# Patient Record
Sex: FEMALE | Race: WHITE | Employment: OTHER | URBAN - METROPOLITAN AREA
[De-identification: names, ages, dates, MRNs, and addresses within clinical notes are randomized per-mention and may not be internally consistent; named-entity substitution may affect disease eponyms.]

---

## 2017-01-17 DIAGNOSIS — I10 ESSENTIAL HYPERTENSION: ICD-10-CM

## 2017-01-17 RX ORDER — AMLODIPINE BESYLATE 10 MG/1
TABLET ORAL
Qty: 90 TABLET | Refills: 0 | Status: SHIPPED | OUTPATIENT
Start: 2017-01-17 | End: 2017-04-08 | Stop reason: SDUPTHER

## 2017-01-17 RX ORDER — LISINOPRIL 40 MG/1
TABLET ORAL
Qty: 90 TABLET | Refills: 0 | Status: SHIPPED | OUTPATIENT
Start: 2017-01-17 | End: 2017-04-08 | Stop reason: SDUPTHER

## 2017-01-25 ENCOUNTER — TELEPHONE (OUTPATIENT)
Dept: FAMILY MEDICINE CLINIC | Age: 62
End: 2017-01-25

## 2017-01-25 ENCOUNTER — OFFICE VISIT (OUTPATIENT)
Dept: FAMILY MEDICINE CLINIC | Age: 62
End: 2017-01-25

## 2017-01-25 VITALS
HEIGHT: 62 IN | SYSTOLIC BLOOD PRESSURE: 170 MMHG | WEIGHT: 264 LBS | BODY MASS INDEX: 48.58 KG/M2 | DIASTOLIC BLOOD PRESSURE: 88 MMHG | RESPIRATION RATE: 12 BRPM | HEART RATE: 84 BPM

## 2017-01-25 DIAGNOSIS — I10 ESSENTIAL HYPERTENSION: ICD-10-CM

## 2017-01-25 DIAGNOSIS — E11.9 TYPE 2 DIABETES MELLITUS WITHOUT COMPLICATION, WITHOUT LONG-TERM CURRENT USE OF INSULIN (HCC): Primary | ICD-10-CM

## 2017-01-25 DIAGNOSIS — E78.00 PURE HYPERCHOLESTEROLEMIA: ICD-10-CM

## 2017-01-25 LAB
ALBUMIN SERPL-MCNC: 4.1 G/DL (ref 3.4–5)
ALP BLD-CCNC: 83 U/L (ref 40–129)
ALT SERPL-CCNC: 11 U/L (ref 10–40)
AST SERPL-CCNC: 12 U/L (ref 15–37)
BILIRUB SERPL-MCNC: 0.3 MG/DL (ref 0–1)
BILIRUBIN DIRECT: <0.2 MG/DL (ref 0–0.3)
BILIRUBIN, INDIRECT: ABNORMAL MG/DL (ref 0–1)
HBA1C MFR BLD: 6.7 %
TOTAL PROTEIN: 7 G/DL (ref 6.4–8.2)

## 2017-01-25 PROCEDURE — 36415 COLL VENOUS BLD VENIPUNCTURE: CPT | Performed by: FAMILY MEDICINE

## 2017-01-25 PROCEDURE — 83036 HEMOGLOBIN GLYCOSYLATED A1C: CPT | Performed by: FAMILY MEDICINE

## 2017-01-25 RX ORDER — CARVEDILOL 6.25 MG/1
6.25 TABLET ORAL 2 TIMES DAILY
Qty: 180 TABLET | Refills: 0 | Status: SHIPPED | OUTPATIENT
Start: 2017-01-25 | End: 2017-02-13 | Stop reason: SINTOL

## 2017-02-13 RX ORDER — TRIAMTERENE AND HYDROCHLOROTHIAZIDE 37.5; 25 MG/1; MG/1
1 CAPSULE ORAL EVERY MORNING
Qty: 30 CAPSULE | Refills: 3 | Status: SHIPPED | OUTPATIENT
Start: 2017-02-13 | End: 2017-05-05 | Stop reason: SDUPTHER

## 2017-02-24 DIAGNOSIS — E11.9 TYPE 2 DIABETES MELLITUS WITHOUT COMPLICATION, WITHOUT LONG-TERM CURRENT USE OF INSULIN (HCC): ICD-10-CM

## 2017-02-24 RX ORDER — ATORVASTATIN CALCIUM 20 MG/1
TABLET, FILM COATED ORAL
Qty: 30 TABLET | Refills: 3 | Status: SHIPPED | OUTPATIENT
Start: 2017-02-24 | End: 2017-05-05 | Stop reason: SDUPTHER

## 2017-04-08 DIAGNOSIS — I10 ESSENTIAL HYPERTENSION: ICD-10-CM

## 2017-04-11 RX ORDER — LISINOPRIL 40 MG/1
TABLET ORAL
Qty: 90 TABLET | Refills: 0 | Status: SHIPPED | OUTPATIENT
Start: 2017-04-11 | End: 2017-05-05 | Stop reason: SDUPTHER

## 2017-04-11 RX ORDER — AMLODIPINE BESYLATE 10 MG/1
TABLET ORAL
Qty: 90 TABLET | Refills: 0 | Status: SHIPPED | OUTPATIENT
Start: 2017-04-11 | End: 2017-07-24 | Stop reason: SDUPTHER

## 2017-05-05 ENCOUNTER — OFFICE VISIT (OUTPATIENT)
Dept: FAMILY MEDICINE CLINIC | Age: 62
End: 2017-05-05

## 2017-05-05 VITALS
HEART RATE: 78 BPM | HEIGHT: 62 IN | SYSTOLIC BLOOD PRESSURE: 162 MMHG | BODY MASS INDEX: 47.66 KG/M2 | DIASTOLIC BLOOD PRESSURE: 82 MMHG | RESPIRATION RATE: 16 BRPM | WEIGHT: 259 LBS

## 2017-05-05 DIAGNOSIS — E11.9 TYPE 2 DIABETES MELLITUS WITHOUT COMPLICATION, WITHOUT LONG-TERM CURRENT USE OF INSULIN (HCC): Primary | ICD-10-CM

## 2017-05-05 DIAGNOSIS — R19.5 POSITIVE FIT (FECAL IMMUNOCHEMICAL TEST): ICD-10-CM

## 2017-05-05 DIAGNOSIS — I10 ESSENTIAL HYPERTENSION: ICD-10-CM

## 2017-05-05 DIAGNOSIS — E78.00 PURE HYPERCHOLESTEROLEMIA: ICD-10-CM

## 2017-05-05 LAB
ANION GAP SERPL CALCULATED.3IONS-SCNC: 18 MMOL/L (ref 3–16)
BUN BLDV-MCNC: 25 MG/DL (ref 7–20)
CALCIUM SERPL-MCNC: 9.1 MG/DL (ref 8.3–10.6)
CHLORIDE BLD-SCNC: 98 MMOL/L (ref 99–110)
CO2: 24 MMOL/L (ref 21–32)
CREAT SERPL-MCNC: 1 MG/DL (ref 0.6–1.2)
GFR AFRICAN AMERICAN: >60
GFR NON-AFRICAN AMERICAN: 56
GLUCOSE BLD-MCNC: 149 MG/DL (ref 70–99)
HBA1C MFR BLD: 6.4 %
POTASSIUM SERPL-SCNC: 4 MMOL/L (ref 3.5–5.1)
SODIUM BLD-SCNC: 140 MMOL/L (ref 136–145)

## 2017-05-05 PROCEDURE — 99999 PR OFFICE/OUTPT VISIT,PROCEDURE ONLY: CPT | Performed by: FAMILY MEDICINE

## 2017-05-05 PROCEDURE — 36415 COLL VENOUS BLD VENIPUNCTURE: CPT | Performed by: FAMILY MEDICINE

## 2017-05-05 PROCEDURE — 83036 HEMOGLOBIN GLYCOSYLATED A1C: CPT | Performed by: FAMILY MEDICINE

## 2017-05-05 RX ORDER — LISINOPRIL 40 MG/1
TABLET ORAL
Qty: 90 TABLET | Refills: 0 | Status: SHIPPED | OUTPATIENT
Start: 2017-05-05 | End: 2017-10-11 | Stop reason: SDUPTHER

## 2017-05-05 RX ORDER — ATORVASTATIN CALCIUM 20 MG/1
TABLET, FILM COATED ORAL
Qty: 90 TABLET | Refills: 0 | Status: SHIPPED | OUTPATIENT
Start: 2017-05-05 | End: 2017-07-24 | Stop reason: SDUPTHER

## 2017-05-05 RX ORDER — TRIAMTERENE AND HYDROCHLOROTHIAZIDE 37.5; 25 MG/1; MG/1
1 CAPSULE ORAL EVERY MORNING
Qty: 90 CAPSULE | Refills: 0 | Status: SHIPPED | OUTPATIENT
Start: 2017-05-05 | End: 2017-07-24 | Stop reason: SDUPTHER

## 2017-05-05 RX ORDER — CLONIDINE HYDROCHLORIDE 0.1 MG/1
0.1 TABLET ORAL 2 TIMES DAILY
Qty: 180 TABLET | Refills: 0 | Status: SHIPPED | OUTPATIENT
Start: 2017-05-05 | End: 2017-08-18

## 2017-05-05 RX ORDER — GLIPIZIDE 5 MG/1
5 TABLET ORAL
Qty: 180 TABLET | Refills: 0 | Status: SHIPPED | OUTPATIENT
Start: 2017-05-05 | End: 2017-10-11 | Stop reason: SDUPTHER

## 2017-07-24 DIAGNOSIS — E11.9 TYPE 2 DIABETES MELLITUS WITHOUT COMPLICATION, WITHOUT LONG-TERM CURRENT USE OF INSULIN (HCC): ICD-10-CM

## 2017-07-24 DIAGNOSIS — I10 ESSENTIAL HYPERTENSION: ICD-10-CM

## 2017-07-24 RX ORDER — TRIAMTERENE AND HYDROCHLOROTHIAZIDE 37.5; 25 MG/1; MG/1
1 CAPSULE ORAL EVERY MORNING
Qty: 90 CAPSULE | Refills: 0 | Status: SHIPPED | OUTPATIENT
Start: 2017-07-24 | End: 2017-10-11 | Stop reason: SDUPTHER

## 2017-07-24 RX ORDER — AMLODIPINE BESYLATE 10 MG/1
TABLET ORAL
Qty: 90 TABLET | Refills: 0 | Status: SHIPPED | OUTPATIENT
Start: 2017-07-24 | End: 2017-10-11 | Stop reason: SDUPTHER

## 2017-07-24 RX ORDER — ATORVASTATIN CALCIUM 20 MG/1
TABLET, FILM COATED ORAL
Qty: 90 TABLET | Refills: 0 | Status: SHIPPED | OUTPATIENT
Start: 2017-07-24 | End: 2017-10-11 | Stop reason: SDUPTHER

## 2017-08-18 ENCOUNTER — OFFICE VISIT (OUTPATIENT)
Dept: FAMILY MEDICINE CLINIC | Age: 62
End: 2017-08-18

## 2017-08-18 VITALS
OXYGEN SATURATION: 97 % | WEIGHT: 278 LBS | DIASTOLIC BLOOD PRESSURE: 74 MMHG | RESPIRATION RATE: 12 BRPM | HEIGHT: 62 IN | HEART RATE: 115 BPM | BODY MASS INDEX: 51.16 KG/M2 | SYSTOLIC BLOOD PRESSURE: 162 MMHG

## 2017-08-18 DIAGNOSIS — E78.00 PURE HYPERCHOLESTEROLEMIA: ICD-10-CM

## 2017-08-18 DIAGNOSIS — R19.5 POSITIVE FIT (FECAL IMMUNOCHEMICAL TEST): ICD-10-CM

## 2017-08-18 DIAGNOSIS — E11.9 TYPE 2 DIABETES MELLITUS WITHOUT COMPLICATION, WITHOUT LONG-TERM CURRENT USE OF INSULIN (HCC): Primary | ICD-10-CM

## 2017-08-18 DIAGNOSIS — I10 ESSENTIAL HYPERTENSION: ICD-10-CM

## 2017-08-18 LAB
ANION GAP SERPL CALCULATED.3IONS-SCNC: 16 MMOL/L (ref 3–16)
BUN BLDV-MCNC: 23 MG/DL (ref 7–20)
CALCIUM SERPL-MCNC: 9.5 MG/DL (ref 8.3–10.6)
CHLORIDE BLD-SCNC: 100 MMOL/L (ref 99–110)
CHOLESTEROL, TOTAL: 131 MG/DL (ref 0–199)
CO2: 26 MMOL/L (ref 21–32)
CREAT SERPL-MCNC: 1 MG/DL (ref 0.6–1.2)
GFR AFRICAN AMERICAN: >60
GFR NON-AFRICAN AMERICAN: 56
GLUCOSE BLD-MCNC: 183 MG/DL (ref 70–99)
HDLC SERPL-MCNC: 49 MG/DL (ref 40–60)
LDL CHOLESTEROL CALCULATED: 62 MG/DL
POTASSIUM SERPL-SCNC: 4.8 MMOL/L (ref 3.5–5.1)
SODIUM BLD-SCNC: 142 MMOL/L (ref 136–145)
TRIGL SERPL-MCNC: 100 MG/DL (ref 0–150)
VLDLC SERPL CALC-MCNC: 20 MG/DL

## 2017-08-18 PROCEDURE — 36415 COLL VENOUS BLD VENIPUNCTURE: CPT | Performed by: FAMILY MEDICINE

## 2017-08-18 PROCEDURE — 99999 PR OFFICE/OUTPT VISIT,PROCEDURE ONLY: CPT | Performed by: FAMILY MEDICINE

## 2017-08-19 LAB
ESTIMATED AVERAGE GLUCOSE: 151.3 MG/DL
HBA1C MFR BLD: 6.9 %

## 2017-08-25 ENCOUNTER — OFFICE VISIT (OUTPATIENT)
Dept: FAMILY MEDICINE CLINIC | Age: 62
End: 2017-08-25

## 2017-08-25 VITALS — DIASTOLIC BLOOD PRESSURE: 96 MMHG | SYSTOLIC BLOOD PRESSURE: 196 MMHG | RESPIRATION RATE: 12 BRPM | HEART RATE: 82 BPM

## 2017-08-25 DIAGNOSIS — I10 ESSENTIAL HYPERTENSION: Primary | ICD-10-CM

## 2017-08-25 PROCEDURE — 99999 PR OFFICE/OUTPT VISIT,PROCEDURE ONLY: CPT | Performed by: FAMILY MEDICINE

## 2017-08-25 RX ORDER — SPIRONOLACTONE 25 MG/1
25 TABLET ORAL DAILY
Qty: 30 TABLET | Refills: 3 | Status: CANCELLED | OUTPATIENT
Start: 2017-08-25

## 2017-08-25 RX ORDER — CHLORTHALIDONE 25 MG/1
25 TABLET ORAL DAILY
Qty: 30 TABLET | Refills: 3 | Status: CANCELLED | OUTPATIENT
Start: 2017-08-25

## 2017-10-11 DIAGNOSIS — I10 ESSENTIAL HYPERTENSION: ICD-10-CM

## 2017-10-11 DIAGNOSIS — E11.9 TYPE 2 DIABETES MELLITUS WITHOUT COMPLICATION, WITHOUT LONG-TERM CURRENT USE OF INSULIN (HCC): ICD-10-CM

## 2017-10-12 RX ORDER — GLIPIZIDE 5 MG/1
5 TABLET ORAL
Qty: 180 TABLET | Refills: 0 | Status: SHIPPED | OUTPATIENT
Start: 2017-10-12 | End: 2017-10-27 | Stop reason: SDUPTHER

## 2017-10-12 RX ORDER — AMLODIPINE BESYLATE 10 MG/1
TABLET ORAL
Qty: 90 TABLET | Refills: 0 | Status: SHIPPED | OUTPATIENT
Start: 2017-10-12 | End: 2017-12-18 | Stop reason: SINTOL

## 2017-10-12 RX ORDER — LISINOPRIL 40 MG/1
TABLET ORAL
Qty: 90 TABLET | Refills: 0 | Status: SHIPPED | OUTPATIENT
Start: 2017-10-12 | End: 2017-10-27 | Stop reason: SINTOL

## 2017-10-12 RX ORDER — ATORVASTATIN CALCIUM 20 MG/1
TABLET, FILM COATED ORAL
Qty: 90 TABLET | Refills: 0 | Status: SHIPPED | OUTPATIENT
Start: 2017-10-12 | End: 2018-01-05 | Stop reason: SDUPTHER

## 2017-10-12 RX ORDER — TRIAMTERENE AND HYDROCHLOROTHIAZIDE 37.5; 25 MG/1; MG/1
1 CAPSULE ORAL EVERY MORNING
Qty: 90 CAPSULE | Refills: 0 | Status: SHIPPED | OUTPATIENT
Start: 2017-10-12 | End: 2018-01-05 | Stop reason: ALTCHOICE

## 2017-10-25 ENCOUNTER — PATIENT MESSAGE (OUTPATIENT)
Dept: FAMILY MEDICINE CLINIC | Age: 62
End: 2017-10-25

## 2017-10-25 DIAGNOSIS — E11.9 TYPE 2 DIABETES MELLITUS WITHOUT COMPLICATION, WITHOUT LONG-TERM CURRENT USE OF INSULIN (HCC): ICD-10-CM

## 2017-10-25 NOTE — TELEPHONE ENCOUNTER
From: Odin Kebede  To: Antonia Dacosta MD  Sent: 10/25/2017 5:05 PM EDT  Subject: Prescription Question    Allegra,  I've given it a good shot, but I can't put up with the horrible side effects from the medications that I'm on. Could you please advise me as to which meds I can drastically reduce immediately? We can reassess when I have my next appointment in November.   Thanks,  Fabi Pierre

## 2017-10-27 RX ORDER — GLIPIZIDE 5 MG/1
2.5 TABLET ORAL
Qty: 90 TABLET | Refills: 0
Start: 2017-10-27 | End: 2018-04-02 | Stop reason: SDUPTHER

## 2017-10-27 RX ORDER — LOSARTAN POTASSIUM 100 MG/1
100 TABLET ORAL DAILY
Qty: 90 TABLET | Refills: 0 | Status: SHIPPED | OUTPATIENT
Start: 2017-10-27 | End: 2018-03-26 | Stop reason: ALTCHOICE

## 2017-12-18 ENCOUNTER — OFFICE VISIT (OUTPATIENT)
Dept: FAMILY MEDICINE CLINIC | Age: 62
End: 2017-12-18

## 2017-12-18 VITALS
DIASTOLIC BLOOD PRESSURE: 84 MMHG | BODY MASS INDEX: 52.81 KG/M2 | RESPIRATION RATE: 16 BRPM | HEIGHT: 62 IN | WEIGHT: 287 LBS | HEART RATE: 64 BPM | SYSTOLIC BLOOD PRESSURE: 172 MMHG

## 2017-12-18 DIAGNOSIS — E78.00 PURE HYPERCHOLESTEROLEMIA: ICD-10-CM

## 2017-12-18 DIAGNOSIS — I10 ESSENTIAL HYPERTENSION: ICD-10-CM

## 2017-12-18 DIAGNOSIS — E11.9 TYPE 2 DIABETES MELLITUS WITHOUT COMPLICATION, WITHOUT LONG-TERM CURRENT USE OF INSULIN (HCC): Primary | ICD-10-CM

## 2017-12-18 DIAGNOSIS — R06.09 DOE (DYSPNEA ON EXERTION): ICD-10-CM

## 2017-12-18 DIAGNOSIS — R53.82 CHRONIC FATIGUE: ICD-10-CM

## 2017-12-18 DIAGNOSIS — R19.5 POSITIVE FIT (FECAL IMMUNOCHEMICAL TEST): ICD-10-CM

## 2017-12-18 LAB
ALBUMIN SERPL-MCNC: 4 G/DL (ref 3.4–5)
ALP BLD-CCNC: 84 U/L (ref 40–129)
ALT SERPL-CCNC: 13 U/L (ref 10–40)
ANION GAP SERPL CALCULATED.3IONS-SCNC: 19 MMOL/L (ref 3–16)
AST SERPL-CCNC: 11 U/L (ref 15–37)
BILIRUB SERPL-MCNC: 0.3 MG/DL (ref 0–1)
BILIRUBIN DIRECT: <0.2 MG/DL (ref 0–0.3)
BILIRUBIN, INDIRECT: ABNORMAL MG/DL (ref 0–1)
BUN BLDV-MCNC: 21 MG/DL (ref 7–20)
CALCIUM SERPL-MCNC: 9.2 MG/DL (ref 8.3–10.6)
CHLORIDE BLD-SCNC: 101 MMOL/L (ref 99–110)
CO2: 25 MMOL/L (ref 21–32)
CREAT SERPL-MCNC: 1 MG/DL (ref 0.6–1.2)
GFR AFRICAN AMERICAN: >60
GFR NON-AFRICAN AMERICAN: 56
GLUCOSE BLD-MCNC: 176 MG/DL (ref 70–99)
HBA1C MFR BLD: 7.2 %
POTASSIUM SERPL-SCNC: 4.4 MMOL/L (ref 3.5–5.1)
PRO-BNP: 105 PG/ML (ref 0–124)
SODIUM BLD-SCNC: 145 MMOL/L (ref 136–145)
TOTAL PROTEIN: 7.1 G/DL (ref 6.4–8.2)
TSH REFLEX: 1.95 UIU/ML (ref 0.27–4.2)

## 2017-12-18 PROCEDURE — 99999 PR OFFICE/OUTPT VISIT,PROCEDURE ONLY: CPT | Performed by: FAMILY MEDICINE

## 2017-12-18 PROCEDURE — 36415 COLL VENOUS BLD VENIPUNCTURE: CPT | Performed by: FAMILY MEDICINE

## 2017-12-18 PROCEDURE — 83036 HEMOGLOBIN GLYCOSYLATED A1C: CPT | Performed by: FAMILY MEDICINE

## 2017-12-18 NOTE — PROGRESS NOTES
significantly. She is to be on Coreg in the past, but also stopped this secondary to shortness of breath, sweating, and heart pounding. Clonidine was stopped secondary to decreased energy and drowsiness. The only blood pressure medicine she is currently taking his triamterene/hydrochlorothiazide. She is no longer taking home blood pressure measurements. .  She is adherent to a low sodium diet. Patient denies chest pain,  headache, lightheadedness, blurred vision, palpitations, dry cough and fatigue. Use of agents associated with hypertension: none. Hyperlipidemia:  No new myalgias or GI upset on atorvastatin (Lipitor). Medication compliance: compliant most of the time. Patient is  following a low fat, low cholesterol diet.   She is Not exercising regularly due to CHÁVEZ      Lab Results   Component Value Date    LABA1C 6.9 08/18/2017    LABA1C 6.4 05/05/2017    LABA1C 6.7 01/25/2017     Lab Results   Component Value Date    CREATININE 1.0 08/18/2017     Lab Results   Component Value Date    ALT 11 01/25/2017    AST 12 (L) 01/25/2017     Lab Results   Component Value Date    CHOL 131 08/18/2017    TRIG 100 08/18/2017    HDL 49 08/18/2017    LDLCALC 62 08/18/2017        Vitals:    12/18/17 1000 12/18/17 1018   BP: (!) 176/86 (!) 172/84   Pulse: 64    Resp: 16    Weight: 287 lb (130.2 kg)    Height: 5' 1.75\" (1.568 m)        Outpatient Prescriptions Marked as Taking for the 12/18/17 encounter (Office Visit) with Jasper Zapata MD   Medication Sig Dispense Refill    glipiZIDE (GLUCOTROL) 5 MG tablet Take 0.5 tablets by mouth 2 times daily (before meals) 90 tablet 0    triamterene-hydrochlorothiazide (DYAZIDE) 37.5-25 MG per capsule Take 1 capsule by mouth every morning 90 capsule 0    atorvastatin (LIPITOR) 20 MG tablet TAKE 1 TABLET BY MOUTH ONE TIME A DAY 90 tablet 0    metFORMIN (GLUCOPHAGE) 1000 MG tablet Take 1 tablet by mouth 2 times daily (with meals) 180 tablet 0    aspirin EC 81 MG EC tablet Take 1 tablet by mouth daily 30 tablet 3       Past Medical History:   Diagnosis Date    Diabetes ([address]     HLD (hyperlipidemia)     Hypertension     Positive FIT (fecal immunochemical test)        History reviewed. No pertinent surgical history. Social History   Substance Use Topics    Smoking status: Never Smoker    Smokeless tobacco: Never Used    Alcohol use 1.2 oz/week     2 Glasses of wine per week      Comment: only on occasional       Family History   Problem Relation Age of Onset    Lung Cancer Mother     High Blood Pressure Father     Lung Cancer Father     Lupus Sister      Review of Systems   see hpi    Objective:   Physical Exam     Constitutional:   · Reviewed vitals above  · Well Nourished, well developed, no distress       Neck:  · Symmetric and without masses  · No thyromegaly  Resp:  · Normal effort  · Clear to auscultation bilaterally without rhonchi, wheezing or crackles  Cardiovascular:  · On auscultation, normal S1 and S2 without murmurs, rubs or gallops  · No bruits of bilateral carotids and no JVD  Gastrointestinal:  · Nontender, nondistended, and no masses  · No hepatosplenomegaly  Musculoskeletal:  · Normal Gait  · +2 pitting edema to level of lower leg, just proximal to ankles bilaterally  Skin:  · No rashes on inspection  Psych:  · Normal mood and affect  · Normal insight and judgement        Assessment:      See below      Plan:       CHÁVEZ (dyspnea on exertion)  Once again discussed with patient that I do not believe her medicines are causing her symptoms, but I am worried that it could be CAD or CHF given the fact that she has risk factors for these conditions. Discussed starting with EKG in the office followed by stress test. Pt opting not to have EKG in office as doesn't have insurance, he wants to save her money for the stress test.    Checking BNP today.     Patient told not to exert herself until after she gets stress test completed and we can rule out CAD.    - NM Myocardial Spect Rest Exercise or Rx; Future  - BRAIN NATRIURETIC PEPTIDE (BNP)      Type 2 diabetes mellitus without complication, without long-term current use of insulin (HCC)  - POCT glycosylated hemoglobin (Hb A1C)=7.2    Slightly above goal.  Patient wants to work on lower carbohydrate diet instead of increasing meds. Pt told to continue meds:  Metformin and glipizide. Continue ASA and statin. Pt told to start ARB. Pt had DM eye exam, requesting resutls. Essential hypertension  Not controlled. Encouraged pt to start losartan 100mg and continue triamterene/HCTZ 37.5/25mg. she only wants to start on blood pressure medicine at the time as she is very anxious about potential side effects. If tolerated, and BP still high in future, might change triamterene/HCTZ  to chlorthalidone and aldactone, and might try metoprolol  and/or diltiazem in the future   - Basic Metabolic Panel    Pure hypercholesterolemia  Patient to continue Lipitor. Checking LFTs. - Hepatic Function Panel         Positive FIT (fecal immunochemical test)  Once again, discussed the importance of having f/u colonoscopy. Pt's main barrier is the fact she doesn't have insurance. Sent to  [employer] to see if they can offer a financial discount, but patient states she's not going to do this right now. She understands the risk of doing so. She does get Medicare next year, and plans to wait until then. Chronic fatigue  Checking the following  - TSH with Reflex        HM:  Gave mammo van number: Again patient not planning on getting this done, and understands the risk of not doing so. Krysta Chapman

## 2018-01-04 ENCOUNTER — TELEPHONE (OUTPATIENT)
Dept: FAMILY MEDICINE CLINIC | Age: 63
End: 2018-01-04

## 2018-01-05 ENCOUNTER — OFFICE VISIT (OUTPATIENT)
Dept: FAMILY MEDICINE CLINIC | Age: 63
End: 2018-01-05

## 2018-01-05 ENCOUNTER — TELEPHONE (OUTPATIENT)
Dept: FAMILY MEDICINE CLINIC | Age: 63
End: 2018-01-05

## 2018-01-05 VITALS — OXYGEN SATURATION: 98 % | DIASTOLIC BLOOD PRESSURE: 90 MMHG | SYSTOLIC BLOOD PRESSURE: 158 MMHG | HEART RATE: 109 BPM

## 2018-01-05 DIAGNOSIS — I10 ESSENTIAL HYPERTENSION: ICD-10-CM

## 2018-01-05 DIAGNOSIS — E11.9 TYPE 2 DIABETES MELLITUS WITHOUT COMPLICATION, WITHOUT LONG-TERM CURRENT USE OF INSULIN (HCC): ICD-10-CM

## 2018-01-05 PROCEDURE — 99999 PR OFFICE/OUTPT VISIT,PROCEDURE ONLY: CPT | Performed by: FAMILY MEDICINE

## 2018-01-05 RX ORDER — SPIRONOLACTONE 25 MG/1
25 TABLET ORAL DAILY
Qty: 30 TABLET | Refills: 0 | Status: SHIPPED | OUTPATIENT
Start: 2018-01-05 | End: 2018-01-31 | Stop reason: SDUPTHER

## 2018-01-05 RX ORDER — CHLORTHALIDONE 25 MG/1
25 TABLET ORAL DAILY
Qty: 30 TABLET | Refills: 0 | Status: SHIPPED | OUTPATIENT
Start: 2018-01-05 | End: 2018-01-31 | Stop reason: SDUPTHER

## 2018-01-05 RX ORDER — TRIAMTERENE AND HYDROCHLOROTHIAZIDE 37.5; 25 MG/1; MG/1
1 CAPSULE ORAL EVERY MORNING
Qty: 90 CAPSULE | Refills: 0 | Status: CANCELLED | OUTPATIENT
Start: 2018-01-05

## 2018-01-05 RX ORDER — ATORVASTATIN CALCIUM 20 MG/1
TABLET, FILM COATED ORAL
Qty: 90 TABLET | Refills: 0 | Status: SHIPPED | OUTPATIENT
Start: 2018-01-05 | End: 2018-04-02 | Stop reason: SDUPTHER

## 2018-01-05 NOTE — PROGRESS NOTES
History reviewed. No pertinent surgical history. Social History   Substance Use Topics    Smoking status: Never Smoker    Smokeless tobacco: Never Used    Alcohol use 1.2 oz/week     2 Glasses of wine per week      Comment: only on occasional       Family History   Problem Relation Age of Onset    Lung Cancer Mother     High Blood Pressure Father     Lung Cancer Father     Lupus Sister      Review of Systems   see hpi    Objective:   Physical Exam     Constitutional:   · Reviewed vitals above  · Well Nourished, well developed, no distress       Neck:  · Symmetric and without masses  · No thyromegaly  Resp:  · Normal effort  · Clear to auscultation bilaterally without rhonchi, wheezing or crackles  Cardiovascular:  · On auscultation, normal S1 and S2 without murmurs, rubs or gallops  · No bruits of bilateral carotids and no JVD  Gastrointestinal:  · Nontender, nondistended, and no masses  · No hepatosplenomegaly  Musculoskeletal:  · Normal Gait  · +2 pitting edema to level of lower leg, just proximal to ankles bilaterally  Skin:  · No rashes on inspection  Psych:  · Normal mood and affect  · Normal insight and judgement        Assessment:      See below      Plan:       CHÁVEZ (dyspnea on exertion)  Once again discussed with patient that I do not believe her medicines are causing her symptoms, but I am worried that it could be CAD or CHF given the fact that she has risk factors for these conditions. Her BMP was very low, making CHF very unlikely. Asked patient to schedule stress test to rule out CAD ASAP. Patient told to take it C and not exert herself until we have stress test results back. Essential hypertension  Not controlled, but improving. Pt to continue  losartan 100mg and will change triamterene/HCTZ 37.5/25mg to chlorthalidone and aldactone,     Recent BMP shows stable stage III chronic kidney disease. We'll need to continue to monitor this closely.     Might try metoprolol  and/or diltiazem in the future for further decrease

## 2018-01-05 NOTE — TELEPHONE ENCOUNTER
Patient stated that scheduling has scheduled her stress test for Tuesday. Test is for a treadmill stress test, patient needs a chemical stress test. Please advise.  Any further questions or concerns patient can be reached at [phone]

## 2018-01-05 NOTE — TELEPHONE ENCOUNTER
I looked at the previous telephone message and it was taken care of, I called patient and let her know

## 2018-01-09 ENCOUNTER — HOSPITAL ENCOUNTER (OUTPATIENT)
Dept: NON INVASIVE DIAGNOSTICS | Age: 63
Discharge: HOME | End: 2018-01-09
Attending: FAMILY MEDICINE | Admitting: FAMILY MEDICINE

## 2018-01-09 ENCOUNTER — NURSE ONLY (OUTPATIENT)
Dept: FAMILY MEDICINE CLINIC | Age: 63
End: 2018-01-09

## 2018-01-09 ENCOUNTER — TELEPHONE (OUTPATIENT)
Dept: FAMILY MEDICINE CLINIC | Age: 63
End: 2018-01-09

## 2018-01-09 VITALS — SYSTOLIC BLOOD PRESSURE: 172 MMHG | DIASTOLIC BLOOD PRESSURE: 84 MMHG

## 2018-01-09 DIAGNOSIS — I10 ESSENTIAL HYPERTENSION: Primary | ICD-10-CM

## 2018-01-09 DIAGNOSIS — F51.04 PSYCHOPHYSIOLOGICAL INSOMNIA: ICD-10-CM

## 2018-01-09 DIAGNOSIS — R06.09 OTHER FORMS OF DYSPNEA: ICD-10-CM

## 2018-01-09 LAB
LV EF: 69 %
LVEF MODALITY: NORMAL

## 2018-01-09 RX ORDER — DIAZEPAM 2 MG/1
TABLET ORAL
Qty: 30 TABLET | Refills: 0 | Status: SHIPPED | OUTPATIENT
Start: 2018-01-09 | End: 2018-03-09

## 2018-01-09 NOTE — TELEPHONE ENCOUNTER
Jelena Molina called from the stress lab. Pt is done with her stress test and is going to have her secon sets of imaging in 30 minutes. She states that pt's vitals are  B/P :210/113, Pulse: 116 and this is without AM meds. Dr. Carol Kendall do you want pt to have meds through her IV? Pt  Called  on her cell phone, and informed. Called Marcy back and she let her know that Luisana Ramos says not to give patient medications with the IV. But have patient come straight here after she is done with everything there. We need to monitor her blood pressure.

## 2018-01-31 RX ORDER — SPIRONOLACTONE 25 MG/1
25 TABLET ORAL DAILY
Qty: 90 TABLET | Refills: 0 | Status: CANCELLED | OUTPATIENT
Start: 2018-01-31

## 2018-01-31 RX ORDER — CHLORTHALIDONE 25 MG/1
25 TABLET ORAL DAILY
Qty: 90 TABLET | Refills: 0 | Status: CANCELLED | OUTPATIENT
Start: 2018-01-31

## 2018-01-31 NOTE — TELEPHONE ENCOUNTER
From: Martell Villagomez  Sent: 1/31/2018 9:37 AM EST  Subject: Medication Renewal Request    Martell Villagomez would like a refill of the following medications:  spironolactone (ALDACTONE) 25 MG tablet Rosa Elena Taylor MD]  chlorthalidone (HYGROTON) 25 MG tablet Rosa Elena Taylor MD]    Preferred pharmacy: [facility] [address], [address] [facility] [address] [facility]    Comment:  Hi Frankey Lety with the above meds, need a prescription for a 90 day supply at the [facility]. I know you also wanted to add another prescription to those, so you can do that at the same time. Also, have had an annoying rash on my right shin (about 5\" long), not really red, but very small bumps under the skin, so I wondered if you could also call in a prescription to help with that. Blood pressure at: 163/86, 172/84, 167/84, 150/94 and 163/86.  Thanks, Mary Alice Carlson

## 2018-02-26 ENCOUNTER — TELEPHONE (OUTPATIENT)
Dept: FAMILY MEDICINE CLINIC | Age: 63
End: 2018-02-26

## 2018-02-26 RX ORDER — METHYLPREDNISOLONE 4 MG/1
TABLET ORAL
Qty: 1 KIT | Refills: 0 | Status: SHIPPED | OUTPATIENT
Start: 2018-02-26 | End: 2018-03-04

## 2018-03-21 ENCOUNTER — TELEPHONE (OUTPATIENT)
Dept: FAMILY MEDICINE CLINIC | Age: 63
End: 2018-03-21

## 2018-03-21 DIAGNOSIS — I10 ESSENTIAL HYPERTENSION: Primary | ICD-10-CM

## 2018-03-21 NOTE — TELEPHONE ENCOUNTER
Requesting to speak with Dr Salcedo Crimes. States she has been feeling lousy lately, wanting a check up and lab work. Offered her an appointment for this afternoon, she declined. First available for her schedule to meet with your schedule is 4/6.  Please call Steve Landing back at [phone]

## 2018-04-02 ENCOUNTER — NURSE ONLY (OUTPATIENT)
Dept: FAMILY MEDICINE CLINIC | Age: 63
End: 2018-04-02

## 2018-04-02 DIAGNOSIS — E11.9 TYPE 2 DIABETES MELLITUS WITHOUT COMPLICATION, WITHOUT LONG-TERM CURRENT USE OF INSULIN (HCC): ICD-10-CM

## 2018-04-02 DIAGNOSIS — I10 ESSENTIAL HYPERTENSION: ICD-10-CM

## 2018-04-02 LAB
ANION GAP SERPL CALCULATED.3IONS-SCNC: 14 MMOL/L (ref 3–16)
BUN BLDV-MCNC: 22 MG/DL (ref 7–20)
CALCIUM SERPL-MCNC: 8.7 MG/DL (ref 8.3–10.6)
CHLORIDE BLD-SCNC: 99 MMOL/L (ref 99–110)
CO2: 30 MMOL/L (ref 21–32)
CREAT SERPL-MCNC: 0.9 MG/DL (ref 0.6–1.2)
GFR AFRICAN AMERICAN: >60
GFR NON-AFRICAN AMERICAN: >60
GLUCOSE BLD-MCNC: 194 MG/DL (ref 70–99)
POTASSIUM SERPL-SCNC: 3.8 MMOL/L (ref 3.5–5.1)
SODIUM BLD-SCNC: 143 MMOL/L (ref 136–145)

## 2018-04-02 RX ORDER — GLIPIZIDE 5 MG/1
2.5 TABLET ORAL
Qty: 90 TABLET | Refills: 0 | Status: SHIPPED | OUTPATIENT
Start: 2018-04-02 | End: 2018-05-17 | Stop reason: SDUPTHER

## 2018-04-02 RX ORDER — ATORVASTATIN CALCIUM 20 MG/1
TABLET, FILM COATED ORAL
Qty: 90 TABLET | Refills: 0 | Status: SHIPPED | OUTPATIENT
Start: 2018-04-02 | End: 2018-07-19 | Stop reason: SDUPTHER

## 2018-04-04 ENCOUNTER — TELEPHONE (OUTPATIENT)
Dept: FAMILY MEDICINE CLINIC | Age: 63
End: 2018-04-04

## 2018-04-05 ENCOUNTER — TELEPHONE (OUTPATIENT)
Dept: FAMILY MEDICINE CLINIC | Age: 63
End: 2018-04-05

## 2018-04-16 ENCOUNTER — NURSE ONLY (OUTPATIENT)
Dept: FAMILY MEDICINE CLINIC | Age: 63
End: 2018-04-16

## 2018-04-16 DIAGNOSIS — R60.1 GENERALIZED EDEMA: ICD-10-CM

## 2018-04-16 DIAGNOSIS — I10 ESSENTIAL HYPERTENSION: ICD-10-CM

## 2018-04-16 DIAGNOSIS — N18.9 CKD (CHRONIC KIDNEY DISEASE), SYMPTOM MANAGEMENT ONLY, UNSPECIFIED STAGE: ICD-10-CM

## 2018-04-16 DIAGNOSIS — I10 HYPERTENSION, UNSPECIFIED TYPE: ICD-10-CM

## 2018-04-16 LAB
ANION GAP SERPL CALCULATED.3IONS-SCNC: 18 MMOL/L (ref 3–16)
BUN BLDV-MCNC: 20 MG/DL (ref 7–20)
CALCIUM SERPL-MCNC: 8.9 MG/DL (ref 8.3–10.6)
CHLORIDE BLD-SCNC: 96 MMOL/L (ref 99–110)
CO2: 28 MMOL/L (ref 21–32)
CORTISOL - AM: 18 UG/DL (ref 4.3–22.4)
CREAT SERPL-MCNC: 0.9 MG/DL (ref 0.6–1.2)
GFR AFRICAN AMERICAN: >60
GFR NON-AFRICAN AMERICAN: >60
GLUCOSE BLD-MCNC: 188 MG/DL (ref 70–99)
POTASSIUM SERPL-SCNC: 3.6 MMOL/L (ref 3.5–5.1)
SODIUM BLD-SCNC: 142 MMOL/L (ref 136–145)
TSH REFLEX: 2.05 UIU/ML (ref 0.27–4.2)

## 2018-05-08 ENCOUNTER — TELEPHONE (OUTPATIENT)
Dept: FAMILY MEDICINE CLINIC | Age: 63
End: 2018-05-08

## 2018-05-14 ENCOUNTER — NURSE ONLY (OUTPATIENT)
Dept: FAMILY MEDICINE CLINIC | Age: 63
End: 2018-05-14

## 2018-05-14 DIAGNOSIS — E11.319 TYPE 2 DIABETES MELLITUS WITH RETINOPATHY, WITHOUT LONG-TERM CURRENT USE OF INSULIN, MACULAR EDEMA PRESENCE UNSPECIFIED, UNSPECIFIED LATERALITY, UNSPECIFIED RETINOPATHY SEVERITY (HCC): Primary | ICD-10-CM

## 2018-05-14 DIAGNOSIS — R60.1 ANASARCA: ICD-10-CM

## 2018-05-14 LAB
ANION GAP SERPL CALCULATED.3IONS-SCNC: 16 MMOL/L (ref 3–16)
BUN BLDV-MCNC: 41 MG/DL (ref 7–20)
CALCIUM SERPL-MCNC: 9.3 MG/DL (ref 8.3–10.6)
CHLORIDE BLD-SCNC: 87 MMOL/L (ref 99–110)
CO2: 35 MMOL/L (ref 21–32)
CREAT SERPL-MCNC: 1.2 MG/DL (ref 0.6–1.2)
GFR AFRICAN AMERICAN: 55
GFR NON-AFRICAN AMERICAN: 45
GLUCOSE BLD-MCNC: 302 MG/DL (ref 70–99)
POTASSIUM SERPL-SCNC: 3.3 MMOL/L (ref 3.5–5.1)
SODIUM BLD-SCNC: 138 MMOL/L (ref 136–145)

## 2018-05-15 LAB
ESTIMATED AVERAGE GLUCOSE: 177.2 MG/DL
HBA1C MFR BLD: 7.8 %

## 2018-05-17 DIAGNOSIS — E11.9 TYPE 2 DIABETES MELLITUS WITHOUT COMPLICATION, WITHOUT LONG-TERM CURRENT USE OF INSULIN (HCC): ICD-10-CM

## 2018-05-17 RX ORDER — GLIPIZIDE 5 MG/1
5 TABLET ORAL
Qty: 180 TABLET | Refills: 0
Start: 2018-05-17 | End: 2018-06-11 | Stop reason: SDUPTHER

## 2018-06-11 ENCOUNTER — TELEPHONE (OUTPATIENT)
Dept: FAMILY MEDICINE CLINIC | Age: 63
End: 2018-06-11

## 2018-06-11 DIAGNOSIS — E11.9 TYPE 2 DIABETES MELLITUS WITHOUT COMPLICATION, WITHOUT LONG-TERM CURRENT USE OF INSULIN (HCC): ICD-10-CM

## 2018-06-11 RX ORDER — GLIPIZIDE 5 MG/1
5 TABLET ORAL
Qty: 180 TABLET | Refills: 0 | Status: SHIPPED | OUTPATIENT
Start: 2018-06-11 | End: 2018-09-10 | Stop reason: SDUPTHER

## 2018-06-13 ENCOUNTER — TELEPHONE (OUTPATIENT)
Dept: FAMILY MEDICINE CLINIC | Age: 63
End: 2018-06-13

## 2018-06-15 ENCOUNTER — OFFICE VISIT (OUTPATIENT)
Dept: FAMILY MEDICINE CLINIC | Age: 63
End: 2018-06-15

## 2018-06-15 VITALS
WEIGHT: 272.5 LBS | SYSTOLIC BLOOD PRESSURE: 140 MMHG | HEART RATE: 105 BPM | BODY MASS INDEX: 48.28 KG/M2 | HEIGHT: 63 IN | OXYGEN SATURATION: 94 % | DIASTOLIC BLOOD PRESSURE: 78 MMHG

## 2018-06-15 DIAGNOSIS — I10 ESSENTIAL HYPERTENSION: ICD-10-CM

## 2018-06-15 DIAGNOSIS — I50.32 CHRONIC DIASTOLIC HEART FAILURE (HCC): Primary | ICD-10-CM

## 2018-06-15 LAB
ANION GAP SERPL CALCULATED.3IONS-SCNC: 18 MMOL/L (ref 3–16)
BUN BLDV-MCNC: 40 MG/DL (ref 7–20)
CALCIUM SERPL-MCNC: 9.6 MG/DL (ref 8.3–10.6)
CHLORIDE BLD-SCNC: 91 MMOL/L (ref 99–110)
CO2: 31 MMOL/L (ref 21–32)
CREAT SERPL-MCNC: 1.2 MG/DL (ref 0.6–1.2)
GFR AFRICAN AMERICAN: 55
GFR NON-AFRICAN AMERICAN: 45
GLUCOSE BLD-MCNC: 184 MG/DL (ref 70–99)
POTASSIUM SERPL-SCNC: 3.4 MMOL/L (ref 3.5–5.1)
SODIUM BLD-SCNC: 140 MMOL/L (ref 136–145)

## 2018-06-15 PROCEDURE — 36415 COLL VENOUS BLD VENIPUNCTURE: CPT | Performed by: FAMILY MEDICINE

## 2018-06-15 ASSESSMENT — PATIENT HEALTH QUESTIONNAIRE - PHQ9
2. FEELING DOWN, DEPRESSED OR HOPELESS: 0
1. LITTLE INTEREST OR PLEASURE IN DOING THINGS: 0
SUM OF ALL RESPONSES TO PHQ QUESTIONS 1-9: 0
SUM OF ALL RESPONSES TO PHQ9 QUESTIONS 1 & 2: 0

## 2018-06-18 ENCOUNTER — TELEPHONE (OUTPATIENT)
Dept: FAMILY MEDICINE CLINIC | Age: 63
End: 2018-06-18

## 2018-06-18 RX ORDER — POTASSIUM CHLORIDE 750 MG/1
10 TABLET, FILM COATED, EXTENDED RELEASE ORAL DAILY
Qty: 30 TABLET | Refills: 0 | Status: SHIPPED | OUTPATIENT
Start: 2018-06-18 | End: 2018-07-18 | Stop reason: SDUPTHER

## 2018-06-18 RX ORDER — METOLAZONE 5 MG/1
TABLET ORAL
Qty: 30 TABLET | Refills: 0 | Status: SHIPPED | OUTPATIENT
Start: 2018-06-18 | End: 2018-06-19 | Stop reason: SDUPTHER

## 2018-06-19 RX ORDER — METOLAZONE 2.5 MG/1
TABLET ORAL
Qty: 30 TABLET | Refills: 0 | Status: SHIPPED | OUTPATIENT
Start: 2018-06-19 | End: 2018-06-29 | Stop reason: SDUPTHER

## 2018-06-29 ENCOUNTER — TELEPHONE (OUTPATIENT)
Dept: FAMILY MEDICINE CLINIC | Age: 63
End: 2018-06-29

## 2018-06-29 RX ORDER — METOLAZONE 2.5 MG/1
TABLET ORAL
Qty: 30 TABLET | Refills: 0
Start: 2018-06-29 | End: 2018-07-18 | Stop reason: SDUPTHER

## 2018-07-16 ENCOUNTER — NURSE ONLY (OUTPATIENT)
Dept: FAMILY MEDICINE CLINIC | Age: 63
End: 2018-07-16

## 2018-07-16 DIAGNOSIS — E87.6 LOW BLOOD POTASSIUM: Primary | ICD-10-CM

## 2018-07-16 LAB
ANION GAP SERPL CALCULATED.3IONS-SCNC: 19 MMOL/L (ref 3–16)
BUN BLDV-MCNC: 46 MG/DL (ref 7–20)
CALCIUM SERPL-MCNC: 9.4 MG/DL (ref 8.3–10.6)
CHLORIDE BLD-SCNC: 89 MMOL/L (ref 99–110)
CO2: 32 MMOL/L (ref 21–32)
CREAT SERPL-MCNC: 1.4 MG/DL (ref 0.6–1.2)
GFR AFRICAN AMERICAN: 46
GFR NON-AFRICAN AMERICAN: 38
GLUCOSE BLD-MCNC: 290 MG/DL (ref 70–99)
MAGNESIUM: 2.1 MG/DL (ref 1.8–2.4)
POTASSIUM SERPL-SCNC: 3.4 MMOL/L (ref 3.5–5.1)
SODIUM BLD-SCNC: 140 MMOL/L (ref 136–145)

## 2018-07-16 PROCEDURE — 36415 COLL VENOUS BLD VENIPUNCTURE: CPT | Performed by: FAMILY MEDICINE

## 2018-07-19 ENCOUNTER — TELEPHONE (OUTPATIENT)
Dept: FAMILY MEDICINE CLINIC | Age: 63
End: 2018-07-19

## 2018-07-19 DIAGNOSIS — E11.9 TYPE 2 DIABETES MELLITUS WITHOUT COMPLICATION, WITHOUT LONG-TERM CURRENT USE OF INSULIN (HCC): ICD-10-CM

## 2018-07-19 NOTE — TELEPHONE ENCOUNTER
Patient requesting a medication refill. Medication  metFORMIN (GLUCOPHAGE)  Dosage  1000 MG tablet   FrequencyTake 1 tablet by mouth 2 times daily (with meals  Last filled on 04/02/18  Pharmacy  [facility] [id number]         Patient requesting a medication refill.   Medication  atorvastatin (LIPITOR)  Dosage  20 MG tablet   Frequency TAKE 1 TABLET BY MOUTH ONE TIME A DAY  Last filled on  04/02/18  Pharmacy  [facility] [address] Sheri Adan

## 2018-07-20 RX ORDER — ATORVASTATIN CALCIUM 20 MG/1
TABLET, FILM COATED ORAL
Qty: 90 TABLET | Refills: 0 | Status: SHIPPED | OUTPATIENT
Start: 2018-07-20 | End: 2018-10-22 | Stop reason: SDUPTHER

## 2018-08-15 ENCOUNTER — PATIENT MESSAGE (OUTPATIENT)
Dept: FAMILY MEDICINE CLINIC | Age: 63
End: 2018-08-15

## 2018-08-16 ENCOUNTER — TELEPHONE (OUTPATIENT)
Dept: FAMILY MEDICINE CLINIC | Age: 63
End: 2018-08-16

## 2018-08-16 DIAGNOSIS — M54.50 ACUTE BILATERAL LOW BACK PAIN WITHOUT SCIATICA: Primary | ICD-10-CM

## 2018-08-16 RX ORDER — HYDROCODONE BITARTRATE AND ACETAMINOPHEN 5; 325 MG/1; MG/1
1 TABLET ORAL EVERY 6 HOURS PRN
Qty: 20 TABLET | Refills: 0 | Status: SHIPPED | OUTPATIENT
Start: 2018-08-16 | End: 2018-08-21

## 2018-09-10 DIAGNOSIS — E11.9 TYPE 2 DIABETES MELLITUS WITHOUT COMPLICATION, WITHOUT LONG-TERM CURRENT USE OF INSULIN (HCC): ICD-10-CM

## 2018-09-10 RX ORDER — GLIPIZIDE 5 MG/1
5 TABLET ORAL
Qty: 180 TABLET | Refills: 0 | Status: SHIPPED | OUTPATIENT
Start: 2018-09-10 | End: 2018-12-24 | Stop reason: SDUPTHER

## 2018-09-10 NOTE — TELEPHONE ENCOUNTER
From: Aung Coffman  Sent: 9/8/2018 10:33 AM EDT  Subject: Medication Renewal Request    Aung Coffman would like a refill of the following medications:     glipiZIDE (GLUCOTROL) 5 MG tablet Tomasz Beatty MD]   Patient Comment: use [facility]    Preferred pharmacy: [facility] [address], [address] [facility] [address] [phone]

## 2018-09-10 NOTE — TELEPHONE ENCOUNTER
The PT that had her script filled for the glipiZIDE (GLUCOTROL) 5 MG tablet     Needs the script sent to [place] [address]        She said it went to [facility]

## 2018-09-11 RX ORDER — GLIPIZIDE 5 MG/1
TABLET ORAL
Qty: 180 TABLET | Refills: 0 | OUTPATIENT
Start: 2018-09-11

## 2018-09-11 NOTE — TELEPHONE ENCOUNTER
Call pt and let her know we already sent this to [facility] and now [facility] is requesting    Find out which pharmacy she needs

## 2018-09-11 NOTE — TELEPHONE ENCOUNTER
Spoke with patient, she states the refill request from yesterday is correct.  Please do not refill this request.

## 2018-10-17 ENCOUNTER — HOSPITAL ENCOUNTER (OUTPATIENT)
Dept: NON INVASIVE DIAGNOSTICS | Age: 63
Discharge: HOME | End: 2018-10-17
Payer: MEDICARE

## 2018-10-17 DIAGNOSIS — R60.1 ANASARCA: ICD-10-CM

## 2018-10-17 DIAGNOSIS — I10 ESSENTIAL HYPERTENSION: ICD-10-CM

## 2018-10-17 LAB
LV EF: 58 %
LVEF MODALITY: NORMAL

## 2018-10-17 PROCEDURE — 93306 TTE W/DOPPLER COMPLETE: CPT

## 2018-10-20 ENCOUNTER — PATIENT MESSAGE (OUTPATIENT)
Dept: FAMILY MEDICINE CLINIC | Age: 63
End: 2018-10-20

## 2018-10-22 DIAGNOSIS — E11.9 TYPE 2 DIABETES MELLITUS WITHOUT COMPLICATION, WITHOUT LONG-TERM CURRENT USE OF INSULIN (HCC): ICD-10-CM

## 2018-10-22 RX ORDER — ATORVASTATIN CALCIUM 20 MG/1
TABLET, FILM COATED ORAL
Qty: 90 TABLET | Refills: 0 | Status: ON HOLD | OUTPATIENT
Start: 2018-10-22 | End: 2018-11-01 | Stop reason: SDUPTHER

## 2018-10-22 NOTE — TELEPHONE ENCOUNTER
From: Elinor Liu  Sent: 10/20/2018 9:48 AM EDT  Subject: Medication Renewal Request    Elinor Liu would like a refill of the following medications:     metFORMIN (GLUCOPHAGE) 1000 MG tablet Jani Luz MD]   Patient Comment: [address] in [place]     atorvastatin (LIPITOR) 20 MG tablet Jani Luz MD]   Patient Comment: [address] in [place]    Preferred pharmacy: Other - Both [address] [place]        Medication renewals requested in this message routed separately:     metolazone (ZAROXOLYN) 2.5 MG tablet Annamaria Sears MD]   Patient Comment: [address]

## 2018-10-23 ENCOUNTER — PATIENT MESSAGE (OUTPATIENT)
Dept: FAMILY MEDICINE CLINIC | Age: 63
End: 2018-10-23

## 2018-10-24 DIAGNOSIS — E11.9 TYPE 2 DIABETES MELLITUS WITHOUT COMPLICATION, WITHOUT LONG-TERM CURRENT USE OF INSULIN (HCC): ICD-10-CM

## 2018-10-25 ENCOUNTER — TELEPHONE (OUTPATIENT)
Dept: FAMILY MEDICINE CLINIC | Age: 63
End: 2018-10-25

## 2018-10-25 NOTE — TELEPHONE ENCOUNTER
The PT would like a call back regarding her script for Atorvastatin and Metformin.             Best call back for --[phone]

## 2018-10-26 RX ORDER — ATORVASTATIN CALCIUM 20 MG/1
TABLET, FILM COATED ORAL
Qty: 90 TABLET | Refills: 0 | Status: SHIPPED | OUTPATIENT
Start: 2018-10-26 | End: 2019-02-04 | Stop reason: ALTCHOICE

## 2018-11-01 ENCOUNTER — APPOINTMENT (OUTPATIENT)
Dept: GENERAL RADIOLOGY | Age: 63
DRG: 330 | End: 2018-11-01
Attending: HOSPITALIST
Payer: MEDICARE

## 2018-11-01 ENCOUNTER — HOSPITAL ENCOUNTER (INPATIENT)
Age: 63
LOS: 12 days | Discharge: HOME | DRG: 330 | End: 2018-11-13
Attending: HOSPITALIST
Payer: MEDICARE

## 2018-11-01 DIAGNOSIS — C18.2 MALIGNANT NEOPLASM OF ASCENDING COLON (HCC): Primary | ICD-10-CM

## 2018-11-01 PROBLEM — E87.70 VOLUME OVERLOAD: Status: ACTIVE | Noted: 2018-11-01

## 2018-11-01 LAB
BACTERIA: ABNORMAL /HPF
BILIRUBIN URINE: NEGATIVE
BLOOD, URINE: NEGATIVE
CLARITY: CLEAR
COLOR: YELLOW
CREATININE URINE: 60.6 MG/DL (ref 28–259)
EPITHELIAL CELLS, UA: 1 /HPF (ref 0–5)
GLUCOSE BLD-MCNC: 253 MG/DL (ref 70–99)
GLUCOSE URINE: NEGATIVE MG/DL
HYALINE CASTS: 0 /LPF (ref 0–8)
KETONES, URINE: NEGATIVE MG/DL
LEUKOCYTE ESTERASE, URINE: ABNORMAL
MICROSCOPIC EXAMINATION: YES
NITRITE, URINE: NEGATIVE
PERFORMED ON: ABNORMAL
PH UA: 6.5
PROTEIN PROTEIN: 7 MG/DL
PROTEIN UA: NEGATIVE MG/DL
RBC UA: 1 /HPF (ref 0–4)
SODIUM URINE: 25 MMOL/L
SPECIFIC GRAVITY UA: 1.01
URINE TYPE: ABNORMAL
UROBILINOGEN, URINE: 0.2 E.U./DL
WBC UA: 14 /HPF (ref 0–5)

## 2018-11-01 PROCEDURE — 80048 BASIC METABOLIC PNL TOTAL CA: CPT

## 2018-11-01 PROCEDURE — 6370000000 HC RX 637 (ALT 250 FOR IP): Performed by: NURSE PRACTITIONER

## 2018-11-01 PROCEDURE — 71045 X-RAY EXAM CHEST 1 VIEW: CPT

## 2018-11-01 PROCEDURE — 84156 ASSAY OF PROTEIN URINE: CPT

## 2018-11-01 PROCEDURE — 83935 ASSAY OF URINE OSMOLALITY: CPT

## 2018-11-01 PROCEDURE — 2580000003 HC RX 258: Performed by: HOSPITALIST

## 2018-11-01 PROCEDURE — 6360000002 HC RX W HCPCS: Performed by: HOSPITALIST

## 2018-11-01 PROCEDURE — 36415 COLL VENOUS BLD VENIPUNCTURE: CPT

## 2018-11-01 PROCEDURE — 82570 ASSAY OF URINE CREATININE: CPT

## 2018-11-01 PROCEDURE — 81001 URINALYSIS AUTO W/SCOPE: CPT

## 2018-11-01 PROCEDURE — 84300 ASSAY OF URINE SODIUM: CPT

## 2018-11-01 PROCEDURE — 6370000000 HC RX 637 (ALT 250 FOR IP): Performed by: HOSPITALIST

## 2018-11-01 PROCEDURE — 1200000000 HC SEMI PRIVATE

## 2018-11-01 RX ORDER — MAGNESIUM GLUCONATE 27 MG(500)
250 TABLET ORAL DAILY
Status: DISCONTINUED | OUTPATIENT
Start: 2018-11-01 | End: 2018-11-01

## 2018-11-01 RX ORDER — SODIUM CHLORIDE 0.9 % (FLUSH) 0.9 %
10 SYRINGE (ML) INJECTION PRN
Status: DISCONTINUED | OUTPATIENT
Start: 2018-11-01 | End: 2018-11-13 | Stop reason: HOSPADM

## 2018-11-01 RX ORDER — ATORVASTATIN CALCIUM 20 MG/1
20 TABLET, FILM COATED ORAL DAILY
Status: DISCONTINUED | OUTPATIENT
Start: 2018-11-01 | End: 2018-11-13 | Stop reason: HOSPADM

## 2018-11-01 RX ORDER — CETIRIZINE HYDROCHLORIDE 10 MG/1
10 TABLET ORAL EVERY EVENING
Status: ON HOLD | COMMUNITY
End: 2019-05-14

## 2018-11-01 RX ORDER — HEPARIN SODIUM 5000 [USP'U]/ML
5000 INJECTION, SOLUTION INTRAVENOUS; SUBCUTANEOUS EVERY 8 HOURS SCHEDULED
Status: DISCONTINUED | OUTPATIENT
Start: 2018-11-01 | End: 2018-11-01

## 2018-11-01 RX ORDER — DEXTROSE MONOHYDRATE 25 G/50ML
12.5 INJECTION, SOLUTION INTRAVENOUS PRN
Status: DISCONTINUED | OUTPATIENT
Start: 2018-11-01 | End: 2018-11-13 | Stop reason: HOSPADM

## 2018-11-01 RX ORDER — CETIRIZINE HYDROCHLORIDE 10 MG/1
10 TABLET ORAL DAILY
Status: DISCONTINUED | OUTPATIENT
Start: 2018-11-01 | End: 2018-11-01

## 2018-11-01 RX ORDER — POTASSIUM CHLORIDE 750 MG/1
20 TABLET, FILM COATED, EXTENDED RELEASE ORAL DAILY
Status: DISCONTINUED | OUTPATIENT
Start: 2018-11-01 | End: 2018-11-04

## 2018-11-01 RX ORDER — ASPIRIN 81 MG/1
81 TABLET ORAL NIGHTLY
Status: DISCONTINUED | OUTPATIENT
Start: 2018-11-01 | End: 2018-11-06

## 2018-11-01 RX ORDER — LISINOPRIL 5 MG/1
5 TABLET ORAL DAILY
Status: DISCONTINUED | OUTPATIENT
Start: 2018-11-01 | End: 2018-11-01

## 2018-11-01 RX ORDER — ASPIRIN 81 MG/1
81 TABLET ORAL DAILY
Status: DISCONTINUED | OUTPATIENT
Start: 2018-11-01 | End: 2018-11-01

## 2018-11-01 RX ORDER — DEXTROSE MONOHYDRATE 50 MG/ML
100 INJECTION, SOLUTION INTRAVENOUS PRN
Status: DISCONTINUED | OUTPATIENT
Start: 2018-11-01 | End: 2018-11-13 | Stop reason: HOSPADM

## 2018-11-01 RX ORDER — MAGNESIUM GLUCONATE 27 MG(500)
250 TABLET ORAL DAILY
COMMUNITY
End: 2018-11-19 | Stop reason: ALTCHOICE

## 2018-11-01 RX ORDER — ONDANSETRON 2 MG/ML
4 INJECTION INTRAMUSCULAR; INTRAVENOUS EVERY 6 HOURS PRN
Status: DISCONTINUED | OUTPATIENT
Start: 2018-11-01 | End: 2018-11-13 | Stop reason: HOSPADM

## 2018-11-01 RX ORDER — SODIUM CHLORIDE 0.9 % (FLUSH) 0.9 %
10 SYRINGE (ML) INJECTION EVERY 12 HOURS SCHEDULED
Status: DISCONTINUED | OUTPATIENT
Start: 2018-11-01 | End: 2018-11-13 | Stop reason: HOSPADM

## 2018-11-01 RX ORDER — NICOTINE POLACRILEX 4 MG
15 LOZENGE BUCCAL PRN
Status: DISCONTINUED | OUTPATIENT
Start: 2018-11-01 | End: 2018-11-13 | Stop reason: HOSPADM

## 2018-11-01 RX ORDER — METOLAZONE 5 MG/1
2.5 TABLET ORAL DAILY
Status: DISCONTINUED | OUTPATIENT
Start: 2018-11-01 | End: 2018-11-01

## 2018-11-01 RX ORDER — CETIRIZINE HYDROCHLORIDE 10 MG/1
10 TABLET ORAL NIGHTLY
Status: DISCONTINUED | OUTPATIENT
Start: 2018-11-01 | End: 2018-11-13 | Stop reason: HOSPADM

## 2018-11-01 RX ADMIN — Medication 10 ML: at 22:45

## 2018-11-01 RX ADMIN — FUROSEMIDE 10 MG/HR: 10 INJECTION, SOLUTION INTRAMUSCULAR; INTRAVENOUS at 16:42

## 2018-11-01 RX ADMIN — ASPIRIN 81 MG: 81 TABLET ORAL at 22:45

## 2018-11-01 RX ADMIN — POTASSIUM CHLORIDE 20 MEQ: 750 TABLET, FILM COATED, EXTENDED RELEASE ORAL at 17:52

## 2018-11-01 RX ADMIN — INSULIN LISPRO 2 UNITS: 100 INJECTION, SOLUTION INTRAVENOUS; SUBCUTANEOUS at 22:49

## 2018-11-01 ASSESSMENT — PAIN SCALES - GENERAL
PAINLEVEL_OUTOF10: 0
PAINLEVEL_OUTOF10: 0

## 2018-11-01 NOTE — PLAN OF CARE
Problem: Safety:  Goal: Free from accidental physical injury  Free from accidental physical injury  Outcome: Ongoing  Educated patient on fall prevention. Patient understands and will wear her home slippers to ambulate. Problem: Fluid Volume:  Intervention: Assess signs and symptoms of fluid volume excess  Discussed with patient, s/s of fluid volume excess. Patient verbalized understanding.

## 2018-11-02 ENCOUNTER — TELEPHONE (OUTPATIENT)
Dept: FAMILY MEDICINE CLINIC | Age: 63
End: 2018-11-02

## 2018-11-02 LAB
ABO/RH: NORMAL
ANION GAP SERPL CALCULATED.3IONS-SCNC: 14 MMOL/L (ref 3–16)
ANION GAP SERPL CALCULATED.3IONS-SCNC: 17 MMOL/L (ref 3–16)
ANTIBODY SCREEN: NORMAL
BLOOD BANK DISPENSE STATUS: NORMAL
BLOOD BANK DISPENSE STATUS: NORMAL
BLOOD BANK PRODUCT CODE: NORMAL
BLOOD BANK PRODUCT CODE: NORMAL
BPU ID: NORMAL
BPU ID: NORMAL
BUN BLDV-MCNC: 38 MG/DL (ref 7–20)
BUN BLDV-MCNC: 41 MG/DL (ref 7–20)
CALCIUM SERPL-MCNC: 9.2 MG/DL (ref 8.3–10.6)
CALCIUM SERPL-MCNC: 9.4 MG/DL (ref 8.3–10.6)
CHLORIDE BLD-SCNC: 95 MMOL/L (ref 99–110)
CHLORIDE BLD-SCNC: 97 MMOL/L (ref 99–110)
CO2: 28 MMOL/L (ref 21–32)
CO2: 31 MMOL/L (ref 21–32)
CREAT SERPL-MCNC: 1.3 MG/DL (ref 0.6–1.2)
CREAT SERPL-MCNC: 1.4 MG/DL (ref 0.6–1.2)
DESCRIPTION BLOOD BANK: NORMAL
DESCRIPTION BLOOD BANK: NORMAL
GFR AFRICAN AMERICAN: 46
GFR AFRICAN AMERICAN: 50
GFR NON-AFRICAN AMERICAN: 38
GFR NON-AFRICAN AMERICAN: 41
GLUCOSE BLD-MCNC: 208 MG/DL (ref 70–99)
GLUCOSE BLD-MCNC: 220 MG/DL (ref 70–99)
GLUCOSE BLD-MCNC: 221 MG/DL (ref 70–99)
GLUCOSE BLD-MCNC: 221 MG/DL (ref 70–99)
GLUCOSE BLD-MCNC: 238 MG/DL (ref 70–99)
GLUCOSE BLD-MCNC: 298 MG/DL (ref 70–99)
GLUCOSE BLD-MCNC: 312 MG/DL (ref 70–99)
MAGNESIUM: 2.4 MG/DL (ref 1.8–2.4)
PERFORMED ON: ABNORMAL
POTASSIUM SERPL-SCNC: 3.5 MMOL/L (ref 3.5–5.1)
POTASSIUM SERPL-SCNC: 3.8 MMOL/L (ref 3.5–5.1)
SODIUM BLD-SCNC: 140 MMOL/L (ref 136–145)
SODIUM BLD-SCNC: 142 MMOL/L (ref 136–145)

## 2018-11-02 PROCEDURE — 86901 BLOOD TYPING SEROLOGIC RH(D): CPT

## 2018-11-02 PROCEDURE — 2580000003 HC RX 258: Performed by: INTERNAL MEDICINE

## 2018-11-02 PROCEDURE — 86923 COMPATIBILITY TEST ELECTRIC: CPT

## 2018-11-02 PROCEDURE — 2580000003 HC RX 258

## 2018-11-02 PROCEDURE — 6370000000 HC RX 637 (ALT 250 FOR IP): Performed by: NURSE PRACTITIONER

## 2018-11-02 PROCEDURE — 94760 N-INVAS EAR/PLS OXIMETRY 1: CPT

## 2018-11-02 PROCEDURE — 80048 BASIC METABOLIC PNL TOTAL CA: CPT

## 2018-11-02 PROCEDURE — P9016 RBC LEUKOCYTES REDUCED: HCPCS

## 2018-11-02 PROCEDURE — 2580000003 HC RX 258: Performed by: HOSPITALIST

## 2018-11-02 PROCEDURE — 6360000002 HC RX W HCPCS: Performed by: HOSPITALIST

## 2018-11-02 PROCEDURE — 90686 IIV4 VACC NO PRSV 0.5 ML IM: CPT | Performed by: HOSPITALIST

## 2018-11-02 PROCEDURE — 85027 COMPLETE CBC AUTOMATED: CPT

## 2018-11-02 PROCEDURE — 6370000000 HC RX 637 (ALT 250 FOR IP): Performed by: HOSPITALIST

## 2018-11-02 PROCEDURE — 6360000002 HC RX W HCPCS: Performed by: INTERNAL MEDICINE

## 2018-11-02 PROCEDURE — 83735 ASSAY OF MAGNESIUM: CPT

## 2018-11-02 PROCEDURE — G0008 ADMIN INFLUENZA VIRUS VAC: HCPCS | Performed by: HOSPITALIST

## 2018-11-02 PROCEDURE — 86850 RBC ANTIBODY SCREEN: CPT

## 2018-11-02 PROCEDURE — 36430 TRANSFUSION BLD/BLD COMPNT: CPT

## 2018-11-02 PROCEDURE — 86900 BLOOD TYPING SEROLOGIC ABO: CPT

## 2018-11-02 PROCEDURE — 1200000000 HC SEMI PRIVATE

## 2018-11-02 PROCEDURE — 36415 COLL VENOUS BLD VENIPUNCTURE: CPT

## 2018-11-02 RX ORDER — INSULIN GLARGINE 100 [IU]/ML
5 INJECTION, SOLUTION SUBCUTANEOUS DAILY
Status: DISCONTINUED | OUTPATIENT
Start: 2018-11-02 | End: 2018-11-02

## 2018-11-02 RX ORDER — SODIUM CHLORIDE 9 MG/ML
INJECTION, SOLUTION INTRAVENOUS
Status: COMPLETED
Start: 2018-11-02 | End: 2018-11-02

## 2018-11-02 RX ORDER — INSULIN GLARGINE 100 [IU]/ML
15 INJECTION, SOLUTION SUBCUTANEOUS DAILY
Status: DISCONTINUED | OUTPATIENT
Start: 2018-11-02 | End: 2018-11-11

## 2018-11-02 RX ORDER — 0.9 % SODIUM CHLORIDE 0.9 %
250 INTRAVENOUS SOLUTION INTRAVENOUS ONCE
Status: COMPLETED | OUTPATIENT
Start: 2018-11-02 | End: 2018-11-03

## 2018-11-02 RX ADMIN — SODIUM CHLORIDE 250 ML: 9 INJECTION, SOLUTION INTRAVENOUS at 13:15

## 2018-11-02 RX ADMIN — INSULIN LISPRO 2 UNITS: 100 INJECTION, SOLUTION INTRAVENOUS; SUBCUTANEOUS at 21:31

## 2018-11-02 RX ADMIN — IRON SUCROSE 500 MG: 20 INJECTION, SOLUTION INTRAVENOUS at 22:12

## 2018-11-02 RX ADMIN — INSULIN GLARGINE 5 UNITS: 100 INJECTION, SOLUTION SUBCUTANEOUS at 11:55

## 2018-11-02 RX ADMIN — ATORVASTATIN CALCIUM 20 MG: 20 TABLET, FILM COATED ORAL at 08:50

## 2018-11-02 RX ADMIN — INSULIN GLARGINE 15 UNITS: 100 INJECTION, SOLUTION SUBCUTANEOUS at 21:30

## 2018-11-02 RX ADMIN — INSULIN LISPRO 8 UNITS: 100 INJECTION, SOLUTION INTRAVENOUS; SUBCUTANEOUS at 18:57

## 2018-11-02 RX ADMIN — SODIUM CHLORIDE 250 ML: 9 INJECTION, SOLUTION INTRAVENOUS at 22:15

## 2018-11-02 RX ADMIN — INSULIN LISPRO 6 UNITS: 100 INJECTION, SOLUTION INTRAVENOUS; SUBCUTANEOUS at 11:55

## 2018-11-02 RX ADMIN — INSULIN LISPRO 2 UNITS: 100 INJECTION, SOLUTION INTRAVENOUS; SUBCUTANEOUS at 08:51

## 2018-11-02 RX ADMIN — INFLUENZA A VIRUS A/MICHIGAN/45/2015 X-275 (H1N1) ANTIGEN (FORMALDEHYDE INACTIVATED), INFLUENZA A VIRUS A/SINGAPORE/INFIMH-16-0019/2016 IVR-186 (H3N2) ANTIGEN (FORMALDEHYDE INACTIVATED), INFLUENZA B VIRUS B/PHUKET/3073/2013 ANTIGEN (FORMALDEHYDE INACTIVATED), AND INFLUENZA B VIRUS B/MARYLAND/15/2016 BX-69A ANTIGEN (FORMALDEHYDE INACTIVATED) 0.5 ML: 15; 15; 15; 15 INJECTION, SUSPENSION INTRAMUSCULAR at 08:51

## 2018-11-02 RX ADMIN — INSULIN LISPRO 15 UNITS: 100 INJECTION, SOLUTION INTRAVENOUS; SUBCUTANEOUS at 18:58

## 2018-11-02 RX ADMIN — ASPIRIN 81 MG: 81 TABLET ORAL at 21:06

## 2018-11-02 RX ADMIN — POTASSIUM CHLORIDE 20 MEQ: 750 TABLET, FILM COATED, EXTENDED RELEASE ORAL at 08:50

## 2018-11-02 ASSESSMENT — PAIN SCALES - GENERAL
PAINLEVEL_OUTOF10: 0

## 2018-11-02 NOTE — PLAN OF CARE
Problem: Safety:  Goal: Free from accidental physical injury  Free from accidental physical injury   Outcome: Ongoing  Patient free from accidental physical injury at this time    Problem: Daily Care:  Goal: Daily care needs are met  Daily care needs are met   Outcome: Ongoing  In collaboration with the healthcare team, the patient's daily care needs are meet. Patient is encouraged to perform tasks independently per ability.

## 2018-11-03 LAB
ANION GAP SERPL CALCULATED.3IONS-SCNC: 16 MMOL/L (ref 3–16)
BLOOD BANK DISPENSE STATUS: NORMAL
BLOOD BANK DISPENSE STATUS: NORMAL
BLOOD BANK PRODUCT CODE: NORMAL
BLOOD BANK PRODUCT CODE: NORMAL
BPU ID: NORMAL
BPU ID: NORMAL
BUN BLDV-MCNC: 30 MG/DL (ref 7–20)
CALCIUM SERPL-MCNC: 8.8 MG/DL (ref 8.3–10.6)
CHLORIDE BLD-SCNC: 97 MMOL/L (ref 99–110)
CO2: 30 MMOL/L (ref 21–32)
CREAT SERPL-MCNC: 1.2 MG/DL (ref 0.6–1.2)
DESCRIPTION BLOOD BANK: NORMAL
DESCRIPTION BLOOD BANK: NORMAL
ESTIMATED AVERAGE GLUCOSE: 142.7 MG/DL
FERRITIN: 32.3 NG/ML (ref 15–150)
GFR AFRICAN AMERICAN: 55
GFR NON-AFRICAN AMERICAN: 45
GLUCOSE BLD-MCNC: 165 MG/DL (ref 70–99)
GLUCOSE BLD-MCNC: 168 MG/DL (ref 70–99)
GLUCOSE BLD-MCNC: 175 MG/DL (ref 70–99)
GLUCOSE BLD-MCNC: 189 MG/DL (ref 70–99)
GLUCOSE BLD-MCNC: 210 MG/DL (ref 70–99)
HBA1C MFR BLD: 6.6 %
HCT VFR BLD CALC: 20.5 % (ref 36–48)
HCT VFR BLD CALC: 28.2 % (ref 36–48)
HEMOGLOBIN: 5.9 G/DL (ref 12–16)
HEMOGLOBIN: 8.5 G/DL (ref 12–16)
IRON SATURATION: ABNORMAL % (ref 15–50)
IRON: 477 UG/DL (ref 37–145)
PERFORMED ON: ABNORMAL
POTASSIUM SERPL-SCNC: 2.9 MMOL/L (ref 3.5–5.1)
POTASSIUM SERPL-SCNC: 4.2 MMOL/L (ref 3.5–5.1)
PRO-BNP: 675 PG/ML (ref 0–124)
SODIUM BLD-SCNC: 143 MMOL/L (ref 136–145)
TOTAL IRON BINDING CAPACITY: ABNORMAL UG/DL (ref 260–445)

## 2018-11-03 PROCEDURE — 83880 ASSAY OF NATRIURETIC PEPTIDE: CPT

## 2018-11-03 PROCEDURE — 85018 HEMOGLOBIN: CPT

## 2018-11-03 PROCEDURE — 36415 COLL VENOUS BLD VENIPUNCTURE: CPT

## 2018-11-03 PROCEDURE — 2580000003 HC RX 258: Performed by: NURSE PRACTITIONER

## 2018-11-03 PROCEDURE — 83036 HEMOGLOBIN GLYCOSYLATED A1C: CPT

## 2018-11-03 PROCEDURE — 6370000000 HC RX 637 (ALT 250 FOR IP): Performed by: HOSPITALIST

## 2018-11-03 PROCEDURE — 2580000003 HC RX 258: Performed by: HOSPITALIST

## 2018-11-03 PROCEDURE — 36430 TRANSFUSION BLD/BLD COMPNT: CPT

## 2018-11-03 PROCEDURE — 84132 ASSAY OF SERUM POTASSIUM: CPT

## 2018-11-03 PROCEDURE — 85027 COMPLETE CBC AUTOMATED: CPT

## 2018-11-03 PROCEDURE — 85014 HEMATOCRIT: CPT

## 2018-11-03 PROCEDURE — 6360000002 HC RX W HCPCS: Performed by: HOSPITALIST

## 2018-11-03 PROCEDURE — 94760 N-INVAS EAR/PLS OXIMETRY 1: CPT

## 2018-11-03 PROCEDURE — 83550 IRON BINDING TEST: CPT

## 2018-11-03 PROCEDURE — 82728 ASSAY OF FERRITIN: CPT

## 2018-11-03 PROCEDURE — 6360000002 HC RX W HCPCS: Performed by: INTERNAL MEDICINE

## 2018-11-03 PROCEDURE — 80048 BASIC METABOLIC PNL TOTAL CA: CPT

## 2018-11-03 PROCEDURE — P9016 RBC LEUKOCYTES REDUCED: HCPCS

## 2018-11-03 PROCEDURE — 83540 ASSAY OF IRON: CPT

## 2018-11-03 PROCEDURE — 83516 IMMUNOASSAY NONANTIBODY: CPT

## 2018-11-03 PROCEDURE — 1200000000 HC SEMI PRIVATE

## 2018-11-03 PROCEDURE — 2580000003 HC RX 258: Performed by: INTERNAL MEDICINE

## 2018-11-03 PROCEDURE — 86923 COMPATIBILITY TEST ELECTRIC: CPT

## 2018-11-03 RX ORDER — 0.9 % SODIUM CHLORIDE 0.9 %
250 INTRAVENOUS SOLUTION INTRAVENOUS ONCE
Status: COMPLETED | OUTPATIENT
Start: 2018-11-03 | End: 2018-11-03

## 2018-11-03 RX ORDER — POTASSIUM CHLORIDE 7.45 MG/ML
10 INJECTION INTRAVENOUS PRN
Status: DISCONTINUED | OUTPATIENT
Start: 2018-11-03 | End: 2018-11-13 | Stop reason: HOSPADM

## 2018-11-03 RX ORDER — SODIUM CHLORIDE 9 MG/ML
INJECTION, SOLUTION INTRAVENOUS
Status: DISPENSED
Start: 2018-11-03 | End: 2018-11-04

## 2018-11-03 RX ADMIN — POTASSIUM CHLORIDE 20 MEQ: 750 TABLET, FILM COATED, EXTENDED RELEASE ORAL at 08:50

## 2018-11-03 RX ADMIN — SODIUM CHLORIDE 250 ML: 9 INJECTION, SOLUTION INTRAVENOUS at 03:48

## 2018-11-03 RX ADMIN — INSULIN LISPRO 2 UNITS: 100 INJECTION, SOLUTION INTRAVENOUS; SUBCUTANEOUS at 13:12

## 2018-11-03 RX ADMIN — INSULIN LISPRO 15 UNITS: 100 INJECTION, SOLUTION INTRAVENOUS; SUBCUTANEOUS at 17:02

## 2018-11-03 RX ADMIN — Medication 10 ML: at 08:50

## 2018-11-03 RX ADMIN — INSULIN LISPRO 2 UNITS: 100 INJECTION, SOLUTION INTRAVENOUS; SUBCUTANEOUS at 21:15

## 2018-11-03 RX ADMIN — ATORVASTATIN CALCIUM 20 MG: 20 TABLET, FILM COATED ORAL at 08:50

## 2018-11-03 RX ADMIN — FUROSEMIDE 10 MG/HR: 10 INJECTION, SOLUTION INTRAMUSCULAR; INTRAVENOUS at 00:28

## 2018-11-03 RX ADMIN — SODIUM CHLORIDE 250 ML: 9 INJECTION, SOLUTION INTRAVENOUS at 16:08

## 2018-11-03 RX ADMIN — IRON SUCROSE 500 MG: 20 INJECTION, SOLUTION INTRAVENOUS at 11:07

## 2018-11-03 RX ADMIN — INSULIN LISPRO 15 UNITS: 100 INJECTION, SOLUTION INTRAVENOUS; SUBCUTANEOUS at 13:15

## 2018-11-03 RX ADMIN — INSULIN GLARGINE 15 UNITS: 100 INJECTION, SOLUTION SUBCUTANEOUS at 08:50

## 2018-11-03 RX ADMIN — POTASSIUM CHLORIDE 10 MEQ: 7.46 INJECTION, SOLUTION INTRAVENOUS at 14:02

## 2018-11-03 RX ADMIN — POTASSIUM CHLORIDE 10 MEQ: 7.46 INJECTION, SOLUTION INTRAVENOUS at 12:42

## 2018-11-03 RX ADMIN — POTASSIUM CHLORIDE 10 MEQ: 7.46 INJECTION, SOLUTION INTRAVENOUS at 15:02

## 2018-11-03 RX ADMIN — INSULIN LISPRO 2 UNITS: 100 INJECTION, SOLUTION INTRAVENOUS; SUBCUTANEOUS at 16:30

## 2018-11-03 RX ADMIN — INSULIN LISPRO 2 UNITS: 100 INJECTION, SOLUTION INTRAVENOUS; SUBCUTANEOUS at 08:51

## 2018-11-03 RX ADMIN — POTASSIUM CHLORIDE 10 MEQ: 7.46 INJECTION, SOLUTION INTRAVENOUS at 11:28

## 2018-11-03 RX ADMIN — POTASSIUM CHLORIDE 10 MEQ: 7.46 INJECTION, SOLUTION INTRAVENOUS at 16:30

## 2018-11-03 RX ADMIN — INSULIN LISPRO 15 UNITS: 100 INJECTION, SOLUTION INTRAVENOUS; SUBCUTANEOUS at 08:56

## 2018-11-03 RX ADMIN — ASPIRIN 81 MG: 81 TABLET ORAL at 21:14

## 2018-11-03 NOTE — CONSULTS
(H) 0.6 - 1.2 mg/dL    GFR Non- 41 (A) >60    GFR  50 (A) >60    Calcium 9.2 8.3 - 10.6 mg/dL   Magnesium    Collection Time: 11/02/18  9:52 AM   Result Value Ref Range    Magnesium 2.40 1.80 - 2.40 mg/dL   CBC    Collection Time: 11/02/18  9:52 AM   Result Value Ref Range    WBC 8.6 4.0 - 11.0 K/uL    RBC 2.82 (L) 4.00 - 5.20 M/uL    Hemoglobin 4.3 (LL) 12.0 - 16.0 g/dL    Hematocrit 15.6 (LL) 36.0 - 48.0 %    MCV 55.2 (L) 80.0 - 100.0 fL    MCH 15.3 (L) 26.0 - 34.0 pg    MCHC 27.8 (L) 31.0 - 36.0 g/dL    RDW 22.1 (H) 12.4 - 15.4 %    Platelets 803 478 - 206 K/uL    MPV 8.7 5.0 - 10.5 fL   TYPE AND SCREEN    Collection Time: 11/02/18 11:33 AM   Result Value Ref Range    ABO/Rh B POS     Antibody Screen NEG    PREPARE RBC (CROSSMATCH) Number of Units: 2, 2 Units    Collection Time: 11/02/18 11:33 AM   Result Value Ref Range    Product Code Blood Bank L7571L41     Description Blood Bank Red Blood Cells, Leuko-reduced     Unit Number [id number]     Dispense Status Blood Bank transfused     Product Code Blood Bank C8355P66     Description Blood Bank Red Blood Cells, Leuko-reduced     Unit Number [id number]     Dispense Status Blood Bank transfused    PREPARE RBC (CROSSMATCH) Number of Units: 2, 2 Units    Collection Time: 11/02/18 11:33 AM   Result Value Ref Range    Product Code Blood Bank Q0778H64     Description Blood Bank Red Blood Cells, Leuko-reduced     Unit Number [id number]     Dispense Status Blood Bank transfused     Product Code Blood Bank [id number]     Description Blood Bank Red Blood Cells, Leuko-reduced     Unit Number [id number]     Dispense Status Blood Bank selected    POCT Glucose    Collection Time: 11/02/18 11:45 AM   Result Value Ref Range    POC Glucose 298 (H) 70 - 99 mg/dl    Performed on ACCU-CHEK    POCT Glucose    Collection Time: 11/02/18  5:04 PM   Result Value Ref Range    POC Glucose 221 (H) 70 - 99 mg/dl    Performed on ACCU-CHEK    POCT failure     RECOMMENDATIONS:  -Patient is s/p 3 units of PRBC. 1 additional unit ordered. Target Hgb 7-8. Patient given IV dose of Venofer. Will order again today. Abdominal US pending. TTG-IGA pending. Continue diuresis over the weekend. Tentatively plan for EGD/Colonoscopy on Monday. Lucie Garza for regular diet today. Will plan to place on clear liquid diet tomorrow with bowel preparation tomorrow evening. If you have any questions or need any further information, please feel free to contact anyone on our consult team.  Thank you for allowing us to participate in the care of Dee Sabillon.     Martha Aleman MD  [address] [facility]

## 2018-11-03 NOTE — FLOWSHEET NOTE
3rd unit of PRBC's infusing at this time. VSS. No adverse effects observed. Rate increased to 150 ml/hr.

## 2018-11-04 LAB
ANION GAP SERPL CALCULATED.3IONS-SCNC: 15 MMOL/L (ref 3–16)
BUN BLDV-MCNC: 22 MG/DL (ref 7–20)
CALCIUM SERPL-MCNC: 8.8 MG/DL (ref 8.3–10.6)
CHLORIDE BLD-SCNC: 100 MMOL/L (ref 99–110)
CO2: 30 MMOL/L (ref 21–32)
CREAT SERPL-MCNC: 1.2 MG/DL (ref 0.6–1.2)
FOLATE: 14.02 NG/ML (ref 4.78–24.2)
GFR AFRICAN AMERICAN: 55
GFR NON-AFRICAN AMERICAN: 45
GLUCOSE BLD-MCNC: 148 MG/DL (ref 70–99)
GLUCOSE BLD-MCNC: 168 MG/DL (ref 70–99)
GLUCOSE BLD-MCNC: 175 MG/DL (ref 70–99)
GLUCOSE BLD-MCNC: 257 MG/DL (ref 70–99)
GLUCOSE BLD-MCNC: 265 MG/DL (ref 70–99)
PERFORMED ON: ABNORMAL
POTASSIUM SERPL-SCNC: 2.7 MMOL/L (ref 3.5–5.1)
SODIUM BLD-SCNC: 145 MMOL/L (ref 136–145)
VITAMIN B-12: 810 PG/ML (ref 211–911)

## 2018-11-04 PROCEDURE — 94760 N-INVAS EAR/PLS OXIMETRY 1: CPT

## 2018-11-04 PROCEDURE — 80048 BASIC METABOLIC PNL TOTAL CA: CPT

## 2018-11-04 PROCEDURE — 6370000000 HC RX 637 (ALT 250 FOR IP): Performed by: HOSPITALIST

## 2018-11-04 PROCEDURE — 82607 VITAMIN B-12: CPT

## 2018-11-04 PROCEDURE — 6370000000 HC RX 637 (ALT 250 FOR IP): Performed by: INTERNAL MEDICINE

## 2018-11-04 PROCEDURE — 85027 COMPLETE CBC AUTOMATED: CPT

## 2018-11-04 PROCEDURE — 1200000000 HC SEMI PRIVATE

## 2018-11-04 PROCEDURE — 82746 ASSAY OF FOLIC ACID SERUM: CPT

## 2018-11-04 PROCEDURE — 2580000003 HC RX 258: Performed by: HOSPITALIST

## 2018-11-04 RX ORDER — POLYETHYLENE GLYCOL 3350 17 G/17G
238 POWDER, FOR SOLUTION ORAL ONCE
Status: COMPLETED | OUTPATIENT
Start: 2018-11-04 | End: 2018-11-04

## 2018-11-04 RX ORDER — POTASSIUM CHLORIDE 750 MG/1
20 TABLET, FILM COATED, EXTENDED RELEASE ORAL ONCE
Status: COMPLETED | OUTPATIENT
Start: 2018-11-04 | End: 2018-11-04

## 2018-11-04 RX ORDER — POTASSIUM CHLORIDE 750 MG/1
40 TABLET, FILM COATED, EXTENDED RELEASE ORAL 3 TIMES DAILY
Status: DISCONTINUED | OUTPATIENT
Start: 2018-11-04 | End: 2018-11-09

## 2018-11-04 RX ORDER — POTASSIUM CHLORIDE 750 MG/1
20 TABLET, FILM COATED, EXTENDED RELEASE ORAL 2 TIMES DAILY WITH MEALS
Status: DISCONTINUED | OUTPATIENT
Start: 2018-11-04 | End: 2018-11-04

## 2018-11-04 RX ORDER — POTASSIUM CHLORIDE 20 MEQ/1
40 TABLET, EXTENDED RELEASE ORAL 3 TIMES DAILY
Status: DISCONTINUED | OUTPATIENT
Start: 2018-11-04 | End: 2018-11-04

## 2018-11-04 RX ADMIN — INSULIN LISPRO 3 UNITS: 100 INJECTION, SOLUTION INTRAVENOUS; SUBCUTANEOUS at 23:07

## 2018-11-04 RX ADMIN — INSULIN GLARGINE 15 UNITS: 100 INJECTION, SOLUTION SUBCUTANEOUS at 11:01

## 2018-11-04 RX ADMIN — POTASSIUM CHLORIDE 20 MEQ: 750 TABLET, FILM COATED, EXTENDED RELEASE ORAL at 10:51

## 2018-11-04 RX ADMIN — ASPIRIN 81 MG: 81 TABLET ORAL at 21:43

## 2018-11-04 RX ADMIN — INSULIN LISPRO 2 UNITS: 100 INJECTION, SOLUTION INTRAVENOUS; SUBCUTANEOUS at 11:00

## 2018-11-04 RX ADMIN — POTASSIUM CHLORIDE 20 MEQ: 750 TABLET, FILM COATED, EXTENDED RELEASE ORAL at 08:00

## 2018-11-04 RX ADMIN — BISACODYL 10 MG: 5 TABLET, COATED ORAL at 18:46

## 2018-11-04 RX ADMIN — POLYETHYLENE GLYCOL 3350 238 G: 17 POWDER, FOR SOLUTION ORAL at 18:45

## 2018-11-04 RX ADMIN — Medication 10 ML: at 10:59

## 2018-11-04 RX ADMIN — POTASSIUM CHLORIDE 40 MEQ: 750 TABLET, FILM COATED, EXTENDED RELEASE ORAL at 14:50

## 2018-11-04 RX ADMIN — INSULIN LISPRO 6 UNITS: 100 INJECTION, SOLUTION INTRAVENOUS; SUBCUTANEOUS at 12:44

## 2018-11-04 RX ADMIN — POTASSIUM CHLORIDE 40 MEQ: 750 TABLET, FILM COATED, EXTENDED RELEASE ORAL at 21:43

## 2018-11-04 RX ADMIN — ATORVASTATIN CALCIUM 20 MG: 20 TABLET, FILM COATED ORAL at 10:51

## 2018-11-04 NOTE — PROGRESS NOTES
Ht 5' 3\" (1.6 m)   Wt 276 lb 10.8 oz (125.5 kg)   SpO2 93%   BMI 49.01 kg/m²   Constitutional:  OAA X3 NAD  Skin: no rash, turgor wnl  Heent:  eomi, mmm  Neck: no bruits or jvd noted  Cardiovascular:  S1, S2 without m/r/g  Respiratory: CTA B without w/r/r  Abdomen:  +bs, soft, nt, nd  Ext: ++ lower extremity edema  Psychiatric: mood and affect appropriate  Musculoskeletal:  Rom, muscular strength intact    Data:   Labs:  CBC:   Recent Labs      11/02/18   0952  11/03/18   0050  11/03/18   0851  11/03/18   2025   WBC  8.6   --   9.4   --    HGB  4.3*  5.9*  7.3*  8.5*   PLT  324   --   283   --      BMP:    Recent Labs      11/01/18   2340  11/02/18   0952  11/03/18   0852  11/03/18   1844   NA  142  140  143   --    K  3.8  3.5  2.9*  4.2   CL  97*  95*  97*   --    CO2  31  28  30   --    BUN  41*  38*  30*   --    CREATININE  1.4*  1.3*  1.2   --    GLUCOSE  221*  238*  168*   --      Ca/Mg/Phos:   Recent Labs      11/01/18   2340  11/02/18   0952  11/03/18   0852   CALCIUM  9.4  9.2  8.8   MG   --   2.40   --      Hepatic: No results for input(s): AST, ALT, ALB, BILITOT, ALKPHOS in the last 72 hours. Troponin: No results for input(s): TROPONINI in the last 72 hours. BNP: No results for input(s): BNP in the last 72 hours. Lipids: No results for input(s): CHOL, TRIG, HDL, LDLCALC, LABVLDL in the last 72 hours. ABGs: No results for input(s): PHART, PO2ART, RNM1HAJ in the last 72 hours. INR: No results for input(s): INR in the last 72 hours.   UA:  Recent Labs      11/01/18   1905   COLORU  YELLOW   CLARITYU  Clear   GLUCOSEU  Negative   BILIRUBINUR  Negative   KETUA  Negative   SPECGRAV  1.012   BLOODU  Negative   PHUR  6.5   PROTEINU  Negative   UROBILINOGEN  0.2   NITRU  Negative   LEUKOCYTESUR  SMALL*   LABMICR  YES   URINETYPE  Clean catch      Urine Microscopic:   Recent Labs      11/01/18   1905   BACTERIA  RARE*   HYALCAST  0   WBCUA  14*   RBCUA  1   EPIU  1     Urine Culture: No results for input(s):

## 2018-11-04 NOTE — PROGRESS NOTES
chloride flush, magnesium hydroxide, ondansetron, glucose, dextrose, glucagon (rDNA), dextrose    Vitals   Vitals /wt   Patient Vitals for the past 8 hrs:   BP Temp Temp src Pulse Resp SpO2 Weight   11/04/18 0427 (!) 153/80 98.3 °F (36.8 °C) Oral 86 (!) 1 94 % 272 lb 7.8 oz (123.6 kg)        72HR INTAKE/OUTPUT:      Intake/Output Summary (Last 24 hours) at 11/04/18 6974  Last data filed at 11/03/18 2103   Gross per 24 hour   Intake            297.5 ml   Output             2400 ml   Net          -2102.5 ml       Exam:    Gen:   Alert and oriented ×3   Eyes: PERRL. No sclera icterus. No conjunctival injection. ENT: No discharge. Pharynx clear. External appearance of ears and nose normal.  Neck: Trachea midline. No obvious mass. Resp: No accessory muscle use. No crackles. No wheezes. No rhonchi. CV: Regular rate. Regular rhythm. No murmur or rub. No edema. GI: Non-tender. Non-distended. No hernia. Skin: Warm, dry, normal texture and turgor. Lymph: No cervical LAD. No supraclavicular LAD. M/S: / Ext. 4+ bilateral lower extremity edema  Neuro: CN 2-12 are intact,  no neurologic deficits noted. PT/INR: No results for input(s): PROTIME, INR in the last 72 hours. APTT: No results for input(s): APTT in the last 72 hours. CBC:   Recent Labs      11/02/18   0952  11/03/18   0050  11/03/18   0851  11/03/18 2025   WBC  8.6   --   9.4   --    HGB  4.3*  5.9*  7.3*  8.5*   HCT  15.6*  20.5*  24.0*  28.2*   MCV  55.2*   --   62.9*   --    PLT  324   --   283   --        BMP:   Recent Labs      11/01/18   2340  11/02/18   0952  11/03/18   0852  11/03/18   1844   NA  142  140  143   --    K  3.8  3.5  2.9*  4.2   CL  97*  95*  97*   --    CO2  31  28  30   --    BUN  41*  38*  30*   --    CREATININE  1.4*  1.3*  1.2   --        LIVER PROFILE: No results for input(s): ALKPHOS, AST, ALT, ALB, BILIDIR, BILITOT, ALKPHOS in the last 72 hours. No results for input(s): AMYLASE in the last 72 hours.   No results for

## 2018-11-05 ENCOUNTER — ANESTHESIA (OUTPATIENT)
Dept: ENDOSCOPY | Age: 63
DRG: 330 | End: 2018-11-05
Payer: MEDICARE

## 2018-11-05 ENCOUNTER — APPOINTMENT (OUTPATIENT)
Dept: CT IMAGING | Age: 63
DRG: 330 | End: 2018-11-05
Attending: HOSPITALIST
Payer: MEDICARE

## 2018-11-05 ENCOUNTER — ANESTHESIA EVENT (OUTPATIENT)
Dept: ENDOSCOPY | Age: 63
DRG: 330 | End: 2018-11-05
Payer: MEDICARE

## 2018-11-05 VITALS — OXYGEN SATURATION: 98 % | SYSTOLIC BLOOD PRESSURE: 108 MMHG | DIASTOLIC BLOOD PRESSURE: 63 MMHG

## 2018-11-05 LAB
ANION GAP SERPL CALCULATED.3IONS-SCNC: 15 MMOL/L (ref 3–16)
BUN BLDV-MCNC: 17 MG/DL (ref 7–20)
CALCIUM SERPL-MCNC: 8.9 MG/DL (ref 8.3–10.6)
CHLORIDE BLD-SCNC: 103 MMOL/L (ref 99–110)
CO2: 28 MMOL/L (ref 21–32)
CREAT SERPL-MCNC: 1.2 MG/DL (ref 0.6–1.2)
GFR AFRICAN AMERICAN: 55
GFR NON-AFRICAN AMERICAN: 45
GLUCOSE BLD-MCNC: 151 MG/DL (ref 70–99)
GLUCOSE BLD-MCNC: 154 MG/DL (ref 70–99)
GLUCOSE BLD-MCNC: 170 MG/DL (ref 70–99)
GLUCOSE BLD-MCNC: 173 MG/DL (ref 70–99)
GLUCOSE BLD-MCNC: 176 MG/DL (ref 70–99)
GLUCOSE BLD-MCNC: 178 MG/DL (ref 70–99)
HCT VFR BLD CALC: 15.6 % (ref 36–48)
HCT VFR BLD CALC: 24 % (ref 36–48)
HCT VFR BLD CALC: 27.6 % (ref 36–48)
HEMATOLOGY PATH CONSULT: NO
HEMATOLOGY PATH CONSULT: NO
HEMATOLOGY PATH CONSULT: NORMAL
HEMATOLOGY PATH CONSULT: YES
HEMOGLOBIN: 4.3 G/DL (ref 12–16)
HEMOGLOBIN: 7.3 G/DL (ref 12–16)
HEMOGLOBIN: 8.3 G/DL (ref 12–16)
MCH RBC QN AUTO: 15.3 PG (ref 26–34)
MCH RBC QN AUTO: 19 PG (ref 26–34)
MCH RBC QN AUTO: 20.3 PG (ref 26–34)
MCHC RBC AUTO-ENTMCNC: 27.8 G/DL (ref 31–36)
MCHC RBC AUTO-ENTMCNC: 30.1 G/DL (ref 31–36)
MCHC RBC AUTO-ENTMCNC: 30.2 G/DL (ref 31–36)
MCV RBC AUTO: 55.2 FL (ref 80–100)
MCV RBC AUTO: 62.9 FL (ref 80–100)
MCV RBC AUTO: 67.4 FL (ref 80–100)
PDW BLD-RTO: 22.1 % (ref 12.4–15.4)
PDW BLD-RTO: 31.4 % (ref 12.4–15.4)
PDW BLD-RTO: 32.1 % (ref 12.4–15.4)
PERFORMED ON: ABNORMAL
PLATELET # BLD: 267 K/UL (ref 135–450)
PLATELET # BLD: 283 K/UL (ref 135–450)
PLATELET # BLD: 324 K/UL (ref 135–450)
PMV BLD AUTO: 8.3 FL (ref 5–10.5)
PMV BLD AUTO: 8.6 FL (ref 5–10.5)
PMV BLD AUTO: 8.7 FL (ref 5–10.5)
POTASSIUM SERPL-SCNC: 3.2 MMOL/L (ref 3.5–5.1)
PRO-BNP: 577 PG/ML (ref 0–124)
RBC # BLD: 2.82 M/UL (ref 4–5.2)
RBC # BLD: 3.82 M/UL (ref 4–5.2)
RBC # BLD: 4.09 M/UL (ref 4–5.2)
SODIUM BLD-SCNC: 146 MMOL/L (ref 136–145)
TISSUE TRANSGLUTAMINASE IGA: 1 U/ML (ref 0–3)
WBC # BLD: 8.6 K/UL (ref 4–11)
WBC # BLD: 9.4 K/UL (ref 4–11)
WBC # BLD: 9.5 K/UL (ref 4–11)

## 2018-11-05 PROCEDURE — 6370000000 HC RX 637 (ALT 250 FOR IP): Performed by: NURSE PRACTITIONER

## 2018-11-05 PROCEDURE — 83880 ASSAY OF NATRIURETIC PEPTIDE: CPT

## 2018-11-05 PROCEDURE — 0DB78ZX EXCISION OF STOMACH, PYLORUS, VIA NATURAL OR ARTIFICIAL OPENING ENDOSCOPIC, DIAGNOSTIC: ICD-10-PCS | Performed by: INTERNAL MEDICINE

## 2018-11-05 PROCEDURE — 3700000000 HC ANESTHESIA ATTENDED CARE: Performed by: INTERNAL MEDICINE

## 2018-11-05 PROCEDURE — 88305 TISSUE EXAM BY PATHOLOGIST: CPT

## 2018-11-05 PROCEDURE — 94760 N-INVAS EAR/PLS OXIMETRY 1: CPT

## 2018-11-05 PROCEDURE — 7100000000 HC PACU RECOVERY - FIRST 15 MIN: Performed by: INTERNAL MEDICINE

## 2018-11-05 PROCEDURE — 2580000003 HC RX 258: Performed by: INTERNAL MEDICINE

## 2018-11-05 PROCEDURE — 6370000000 HC RX 637 (ALT 250 FOR IP): Performed by: HOSPITALIST

## 2018-11-05 PROCEDURE — 0DBH8ZX EXCISION OF CECUM, VIA NATURAL OR ARTIFICIAL OPENING ENDOSCOPIC, DIAGNOSTIC: ICD-10-PCS | Performed by: INTERNAL MEDICINE

## 2018-11-05 PROCEDURE — 6360000002 HC RX W HCPCS: Performed by: NURSE ANESTHETIST, CERTIFIED REGISTERED

## 2018-11-05 PROCEDURE — 6360000004 HC RX CONTRAST MEDICATION: Performed by: INTERNAL MEDICINE

## 2018-11-05 PROCEDURE — 2580000003 HC RX 258: Performed by: HOSPITALIST

## 2018-11-05 PROCEDURE — 0DB98ZX EXCISION OF DUODENUM, VIA NATURAL OR ARTIFICIAL OPENING ENDOSCOPIC, DIAGNOSTIC: ICD-10-PCS | Performed by: INTERNAL MEDICINE

## 2018-11-05 PROCEDURE — 3700000001 HC ADD 15 MINUTES (ANESTHESIA): Performed by: INTERNAL MEDICINE

## 2018-11-05 PROCEDURE — 36415 COLL VENOUS BLD VENIPUNCTURE: CPT

## 2018-11-05 PROCEDURE — 7100000001 HC PACU RECOVERY - ADDTL 15 MIN: Performed by: INTERNAL MEDICINE

## 2018-11-05 PROCEDURE — 1200000000 HC SEMI PRIVATE

## 2018-11-05 PROCEDURE — 2709999900 HC NON-CHARGEABLE SUPPLY: Performed by: INTERNAL MEDICINE

## 2018-11-05 PROCEDURE — 2500000003 HC RX 250 WO HCPCS: Performed by: INTERNAL MEDICINE

## 2018-11-05 PROCEDURE — 2580000003 HC RX 258: Performed by: NURSE ANESTHETIST, CERTIFIED REGISTERED

## 2018-11-05 PROCEDURE — 2500000003 HC RX 250 WO HCPCS: Performed by: NURSE ANESTHETIST, CERTIFIED REGISTERED

## 2018-11-05 PROCEDURE — 80048 BASIC METABOLIC PNL TOTAL CA: CPT

## 2018-11-05 PROCEDURE — 74177 CT ABD & PELVIS W/CONTRAST: CPT

## 2018-11-05 PROCEDURE — C1773 RET DEV, INSERTABLE: HCPCS | Performed by: INTERNAL MEDICINE

## 2018-11-05 PROCEDURE — 6360000002 HC RX W HCPCS: Performed by: HOSPITALIST

## 2018-11-05 PROCEDURE — 3609010300 HC COLONOSCOPY W/BIOPSY SINGLE/MULTIPLE: Performed by: INTERNAL MEDICINE

## 2018-11-05 PROCEDURE — 0DBK8ZX EXCISION OF ASCENDING COLON, VIA NATURAL OR ARTIFICIAL OPENING ENDOSCOPIC, DIAGNOSTIC: ICD-10-PCS | Performed by: INTERNAL MEDICINE

## 2018-11-05 PROCEDURE — 0DBN8ZX EXCISION OF SIGMOID COLON, VIA NATURAL OR ARTIFICIAL OPENING ENDOSCOPIC, DIAGNOSTIC: ICD-10-PCS | Performed by: INTERNAL MEDICINE

## 2018-11-05 PROCEDURE — 3609012400 HC EGD TRANSORAL BIOPSY SINGLE/MULTIPLE: Performed by: INTERNAL MEDICINE

## 2018-11-05 RX ORDER — LIDOCAINE HYDROCHLORIDE 20 MG/ML
INJECTION, SOLUTION EPIDURAL; INFILTRATION; INTRACAUDAL; PERINEURAL PRN
Status: DISCONTINUED | OUTPATIENT
Start: 2018-11-05 | End: 2018-11-05 | Stop reason: SDUPTHER

## 2018-11-05 RX ORDER — SODIUM CHLORIDE 0.9 % (FLUSH) 0.9 %
10 SYRINGE (ML) INJECTION PRN
Status: DISCONTINUED | OUTPATIENT
Start: 2018-11-05 | End: 2018-11-05 | Stop reason: ALTCHOICE

## 2018-11-05 RX ORDER — ONDANSETRON 2 MG/ML
4 INJECTION INTRAMUSCULAR; INTRAVENOUS
Status: DISCONTINUED | OUTPATIENT
Start: 2018-11-05 | End: 2018-11-05

## 2018-11-05 RX ORDER — CLONAZEPAM 0.5 MG/1
0.5 TABLET ORAL ONCE
Status: COMPLETED | OUTPATIENT
Start: 2018-11-05 | End: 2018-11-05

## 2018-11-05 RX ORDER — MEPERIDINE HYDROCHLORIDE 25 MG/ML
12.5 INJECTION INTRAMUSCULAR; INTRAVENOUS; SUBCUTANEOUS EVERY 5 MIN PRN
Status: DISCONTINUED | OUTPATIENT
Start: 2018-11-05 | End: 2018-11-05

## 2018-11-05 RX ORDER — PROPOFOL 10 MG/ML
INJECTION, EMULSION INTRAVENOUS PRN
Status: DISCONTINUED | OUTPATIENT
Start: 2018-11-05 | End: 2018-11-05 | Stop reason: SDUPTHER

## 2018-11-05 RX ORDER — SODIUM CHLORIDE 9 MG/ML
INJECTION, SOLUTION INTRAVENOUS CONTINUOUS
Status: DISCONTINUED | OUTPATIENT
Start: 2018-11-05 | End: 2018-11-05 | Stop reason: ALTCHOICE

## 2018-11-05 RX ORDER — OXYCODONE HYDROCHLORIDE AND ACETAMINOPHEN 5; 325 MG/1; MG/1
2 TABLET ORAL PRN
Status: DISCONTINUED | OUTPATIENT
Start: 2018-11-05 | End: 2018-11-05

## 2018-11-05 RX ORDER — SODIUM CHLORIDE 9 MG/ML
INJECTION, SOLUTION INTRAVENOUS CONTINUOUS
Status: DISPENSED | OUTPATIENT
Start: 2018-11-05 | End: 2018-11-06

## 2018-11-05 RX ORDER — MORPHINE SULFATE 2 MG/ML
1 INJECTION, SOLUTION INTRAMUSCULAR; INTRAVENOUS EVERY 5 MIN PRN
Status: DISCONTINUED | OUTPATIENT
Start: 2018-11-05 | End: 2018-11-05

## 2018-11-05 RX ORDER — FENTANYL CITRATE 50 UG/ML
50 INJECTION, SOLUTION INTRAMUSCULAR; INTRAVENOUS EVERY 5 MIN PRN
Status: DISCONTINUED | OUTPATIENT
Start: 2018-11-05 | End: 2018-11-05

## 2018-11-05 RX ORDER — SODIUM CHLORIDE 9 MG/ML
INJECTION, SOLUTION INTRAVENOUS CONTINUOUS PRN
Status: DISCONTINUED | OUTPATIENT
Start: 2018-11-05 | End: 2018-11-05 | Stop reason: SDUPTHER

## 2018-11-05 RX ORDER — KETAMINE HCL IN NACL, ISO-OSM 100MG/10ML
SYRINGE (ML) INJECTION PRN
Status: DISCONTINUED | OUTPATIENT
Start: 2018-11-05 | End: 2018-11-05 | Stop reason: SDUPTHER

## 2018-11-05 RX ORDER — GLYCOPYRROLATE 0.2 MG/ML
INJECTION INTRAMUSCULAR; INTRAVENOUS PRN
Status: DISCONTINUED | OUTPATIENT
Start: 2018-11-05 | End: 2018-11-05 | Stop reason: SDUPTHER

## 2018-11-05 RX ORDER — SODIUM CHLORIDE 0.9 % (FLUSH) 0.9 %
10 SYRINGE (ML) INJECTION EVERY 12 HOURS SCHEDULED
Status: DISCONTINUED | OUTPATIENT
Start: 2018-11-05 | End: 2018-11-05 | Stop reason: ALTCHOICE

## 2018-11-05 RX ORDER — LANOLIN ALCOHOL/MO/W.PET/CERES
3 CREAM (GRAM) TOPICAL NIGHTLY PRN
Status: DISCONTINUED | OUTPATIENT
Start: 2018-11-05 | End: 2018-11-13 | Stop reason: HOSPADM

## 2018-11-05 RX ORDER — PROPOFOL 10 MG/ML
INJECTION, EMULSION INTRAVENOUS CONTINUOUS PRN
Status: DISCONTINUED | OUTPATIENT
Start: 2018-11-05 | End: 2018-11-05 | Stop reason: SDUPTHER

## 2018-11-05 RX ORDER — FENTANYL CITRATE 50 UG/ML
25 INJECTION, SOLUTION INTRAMUSCULAR; INTRAVENOUS EVERY 5 MIN PRN
Status: DISCONTINUED | OUTPATIENT
Start: 2018-11-05 | End: 2018-11-05

## 2018-11-05 RX ORDER — OXYCODONE HYDROCHLORIDE AND ACETAMINOPHEN 5; 325 MG/1; MG/1
1 TABLET ORAL PRN
Status: DISCONTINUED | OUTPATIENT
Start: 2018-11-05 | End: 2018-11-05

## 2018-11-05 RX ORDER — MORPHINE SULFATE 2 MG/ML
2 INJECTION, SOLUTION INTRAMUSCULAR; INTRAVENOUS EVERY 5 MIN PRN
Status: DISCONTINUED | OUTPATIENT
Start: 2018-11-05 | End: 2018-11-05

## 2018-11-05 RX ADMIN — IOPAMIDOL 75 ML: 755 INJECTION, SOLUTION INTRAVENOUS at 20:40

## 2018-11-05 RX ADMIN — Medication 50 MG: at 16:14

## 2018-11-05 RX ADMIN — FUROSEMIDE 10 MG/HR: 10 INJECTION, SOLUTION INTRAMUSCULAR; INTRAVENOUS at 08:44

## 2018-11-05 RX ADMIN — PROPOFOL 100 MG: 10 INJECTION, EMULSION INTRAVENOUS at 16:12

## 2018-11-05 RX ADMIN — SODIUM CHLORIDE: 9 INJECTION, SOLUTION INTRAVENOUS at 19:28

## 2018-11-05 RX ADMIN — GLYCOPYRROLATE 0.2 MG: 0.2 INJECTION, SOLUTION INTRAMUSCULAR; INTRAVENOUS at 16:10

## 2018-11-05 RX ADMIN — INSULIN LISPRO 1 UNITS: 100 INJECTION, SOLUTION INTRAVENOUS; SUBCUTANEOUS at 22:10

## 2018-11-05 RX ADMIN — POTASSIUM CHLORIDE 40 MEQ: 750 TABLET, FILM COATED, EXTENDED RELEASE ORAL at 08:44

## 2018-11-05 RX ADMIN — LIDOCAINE HYDROCHLORIDE 100 MG: 20 INJECTION, SOLUTION EPIDURAL; INFILTRATION; INTRACAUDAL; PERINEURAL at 16:12

## 2018-11-05 RX ADMIN — SODIUM CHLORIDE: 9 INJECTION, SOLUTION INTRAVENOUS at 16:01

## 2018-11-05 RX ADMIN — INSULIN LISPRO 2 UNITS: 100 INJECTION, SOLUTION INTRAVENOUS; SUBCUTANEOUS at 17:54

## 2018-11-05 RX ADMIN — CLONAZEPAM 0.5 MG: 0.5 TABLET ORAL at 23:31

## 2018-11-05 RX ADMIN — PROPOFOL 200 MCG/KG/MIN: 10 INJECTION, EMULSION INTRAVENOUS at 16:12

## 2018-11-05 RX ADMIN — POTASSIUM CHLORIDE 40 MEQ: 750 TABLET, FILM COATED, EXTENDED RELEASE ORAL at 22:09

## 2018-11-05 ASSESSMENT — PULMONARY FUNCTION TESTS
PIF_VALUE: 0

## 2018-11-05 ASSESSMENT — PAIN - FUNCTIONAL ASSESSMENT: PAIN_FUNCTIONAL_ASSESSMENT: 0-10

## 2018-11-05 ASSESSMENT — PAIN SCALES - GENERAL: PAINLEVEL_OUTOF10: 0

## 2018-11-05 ASSESSMENT — LIFESTYLE VARIABLES: SMOKING_STATUS: 0

## 2018-11-05 ASSESSMENT — ENCOUNTER SYMPTOMS: SHORTNESS OF BREATH: 1

## 2018-11-05 NOTE — PROGRESS NOTES
(123.6 kg)   SpO2 92%   BMI 48.27 kg/m²   Constitutional:  OAA X3 NAD  Skin: no rash, turgor wnl  Heent:  eomi, mmm  Neck: no bruits or jvd noted  Cardiovascular:  S1, S2 without m/r/g  Respiratory: CTA B without w/r/r  Abdomen:  +bs, soft, nt, nd  Ext: ++ lower extremity edema  Psychiatric: mood and affect appropriate  Musculoskeletal:  Rom, muscular strength intact    Data:   Labs:  CBC:   Recent Labs      11/02/18   0952   11/03/18   0851  11/03/18 2025 11/04/18   0846   WBC  8.6   --   9.4   --   9.5   HGB  4.3*   < >  7.3*  8.5*  8.3*   PLT  324   --   283   --   267    < > = values in this interval not displayed. BMP:    Recent Labs      11/02/18   0952  11/03/18   0852  11/03/18   1844 11/04/18   0846   NA  140  143   --   145   K  3.5  2.9*  4.2  2.7*   CL  95*  97*   --   100   CO2  28  30   --   30   BUN  38*  30*   --   22*   CREATININE  1.3*  1.2   --   1.2   GLUCOSE  238*  168*   --   168*     Ca/Mg/Phos:   Recent Labs      11/02/18   0952  11/03/18   0852  11/04/18   0846   CALCIUM  9.2  8.8  8.8   MG  2.40   --    --      Hepatic: No results for input(s): AST, ALT, ALB, BILITOT, ALKPHOS in the last 72 hours. Troponin: No results for input(s): TROPONINI in the last 72 hours. BNP: No results for input(s): BNP in the last 72 hours. Lipids: No results for input(s): CHOL, TRIG, HDL, LDLCALC, LABVLDL in the last 72 hours. ABGs: No results for input(s): PHART, PO2ART, QRM7KKA in the last 72 hours. INR: No results for input(s): INR in the last 72 hours. UA:  No results for input(s): Cade Raspberry, GLUCOSEU, BILIRUBINUR, KETUA, SPECGRAV, BLOODU, PHUR, PROTEINU, UROBILINOGEN, NITRU, LEUKOCYTESUR, Ledell Shaver in the last 72 hours. Urine Microscopic:   No results for input(s): LABCAST, BACTERIA, COMU, HYALCAST, WBCUA, RBCUA, EPIU in the last 72 hours. Urine Culture: No results for input(s): LABURIN in the last 72 hours.   Urine Chemistry:   No results for input(s): Alicja Stuart,

## 2018-11-05 NOTE — ANESTHESIA PRE PROCEDURE
ELÍAS Rose  mL/hr at 11/03/18 1630 10 mEq at 11/03/18 1630    insulin lispro (HUMALOG) injection vial 0-12 Units  0-12 Units Subcutaneous TID LORRIE Rose MD   6 Units at 11/04/18 1244    insulin lispro (HUMALOG) injection vial 0-6 Units  0-6 Units Subcutaneous Nightly Carlota Rose MD   3 Units at 11/04/18 2307    insulin glargine (LANTUS) injection vial 15 Units  15 Units Subcutaneous Daily Carlota Rose MD   15 Units at 11/04/18 1101    insulin lispro (HUMALOG) injection vial 15 Units  15 Units Subcutaneous TID LORRIE Rose MD   15 Units at 11/03/18 1702    atorvastatin (LIPITOR) tablet 20 mg  20 mg Oral Daily Carlota Rose MD   20 mg at 11/04/18 1051    sodium chloride flush 0.9 % injection 10 mL  10 mL Intravenous 2 times per day Carlota Rose MD   10 mL at 11/04/18 1059    sodium chloride flush 0.9 % injection 10 mL  10 mL Intravenous PRN Carlota Rose MD        magnesium hydroxide (MILK OF MAGNESIA) 400 MG/5ML suspension 30 mL  30 mL Oral Daily PRN Carlota Rose MD        ondansetron [facility]) injection 4 mg  4 mg Intravenous Q6H PRN Carlota Rose MD        furosemide (LASIX) 500 mg in dextrose 5 % 100 mL infusion  10 mg/hr Intravenous Continuous Carlota Rose MD 2 mL/hr at 11/05/18 0844 10 mg/hr at 11/05/18 0844    aspirin EC tablet 81 mg  81 mg Oral Nightly Carlota Rose MD   81 mg at 11/04/18 2143    cetirizine (ZYRTEC) tablet 10 mg  10 mg Oral Nightly Carlota Rose MD        glucose (GLUTOSE) 40 % oral gel 15 g  15 g Oral PRN Vernon Lugo, APRN - CNP        dextrose 50 % solution 12.5 g  12.5 g Intravenous PRN Vernon Lugo, APRN - CNP        glucagon (rDNA) injection 1 mg  1 mg Intramuscular PRN Vernon Lugo, APRN - CNP        dextrose 5 % solution  100 mL/hr Intravenous PRN SANDY Beard - CNP           Allergies:     Allergies   Allergen Reactions    Pcn

## 2018-11-05 NOTE — OP NOTE
the mass. A 1 cm sessile cecal polyp was identified and resected with snare cautery. The lesion was retrieved in piecemeal fashion through the suction channel. A 3 mm sessile polyp in the proximal ascending colon was identified, and resected with biopsy forceps. The scope was then withdrawn into the rectum and retroflexed. The retroflexed view of the anal verge and rectum demonstrates hemorrhoids. The scope was straightened, the colon was decompressed and the scope was withdrawn from the patient. The patient tolerated the procedure well and was taken to the PACU in good condition. Estimated Blood Loss: Minimal     Impression:    1) See post procedure diagnoses    Recommendations:   1) Follow-up pathology  2) Will order CT A/P and Chest for staging of presumed colon cancer  3) Will consult oncology and surgery  4) Continue iron replacement.     ABEL Martinez [address] [facility]

## 2018-11-05 NOTE — PROGRESS NOTES
B without w/r/r  Abdomen:  +bs, soft, nt, nd  Ext: ++ lower extremity edema  Psychiatric: mood and affect appropriate  Musculoskeletal:  Rom, muscular strength intact    Data:   Labs:  CBC:   Recent Labs      11/02/18   0952   11/03/18   0851  11/03/18 2025 11/04/18   0846   WBC  8.6   --   9.4   --   9.5   HGB  4.3*   < >  7.3*  8.5*  8.3*   PLT  324   --   283   --   267    < > = values in this interval not displayed. BMP:    Recent Labs      11/02/18   0952  11/03/18   0852  11/03/18   1844  11/04/18   0846   NA  140  143   --   145   K  3.5  2.9*  4.2  2.7*   CL  95*  97*   --   100   CO2  28  30   --   30   BUN  38*  30*   --   22*   CREATININE  1.3*  1.2   --   1.2   GLUCOSE  238*  168*   --   168*     Ca/Mg/Phos:   Recent Labs      11/02/18   0952  11/03/18   0852  11/04/18   0846   CALCIUM  9.2  8.8  8.8   MG  2.40   --    --      Hepatic: No results for input(s): AST, ALT, ALB, BILITOT, ALKPHOS in the last 72 hours. Troponin: No results for input(s): TROPONINI in the last 72 hours. BNP: No results for input(s): BNP in the last 72 hours. Lipids: No results for input(s): CHOL, TRIG, HDL, LDLCALC, LABVLDL in the last 72 hours. ABGs: No results for input(s): PHART, PO2ART, DCA4DIQ in the last 72 hours. INR: No results for input(s): INR in the last 72 hours. UA:  No results for input(s): Denia Jeromy, GLUCOSEU, BILIRUBINUR, KETUA, SPECGRAV, BLOODU, PHUR, PROTEINU, UROBILINOGEN, NITRU, LEUKOCYTESUR, Forest Norlander in the last 72 hours. Urine Microscopic:   No results for input(s): LABCAST, BACTERIA, COMU, HYALCAST, WBCUA, RBCUA, EPIU in the last 72 hours. Urine Culture: No results for input(s): LABURIN in the last 72 hours. Urine Chemistry:   No results for input(s): Fiorella Alu, PROTEINUR, NAUR in the last 72 hours. IMAGING:  XR CHEST PORTABLE   Final Result   Mild pulmonary vascular congestion. Cardial-pericardial silhouette enlargement.          US ABDOMEN COMPLETE

## 2018-11-06 ENCOUNTER — APPOINTMENT (OUTPATIENT)
Dept: CT IMAGING | Age: 63
DRG: 330 | End: 2018-11-06
Attending: HOSPITALIST
Payer: MEDICARE

## 2018-11-06 ENCOUNTER — APPOINTMENT (OUTPATIENT)
Dept: MRI IMAGING | Age: 63
DRG: 330 | End: 2018-11-06
Attending: HOSPITALIST
Payer: MEDICARE

## 2018-11-06 LAB
ALBUMIN SERPL-MCNC: 3.4 G/DL (ref 3.4–5)
ANION GAP SERPL CALCULATED.3IONS-SCNC: 14 MMOL/L (ref 3–16)
BUN BLDV-MCNC: 12 MG/DL (ref 7–20)
CA 125: 17.1 U/ML (ref 0–35)
CALCIUM SERPL-MCNC: 8.9 MG/DL (ref 8.3–10.6)
CEA: 1.7 NG/ML (ref 0–5)
CHLORIDE BLD-SCNC: 102 MMOL/L (ref 99–110)
CO2: 27 MMOL/L (ref 21–32)
CREAT SERPL-MCNC: 1.2 MG/DL (ref 0.6–1.2)
GFR AFRICAN AMERICAN: 55
GFR NON-AFRICAN AMERICAN: 45
GLUCOSE BLD-MCNC: 114 MG/DL (ref 70–99)
GLUCOSE BLD-MCNC: 195 MG/DL (ref 70–99)
GLUCOSE BLD-MCNC: 217 MG/DL (ref 70–99)
GLUCOSE BLD-MCNC: 225 MG/DL (ref 70–99)
GLUCOSE BLD-MCNC: 300 MG/DL (ref 70–99)
HCT VFR BLD CALC: 27.9 % (ref 36–48)
HEMOGLOBIN: 8.3 G/DL (ref 12–16)
MCH RBC QN AUTO: 21.1 PG (ref 26–34)
MCHC RBC AUTO-ENTMCNC: 29.8 G/DL (ref 31–36)
MCV RBC AUTO: 70.8 FL (ref 80–100)
PDW BLD-RTO: 32.8 % (ref 12.4–15.4)
PERFORMED ON: ABNORMAL
PLATELET # BLD: 273 K/UL (ref 135–450)
PMV BLD AUTO: 8.6 FL (ref 5–10.5)
POTASSIUM SERPL-SCNC: 3.9 MMOL/L (ref 3.5–5.1)
PREALBUMIN: 18.7 MG/DL (ref 20–40)
RBC # BLD: 3.94 M/UL (ref 4–5.2)
SODIUM BLD-SCNC: 143 MMOL/L (ref 136–145)
TRANSFERRIN: 309 MG/DL (ref 200–360)
WBC # BLD: 10.8 K/UL (ref 4–11)

## 2018-11-06 PROCEDURE — 2580000003 HC RX 258: Performed by: INTERNAL MEDICINE

## 2018-11-06 PROCEDURE — 6360000002 HC RX W HCPCS: Performed by: INTERNAL MEDICINE

## 2018-11-06 PROCEDURE — 6370000000 HC RX 637 (ALT 250 FOR IP): Performed by: HOSPITALIST

## 2018-11-06 PROCEDURE — 6360000004 HC RX CONTRAST MEDICATION: Performed by: INTERNAL MEDICINE

## 2018-11-06 PROCEDURE — 1200000000 HC SEMI PRIVATE

## 2018-11-06 PROCEDURE — 85027 COMPLETE CBC AUTOMATED: CPT

## 2018-11-06 PROCEDURE — 86304 IMMUNOASSAY TUMOR CA 125: CPT

## 2018-11-06 PROCEDURE — 36415 COLL VENOUS BLD VENIPUNCTURE: CPT

## 2018-11-06 PROCEDURE — 82378 CARCINOEMBRYONIC ANTIGEN: CPT

## 2018-11-06 PROCEDURE — APPSS45 APP SPLIT SHARED TIME 31-45 MINUTES: Performed by: PHYSICIAN ASSISTANT

## 2018-11-06 PROCEDURE — 99222 1ST HOSP IP/OBS MODERATE 55: CPT | Performed by: SURGERY

## 2018-11-06 PROCEDURE — 80048 BASIC METABOLIC PNL TOTAL CA: CPT

## 2018-11-06 PROCEDURE — A9577 INJ MULTIHANCE: HCPCS | Performed by: INTERNAL MEDICINE

## 2018-11-06 PROCEDURE — 84134 ASSAY OF PREALBUMIN: CPT

## 2018-11-06 PROCEDURE — 74183 MRI ABD W/O CNTR FLWD CNTR: CPT

## 2018-11-06 PROCEDURE — APPNB60 APP NON BILLABLE TIME 46-60 MINS: Performed by: PHYSICIAN ASSISTANT

## 2018-11-06 PROCEDURE — 82040 ASSAY OF SERUM ALBUMIN: CPT

## 2018-11-06 PROCEDURE — 84466 ASSAY OF TRANSFERRIN: CPT

## 2018-11-06 PROCEDURE — 71250 CT THORAX DX C-: CPT

## 2018-11-06 RX ADMIN — GADOBENATE DIMEGLUMINE 10 ML: 529 INJECTION, SOLUTION INTRAVENOUS at 17:29

## 2018-11-06 RX ADMIN — POTASSIUM CHLORIDE 40 MEQ: 750 TABLET, FILM COATED, EXTENDED RELEASE ORAL at 08:16

## 2018-11-06 RX ADMIN — INSULIN GLARGINE 15 UNITS: 100 INJECTION, SOLUTION SUBCUTANEOUS at 08:17

## 2018-11-06 RX ADMIN — INSULIN LISPRO 4 UNITS: 100 INJECTION, SOLUTION INTRAVENOUS; SUBCUTANEOUS at 11:45

## 2018-11-06 RX ADMIN — ATORVASTATIN CALCIUM 20 MG: 20 TABLET, FILM COATED ORAL at 08:17

## 2018-11-06 RX ADMIN — INSULIN LISPRO 15 UNITS: 100 INJECTION, SOLUTION INTRAVENOUS; SUBCUTANEOUS at 08:20

## 2018-11-06 RX ADMIN — INSULIN LISPRO 7 UNITS: 100 INJECTION, SOLUTION INTRAVENOUS; SUBCUTANEOUS at 18:50

## 2018-11-06 RX ADMIN — INSULIN LISPRO 4 UNITS: 100 INJECTION, SOLUTION INTRAVENOUS; SUBCUTANEOUS at 22:26

## 2018-11-06 RX ADMIN — IRON SUCROSE 200 MG: 20 INJECTION, SOLUTION INTRAVENOUS at 11:06

## 2018-11-06 RX ADMIN — INSULIN LISPRO 4 UNITS: 100 INJECTION, SOLUTION INTRAVENOUS; SUBCUTANEOUS at 08:17

## 2018-11-06 RX ADMIN — POTASSIUM CHLORIDE 40 MEQ: 750 TABLET, FILM COATED, EXTENDED RELEASE ORAL at 22:25

## 2018-11-06 ASSESSMENT — PAIN SCALES - GENERAL: PAINLEVEL_OUTOF10: 0

## 2018-11-06 NOTE — CONSULTS
weekly 10/23/18  Yes Seferino Dang MD   furosemide (LASIX) 80 MG tablet Take 1 tablet by mouth 2 times daily 10/22/18  Yes Seferino Dang MD   metFORMIN (GLUCOPHAGE) 1000 MG tablet Take 1 tablet by mouth 2 times daily (with meals) 10/22/18  Yes Mika Yun MD   potassium chloride (KLOR-CON M) 20 MEQ TBCR extended release tablet Take 1 tablet by mouth daily Take one tablet po daily 9/27/18  Yes Seferino Dang MD   glipiZIDE (GLUCOTROL) 5 MG tablet Take 1 tablet by mouth 2 times daily (before meals) 9/10/18  Yes Mika Yun MD   aspirin EC 81 MG EC tablet Take 1 tablet by mouth daily 7/8/16  Yes Mika Yun MD    Scheduled Meds:   iron sucrose  200 mg Intravenous Q24H    potassium chloride  40 mEq Oral TID    insulin lispro  0-12 Units Subcutaneous TID WC    insulin lispro  0-6 Units Subcutaneous Nightly    insulin glargine  15 Units Subcutaneous Daily    insulin lispro  15 Units Subcutaneous TID WC    atorvastatin  20 mg Oral Daily    sodium chloride flush  10 mL Intravenous 2 times per day    cetirizine  10 mg Oral Nightly     Continuous Infusions:   SPOT INK MARKER 5 mL/hr (11/05/18 1648)    furosemide (LASIX) 5mg/ml infusion 10 mg/hr (11/05/18 0844)    dextrose       PRN Meds:. SPOT INK MARKER, melatonin, potassium chloride, sodium chloride flush, magnesium hydroxide, ondansetron, glucose, dextrose, glucagon (rDNA), dextrose  Allergies  is allergic to pcn [penicillins]; spironolactone; coreg [carvedilol]; and lisinopril. Family History  Reviewedfamily history includes High Blood Pressure in her father; Mardelle Tunica in her father and mother; Lupus in her sister. Social History   reports that she has never smoked. She has never used smokeless tobacco. She reports that she drinks about 1.2 oz of alcohol per week . She reports that she does not use drugs. EDUCATION  Patient educated about their illness/diagnosis, stated above, and all questions answered.  We discussed the

## 2018-11-06 NOTE — PROGRESS NOTES
I.V.:225]  Out: 1500 [Urine:1500]    Physical Exam:  Gen: Resting in bed, NAD   HEENT: normocephalic, atraumatic. No scleral icterus. CV: RRR no MRG   Pul: CTAB   Abd: soft, moderately distended, +BS  Ext: 2+BLE edema  Neuro: No asterixis   Skin: No jaundice, spider angiomas, grigsby erythema     LABS AND IMAGING     Recent Results (from the past 24 hour(s))   POCT Glucose    Collection Time: 11/05/18  7:46 AM   Result Value Ref Range    POC Glucose 151 (H) 70 - 99 mg/dl    Performed on ACCU-CHEK    Basic Metabolic Panel    Collection Time: 11/05/18  8:04 AM   Result Value Ref Range    Sodium 146 (H) 136 - 145 mmol/L    Potassium 3.2 (L) 3.5 - 5.1 mmol/L    Chloride 103 99 - 110 mmol/L    CO2 28 21 - 32 mmol/L    Anion Gap 15 3 - 16    Glucose 154 (H) 70 - 99 mg/dL    BUN 17 7 - 20 mg/dL    CREATININE 1.2 0.6 - 1.2 mg/dL    GFR Non-African American 45 (A) >60    GFR  55 (A) >60    Calcium 8.9 8.3 - 10.6 mg/dL   Brain Natriuretic Peptide    Collection Time: 11/05/18  8:04 AM   Result Value Ref Range    Pro- (H) 0 - 124 pg/mL   POCT Glucose    Collection Time: 11/05/18 12:10 PM   Result Value Ref Range    POC Glucose 173 (H) 70 - 99 mg/dl    Performed on ACCU-CHEK    POCT Glucose    Collection Time: 11/05/18  2:01 PM   Result Value Ref Range    POC Glucose 170 (H) 70 - 99 mg/dl    Performed on ACCU-CHEK    POCT Glucose    Collection Time: 11/05/18  5:13 PM   Result Value Ref Range    POC Glucose 178 (H) 70 - 99 mg/dl    Performed on ACCU-CHEK    POCT Glucose    Collection Time: 11/05/18  9:17 PM   Result Value Ref Range    POC Glucose 176 (H) 70 - 99 mg/dl    Performed on ACCU-CHEK      Other Labs    Imaging  CT ABDOMEN PELVIS W IV CONTRAST Additional Contrast? None   Final Result   No convincing evidence of metastatic disease within the abdomen pelvis. There is a mottled appearance of the peripheral hepatic parenchyma, which   suggests possible hepatic congestion.   Further evaluation with a dedicated   hepatic MRI is recommended to better evaluate that. Despite the given history, a definite sigmoid mass is not detected. Subcutaneous fat stranding involving the pannus, with cutaneous thickening. Correlate with any clinical evidence of cellulitis. Bilateral nonobstructing nephrolithiasis. XR CHEST PORTABLE   Final Result   Mild pulmonary vascular congestion. Cardial-pericardial silhouette enlargement. US ABDOMEN COMPLETE    (Results Pending)       Endoscopy  EGD and colonoscopy 11/5/18 with Dr. Parish Mas  1. Multiple gastric erosions, 3-4 mm in size, with surrounding erythema. Biopsies were obtained from the gastric antrum, incisura and body to evaluate for H. Pylori. 2. Normal duodenal mucosa. Biopsies obtained from the duodenal bulb and the second portion of the duodenum to evaluate for Celiac disease. 3.  15 mm pedunculated sigmoid polyp, resected with snare cautery  4. 4 mm sessile polyp in the sigmoid colon, resected with cold snare  5. A large 6 cm fungating mass in the distal ascending colon extending to the hepatic flexure. The mass was biopsied with cold forceps. 2.5 mL [place] ink was placed proximal and distal to the mass. 6. A 1 cm cecal polyp, resected with snare cautery. 7. A 3 mm sessile polyp in the proximal ascending colon, resected with biopsy forceps. ASSESSMENT AND RECOMMENDATIONS   Rachele Hammond is a 70 y.o. female with PMH of CRF, DM, HTN, and HLD who presented on 11/1/2018 with  progressive CHÁVEZ, abdominal swelling, and LE edema. Patient has followed by Nephrologist and edema has been unresponsive to diuretics so was admitted for diuresis. Patient was found to have severe microcytic anemia and GI consulted for further evaluation. Underwent EGD and colonoscopy 11/5/18 (report above)    1. Right sided colon mass:  Biopsies pending. Area tattooed. CT abd/pel negative for evidence of metastatic disease.   Surgery and oncology consulted

## 2018-11-07 ENCOUNTER — APPOINTMENT (OUTPATIENT)
Dept: ULTRASOUND IMAGING | Age: 63
DRG: 330 | End: 2018-11-07
Attending: HOSPITALIST
Payer: MEDICARE

## 2018-11-07 LAB
ANION GAP SERPL CALCULATED.3IONS-SCNC: 13 MMOL/L (ref 3–16)
BUN BLDV-MCNC: 11 MG/DL (ref 7–20)
CALCIUM SERPL-MCNC: 9.2 MG/DL (ref 8.3–10.6)
CHLORIDE BLD-SCNC: 104 MMOL/L (ref 99–110)
CO2: 28 MMOL/L (ref 21–32)
CREAT SERPL-MCNC: 1.1 MG/DL (ref 0.6–1.2)
GFR AFRICAN AMERICAN: >60
GFR NON-AFRICAN AMERICAN: 50
GLUCOSE BLD-MCNC: 171 MG/DL (ref 70–99)
GLUCOSE BLD-MCNC: 187 MG/DL (ref 70–99)
GLUCOSE BLD-MCNC: 209 MG/DL (ref 70–99)
GLUCOSE BLD-MCNC: 232 MG/DL (ref 70–99)
GLUCOSE BLD-MCNC: 244 MG/DL (ref 70–99)
HCT VFR BLD CALC: 30.6 % (ref 36–48)
HEMOGLOBIN: 9 G/DL (ref 12–16)
MCH RBC QN AUTO: 21.1 PG (ref 26–34)
MCHC RBC AUTO-ENTMCNC: 29.5 G/DL (ref 31–36)
MCV RBC AUTO: 71.4 FL (ref 80–100)
OSMOLALITY URINE: 302 MOSM/KG (ref 390–1070)
PDW BLD-RTO: 33.6 % (ref 12.4–15.4)
PERFORMED ON: ABNORMAL
PLATELET # BLD: 265 K/UL (ref 135–450)
PMV BLD AUTO: 8.4 FL (ref 5–10.5)
POTASSIUM SERPL-SCNC: 4 MMOL/L (ref 3.5–5.1)
PRO-BNP: 708 PG/ML (ref 0–124)
RBC # BLD: 4.28 M/UL (ref 4–5.2)
SODIUM BLD-SCNC: 145 MMOL/L (ref 136–145)
WBC # BLD: 9.7 K/UL (ref 4–11)

## 2018-11-07 PROCEDURE — 85027 COMPLETE CBC AUTOMATED: CPT

## 2018-11-07 PROCEDURE — 6370000000 HC RX 637 (ALT 250 FOR IP): Performed by: HOSPITALIST

## 2018-11-07 PROCEDURE — 6360000002 HC RX W HCPCS: Performed by: INTERNAL MEDICINE

## 2018-11-07 PROCEDURE — 2580000003 HC RX 258: Performed by: HOSPITALIST

## 2018-11-07 PROCEDURE — 99232 SBSQ HOSP IP/OBS MODERATE 35: CPT | Performed by: SURGERY

## 2018-11-07 PROCEDURE — 2580000003 HC RX 258: Performed by: INTERNAL MEDICINE

## 2018-11-07 PROCEDURE — 83880 ASSAY OF NATRIURETIC PEPTIDE: CPT

## 2018-11-07 PROCEDURE — 36415 COLL VENOUS BLD VENIPUNCTURE: CPT

## 2018-11-07 PROCEDURE — 80048 BASIC METABOLIC PNL TOTAL CA: CPT

## 2018-11-07 PROCEDURE — APPNB30 APP NON BILLABLE TIME 0-30 MINS: Performed by: PHYSICIAN ASSISTANT

## 2018-11-07 PROCEDURE — 1200000000 HC SEMI PRIVATE

## 2018-11-07 PROCEDURE — APPSS15 APP SPLIT SHARED TIME 0-15 MINUTES: Performed by: PHYSICIAN ASSISTANT

## 2018-11-07 PROCEDURE — 6360000002 HC RX W HCPCS: Performed by: HOSPITALIST

## 2018-11-07 PROCEDURE — 76856 US EXAM PELVIC COMPLETE: CPT

## 2018-11-07 PROCEDURE — 76830 TRANSVAGINAL US NON-OB: CPT

## 2018-11-07 RX ORDER — CIPROFLOXACIN 2 MG/ML
400 INJECTION, SOLUTION INTRAVENOUS
Status: COMPLETED | OUTPATIENT
Start: 2018-11-08 | End: 2018-11-08

## 2018-11-07 RX ORDER — HEPARIN SODIUM 5000 [USP'U]/ML
5000 INJECTION, SOLUTION INTRAVENOUS; SUBCUTANEOUS
Status: COMPLETED | OUTPATIENT
Start: 2018-11-08 | End: 2018-11-08

## 2018-11-07 RX ORDER — SODIUM CHLORIDE 9 MG/ML
INJECTION, SOLUTION INTRAVENOUS CONTINUOUS
Status: DISCONTINUED | OUTPATIENT
Start: 2018-11-08 | End: 2018-11-09

## 2018-11-07 RX ADMIN — POTASSIUM CHLORIDE 40 MEQ: 750 TABLET, FILM COATED, EXTENDED RELEASE ORAL at 20:59

## 2018-11-07 RX ADMIN — INSULIN LISPRO 4 UNITS: 100 INJECTION, SOLUTION INTRAVENOUS; SUBCUTANEOUS at 12:41

## 2018-11-07 RX ADMIN — INSULIN GLARGINE 15 UNITS: 100 INJECTION, SOLUTION SUBCUTANEOUS at 08:26

## 2018-11-07 RX ADMIN — INSULIN LISPRO 15 UNITS: 100 INJECTION, SOLUTION INTRAVENOUS; SUBCUTANEOUS at 08:29

## 2018-11-07 RX ADMIN — INSULIN LISPRO 15 UNITS: 100 INJECTION, SOLUTION INTRAVENOUS; SUBCUTANEOUS at 12:43

## 2018-11-07 RX ADMIN — INSULIN LISPRO 4 UNITS: 100 INJECTION, SOLUTION INTRAVENOUS; SUBCUTANEOUS at 17:14

## 2018-11-07 RX ADMIN — POTASSIUM CHLORIDE 40 MEQ: 750 TABLET, FILM COATED, EXTENDED RELEASE ORAL at 08:25

## 2018-11-07 RX ADMIN — INSULIN LISPRO 2 UNITS: 100 INJECTION, SOLUTION INTRAVENOUS; SUBCUTANEOUS at 08:26

## 2018-11-07 RX ADMIN — INSULIN LISPRO 15 UNITS: 100 INJECTION, SOLUTION INTRAVENOUS; SUBCUTANEOUS at 17:15

## 2018-11-07 RX ADMIN — ATORVASTATIN CALCIUM 20 MG: 20 TABLET, FILM COATED ORAL at 08:25

## 2018-11-07 RX ADMIN — FUROSEMIDE 10 MG/HR: 10 INJECTION, SOLUTION INTRAMUSCULAR; INTRAVENOUS at 19:09

## 2018-11-07 RX ADMIN — POTASSIUM CHLORIDE 40 MEQ: 750 TABLET, FILM COATED, EXTENDED RELEASE ORAL at 14:22

## 2018-11-07 RX ADMIN — Medication 10 ML: at 21:01

## 2018-11-07 RX ADMIN — IRON SUCROSE 200 MG: 20 INJECTION, SOLUTION INTRAVENOUS at 11:18

## 2018-11-07 NOTE — PROGRESS NOTES
Hospitalist Progress Note    CC: sob    Admit date: 11/1/2018  Days in hospital:  6    Subjective: doing well, no SOB/CP    ROS:   Review of Systems   Constitutional: Negative for chills and fever. Respiratory: Negative for cough and shortness of breath. Cardiovascular: Negative for chest pain and palpitations. Gastrointestinal: Negative for abdominal pain, constipation and diarrhea. Genitourinary: Negative for dysuria and urgency. Neurological: Negative for headaches. Objective:    /77   Pulse 82   Temp 98.1 °F (36.7 °C) (Oral)   Resp 18   Ht 5' 3\" (1.6 m)   Wt 272 lb 0.8 oz (123.4 kg)   SpO2 94%   BMI 48.19 kg/m²     Gen:   Alert and oriented ×3, pale appearing   Eyes: PERRL. No sclera icterus. No conjunctival injection. ENT: No discharge. Pharynx clear. External appearance of ears and nose normal.  Neck: Trachea midline. No obvious mass.    Resp: No accessory muscle use. No crackles. No wheezes. No rhonchi. CV: Regular rate. Regular rhythm. No murmur or rub. No edema. GI: Non-tender. Non-distended. No hernia. Skin: Warm, dry, normal texture and turgor. Lymph: No cervical LAD. No supraclavicular LAD. M/S: / Ext. 2+ bilateral lower extremity edema  Neuro: CN 2-12 are intact,  no neurologic deficits noted.       Medications:  Scheduled Meds:   iron sucrose  200 mg Intravenous Q24H    potassium chloride  40 mEq Oral TID    insulin lispro  0-12 Units Subcutaneous TID WC    insulin lispro  0-6 Units Subcutaneous Nightly    insulin glargine  15 Units Subcutaneous Daily    insulin lispro  15 Units Subcutaneous TID WC    atorvastatin  20 mg Oral Daily    sodium chloride flush  10 mL Intravenous 2 times per day    cetirizine  10 mg Oral Nightly       PRN Meds:  gadobenate dimeglumine, SPOT INK MARKER, melatonin, potassium chloride, sodium chloride flush, magnesium hydroxide, ondansetron, glucose, dextrose, glucagon (rDNA), dextrose    IV:   SPOT INK MARKER 5 mL/hr (11/05/18 liver - no evidence of mets  Thus far CT A/P wo evidence of mets  CEA and  sent  Tentatively planned for surgery silvano am     JOB on CKD, Cr at baseline   On lasix drip  Renal following  Monitor I/O's     Pulmonary vascular congestion  Normal echocardiogram     Lower extremity edema  Diuresis      Type 2 diabetes mellitus E11.9  Insulin sliding scale    Pancreatic Cyst  Likely benign  Will need outpatient work up     Code Status: Full Code     Dispo - continuing CARE    Electronically signed by Brendon Josue MD on 11/7/2018 at 9:59 AM

## 2018-11-07 NOTE — PROGRESS NOTES
ABDOMEN PELVIS W IV CONTRAST Additional Contrast? None   Final Result   No convincing evidence of metastatic disease within the abdomen pelvis. There is a mottled appearance of the peripheral hepatic parenchyma, which   suggests possible hepatic congestion. Further evaluation with a dedicated   hepatic MRI is recommended to better evaluate that. Despite the given history, a definite sigmoid mass is not detected. Subcutaneous fat stranding involving the pannus, with cutaneous thickening. Correlate with any clinical evidence of cellulitis. Bilateral nonobstructing nephrolithiasis. XR CHEST PORTABLE   Final Result   Mild pulmonary vascular congestion. Cardial-pericardial silhouette enlargement. US ABDOMEN COMPLETE    (Results Pending)       Endoscopy  EGD and colonoscopy 11/5/18 with Dr. Parish Mas  1. Multiple gastric erosions, 3-4 mm in size, with surrounding erythema. Biopsies were obtained from the gastric antrum, incisura and body to evaluate for H. Pylori. 2. Normal duodenal mucosa. Biopsies obtained from the duodenal bulb and the second portion of the duodenum to evaluate for Celiac disease. 3.  15 mm pedunculated sigmoid polyp, resected with snare cautery  4. 4 mm sessile polyp in the sigmoid colon, resected with cold snare  5. A large 6 cm fungating mass in the distal ascending colon extending to the hepatic flexure. The mass was biopsied with cold forceps. 2.5 mL [place] ink was placed proximal and distal to the mass. 6. A 1 cm cecal polyp, resected with snare cautery. 7. A 3 mm sessile polyp in the proximal ascending colon, resected with biopsy forceps. ASSESSMENT AND RECOMMENDATIONS   Rachele Hammond is a 70 y.o. female with PMH of CRF, DM, HTN, and HLD who presented on 11/1/2018 with  progressive CHÁVEZ, abdominal swelling, and LE edema. Patient has followed by Nephrologist and edema has been unresponsive to diuretics so was admitted for diuresis.  Patient was found to have severe microcytic anemia and GI consulted for further evaluation. Underwent EGD and colonoscopy 11/5/18 (report above)    1. Right sided colon mass, tubulovillous adenoma with intramucosal adenocarcinoma:  CT abd/pel and chest negative for evidence of metastatic disease. Surgery planning resection 11/8/18, and oncology consulted   2. Iron deficiency anemia: likely due to #1. EGD with small bowel bx obtained/pending to r/o celiac disease. Received 2 days of IV venofer 500 mg  3. Pancreatic cystic lesion: Likely IPMN, needs surveillance MRI. RECOMMENDATIONS:    Surgical resection 11/8/18  Oncology follow-up thereafter following surgical staging. If you have any questions or need any further information, please feel free to contact us [phone]. Thank you for allowing us to participate in the care of Deepak Pelaez.     Lilian Galan

## 2018-11-07 NOTE — PROGRESS NOTES
General and Vascular Surgery                                                           Daily Progress Note                                                             Justice Alanis PA-C     Pt Name: Ysabel Lombardo  Medical Record Number: [id number]  Date of Birth 1955   Today's Date: 11/7/2018    ASSESSMENT/PLAN  1. Colon mass likely cancer awaiting biopsy  2. Anemia- getting iron  3. Colectomy on Thursday AM by Dr. Awais Kapadia  4. Continue clear liquid diet  5. Treatment consent  6. NPO after midnight, start IVF    EDUCATION  Patient educated about their illness/diagnosis, stated above, and all questions answered. We discussed the importance of nutrition, medications they are taking, and healthy lifestyle. Mansi Medina is unchanged from yesterday. Pain is well controlled. OBJECTIVE  VITALS:  height is 5' 3\" (1.6 m) and weight is 272 lb 0.8 oz (123.4 kg). Her oral temperature is 98.1 °F (36.7 °C). Her blood pressure is 114/77 and her pulse is 82. Her respiration is 18 and oxygen saturation is 94%. VITALS:  /77   Pulse 82   Temp 98.1 °F (36.7 °C) (Oral)   Resp 18   Ht 5' 3\" (1.6 m)   Wt 272 lb 0.8 oz (123.4 kg)   SpO2 94%   BMI 48.19 kg/m²   GENERAL: alert, no distress  ABDOMEN: soft, non-tender and non-distended  I/O last 3 completed shifts:   In: 720 [P.O.:720]  Out: 2100 [Urine:2100]  I/O this shift:  In: -   Out: 500 [Urine:500]    LABS  Recent Labs      11/06/18   0808  11/07/18   0827   WBC   --   9.7   HGB   --   9.0*   HCT   --   30.6*   PLT   --   265   NA  143   --    K  3.9   --    CL  102   --    CO2  27   --    BUN  12   --    CREATININE  1.2   --    CALCIUM  8.9   --      CBC:   Lab Results   Component Value Date    WBC 9.7 11/07/2018    RBC 4.28 11/07/2018    HGB 9.0 11/07/2018    HCT 30.6 11/07/2018    MCV 71.4 11/07/2018    MCH 21.1 11/07/2018    MCHC 29.5 11/07/2018    RDW 33.6 11/07/2018     11/07/2018    MPV

## 2018-11-07 NOTE — PLAN OF CARE
Problem: Safety:  Goal: Free from accidental physical injury  Free from accidental physical injury   Outcome: Ongoing    Goal: Free from intentional harm  Free from intentional harm   Outcome: Ongoing      Problem: Daily Care:  Goal: Daily care needs are met  Daily care needs are met   Outcome: Ongoing      Problem: Fluid Volume:  Goal: Hemodynamic stability will improve  Hemodynamic stability will improve   Outcome: Ongoing    Goal: Ability to maintain a balanced intake and output will improve  Ability to maintain a balanced intake and output will improve   Outcome: Ongoing      Problem: Falls - Risk of:  Goal: Will remain free from falls  Will remain free from falls   Outcome: Ongoing    Goal: Absence of physical injury  Absence of physical injury   Outcome: Ongoing

## 2018-11-08 ENCOUNTER — ANESTHESIA EVENT (OUTPATIENT)
Dept: OPERATING ROOM | Age: 63
DRG: 330 | End: 2018-11-08
Payer: MEDICARE

## 2018-11-08 ENCOUNTER — ANESTHESIA (OUTPATIENT)
Dept: OPERATING ROOM | Age: 63
DRG: 330 | End: 2018-11-08
Payer: MEDICARE

## 2018-11-08 VITALS
RESPIRATION RATE: 3 BRPM | TEMPERATURE: 98.2 F | OXYGEN SATURATION: 97 % | SYSTOLIC BLOOD PRESSURE: 118 MMHG | DIASTOLIC BLOOD PRESSURE: 58 MMHG

## 2018-11-08 LAB
ANION GAP SERPL CALCULATED.3IONS-SCNC: 13 MMOL/L (ref 3–16)
BUN BLDV-MCNC: 12 MG/DL (ref 7–20)
CALCIUM SERPL-MCNC: 8.7 MG/DL (ref 8.3–10.6)
CHLORIDE BLD-SCNC: 107 MMOL/L (ref 99–110)
CO2: 24 MMOL/L (ref 21–32)
CREAT SERPL-MCNC: 1.2 MG/DL (ref 0.6–1.2)
GFR AFRICAN AMERICAN: 55
GFR NON-AFRICAN AMERICAN: 45
GLUCOSE BLD-MCNC: 168 MG/DL (ref 70–99)
GLUCOSE BLD-MCNC: 228 MG/DL (ref 70–99)
GLUCOSE BLD-MCNC: 241 MG/DL (ref 70–99)
GLUCOSE BLD-MCNC: 246 MG/DL (ref 70–99)
GLUCOSE BLD-MCNC: 261 MG/DL (ref 70–99)
HCT VFR BLD CALC: 30.5 % (ref 36–48)
HEMOGLOBIN: 8.9 G/DL (ref 12–16)
MCH RBC QN AUTO: 21.3 PG (ref 26–34)
MCHC RBC AUTO-ENTMCNC: 29.2 G/DL (ref 31–36)
MCV RBC AUTO: 72.9 FL (ref 80–100)
PDW BLD-RTO: 34.3 % (ref 12.4–15.4)
PERFORMED ON: ABNORMAL
PLATELET # BLD: 239 K/UL (ref 135–450)
PMV BLD AUTO: 8.3 FL (ref 5–10.5)
POTASSIUM SERPL-SCNC: 4.4 MMOL/L (ref 3.5–5.1)
RBC # BLD: 4.19 M/UL (ref 4–5.2)
SODIUM BLD-SCNC: 144 MMOL/L (ref 136–145)
WBC # BLD: 10.7 K/UL (ref 4–11)

## 2018-11-08 PROCEDURE — 7100000001 HC PACU RECOVERY - ADDTL 15 MIN: Performed by: SURGERY

## 2018-11-08 PROCEDURE — 2709999900 HC NON-CHARGEABLE SUPPLY: Performed by: SURGERY

## 2018-11-08 PROCEDURE — 2580000003 HC RX 258: Performed by: HOSPITALIST

## 2018-11-08 PROCEDURE — 2580000003 HC RX 258: Performed by: PHYSICIAN ASSISTANT

## 2018-11-08 PROCEDURE — 2500000003 HC RX 250 WO HCPCS: Performed by: NURSE ANESTHETIST, CERTIFIED REGISTERED

## 2018-11-08 PROCEDURE — 0DTF4ZZ RESECTION OF RIGHT LARGE INTESTINE, PERCUTANEOUS ENDOSCOPIC APPROACH: ICD-10-PCS | Performed by: SURGERY

## 2018-11-08 PROCEDURE — 36415 COLL VENOUS BLD VENIPUNCTURE: CPT

## 2018-11-08 PROCEDURE — 2500000003 HC RX 250 WO HCPCS: Performed by: SURGERY

## 2018-11-08 PROCEDURE — 6360000002 HC RX W HCPCS: Performed by: SURGERY

## 2018-11-08 PROCEDURE — 3600000004 HC SURGERY LEVEL 4 BASE: Performed by: SURGERY

## 2018-11-08 PROCEDURE — 1200000000 HC SEMI PRIVATE

## 2018-11-08 PROCEDURE — 0WQF0ZZ REPAIR ABDOMINAL WALL, OPEN APPROACH: ICD-10-PCS | Performed by: SURGERY

## 2018-11-08 PROCEDURE — 7100000000 HC PACU RECOVERY - FIRST 15 MIN: Performed by: SURGERY

## 2018-11-08 PROCEDURE — 3600000014 HC SURGERY LEVEL 4 ADDTL 15MIN: Performed by: SURGERY

## 2018-11-08 PROCEDURE — 6360000002 HC RX W HCPCS: Performed by: NURSE ANESTHETIST, CERTIFIED REGISTERED

## 2018-11-08 PROCEDURE — 6370000000 HC RX 637 (ALT 250 FOR IP): Performed by: HOSPITALIST

## 2018-11-08 PROCEDURE — 6360000002 HC RX W HCPCS: Performed by: INTERNAL MEDICINE

## 2018-11-08 PROCEDURE — 88309 TISSUE EXAM BY PATHOLOGIST: CPT

## 2018-11-08 PROCEDURE — 3700000001 HC ADD 15 MINUTES (ANESTHESIA): Performed by: SURGERY

## 2018-11-08 PROCEDURE — 2580000003 HC RX 258: Performed by: SURGERY

## 2018-11-08 PROCEDURE — 2580000003 HC RX 258: Performed by: INTERNAL MEDICINE

## 2018-11-08 PROCEDURE — 6370000000 HC RX 637 (ALT 250 FOR IP): Performed by: SURGERY

## 2018-11-08 PROCEDURE — 2780000010 HC IMPLANT OTHER: Performed by: SURGERY

## 2018-11-08 PROCEDURE — 3700000000 HC ANESTHESIA ATTENDED CARE: Performed by: SURGERY

## 2018-11-08 PROCEDURE — 44205 LAP COLECTOMY PART W/ILEUM: CPT | Performed by: SURGERY

## 2018-11-08 PROCEDURE — 85027 COMPLETE CBC AUTOMATED: CPT

## 2018-11-08 PROCEDURE — 2720000010 HC SURG SUPPLY STERILE: Performed by: SURGERY

## 2018-11-08 PROCEDURE — 80048 BASIC METABOLIC PNL TOTAL CA: CPT

## 2018-11-08 PROCEDURE — C9290 INJ, BUPIVACAINE LIPOSOME: HCPCS | Performed by: SURGERY

## 2018-11-08 RX ORDER — OXYCODONE HYDROCHLORIDE AND ACETAMINOPHEN 5; 325 MG/1; MG/1
2 TABLET ORAL PRN
Status: DISCONTINUED | OUTPATIENT
Start: 2018-11-08 | End: 2018-11-08 | Stop reason: HOSPADM

## 2018-11-08 RX ORDER — SODIUM CHLORIDE 0.9 % (FLUSH) 0.9 %
10 SYRINGE (ML) INJECTION PRN
Status: CANCELLED | OUTPATIENT
Start: 2018-11-08

## 2018-11-08 RX ORDER — ROCURONIUM BROMIDE 10 MG/ML
INJECTION, SOLUTION INTRAVENOUS PRN
Status: DISCONTINUED | OUTPATIENT
Start: 2018-11-08 | End: 2018-11-08 | Stop reason: SDUPTHER

## 2018-11-08 RX ORDER — HYDROCODONE BITARTRATE AND ACETAMINOPHEN 5; 325 MG/1; MG/1
1 TABLET ORAL EVERY 4 HOURS PRN
Status: DISCONTINUED | OUTPATIENT
Start: 2018-11-08 | End: 2018-11-13 | Stop reason: HOSPADM

## 2018-11-08 RX ORDER — FENTANYL CITRATE 50 UG/ML
INJECTION, SOLUTION INTRAMUSCULAR; INTRAVENOUS PRN
Status: DISCONTINUED | OUTPATIENT
Start: 2018-11-08 | End: 2018-11-08 | Stop reason: SDUPTHER

## 2018-11-08 RX ORDER — SODIUM CHLORIDE 0.9 % (FLUSH) 0.9 %
10 SYRINGE (ML) INJECTION EVERY 12 HOURS SCHEDULED
Status: CANCELLED | OUTPATIENT
Start: 2018-11-08

## 2018-11-08 RX ORDER — FENTANYL CITRATE 50 UG/ML
50 INJECTION, SOLUTION INTRAMUSCULAR; INTRAVENOUS EVERY 5 MIN PRN
Status: DISCONTINUED | OUTPATIENT
Start: 2018-11-08 | End: 2018-11-08 | Stop reason: HOSPADM

## 2018-11-08 RX ORDER — HYDROMORPHONE HCL 110MG/55ML
PATIENT CONTROLLED ANALGESIA SYRINGE INTRAVENOUS PRN
Status: DISCONTINUED | OUTPATIENT
Start: 2018-11-08 | End: 2018-11-08 | Stop reason: SDUPTHER

## 2018-11-08 RX ORDER — MIDAZOLAM HYDROCHLORIDE 1 MG/ML
INJECTION INTRAMUSCULAR; INTRAVENOUS PRN
Status: DISCONTINUED | OUTPATIENT
Start: 2018-11-08 | End: 2018-11-08 | Stop reason: SDUPTHER

## 2018-11-08 RX ORDER — SODIUM CHLORIDE 9 MG/ML
INJECTION, SOLUTION INTRAVENOUS CONTINUOUS
Status: CANCELLED | OUTPATIENT
Start: 2018-11-08

## 2018-11-08 RX ORDER — PROPOFOL 10 MG/ML
INJECTION, EMULSION INTRAVENOUS PRN
Status: DISCONTINUED | OUTPATIENT
Start: 2018-11-08 | End: 2018-11-08 | Stop reason: SDUPTHER

## 2018-11-08 RX ORDER — MORPHINE SULFATE 2 MG/ML
2 INJECTION, SOLUTION INTRAMUSCULAR; INTRAVENOUS
Status: DISCONTINUED | OUTPATIENT
Start: 2018-11-08 | End: 2018-11-13 | Stop reason: HOSPADM

## 2018-11-08 RX ORDER — DEXAMETHASONE SODIUM PHOSPHATE 4 MG/ML
INJECTION, SOLUTION INTRA-ARTICULAR; INTRALESIONAL; INTRAMUSCULAR; INTRAVENOUS; SOFT TISSUE PRN
Status: DISCONTINUED | OUTPATIENT
Start: 2018-11-08 | End: 2018-11-08 | Stop reason: SDUPTHER

## 2018-11-08 RX ORDER — ONDANSETRON 2 MG/ML
4 INJECTION INTRAMUSCULAR; INTRAVENOUS
Status: DISCONTINUED | OUTPATIENT
Start: 2018-11-08 | End: 2018-11-08 | Stop reason: HOSPADM

## 2018-11-08 RX ORDER — OXYCODONE HYDROCHLORIDE AND ACETAMINOPHEN 5; 325 MG/1; MG/1
1 TABLET ORAL PRN
Status: DISCONTINUED | OUTPATIENT
Start: 2018-11-08 | End: 2018-11-08 | Stop reason: HOSPADM

## 2018-11-08 RX ORDER — FENTANYL CITRATE 50 UG/ML
25 INJECTION, SOLUTION INTRAMUSCULAR; INTRAVENOUS EVERY 5 MIN PRN
Status: DISCONTINUED | OUTPATIENT
Start: 2018-11-08 | End: 2018-11-08 | Stop reason: HOSPADM

## 2018-11-08 RX ORDER — LIDOCAINE HYDROCHLORIDE 20 MG/ML
INJECTION, SOLUTION INFILTRATION; PERINEURAL PRN
Status: DISCONTINUED | OUTPATIENT
Start: 2018-11-08 | End: 2018-11-08 | Stop reason: SDUPTHER

## 2018-11-08 RX ORDER — GLYCOPYRROLATE 0.2 MG/ML
INJECTION INTRAMUSCULAR; INTRAVENOUS PRN
Status: DISCONTINUED | OUTPATIENT
Start: 2018-11-08 | End: 2018-11-08 | Stop reason: SDUPTHER

## 2018-11-08 RX ORDER — SUCCINYLCHOLINE CHLORIDE 20 MG/ML
INJECTION INTRAMUSCULAR; INTRAVENOUS PRN
Status: DISCONTINUED | OUTPATIENT
Start: 2018-11-08 | End: 2018-11-08 | Stop reason: SDUPTHER

## 2018-11-08 RX ORDER — MORPHINE SULFATE 2 MG/ML
2 INJECTION, SOLUTION INTRAMUSCULAR; INTRAVENOUS EVERY 5 MIN PRN
Status: DISCONTINUED | OUTPATIENT
Start: 2018-11-08 | End: 2018-11-08 | Stop reason: HOSPADM

## 2018-11-08 RX ORDER — MEPERIDINE HYDROCHLORIDE 25 MG/ML
12.5 INJECTION INTRAMUSCULAR; INTRAVENOUS; SUBCUTANEOUS EVERY 5 MIN PRN
Status: DISCONTINUED | OUTPATIENT
Start: 2018-11-08 | End: 2018-11-08 | Stop reason: HOSPADM

## 2018-11-08 RX ORDER — MORPHINE SULFATE 2 MG/ML
1 INJECTION, SOLUTION INTRAMUSCULAR; INTRAVENOUS EVERY 5 MIN PRN
Status: DISCONTINUED | OUTPATIENT
Start: 2018-11-08 | End: 2018-11-08 | Stop reason: HOSPADM

## 2018-11-08 RX ORDER — MAGNESIUM HYDROXIDE 1200 MG/15ML
LIQUID ORAL CONTINUOUS PRN
Status: COMPLETED | OUTPATIENT
Start: 2018-11-08 | End: 2018-11-08

## 2018-11-08 RX ORDER — HYDROCODONE BITARTRATE AND ACETAMINOPHEN 5; 325 MG/1; MG/1
2 TABLET ORAL EVERY 4 HOURS PRN
Status: DISCONTINUED | OUTPATIENT
Start: 2018-11-08 | End: 2018-11-13 | Stop reason: HOSPADM

## 2018-11-08 RX ORDER — ONDANSETRON 2 MG/ML
INJECTION INTRAMUSCULAR; INTRAVENOUS PRN
Status: DISCONTINUED | OUTPATIENT
Start: 2018-11-08 | End: 2018-11-08 | Stop reason: SDUPTHER

## 2018-11-08 RX ADMIN — HYDROMORPHONE HYDROCHLORIDE 0.25 MG: 2 INJECTION, SOLUTION INTRAMUSCULAR; INTRAVENOUS; SUBCUTANEOUS at 10:03

## 2018-11-08 RX ADMIN — ROCURONIUM BROMIDE 5 MG: 10 INJECTION INTRAVENOUS at 09:10

## 2018-11-08 RX ADMIN — ONDANSETRON 4 MG: 2 INJECTION INTRAMUSCULAR; INTRAVENOUS at 12:45

## 2018-11-08 RX ADMIN — GLYCOPYRROLATE 0.1 MG: 0.2 INJECTION, SOLUTION INTRAMUSCULAR; INTRAVENOUS at 09:22

## 2018-11-08 RX ADMIN — DEXAMETHASONE SODIUM PHOSPHATE 8 MG: 4 INJECTION, SOLUTION INTRAMUSCULAR; INTRAVENOUS at 09:22

## 2018-11-08 RX ADMIN — ROCURONIUM BROMIDE 20 MG: 10 INJECTION INTRAVENOUS at 09:43

## 2018-11-08 RX ADMIN — POTASSIUM CHLORIDE 40 MEQ: 750 TABLET, FILM COATED, EXTENDED RELEASE ORAL at 20:48

## 2018-11-08 RX ADMIN — MIDAZOLAM 2 MG: 1 INJECTION INTRAMUSCULAR; INTRAVENOUS at 09:06

## 2018-11-08 RX ADMIN — METRONIDAZOLE 500 MG: 500 INJECTION, SOLUTION INTRAVENOUS at 08:26

## 2018-11-08 RX ADMIN — CIPROFLOXACIN 400 MG: 2 INJECTION, SOLUTION INTRAVENOUS at 09:17

## 2018-11-08 RX ADMIN — SODIUM CHLORIDE: 9 INJECTION, SOLUTION INTRAVENOUS at 16:18

## 2018-11-08 RX ADMIN — LIDOCAINE HYDROCHLORIDE 80 MG: 20 INJECTION, SOLUTION INFILTRATION; PERINEURAL at 09:10

## 2018-11-08 RX ADMIN — ROCURONIUM BROMIDE 45 MG: 10 INJECTION INTRAVENOUS at 09:20

## 2018-11-08 RX ADMIN — INSULIN LISPRO 3 UNITS: 100 INJECTION, SOLUTION INTRAVENOUS; SUBCUTANEOUS at 23:55

## 2018-11-08 RX ADMIN — HEPARIN SODIUM 5000 UNITS: 5000 INJECTION INTRAVENOUS; SUBCUTANEOUS at 10:13

## 2018-11-08 RX ADMIN — SODIUM CHLORIDE: 9 INJECTION, SOLUTION INTRAVENOUS at 01:07

## 2018-11-08 RX ADMIN — PROPOFOL 150 MG: 10 INJECTION, EMULSION INTRAVENOUS at 09:10

## 2018-11-08 RX ADMIN — ROCURONIUM BROMIDE 20 MG: 10 INJECTION INTRAVENOUS at 11:25

## 2018-11-08 RX ADMIN — SUCCINYLCHOLINE CHLORIDE 120 MG: 20 INJECTION, SOLUTION INTRAMUSCULAR; INTRAVENOUS at 09:10

## 2018-11-08 RX ADMIN — INSULIN LISPRO 4 UNITS: 100 INJECTION, SOLUTION INTRAVENOUS; SUBCUTANEOUS at 18:05

## 2018-11-08 RX ADMIN — SUGAMMADEX 250 MG: 100 INJECTION, SOLUTION INTRAVENOUS at 13:12

## 2018-11-08 RX ADMIN — HYDROMORPHONE HYDROCHLORIDE 0.5 MG: 2 INJECTION, SOLUTION INTRAMUSCULAR; INTRAVENOUS; SUBCUTANEOUS at 09:35

## 2018-11-08 RX ADMIN — HYDROMORPHONE HYDROCHLORIDE 0.25 MG: 2 INJECTION, SOLUTION INTRAMUSCULAR; INTRAVENOUS; SUBCUTANEOUS at 11:53

## 2018-11-08 RX ADMIN — IRON SUCROSE 200 MG: 20 INJECTION, SOLUTION INTRAVENOUS at 18:53

## 2018-11-08 RX ADMIN — HYDROCODONE BITARTRATE AND ACETAMINOPHEN 1 TABLET: 5; 325 TABLET ORAL at 18:59

## 2018-11-08 RX ADMIN — ROCURONIUM BROMIDE 10 MG: 10 INJECTION INTRAVENOUS at 10:54

## 2018-11-08 RX ADMIN — FENTANYL CITRATE 100 MCG: 50 INJECTION INTRAMUSCULAR; INTRAVENOUS at 09:08

## 2018-11-08 RX ADMIN — Medication 10 ML: at 23:55

## 2018-11-08 RX ADMIN — SODIUM CHLORIDE: 9 INJECTION, SOLUTION INTRAVENOUS at 10:15

## 2018-11-08 ASSESSMENT — PULMONARY FUNCTION TESTS
PIF_VALUE: 35
PIF_VALUE: 33
PIF_VALUE: 35
PIF_VALUE: 25
PIF_VALUE: 22
PIF_VALUE: 28
PIF_VALUE: 28
PIF_VALUE: 25
PIF_VALUE: 26
PIF_VALUE: 25
PIF_VALUE: 25
PIF_VALUE: 16
PIF_VALUE: 36
PIF_VALUE: 25
PIF_VALUE: 25
PIF_VALUE: 35
PIF_VALUE: 0
PIF_VALUE: 28
PIF_VALUE: 25
PIF_VALUE: 0
PIF_VALUE: 26
PIF_VALUE: 25
PIF_VALUE: 20
PIF_VALUE: 19
PIF_VALUE: 25
PIF_VALUE: 36
PIF_VALUE: 1
PIF_VALUE: 29
PIF_VALUE: 35
PIF_VALUE: 39
PIF_VALUE: 36
PIF_VALUE: 35
PIF_VALUE: 35
PIF_VALUE: 25
PIF_VALUE: 25
PIF_VALUE: 35
PIF_VALUE: 30
PIF_VALUE: 35
PIF_VALUE: 26
PIF_VALUE: 25
PIF_VALUE: 36
PIF_VALUE: 35
PIF_VALUE: 35
PIF_VALUE: 30
PIF_VALUE: 35
PIF_VALUE: 36
PIF_VALUE: 35
PIF_VALUE: 26
PIF_VALUE: 26
PIF_VALUE: 30
PIF_VALUE: 30
PIF_VALUE: 25
PIF_VALUE: 36
PIF_VALUE: 35
PIF_VALUE: 35
PIF_VALUE: 36
PIF_VALUE: 35
PIF_VALUE: 25
PIF_VALUE: 25
PIF_VALUE: 28
PIF_VALUE: 36
PIF_VALUE: 31
PIF_VALUE: 35
PIF_VALUE: 29
PIF_VALUE: 36
PIF_VALUE: 38
PIF_VALUE: 40
PIF_VALUE: 36
PIF_VALUE: 36
PIF_VALUE: 35
PIF_VALUE: 36
PIF_VALUE: 36
PIF_VALUE: 35
PIF_VALUE: 35
PIF_VALUE: 25
PIF_VALUE: 36
PIF_VALUE: 26
PIF_VALUE: 25
PIF_VALUE: 26
PIF_VALUE: 35
PIF_VALUE: 36
PIF_VALUE: 27
PIF_VALUE: 17
PIF_VALUE: 40
PIF_VALUE: 35
PIF_VALUE: 1
PIF_VALUE: 35
PIF_VALUE: 36
PIF_VALUE: 26
PIF_VALUE: 36
PIF_VALUE: 20
PIF_VALUE: 35
PIF_VALUE: 40
PIF_VALUE: 35
PIF_VALUE: 38
PIF_VALUE: 0
PIF_VALUE: 35
PIF_VALUE: 35
PIF_VALUE: 36
PIF_VALUE: 25
PIF_VALUE: 40
PIF_VALUE: 26
PIF_VALUE: 42
PIF_VALUE: 35
PIF_VALUE: 36
PIF_VALUE: 26
PIF_VALUE: 36
PIF_VALUE: 25
PIF_VALUE: 35
PIF_VALUE: 35
PIF_VALUE: 26
PIF_VALUE: 35
PIF_VALUE: 25
PIF_VALUE: 26
PIF_VALUE: 35
PIF_VALUE: 30
PIF_VALUE: 38
PIF_VALUE: 25
PIF_VALUE: 0
PIF_VALUE: 28
PIF_VALUE: 29
PIF_VALUE: 35
PIF_VALUE: 25
PIF_VALUE: 36
PIF_VALUE: 36
PIF_VALUE: 35
PIF_VALUE: 37
PIF_VALUE: 35
PIF_VALUE: 28
PIF_VALUE: 35
PIF_VALUE: 30
PIF_VALUE: 35
PIF_VALUE: 35
PIF_VALUE: 36
PIF_VALUE: 35
PIF_VALUE: 25
PIF_VALUE: 35
PIF_VALUE: 25
PIF_VALUE: 36
PIF_VALUE: 25
PIF_VALUE: 36
PIF_VALUE: 33
PIF_VALUE: 35
PIF_VALUE: 27
PIF_VALUE: 35
PIF_VALUE: 3
PIF_VALUE: 36
PIF_VALUE: 35
PIF_VALUE: 25
PIF_VALUE: 35
PIF_VALUE: 35
PIF_VALUE: 16
PIF_VALUE: 36
PIF_VALUE: 35
PIF_VALUE: 36
PIF_VALUE: 35
PIF_VALUE: 36
PIF_VALUE: 35
PIF_VALUE: 35
PIF_VALUE: 36
PIF_VALUE: 25
PIF_VALUE: 35
PIF_VALUE: 35
PIF_VALUE: 30
PIF_VALUE: 36
PIF_VALUE: 0
PIF_VALUE: 35
PIF_VALUE: 35
PIF_VALUE: 26
PIF_VALUE: 27
PIF_VALUE: 25
PIF_VALUE: 30
PIF_VALUE: 29
PIF_VALUE: 2
PIF_VALUE: 28
PIF_VALUE: 35
PIF_VALUE: 25
PIF_VALUE: 26
PIF_VALUE: 25
PIF_VALUE: 36
PIF_VALUE: 26
PIF_VALUE: 35
PIF_VALUE: 36
PIF_VALUE: 35
PIF_VALUE: 26
PIF_VALUE: 35
PIF_VALUE: 26
PIF_VALUE: 36
PIF_VALUE: 31
PIF_VALUE: 35
PIF_VALUE: 26
PIF_VALUE: 35
PIF_VALUE: 35
PIF_VALUE: 25
PIF_VALUE: 26
PIF_VALUE: 25
PIF_VALUE: 25
PIF_VALUE: 33
PIF_VALUE: 25
PIF_VALUE: 40
PIF_VALUE: 27
PIF_VALUE: 26
PIF_VALUE: 25
PIF_VALUE: 38
PIF_VALUE: 41
PIF_VALUE: 40
PIF_VALUE: 35
PIF_VALUE: 40
PIF_VALUE: 23
PIF_VALUE: 36
PIF_VALUE: 36
PIF_VALUE: 35
PIF_VALUE: 25
PIF_VALUE: 36
PIF_VALUE: 36
PIF_VALUE: 35
PIF_VALUE: 36
PIF_VALUE: 28
PIF_VALUE: 26
PIF_VALUE: 25
PIF_VALUE: 36
PIF_VALUE: 25
PIF_VALUE: 14
PIF_VALUE: 36
PIF_VALUE: 35
PIF_VALUE: 36
PIF_VALUE: 25
PIF_VALUE: 25
PIF_VALUE: 35
PIF_VALUE: 25
PIF_VALUE: 25
PIF_VALUE: 36
PIF_VALUE: 38
PIF_VALUE: 36
PIF_VALUE: 36
PIF_VALUE: 25
PIF_VALUE: 35
PIF_VALUE: 25
PIF_VALUE: 26
PIF_VALUE: 25
PIF_VALUE: 36
PIF_VALUE: 3
PIF_VALUE: 25
PIF_VALUE: 36
PIF_VALUE: 35
PIF_VALUE: 25
PIF_VALUE: 35
PIF_VALUE: 25
PIF_VALUE: 31
PIF_VALUE: 35
PIF_VALUE: 26
PIF_VALUE: 36
PIF_VALUE: 36

## 2018-11-08 ASSESSMENT — PAIN SCALES - GENERAL: PAINLEVEL_OUTOF10: 5

## 2018-11-08 ASSESSMENT — LIFESTYLE VARIABLES: SMOKING_STATUS: 0

## 2018-11-08 ASSESSMENT — ENCOUNTER SYMPTOMS: SHORTNESS OF BREATH: 1

## 2018-11-08 NOTE — PROGRESS NOTES
consistent with adenocarcinoma  Sp IV Fe infusion  Sp pRBC transfusion with appropriate response  HB stable     Colon Mass  GI, oncology, surgery following - planning on R hemicolectomy today  MRI liver - no evidence of mets  Thus far CT A/P wo evidence of mets  CEA and  sent  Will fu oncology recs     JOB on CKD, Cr at baseline   On lasix drip  Renal following  Monitor I/O's     Pulmonary vascular congestion  Normal echocardiogram     Lower extremity edema  Diuresis      Type 2 diabetes mellitus E11.9  Insulin sliding scale     Pancreatic Cyst  Likely benign  Will need outpatient MRI     Code Status: Full Code     Dispo - continuing CARE    Electronically signed by Sabra Ramos MD on 11/8/2018 at 9:24 AM

## 2018-11-08 NOTE — PROGRESS NOTES
Handoff report given to MARGIE Tubbs for 5245. VSS. David Adamson. Respirations easy, unlabored on 3 L of O2. Pt ready for transfer.

## 2018-11-08 NOTE — PROGRESS NOTES
Nutrition Assessment    Type and Reason for Visit: Initial (LOS)    Nutrition Recommendations:   Monitor diet progression. Nutrition Assessment: Pt is at risk for being nutritionally compromised AEB expected decline in nutrition d/t possible new dx of cancer and start of chemo. Pt is at risk for further compromise d/t surgery and diet progression after surgery. Pt already has order for Ensure when diet advances. Malnutrition Assessment:  · Malnutrition Status: At risk for malnutrition (pt in surgery)  · Context: Acute illness or injury  · Findings of the 6 clinical characteristics of malnutrition (Minimum of 2 out of 6 clinical characteristics is required to make the diagnosis of moderate or severe Protein Calorie Malnutrition based on AND/ASPEN Guidelines):  1. Energy Intake- (not able to assess), not able to assess    2. Weight Loss-No significant weight loss (wt loss from diuretics),    3. Fat Loss-Unable to assess (pt in surgery),    4. Muscle Loss-Unable to assess (pt in surgery),    5. Fluid Accumulation-No significant fluid accumulation,    6.   Strength-Not measured    Nutrition Risk Level: High (unable to see pt)    Nutrient Needs:  · Estimated Daily Total Kcal: 7968-7684 kcal (11-15kcal/kg)  · Estimated Daily Protein (g): 62-72 gm  · Estimated Daily Total Fluid (ml/day): 4696-5668 mL    Nutrition Diagnosis:   · Problem: Inadequate oral intake  · Etiology: related to Insufficient energy/nutrient consumption     Signs and symptoms:  as evidenced by NPO status due to medical condition    Objective Information:  · Wound Type: None  · Current Nutrition Therapies:  · Oral Diet Orders: NPO   · Oral Nutrition Supplement (ONS) Orders: None  · Anthropometric Measures:  · Ht: 5' 3\" (160 cm)   · Current Body Wt: 272 lb (123.4 kg)  · Admission Body Wt: 277 lb (125.6 kg)  · % Weight Change:   wt trending down d/t diuretics  · Ideal Body Wt: 115 lb (52.2 kg)   · BMI Classification: BMI > or equal to 40.0

## 2018-11-08 NOTE — PROGRESS NOTES
lesion on CT. Considerations include   fibroid or an endometrial neoplasm such as polyp or carcinoma. Recommend   further evaluation with sonography. 5. 0.7 cm x 0.6 cm simple appearing cystic lesion near the junction of the   pancreatic body and tail, most likely a branch duct intraductal papillary   mucinous neoplasm. Recommend follow-up with pancreas protocol MRI or CT in 2   years as below. RECOMMENDATIONS:   Managing Incidental Pancreatic Cysts      <80 years at presentation, 0.5 cm to <1.5 cm      65-79 years at presentation:  Pancreas protocol MRI (or CT) in 2 years      Abisai et al.  Management of incidental pancreatic cysts: a white paper of   the ACR Incidental Findings Committee. [address] [place] [address];07:[id number]. CT CHEST WO CONTRAST   Final Result   No evidence of metastatic disease within the chest.      Cardiac chamber enlargement. Enlargement of the main pulmonary artery   suggesting pulmonary hypertension. CT ABDOMEN PELVIS W IV CONTRAST Additional Contrast? None   Final Result   No convincing evidence of metastatic disease within the abdomen pelvis. There is a mottled appearance of the peripheral hepatic parenchyma, which   suggests possible hepatic congestion. Further evaluation with a dedicated   hepatic MRI is recommended to better evaluate that. Despite the given history, a definite sigmoid mass is not detected. Subcutaneous fat stranding involving the pannus, with cutaneous thickening. Correlate with any clinical evidence of cellulitis. Bilateral nonobstructing nephrolithiasis. XR CHEST PORTABLE   Final Result   Mild pulmonary vascular congestion. Cardial-pericardial silhouette enlargement. US ABDOMEN COMPLETE    (Results Pending)       Endoscopy  EGD and colonoscopy 11/5/18 with Dr. Parish Mas  1. Multiple gastric erosions, 3-4 mm in size, with surrounding erythema.  Biopsies were obtained from the gastric antrum, incisura and body to evaluate for H. Pylori. 2. Normal duodenal mucosa. Biopsies obtained from the duodenal bulb and the second portion of the duodenum to evaluate for Celiac disease. 3.  15 mm pedunculated sigmoid polyp, resected with snare cautery  4. 4 mm sessile polyp in the sigmoid colon, resected with cold snare  5. A large 6 cm fungating mass in the distal ascending colon extending to the hepatic flexure. The mass was biopsied with cold forceps. 2.5 mL [place] ink was placed proximal and distal to the mass. 6. A 1 cm cecal polyp, resected with snare cautery. 7. A 3 mm sessile polyp in the proximal ascending colon, resected with biopsy forceps. ASSESSMENT AND RECOMMENDATIONS   Rocío Younger is a 70 y.o. female with PMH of CRF, DM, HTN, and HLD who presented on 11/1/2018 with  progressive CHÁVEZ, abdominal swelling, and LE edema. Patient has followed by Nephrologist and edema has been unresponsive to diuretics so was admitted for diuresis. Patient was found to have severe microcytic anemia and GI consulted for further evaluation. Underwent EGD and colonoscopy 11/5/18 (report above). Surgical resection of colon mass on 11/8/2018. 1. Right sided colon adenocarcinoma:  S/p right hemicolectomy. POD#0. CT abd/pel and chest negative for evidence of metastatic disease. Resection 11/8/18, and oncology consulted   2. Iron deficiency anemia: likely due to #1. EGD with small bowel bx negative for Celiac disease. Received 3 days of IV venofer 500 mg  3. Pancreatic cystic lesion: Likely IPMN, needs surveillance MRI. RECOMMENDATIONS:    Monitor H/H, and flatulence/stool output  Diet advancement defer to surgery. Will need repeat colonoscopy in 6 months  F/u with oncology for cancer staging ?need for adjuvant chemo    If you have any questions or need any further information, please feel free to contact us [phone]. Thank you for allowing us to participate in the care of Rocío Younger.     Carol Ann Mckeon

## 2018-11-09 LAB
ANION GAP SERPL CALCULATED.3IONS-SCNC: 10 MMOL/L (ref 3–16)
BASOPHILS ABSOLUTE: 0.1 K/UL (ref 0–0.2)
BASOPHILS RELATIVE PERCENT: 0.6 %
BUN BLDV-MCNC: 12 MG/DL (ref 7–20)
CALCIUM SERPL-MCNC: 8.4 MG/DL (ref 8.3–10.6)
CHLORIDE BLD-SCNC: 108 MMOL/L (ref 99–110)
CO2: 25 MMOL/L (ref 21–32)
CREAT SERPL-MCNC: 1.1 MG/DL (ref 0.6–1.2)
EOSINOPHILS ABSOLUTE: 0.2 K/UL (ref 0–0.6)
EOSINOPHILS RELATIVE PERCENT: 2.1 %
GFR AFRICAN AMERICAN: >60
GFR NON-AFRICAN AMERICAN: 50
GLUCOSE BLD-MCNC: 105 MG/DL (ref 70–99)
GLUCOSE BLD-MCNC: 176 MG/DL (ref 70–99)
GLUCOSE BLD-MCNC: 227 MG/DL (ref 70–99)
GLUCOSE BLD-MCNC: 269 MG/DL (ref 70–99)
GLUCOSE BLD-MCNC: 287 MG/DL (ref 70–99)
HCT VFR BLD CALC: 28.3 % (ref 36–48)
HCT VFR BLD CALC: 28.8 % (ref 36–48)
HEMOGLOBIN: 8.3 G/DL (ref 12–16)
HEMOGLOBIN: 8.3 G/DL (ref 12–16)
LYMPHOCYTES ABSOLUTE: 1 K/UL (ref 1–5.1)
LYMPHOCYTES RELATIVE PERCENT: 10 %
MCH RBC QN AUTO: 21.4 PG (ref 26–34)
MCH RBC QN AUTO: 21.7 PG (ref 26–34)
MCHC RBC AUTO-ENTMCNC: 28.9 G/DL (ref 31–36)
MCHC RBC AUTO-ENTMCNC: 29.4 G/DL (ref 31–36)
MCV RBC AUTO: 73.9 FL (ref 80–100)
MCV RBC AUTO: 74 FL (ref 80–100)
MONOCYTES ABSOLUTE: 0.7 K/UL (ref 0–1.3)
MONOCYTES RELATIVE PERCENT: 7.6 %
NEUTROPHILS ABSOLUTE: 7.7 K/UL (ref 1.7–7.7)
NEUTROPHILS RELATIVE PERCENT: 79.7 %
PDW BLD-RTO: 35.2 % (ref 12.4–15.4)
PDW BLD-RTO: 35.5 % (ref 12.4–15.4)
PERFORMED ON: ABNORMAL
PLATELET # BLD: 216 K/UL (ref 135–450)
PLATELET # BLD: 217 K/UL (ref 135–450)
PMV BLD AUTO: 8.4 FL (ref 5–10.5)
PMV BLD AUTO: 8.6 FL (ref 5–10.5)
POTASSIUM SERPL-SCNC: 4.3 MMOL/L (ref 3.5–5.1)
RBC # BLD: 3.83 M/UL (ref 4–5.2)
RBC # BLD: 3.9 M/UL (ref 4–5.2)
SODIUM BLD-SCNC: 143 MMOL/L (ref 136–145)
WBC # BLD: 9.7 K/UL (ref 4–11)
WBC # BLD: 9.8 K/UL (ref 4–11)

## 2018-11-09 PROCEDURE — 6360000002 HC RX W HCPCS: Performed by: INTERNAL MEDICINE

## 2018-11-09 PROCEDURE — 85027 COMPLETE CBC AUTOMATED: CPT

## 2018-11-09 PROCEDURE — 85025 COMPLETE CBC W/AUTO DIFF WBC: CPT

## 2018-11-09 PROCEDURE — 6370000000 HC RX 637 (ALT 250 FOR IP): Performed by: HOSPITALIST

## 2018-11-09 PROCEDURE — 2580000003 HC RX 258: Performed by: INTERNAL MEDICINE

## 2018-11-09 PROCEDURE — 1200000000 HC SEMI PRIVATE

## 2018-11-09 PROCEDURE — APPNB30 APP NON BILLABLE TIME 0-30 MINS: Performed by: NURSE PRACTITIONER

## 2018-11-09 PROCEDURE — 2580000003 HC RX 258: Performed by: HOSPITALIST

## 2018-11-09 PROCEDURE — 99024 POSTOP FOLLOW-UP VISIT: CPT | Performed by: SURGERY

## 2018-11-09 PROCEDURE — 6370000000 HC RX 637 (ALT 250 FOR IP): Performed by: INTERNAL MEDICINE

## 2018-11-09 PROCEDURE — 80048 BASIC METABOLIC PNL TOTAL CA: CPT

## 2018-11-09 PROCEDURE — APPSS15 APP SPLIT SHARED TIME 0-15 MINUTES: Performed by: NURSE PRACTITIONER

## 2018-11-09 PROCEDURE — 36415 COLL VENOUS BLD VENIPUNCTURE: CPT

## 2018-11-09 PROCEDURE — 6370000000 HC RX 637 (ALT 250 FOR IP): Performed by: SURGERY

## 2018-11-09 RX ORDER — POTASSIUM CHLORIDE 750 MG/1
40 TABLET, FILM COATED, EXTENDED RELEASE ORAL 2 TIMES DAILY
Status: DISCONTINUED | OUTPATIENT
Start: 2018-11-09 | End: 2018-11-11

## 2018-11-09 RX ADMIN — INSULIN LISPRO 6 UNITS: 100 INJECTION, SOLUTION INTRAVENOUS; SUBCUTANEOUS at 14:32

## 2018-11-09 RX ADMIN — ATORVASTATIN CALCIUM 20 MG: 20 TABLET, FILM COATED ORAL at 09:41

## 2018-11-09 RX ADMIN — HYDROCODONE BITARTRATE AND ACETAMINOPHEN 2 TABLET: 5; 325 TABLET ORAL at 21:43

## 2018-11-09 RX ADMIN — INSULIN LISPRO 2 UNITS: 100 INJECTION, SOLUTION INTRAVENOUS; SUBCUTANEOUS at 09:42

## 2018-11-09 RX ADMIN — HYDROCODONE BITARTRATE AND ACETAMINOPHEN 1 TABLET: 5; 325 TABLET ORAL at 06:26

## 2018-11-09 RX ADMIN — HYDROCODONE BITARTRATE AND ACETAMINOPHEN 2 TABLET: 5; 325 TABLET ORAL at 15:30

## 2018-11-09 RX ADMIN — IRON SUCROSE 200 MG: 20 INJECTION, SOLUTION INTRAVENOUS at 14:31

## 2018-11-09 RX ADMIN — HYDROCODONE BITARTRATE AND ACETAMINOPHEN 1 TABLET: 5; 325 TABLET ORAL at 10:51

## 2018-11-09 RX ADMIN — INSULIN LISPRO 15 UNITS: 100 INJECTION, SOLUTION INTRAVENOUS; SUBCUTANEOUS at 17:59

## 2018-11-09 RX ADMIN — INSULIN LISPRO 15 UNITS: 100 INJECTION, SOLUTION INTRAVENOUS; SUBCUTANEOUS at 09:43

## 2018-11-09 RX ADMIN — INSULIN GLARGINE 15 UNITS: 100 INJECTION, SOLUTION SUBCUTANEOUS at 10:46

## 2018-11-09 RX ADMIN — Medication 10 ML: at 09:44

## 2018-11-09 RX ADMIN — INSULIN LISPRO 15 UNITS: 100 INJECTION, SOLUTION INTRAVENOUS; SUBCUTANEOUS at 14:39

## 2018-11-09 RX ADMIN — INSULIN LISPRO 6 UNITS: 100 INJECTION, SOLUTION INTRAVENOUS; SUBCUTANEOUS at 17:57

## 2018-11-09 RX ADMIN — HYDROCODONE BITARTRATE AND ACETAMINOPHEN 1 TABLET: 5; 325 TABLET ORAL at 02:19

## 2018-11-09 RX ADMIN — POTASSIUM CHLORIDE 40 MEQ: 750 TABLET, FILM COATED, EXTENDED RELEASE ORAL at 09:41

## 2018-11-09 RX ADMIN — POTASSIUM CHLORIDE 40 MEQ: 750 TABLET, FILM COATED, EXTENDED RELEASE ORAL at 20:03

## 2018-11-09 ASSESSMENT — PAIN SCALES - GENERAL
PAINLEVEL_OUTOF10: 4
PAINLEVEL_OUTOF10: 5
PAINLEVEL_OUTOF10: 2
PAINLEVEL_OUTOF10: 6
PAINLEVEL_OUTOF10: 5
PAINLEVEL_OUTOF10: 4
PAINLEVEL_OUTOF10: 0

## 2018-11-09 NOTE — PROGRESS NOTES
Hematology Oncology Daily Progress Note    Admit Date: 11/1/2018  Hospital day several    Subjective:     Patient has complaints of mild to mod abd pain after surgery--denies sob/cp. Medication side effects: none    Scheduled Meds:   potassium chloride  40 mEq Oral TID    insulin lispro  0-12 Units Subcutaneous TID WC    insulin lispro  0-6 Units Subcutaneous Nightly    insulin glargine  15 Units Subcutaneous Daily    insulin lispro  15 Units Subcutaneous TID WC    atorvastatin  20 mg Oral Daily    sodium chloride flush  10 mL Intravenous 2 times per day    cetirizine  10 mg Oral Nightly     Continuous Infusions:   furosemide (LASIX) 5mg/ml infusion 5 mg/hr (11/09/18 1156)    dextrose       PRN Meds:morphine, HYDROcodone 5 mg - acetaminophen **OR** HYDROcodone 5 mg - acetaminophen, gadobenate dimeglumine, melatonin, potassium chloride, sodium chloride flush, magnesium hydroxide, ondansetron, glucose, dextrose, glucagon (rDNA), dextrose    Review of Systems  Pertinent items are noted in HPI. REVIEW OF SYSTEMS:         · Constitutional: Denies fever, sweats, weight loss     · Eyes: No visual changes or diplopia. No scleral icterus. · ENT: No Headaches, hearing loss or vertigo. No mouth sores or sore throat. · Cardiovascular: No chest pain, dyspnea on exertion, palpitations or loss of consciousness. · Respiratory: No cough or wheezing, no sputum production. No hemoptysis. .    · Gastrointestinal: No abdominal pain, appetite loss, blood in stools. No change in bowel habits. · Genitourinary: No dysuria, trouble voiding, or hematuria. · Musculoskeletal:  Generalized weakness. No joint complaints. · Integumentary: No rash or pruritis. · Neurological: No headache, diplopia. No change in gait, balance, or coordination. No paresthesias. · Endocrine: No temperature intolerance. No excessive thirst, fluid intake, or urination.    · Hematologic/Lymphatic: No abnormal bruising or ecchymoses, blood clots or

## 2018-11-09 NOTE — PROGRESS NOTES
pulmonary hypertension. CT ABDOMEN PELVIS W IV CONTRAST Additional Contrast? None   Final Result   No convincing evidence of metastatic disease within the abdomen pelvis. There is a mottled appearance of the peripheral hepatic parenchyma, which   suggests possible hepatic congestion. Further evaluation with a dedicated   hepatic MRI is recommended to better evaluate that. Despite the given history, a definite sigmoid mass is not detected. Subcutaneous fat stranding involving the pannus, with cutaneous thickening. Correlate with any clinical evidence of cellulitis. Bilateral nonobstructing nephrolithiasis. XR CHEST PORTABLE   Final Result   Mild pulmonary vascular congestion. Cardial-pericardial silhouette enlargement. US ABDOMEN COMPLETE    (Results Pending)       Endoscopy  EGD and colonoscopy 11/5/18 with Dr. Jax Burks  1. Multiple gastric erosions, 3-4 mm in size, with surrounding erythema. Biopsies were obtained from the gastric antrum, incisura and body to evaluate for H. Pylori. 2. Normal duodenal mucosa. Biopsies obtained from the duodenal bulb and the second portion of the duodenum to evaluate for Celiac disease. 3.  15 mm pedunculated sigmoid polyp, resected with snare cautery  4. 4 mm sessile polyp in the sigmoid colon, resected with cold snare  5. A large 6 cm fungating mass in the distal ascending colon extending to the hepatic flexure. The mass was biopsied with cold forceps. 2.5 mL [place] ink was placed proximal and distal to the mass. 6. A 1 cm cecal polyp, resected with snare cautery. 7. A 3 mm sessile polyp in the proximal ascending colon, resected with biopsy forceps. ASSESSMENT AND RECOMMENDATIONS   Momo Montalvo is a 70 y.o. female with PMH of CRF, DM, HTN, and HLD who presented on 11/1/2018 with  progressive CHÁVEZ, abdominal swelling, and LE edema.  Patient has followed by Nephrologist and edema has been unresponsive to diuretics so was

## 2018-11-09 NOTE — PROGRESS NOTES
Hospitalist Progress Note    CC: sob    Admit date: 11/1/2018  Days in hospital:  8    Subjective: soreness in abdomen, no flatus nor bm yet, tolerated clears this am    ROS:   Review of Systems   Constitutional: Negative for chills and fever. Respiratory: Negative for cough and shortness of breath. Cardiovascular: Negative for chest pain and palpitations. Gastrointestinal: Negative for abdominal pain, constipation and diarrhea. Genitourinary: Negative for dysuria and urgency. Neurological: Negative for headaches. Objective:    /65   Pulse 93   Temp 97.7 °F (36.5 °C) (Oral)   Resp 18   Ht 5' 3\" (1.6 m)   Wt 272 lb 14.9 oz (123.8 kg)   SpO2 94%   BMI 48.35 kg/m²     Gen:   Alert and oriented ×3, pale appearing   Eyes: PERRL. No sclera icterus. No conjunctival injection. ENT: No discharge. Pharynx clear. External appearance of ears and nose normal.  Neck: Trachea midline. No obvious mass.    Resp: No accessory muscle use. No crackles. No wheezes. No rhonchi. CV: Regular rate. Regular rhythm. No murmur or rub. No edema. GI: Non-tender. Non-distended. No hernia. Incision c/d/i  Skin: Warm, dry, normal texture and turgor. Lymph: No cervical LAD. No supraclavicular LAD. M/S: / Ext. 2+ bilateral lower extremity edema  Neuro: CN 2-12 are intact,  no neurologic deficits noted.     Medications:  Scheduled Meds:   potassium chloride  40 mEq Oral TID    insulin lispro  0-12 Units Subcutaneous TID WC    insulin lispro  0-6 Units Subcutaneous Nightly    insulin glargine  15 Units Subcutaneous Daily    insulin lispro  15 Units Subcutaneous TID WC    atorvastatin  20 mg Oral Daily    sodium chloride flush  10 mL Intravenous 2 times per day    cetirizine  10 mg Oral Nightly       PRN Meds:  morphine, HYDROcodone 5 mg - acetaminophen **OR** HYDROcodone 5 mg - acetaminophen, gadobenate dimeglumine, melatonin, potassium chloride, sodium chloride flush, magnesium hydroxide, ondansetron, adenocarcinoma  Sp IV Fe infusion x 3 doses   Sp pRBC transfusion with appropriate response  HB stable     Colon Mass  GI, oncology, surgery following   Sp R hemicolectomy on 11/8, POD 1 - tolerated well  MRI liver - no evidence of mets  Thus far CT A/P wo evidence of mets  Will fu oncology recs - ?need for adj chemo  Diet advancement per surgeon  Initiation of AC per surgical team      JOB on CKD, Cr at baseline   Lasix drip on hold   Renal following  Monitor I/O's     Pulmonary vascular congestion  Normal echocardiogram     Lower extremity edema  Diuresis      Type 2 diabetes mellitus E11.9  Insulin sliding scale     Pancreatic Cyst  Likely benign  Will need outpatient MRI     Code Status: Full Code     Dispo - continuing CARE    Electronically signed by Santiago Way MD on 11/9/2018 at 10:44 AM

## 2018-11-10 LAB
ANION GAP SERPL CALCULATED.3IONS-SCNC: 14 MMOL/L (ref 3–16)
BASOPHILS ABSOLUTE: 0.1 K/UL (ref 0–0.2)
BASOPHILS RELATIVE PERCENT: 0.9 %
BUN BLDV-MCNC: 11 MG/DL (ref 7–20)
CALCIUM SERPL-MCNC: 8.9 MG/DL (ref 8.3–10.6)
CHLORIDE BLD-SCNC: 105 MMOL/L (ref 99–110)
CO2: 24 MMOL/L (ref 21–32)
CREAT SERPL-MCNC: 1.3 MG/DL (ref 0.6–1.2)
EOSINOPHILS ABSOLUTE: 0.4 K/UL (ref 0–0.6)
EOSINOPHILS RELATIVE PERCENT: 4.1 %
GFR AFRICAN AMERICAN: 50
GFR NON-AFRICAN AMERICAN: 41
GLUCOSE BLD-MCNC: 139 MG/DL (ref 70–99)
GLUCOSE BLD-MCNC: 160 MG/DL (ref 70–99)
GLUCOSE BLD-MCNC: 192 MG/DL (ref 70–99)
GLUCOSE BLD-MCNC: 205 MG/DL (ref 70–99)
GLUCOSE BLD-MCNC: 242 MG/DL (ref 70–99)
HCT VFR BLD CALC: 31.8 % (ref 36–48)
HEMOGLOBIN: 8.9 G/DL (ref 12–16)
LYMPHOCYTES ABSOLUTE: 0.9 K/UL (ref 1–5.1)
LYMPHOCYTES RELATIVE PERCENT: 8.6 %
MCH RBC QN AUTO: 21.5 PG (ref 26–34)
MCHC RBC AUTO-ENTMCNC: 28.1 G/DL (ref 31–36)
MCV RBC AUTO: 76.5 FL (ref 80–100)
MONOCYTES ABSOLUTE: 0.6 K/UL (ref 0–1.3)
MONOCYTES RELATIVE PERCENT: 5.3 %
NEUTROPHILS ABSOLUTE: 8.4 K/UL (ref 1.7–7.7)
NEUTROPHILS RELATIVE PERCENT: 81.1 %
PDW BLD-RTO: 36.9 % (ref 12.4–15.4)
PERFORMED ON: ABNORMAL
PLATELET # BLD: 222 K/UL (ref 135–450)
PMV BLD AUTO: 8.4 FL (ref 5–10.5)
POTASSIUM SERPL-SCNC: 5 MMOL/L (ref 3.5–5.1)
RBC # BLD: 4.15 M/UL (ref 4–5.2)
SODIUM BLD-SCNC: 143 MMOL/L (ref 136–145)
WBC # BLD: 10.4 K/UL (ref 4–11)

## 2018-11-10 PROCEDURE — 6370000000 HC RX 637 (ALT 250 FOR IP): Performed by: HOSPITALIST

## 2018-11-10 PROCEDURE — APPSS15 APP SPLIT SHARED TIME 0-15 MINUTES: Performed by: NURSE PRACTITIONER

## 2018-11-10 PROCEDURE — 2580000003 HC RX 258: Performed by: INTERNAL MEDICINE

## 2018-11-10 PROCEDURE — 85025 COMPLETE CBC W/AUTO DIFF WBC: CPT

## 2018-11-10 PROCEDURE — 6370000000 HC RX 637 (ALT 250 FOR IP): Performed by: INTERNAL MEDICINE

## 2018-11-10 PROCEDURE — APPNB30 APP NON BILLABLE TIME 0-30 MINS: Performed by: NURSE PRACTITIONER

## 2018-11-10 PROCEDURE — 99024 POSTOP FOLLOW-UP VISIT: CPT | Performed by: SURGERY

## 2018-11-10 PROCEDURE — 80048 BASIC METABOLIC PNL TOTAL CA: CPT

## 2018-11-10 PROCEDURE — 6370000000 HC RX 637 (ALT 250 FOR IP): Performed by: SURGERY

## 2018-11-10 PROCEDURE — 2580000003 HC RX 258: Performed by: HOSPITALIST

## 2018-11-10 PROCEDURE — 6360000002 HC RX W HCPCS: Performed by: INTERNAL MEDICINE

## 2018-11-10 PROCEDURE — 1200000000 HC SEMI PRIVATE

## 2018-11-10 PROCEDURE — 36415 COLL VENOUS BLD VENIPUNCTURE: CPT

## 2018-11-10 RX ADMIN — HYDROCODONE BITARTRATE AND ACETAMINOPHEN 1 TABLET: 5; 325 TABLET ORAL at 18:22

## 2018-11-10 RX ADMIN — POTASSIUM CHLORIDE 40 MEQ: 750 TABLET, FILM COATED, EXTENDED RELEASE ORAL at 21:40

## 2018-11-10 RX ADMIN — INSULIN LISPRO 2 UNITS: 100 INJECTION, SOLUTION INTRAVENOUS; SUBCUTANEOUS at 21:40

## 2018-11-10 RX ADMIN — HYDROCODONE BITARTRATE AND ACETAMINOPHEN 2 TABLET: 5; 325 TABLET ORAL at 02:34

## 2018-11-10 RX ADMIN — POTASSIUM CHLORIDE 40 MEQ: 750 TABLET, FILM COATED, EXTENDED RELEASE ORAL at 08:48

## 2018-11-10 RX ADMIN — Medication 10 ML: at 21:40

## 2018-11-10 RX ADMIN — HYDROCODONE BITARTRATE AND ACETAMINOPHEN 2 TABLET: 5; 325 TABLET ORAL at 13:28

## 2018-11-10 RX ADMIN — INSULIN LISPRO 15 UNITS: 100 INJECTION, SOLUTION INTRAVENOUS; SUBCUTANEOUS at 13:27

## 2018-11-10 RX ADMIN — Medication 10 ML: at 08:53

## 2018-11-10 RX ADMIN — INSULIN LISPRO 4 UNITS: 100 INJECTION, SOLUTION INTRAVENOUS; SUBCUTANEOUS at 13:13

## 2018-11-10 RX ADMIN — INSULIN LISPRO 15 UNITS: 100 INJECTION, SOLUTION INTRAVENOUS; SUBCUTANEOUS at 18:22

## 2018-11-10 RX ADMIN — HYDROCODONE BITARTRATE AND ACETAMINOPHEN 2 TABLET: 5; 325 TABLET ORAL at 08:48

## 2018-11-10 RX ADMIN — INSULIN LISPRO 15 UNITS: 100 INJECTION, SOLUTION INTRAVENOUS; SUBCUTANEOUS at 08:49

## 2018-11-10 RX ADMIN — ATORVASTATIN CALCIUM 20 MG: 20 TABLET, FILM COATED ORAL at 08:48

## 2018-11-10 RX ADMIN — INSULIN LISPRO 2 UNITS: 100 INJECTION, SOLUTION INTRAVENOUS; SUBCUTANEOUS at 18:13

## 2018-11-10 RX ADMIN — INSULIN GLARGINE 15 UNITS: 100 INJECTION, SOLUTION SUBCUTANEOUS at 08:48

## 2018-11-10 RX ADMIN — HYDROCODONE BITARTRATE AND ACETAMINOPHEN 2 TABLET: 5; 325 TABLET ORAL at 22:22

## 2018-11-10 RX ADMIN — IRON SUCROSE 200 MG: 20 INJECTION, SOLUTION INTRAVENOUS at 13:28

## 2018-11-10 ASSESSMENT — PAIN SCALES - GENERAL
PAINLEVEL_OUTOF10: 7
PAINLEVEL_OUTOF10: 7
PAINLEVEL_OUTOF10: 2
PAINLEVEL_OUTOF10: 7
PAINLEVEL_OUTOF10: 4
PAINLEVEL_OUTOF10: 5

## 2018-11-10 NOTE — PROGRESS NOTES
injection 10 mL 2 times per day   sodium chloride flush 0.9 % injection 10 mL PRN   magnesium hydroxide (MILK OF MAGNESIA) 400 MG/5ML suspension 30 mL Daily PRN   ondansetron (ZOFRAN) injection 4 mg Q6H PRN   cetirizine (ZYRTEC) tablet 10 mg Nightly   glucose (GLUTOSE) 40 % oral gel 15 g PRN   dextrose 50 % solution 12.5 g PRN   glucagon (rDNA) injection 1 mg PRN   dextrose 5 % solution PRN           Physical exam:     Vitals:  BP (!) 108/54   Pulse 91   Temp 99.2 °F (37.3 °C) (Oral)   Resp 18   Ht 5' 3\" (1.6 m)   Wt 272 lb 14.9 oz (123.8 kg)   SpO2 93%   BMI 48.35 kg/m²   Constitutional:  OAA X3 NAD  Skin: no rash, turgor wnl  Heent:  eomi, mmm  Neck: no bruits or jvd noted  Cardiovascular:  S1, S2 without m/r/g  Respiratory: CTA B without w/r/r  Abdomen:  +bs, soft, nt, nd  Ext: ++ lower extremity edema  Psychiatric: mood and affect appropriate  Musculoskeletal:  Rom, muscular strength intact    Data:   Labs:  CBC:   Recent Labs      11/08/18   1710  11/09/18   0925  11/10/18   0810   WBC  10.7  9.7  9.8  10.4   HGB  8.9*  8.3*  8.3*  8.9*   PLT  239  217  216  222     BMP:    Recent Labs      11/08/18   1710  11/09/18   0903  11/10/18   0810   NA  144  143  143   K  4.4  4.3  5.0   CL  107  108  105   CO2  24  25  24   BUN  12  12  11   CREATININE  1.2  1.1  1.3*   GLUCOSE  241*  227*  160*     Ca/Mg/Phos:   Recent Labs      11/08/18   1710  11/09/18   0903  11/10/18   0810   CALCIUM  8.7  8.4  8.9     Hepatic: No results for input(s): AST, ALT, ALB, BILITOT, ALKPHOS in the last 72 hours. Troponin: No results for input(s): TROPONINI in the last 72 hours. BNP: No results for input(s): BNP in the last 72 hours. Lipids: No results for input(s): CHOL, TRIG, HDL, LDLCALC, LABVLDL in the last 72 hours. ABGs: No results for input(s): PHART, PO2ART, [id number] in the last 72 hours. INR: No results for input(s): INR in the last 72 hours.   UA:  No results for input(s): Melania Hung, [facility], [address] [place] [address],

## 2018-11-10 NOTE — PROGRESS NOTES
General and Vascular Surgery                                                           Daily Progress Note                                                               Pt Name: Chastity Burnham  Medical Record Number: [id number]  Date of Birth 1955   Today's Date: 11/10/2018    ASSESSMENT/PLAN  Colon adenocarcinoma  -POD #2 laparoscopic right hemicolectomy and umbilical hernia repair  -Pain controlled, only using oral meds  -DC cowan  -OOB, ambulate.   -Continue full liquids. Not passing much flatus, awaiting BM. Pathology: pT1N0  Right colon, segmental resection:  - Invasive colonic adenocarcinoma invading submucosa, well  differentiated. - Margins not involved. - Pericolonic lymph nodes negative for metastatic carcinoma (0/25). Anemia  -Iron    JOB on CKD 3  -nephro following. Cr 1.1-->1.3, GFR 50-->41.   -On lasix drip    EDUCATION  Patient educated about their illness/diagnosis, stated above, and all questions answered. We discussed the importance of nutrition, medications they are taking, and healthy lifestyle. Marita Cayuga is feeling well. Pain controlled. + flatus, no BM yet. OBJECTIVE  VITALS:  height is 5' 3\" (1.6 m) and weight is 272 lb 14.9 oz (123.8 kg). Her oral temperature is 97.6 °F (36.4 °C). Her blood pressure is 108/66 and her pulse is 98. Her respiration is 18 and oxygen saturation is 98%. VITALS:  /66   Pulse 98   Temp 97.6 °F (36.4 °C) (Oral)   Resp 18   Ht 5' 3\" (1.6 m)   Wt 272 lb 14.9 oz (123.8 kg)   SpO2 98%   BMI 48.35 kg/m²   GENERAL: alert, no distress  ABDOMEN: soft, appropriately-tender and non-distended. Incisions with surgical glue c/d/i. I/O last 3 completed shifts: In: 250 [P.O.:240; I.V.:10]  Out: 1050 [Urine:1050]  No intake/output data recorded.     LABS  Recent Labs      11/10/18   0810   WBC  10.4   HGB  8.9*   HCT  31.8*   PLT  222   NA  143   K  5.0   CL  105   CO2  24   BUN

## 2018-11-10 NOTE — PLAN OF CARE
Problem: Safety:  Goal: Free from accidental physical injury  Free from accidental physical injury   Outcome: Ongoing    Goal: Free from intentional harm  Free from intentional harm   Outcome: Ongoing      Problem: Daily Care:  Goal: Daily care needs are met  Daily care needs are met   Outcome: Ongoing      Problem: Fluid Volume:  Goal: Hemodynamic stability will improve  Hemodynamic stability will improve   Outcome: Ongoing    Goal: Ability to maintain a balanced intake and output will improve  Ability to maintain a balanced intake and output will improve   Outcome: Ongoing      Problem: Health Behavior:  Goal: Identification of resources available to assist in meeting health care needs will improve  Identification of resources available to assist in meeting health care needs will improve   Outcome: Ongoing      Problem: Respiratory:  Goal: Respiratory status will improve  Respiratory status will improve   Outcome: Ongoing      Problem: Falls - Risk of:  Goal: Will remain free from falls  Will remain free from falls   Outcome: Ongoing    Goal: Absence of physical injury  Absence of physical injury   Outcome: Ongoing      Problem: Nutrition  Goal: Optimal nutrition therapy  Outcome: Ongoing      Problem: Pain:  Goal: Pain level will decrease  Pain level will decrease   Outcome: Ongoing    Goal: Control of acute pain  Control of acute pain   Outcome: Ongoing    Goal: Control of chronic pain  Control of chronic pain   Outcome: Ongoing

## 2018-11-10 NOTE — PROGRESS NOTES
10 mL Intravenous 2 times per day    cetirizine  10 mg Oral Nightly      PRN Medications: morphine, HYDROcodone 5 mg - acetaminophen **OR** HYDROcodone 5 mg - acetaminophen, gadobenate dimeglumine, melatonin, potassium chloride, sodium chloride flush, magnesium hydroxide, ondansetron, glucose, dextrose, glucagon (rDNA), dextrose  Diet: Dietary Nutrition Supplements: Other Supplement (ensure)  DIET FULL LIQUID;    Continuous Infusions:   dextrose           Intake/Output Summary (Last 24 hours) at 11/10/18 1138  Last data filed at 11/10/18 0644   Gross per 24 hour   Intake              240 ml   Output             1050 ml   Net             -810 ml       CBC:   Recent Labs      11/09/18   0925  11/10/18   0810   WBC  9.7  9.8  10.4   HGB  8.3*  8.3*  8.9*   PLT  217  216  222     BMP:  Recent Labs      11/09/18   0903  11/10/18   0810   NA  143  143   K  4.3  5.0   CL  108  105   CO2  25  24   BUN  12  11   CREATININE  1.1  1.3*   GLUCOSE  227*  160*     ABGs: No results found for: PHART, PO2ART, [id number]    Assessment/plan     Patient Active Problem List:     Hypertension     HLD (hyperlipidemia)     Positive FIT (fecal immunochemical test)     Diabetes with retinopathy (HCC)     Volume overload     Malignant neoplasm of ascending colon (HCC)     Umbilical hernia without obstruction and without gangrene      69 yo female with      Dyspnea of exertion and shortness of breath -resolved  Secondary to severe anemia, non cardiogenic pulmonary edema  Lasix IV infusion - renal managing, good UOP - currently on hold, re initiation per renal  Currently receiving IVF  Renal fxn stable      Anemia, Fe def  Sp EGD/Colon - sig findings of R colon mass, bx consistent with adenocarcinoma  Sp IV Fe infusion x 3 doses   Sp pRBC transfusion with appropriate response  HB stable     Colon Mass - R colon adenoCa    GI, oncology, surgery following   Sp R hemicolectomy on 11/8, POD 2 - tolerated well  MRI liver - no evidence of

## 2018-11-10 NOTE — PROGRESS NOTES
Eosinophils # 0.4 0.0 - 0.6 K/uL    Basophils # 0.1 0.0 - 0.2 K/uL     Other Labs    Imaging  US NON OB TRANSVAGINAL   Final Result   2.8 cm heterogeneous mass does correlate with the findings of the recent CT   and MRI. This could represent a submucosal fibroid. However, a malignant   endometrial mass cannot be excluded. US PELVIS COMPLETE   Final Result   2.8 cm heterogeneous mass does correlate with the findings of the recent CT   and MRI. This could represent a submucosal fibroid. However, a malignant   endometrial mass cannot be excluded. MRI ABDOMEN W WO CONTRAST   Final Result   1. Enhancing mass in the ascending colon, likely corresponding with the   biopsy-proven malignancy. 2. No findings of metastatic disease in the abdomen. 3. Heterogeneous enhancement of the liver during the arterial phase and less   prominently during the portal venous phase may be related to underlying   steatosis but could also be due to congestive hepatopathy given mild   cardiomegaly. No superimposed cirrhosis. 4. 3.4 cm x 2.5 cm lesion located centrally in the incompletely evaluated   uterus, corresponding with a hyperdense lesion on CT. Considerations include   fibroid or an endometrial neoplasm such as polyp or carcinoma. Recommend   further evaluation with sonography. 5. 0.7 cm x 0.6 cm simple appearing cystic lesion near the junction of the   pancreatic body and tail, most likely a branch duct intraductal papillary   mucinous neoplasm. Recommend follow-up with pancreas protocol MRI or CT in 2   years as below. RECOMMENDATIONS:   Managing Incidental Pancreatic Cysts      <80 years at presentation, 0.5 cm to <1.5 cm      65-79 years at presentation:  Pancreas protocol MRI (or CT) in 2 years      Abisai et al.  Management of incidental pancreatic cysts: a white paper of   the ACR Incidental Findings Committee. [address] [place] [address] 3490;[id number].          CT CHEST WO CONTRAST   Final Result   No evidence of metastatic disease within the chest.      Cardiac chamber enlargement. Enlargement of the main pulmonary artery   suggesting pulmonary hypertension. CT ABDOMEN PELVIS W IV CONTRAST Additional Contrast? None   Final Result   No convincing evidence of metastatic disease within the abdomen pelvis. There is a mottled appearance of the peripheral hepatic parenchyma, which   suggests possible hepatic congestion. Further evaluation with a dedicated   hepatic MRI is recommended to better evaluate that. Despite the given history, a definite sigmoid mass is not detected. Subcutaneous fat stranding involving the pannus, with cutaneous thickening. Correlate with any clinical evidence of cellulitis. Bilateral nonobstructing nephrolithiasis. XR CHEST PORTABLE   Final Result   Mild pulmonary vascular congestion. Cardial-pericardial silhouette enlargement. Endoscopy  EGD and colonoscopy 11/5/18 with Dr. Basim Ochoa  1. Multiple gastric erosions, 3-4 mm in size, with surrounding erythema. Biopsies were obtained from the gastric antrum, incisura and body to evaluate for H. Pylori. 2. Normal duodenal mucosa. Biopsies obtained from the duodenal bulb and the second portion of the duodenum to evaluate for Celiac disease. 3.  15 mm pedunculated sigmoid polyp, resected with snare cautery  4. 4 mm sessile polyp in the sigmoid colon, resected with cold snare  5. A large 6 cm fungating mass in the distal ascending colon extending to the hepatic flexure. The mass was biopsied with cold forceps. 2.5 mL [place] ink was placed proximal and distal to the mass. 6. A 1 cm cecal polyp, resected with snare cautery. 7. A 3 mm sessile polyp in the proximal ascending colon, resected with biopsy forceps. ASSESSMENT AND RECOMMENDATIONS   Ethan Carreon is a 70 y.o. female with PMH of CRF, DM, HTN, and HLD who presented on 11/1/2018 with  progressive CHÁVEZ, abdominal swelling, and LE edema. Patient has followed by Nephrologist and edema has been unresponsive to diuretics so was admitted for diuresis. Patient was found to have severe microcytic anemia and GI consulted for further evaluation. Underwent EGD and colonoscopy 11/5/18 (report above). Surgical resection of colon mass on 11/8/2018. 1. Right sided colon adenocarcinoma pT1N0:  S/p right hemicolectomy. POD#2. 2. Iron deficiency anemia: likely due to #1. EGD with small bowel bx negative for Celiac disease. Received 3 days of IV venofer 500 mg  3. Pancreatic cystic lesion: Likely IPMN, needs surveillance MRI. RECOMMENDATIONS:    Defer diet advancement to surgery. Will need repeat colonoscopy in 6 months. Thank you for allowing me to participate in this patient's care. No further GI work-up needed at this time. We will sign off, however please contact us with any additional questions at [phone].       Suleman Avelar

## 2018-11-11 LAB
ANION GAP SERPL CALCULATED.3IONS-SCNC: 11 MMOL/L (ref 3–16)
BASOPHILS ABSOLUTE: 0 K/UL (ref 0–0.2)
BASOPHILS RELATIVE PERCENT: 0.5 %
BUN BLDV-MCNC: 14 MG/DL (ref 7–20)
CALCIUM SERPL-MCNC: 9.1 MG/DL (ref 8.3–10.6)
CHLORIDE BLD-SCNC: 106 MMOL/L (ref 99–110)
CO2: 22 MMOL/L (ref 21–32)
CREAT SERPL-MCNC: 1.2 MG/DL (ref 0.6–1.2)
EOSINOPHILS ABSOLUTE: 0.4 K/UL (ref 0–0.6)
EOSINOPHILS RELATIVE PERCENT: 5.2 %
GFR AFRICAN AMERICAN: 55
GFR NON-AFRICAN AMERICAN: 45
GLUCOSE BLD-MCNC: 140 MG/DL (ref 70–99)
GLUCOSE BLD-MCNC: 157 MG/DL (ref 70–99)
GLUCOSE BLD-MCNC: 164 MG/DL (ref 70–99)
GLUCOSE BLD-MCNC: 179 MG/DL (ref 70–99)
GLUCOSE BLD-MCNC: 201 MG/DL (ref 70–99)
HCT VFR BLD CALC: 30.2 % (ref 36–48)
HEMOGLOBIN: 8.5 G/DL (ref 12–16)
LYMPHOCYTES ABSOLUTE: 1.1 K/UL (ref 1–5.1)
LYMPHOCYTES RELATIVE PERCENT: 13.3 %
MCH RBC QN AUTO: 21.6 PG (ref 26–34)
MCHC RBC AUTO-ENTMCNC: 28.1 G/DL (ref 31–36)
MCV RBC AUTO: 76.9 FL (ref 80–100)
MONOCYTES ABSOLUTE: 0.6 K/UL (ref 0–1.3)
MONOCYTES RELATIVE PERCENT: 7.4 %
NEUTROPHILS ABSOLUTE: 6.3 K/UL (ref 1.7–7.7)
NEUTROPHILS RELATIVE PERCENT: 73.6 %
PDW BLD-RTO: 38.6 % (ref 12.4–15.4)
PERFORMED ON: ABNORMAL
PLATELET # BLD: 195 K/UL (ref 135–450)
PMV BLD AUTO: 8.7 FL (ref 5–10.5)
POTASSIUM SERPL-SCNC: 5.4 MMOL/L (ref 3.5–5.1)
RBC # BLD: 3.93 M/UL (ref 4–5.2)
SODIUM BLD-SCNC: 139 MMOL/L (ref 136–145)
WBC # BLD: 8.5 K/UL (ref 4–11)

## 2018-11-11 PROCEDURE — 6370000000 HC RX 637 (ALT 250 FOR IP): Performed by: SURGERY

## 2018-11-11 PROCEDURE — 2580000003 HC RX 258: Performed by: HOSPITALIST

## 2018-11-11 PROCEDURE — 36415 COLL VENOUS BLD VENIPUNCTURE: CPT

## 2018-11-11 PROCEDURE — 80048 BASIC METABOLIC PNL TOTAL CA: CPT

## 2018-11-11 PROCEDURE — 6370000000 HC RX 637 (ALT 250 FOR IP): Performed by: INTERNAL MEDICINE

## 2018-11-11 PROCEDURE — 99024 POSTOP FOLLOW-UP VISIT: CPT | Performed by: SURGERY

## 2018-11-11 PROCEDURE — 6370000000 HC RX 637 (ALT 250 FOR IP): Performed by: HOSPITALIST

## 2018-11-11 PROCEDURE — 85025 COMPLETE CBC W/AUTO DIFF WBC: CPT

## 2018-11-11 PROCEDURE — 1200000000 HC SEMI PRIVATE

## 2018-11-11 PROCEDURE — 94760 N-INVAS EAR/PLS OXIMETRY 1: CPT

## 2018-11-11 RX ORDER — FUROSEMIDE 40 MG/1
40 TABLET ORAL DAILY
Status: DISCONTINUED | OUTPATIENT
Start: 2018-11-11 | End: 2018-11-13

## 2018-11-11 RX ORDER — INSULIN GLARGINE 100 [IU]/ML
20 INJECTION, SOLUTION SUBCUTANEOUS DAILY
Status: DISCONTINUED | OUTPATIENT
Start: 2018-11-12 | End: 2018-11-13 | Stop reason: HOSPADM

## 2018-11-11 RX ADMIN — Medication 10 ML: at 20:47

## 2018-11-11 RX ADMIN — INSULIN LISPRO 2 UNITS: 100 INJECTION, SOLUTION INTRAVENOUS; SUBCUTANEOUS at 17:18

## 2018-11-11 RX ADMIN — INSULIN LISPRO 18 UNITS: 100 INJECTION, SOLUTION INTRAVENOUS; SUBCUTANEOUS at 12:35

## 2018-11-11 RX ADMIN — HYDROCODONE BITARTRATE AND ACETAMINOPHEN 2 TABLET: 5; 325 TABLET ORAL at 06:59

## 2018-11-11 RX ADMIN — INSULIN LISPRO 18 UNITS: 100 INJECTION, SOLUTION INTRAVENOUS; SUBCUTANEOUS at 17:16

## 2018-11-11 RX ADMIN — POTASSIUM CHLORIDE 40 MEQ: 750 TABLET, FILM COATED, EXTENDED RELEASE ORAL at 08:18

## 2018-11-11 RX ADMIN — INSULIN LISPRO 4 UNITS: 100 INJECTION, SOLUTION INTRAVENOUS; SUBCUTANEOUS at 08:22

## 2018-11-11 RX ADMIN — ATORVASTATIN CALCIUM 20 MG: 20 TABLET, FILM COATED ORAL at 08:17

## 2018-11-11 RX ADMIN — INSULIN GLARGINE 15 UNITS: 100 INJECTION, SOLUTION SUBCUTANEOUS at 08:22

## 2018-11-11 RX ADMIN — INSULIN LISPRO 2 UNITS: 100 INJECTION, SOLUTION INTRAVENOUS; SUBCUTANEOUS at 12:34

## 2018-11-11 RX ADMIN — HYDROCODONE BITARTRATE AND ACETAMINOPHEN 2 TABLET: 5; 325 TABLET ORAL at 20:46

## 2018-11-11 RX ADMIN — Medication 10 ML: at 08:19

## 2018-11-11 RX ADMIN — HYDROCODONE BITARTRATE AND ACETAMINOPHEN 1 TABLET: 5; 325 TABLET ORAL at 13:35

## 2018-11-11 RX ADMIN — INSULIN LISPRO 15 UNITS: 100 INJECTION, SOLUTION INTRAVENOUS; SUBCUTANEOUS at 08:23

## 2018-11-11 RX ADMIN — FUROSEMIDE 40 MG: 40 TABLET ORAL at 17:15

## 2018-11-11 ASSESSMENT — PAIN SCALES - GENERAL
PAINLEVEL_OUTOF10: 3
PAINLEVEL_OUTOF10: 0
PAINLEVEL_OUTOF10: 2
PAINLEVEL_OUTOF10: 0
PAINLEVEL_OUTOF10: 7
PAINLEVEL_OUTOF10: 7

## 2018-11-11 ASSESSMENT — ENCOUNTER SYMPTOMS: NAUSEA: 0

## 2018-11-11 ASSESSMENT — PAIN DESCRIPTION - PAIN TYPE: TYPE: SURGICAL PAIN

## 2018-11-11 ASSESSMENT — PAIN DESCRIPTION - LOCATION: LOCATION: ABDOMEN

## 2018-11-11 NOTE — PROGRESS NOTES
Nephrology Progress Note     Good Urine output   Cr stable  Edema  LE  Much decreased   S/p rt hemicolectomy   Anemia  Ambulating better    I/O last 3 completed shifts: In: 380 [P.O.:380]  Out: -   No intake/output data recorded.         Past Medical History:   Diagnosis Date    Diabetes ([facility]     Diabetes ([facility]     Diabetes with retinopathy ([facility]     DM eye exam 12/18/17    HLD (hyperlipidemia)     Hypertension     Positive FIT (fecal immunochemical test)        Past Surgical History:   Procedure Laterality Date    COLONOSCOPY N/A 11/5/2018    COLONOSCOPY WITH BIOPSY performed by Reagan Schilling MD at [address] [place] [address] [place] Right 11/08/2018    LAPAROSCOPIC RIGHT HEMICOLECTOMY and umbilical hernia repair    [place] OFFICE/OUTPT VISIT,PROCEDURE ONLY Right 11/8/2018    LAPAROSCOPIC RIGHT HEMICOLECTOMY and umbilical hernia repair performed by Shemar Garza MD at [facility] 11/5/2018    EGD BIOPSY performed by Reagan Schilling MD at [address] [place] [address] History   Problem Relation Age of Onset    Lung Cancer Mother     High Blood Pressure Father     Lung Cancer Father     Lupus Sister          Current Medications:      [START ON 11/12/2018] insulin glargine (LANTUS) injection vial 20 Units Daily   insulin lispro (HUMALOG) injection vial 18 Units TID WC   morphine (PF) injection 2 mg Q2H PRN   HYDROcodone-acetaminophen (NORCO) 5-325 MG per tablet 1 tablet Q4H PRN   Or    HYDROcodone-acetaminophen (NORCO) 5-325 MG per tablet 2 tablet Q4H PRN   gadobenate dimeglumine (MULTIHANCE) injection 10 mL ONCE PRN   melatonin tablet 3 mg Nightly PRN   potassium chloride 10 mEq/100 mL IVPB (Peripheral Line) PRN   insulin lispro (HUMALOG) injection vial 0-12 Units TID WC   insulin lispro (HUMALOG) injection vial 0-6 Units Nightly   atorvastatin (LIPITOR) tablet 20 mg Daily   sodium chloride flush 0.9 % injection 10 mL 2 times per day   sodium chloride flush 0.9 65-79 years at presentation:  Pancreas protocol MRI (or CT) in 2 years      Abisai et al.  Management of incidental pancreatic cysts: a white paper of   the ACR Incidental Findings Committee. [address] 2076;[id number]. CT CHEST WO CONTRAST   Final Result   No evidence of metastatic disease within the chest.      Cardiac chamber enlargement. Enlargement of the main pulmonary artery   suggesting pulmonary hypertension. CT ABDOMEN PELVIS W IV CONTRAST Additional Contrast? None   Final Result   No convincing evidence of metastatic disease within the abdomen pelvis. There is a mottled appearance of the peripheral hepatic parenchyma, which   suggests possible hepatic congestion. Further evaluation with a dedicated   hepatic MRI is recommended to better evaluate that. Despite the given history, a definite sigmoid mass is not detected. Subcutaneous fat stranding involving the pannus, with cutaneous thickening. Correlate with any clinical evidence of cellulitis. Bilateral nonobstructing nephrolithiasis. XR CHEST PORTABLE   Final Result   Mild pulmonary vascular congestion. Cardial-pericardial silhouette enlargement. Assessment/Plan   1. JOB on CKD3    2. Anasarca , improved significantly     3. Anemia - severe , s/p PRBC transfusion. 4. Acid- base/ Electrolyte imbalance     5. Adenocarcinoma of colon, s/p laparoscopic right hemicolectomy and umbilical hernia repair      Plan   - on venofer   -  Has mild edema now.  Will start lasix 40 mg po daily   - daily wts   - monitor renal function, HB                Thank you for allowing us to participate in care of Louann Washington MD    [facility] [address]  Office : [phone]  Fax :[phone]

## 2018-11-11 NOTE — PROGRESS NOTES
Oral, resp. rate 16, height 5' 3\" (1.6 m), weight 271 lb 2.7 oz (123 kg), SpO2 91 %, not currently breastfeeding. General appearance: Comfortable and in no acute distress. Abdomen: No distension. Tenderness: There is tenderness in the incisional area.      Assessment & Plan  POD 3 from right colectomy   Doing well overall   Will advance diet   PT/OT consult per family request   Home soon    Electronically signed by Raúl Coello MD on 11/11/2018 at 1:00 PM    Raúl Coello MD  11/11/2018

## 2018-11-11 NOTE — PLAN OF CARE
Problem: Safety:  Goal: Free from accidental physical injury  Free from accidental physical injury   Outcome: Ongoing      Problem: Daily Care:  Goal: Daily care needs are met  Daily care needs are met   Outcome: Ongoing      Problem: Discharge Planning:  Goal: Patients continuum of care needs are met  Patients continuum of care needs are met   Outcome: Ongoing      Problem: Fluid Volume:  Goal: Hemodynamic stability will improve  Hemodynamic stability will improve   Outcome: Ongoing

## 2018-11-12 LAB
BASOPHILS ABSOLUTE: 0 K/UL (ref 0–0.2)
BASOPHILS RELATIVE PERCENT: 0.4 %
EOSINOPHILS ABSOLUTE: 0.3 K/UL (ref 0–0.6)
EOSINOPHILS RELATIVE PERCENT: 5.1 %
GLUCOSE BLD-MCNC: 128 MG/DL (ref 70–99)
GLUCOSE BLD-MCNC: 131 MG/DL (ref 70–99)
GLUCOSE BLD-MCNC: 133 MG/DL (ref 70–99)
GLUCOSE BLD-MCNC: 248 MG/DL (ref 70–99)
HCT VFR BLD CALC: 30 % (ref 36–48)
HEMOGLOBIN: 8.4 G/DL (ref 12–16)
LYMPHOCYTES ABSOLUTE: 0.7 K/UL (ref 1–5.1)
LYMPHOCYTES RELATIVE PERCENT: 10.7 %
MCH RBC QN AUTO: 21.7 PG (ref 26–34)
MCHC RBC AUTO-ENTMCNC: 28.1 G/DL (ref 31–36)
MCV RBC AUTO: 77 FL (ref 80–100)
MONOCYTES ABSOLUTE: 0.4 K/UL (ref 0–1.3)
MONOCYTES RELATIVE PERCENT: 6.2 %
NEUTROPHILS ABSOLUTE: 5.3 K/UL (ref 1.7–7.7)
NEUTROPHILS RELATIVE PERCENT: 77.6 %
PDW BLD-RTO: 39 % (ref 12.4–15.4)
PERFORMED ON: ABNORMAL
PLATELET # BLD: 201 K/UL (ref 135–450)
PMV BLD AUTO: 8.4 FL (ref 5–10.5)
RBC # BLD: 3.89 M/UL (ref 4–5.2)
WBC # BLD: 6.8 K/UL (ref 4–11)

## 2018-11-12 PROCEDURE — G8979 MOBILITY GOAL STATUS: HCPCS

## 2018-11-12 PROCEDURE — 1200000000 HC SEMI PRIVATE

## 2018-11-12 PROCEDURE — G8988 SELF CARE GOAL STATUS: HCPCS

## 2018-11-12 PROCEDURE — 6370000000 HC RX 637 (ALT 250 FOR IP): Performed by: HOSPITALIST

## 2018-11-12 PROCEDURE — 97166 OT EVAL MOD COMPLEX 45 MIN: CPT

## 2018-11-12 PROCEDURE — APPSS15 APP SPLIT SHARED TIME 0-15 MINUTES: Performed by: PHYSICIAN ASSISTANT

## 2018-11-12 PROCEDURE — 97530 THERAPEUTIC ACTIVITIES: CPT

## 2018-11-12 PROCEDURE — 99024 POSTOP FOLLOW-UP VISIT: CPT | Performed by: SURGERY

## 2018-11-12 PROCEDURE — 6370000000 HC RX 637 (ALT 250 FOR IP): Performed by: INTERNAL MEDICINE

## 2018-11-12 PROCEDURE — 85025 COMPLETE CBC W/AUTO DIFF WBC: CPT

## 2018-11-12 PROCEDURE — APPNB30 APP NON BILLABLE TIME 0-30 MINS: Performed by: PHYSICIAN ASSISTANT

## 2018-11-12 PROCEDURE — 36415 COLL VENOUS BLD VENIPUNCTURE: CPT

## 2018-11-12 PROCEDURE — 97162 PT EVAL MOD COMPLEX 30 MIN: CPT

## 2018-11-12 PROCEDURE — G8987 SELF CARE CURRENT STATUS: HCPCS

## 2018-11-12 PROCEDURE — 94760 N-INVAS EAR/PLS OXIMETRY 1: CPT

## 2018-11-12 PROCEDURE — 97535 SELF CARE MNGMENT TRAINING: CPT

## 2018-11-12 PROCEDURE — 6370000000 HC RX 637 (ALT 250 FOR IP): Performed by: SURGERY

## 2018-11-12 PROCEDURE — G8978 MOBILITY CURRENT STATUS: HCPCS

## 2018-11-12 PROCEDURE — 97116 GAIT TRAINING THERAPY: CPT

## 2018-11-12 PROCEDURE — 2580000003 HC RX 258: Performed by: HOSPITALIST

## 2018-11-12 RX ADMIN — INSULIN LISPRO 4 UNITS: 100 INJECTION, SOLUTION INTRAVENOUS; SUBCUTANEOUS at 08:19

## 2018-11-12 RX ADMIN — INSULIN LISPRO 18 UNITS: 100 INJECTION, SOLUTION INTRAVENOUS; SUBCUTANEOUS at 08:21

## 2018-11-12 RX ADMIN — INSULIN GLARGINE 20 UNITS: 100 INJECTION, SOLUTION SUBCUTANEOUS at 08:19

## 2018-11-12 RX ADMIN — Medication 10 ML: at 20:55

## 2018-11-12 RX ADMIN — ATORVASTATIN CALCIUM 20 MG: 20 TABLET, FILM COATED ORAL at 08:19

## 2018-11-12 RX ADMIN — HYDROCODONE BITARTRATE AND ACETAMINOPHEN 2 TABLET: 5; 325 TABLET ORAL at 20:53

## 2018-11-12 RX ADMIN — HYDROCODONE BITARTRATE AND ACETAMINOPHEN 1 TABLET: 5; 325 TABLET ORAL at 10:43

## 2018-11-12 RX ADMIN — Medication 10 ML: at 08:19

## 2018-11-12 RX ADMIN — INSULIN LISPRO 18 UNITS: 100 INJECTION, SOLUTION INTRAVENOUS; SUBCUTANEOUS at 18:45

## 2018-11-12 RX ADMIN — FUROSEMIDE 40 MG: 40 TABLET ORAL at 08:19

## 2018-11-12 RX ADMIN — INSULIN LISPRO 18 UNITS: 100 INJECTION, SOLUTION INTRAVENOUS; SUBCUTANEOUS at 12:04

## 2018-11-12 ASSESSMENT — PAIN SCALES - GENERAL
PAINLEVEL_OUTOF10: 0
PAINLEVEL_OUTOF10: 2
PAINLEVEL_OUTOF10: 2
PAINLEVEL_OUTOF10: 3
PAINLEVEL_OUTOF10: 7

## 2018-11-12 ASSESSMENT — PAIN DESCRIPTION - PAIN TYPE: TYPE: SURGICAL PAIN

## 2018-11-12 ASSESSMENT — PAIN DESCRIPTION - LOCATION: LOCATION: ABDOMEN

## 2018-11-12 NOTE — PLAN OF CARE
Problem: Nutrition  Goal: Optimal nutrition therapy  Outcome: Ongoing  Nutrition Problem: Inadequate oral intake  Intervention: Food and/or Nutrient Delivery: Continue current diet, Continue current ONS  Nutritional Goals: consume >50% of meals and supplement during admission.

## 2018-11-12 NOTE — PROGRESS NOTES
Limitation: Mobility: Walking and moving around  Mobility: Walking and Moving Around Current Status (): At least 40 percent but less than 60 percent impaired, limited or restricted  Mobility: Walking and Moving Around Goal Status ():  At least 20 percent but less than 40 percent impaired, limited or restricted     OutComes Score  How much difficulty turning over in bed?: A Little  How much difficulty sitting down on / standing up from a chair with arms?: A Little  How much difficulty moving from lying on back to sitting on side of bed?: A Little  How much help from another person moving to and from a bed to a chair?: A Little  How much help from another person needed to walk in hospital room?: A Little  How much help from another person for climbing 3-5 steps with a railing?: A Little  AM-PAC Inpatient Mobility Raw Score : 18  AM-PAC Inpatient T-Scale Score : 43.63  Mobility Inpatient CMS 0-100% Score: 46.58  Mobility Inpatient CMS G-Code Modifier : CK       Goals  Patient Goals   Patient goals : to be independent  Time Frame for Short term goals: upon d/c  Short term goal 1: sup<>sit CGA, HOB flat, no rail, use of log roll technique   Short term goal 2: sit<>stand Ind   Short term goal 3: amb 150' with rolling walker and supervision, SpO2>88%              Therapy Time    Individual Concurrent Group Co-treatment   Time In 0750            Time Out 0900          Minutes 70             Timed Code Treatment Minutes: 55 Minutes      Moe Mckeon, PT    ACTIVITY GUIDELINES    A) INTERVAL TRAINING: use of frequent, short duration episodes of walking to improve stamina.   - Examples include walking roughly every half an hour for 2 minutes (which would mean taking 32 short walks in a 16-hour day for a total of about one hour of walking)  - Goal is to GRADUALLY progress your duration as you reduce frequency (EXAMPLE: within 4 weeks progress to walking for 20 minutes roughly every three hours - about 5 walks in a 16 hour day)  - Pitfalls to avoid:  o Avoid sitting/lying down for too long - keep to your interval training and when you feel too tired to exert for full interval get up and at least walk around the room once. o Avoid exerting for long durations such that you feel the need for a day of sitting still to recover (example - going grocery shopping for an hour and then going to the park for an hour). B) TALK TEST: use this test while walking during your interval training  1) If you can talk and sing, then you probably are not exerting yourself enough. Consider exerting yourself either a little faster or a bit longer. 2) If you cannot talk, then you probably are exerting yourself too much. Sit down to take a rest break at least long enough to be able to talk conversationally. C) PURSED LIP BREATHING: breathing in through your nose and out through puckered (pursed) lips during activity. This helps your lungs gets lots of good oxygen to you.     1) Breathe in through your nose for 2 seconds   2) Breathe out through your lips for 4 seconds

## 2018-11-12 NOTE — PROGRESS NOTES
11/12/18   0757   WBC  8.5  6.8   HGB  8.5*  8.4*   HCT  30.2*  30.0*   PLT  195  201   NA  139   --    K  5.4*   --    CL  106   --    CO2  22   --    BUN  14   --    CREATININE  1.2   --    CALCIUM  9.1   --      CBC:   Lab Results   Component Value Date    WBC 6.8 11/12/2018    RBC 3.89 11/12/2018    HGB 8.4 11/12/2018    HCT 30.0 11/12/2018    MCV 77.0 11/12/2018    MCH 21.7 11/12/2018    MCHC 28.1 11/12/2018    RDW 39.0 11/12/2018     11/12/2018    MPV 8.4 11/12/2018     CMP:    Lab Results   Component Value Date     11/11/2018    K 5.4 11/11/2018     11/11/2018    CO2 22 11/11/2018    BUN 14 11/11/2018    CREATININE 1.2 11/11/2018    GFRAA 55 11/11/2018    LABGLOM 45 11/11/2018    GLUCOSE 157 11/11/2018    PROT 7.1 12/18/2017    LABALBU 3.4 11/06/2018    CALCIUM 9.1 11/11/2018    BILITOT 0.3 12/18/2017    ALKPHOS 84 12/18/2017    AST 11 12/18/2017    ALT 13 12/18/2017         Matty Garcia  Electronically signed 11/12/2018 at 10:37 AM    As above  Making steady progress  PT/OT today    Home soon    Electronically signed by Carlos Bell MD on 11/12/2018 at 2:18 PM

## 2018-11-12 NOTE — PROGRESS NOTES
Pt AAO on assessment. C/o surgical abd pain, 2 tabs Norco given. VSS,  at bedtime. Pt refused 1 unit SSI. Moving around in room well with assist of walker. Surgical site CDI, BS active. States she had a BM today. Tolerated general diet for dinner. No further needs. Will monitor.

## 2018-11-12 NOTE — PROGRESS NOTES
Good  Decision Making: Medium Complexity  History: hx of anemia, DM, retinopathy, HTN, now with Stage 1 adenocarcenoma of colon with resection/ hemicolectomy and umbilical hernia repair  Exam: ADLs  Assistance / Modification: assist, walker  REQUIRES OT FOLLOW UP: Yes  Activity Tolerance  Activity Tolerance: Patient Tolerated treatment well  Safety Devices  Safety Devices in place: Yes  Type of devices: Left in chair;Call light within reach (daughter in room with patient and reports she can assist patient as needed)         Plan   Plan  Times per week: 3-5x  Current Treatment Recommendations: Functional Mobility Training, Equipment Evaluation, Education, & procurement, Self-Care / ADL, Patient/Caregiver Education & Training    G-Code  OT G-codes  Functional Limitation: Self care  Self Care Current Status (): At least 20 percent but less than 40 percent impaired, limited or restricted  Self Care Goal Status (): At least 1 percent but less than 20 percent impaired, limited or restricted  OutComes Score    Luis F Many [facility] 20[facility]. At this time, no further OT is recommended upon discharge due to patient doing well with mobility and having help from  and daughter. Recommend patient returns to prior setting with prior services. AM-PAC Score        AM-[facility] Inpatient Daily Activity Raw Score: 20  AM-PAC Inpatient ADL T-Scale Score : 42.03  ADL Inpatient CMS 0-100% Score: 38.32  ADL Inpatient CMS G-Code Modifier : CJ    Goals  Short term goals  Time Frame for Short term goals: at d/c:  Short term goal 1: Independent grooming  Short term goal 2: Supervision for bathing  Short term goal 3: Supervision for dressing  Short term goal 4: Modified independent bed mobility, using log roll  Short term goal 5: Supervision for transfers  Patient Goals   Patient goals : \"To be able to walk and have more stamina. \"       Therapy Time

## 2018-11-12 NOTE — PROGRESS NOTES
Nephrology Progress Note         Good Urine output   Edema  LE    S/p rt hemicolectomy   Anemia  Ambulating better    I/O last 3 completed shifts: In: 620 [P.O.:620]  Out: -   No intake/output data recorded.         Past Medical History:   Diagnosis Date    [facility]     Diabetes ([facility]     Diabetes with retinopathy ([facility]     DM eye exam 12/18/17    HLD (hyperlipidemia)     Hypertension     Positive FIT (fecal immunochemical test)        Past Surgical History:   Procedure Laterality Date    COLONOSCOPY N/A 11/5/2018    COLONOSCOPY WITH BIOPSY performed by Joe Cantor MD at [address] [place] [address] [place] Right 11/08/2018    LAPAROSCOPIC RIGHT HEMICOLECTOMY and umbilical hernia repair    [place] OFFICE/OUTPT VISIT,PROCEDURE ONLY Right 11/8/2018    LAPAROSCOPIC RIGHT HEMICOLECTOMY and umbilical hernia repair performed by Aretha Ly MD at [facility] N/A 11/5/2018    EGD BIOPSY performed by Joe Cantor MD at [facility] History   Problem Relation Age of Onset    Lung Cancer Mother     High Blood Pressure Father     Lung Cancer Father     Lupus Sister          Current Medications:      insulin glargine (LANTUS) injection vial 20 Units Daily   insulin lispro (HUMALOG) injection vial 18 Units TID WC   furosemide (LASIX) tablet 40 mg Daily   morphine (PF) injection 2 mg Q2H PRN   HYDROcodone-acetaminophen (NORCO) 5-325 MG per tablet 1 tablet Q4H PRN   Or    HYDROcodone-acetaminophen (NORCO) 5-325 MG per tablet 2 tablet Q4H PRN   gadobenate dimeglumine (MULTIHANCE) injection 10 mL ONCE PRN   melatonin tablet 3 mg Nightly PRN   potassium chloride 10 mEq/100 mL IVPB (Peripheral Line) PRN   insulin lispro (HUMALOG) injection vial 0-12 Units TID WC   insulin lispro (HUMALOG) injection vial 0-6 Units Nightly   atorvastatin (LIPITOR) tablet 20 mg Daily   sodium chloride flush 0.9 % injection 10 mL 2 times per day   sodium chloride flush 0.9 % injection 10 mL PRN   magnesium hydroxide (MILK OF MAGNESIA) 400 MG/5ML suspension 30 mL Daily PRN   ondansetron (ZOFRAN) injection 4 mg Q6H PRN   cetirizine (ZYRTEC) tablet 10 mg Nightly   glucose (GLUTOSE) 40 % oral gel 15 g PRN   dextrose 50 % solution 12.5 g PRN   glucagon (rDNA) injection 1 mg PRN   dextrose 5 % solution PRN           Physical exam:     Vitals:  BP (!) 145/83   Pulse 80   Temp 98.3 °F (36.8 °C) (Oral)   Resp 16   Ht 5' 3\" (1.6 m)   Wt 270 lb 15.1 oz (122.9 kg)   SpO2 94%   BMI 48.00 kg/m²   Constitutional:  OAA X3 NAD  Skin: no rash, turgor wnl  Heent:  eomi, mmm  Neck: no bruits or jvd noted  Cardiovascular:  S1, S2 without m/r/g  Respiratory: CTA B without w/r/r  Abdomen:  +bs, soft, nt, nd  Ext: ++ lower extremity edema  Psychiatric: mood and affect appropriate  Musculoskeletal:  Rom, muscular strength intact    Data:   Labs:  CBC:   Recent Labs      11/10/18   0810  11/11/18   0746  11/12/18   0757   WBC  10.4  8.5  6.8   HGB  8.9*  8.5*  8.4*   PLT  222  195  201     BMP:    Recent Labs      11/10/18   0810  11/11/18   0746   NA  143  139   K  5.0  5.4*   CL  105  106   CO2  24  22   BUN  11  14   CREATININE  1.3*  1.2   GLUCOSE  160*  157*     Ca/Mg/Phos:   Recent Labs      11/10/18   0810  11/11/18   0746   CALCIUM  8.9  9.1     Hepatic: No results for input(s): AST, ALT, ALB, BILITOT, ALKPHOS in the last 72 hours. Troponin: No results for input(s): TROPONINI in the last 72 hours. BNP: No results for input(s): BNP in the last 72 hours. Lipids: No results for input(s): CHOL, TRIG, HDL, LDLCALC, LABVLDL in the last 72 hours. ABGs: No results for input(s): PHART, PO2ART, BJF3TVW in the last 72 hours. INR: No results for input(s): INR in the last 72 hours. UA:  No results for input(s): Mendoza Ayala, GLUCOSEU, BILIRUBINUR, KETUA, SPECGRAV, BLOODU, PHUR, PROTEINU, UROBILINOGEN, NITRU, LEUKOCYTESUR, Giacomo Dura in the last 72 hours.    Urine Microscopic:   No results for input(s): LABCAST, BACTERIA, COMU, HYALCAST, WBCUA, RBCUA, EPIU in the last 72 hours. Urine Culture: No results for input(s): LABURIN in the last 72 hours. Urine Chemistry:   No results for input(s): Evette Wade, PROTEINUR, NAUR in the last 72 hours. IMAGING:  US NON OB TRANSVAGINAL   Final Result   2.8 cm heterogeneous mass does correlate with the findings of the recent CT   and MRI. This could represent a submucosal fibroid. However, a malignant   endometrial mass cannot be excluded. US PELVIS COMPLETE   Final Result   2.8 cm heterogeneous mass does correlate with the findings of the recent CT   and MRI. This could represent a submucosal fibroid. However, a malignant   endometrial mass cannot be excluded. MRI ABDOMEN W WO CONTRAST   Final Result   1. Enhancing mass in the ascending colon, likely corresponding with the   biopsy-proven malignancy. 2. No findings of metastatic disease in the abdomen. 3. Heterogeneous enhancement of the liver during the arterial phase and less   prominently during the portal venous phase may be related to underlying   steatosis but could also be due to congestive hepatopathy given mild   cardiomegaly. No superimposed cirrhosis. 4. 3.4 cm x 2.5 cm lesion located centrally in the incompletely evaluated   uterus, corresponding with a hyperdense lesion on CT. Considerations include   fibroid or an endometrial neoplasm such as polyp or carcinoma. Recommend   further evaluation with sonography. 5. 0.7 cm x 0.6 cm simple appearing cystic lesion near the junction of the   pancreatic body and tail, most likely a branch duct intraductal papillary   mucinous neoplasm. Recommend follow-up with pancreas protocol MRI or CT in 2   years as below.       RECOMMENDATIONS:   Managing Incidental Pancreatic Cysts      <80 years at presentation, 0.5 cm to <1.5 cm      65-79 years at presentation:  Pancreas protocol MRI (or CT) in 2 years      Abisai et al.

## 2018-11-12 NOTE — PROGRESS NOTES
·     Objective:     Patient Vitals for the past 8 hrs:   BP Temp Temp src Pulse Resp SpO2 Weight   11/12/18 0638 (!) 145/83 98.3 °F (36.8 °C) Oral 80 16 94 % 270 lb 15.1 oz (122.9 kg)     I/O last 3 completed shifts: In: 620 [P.O.:620]  Out: -   No intake/output data recorded. BP (!) 145/83   Pulse 80   Temp 98.3 °F (36.8 °C) (Oral)   Resp 16   Ht 5' 3\" (1.6 m)   Wt 270 lb 15.1 oz (122.9 kg)   SpO2 94%   BMI 48.00 kg/m²     General Appearance:    Alert, cooperative, no distress, appears stated age   Head:    Normocephalic, without obvious abnormality, atraumatic   Eyes:    PERRL, conjunctiva/corneas clear, EOM's intact, fundi     benign, both eyes        Ears:    Normal TM's and external ear canals, both ears   Nose:   Nares normal, septum midline, mucosa normal, no drainage    or sinus tenderness   Throat:   Lips, mucosa, and tongue normal; teeth and gums normal   Neck:   Supple, symmetrical, trachea midline, no adenopathy;        thyroid:  No enlargement/tenderness/nodules; no carotid    bruit or JVD   Back:     Symmetric, no curvature, ROM normal, no CVA tenderness   Lungs:     Clear to auscultation bilaterally, respirations unlabored   Chest wall:    No tenderness or deformity   Heart:    Regular rate and rhythm, S1 and S2 normal, no murmur, rub   or gallop   Abdomen:     Soft, non-tender, bowel sounds active all four quadrants,     no masses, no organomegaly           Extremities:   Extremities normal, atraumatic, no cyanosis or edema   Pulses:   2+ and symmetric all extremities   Skin:   Skin color, texture, turgor normal, no rashes or lesions   Lymph nodes:   Cervical, supraclavicular, and axillary nodes normal   Neurologic:   CNII-XII intact. Normal strength, sensation and reflexes       throughout       ECG: normal sinus rhythm, no blocks or conduction defects, no ischemic changes.    Data Review  CBC:   Lab Results   Component Value Date    WBC 6.8 11/12/2018    RBC 3.89 11/12/2018

## 2018-11-12 NOTE — PROGRESS NOTES
Nutrition Assessment    Type and Reason for Visit: Reassess    Nutrition Recommendations:   Continue general diet. Continue Glucerna BID. Continue to monitor PO intakes, weights, I&O's, skin integrity, and nutrition-related labs. Nutrition Assessment: Pt remains at risk for being nutritionally compromised d/t new dx of colon cancer and possible decrease in PO intakes associated with dx. Pt with current intakes of % but with slight wt loss (possibly from diuretics)  since admit and remains at risk for nutrition decline if cancer progresses. Pt continues to receive nutritional supplement (changed to Glucerna d/t high BS). Malnutrition Assessment:  · Malnutrition Status: At risk for malnutrition (pt in surgery)  · Context: Acute illness or injury  · Findings of the 6 clinical characteristics of malnutrition (Minimum of 2 out of 6 clinical characteristics is required to make the diagnosis of moderate or severe Protein Calorie Malnutrition based on AND/ASPEN Guidelines):  1. Energy Intake-Less than 75% of estimated energy requirement for greater than 7 days, greater than 7 days    2. Weight Loss-No significant weight loss (wt loss from diuretics),    3. Fat Loss-Unable to assess (pt in surgery),    4. Muscle Loss-Unable to assess (pt in surgery),    5. Fluid Accumulation-No significant fluid accumulation,    6.  Strength-Not measured    Nutrition Risk Level:  Moderate    Nutrient Needs:  · Estimated Daily Total Kcal: 0131-7668 kcal (11-15kcal/kg)  · Estimated Daily Protein (g): 62-72 gm  · Estimated Daily Total Fluid (ml/day): 9294-1152 mL    Nutrition Diagnosis:   · Problem: Inadequate oral intake  · Etiology: related to Insufficient energy/nutrient consumption     Signs and symptoms:  as evidenced by NPO status due to medical condition    Objective Information:  · Wound Type: Surgical Wound  · Current Nutrition Therapies:  · Oral Diet Orders: General   · Oral Diet intake: %  · Oral

## 2018-11-12 NOTE — PROGRESS NOTES
Results:  CBC: Recent Labs      11/10/18   0810  11/11/18   0746  11/12/18   0757   WBC  10.4  8.5  6.8   HGB  8.9*  8.5*  8.4*   HCT  31.8*  30.2*  30.0*   MCV  76.5*  76.9*  77.0*   PLT  222  195  201     BMP: Recent Labs      11/10/18   0810  11/11/18   0746   NA  143  139   K  5.0  5.4*   CL  105  106   CO2  24  22   BUN  11  14   CREATININE  1.3*  1.2     Mag: No results for input(s): MAG in the last 72 hours. Phos: No results found for: PHOS  No components found for: GLU    LIVER PROFILE: No results for input(s): AST, ALT, LIPASE, BILIDIR, BILITOT, ALKPHOS in the last 72 hours. Invalid input(s): AMYLASE,  ALB  PT/INR: No results for input(s): PROTIME, INR in the last 72 hours. APTT: No results for input(s): APTT in the last 72 hours. UA:No results for input(s): NITRITE, COLORU, PHUR, LABCAST, WBCUA, RBCUA, MUCUS, TRICHOMONAS, YEAST, BACTERIA, CLARITYU, SPECGRAV, LEUKOCYTESUR, UROBILINOGEN, BILIRUBINUR, BLOODU, GLUCOSEU, AMORPHOUS in the last 72 hours. Invalid input(s): Jennifer Byrd input(s): ABG  Lab Results   Component Value Date    CALCIUM 9.1 11/11/2018       Assessment:    Active Problems:    Volume overload    Malignant neoplasm of ascending colon (HCC)    Umbilical hernia without obstruction and without gangrene  Resolved Problems:    * No resolved hospital problems. [facility] course: A 71 yo female admitted from nephrology office with SOB for diuresis, found to have severe anemia with hg b of 4.3. GI was consulted and venofer was started. She had an  EGD/Colonoscopy which revealed an ascending colon mass. Surgery was consulted and a laparoscopic Rt hemicolectomy, with umbilical hernia repair, was completed. The pathology came back for adenocarcinoma.      Plan:  Colon Mass - R colon adenoCa    GI, oncology, surgery following   Sp R hemicolectomy on 11/8  MRI liver - no evidence of mets  Thus far CT A/P wo evidence of mets  Will fu oncology recs - there is no benefit for

## 2018-11-13 ENCOUNTER — TELEPHONE (OUTPATIENT)
Dept: FAMILY MEDICINE CLINIC | Age: 63
End: 2018-11-13

## 2018-11-13 VITALS
OXYGEN SATURATION: 99 % | BODY MASS INDEX: 48.01 KG/M2 | RESPIRATION RATE: 18 BRPM | HEIGHT: 63 IN | DIASTOLIC BLOOD PRESSURE: 61 MMHG | TEMPERATURE: 97.7 F | WEIGHT: 270.95 LBS | HEART RATE: 79 BPM | SYSTOLIC BLOOD PRESSURE: 150 MMHG

## 2018-11-13 LAB
ANION GAP SERPL CALCULATED.3IONS-SCNC: 12 MMOL/L (ref 3–16)
BASOPHILS ABSOLUTE: 0.1 K/UL (ref 0–0.2)
BASOPHILS RELATIVE PERCENT: 1.1 %
BUN BLDV-MCNC: 11 MG/DL (ref 7–20)
CALCIUM SERPL-MCNC: 9.1 MG/DL (ref 8.3–10.6)
CHLORIDE BLD-SCNC: 107 MMOL/L (ref 99–110)
CO2: 24 MMOL/L (ref 21–32)
CREAT SERPL-MCNC: 1.1 MG/DL (ref 0.6–1.2)
EOSINOPHILS ABSOLUTE: 0.2 K/UL (ref 0–0.6)
EOSINOPHILS RELATIVE PERCENT: 4.7 %
GFR AFRICAN AMERICAN: >60
GFR NON-AFRICAN AMERICAN: 50
GLUCOSE BLD-MCNC: 122 MG/DL (ref 70–99)
GLUCOSE BLD-MCNC: 192 MG/DL (ref 70–99)
HCT VFR BLD CALC: 30.7 % (ref 36–48)
HEMOGLOBIN: 8.9 G/DL (ref 12–16)
LYMPHOCYTES ABSOLUTE: 0.8 K/UL (ref 1–5.1)
LYMPHOCYTES RELATIVE PERCENT: 14.5 %
MCH RBC QN AUTO: 22.4 PG (ref 26–34)
MCHC RBC AUTO-ENTMCNC: 28.9 G/DL (ref 31–36)
MCV RBC AUTO: 77.5 FL (ref 80–100)
MONOCYTES ABSOLUTE: 0.4 K/UL (ref 0–1.3)
MONOCYTES RELATIVE PERCENT: 7.2 %
NEUTROPHILS ABSOLUTE: 3.8 K/UL (ref 1.7–7.7)
NEUTROPHILS RELATIVE PERCENT: 72.5 %
PDW BLD-RTO: 39.4 % (ref 12.4–15.4)
PERFORMED ON: ABNORMAL
PLATELET # BLD: 209 K/UL (ref 135–450)
PMV BLD AUTO: 8.7 FL (ref 5–10.5)
POTASSIUM SERPL-SCNC: 4.1 MMOL/L (ref 3.5–5.1)
RBC # BLD: 3.96 M/UL (ref 4–5.2)
SODIUM BLD-SCNC: 143 MMOL/L (ref 136–145)
WBC # BLD: 5.3 K/UL (ref 4–11)

## 2018-11-13 PROCEDURE — 6370000000 HC RX 637 (ALT 250 FOR IP): Performed by: SURGERY

## 2018-11-13 PROCEDURE — 97530 THERAPEUTIC ACTIVITIES: CPT

## 2018-11-13 PROCEDURE — APPSS15 APP SPLIT SHARED TIME 0-15 MINUTES: Performed by: PHYSICIAN ASSISTANT

## 2018-11-13 PROCEDURE — 36415 COLL VENOUS BLD VENIPUNCTURE: CPT

## 2018-11-13 PROCEDURE — 6370000000 HC RX 637 (ALT 250 FOR IP): Performed by: INTERNAL MEDICINE

## 2018-11-13 PROCEDURE — 85025 COMPLETE CBC W/AUTO DIFF WBC: CPT

## 2018-11-13 PROCEDURE — 80048 BASIC METABOLIC PNL TOTAL CA: CPT

## 2018-11-13 PROCEDURE — 6370000000 HC RX 637 (ALT 250 FOR IP): Performed by: HOSPITALIST

## 2018-11-13 PROCEDURE — 94760 N-INVAS EAR/PLS OXIMETRY 1: CPT

## 2018-11-13 PROCEDURE — APPNB30 APP NON BILLABLE TIME 0-30 MINS: Performed by: PHYSICIAN ASSISTANT

## 2018-11-13 PROCEDURE — 97116 GAIT TRAINING THERAPY: CPT

## 2018-11-13 RX ORDER — FUROSEMIDE 40 MG/1
40 TABLET ORAL 2 TIMES DAILY
Qty: 60 TABLET | Refills: 3 | Status: SHIPPED | OUTPATIENT
Start: 2018-11-13 | End: 2019-01-14 | Stop reason: ALTCHOICE

## 2018-11-13 RX ORDER — HYDROCODONE BITARTRATE AND ACETAMINOPHEN 5; 325 MG/1; MG/1
1 TABLET ORAL EVERY 6 HOURS PRN
Qty: 16 TABLET | Refills: 0 | Status: SHIPPED | OUTPATIENT
Start: 2018-11-13 | End: 2018-11-20 | Stop reason: SDUPTHER

## 2018-11-13 RX ORDER — FUROSEMIDE 40 MG/1
40 TABLET ORAL 2 TIMES DAILY
Status: DISCONTINUED | OUTPATIENT
Start: 2018-11-13 | End: 2018-11-13 | Stop reason: HOSPADM

## 2018-11-13 RX ADMIN — HYDROCODONE BITARTRATE AND ACETAMINOPHEN 2 TABLET: 5; 325 TABLET ORAL at 09:21

## 2018-11-13 RX ADMIN — INSULIN GLARGINE 20 UNITS: 100 INJECTION, SOLUTION SUBCUTANEOUS at 09:19

## 2018-11-13 RX ADMIN — INSULIN LISPRO 18 UNITS: 100 INJECTION, SOLUTION INTRAVENOUS; SUBCUTANEOUS at 09:19

## 2018-11-13 RX ADMIN — ATORVASTATIN CALCIUM 20 MG: 20 TABLET, FILM COATED ORAL at 09:21

## 2018-11-13 RX ADMIN — FUROSEMIDE 40 MG: 40 TABLET ORAL at 09:21

## 2018-11-13 RX ADMIN — HYDROCODONE BITARTRATE AND ACETAMINOPHEN 1 TABLET: 5; 325 TABLET ORAL at 03:48

## 2018-11-13 ASSESSMENT — PAIN SCALES - GENERAL
PAINLEVEL_OUTOF10: 7
PAINLEVEL_OUTOF10: 0
PAINLEVEL_OUTOF10: 4
PAINLEVEL_OUTOF10: 4
PAINLEVEL_OUTOF10: 3

## 2018-11-13 ASSESSMENT — PAIN DESCRIPTION - ORIENTATION: ORIENTATION: RIGHT

## 2018-11-13 ASSESSMENT — PAIN DESCRIPTION - LOCATION: LOCATION: HAND;ABDOMEN

## 2018-11-13 ASSESSMENT — PAIN DESCRIPTION - PAIN TYPE: TYPE: ACUTE PAIN;SURGICAL PAIN

## 2018-11-13 NOTE — PROGRESS NOTES
BACTERIA, COMU, HYALCAST, WBCUA, RBCUA, EPIU in the last 72 hours. Urine Culture: No results for input(s): LABURIN in the last 72 hours. Urine Chemistry:   No results for input(s): Fiorella Alu, PROTEINUR, NAUR in the last 72 hours. IMAGING:  US NON OB TRANSVAGINAL   Final Result   2.8 cm heterogeneous mass does correlate with the findings of the recent CT   and MRI. This could represent a submucosal fibroid. However, a malignant   endometrial mass cannot be excluded. US PELVIS COMPLETE   Final Result   2.8 cm heterogeneous mass does correlate with the findings of the recent CT   and MRI. This could represent a submucosal fibroid. However, a malignant   endometrial mass cannot be excluded. MRI ABDOMEN W WO CONTRAST   Final Result   1. Enhancing mass in the ascending colon, likely corresponding with the   biopsy-proven malignancy. 2. No findings of metastatic disease in the abdomen. 3. Heterogeneous enhancement of the liver during the arterial phase and less   prominently during the portal venous phase may be related to underlying   steatosis but could also be due to congestive hepatopathy given mild   cardiomegaly. No superimposed cirrhosis. 4. 3.4 cm x 2.5 cm lesion located centrally in the incompletely evaluated   uterus, corresponding with a hyperdense lesion on CT. Considerations include   fibroid or an endometrial neoplasm such as polyp or carcinoma. Recommend   further evaluation with sonography. 5. 0.7 cm x 0.6 cm simple appearing cystic lesion near the junction of the   pancreatic body and tail, most likely a branch duct intraductal papillary   mucinous neoplasm. Recommend follow-up with pancreas protocol MRI or CT in 2   years as below.       RECOMMENDATIONS:   Managing Incidental Pancreatic Cysts      <80 years at presentation, 0.5 cm to <1.5 cm      65-79 years at presentation:  Pancreas protocol MRI (or CT) in 2 years      Abisai et al.  Management of

## 2018-11-13 NOTE — PROGRESS NOTES
Physical Therapy    Facility/Department: [address] MED SURG  Daily Note  If pt. is D/C'd prior to next visit please refer back to last daily progress note for D/C status. NAME: Jere Jane  : 1955  MRN: [id number]    Date of Service: 2018    Discharge Recommendations:  Home with assist PRN   PT Equipment Recommendations  Equipment Needed: Yes  Mobility Devices: Arlice King: Rolling  Other: Radha Tanner, from [facility] called and will get pt a walker prior to d/c; nurse, America Alcantara will get a walker order on the chart    Patient Diagnosis(es): There were no encounter diagnoses. has a past medical history of Diabetes ([facility] Diabetes ([facility] [address] [facility] Diabetes with retinopathy ([facility] [address] HLD (hyperlipidemia); Hypertension; and Positive FIT (fecal immunochemical test). has a past surgical history that includes Upper gastrointestinal endoscopy (N/A, 2018); Colonoscopy (N/A, 2018); hemicolectomy (Right, 2018); and pr office/outpt visit,procedure only (Right, 2018). Restrictions  Restrictions/Precautions  Restrictions/Precautions: Fall Risk  Position Activity Restriction  Other position/activity restrictions: small abdominal incision    Subjective  General  Chart Reviewed: Yes  Patient assessed for rehabilitation services?: Yes  Additional Pertinent Hx: Jere Jane is a 70 y.o. F directly admitted by her nephrologist 18 with edema, SOB and weight gain -- also found to be significantly anemic and received 4 units of PRBC on -3 --  underwent EGD and colonoscopy revealing Multiple gastric erosions and a large 6 cm fungating mass in the distal ascending colon --  underwent laparoscopic right hemicolectomy. Response To Previous Treatment: Patient reporting fatigue but able to participate. Family / Caregiver Present: No  Referring Practitioner: Kyung Phalen, MD  Subjective  Subjective:  The pt was found to be up in her chair and needing slight encouragement to tile  Device: Rolling Walker  Assistance: Supervision  Quality of Gait: tends to ambulate slightly behind walker base, steady, symmetric short steps, normal gait speed, no LOB  Distance: 130'  Comments: no c/o of SOB with walking  Stairs/Curb  Stairs?: No (pt has an elevator at home)     Balance  Sitting - Static: Good  Standing - Static: Good  Standing - Dynamic: Good (with walker)        Assessment   Assessment: Chastity Burnham is a 70 y.o. F directly admitted by her nephrologist 11/01/18 with edema, SOB and weight gain -- also found to be significantly anemic and received 4 units of PRBC on Nov 2-3 -- Nov 5 underwent EGD and colonoscopy revealing Multiple gastric erosions and a large 6 cm fungating mass in the distal ascending colon -- 11/8 underwent laparoscopic right hemicolectomy. Prior to admit pt was living in a loft apartment with her spouse and was ambulating independently with no device. Today she ambulated 80' with a walker and supervision; the pt had no c/o of SOB today. She con't to function slightly below her baseline but has good support at home from her spouse. I anticipate she will be able to return home with prn sup/assist from her spouse and a rolling walker(will obtain from [address] [place] [address] prior to d/c). Will con't to follow if not d/c today. Treatment Diagnosis: Decreased activity tolerance, abnormal gait  Prognosis: Good  Patient Education: Role of PT, log roll for bed mobility, use of talk test, pursed lip breathing, use of interval training to improve activity abraham. She verb understanding.   Barriers to Learning: none  REQUIRES PT FOLLOW UP: Yes  Activity Tolerance  Activity Tolerance: Patient Tolerated treatment well         Plan   Plan  Times per week: 2-3x/wk while in the hospital  Current Treatment Recommendations: Functional Mobility Training  Safety Devices  Type of devices: Patient at risk for falls, Nurse notified, Call light within reach, Left in chair (MARGIE Palacios notified; the pt reports

## 2018-11-13 NOTE — DISCHARGE SUMMARY
Hospital Medicine Discharge Summary    Patient: Ashlee Clemente     Gender: female  : 1955   Age: 70 y.o. MRN: [id number]    Code Status: Full Code     Primary Care Provider: Jacky Mcardle, MD    Admit Date: 2018   Discharge Date:   2018    Admitting Physician: No admitting provider for patient encounter. Discharge Physician: Nazia Lewis DO     Discharge Diagnoses: Active Hospital Problems    Diagnosis Date Noted    Umbilical hernia without obstruction and without gangrene [K42.9]     Malignant neoplasm of ascending colon ([address] [C18.2]     Volume overload [E87.70] 2018       Hospital Course: A 69 yo female admitted from nephrology office with SOB for diuresis, found to have severe anemia with hg b of 4.3. GI was consulted and venofer was started. She had an  EGD/Colonoscopy which revealed an ascending colon mass. Surgery was consulted and a laparoscopic Rt hemicolectomy, with umbilical hernia repair, was completed. The pathology came back for adenocarcinoma.      Colon Mass - R colon adenoCa    GI, oncology, surgery following   Sp R hemicolectomy on   MRI liver - no evidence of mets  Thus far CT A/P wo evidence of mets  Will fu oncology recs - there is no benefit for adjuvant chemo for her. She will be monitored regularly by oncology and GI/colonoscopy   Diet advancement per surgeon, tolerating general diet  Initiation of AC per surgical team      Dyspnea of exertion and shortness of breath -resolved  Secondary to severe anemia  CXR with mild pulmonary vascular congestion.  No evidence of acute pulmonary edema   Lasix IV infusion stopped  Normal echocardiogram      Anemia, Fe def  Sp IV Fe infusion x 3 doses   Sp pRBC transfusion with appropriate response  HGB stable     JOB on CKD - resolved  - Cr at baseline   Lasix drip on hold, possibly restart PO lasix per nephrology  Renal following  Monitor I/O's     Type 2 diabetes mellitus E11.9  BG high but not taking

## 2018-11-13 NOTE — CARE COORDINATION
[facility] delivered requested 2-Wheeled [place] to patient and reviewed insurance coverage and equipment set up with patient and spouse. Notified SW & RN.     Thank you for the referral.  Electronically signed by Luther Cason on 11/13/2018 at 12:28 PM Cell ph# [phone]

## 2018-11-13 NOTE — PLAN OF CARE
Problem: Safety:  Goal: Free from accidental physical injury  Free from accidental physical injury   Outcome: Ongoing      Problem: Daily Care:  Goal: Daily care needs are met  Daily care needs are met   Outcome: Ongoing      Problem: Fluid Volume:  Goal: Hemodynamic stability will improve  Hemodynamic stability will improve   Outcome: Ongoing

## 2018-11-16 ENCOUNTER — TELEPHONE (OUTPATIENT)
Dept: SURGERY | Age: 63
End: 2018-11-16

## 2018-11-19 ENCOUNTER — PATIENT MESSAGE (OUTPATIENT)
Dept: FAMILY MEDICINE CLINIC | Age: 63
End: 2018-11-19

## 2018-11-19 ENCOUNTER — OFFICE VISIT (OUTPATIENT)
Dept: FAMILY MEDICINE CLINIC | Age: 63
End: 2018-11-19
Payer: MEDICARE

## 2018-11-19 VITALS
HEART RATE: 77 BPM | DIASTOLIC BLOOD PRESSURE: 88 MMHG | HEIGHT: 63 IN | SYSTOLIC BLOOD PRESSURE: 152 MMHG | BODY MASS INDEX: 46.25 KG/M2 | WEIGHT: 261 LBS

## 2018-11-19 DIAGNOSIS — I27.20 PULMONARY HYPERTENSION (HCC): ICD-10-CM

## 2018-11-19 DIAGNOSIS — Z09 HOSPITAL DISCHARGE FOLLOW-UP: Primary | ICD-10-CM

## 2018-11-19 DIAGNOSIS — E78.00 PURE HYPERCHOLESTEROLEMIA: ICD-10-CM

## 2018-11-19 DIAGNOSIS — C18.2 MALIGNANT NEOPLASM OF ASCENDING COLON (HCC): ICD-10-CM

## 2018-11-19 DIAGNOSIS — D50.0 IRON DEFICIENCY ANEMIA DUE TO CHRONIC BLOOD LOSS: ICD-10-CM

## 2018-11-19 DIAGNOSIS — G47.33 OSA (OBSTRUCTIVE SLEEP APNEA): ICD-10-CM

## 2018-11-19 DIAGNOSIS — N85.8 UTERINE MASS: ICD-10-CM

## 2018-11-19 DIAGNOSIS — I10 ESSENTIAL HYPERTENSION: ICD-10-CM

## 2018-11-19 DIAGNOSIS — K86.2 PANCREATIC CYST: ICD-10-CM

## 2018-11-19 DIAGNOSIS — N18.30 CKD (CHRONIC KIDNEY DISEASE) STAGE 3, GFR 30-59 ML/MIN (HCC): ICD-10-CM

## 2018-11-19 DIAGNOSIS — E11.319 TYPE 2 DIABETES MELLITUS WITH RETINOPATHY, WITHOUT LONG-TERM CURRENT USE OF INSULIN, MACULAR EDEMA PRESENCE UNSPECIFIED, UNSPECIFIED LATERALITY, UNSPECIFIED RETINOPATHY SEVERITY (HCC): ICD-10-CM

## 2018-11-19 LAB
ANION GAP SERPL CALCULATED.3IONS-SCNC: 16 MMOL/L (ref 3–16)
BASOPHILS ABSOLUTE: 0 K/UL (ref 0–0.2)
BASOPHILS RELATIVE PERCENT: 0.8 %
BUN BLDV-MCNC: 13 MG/DL (ref 7–20)
CALCIUM SERPL-MCNC: 9.4 MG/DL (ref 8.3–10.6)
CHLORIDE BLD-SCNC: 102 MMOL/L (ref 99–110)
CO2: 28 MMOL/L (ref 21–32)
CREAT SERPL-MCNC: 1.1 MG/DL (ref 0.6–1.2)
EOSINOPHILS ABSOLUTE: 0.1 K/UL (ref 0–0.6)
EOSINOPHILS RELATIVE PERCENT: 1.5 %
GFR AFRICAN AMERICAN: >60
GFR NON-AFRICAN AMERICAN: 50
GLUCOSE BLD-MCNC: 80 MG/DL (ref 70–99)
HCT VFR BLD CALC: 33.7 % (ref 36–48)
HEMATOLOGY PATH CONSULT: NO
HEMOGLOBIN: 10.1 G/DL (ref 12–16)
LYMPHOCYTES ABSOLUTE: 1.2 K/UL (ref 1–5.1)
LYMPHOCYTES RELATIVE PERCENT: 22.2 %
MCH RBC QN AUTO: 23.3 PG (ref 26–34)
MCHC RBC AUTO-ENTMCNC: 29.9 G/DL (ref 31–36)
MCV RBC AUTO: 78.1 FL (ref 80–100)
MONOCYTES ABSOLUTE: 0.5 K/UL (ref 0–1.3)
MONOCYTES RELATIVE PERCENT: 9.6 %
NEUTROPHILS ABSOLUTE: 3.6 K/UL (ref 1.7–7.7)
NEUTROPHILS RELATIVE PERCENT: 65.9 %
PDW BLD-RTO: 36.1 % (ref 12.4–15.4)
PLATELET # BLD: 343 K/UL (ref 135–450)
PMV BLD AUTO: 8.9 FL (ref 5–10.5)
POTASSIUM SERPL-SCNC: 3.7 MMOL/L (ref 3.5–5.1)
RBC # BLD: 4.31 M/UL (ref 4–5.2)
SODIUM BLD-SCNC: 146 MMOL/L (ref 136–145)
WBC # BLD: 5.5 K/UL (ref 4–11)

## 2018-11-19 PROCEDURE — 99496 TRANSJ CARE MGMT HIGH F2F 7D: CPT | Performed by: FAMILY MEDICINE

## 2018-11-19 PROCEDURE — 1111F DSCHRG MED/CURRENT MED MERGE: CPT | Performed by: FAMILY MEDICINE

## 2018-11-19 PROCEDURE — 36415 COLL VENOUS BLD VENIPUNCTURE: CPT | Performed by: FAMILY MEDICINE

## 2018-11-19 NOTE — PROGRESS NOTES
Post-Discharge Transitional Care Management Services or Hospital Follow Up      Yariel Clark   YOB: 1955    Date of Office Visit:  11/19/2018  Date of Hospital Admission: 11/1/18  Date of Hospital Discharge: 11/13/18  Readmission Risk Score(high >=14%. Medium >=10%):Readmission Risk Score: 12    Care management risk score Rising risk (score 2-5) and Complex Care (Scores >=6): 2     Non face to face  following discharge, date last encounter closed (first attempt may have been earlier): *No documented post hospital discharge outreach found in the last 14 days *No documented post hospital discharge outreach found in the last 14 days    Call initiated 2 business days of discharge: this MD called an discussed with patient on day of discharge, 11/13/18.     Patient Active Problem List   Diagnosis    Hypertension    HLD (hyperlipidemia)    Positive FIT (fecal immunochemical test)    Diabetes with retinopathy (HCC)    Volume overload    Malignant neoplasm of ascending colon (HCC)    Pulmonary hypertension (HCC)    Anemia    CKD (chronic kidney disease) stage 3, GFR 30-59 ml/min (HCC)    Uterine mass       Allergies   Allergen Reactions    Pcn [Penicillins] Shortness Of Breath    Spironolactone Rash    Coreg [Carvedilol]      Side effects:  SOB, palpitations    Lisinopril Other (See Comments)     cough       Medications listed as ordered at the time of discharge from [facility] Medication Instructions JENNIFER:    Printed on:11/19/18 0204   Medication Information                      aspirin EC 81 MG EC tablet  Take 1 tablet by mouth daily             atorvastatin (LIPITOR) 20 MG tablet  TAKE 1 TABLET BY MOUTH ONE TIME A DAY              cetirizine (ZYRTEC ALLERGY) 10 MG tablet  Take 10 mg by mouth daily             furosemide (LASIX) 40 MG tablet  Take 1 tablet by mouth 2 times daily             glipiZIDE (GLUCOTROL) 5 MG tablet  Take 1 tablet by mouth 2 times daily right hemicolectomy on 11/8/18 after CT scan of chest, abdomen, and pelvis all showed patient was negative for signs of metastasis. During this surgery, mesh was placed to repair an umbilical hernia as well. Lymph nodes taken during colon surgery also were negative for signs of metastasis. As determined she would not need adjunctive chemotherapy, with follow-up with Dr. Kathrine Ohara outpatient for surveillance. During evaluation to rule out colon cancer metastasis, MRI of abdomen was completed and did show a 0.7 x 0.6 cm pancreatic cyst that requires follow-up by MRI in 2 years. Patient also incidentally was found to have a 2.8 cm mass in her uterus, transvaginal ultrasound states likely fibroid but cannot rule out malignant endometrial mass. Of note, during hospitalization, IV Lasix was able to remove much fluid from patient, allowing her to get off all blood pressure medicines. She did have an echocardiogram on 10/17/18 prior to hospitalization that showed signs of increased right atrial pressure with dilated IVC with 50% collapse with respiration, and during hospitalization CAT scan of chest showed enlargement of the main pulmonary artery that was consistent with pulmonary hypertension. Patient has had a Lexiscan stress test on 1/19/18 that was normal did not show signs of CAD. Interval history/Current status: While patient is still very sore in her abdomen from the surgery, this is the best she's felt in a while. She no longer is having much dyspnea on exertion. She does still have some lower extremity edema, but feels that it has improved with time. She is weighing herself daily, and her daily weights continued to decrease. She is having some diarrhea since her colon surgery, end-stage GI doctor told her it would take a little while for her colon to adjust from some of that missing. Overall she is taking it easy, and is feeling better each day.     She is surprised that today her blood

## 2018-11-21 RX ORDER — HYDROCODONE BITARTRATE AND ACETAMINOPHEN 5; 325 MG/1; MG/1
1 TABLET ORAL EVERY 8 HOURS PRN
Qty: 30 TABLET | Refills: 0 | Status: SHIPPED | OUTPATIENT
Start: 2018-11-21 | End: 2019-04-26 | Stop reason: SDUPTHER

## 2018-11-24 ENCOUNTER — PATIENT MESSAGE (OUTPATIENT)
Dept: FAMILY MEDICINE CLINIC | Age: 63
End: 2018-11-24

## 2018-11-26 ENCOUNTER — OFFICE VISIT (OUTPATIENT)
Dept: SLEEP MEDICINE | Age: 63
End: 2018-11-26
Payer: MEDICARE

## 2018-11-26 ENCOUNTER — OFFICE VISIT (OUTPATIENT)
Dept: SURGERY | Age: 63
End: 2018-11-26

## 2018-11-26 VITALS
BODY MASS INDEX: 46.21 KG/M2 | HEIGHT: 63 IN | HEART RATE: 88 BPM | OXYGEN SATURATION: 95 % | DIASTOLIC BLOOD PRESSURE: 80 MMHG | RESPIRATION RATE: 18 BRPM | WEIGHT: 260.8 LBS | SYSTOLIC BLOOD PRESSURE: 140 MMHG | TEMPERATURE: 98.3 F

## 2018-11-26 VITALS
SYSTOLIC BLOOD PRESSURE: 160 MMHG | BODY MASS INDEX: 46.06 KG/M2 | DIASTOLIC BLOOD PRESSURE: 80 MMHG | WEIGHT: 260 LBS | HEART RATE: 92 BPM

## 2018-11-26 DIAGNOSIS — E66.01 CLASS 3 SEVERE OBESITY DUE TO EXCESS CALORIES WITHOUT SERIOUS COMORBIDITY WITH BODY MASS INDEX (BMI) OF 45.0 TO 49.9 IN ADULT (HCC): ICD-10-CM

## 2018-11-26 DIAGNOSIS — C18.2 MALIGNANT NEOPLASM OF ASCENDING COLON (HCC): Primary | ICD-10-CM

## 2018-11-26 DIAGNOSIS — G47.33 OSA (OBSTRUCTIVE SLEEP APNEA): Primary | ICD-10-CM

## 2018-11-26 DIAGNOSIS — I10 ESSENTIAL HYPERTENSION: ICD-10-CM

## 2018-11-26 PROCEDURE — 99204 OFFICE O/P NEW MOD 45 MIN: CPT | Performed by: PSYCHIATRY & NEUROLOGY

## 2018-11-26 PROCEDURE — 1101F PT FALLS ASSESS-DOCD LE1/YR: CPT | Performed by: PSYCHIATRY & NEUROLOGY

## 2018-11-26 PROCEDURE — 1090F PRES/ABSN URINE INCON ASSESS: CPT | Performed by: PSYCHIATRY & NEUROLOGY

## 2018-11-26 PROCEDURE — G8417 CALC BMI ABV UP PARAM F/U: HCPCS | Performed by: PSYCHIATRY & NEUROLOGY

## 2018-11-26 PROCEDURE — G8482 FLU IMMUNIZE ORDER/ADMIN: HCPCS | Performed by: PSYCHIATRY & NEUROLOGY

## 2018-11-26 PROCEDURE — G8400 PT W/DXA NO RESULTS DOC: HCPCS | Performed by: PSYCHIATRY & NEUROLOGY

## 2018-11-26 PROCEDURE — 1111F DSCHRG MED/CURRENT MED MERGE: CPT | Performed by: PSYCHIATRY & NEUROLOGY

## 2018-11-26 PROCEDURE — 4040F PNEUMOC VAC/ADMIN/RCVD: CPT | Performed by: PSYCHIATRY & NEUROLOGY

## 2018-11-26 PROCEDURE — 3017F COLORECTAL CA SCREEN DOC REV: CPT | Performed by: PSYCHIATRY & NEUROLOGY

## 2018-11-26 PROCEDURE — 1123F ACP DISCUSS/DSCN MKR DOCD: CPT | Performed by: PSYCHIATRY & NEUROLOGY

## 2018-11-26 PROCEDURE — 1036F TOBACCO NON-USER: CPT | Performed by: PSYCHIATRY & NEUROLOGY

## 2018-11-26 PROCEDURE — G8427 DOCREV CUR MEDS BY ELIG CLIN: HCPCS | Performed by: PSYCHIATRY & NEUROLOGY

## 2018-11-26 PROCEDURE — 99024 POSTOP FOLLOW-UP VISIT: CPT | Performed by: SURGERY

## 2018-11-26 RX ORDER — POTASSIUM CHLORIDE 1500 MG/1
20 TABLET, FILM COATED, EXTENDED RELEASE ORAL DAILY
Qty: 90 TABLET | Refills: 1 | Status: SHIPPED | OUTPATIENT
Start: 2018-11-26 | End: 2018-11-26 | Stop reason: SDUPTHER

## 2018-11-26 ASSESSMENT — SLEEP AND FATIGUE QUESTIONNAIRES
HOW LIKELY ARE YOU TO NOD OFF OR FALL ASLEEP WHILE LYING DOWN TO REST IN THE AFTERNOON WHEN CIRCUMSTANCES PERMIT: 3
HOW LIKELY ARE YOU TO NOD OFF OR FALL ASLEEP WHILE SITTING AND TALKING TO SOMEONE: 0
HOW LIKELY ARE YOU TO NOD OFF OR FALL ASLEEP WHILE SITTING INACTIVE IN A PUBLIC PLACE: 0
HOW LIKELY ARE YOU TO NOD OFF OR FALL ASLEEP WHEN YOU ARE A PASSENGER IN A CAR FOR AN HOUR WITHOUT A BREAK: 0
ESS TOTAL SCORE: 3
NECK CIRCUMFERENCE (INCHES): 15.5
HOW LIKELY ARE YOU TO NOD OFF OR FALL ASLEEP WHILE WATCHING TV: 0
HOW LIKELY ARE YOU TO NOD OFF OR FALL ASLEEP WHILE SITTING AND READING: 0
HOW LIKELY ARE YOU TO NOD OFF OR FALL ASLEEP WHILE SITTING QUIETLY AFTER LUNCH WITHOUT ALCOHOL: 0
HOW LIKELY ARE YOU TO NOD OFF OR FALL ASLEEP IN A CAR, WHILE STOPPED FOR A FEW MINUTES IN TRAFFIC: 0

## 2018-11-26 ASSESSMENT — ENCOUNTER SYMPTOMS
CHOKING: 1
SHORTNESS OF BREATH: 1
GASTROINTESTINAL NEGATIVE: 1
APNEA: 1
EYES NEGATIVE: 1
ALLERGIC/IMMUNOLOGIC NEGATIVE: 1

## 2018-11-26 NOTE — PROGRESS NOTES
more than 36 is indicativeof daytime fatigue). The patient takes 1-2 naps/day for 60 minutes and usually is not refreshing nap since she had colon resection surgery few weeks ago. Tracie Bunn HTN is up and down  Previous evaluation and treatment has included- none. Insomnia with sleep maintaining, usually takesthe patient 15 minutes to fall in sleep, wakes up one  time from 30 to 60 minutes eachtime to fall back in sleep, usually stays in bed trying to fall in sleep and works on her PC, this might happened 7 nights/week in the last 7 months. He has been obese for many years and tried, has gained 30 pounds in the last 5 years. unsuccessfully to lose weight through diet, exercise. DOT/CDL - N/A  SYLVESTER/'peter - N/A      Previous Report(s) Reviewed: historical medical records         Social History     Social History    Marital status:      Spouse name: N/A    Number of children: N/A    Years of education: N/A     Occupational History    Not on file. Social History Main Topics    Smoking status: Never Smoker    Smokeless tobacco: Never Used    Alcohol use 1.2 oz/week     2 Glasses of wine per week      Comment: only on occasional    Drug use: No    Sexual activity: Yes     Partners: Female     Other Topics Concern    Not on file     Social History Narrative    No narrative on file       Prior to Admission medications    Medication Sig Start Date End Date Taking? Authorizing Provider   potassium chloride (KLOR-CON M) 20 MEQ TBCR extended release tablet Take 1 tablet by mouth daily Take one tablet po daily 11/26/18  Yes Weston Mancuso MD   HYDROcodone-acetaminophen (NORCO) 5-325 MG per tablet Take 1 tablet by mouth every 8 hours as needed for Pain for up to 30 days. . 11/21/18 12/21/18 Yes Cruz Douglas MD   furosemide (LASIX) 40 MG tablet Take 1 tablet by mouth 2 times daily 11/13/18  Yes Fely Carver DO   cetirizine (ZYRTEC ALLERGY) 10 MG tablet Take 10 mg by mouth daily   Yes Adaline Fothergill, MD at [facility] N/A 11/5/2018    EGD BIOPSY performed by Mckenzie Queen MD at [address] [facility] History   Problem Relation Age of Onset    Lung Cancer Mother     High Blood Pressure Father     Lung Cancer Father     Lupus Sister        Review of Systems   Constitutional: Positive for fatigue. HENT: Negative. Eyes: Negative. Respiratory: Positive for apnea, choking and shortness of breath. Cardiovascular: Positive for leg swelling. Gastrointestinal: Negative. Endocrine: Negative. Genitourinary: Positive for frequency. Musculoskeletal: Negative. Allergic/Immunologic: Negative. Neurological: Negative for headaches. Hematological: Negative. Psychiatric/Behavioral: Positive for sleep disturbance. Objective:     Vitals:  Weight BMI Neck circumference    Wt Readings from Last 3 Encounters:   11/26/18 260 lb 12.8 oz (118.3 kg)   11/19/18 261 lb (118.4 kg)   11/13/18 270 lb 15.1 oz (122.9 kg)    Body mass index is 46.2 kg/m². Neck circumference: 15.5     BP HR SaO2   BP Readings from Last 3 Encounters:   11/26/18 (!) 140/80   11/19/18 (!) 152/88   11/13/18 (!) 150/61    Pulse Readings from Last 3 Encounters:   11/26/18 88   11/19/18 77   11/13/18 79    SpO2 Readings from Last 3 Encounters:   11/26/18 95%   11/13/18 99%   11/08/18 97%        The mandibular molar Class :   [x]1 []2 []3      Mallampati I Airway Classification:   []1 []2 [x]3 []4        Physical Exam   Constitutional: No distress. HENT:   Mallampati class 3, no retrognathia or hypognathia , normal airflow in bilateral nostrils, no septum deviation , crowded oropharynx with low soft palate, high arched hard palate,no tonsils enlargement. Eyes: EOM are normal.   Neck: Neck supple. Cardiovascular: Normal rate, regular rhythm and normal heart sounds. Pulmonary/Chest: No respiratory distress. Musculoskeletal: She exhibits edema (2+ LE).    Neurological: She is alert. Skin: Skin is warm. Psychiatric: She has a normal mood and affect. Nursing note and vitals reviewed. Assessment:    Obstructive sleep apnea especially with snoring, snorting,  observed apnea, daytime sleepiness, large neck circumference, Mallampati class of 3 and obesity. Diagnosis Orders   1. BRIANNA (obstructive sleep apnea)  Sleep Study with PAP Titration   2. Essential hypertension  Sleep Study with PAP Titration   3. Class 3 severe obesity due to excess calories without serious comorbidity with body mass index (BMI) of 45.0 to 49.9 in adult [facility])       Plan:     Patient was counseled about the pathophysiology of obstructive sleep apnea syndrome and the methods for evaluating its presence and severity. Patient was counseled to avoid driving and other potentially hazardous circumstances if the patient is experiencing excessive sleepiness. Treatment considerations include the use of nasal CPAP, oral dental appliance or a surgical intervention, which should be based on otolarygologic findings, In the meantime, the patient should be cautioned to avoid the use of alcohol or other depressant medications because of potential for increasing the duration and severity of apnea and cautioned regarding driving or operating and dangerous equipment if the patient is experiencing daytime sleepiness. .  Most likely treating the BRIANNA will have position impact on HTN control. We discussed the proportionality between weight and AHI. With 10% weight change, the AHI has a 27% proportionate change. With 20% weight change, the AHI has a 45-50% proportionate change. Orders Placed This Encounter   Procedures    Sleep Study with PAP Titration       Return in about 3 months (around 2/26/2019) for to review the PSG and CPAP usage, Reveiwing CPAP usage and compliance report and tro.     Rohan Mari MD  Medical Director - [facility]

## 2018-11-26 NOTE — Clinical Note
Quintin Collazo,  I just saw Ms. Arnulfo Nolasco back in the office after her laparoscopic right hemicolectomy for colon cancer. She is doing well and incisions are healing appropriately. I am going to have her follow up with me in 4 weeks. Thank you for allowing me to take care of Ms. Ortiz with you.   Mitzy Lam

## 2018-12-24 DIAGNOSIS — E11.9 TYPE 2 DIABETES MELLITUS WITHOUT COMPLICATION, WITHOUT LONG-TERM CURRENT USE OF INSULIN (HCC): ICD-10-CM

## 2018-12-24 RX ORDER — GLIPIZIDE 5 MG/1
5 TABLET ORAL
Qty: 180 TABLET | Refills: 0 | Status: SHIPPED | OUTPATIENT
Start: 2018-12-24 | End: 2019-03-16 | Stop reason: SDUPTHER

## 2018-12-31 ENCOUNTER — OFFICE VISIT (OUTPATIENT)
Dept: SURGERY | Age: 63
End: 2018-12-31

## 2018-12-31 VITALS
WEIGHT: 252 LBS | SYSTOLIC BLOOD PRESSURE: 177 MMHG | HEART RATE: 109 BPM | BODY MASS INDEX: 44.64 KG/M2 | DIASTOLIC BLOOD PRESSURE: 98 MMHG

## 2018-12-31 DIAGNOSIS — C18.2 MALIGNANT NEOPLASM OF ASCENDING COLON (HCC): Primary | ICD-10-CM

## 2018-12-31 PROCEDURE — 99024 POSTOP FOLLOW-UP VISIT: CPT | Performed by: SURGERY

## 2019-01-14 ENCOUNTER — OFFICE VISIT (OUTPATIENT)
Dept: CARDIOLOGY CLINIC | Age: 64
End: 2019-01-14
Payer: MEDICARE

## 2019-01-14 VITALS
HEART RATE: 103 BPM | SYSTOLIC BLOOD PRESSURE: 172 MMHG | HEIGHT: 63 IN | OXYGEN SATURATION: 93 % | WEIGHT: 271 LBS | DIASTOLIC BLOOD PRESSURE: 90 MMHG | BODY MASS INDEX: 48.02 KG/M2

## 2019-01-14 DIAGNOSIS — E66.01 MORBID OBESITY (HCC): ICD-10-CM

## 2019-01-14 DIAGNOSIS — I87.2 VENOUS INSUFFICIENCY: ICD-10-CM

## 2019-01-14 DIAGNOSIS — D50.0 IRON DEFICIENCY ANEMIA DUE TO CHRONIC BLOOD LOSS: ICD-10-CM

## 2019-01-14 DIAGNOSIS — I10 ESSENTIAL HYPERTENSION: ICD-10-CM

## 2019-01-14 DIAGNOSIS — R60.0 LOWER EXTREMITY EDEMA: Primary | ICD-10-CM

## 2019-01-14 DIAGNOSIS — N18.2 STAGE 2 CHRONIC KIDNEY DISEASE: ICD-10-CM

## 2019-01-14 PROCEDURE — 1101F PT FALLS ASSESS-DOCD LE1/YR: CPT | Performed by: INTERNAL MEDICINE

## 2019-01-14 PROCEDURE — G8417 CALC BMI ABV UP PARAM F/U: HCPCS | Performed by: INTERNAL MEDICINE

## 2019-01-14 PROCEDURE — G8482 FLU IMMUNIZE ORDER/ADMIN: HCPCS | Performed by: INTERNAL MEDICINE

## 2019-01-14 PROCEDURE — 99204 OFFICE O/P NEW MOD 45 MIN: CPT | Performed by: INTERNAL MEDICINE

## 2019-01-14 PROCEDURE — 1090F PRES/ABSN URINE INCON ASSESS: CPT | Performed by: INTERNAL MEDICINE

## 2019-01-14 PROCEDURE — G8428 CUR MEDS NOT DOCUMENT: HCPCS | Performed by: INTERNAL MEDICINE

## 2019-01-14 PROCEDURE — 3017F COLORECTAL CA SCREEN DOC REV: CPT | Performed by: INTERNAL MEDICINE

## 2019-01-14 PROCEDURE — 93000 ELECTROCARDIOGRAM COMPLETE: CPT | Performed by: INTERNAL MEDICINE

## 2019-01-14 RX ORDER — TORSEMIDE 20 MG/1
20 TABLET ORAL 2 TIMES DAILY
Qty: 30 TABLET | Refills: 3 | Status: SHIPPED | OUTPATIENT
Start: 2019-01-14 | End: 2019-01-21 | Stop reason: SDUPTHER

## 2019-01-14 RX ORDER — METOLAZONE 2.5 MG/1
2.5 TABLET ORAL
Qty: 30 TABLET | Refills: 3
Start: 2019-01-14 | End: 2019-01-21 | Stop reason: SDUPTHER

## 2019-01-21 RX ORDER — TORSEMIDE 20 MG/1
20 TABLET ORAL 2 TIMES DAILY
Qty: 30 TABLET | Refills: 3 | Status: SHIPPED | OUTPATIENT
Start: 2019-01-21 | End: 2019-02-11 | Stop reason: SDUPTHER

## 2019-01-21 RX ORDER — METOLAZONE 2.5 MG/1
2.5 TABLET ORAL
Qty: 30 TABLET | Refills: 3 | Status: SHIPPED | OUTPATIENT
Start: 2019-01-21 | End: 2019-01-28 | Stop reason: SDUPTHER

## 2019-01-23 ENCOUNTER — TELEPHONE (OUTPATIENT)
Dept: FAMILY MEDICINE CLINIC | Age: 64
End: 2019-01-23

## 2019-01-23 DIAGNOSIS — E66.01 MORBID OBESITY (HCC): ICD-10-CM

## 2019-01-23 DIAGNOSIS — Z13.220 SCREENING FOR HYPERLIPIDEMIA: ICD-10-CM

## 2019-01-23 DIAGNOSIS — E11.319 TYPE 2 DIABETES MELLITUS WITH RETINOPATHY, WITHOUT LONG-TERM CURRENT USE OF INSULIN, MACULAR EDEMA PRESENCE UNSPECIFIED, UNSPECIFIED LATERALITY, UNSPECIFIED RETINOPATHY SEVERITY (HCC): Primary | ICD-10-CM

## 2019-01-28 ENCOUNTER — NURSE ONLY (OUTPATIENT)
Dept: FAMILY MEDICINE CLINIC | Age: 64
End: 2019-01-28
Payer: MEDICARE

## 2019-01-28 DIAGNOSIS — E11.319 TYPE 2 DIABETES MELLITUS WITH RETINOPATHY, WITHOUT LONG-TERM CURRENT USE OF INSULIN, MACULAR EDEMA PRESENCE UNSPECIFIED, UNSPECIFIED LATERALITY, UNSPECIFIED RETINOPATHY SEVERITY (HCC): ICD-10-CM

## 2019-01-28 DIAGNOSIS — Z13.220 SCREENING FOR HYPERLIPIDEMIA: ICD-10-CM

## 2019-01-28 DIAGNOSIS — E66.01 MORBID OBESITY (HCC): ICD-10-CM

## 2019-01-28 LAB
ALBUMIN SERPL-MCNC: 4 G/DL (ref 3.4–5)
ALP BLD-CCNC: 77 U/L (ref 40–129)
ALT SERPL-CCNC: 12 U/L (ref 10–40)
AST SERPL-CCNC: 13 U/L (ref 15–37)
BILIRUB SERPL-MCNC: 0.5 MG/DL (ref 0–1)
BILIRUBIN DIRECT: <0.2 MG/DL (ref 0–0.3)
BILIRUBIN, INDIRECT: ABNORMAL MG/DL (ref 0–1)
CHOLESTEROL, TOTAL: 101 MG/DL (ref 0–199)
HDLC SERPL-MCNC: 34 MG/DL (ref 40–60)
LDL CHOLESTEROL CALCULATED: 33 MG/DL
TOTAL PROTEIN: 7.1 G/DL (ref 6.4–8.2)
TRIGL SERPL-MCNC: 170 MG/DL (ref 0–150)
VLDLC SERPL CALC-MCNC: 34 MG/DL

## 2019-01-28 PROCEDURE — 36415 COLL VENOUS BLD VENIPUNCTURE: CPT | Performed by: FAMILY MEDICINE

## 2019-01-28 RX ORDER — METOLAZONE 2.5 MG/1
2.5 TABLET ORAL
Qty: 8 TABLET | Refills: 5 | Status: SHIPPED | OUTPATIENT
Start: 2019-01-28 | End: 2019-02-14 | Stop reason: ALTCHOICE

## 2019-01-29 LAB
ESTIMATED AVERAGE GLUCOSE: 159.9 MG/DL
HBA1C MFR BLD: 7.2 %

## 2019-01-31 ENCOUNTER — HOSPITAL ENCOUNTER (OUTPATIENT)
Dept: SLEEP CENTER | Age: 64
Discharge: HOME | End: 2019-01-31
Payer: MEDICARE

## 2019-01-31 DIAGNOSIS — I10 ESSENTIAL HYPERTENSION: ICD-10-CM

## 2019-01-31 DIAGNOSIS — G47.33 OSA (OBSTRUCTIVE SLEEP APNEA): ICD-10-CM

## 2019-01-31 PROCEDURE — 99999 PR OFFICE/OUTPT VISIT,PROCEDURE ONLY: CPT | Performed by: PSYCHIATRY & NEUROLOGY

## 2019-01-31 PROCEDURE — 95810 POLYSOM 6/> YRS 4/> PARAM: CPT

## 2019-02-01 DIAGNOSIS — G47.33 OSA (OBSTRUCTIVE SLEEP APNEA): Primary | ICD-10-CM

## 2019-02-01 PROCEDURE — 95810 POLYSOM 6/> YRS 4/> PARAM: CPT

## 2019-02-04 ENCOUNTER — TELEPHONE (OUTPATIENT)
Dept: CARDIOLOGY CLINIC | Age: 64
End: 2019-02-04

## 2019-02-05 ENCOUNTER — TELEPHONE (OUTPATIENT)
Dept: FAMILY MEDICINE CLINIC | Age: 64
End: 2019-02-05

## 2019-02-05 ENCOUNTER — TELEPHONE (OUTPATIENT)
Dept: SLEEP MEDICINE | Age: 64
End: 2019-02-05

## 2019-02-06 DIAGNOSIS — G47.33 OSA (OBSTRUCTIVE SLEEP APNEA): Primary | ICD-10-CM

## 2019-02-09 ENCOUNTER — PATIENT MESSAGE (OUTPATIENT)
Dept: FAMILY MEDICINE CLINIC | Age: 64
End: 2019-02-09

## 2019-02-11 ENCOUNTER — NURSE ONLY (OUTPATIENT)
Dept: FAMILY MEDICINE CLINIC | Age: 64
End: 2019-02-11

## 2019-02-11 ENCOUNTER — TELEPHONE (OUTPATIENT)
Dept: FAMILY MEDICINE CLINIC | Age: 64
End: 2019-02-11

## 2019-02-11 DIAGNOSIS — D50.0 IRON DEFICIENCY ANEMIA DUE TO CHRONIC BLOOD LOSS: ICD-10-CM

## 2019-02-11 DIAGNOSIS — N18.2 STAGE 2 CHRONIC KIDNEY DISEASE: ICD-10-CM

## 2019-02-11 LAB
ANION GAP SERPL CALCULATED.3IONS-SCNC: 15 MMOL/L (ref 3–16)
BUN BLDV-MCNC: 32 MG/DL (ref 7–20)
CALCIUM SERPL-MCNC: 10 MG/DL (ref 8.3–10.6)
CHLORIDE BLD-SCNC: 95 MMOL/L (ref 99–110)
CO2: 31 MMOL/L (ref 21–32)
CREAT SERPL-MCNC: 1.4 MG/DL (ref 0.6–1.2)
GFR AFRICAN AMERICAN: 46
GFR NON-AFRICAN AMERICAN: 38
GLUCOSE BLD-MCNC: 208 MG/DL (ref 70–99)
HCT VFR BLD CALC: 38.7 % (ref 36–48)
HEMOGLOBIN: 12.8 G/DL (ref 12–16)
MCH RBC QN AUTO: 28.5 PG (ref 26–34)
MCHC RBC AUTO-ENTMCNC: 32.9 G/DL (ref 31–36)
MCV RBC AUTO: 86.6 FL (ref 80–100)
PDW BLD-RTO: 15.5 % (ref 12.4–15.4)
PLATELET # BLD: 177 K/UL (ref 135–450)
PMV BLD AUTO: 9.4 FL (ref 5–10.5)
POTASSIUM SERPL-SCNC: 3.4 MMOL/L (ref 3.5–5.1)
RBC # BLD: 4.47 M/UL (ref 4–5.2)
SODIUM BLD-SCNC: 141 MMOL/L (ref 136–145)
WBC # BLD: 8.3 K/UL (ref 4–11)

## 2019-02-11 RX ORDER — POTASSIUM CHLORIDE 1500 MG/1
20 TABLET, FILM COATED, EXTENDED RELEASE ORAL DAILY
Qty: 90 TABLET | Refills: 1 | OUTPATIENT
Start: 2019-02-11

## 2019-02-11 RX ORDER — POTASSIUM CHLORIDE 1500 MG/1
20 TABLET, FILM COATED, EXTENDED RELEASE ORAL DAILY
Qty: 90 TABLET | Refills: 1 | Status: SHIPPED | OUTPATIENT
Start: 2019-02-11 | End: 2019-07-22 | Stop reason: SDUPTHER

## 2019-02-11 RX ORDER — TORSEMIDE 20 MG/1
20 TABLET ORAL 2 TIMES DAILY
Qty: 180 TABLET | Refills: 0 | Status: SHIPPED | OUTPATIENT
Start: 2019-02-11 | End: 2019-02-25 | Stop reason: SDUPTHER

## 2019-02-11 RX ORDER — TORSEMIDE 20 MG/1
20 TABLET ORAL 2 TIMES DAILY
Qty: 90 TABLET | Refills: 0 | Status: SHIPPED | OUTPATIENT
Start: 2019-02-11 | End: 2019-02-11 | Stop reason: SDUPTHER

## 2019-02-14 DIAGNOSIS — N18.30 CKD (CHRONIC KIDNEY DISEASE) STAGE 3, GFR 30-59 ML/MIN (HCC): Primary | ICD-10-CM

## 2019-02-18 ENCOUNTER — TELEPHONE (OUTPATIENT)
Dept: CARDIOLOGY CLINIC | Age: 64
End: 2019-02-18

## 2019-02-21 RX ORDER — METOLAZONE 2.5 MG/1
2.5 TABLET ORAL DAILY
COMMUNITY
End: 2019-03-18 | Stop reason: SDUPTHER

## 2019-02-25 ENCOUNTER — NURSE ONLY (OUTPATIENT)
Dept: FAMILY MEDICINE CLINIC | Age: 64
End: 2019-02-25

## 2019-02-25 LAB
ANION GAP SERPL CALCULATED.3IONS-SCNC: 15 MMOL/L (ref 3–16)
BUN BLDV-MCNC: 22 MG/DL (ref 7–20)
CALCIUM SERPL-MCNC: 9.3 MG/DL (ref 8.3–10.6)
CHLORIDE BLD-SCNC: 98 MMOL/L (ref 99–110)
CO2: 29 MMOL/L (ref 21–32)
CREAT SERPL-MCNC: 1.2 MG/DL (ref 0.6–1.2)
GFR AFRICAN AMERICAN: 54
GFR NON-AFRICAN AMERICAN: 45
GLUCOSE BLD-MCNC: 143 MG/DL (ref 70–99)
POTASSIUM SERPL-SCNC: 4.2 MMOL/L (ref 3.5–5.1)
SODIUM BLD-SCNC: 142 MMOL/L (ref 136–145)

## 2019-02-26 RX ORDER — TORSEMIDE 20 MG/1
20 TABLET ORAL 2 TIMES DAILY
Qty: 180 TABLET | Refills: 0 | Status: SHIPPED | OUTPATIENT
Start: 2019-02-26 | End: 2019-03-16 | Stop reason: SDUPTHER

## 2019-03-13 ENCOUNTER — TELEPHONE (OUTPATIENT)
Dept: FAMILY MEDICINE CLINIC | Age: 64
End: 2019-03-13

## 2019-03-16 DIAGNOSIS — E11.9 TYPE 2 DIABETES MELLITUS WITHOUT COMPLICATION, WITHOUT LONG-TERM CURRENT USE OF INSULIN (HCC): ICD-10-CM

## 2019-03-18 ENCOUNTER — TELEPHONE (OUTPATIENT)
Dept: FAMILY MEDICINE CLINIC | Age: 64
End: 2019-03-18

## 2019-03-18 RX ORDER — GLIPIZIDE 5 MG/1
5 TABLET ORAL
Qty: 180 TABLET | Refills: 0 | Status: SHIPPED | OUTPATIENT
Start: 2019-03-18 | End: 2019-04-26

## 2019-03-18 RX ORDER — METOLAZONE 2.5 MG/1
2.5 TABLET ORAL
Qty: 30 TABLET | Refills: 3 | Status: SHIPPED | OUTPATIENT
Start: 2019-03-18 | End: 2019-04-08 | Stop reason: SDUPTHER

## 2019-03-18 RX ORDER — TORSEMIDE 20 MG/1
20 TABLET ORAL 2 TIMES DAILY
Qty: 180 TABLET | Refills: 0 | Status: SHIPPED | OUTPATIENT
Start: 2019-03-18 | End: 2019-05-10 | Stop reason: SDUPTHER

## 2019-04-08 ENCOUNTER — OFFICE VISIT (OUTPATIENT)
Dept: CARDIOLOGY CLINIC | Age: 64
End: 2019-04-08
Payer: MEDICARE

## 2019-04-08 VITALS
BODY MASS INDEX: 46.95 KG/M2 | HEIGHT: 63 IN | DIASTOLIC BLOOD PRESSURE: 86 MMHG | WEIGHT: 265 LBS | HEART RATE: 90 BPM | OXYGEN SATURATION: 96 % | SYSTOLIC BLOOD PRESSURE: 158 MMHG

## 2019-04-08 DIAGNOSIS — R53.83 FATIGUE, UNSPECIFIED TYPE: Primary | ICD-10-CM

## 2019-04-08 DIAGNOSIS — N18.2 STAGE 2 CHRONIC KIDNEY DISEASE: ICD-10-CM

## 2019-04-08 DIAGNOSIS — R60.0 LOWER EXTREMITY EDEMA: ICD-10-CM

## 2019-04-08 DIAGNOSIS — I10 ESSENTIAL HYPERTENSION: ICD-10-CM

## 2019-04-08 DIAGNOSIS — I87.2 VENOUS INSUFFICIENCY: ICD-10-CM

## 2019-04-08 PROCEDURE — 3017F COLORECTAL CA SCREEN DOC REV: CPT | Performed by: INTERNAL MEDICINE

## 2019-04-08 PROCEDURE — G8400 PT W/DXA NO RESULTS DOC: HCPCS | Performed by: INTERNAL MEDICINE

## 2019-04-08 PROCEDURE — G8427 DOCREV CUR MEDS BY ELIG CLIN: HCPCS | Performed by: INTERNAL MEDICINE

## 2019-04-08 PROCEDURE — 99214 OFFICE O/P EST MOD 30 MIN: CPT | Performed by: INTERNAL MEDICINE

## 2019-04-08 PROCEDURE — G8417 CALC BMI ABV UP PARAM F/U: HCPCS | Performed by: INTERNAL MEDICINE

## 2019-04-08 PROCEDURE — 4040F PNEUMOC VAC/ADMIN/RCVD: CPT | Performed by: INTERNAL MEDICINE

## 2019-04-08 PROCEDURE — 1123F ACP DISCUSS/DSCN MKR DOCD: CPT | Performed by: INTERNAL MEDICINE

## 2019-04-08 PROCEDURE — 1036F TOBACCO NON-USER: CPT | Performed by: INTERNAL MEDICINE

## 2019-04-08 PROCEDURE — 1090F PRES/ABSN URINE INCON ASSESS: CPT | Performed by: INTERNAL MEDICINE

## 2019-04-08 RX ORDER — METOLAZONE 5 MG/1
5 TABLET ORAL
Qty: 10 TABLET | Refills: 3 | Status: SHIPPED | OUTPATIENT
Start: 2019-04-08 | End: 2019-07-24 | Stop reason: SDUPTHER

## 2019-04-08 NOTE — PROGRESS NOTES
[id number] [place] [address] [employer]  1955 April 8, 2019    Reason for Consult: Leg Swelling    CC: Lower extremity edema. HPI:  The patient is 70 y.o. female with a past medical history significant for hypertension, hyperlipidemia, diabetes and stage 2 CKD. She presents today for follow up. She does report her lower extremity did get worse when she discontinued metolazone. Once she began taking this again it did improve. She denies any weeping of her legs. She does not wrap these or use compression stockings as she has never found any that are comfortable for her. Her breathing has been well overall. She does present with a walker to aid in ambulation. She does admit to weakness from lack of activity. She has been monitoring the sodium in her diet. She was found to have bleeding in her colon and had a colon resection several months ago. Her hemoglobin at that time was 4 per her report. Review of Systems:  Constitutional: No fatigue, weakness, night sweats or fever. HEENT: No new vision difficulties or ringing in the ears. Respiratory: No new SOB, PND, orthopnea or cough. Cardiovascular: See HPI   GI: No n/v, diarrhea, constipation, abdominal pain or changes in bowel habits. No melena, no hematochezia  : No urinary frequency, urgency, incontinence, hematuria or dysuria. Skin: No cyanosis or skin lesions. Musculoskeletal: Chronic bilateral lower extremity swelling. No new muscle or joint pain. Neurological: No syncope or TIA-like symptoms.   Psychiatric: No anxiety, insomnia or depression     Past Medical History:   Diagnosis Date    Anemia     CKD (chronic kidney disease) stage 3, GFR 30-59 ml/min ([facility])     Diabetes ([address]     Diabetes ([address]     Diabetes with retinopathy ([address]     DM eye exam 12/18/17    HLD (hyperlipidemia)     Hypertension     Positive FIT (fecal immunochemical test)     Pulmonary hypertension ([address]      Past Surgical History: Procedure Laterality Date    COLONOSCOPY N/A 11/5/2018    COLONOSCOPY WITH BIOPSY performed by Iker Canas MD at [address] [place] [address] [place] Right 11/08/2018    LAPAROSCOPIC RIGHT HEMICOLECTOMY and umbilical hernia repair    [place] OFFICE/OUTPT VISIT,PROCEDURE ONLY Right 11/8/2018    LAPAROSCOPIC RIGHT HEMICOLECTOMY and umbilical hernia repair performed by Erwin Amaya MD at [address] N/A 11/5/2018    EGD BIOPSY performed by Iker Canas MD at [address] [facility] History   Problem Relation Age of Onset    Lung Cancer Mother     High Blood Pressure Father     Lung Cancer Father     Lupus Sister      Social History     Tobacco Use    Smoking status: Never Smoker    Smokeless tobacco: Never Used   Substance Use Topics    Alcohol use:  Yes     Alcohol/week: 1.2 oz     Types: 2 Glasses of wine per week     Comment: only on occasional    Drug use: No       Allergies   Allergen Reactions    Pcn [Penicillins] Shortness Of Breath    Spironolactone Rash    Coreg [Carvedilol]      Side effects:  SOB, palpitations    Lisinopril Other (See Comments)     cough     Current Outpatient Medications   Medication Sig Dispense Refill    torsemide (DEMADEX) 20 MG tablet Take 1 tablet by mouth 2 times daily 180 tablet 0    glipiZIDE (GLUCOTROL) 5 MG tablet Take 1 tablet by mouth 2 times daily (before meals) 180 tablet 0    metolazone (ZAROXOLYN) 2.5 MG tablet Take 1 tablet by mouth Twice a Week 30 tablet 3    potassium chloride (KLOR-CON M) 20 MEQ TBCR extended release tablet Take 1 tablet by mouth daily Take one tablet po daily 90 tablet 1    atorvastatin (LIPITOR) 20 MG tablet TAKE 1 TABLET BY MOUTH ONE TIME A DAY 90 tablet 0    metFORMIN (GLUCOPHAGE) 1000 MG tablet Take 1 tablet by mouth 2 times daily (with meals) 180 tablet 0    Ferrous Sulfate (IRON) 28 MG TABS Take by mouth      Multiple Vitamin (MULTI-VITAMIN DAILY PO) Take by mouth      vitamin D (CHOLECALCIFEROL) 1000 UNIT TABS tablet Take 1,000 Units by mouth daily      cetirizine (ZYRTEC ALLERGY) 10 MG tablet Take 10 mg by mouth daily       No current facility-administered medications for this visit. Physical Exam:   BP (!) 158/86 (Site: Left Lower Arm)   Pulse 90   Ht 5' 3\" (1.6 m)   Wt 265 lb (120.2 kg)   SpO2 96%   BMI 46.94 kg/m²   No intake or output data in the 24 hours ending 04/08/19 1553  Wt Readings from Last 2 Encounters:   04/08/19 265 lb (120.2 kg)   01/14/19 271 lb (122.9 kg)     Constitutional: She is oriented to person, place, and time. She appears well-developed and well-nourished. In no acute distress. Head: Normocephalic and atraumatic. Neck: Neck supple. No JVD present. Carotid bruit is not present. No mass and no thyromegaly present. No lymphadenopathy present. Cardiovascular: Regular but mildly tachy, normal heart sounds and intact distal pulses. Exam reveals no gallop and no friction rub. No murmur heard. Pulmonary/Chest: Effort normal and breath sounds normal. No respiratory distress. She has no wheezes, rhonchi or rales. Abdominal: Soft, non-tender. Bowel sounds and aorta are normal. She exhibits no organomegaly, mass or bruit. Extremities: 1+ BLE edema to the knee, cyanosis, or clubbing. Pulses are 2+ radial/carotid/dorsalis pedis and posterior tibial bilaterally. Neurological: She is alert and oriented to person, place, and time. She has normal reflexes. No cranial nerve deficit. Coordination normal.   Skin: Skin is warm and dry. There is no rash or diaphoresis. Psychiatric: She has a normal mood and affect.  Her speech is normal and behavior is normal.     EKG Interpretation 1/10/19: Sinus tachycardia      Imaging:     Stress perfusion 1/9/18  Small area of anterior wall breast artifact but no clear reversible    perfusion defect seen to suggest myocardial ischemia.    Hypertensive response to stress test    Overall findings represent a low risk study.    Normal LV size and systolic function. Echo 10/17/18  Left ventricular cavity size is normal. There is mild concentric left  ventricular hypertrophy. Overall left ventricular systolic function appears normal with an estimated  ejection fraction of 55-60%. No regional wall motion abnormalities are noted. Diastolic function is indeterminate due to increased heart rate. Mild aortic stenosis with a peak velocity of 2.73m/s and a mean pressure  gradient of 16mmHg. IVC size is dilated (>2.1 cm) and collapses < 50% with respiration  consistent with elevated RA pressure (15 mmHg). Lab Review:   Lab Results   Component Value Date    TRIG 170 01/28/2019    HDL 34 01/28/2019    LDLCALC 33 01/28/2019    LABVLDL 34 01/28/2019     Lab Results   Component Value Date     02/25/2019    K 4.2 02/25/2019    BUN 22 02/25/2019    CREATININE 1.2 02/25/2019         Assessment:  1. Lower extremity edema  2. Venous insufficiency   3. Essential hypertension   4. CKD, stage 2    5. Morbid Obesity  6. Anemia, iron deficient due to chronic blood loss  7. Fatigue     Plan:  She continues with lower extremity edema. I will have her increase her dose of metolazone to 5 mg twice weekly. She will need to complete a BMP in 2 weeks to assess her kidney function. I have placed a referral for outpatient physical therapy as she is deconditioned and wishes to improve her strength and mobility. I will see her in office for follow up in 6 months. This note was scribed in the presence of Smooth Johns MD by [employer], RN. Physician Attestation:  The scribes documentation has been prepared under my direction and personally reviewed by me in its entirety.      I, Dr. Susie Camarillo personally performed the services described in this documentation as scribed by my RN,  Ana Bateman in my presence, and I confirm that the note above accurately reflects all work, treatment, procedures, and medical decision making performed by me.

## 2019-04-08 NOTE — TELEPHONE ENCOUNTER
Please call patient and let her know she is already a couple months past due for a diabetic visit    Please schedule this month    Please document call and then close encounter.   thanks

## 2019-04-22 DIAGNOSIS — E11.9 TYPE 2 DIABETES MELLITUS WITHOUT COMPLICATION, WITHOUT LONG-TERM CURRENT USE OF INSULIN (HCC): ICD-10-CM

## 2019-04-22 RX ORDER — ATORVASTATIN CALCIUM 20 MG/1
TABLET, FILM COATED ORAL
Qty: 90 TABLET | Refills: 0 | Status: SHIPPED | OUTPATIENT
Start: 2019-04-22 | End: 2019-07-17 | Stop reason: SDUPTHER

## 2019-04-23 ENCOUNTER — TELEPHONE (OUTPATIENT)
Dept: CARDIOLOGY CLINIC | Age: 64
End: 2019-04-23

## 2019-04-23 NOTE — TELEPHONE ENCOUNTER
Spoke to Cristobal Melgar regarding labs needing to be completed. She will have these completed Friday 4/26 at Dr. Jaziel Livingston office.

## 2019-04-26 ENCOUNTER — OFFICE VISIT (OUTPATIENT)
Dept: FAMILY MEDICINE CLINIC | Age: 64
End: 2019-04-26
Payer: MEDICARE

## 2019-04-26 VITALS
DIASTOLIC BLOOD PRESSURE: 98 MMHG | HEIGHT: 63 IN | HEART RATE: 105 BPM | BODY MASS INDEX: 47.66 KG/M2 | SYSTOLIC BLOOD PRESSURE: 179 MMHG | WEIGHT: 269 LBS

## 2019-04-26 DIAGNOSIS — I10 ESSENTIAL HYPERTENSION: ICD-10-CM

## 2019-04-26 DIAGNOSIS — Z78.0 POST-MENOPAUSAL: ICD-10-CM

## 2019-04-26 DIAGNOSIS — M25.561 CHRONIC PAIN OF BOTH KNEES: ICD-10-CM

## 2019-04-26 DIAGNOSIS — D50.0 IRON DEFICIENCY ANEMIA DUE TO CHRONIC BLOOD LOSS: ICD-10-CM

## 2019-04-26 DIAGNOSIS — K86.2 PANCREATIC CYST: ICD-10-CM

## 2019-04-26 DIAGNOSIS — M25.562 CHRONIC PAIN OF BOTH KNEES: ICD-10-CM

## 2019-04-26 DIAGNOSIS — N18.30 CKD (CHRONIC KIDNEY DISEASE) STAGE 3, GFR 30-59 ML/MIN (HCC): ICD-10-CM

## 2019-04-26 DIAGNOSIS — G47.33 OSA (OBSTRUCTIVE SLEEP APNEA): ICD-10-CM

## 2019-04-26 DIAGNOSIS — R60.0 BILATERAL LEG EDEMA: ICD-10-CM

## 2019-04-26 DIAGNOSIS — N85.8 UTERINE MASS: ICD-10-CM

## 2019-04-26 DIAGNOSIS — R53.81 PHYSICAL DECONDITIONING: ICD-10-CM

## 2019-04-26 DIAGNOSIS — E11.319 TYPE 2 DIABETES MELLITUS WITH RETINOPATHY, WITHOUT LONG-TERM CURRENT USE OF INSULIN, MACULAR EDEMA PRESENCE UNSPECIFIED, UNSPECIFIED LATERALITY, UNSPECIFIED RETINOPATHY SEVERITY (HCC): Primary | ICD-10-CM

## 2019-04-26 DIAGNOSIS — Z12.39 BREAST CANCER SCREENING: ICD-10-CM

## 2019-04-26 DIAGNOSIS — E87.79 OTHER HYPERVOLEMIA: ICD-10-CM

## 2019-04-26 DIAGNOSIS — C18.2 MALIGNANT NEOPLASM OF ASCENDING COLON (HCC): ICD-10-CM

## 2019-04-26 DIAGNOSIS — E78.00 PURE HYPERCHOLESTEROLEMIA: ICD-10-CM

## 2019-04-26 DIAGNOSIS — Z23 NEED FOR PNEUMOCOCCAL VACCINATION: ICD-10-CM

## 2019-04-26 DIAGNOSIS — G89.29 CHRONIC PAIN OF BOTH KNEES: ICD-10-CM

## 2019-04-26 LAB
ANION GAP SERPL CALCULATED.3IONS-SCNC: 16 MMOL/L (ref 3–16)
BASOPHILS ABSOLUTE: 0.1 K/UL (ref 0–0.2)
BASOPHILS RELATIVE PERCENT: 1 %
BUN BLDV-MCNC: 42 MG/DL (ref 7–20)
CALCIUM SERPL-MCNC: 10 MG/DL (ref 8.3–10.6)
CHLORIDE BLD-SCNC: 96 MMOL/L (ref 99–110)
CO2: 32 MMOL/L (ref 21–32)
CREAT SERPL-MCNC: 1.6 MG/DL (ref 0.6–1.2)
CREATININE URINE: 71 MG/DL (ref 28–259)
EOSINOPHILS ABSOLUTE: 0.2 K/UL (ref 0–0.6)
EOSINOPHILS RELATIVE PERCENT: 2.3 %
GFR AFRICAN AMERICAN: 39
GFR NON-AFRICAN AMERICAN: 32
GLUCOSE BLD-MCNC: 191 MG/DL (ref 70–99)
HBA1C MFR BLD: 7.3 %
HCT VFR BLD CALC: 38.6 % (ref 36–48)
HEMOGLOBIN: 12.8 G/DL (ref 12–16)
LYMPHOCYTES ABSOLUTE: 1.6 K/UL (ref 1–5.1)
LYMPHOCYTES RELATIVE PERCENT: 17.2 %
MCH RBC QN AUTO: 30 PG (ref 26–34)
MCHC RBC AUTO-ENTMCNC: 33.1 G/DL (ref 31–36)
MCV RBC AUTO: 90.7 FL (ref 80–100)
MICROALBUMIN UR-MCNC: <1.2 MG/DL
MICROALBUMIN/CREAT UR-RTO: NORMAL MG/G (ref 0–30)
MONOCYTES ABSOLUTE: 0.6 K/UL (ref 0–1.3)
MONOCYTES RELATIVE PERCENT: 6 %
NEUTROPHILS ABSOLUTE: 6.9 K/UL (ref 1.7–7.7)
NEUTROPHILS RELATIVE PERCENT: 73.5 %
PDW BLD-RTO: 16.1 % (ref 12.4–15.4)
PLATELET # BLD: 199 K/UL (ref 135–450)
PMV BLD AUTO: 9.1 FL (ref 5–10.5)
POTASSIUM SERPL-SCNC: 3 MMOL/L (ref 3.5–5.1)
PRO-BNP: 135 PG/ML (ref 0–124)
RBC # BLD: 4.25 M/UL (ref 4–5.2)
SODIUM BLD-SCNC: 144 MMOL/L (ref 136–145)
WBC # BLD: 9.4 K/UL (ref 4–11)

## 2019-04-26 PROCEDURE — 99215 OFFICE O/P EST HI 40 MIN: CPT | Performed by: FAMILY MEDICINE

## 2019-04-26 PROCEDURE — 2022F DILAT RTA XM EVC RTNOPTHY: CPT | Performed by: FAMILY MEDICINE

## 2019-04-26 PROCEDURE — 1090F PRES/ABSN URINE INCON ASSESS: CPT | Performed by: FAMILY MEDICINE

## 2019-04-26 PROCEDURE — G8400 PT W/DXA NO RESULTS DOC: HCPCS | Performed by: FAMILY MEDICINE

## 2019-04-26 PROCEDURE — 1123F ACP DISCUSS/DSCN MKR DOCD: CPT | Performed by: FAMILY MEDICINE

## 2019-04-26 PROCEDURE — 1036F TOBACCO NON-USER: CPT | Performed by: FAMILY MEDICINE

## 2019-04-26 PROCEDURE — G8427 DOCREV CUR MEDS BY ELIG CLIN: HCPCS | Performed by: FAMILY MEDICINE

## 2019-04-26 PROCEDURE — 4040F PNEUMOC VAC/ADMIN/RCVD: CPT | Performed by: FAMILY MEDICINE

## 2019-04-26 PROCEDURE — 83036 HEMOGLOBIN GLYCOSYLATED A1C: CPT | Performed by: FAMILY MEDICINE

## 2019-04-26 PROCEDURE — 3045F PR MOST RECENT HEMOGLOBIN A1C LEVEL 7.0-9.0%: CPT | Performed by: FAMILY MEDICINE

## 2019-04-26 PROCEDURE — 36415 COLL VENOUS BLD VENIPUNCTURE: CPT | Performed by: FAMILY MEDICINE

## 2019-04-26 PROCEDURE — 3017F COLORECTAL CA SCREEN DOC REV: CPT | Performed by: FAMILY MEDICINE

## 2019-04-26 PROCEDURE — G8417 CALC BMI ABV UP PARAM F/U: HCPCS | Performed by: FAMILY MEDICINE

## 2019-04-26 RX ORDER — METOPROLOL SUCCINATE 25 MG/1
25 TABLET, EXTENDED RELEASE ORAL DAILY
Qty: 30 TABLET | Refills: 0 | Status: SHIPPED | OUTPATIENT
Start: 2019-04-26 | End: 2019-05-18 | Stop reason: SDUPTHER

## 2019-04-26 RX ORDER — HYDROCODONE BITARTRATE AND ACETAMINOPHEN 5; 325 MG/1; MG/1
1 TABLET ORAL EVERY 8 HOURS PRN
Qty: 30 TABLET | Refills: 0 | Status: SHIPPED | OUTPATIENT
Start: 2019-04-26 | End: 2019-09-06 | Stop reason: SDUPTHER

## 2019-04-26 ASSESSMENT — PATIENT HEALTH QUESTIONNAIRE - PHQ9
SUM OF ALL RESPONSES TO PHQ9 QUESTIONS 1 & 2: 0
1. LITTLE INTEREST OR PLEASURE IN DOING THINGS: 0
SUM OF ALL RESPONSES TO PHQ QUESTIONS 1-9: 0
2. FEELING DOWN, DEPRESSED OR HOPELESS: 0
SUM OF ALL RESPONSES TO PHQ QUESTIONS 1-9: 0

## 2019-04-26 NOTE — PROGRESS NOTES
PROGRESS NOTE     Kathe Koyanagi MD  [address] [facility] [address]  [place] [address]  [phone] office  [phone] fax    Date of Service:  4/26/2019    Subjective:      Patient ID: .Ab Narvaez is a 70 y.o. female      CC: DM2 with retinopathy and stage 3 CKD, HTN, HLD, BRIANNA, colon cancer, uterine mass, pancreatic cyst    HPI  LE edema/ venous insufficiency  Vs diastolic HF  I do believe patient likely has some diastolic heart failure, because when she presented initially, she had significant dyspnea on exertion associated with her lower extremity edema, and both of these were documented to be significantly improved after being placed on Lasix, according to my note on 6/15/18. She had an initial BNP that was slightly over 100, but this increased to 708 on 11/17/18. Echocardiogram also showed some mild concentric left ventricular hypertrophy, normal ejection fraction, and stated diastolic function could not be well determined due to elevated heart rate. Patient later had some significant GI blood loss anemia that complicated the picture worsened her dyspnea on exertion, however: Dyspnea on exertion did initially improve greatly after Lasix was started. Pt initially established with Dr Patsy Stewart on 1/14/19, and according to his note, felt her LE edema was likely \"due to her HTN and obesity with some possible venous insufficiency as well\"    Last saw Dr Patsy Stewart on 4/8/19. She reported a worsening of her LE edema after stopping her metolazone, that improved after restarting it. Dr Patsy Stewart increased her metolazone to 5mg twice a week for uncontrolled edema. She was instructed to have BMP rechecked 2 weeks later. She wants to have this done todady    Patient still has some chronic lower extremity edema. Can't tolerate compression stockings. Denies current dyspnea on exertion. No orthopnea, paroxysmal nocturnal dyspnea, palpitations, lightheadedness.     Pro-AOL=788 on 11/17/18    Stress perfusion 1/9/18  Small area of anterior wall breast artifact but no clear reversible    perfusion defect seen to suggest myocardial ischemia.    Hypertensive response to stress test    Overall findings represent a low risk study.    Normal LV size and systolic function.      Echo 10/17/18  Left ventricular cavity size is normal. There is mild concentric left  ventricular hypertrophy. Overall left ventricular systolic function appears normal with an estimated  ejection fraction of 55-60%. No regional wall motion abnormalities are noted. Diastolic function is indeterminate due to increased heart rate. Mild aortic stenosis with a peak velocity of 2.73m/s and a mean pressure  gradient of 16mmHg. IVC size is dilated (>2.1 cm) and collapses < 50% with respiration  consistent with elevated RA pressure (15 mmHg). Diabetes Mellitus Type 2 with diabetic retinopathy and stage 3 CKD: pt denies: blurry vision, foot ulcerations, neuropathy, polyphagia, polyuria, polydipsia, nausea, vomiting and diarrhea. Home blood sugar records: patient does not test  Any episodes of hypoglycemia? no  Eye exam current (within one year): no  Tobacco history: She  reports that she has never smoked. She has never used smokeless tobacco.   Daily Aspirin? No    Hypertension:  Home blood pressure monitoring: Yes - 150/70s. She is adherent to a low sodium diet. Patient denies chest pain, shortness of breath, headache, lightheadedness, blurred vision, palpitations, dry cough and fatigue. Antihypertensive medication side effects: no medication side effects noted. Use of agents associated with hypertension: none. Hyperlipidemia:  No new myalgias or GI upset on atorvastatin (Lipitor).        Lab Results   Component Value Date    LABA1C 7.3 04/26/2019    LABA1C 7.2 01/28/2019    LABA1C 6.6 11/03/2018     Lab Results   Component Value Date    LABMICR YES 11/01/2018    CREATININE 1.2 02/25/2019     Lab Results Component Value Date    ALT 12 01/28/2019    AST 13 (L) 01/28/2019     Lab Results   Component Value Date    CHOL 101 01/28/2019    TRIG 170 (H) 01/28/2019    HDL 34 (L) 01/28/2019    LDLCALC 33 01/28/2019          Sleep Apnea:  Current treatment: cPAP: 4-5 hours a night. Compliance: compliant most of the time. Residual symptoms include: none. 1/31/19: found to have moderate sleep apnea      Colon cancer, iron def anemia  In November 2018, patient was discovered to have stage I colon cancer. Status post partial colectomy. She has repeat colonoscopy next month. She is also followed by Dr. Mickey Romo. She had a positive fit test 2 years prior, but couldn't afford colonoscopy until she went on Medicare. Lab Results   Component Value Date    WBC 8.3 02/11/2019    HGB 12.8 02/11/2019    HCT 38.7 02/11/2019    MCV 86.6 02/11/2019     02/11/2019       Uterine mass,  Found on US in November 2018. Thought to be fibroid, but endometrial cancer couldn't be excluded on imaging. Pt was referred to Dr Madelin Noyola on 11/19/19, but has not went. Patient today states \"I'm not worried about it. Pancreatic cyst  Found incidentally in November when hospitalized for colon cancer and chronic blood loss anemia.     Repeat MRI due November 2020 per current management guidelines    Vitals:    04/26/19 1123   BP: (!) 179/98   Pulse: 105   Weight: 269 lb (122 kg)   Height: 5' 3\" (1.6 m)       Outpatient Medications Marked as Taking for the 4/26/19 encounter (Office Visit) with Tonja Oconnell MD   Medication Sig Dispense Refill    metFORMIN (GLUCOPHAGE) 1000 MG tablet Take 1 tablet by mouth 2 times daily (with meals) 180 tablet 0    atorvastatin (LIPITOR) 20 MG tablet TAKE 1 TABLET BY MOUTH ONE TIME A DAY 90 tablet 0    metolazone (ZAROXOLYN) 5 MG tablet Take 1 tablet by mouth Twice a Week 10 tablet 3    torsemide (DEMADEX) 20 MG tablet Take 1 tablet by mouth 2 times daily 180 tablet 0    glipiZIDE (GLUCOTROL) 5 MG tablet Take 1 tablet by mouth 2 times daily (before meals) 180 tablet 0    potassium chloride (KLOR-CON M) 20 MEQ TBCR extended release tablet Take 1 tablet by mouth daily Take one tablet po daily 90 tablet 1    Ferrous Sulfate (IRON) 28 MG TABS Take by mouth      Multiple Vitamin (MULTI-VITAMIN DAILY PO) Take by mouth      vitamin D (CHOLECALCIFEROL) 1000 UNIT TABS tablet Take 1,000 Units by mouth daily      cetirizine (ZYRTEC ALLERGY) 10 MG tablet Take 10 mg by mouth daily         Past Medical History:   Diagnosis Date    Anemia     CKD (chronic kidney disease) stage 3, GFR 30-59 ml/min (HCC)     Diabetes (HCC)     [facility]     Diabetes with retinopathy ([facility]     DM eye exam 12/18/17    HLD (hyperlipidemia)     Hypertension     Positive FIT (fecal immunochemical test)     Pulmonary hypertension ([facility]        Past Surgical History:   Procedure Laterality Date    COLONOSCOPY N/A 11/5/2018    COLONOSCOPY WITH BIOPSY performed by Cielo Early MD at [address] [place] [address] Right 11/08/2018    LAPAROSCOPIC RIGHT HEMICOLECTOMY and umbilical hernia repair    [place] OFFICE/OUTPT VISIT,PROCEDURE ONLY Right 11/8/2018    LAPAROSCOPIC RIGHT HEMICOLECTOMY and umbilical hernia repair performed by Natalya Pennington MD at [facility] N/A 11/5/2018    EGD BIOPSY performed by Cielo Early MD at [facility] History     Tobacco Use    Smoking status: Never Smoker    Smokeless tobacco: Never Used   Substance Use Topics    Alcohol use:  Yes     Alcohol/week: 1.2 oz     Types: 2 Glasses of wine per week     Comment: only on occasional       Family History   Problem Relation Age of Onset    Lung Cancer Mother     High Blood Pressure Father     Lung Cancer Father     Lupus Sister            Review of Systems  Constitutional:  Negative for activity or appetite change, fever or fatigue  HENT:  Negative for congestion,sinus pressure, or rhinorrhea  Eyes:  Negative for eye pain or visual changes  Resp:  Negative for SOB, chest tightness, cough  Cardiovascular: Negative for CP, palpitations, CHÁVEZ, orthopnea, PND, +LE edema  Gastrointestinal: Negative for abd pain, melena, BRBPR, N/V/D  Endocrine:  Negative for polydipsia and polyuria  :  Negative for dysuria, flank pain or urinary frequency  Musculoskeletal:  Negative for back pain or myalgias  Neuro:  Negative for dizziness or lightheadedness  Psych: negative for depression or anxiety      Objective:   Constitutional:   · Reviewed vitals above  · Well Nourished, well developed, no distress       HENT:  · Normal external nose without lesions  · Normal nasal mucosa without swelling or erythema  Neck:  · Symmetric and without masses  · No thyromegaly  Resp:  · Normal effort  · Clear to auscultation bilaterally without rhonchi, wheezing or crackles  Cardiovascular:  · On auscultation, normal S1 and S2 without murmurs, rubs or gallops  · No bruits of bilateral carotids and no JVD  Gastrointestinal:  · Nontender, nondistended, and no masses  · No hepatosplenomegaly  Musculoskeletal:  · Normal Gait  · +2 pitting edema to level of mid shin bilaterally  Skin:  · No rashes on inspection  · No areas of increased heat or induration on palpation  Psych:  · Normal mood and affect  · Normal insight and judgement    FOOT EXAM:    +1 distal tibialis anterior and dorsalis pedis pulses bilaterally  Normal sensation to monofilament testing bilaterally  No calluses  No ulcers  Non mycotic toe nails. Assessment / Plan:     1. Type 2 diabetes mellitus with retinopathy, without long-term current use of insulin, macular edema presence unspecified, unspecified laterality, unspecified retinopathy severity (HCC)  - POCT glycosylated hemoglobin (Hb A1C)=7.3    Now that has better insurance coverage, will stop sulfonylurea and start Tradjenta 5 mg in its place, since she has some kidney insufficiency.   Patient to continue metformin 1000 mg b.i.d. If kidney function worsens in the future, metformin will need to be stopped      Checking BMP and urine microalbumin today. Encouraged patient to get diabetic eye exam.    Encouraged patient to continue to work on healthy lifestyle change. 2. CKD (chronic kidney disease) stage 3, GFR 30-59 ml/min ([facility])  Repeating BMP, to ensure no worsening. We'll adjust meds if needed. 3. Essential hypertension  Not controlled, but has had many side effects with meds in the past.  We'll start a low-dose of Toprol XL to see if tolerated. Patient to continue water pills. - Basic Metabolic Panel      4. Pure hypercholesterolemia  Reviewed most recent fasting lipid panel and LFTs. Cholesterol well controlled. Continue Lipitor 20 mg.    5. BRIANNA (obstructive sleep apnea)  Patient finally using CPAP. Encouraged her to continue. 6. Iron deficiency anemia due to chronic blood loss  Repeating CBC to ensure still within normal limits. 7. Malignant neoplasm of ascending colon (HCC)  Was stage I, and hopefully cured after surgery. Patient has repeat colonoscopy next month. Patient still follow-up with Dr. Mulu Gay.  - CBC Auto Differential    8. Uterine mass  Advised patient to see ObGyn to further evaluate uterine mass. Also overdue for Pap smear. She declines both. She states she understands morbidity mortality risk of doing so. 9. Pancreatic cyst  Not due for repeat MRI until 2020. 10. Bilateral leg edema (possible diastolic HF)  Most likely component of diastolic heart failure. Discussed importance of keeping blood pressure control, treating sleep apnea, and treating diabetes to prevent worsening. Starting beta blocker today to help control blood pressure    Checking BNP. 11. Physical deconditioning  Patient has some physical deconditioning since recent hospitalization. Sending to physical therapy for evaluation treatment.   [facility] Outpatient Physical Therapy -

## 2019-04-30 ENCOUNTER — HOSPITAL ENCOUNTER (OUTPATIENT)
Dept: PHYSICAL THERAPY | Age: 64
Setting detail: THERAPIES SERIES
Discharge: HOME | End: 2019-04-30
Payer: MEDICARE

## 2019-04-30 PROCEDURE — 97110 THERAPEUTIC EXERCISES: CPT

## 2019-04-30 PROCEDURE — 97162 PT EVAL MOD COMPLEX 30 MIN: CPT

## 2019-04-30 NOTE — PROGRESS NOTES
Physical Therapy  Initial Assessment  Date: 2019  Patient Name: Veronique Moreira  MRN: [id number]  : 1955     Treatment Diagnosis: General weakness, tightness on hip and knee joints, and difficulty with walking on level surfaces and on steps     Restrictions  Restrictions/Precautions  Restrictions/Precautions: Fall Risk(No risk of falls)  Required Braces or Orthoses?: No    Subjective   General  Chart Reviewed: Yes  Patient assessed for rehabilitation services?: Yes  Additional Pertinent Hx: PLOF: Independent  Family / Caregiver Present: No  Referring Practitioner: Dr. Shellie Hugo  Referral Date : 19  Diagnosis: Physical deconditioining  PT Visit Information  Onset Date: 18  PT Insurance Information: Medicare  Total # of Visits Approved: 10  Total # of Visits to Date: 1  Subjective  Subjective: In  pt gradually began to feel weaker. PCP referrred her to a nephrologist. She went to hospital and hospitalist learned that her hemoglobin was 4. She was very weak and fatigued. She has kidney disease and had a bleeding polyp . She had  a colon resection in November. She was having LE edema and saw Dr. Rajan Swanson. She does feel better than she did  and is more energetic. She has no pain  but has oll over stiffness mostly in legs and ankles.     Pain Screening  Patient Currently in Pain: No  Vital Signs  Patient Currently in Pain: No         Social/Functional History  Social/Functional History  Lives With: Spouse  Type of Home: House  Home Access: Elevator  Bathroom Shower/Tub: Tub/Shower unit  Bathroom Equipment: Grab bars in shower  Active : Yes  Occupation: Retired  Type of occupation:      Objective     Observation/Palpation  Posture: Fair  Palpation: Not TTP  Edema: Significant edema on distal LE's    AROM RLE (degrees)  RLE AROM: Exceptions  R Hip Flexion 0-125: 0-95  R Hip ABduction 0-45: WNL  R Hip External Rotation 0-45: WNL  R Hip Internal Rotation 0-45: WNL  R Knee Flexion 0-145: 0-122  R Knee Extension 0: 0  R Ankle Dorsiflexion 0-20: WNL  AROM LLE (degrees)  LLE AROM : Exceptions  L Hip Flexion 0-125: 0-95  L Hip ABduction 0-45: WNL  L Hip External Rotation 0-45: WNL  L Hip Internal Rotation 0-45: WNL  L Knee Flexion 0-145: 0-122  L Knee Extension 0: 0  L Ankle Dorsiflexion 0-20: WNL    Strength RLE  Strength RLE: WNL  Strength LLE  Strength LLE: Exception  Comment: Left hip is weaker than right  L Hip Flexion: 4-/5        Sensation  Overall Sensation Status: WFL     Transfers  Sit to Stand: Modified independent(Difficult for pt to do)       Ambulation  Ambulation?: Yes  Ambulation 1  Device: No Device  Assistance: Independent  Quality of Gait: Slow tristan, walks 100  feet  Stairs/Curb  Stairs?: Yes(Non reciprocal pattern )        Assessment   Conditions Requiring Skilled Therapeutic Intervention  Body structures, Functions, Activity limitations: Decreased functional mobility ; Decreased ROM; Decreased strength;Decreased ADL status  Assessment: PLOF: Independent. Pt is a 69 yo female who has fatigue and LE weakness. She will benefit from PT to increase strength, increase flexibility and improve on level  surfaces and stairs.   Treatment Diagnosis: General weakness, tightness on hip and knee joints, and difficulty with walking on level surfaces and on steps   Prognosis: Good  Decision Making: Medium Complexity  REQUIRES PT FOLLOW UP: Yes         Plan   Plan  Times per week: 2   Plan weeks: 5  Current Treatment Recommendations: Strengthening, ROM, Modalities, Manual Therapy - Soft Tissue Mobilization, Home Exercise Program, Gait Training    G-Code       OutComes Score  LEFS Total Score: 34                                               AM-PAC Score             Goals  Short term goals  Time Frame for Short term goals: 5 weeks  Short term goal 1: Pt will increase bilateral hip flexor strength by at least 1/2 grade   Short term goal 2: Pt will note better flexiibility in hips and knees  Short term goal 3: Pt will be able to negotiate steps better and ambuation on level surfaces. as well  Long term goals  Time Frame for Long term goals : Dimas HOLLEY  Long term goal 1: Pt will be independent in HEP  Patient Goals   Patient goals :  \"To get back to normal activity and have the ability to exercise\"       Therapy Time   Individual Concurrent Group Co-treatment   Time In 1025         Time Out 1110         Minutes 45         Timed Code Treatment Minutes: Lorne 66, PT

## 2019-04-30 NOTE — PLAN OF CARE
Outpatient Physical Therapy  [] [facility]    Phone: [phone]   Fax: [phone]   [x] [facility]  Phone: [phone]              Fax: [phone]  [] [facility]   Phone: [phone]   Fax: [phone]     To: Referring Practitioner: Dr. Chay Dumont      Patient: Chaz Choudhary   : 1955   MRN: [id number]  Evaluation Date: 2019      Diagnosis Information:  · Diagnosis: Physical deconditioining   · Treatment Diagnosis: General weakness, tightness on hip and knee joints, and difficulty with walking on level surfaces and on steps      Physical Therapy Certification/Re-Certification Form  Dear Estuardo Telles,  The following patient has been evaluated for physical therapy services and for therapy to continue, Medicare requires monthly physician review of the treatment plan. Please review the attached evaluation and/or summary of the patient's plan of care, and verify that you agree therapy should continue by signing the attached document and sending it back to our office. Plan of Care/Treatment to date:  [x] Therapeutic Exercise    [x] Modalities:  [x] Therapeutic Activity     [] Ultrasound  [] Electrical Stimulation  [x] Gait Training      [] Cervical Traction [] Lumbar Traction  [] Neuromuscular Re-education    [] Cold/hotpack [] Iontophoresis   [x] Instruction in HEP     Other:  [x] Manual Therapy      []             [] Aquatic Therapy      []           ? Frequency/Duration:  # Days per week: [] 1 day # Weeks: [] 1 week [x] 5 weeks     [x] 2 days? [] 2 weeks [] 6 weeks     [] 3 days   [] 3 weeks [] 7 weeks     [] 4 days   [] 4 weeks [] 8 weeks    Rehab Potential: [] Excellent [x] Good [] Fair  [] Poor       Electronically signed by:  Carlos Bentley PT      If you have any questions or concerns, please don't hesitate to call.   Thank you for your referral.      Physician Signature:________________________________Date:__________________  By signing above, therapists plan is approved by physician

## 2019-05-03 ENCOUNTER — HOSPITAL ENCOUNTER (OUTPATIENT)
Dept: PHYSICAL THERAPY | Age: 64
Setting detail: THERAPIES SERIES
Discharge: HOME | End: 2019-05-03
Payer: MEDICARE

## 2019-05-03 PROCEDURE — 97110 THERAPEUTIC EXERCISES: CPT

## 2019-05-03 NOTE — FLOWSHEET NOTE
Physical Therapy Daily Treatment Note  Date:  5/3/2019    Patient Name:  Terry Henriquez    :  1955  MRN: [id number]    Restrictions/Precautions:      Pertinent Medical History:      Medical/Treatment Diagnosis Information:  · Diagnosis: Physical deconditioning  · Treatment Diagnosis: General weakness, tightness on hip and knee joints, and difficulty with walking on level surfaces and on steps     Insurance/Certification information:  PT Insurance Information: Medicare  Physician Information:  Referring Practitioner: Dr. Edu Hanks of care signed (Y/N):  Sent to Dr. Joelle Hernandez 19  Visit# / total visits:  2/10  Pain level:  3/10     G-Code (if applicable):      Date / Visit # G-Code Applied:  /       Progress Note: [x]  Yes  [x]  No  Next due by: Visit #10      History of Injury: See below    Subjective:  Subjective  Subjective: In  pt gradually began to feel weaker. PCP referrred her to a nephrologist. She went to hospital and hospitalist learned that her hemoglobin was 4. She was very weak and fatigued. She has kidney disease and had a bleeding polyp . She had  a colon resection in November. She was having LE edema and saw Dr. Kofi Dorsey. She does feel better than she did  and is more energetic. She has no pain  but has oll over stiffness mostly in legs and ankles. 5/3/19: Pt reports that she has muscle soreness but already notes more flexibility.     Objective: See eval  Observation:   Test measurements:        Exercises:  Exercise/Equipment Resistance/Repetitions Other comments   Calf stretching with towel 10 sec x 10    Hamstring stretch 3 x 30 \"    PPT with marching 5 sec x 10    Nustep 5 min , WL 4    TUG 8 sec x 3 Add   Sit to stand 10X Add   Leg press 65# x 30 Added 5/3/19   Incline Board 3 x 30 sec Added 5/3/19   Step lunges 3 x 30 sec Added 5/3/19    TKE 30X    Hip flexor with resistiance 10X 5/3/19                    Other Therapeutic Activities:  Patient was educated on diagnosis, plan of care and prognosis of their complaint. Also, frequency and duration of treatments was discussed. Patient was informed of the attendance policy and issued a copy for their records. 4/30/19: Pt will get light wt acewrap. Home Exercise Program: Patient was given written instructions for home exercises as above. Patient performs them correctly and understands purpose. 5/3/19: Added exercises as above. Gave orange t band    Manual Treatments:      Modalities:      Timed Code Treatment Minutes:    35  Total Treatment Minutes:    40  Treatment/Activity Tolerance:  [x] Patient tolerated treatment well [] Patient limited by fatigue  [] Patient limited by pain  [] Patient limited by other medical complications  [] Other:     Prognosis: [x] Good [] Fair  [] Poor    Goals:    Short term goals  Time Frame for Short term goals: 5 weeks  Short term goal 1: Pt will increase bilateral hip flexor strength by at least 1/2 grade   Short term goal 2: Pt will note better flexiibility in hips and knees  Short term goal 3: Pt will be able to negotiate steps better and ambuation on level surfaces. as well      Long term goals  Time Frame for Long term goals : Upon DC  Long term goal 1: Pt will be independent in HEP     Patient goals : \"To get back to normal activity and have the ability to exercise      Patient Requires Follow-up: [x] Yes  [] No    Plan:   [] Continue per plan of care [] Alter current plan (see comments)  [x] Plan of care initiated [] Hold pending MD visit [] Discharge    Plan for Next Session: Continue with exercise progression.   Electronically signed by:  Dilcia Josue, PT,

## 2019-05-07 ENCOUNTER — OFFICE VISIT (OUTPATIENT)
Dept: SLEEP MEDICINE | Age: 64
End: 2019-05-07
Payer: MEDICARE

## 2019-05-07 ENCOUNTER — NURSE ONLY (OUTPATIENT)
Dept: FAMILY MEDICINE CLINIC | Age: 64
End: 2019-05-07
Payer: MEDICARE

## 2019-05-07 VITALS
OXYGEN SATURATION: 96 % | BODY MASS INDEX: 47.48 KG/M2 | WEIGHT: 268 LBS | HEART RATE: 80 BPM | HEIGHT: 63 IN | DIASTOLIC BLOOD PRESSURE: 84 MMHG | SYSTOLIC BLOOD PRESSURE: 138 MMHG | TEMPERATURE: 98.7 F | RESPIRATION RATE: 16 BRPM

## 2019-05-07 DIAGNOSIS — Z99.89 OSA ON CPAP: Primary | ICD-10-CM

## 2019-05-07 DIAGNOSIS — Z99.89 DEPENDENCE ON OTHER ENABLING MACHINES AND DEVICES: ICD-10-CM

## 2019-05-07 DIAGNOSIS — I10 ESSENTIAL HYPERTENSION: ICD-10-CM

## 2019-05-07 DIAGNOSIS — G47.33 OSA ON CPAP: Primary | ICD-10-CM

## 2019-05-07 DIAGNOSIS — E11.319 TYPE 2 DIABETES MELLITUS WITH RETINOPATHY, WITHOUT LONG-TERM CURRENT USE OF INSULIN, MACULAR EDEMA PRESENCE UNSPECIFIED, UNSPECIFIED LATERALITY, UNSPECIFIED RETINOPATHY SEVERITY (HCC): Primary | ICD-10-CM

## 2019-05-07 LAB
A/G RATIO: 1.3 (ref 1.1–2.2)
ALBUMIN SERPL-MCNC: 4.2 G/DL (ref 3.4–5)
ALP BLD-CCNC: 90 U/L (ref 40–129)
ALT SERPL-CCNC: 14 U/L (ref 10–40)
ANION GAP SERPL CALCULATED.3IONS-SCNC: 15 MMOL/L (ref 3–16)
AST SERPL-CCNC: 15 U/L (ref 15–37)
BILIRUB SERPL-MCNC: 0.4 MG/DL (ref 0–1)
BUN BLDV-MCNC: 32 MG/DL (ref 7–20)
CALCIUM SERPL-MCNC: 9.7 MG/DL (ref 8.3–10.6)
CHLORIDE BLD-SCNC: 92 MMOL/L (ref 99–110)
CO2: 33 MMOL/L (ref 21–32)
CREAT SERPL-MCNC: 1.4 MG/DL (ref 0.6–1.2)
GFR AFRICAN AMERICAN: 46
GFR NON-AFRICAN AMERICAN: 38
GLOBULIN: 3.3 G/DL
GLUCOSE BLD-MCNC: 205 MG/DL (ref 70–99)
POTASSIUM SERPL-SCNC: 3.4 MMOL/L (ref 3.5–5.1)
SODIUM BLD-SCNC: 140 MMOL/L (ref 136–145)
TOTAL PROTEIN: 7.5 G/DL (ref 6.4–8.2)

## 2019-05-07 PROCEDURE — G8417 CALC BMI ABV UP PARAM F/U: HCPCS | Performed by: PSYCHIATRY & NEUROLOGY

## 2019-05-07 PROCEDURE — 99213 OFFICE O/P EST LOW 20 MIN: CPT | Performed by: PSYCHIATRY & NEUROLOGY

## 2019-05-07 PROCEDURE — 3017F COLORECTAL CA SCREEN DOC REV: CPT | Performed by: PSYCHIATRY & NEUROLOGY

## 2019-05-07 PROCEDURE — G8400 PT W/DXA NO RESULTS DOC: HCPCS | Performed by: PSYCHIATRY & NEUROLOGY

## 2019-05-07 PROCEDURE — G8427 DOCREV CUR MEDS BY ELIG CLIN: HCPCS | Performed by: PSYCHIATRY & NEUROLOGY

## 2019-05-07 PROCEDURE — 1036F TOBACCO NON-USER: CPT | Performed by: PSYCHIATRY & NEUROLOGY

## 2019-05-07 PROCEDURE — 1123F ACP DISCUSS/DSCN MKR DOCD: CPT | Performed by: PSYCHIATRY & NEUROLOGY

## 2019-05-07 PROCEDURE — 1090F PRES/ABSN URINE INCON ASSESS: CPT | Performed by: PSYCHIATRY & NEUROLOGY

## 2019-05-07 PROCEDURE — 36415 COLL VENOUS BLD VENIPUNCTURE: CPT | Performed by: FAMILY MEDICINE

## 2019-05-07 PROCEDURE — 4040F PNEUMOC VAC/ADMIN/RCVD: CPT | Performed by: PSYCHIATRY & NEUROLOGY

## 2019-05-07 ASSESSMENT — SLEEP AND FATIGUE QUESTIONNAIRES
HOW LIKELY ARE YOU TO NOD OFF OR FALL ASLEEP WHILE SITTING AND READING: 1
HOW LIKELY ARE YOU TO NOD OFF OR FALL ASLEEP WHILE LYING DOWN TO REST IN THE AFTERNOON WHEN CIRCUMSTANCES PERMIT: 0
ESS TOTAL SCORE: 2
HOW LIKELY ARE YOU TO NOD OFF OR FALL ASLEEP WHILE SITTING AND TALKING TO SOMEONE: 0
HOW LIKELY ARE YOU TO NOD OFF OR FALL ASLEEP WHILE SITTING QUIETLY AFTER LUNCH WITHOUT ALCOHOL: 0
HOW LIKELY ARE YOU TO NOD OFF OR FALL ASLEEP WHILE WATCHING TV: 1
HOW LIKELY ARE YOU TO NOD OFF OR FALL ASLEEP IN A CAR, WHILE STOPPED FOR A FEW MINUTES IN TRAFFIC: 0
HOW LIKELY ARE YOU TO NOD OFF OR FALL ASLEEP WHILE SITTING INACTIVE IN A PUBLIC PLACE: 0
HOW LIKELY ARE YOU TO NOD OFF OR FALL ASLEEP WHEN YOU ARE A PASSENGER IN A CAR FOR AN HOUR WITHOUT A BREAK: 0

## 2019-05-07 ASSESSMENT — ENCOUNTER SYMPTOMS
ALLERGIC/IMMUNOLOGIC NEGATIVE: 1
APNEA: 0
CHOKING: 0
GASTROINTESTINAL NEGATIVE: 1

## 2019-05-07 NOTE — PATIENT INSTRUCTIONS

## 2019-05-07 NOTE — PROGRESS NOTES
MD ALICIA Mccoy Board Certified in Sleep Medicine  Certified in [address] [place] [address] Certified in Neurology Stefany Jonanadiacarson [phone],  Davey Cordova 65  [phone]   [address] [place] [address], [address] [facility]                      [address]  [address] [facility] [address] [phone]    Subjective:     Patient ID: Abdirahman Segovia is a 70 y.o. female. Chief Complaint   Patient presents with    Sleep Apnea     1st cpap follow up / doesn't like the feel of mask        HPI:        Abdirahman Segovia is a 70 y.o. female was seen today as a follow for obstructive sleep apnea. The patient underwent comprehensive polysomnogram on 01/31/2019, the overnight registration revealed moderate obstructive sleep apnea with apnea hypopnea index of 24.8 with lowest O2 saturation of 86%, patient spent about 34.7 minutes below 90%. Patient is using the PAP machine about 100% of the time, more than 4 hours a nightabout  83 %, in total average of 5:7 hours a night in last 30 days. Currently on PAP at 13 cm 95%, the AHI is only 0.1 events per hour atthis pressure. Patient improved regarding daytime sleepiness and fatigue, wakes up refreshed in the morning. The Patient scored Total score: 2 on [place] Sleepiness Scale ( more than 10 is indicative of daytime sleepiness)   Patient has no problem with PAP pressure or mask, uses nasal pillow mask.       DOT/CDL - N/A        Previous Report(s)Reviewed: historical medical records         Social History     Socioeconomic History    Marital status:      Spouse name: Not on file    Number of children: Not on file    Years of education: Not on file    Highest education level: Not on file   Occupational History    Not on file   Social Needs    Financial resource strain: Not on file    Food insecurity: Worry: Not on file     Inability: Not on file    Transportation needs:     Medical: Not on file     Non-medical: Not on file   Tobacco Use    Smoking status: Never Smoker    Smokeless tobacco: Never Used   Substance and Sexual Activity    Alcohol use: Yes     Alcohol/week: 1.2 oz     Types: 2 Glasses of wine per week     Comment: only on occasional    Drug use: No    Sexual activity: Yes     Partners: Female   Lifestyle    Physical activity:     Days per week: Not on file     Minutes per session: Not on file    Stress: Not on file   Relationships    Social connections:     Talks on phone: Not on file     Gets together: Not on file     Attends [employer] service: Not on file     Active member of club or organization: Not on file     Attends meetings of clubs or organizations: Not on file     Relationship status: Not on file    Intimate partner violence:     Fear of current or ex partner: Not on file     Emotionally abused: Not on file     Physically abused: Not on file     Forced sexual activity: Not on file   Other Topics Concern    Not on file   Social History Narrative    Not on file       Prior to Admission medications    Medication Sig Start Date End Date Taking? Authorizing Provider   metoprolol succinate (TOPROL XL) 25 MG extended release tablet Take 1 tablet by mouth daily 4/26/19  Yes Liborio Guerra MD   HYDROcodone-acetaminophen (NORCO) 5-325 MG per tablet Take 1 tablet by mouth every 8 hours as needed for Pain for up to 30 days.  4/26/19 5/26/19 Yes Vale Zuleta MD   metFORMIN (GLUCOPHAGE) 1000 MG tablet Take 1 tablet by mouth 2 times daily (with meals) 4/22/19  Yes Liborio Guerra MD   atorvastatin (LIPITOR) 20 MG tablet TAKE 1 TABLET BY MOUTH ONE TIME A DAY 4/22/19  Yes Liborio Guerra MD   metolazone (ZAROXOLYN) 5 MG tablet Take 1 tablet by mouth Twice a Week 4/8/19  Yes Edmond Garcia MD   torsemide (DEMADEX) 20 MG tablet Take 1 tablet by mouth 2 times daily 3/18/19  Yes Camillo Hashimoto, MD   potassium chloride (KLOR-CON M) 20 MEQ TBCR extended release tablet Take 1 tablet by mouth daily Take one tablet po daily 2/11/19  Yes Amy Alcocer MD   Ferrous Sulfate (IRON) 28 MG TABS Take by mouth   Yes Historical Provider, MD   Multiple Vitamin (MULTI-VITAMIN DAILY PO) Take by mouth   Yes Historical Provider, MD   vitamin D (CHOLECALCIFEROL) 1000 UNIT TABS tablet Take 1,000 Units by mouth daily   Yes Historical Provider, MD   cetirizine (ZYRTEC ALLERGY) 10 MG tablet Take 10 mg by mouth daily   Yes Historical Provider, MD       Allergies as of 05/07/2019 - Review Complete 05/07/2019   Allergen Reaction Noted    Pcn [penicillins] Shortness Of Breath 07/08/2016    Spironolactone Rash 03/26/2018    Coreg [carvedilol]  05/05/2017    Lisinopril Other (See Comments) 10/27/2017       Patient Active Problem List   Diagnosis    Hypertension    HLD (hyperlipidemia)    Positive FIT (fecal immunochemical test)    Diabetes with retinopathy ([address]    Volume overload    Malignant neoplasm of ascending colon ([address]    Pulmonary hypertension (HCC)    Anemia    CKD (chronic kidney disease) stage 3, GFR 30-59 ml/min (HCC)    Uterine mass    Morbid obesity ([address]    BRIANNA (obstructive sleep apnea)    PLMD (periodic limb movement disorder)       Past Medical History:   Diagnosis Date    Anemia     CKD (chronic kidney disease) stage 3, GFR 30-59 ml/min (HCC)     Diabetes ([address]     Diabetes ([address]     Diabetes with retinopathy ([address]     DM eye exam 12/18/17    HLD (hyperlipidemia)     Hypertension     Positive FIT (fecal immunochemical test)     Pulmonary hypertension ([address]        Past Surgical History:   Procedure Laterality Date    COLONOSCOPY N/A 11/5/2018    COLONOSCOPY WITH BIOPSY performed by Saurabh Lawrence MD at [address] [place] [address] Right 11/08/2018    LAPAROSCOPIC RIGHT HEMICOLECTOMY and umbilical hernia repair    [place] OFFICE/OUTPT VISIT,PROCEDURE ONLY Right 11/8/2018    LAPAROSCOPIC RIGHT HEMICOLECTOMY and umbilical hernia repair performed by Riley Mohs, MD at [facility] 11/5/2018    EGD BIOPSY performed by Saurabh Lawrence MD at [address] [place] [address] History   Problem Relation Age of Onset    Lung Cancer Mother     High Blood Pressure Father     Lung Cancer Father     Lupus Sister        Review of Systems   Constitutional: Negative for fatigue. Respiratory: Negative for apnea and choking. Cardiovascular: Positive for leg swelling (improved ). Gastrointestinal: Negative. Endocrine: Negative. Genitourinary: Frequency: improved to once a night. Musculoskeletal: Negative. Allergic/Immunologic: Negative. Neurological: Negative. Negative for headaches. Hematological: Negative. Psychiatric/Behavioral: Negative. All other systems reviewed and are negative. Objective:     Vitals:  Weight BMI Neck circumference    Wt Readings from Last 3 Encounters:   05/07/19 268 lb (121.6 kg)   04/26/19 269 lb (122 kg)   04/08/19 265 lb (120.2 kg)    Body mass index is 47.47 kg/m². BP HR SaO2   BP Readings from Last 3 Encounters:   05/07/19 138/84   04/26/19 (!) 179/98   04/08/19 (!) 158/86    Pulse Readings from Last 3 Encounters:   05/07/19 80   04/26/19 105   04/08/19 90    SpO2 Readings from Last 3 Encounters:   05/07/19 96%   04/08/19 96%   01/14/19 93%              Themandibular molar Class :   [x]1 []2 []3      Mallampati I Airway Classification:   []1 []2 [x]3 []4      Physical Exam   Constitutional: No distress. HENT:   Nose: Nose normal.   Eyes: EOM are normal.   Neck: Neck supple. Cardiovascular: Normal rate and regular rhythm. Pulmonary/Chest: Effort normal and breath sounds normal.   Musculoskeletal: She exhibits edema (LE 1+). Neurological: She is alert. Skin: Skin is warm. Psychiatric: She has a normal mood and affect.    Nursing note and vitals reviewed. Assessment:   Moderate Obstructive Sleep Apnea/Hypopnea Syndrome under good control on PAP at 13 cmwp. Diagnosis Orders   1. BRIANNA on CPAP     2. Dependence on other enabling machines and devices       Plan: Will continue the APAP between 5 and 13  cmwp,   I will order PAP supplies, mask, filters. ... No orders of the defined types were placed in this encounter. Return in about 1 year (around 5/7/2020) for Reveiwing CPAP usage and compliance report and tro.     Carlos Guerrero MD  Medical Director - [facility]

## 2019-05-08 ENCOUNTER — HOSPITAL ENCOUNTER (OUTPATIENT)
Dept: PHYSICAL THERAPY | Age: 64
Setting detail: THERAPIES SERIES
Discharge: HOME | End: 2019-05-08
Payer: MEDICARE

## 2019-05-08 DIAGNOSIS — E11.9 TYPE 2 DIABETES MELLITUS WITHOUT COMPLICATION, WITHOUT LONG-TERM CURRENT USE OF INSULIN (HCC): ICD-10-CM

## 2019-05-08 PROCEDURE — 97110 THERAPEUTIC EXERCISES: CPT

## 2019-05-08 RX ORDER — GLIPIZIDE 10 MG/1
10 TABLET ORAL
Qty: 180 TABLET | Refills: 0
Start: 2019-05-08 | End: 2019-05-26 | Stop reason: SDUPTHER

## 2019-05-10 ENCOUNTER — HOSPITAL ENCOUNTER (OUTPATIENT)
Dept: PHYSICAL THERAPY | Age: 64
Setting detail: THERAPIES SERIES
Discharge: HOME | End: 2019-05-10
Payer: MEDICARE

## 2019-05-10 PROCEDURE — 97110 THERAPEUTIC EXERCISES: CPT

## 2019-05-10 RX ORDER — TORSEMIDE 20 MG/1
20 TABLET ORAL 2 TIMES DAILY
Qty: 180 TABLET | Refills: 0 | Status: SHIPPED | OUTPATIENT
Start: 2019-05-10 | End: 2019-08-12 | Stop reason: SDUPTHER

## 2019-05-10 NOTE — FLOWSHEET NOTE
Leg press 70# x 30 Added 5/3/19   Incline Board 3 x 30 sec Added 5/3/19   Step lunges 3 x 30 sec Added 5/3/19    TKE 2 # x 30    Hip flexor with resistiance 10X 5/3/19   Pilates Reformer 2 springs with arms overhead 5/10/19, too difficult getting on and off so may avoid in the future   Step ups   6 inch x 20 5/10/19   SLR 10X 5/10/19     Other Therapeutic Activities:  Patient was educated on diagnosis, plan of care and prognosis of their complaint. Also, frequency and duration of treatments was discussed. Patient was informed of the attendance policy and issued a copy for their records. 4/30/19: Pt will get light wt acewrap. 5/10/19: Assisted with ace wap on legs    Home Exercise Program: Patient was given written instructions for home exercises as above. Patient performs them correctly and understands purpose. 5/3/19: Added exercises as above. Gave orange t band    Manual Treatments:      Modalities:      Timed Code Treatment Minutes:    40  Total Treatment Minutes:    42  Treatment/Activity Tolerance:  [x] Patient tolerated treatment well [] Patient limited by fatigue  [] Patient limited by pain  [] Patient limited by other medical complications  [] Other:     Prognosis: [x] Good [] Fair  [] Poor    Goals:    Short term goals  Time Frame for Short term goals: 5 weeks  Short term goal 1: Pt will increase bilateral hip flexor strength by at least 1/2 grade   Short term goal 2: Pt will note better flexiibility in hips and knees  Short term goal 3: Pt will be able to negotiate steps better and ambuation on level surfaces. as well      Long term goals  Time Frame for Long term goals : Upon DC  Long term goal 1: Pt will be independent in HEP     Patient goals :  \"To get back to normal activity and have the ability to exercise      Patient Requires Follow-up: [x] Yes  [] No    Plan:   [x] Continue per plan of care [] Alter current plan (see comments)  [] Plan of care initiated [] Hold pending MD visit [] Discharge    Plan for Next Session: Continue with exercise progression.   Electronically signed by:  Shady Aldana, PT,

## 2019-05-13 ENCOUNTER — ANESTHESIA EVENT (OUTPATIENT)
Dept: ENDOSCOPY | Age: 64
End: 2019-05-13
Payer: MEDICARE

## 2019-05-13 RX ORDER — IBUPROFEN/DIPHENHYDRAMINE CIT 200MG-38MG
1 TABLET ORAL DAILY
COMMUNITY

## 2019-05-13 NOTE — PROGRESS NOTES
[address] time____0700________        Surgery time_____0830_______    Take the following medications with a sip of water: AS DIRECTED BY MD    Do not eat or drink anything after 12:00 midnight prior to your surgery. This includes water chewing gum, mints and ice chips. You may brush your teeth and gargle the morning of your surgery, but do not swallow the water     Please see your family doctor/pediatrician for a history and physical and/or concerning medications. Bring any test results/reports from your physicians office. If you are under the care of a heart doctor or specialist doctor, please be aware that you may be asked to them for clearance    You may be asked to stop blood thinners such as Coumadin, Plavix, Fragmin, Lovenox, etc., or any anti-inflammatories such as:  Aspirin, Ibuprofen, Advil, Naproxen prior to your surgery. We also ask that you stop any OTC medications such as fish oil, vitamin E, glucosamine, garlic, Multivitamins, COQ 10, etc. MAY TAKE TYLENOL    We ask that you do not smoke 24 hours prior to surgery  We ask that you do not  drink any alcoholic beverages 24 hours prior to surgery     You must make arrangements for a responsible adult to take you home after your surgery. For your safety you will not be allowed to leave alone or drive yourself home. Your surgery will be cancelled if you do not have a ride home. Also for your safety, it is strongly suggested that someone stay with you the first 24 hours after your surgery. A parent or legal guardian must accompany a child scheduled for surgery and plan to stay at the hospital until the child is discharged. Please do not bring other children with you. For your comfort, please wear simple loose fitting clothing to the hospital.  Please do not bring valuables.     Do not wear any make-up or nail polish on your fingers or toes      For your safety, please do not wear any jewelry or body piercing's on the day of surgery. All jewelry must be removed. If you have dentures, they will be removed before going to operating room. For your convenience, we will provide you with a container. If you wear contact lenses or glasses, they will be removed, please bring a case for them. If you have a living will and a durable power of  for healthcare, please bring in a copy. As part of our patient safety program to minimize surgical site infections, we ask you to do the following:    · Please notify your surgeon if you develop any illness between         now and the  day of your surgery. · This includes a cough, cold, fever, sore throat, nausea,         or vomiting, and diarrhea, etc.  ·  Please notify your surgeon if you experience dizziness, shortness         of breath or blurred vision between now and the time of your surgery. Do not shave your operative site 96 hours prior to surgery. For face and neck surgery, men may use an electric razor 48 hours   prior to surgery. You may shower the night before surgery or the morning of   your surgery with an antibacterial soap. You will need to bring a photo ID and insurance card    [facility] has an onsite pharmacy, would you like to utilize our pharmacy     If you will be staying overnight and use a C-pap machine, please bring   your C-pap to hospital     Our goal is to provide you with excellent care, therefore, visitors will be limited to two(2) in the room at a time so that we may focus on providing this care for you. Please contact pre-admission testing if you have any further questions. [facility] phone number:  [address] PAT fax number:  [phone]  Please note these are generalized instructions for all surgical cases, you may be provided with more specific instructions according to your surgery.

## 2019-05-13 NOTE — PROGRESS NOTES
C-Difficile admission screening and protocol:     * Admitted with diarrhea? NO     *Prior history of C-Diff. In last 3 months? NO     *Antibiotic use in the past 6-8 weeks? NO     *Prior hospitalization or nursing home in the last month?     NO

## 2019-05-14 ENCOUNTER — ANESTHESIA (OUTPATIENT)
Dept: ENDOSCOPY | Age: 64
End: 2019-05-14
Payer: MEDICARE

## 2019-05-14 ENCOUNTER — HOSPITAL ENCOUNTER (OUTPATIENT)
Age: 64
Setting detail: OUTPATIENT SURGERY
Discharge: HOME | End: 2019-05-14
Attending: INTERNAL MEDICINE | Admitting: INTERNAL MEDICINE
Payer: MEDICARE

## 2019-05-14 VITALS
HEIGHT: 63 IN | WEIGHT: 266.1 LBS | TEMPERATURE: 97.4 F | OXYGEN SATURATION: 96 % | BODY MASS INDEX: 47.15 KG/M2 | HEART RATE: 79 BPM | SYSTOLIC BLOOD PRESSURE: 195 MMHG | RESPIRATION RATE: 18 BRPM | DIASTOLIC BLOOD PRESSURE: 90 MMHG

## 2019-05-14 VITALS
DIASTOLIC BLOOD PRESSURE: 79 MMHG | RESPIRATION RATE: 19 BRPM | SYSTOLIC BLOOD PRESSURE: 173 MMHG | OXYGEN SATURATION: 100 %

## 2019-05-14 DIAGNOSIS — C18.2 MALIGNANT NEOPLASM OF ASCENDING COLON (HCC): ICD-10-CM

## 2019-05-14 LAB
GLUCOSE BLD-MCNC: 212 MG/DL (ref 70–99)
GLUCOSE BLD-MCNC: 234 MG/DL (ref 70–99)
PERFORMED ON: ABNORMAL
PERFORMED ON: ABNORMAL

## 2019-05-14 PROCEDURE — 7100000001 HC PACU RECOVERY - ADDTL 15 MIN: Performed by: INTERNAL MEDICINE

## 2019-05-14 PROCEDURE — 3609010600 HC COLONOSCOPY POLYPECTOMY SNARE/COLD BIOPSY: Performed by: INTERNAL MEDICINE

## 2019-05-14 PROCEDURE — 88305 TISSUE EXAM BY PATHOLOGIST: CPT

## 2019-05-14 PROCEDURE — 7100000010 HC PHASE II RECOVERY - FIRST 15 MIN: Performed by: INTERNAL MEDICINE

## 2019-05-14 PROCEDURE — 6360000002 HC RX W HCPCS

## 2019-05-14 PROCEDURE — 2709999900 HC NON-CHARGEABLE SUPPLY: Performed by: INTERNAL MEDICINE

## 2019-05-14 PROCEDURE — 7100000011 HC PHASE II RECOVERY - ADDTL 15 MIN: Performed by: INTERNAL MEDICINE

## 2019-05-14 PROCEDURE — 3700000001 HC ADD 15 MINUTES (ANESTHESIA): Performed by: INTERNAL MEDICINE

## 2019-05-14 PROCEDURE — 2500000003 HC RX 250 WO HCPCS

## 2019-05-14 PROCEDURE — 2580000003 HC RX 258: Performed by: ANESTHESIOLOGY

## 2019-05-14 PROCEDURE — 3700000000 HC ANESTHESIA ATTENDED CARE: Performed by: INTERNAL MEDICINE

## 2019-05-14 PROCEDURE — 7100000000 HC PACU RECOVERY - FIRST 15 MIN: Performed by: INTERNAL MEDICINE

## 2019-05-14 RX ORDER — PROPOFOL 10 MG/ML
INJECTION, EMULSION INTRAVENOUS CONTINUOUS PRN
Status: DISCONTINUED | OUTPATIENT
Start: 2019-05-14 | End: 2019-05-14 | Stop reason: SDUPTHER

## 2019-05-14 RX ORDER — LIDOCAINE HYDROCHLORIDE 20 MG/ML
INJECTION, SOLUTION EPIDURAL; INFILTRATION; INTRACAUDAL; PERINEURAL PRN
Status: DISCONTINUED | OUTPATIENT
Start: 2019-05-14 | End: 2019-05-14 | Stop reason: SDUPTHER

## 2019-05-14 RX ORDER — SODIUM CHLORIDE 9 MG/ML
INJECTION, SOLUTION INTRAVENOUS CONTINUOUS
Status: DISCONTINUED | OUTPATIENT
Start: 2019-05-14 | End: 2019-05-14 | Stop reason: HOSPADM

## 2019-05-14 RX ORDER — PROPOFOL 10 MG/ML
INJECTION, EMULSION INTRAVENOUS PRN
Status: DISCONTINUED | OUTPATIENT
Start: 2019-05-14 | End: 2019-05-14 | Stop reason: SDUPTHER

## 2019-05-14 RX ORDER — LORATADINE 10 MG/1
10 TABLET ORAL DAILY
COMMUNITY
End: 2019-09-20

## 2019-05-14 RX ORDER — SODIUM CHLORIDE 0.9 % (FLUSH) 0.9 %
10 SYRINGE (ML) INJECTION PRN
Status: DISCONTINUED | OUTPATIENT
Start: 2019-05-14 | End: 2019-05-14 | Stop reason: HOSPADM

## 2019-05-14 RX ORDER — SODIUM CHLORIDE 0.9 % (FLUSH) 0.9 %
10 SYRINGE (ML) INJECTION EVERY 12 HOURS SCHEDULED
Status: DISCONTINUED | OUTPATIENT
Start: 2019-05-14 | End: 2019-05-14 | Stop reason: HOSPADM

## 2019-05-14 RX ADMIN — PROPOFOL 50 MG: 10 INJECTION, EMULSION INTRAVENOUS at 08:36

## 2019-05-14 RX ADMIN — SODIUM CHLORIDE: 9 INJECTION, SOLUTION INTRAVENOUS at 07:23

## 2019-05-14 RX ADMIN — LIDOCAINE HYDROCHLORIDE 100 MG: 20 INJECTION, SOLUTION EPIDURAL; INFILTRATION; INTRACAUDAL; PERINEURAL at 08:36

## 2019-05-14 RX ADMIN — PROPOFOL 140 MCG/KG/MIN: 10 INJECTION, EMULSION INTRAVENOUS at 08:34

## 2019-05-14 ASSESSMENT — PULMONARY FUNCTION TESTS
PIF_VALUE: 0

## 2019-05-14 ASSESSMENT — PAIN SCALES - GENERAL
PAINLEVEL_OUTOF10: 0

## 2019-05-14 ASSESSMENT — PAIN - FUNCTIONAL ASSESSMENT: PAIN_FUNCTIONAL_ASSESSMENT: 0-10

## 2019-05-14 ASSESSMENT — ENCOUNTER SYMPTOMS: SHORTNESS OF BREATH: 1

## 2019-05-14 ASSESSMENT — LIFESTYLE VARIABLES: SMOKING_STATUS: 0

## 2019-05-14 NOTE — ANESTHESIA PRE PROCEDURE
Department of Anesthesiology  Preprocedure Note       Name:  Swati Alexis   Age:  70 y.o.  :  1955                                          MRN:  [id number]         Date:  2019      Surgeon: Jayro Perez):  Trixie Brothers MD    Procedure: COLONOSCOPY (N/A )    Medications prior to admission:   Prior to Admission medications    Medication Sig Start Date End Date Taking? Authorizing Provider   loratadine (CLARITIN) 10 MG tablet Take 10 mg by mouth daily    Historical Provider, MD   Multiple Vitamins-Minerals (ADULT ONE DAILY GUMMIES) CHEW Take 1 tablet by mouth daily    Historical Provider, MD   torsemide (DEMADEX) 20 MG tablet Take 1 tablet by mouth 2 times daily 5/10/19   Aung Lay MD   glipiZIDE (GLUCOTROL) 10 MG tablet Take 1 tablet by mouth 2 times daily (before meals) 19   Vale Trinidad MD   metoprolol succinate (TOPROL XL) 25 MG extended release tablet Take 1 tablet by mouth daily 19   Vale Trinidad MD   HYDROcodone-acetaminophen (NORCO) 5-325 MG per tablet Take 1 tablet by mouth every 8 hours as needed for Pain for up to 30 days. 19  Vale Trinidad MD   atorvastatin (LIPITOR) 20 MG tablet TAKE 1 TABLET BY MOUTH ONE TIME A DAY 19   Vale Trinidad MD   metolazone (ZAROXOLYN) 5 MG tablet Take 1 tablet by mouth Twice a Week  Patient taking differently: Take 2.5 mg by mouth Twice a Week  19   Dmitri Corral MD   potassium chloride (KLOR-CON M) 20 MEQ TBCR extended release tablet Take 1 tablet by mouth daily Take one tablet po daily 19   Shellie Delacruz MD       Current medications:    No current facility-administered medications for this visit. No current outpatient medications on file.      Facility-Administered Medications Ordered in Other Visits   Medication Dose Route Frequency Provider Last Rate Last Dose    0.9 % sodium chloride infusion   Intravenous Continuous Shruti Allan MD 75 mL/hr at 19 3759  sodium chloride flush 0.9 % injection 10 mL  10 mL Intravenous 2 times per day Sloane Dumont MD        sodium chloride flush 0.9 % injection 10 mL  10 mL Intravenous PRN Sloane Dumont MD           Allergies:     Allergies   Allergen Reactions    Pcn [Penicillins] Shortness Of Breath    Spironolactone Rash    Coreg [Carvedilol]      Side effects:  SOB, palpitations    Lisinopril Other (See Comments)     cough       Problem List:    Patient Active Problem List   Diagnosis Code    Hypertension I10    HLD (hyperlipidemia) E78.5    Positive FIT (fecal immunochemical test) R19.5    Diabetes with retinopathy (HCC) E11.319    Volume overload E87.70    Malignant neoplasm of ascending colon (HCC) C18.2    Pulmonary hypertension (HCC) I27.20    Anemia D64.9    CKD (chronic kidney disease) stage 3, GFR 30-59 ml/min (HCC) N18.3    Uterine mass N85.9    Morbid obesity (HCC) E66.01    BRIANNA (obstructive sleep apnea) G47.33    PLMD (periodic limb movement disorder) G47.61       Past Medical History:        Diagnosis Date    Anemia     CKD (chronic kidney disease) stage 3, GFR 30-59 ml/min (HCC)     Colon cancer (HCC)     Diabetes ([address]     Diabetes ([address]     Diabetes with retinopathy ([address]     DM eye exam 12/18/17    Hypertension     Positive FIT (fecal immunochemical test)     Pulmonary hypertension (HCC)        Past Surgical History:        Procedure Laterality Date    COLONOSCOPY N/A 11/5/2018    COLONOSCOPY WITH BIOPSY performed by Abram Ho MD at [address] [place] [address] Right 11/08/2018    LAPAROSCOPIC RIGHT HEMICOLECTOMY and umbilical hernia repair    [place] OFFICE/OUTPT VISIT,PROCEDURE ONLY Right 11/8/2018    LAPAROSCOPIC RIGHT HEMICOLECTOMY and umbilical hernia repair performed by Breanna Madrigal MD at [facility] 11/5/2018    EGD BIOPSY performed by Abram Ho MD at [address] [place] [address] History:    Social History     Tobacco Use    Smoking status: Never Smoker    Smokeless tobacco: Never Used   Substance Use Topics    Alcohol use: Yes     Alcohol/week: 1.2 oz     Types: 2 Glasses of wine per week     Comment: only on occasional                                Counseling given: Not Answered      Vital Signs (Current): There were no vitals filed for this visit.                                            BP Readings from Last 3 Encounters:   05/14/19 (!) 191/99   05/07/19 138/84   04/26/19 (!) 179/98       NPO Status:  >8h                                                                               BMI:   Wt Readings from Last 3 Encounters:   05/14/19 266 lb 1.5 oz (120.7 kg)   05/07/19 268 lb (121.6 kg)   04/26/19 269 lb (122 kg)     There is no height or weight on file to calculate BMI.    CBC:   Lab Results   Component Value Date    WBC 9.4 04/26/2019    RBC 4.25 04/26/2019    HGB 12.8 04/26/2019    HCT 38.6 04/26/2019    MCV 90.7 04/26/2019    RDW 16.1 04/26/2019     04/26/2019       CMP:   Lab Results   Component Value Date     05/07/2019    K 3.4 05/07/2019    CL 92 05/07/2019    CO2 33 05/07/2019    BUN 32 05/07/2019    CREATININE 1.4 05/07/2019    GFRAA 46 05/07/2019    AGRATIO 1.3 05/07/2019    LABGLOM 38 05/07/2019    GLUCOSE 205 05/07/2019    PROT 7.5 05/07/2019    CALCIUM 9.7 05/07/2019    BILITOT 0.4 05/07/2019    ALKPHOS 90 05/07/2019    AST 15 05/07/2019    ALT 14 05/07/2019       POC Tests:   Recent Labs     05/14/19  0724   POCGLU 234*       Coags: No results found for: PROTIME, INR, APTT    HCG (If Applicable): No results found for: PREGTESTUR, PREGSERUM, HCG, HCGQUANT     ABGs: No results found for: PHART, PO2ART, NSS8ONY, NTO6WHF, BEART, N2FNLWIF     Type & Screen (If Applicable):  No results found for: LABABO, [address]    Anesthesia Evaluation  Patient summary reviewed no history of anesthetic complications:   Airway: Mallampati: II  TM distance: <3 FB   Neck ROM: limited  Mouth opening: > = 3 FB Dental:          Pulmonary: breath sounds clear to auscultation  (+) shortness of breath (AT ADMIT , RESOLVED ):      (-) COPD, asthma, sleep apnea and not a current smoker          Patient did not smoke on day of surgery. Cardiovascular:    (+) hypertension:, valvular problems/murmurs (MIN-MILD, ON ECHO): AS, CHF:, hyperlipidemia    (-) CAD, CABG/stent and dysrhythmias      Rhythm: regular  Rate: normal  Echocardiogram reviewed         Beta Blocker:  Not on Beta Blocker         Neuro/Psych:   Negative Neuro/Psych ROS              GI/Hepatic/Renal:   (+) renal disease: CRI, morbid obesity     (-) liver disease       Endo/Other:    (+) Diabetes (NOT INSULIN AT HOME)Type II DM, using insulin, blood dyscrasia::., malignancy/cancer (COLON). (-) hypothyroidism, hyperthyroidism               Abdominal:   (+) obese,     Abdomen: soft. Vascular: negative vascular ROS. Anesthesia Plan      MAC and TIVA     ASA 3       Induction: intravenous. Anesthetic plan and risks discussed with patient. Plan discussed with CRNA. This pre-anesthesia assessment may be used as a history and physical.    DOS STAFF ADDENDUM:    Pt seen and examined, chart reviewed (including anesthesia, drug and allergy history). No interval changes to history and physical examination. Anesthetic plan, risks, benefits, alternatives, and personnel involved discussed with patient. Patient verbalized an understanding and agrees to proceed.       Chito Dominguez MD  May 14, 2019  7:53 AM      Chito Dominguez MD   5/14/2019

## 2019-05-14 NOTE — OP NOTE
Colonoscopy Procedure Note      Patient: Vasiliy Rojas  : 1955  Acct#:     Procedure: Colonoscopy with polypectomy (cold snare)    Date:  2019    Surgeon:  Juan Conte MD    Referring Physician:  Mariam Ashley MD    Previous Colonoscopy: YES  Date: 2018, where colon adenocarcinoma was diagnosed. Preoperative Diagnosis:  The patient is a 70 y.o. female  who presents for colonoscopy due to history of colon adenocarcinoma s/p right hemicolectomy 2018 with negative margins, and negative lymph nodes. Postoperative Diagnosis:   1. 3 mm flat polyp at the ileocolonic anastamosis, resected with cold snare  2. 4 mm polyp in the proximal transverse colon, resected with cold snare  3. Healthy appearing end-to-side ileocolonic anastamosis    Consent:  The patient or their legal guardian has signed a consent, and is aware of the potential risks, benefits, alternatives, and potential complications of this procedure. These include, but are not limited to hemorrhage, bleeding, post procedural pain, perforation, phlebitis, aspiration, hypotension, hypoxia, cardiovascular events such as arryhthmia, and possibly death. Additionally, the possibility of missed colonic polyps and interval colon cancer was discussed in the consent. Anesthesia:  MAC    Procedure: An informed consent was obtained from the patient after explanation of indications, benefits, possible risks and complications of the procedure. The patient was then taken to the endoscopy suite, placed in the left lateral decubitus position, and the above IV anesthesia was administered. A digital rectal examination was performed and revealed negative without mass, lesions or tenderness. The Olympus video colonoscope was placed in the patient's rectum under digital direction and advanced to the ileocolonic anastamosis, identified by characteristic anatomy and ballottment. The preparation was fair-to-good.     The examined terminal ileum was normal.    The scope was then withdrawn back through the cecum, ascending, transverse, descending, sigmoid colon, and rectum. Careful circumferential examination of the mucosa in these areas demonstrated a healthy appearing end-to-side ileocolonic anastamosis. A 3 mm flat polyp was seen at the ileocolonic anastamosis site, and it was resected with cold snare. Additionally, a 4 mm polyp was present in the proximal transverse colon, at the blind pouch, and it was resected with cold snare    The scope was then withdrawn into the rectum and retroflexed. The retroflexed view of the anal verge and rectum demonstrates an unremarkable anorectal verge. The scope was straightened, the colon was decompressed and the scope was withdrawn from the patient. The patient tolerated the procedure well and was taken to the PACU in good condition. Estimated blood loss: Minimal    Impression:  See post-procedure diagnoses. Recommendations:   - Follow-up pathology results in 7 days, by calling the office at [phone].  - Resume regular medications. - Resume diet as tolerated. - Repeat colonoscopy in 1 year for further surveillance.       ABEL Dhillon [address]  5/14/2019  [phone]

## 2019-05-14 NOTE — H&P
Pre-operative History and Physical    Patient: Chetna Mcgovern  : 1955  Acct#:     Intended Procedure:  Colonoscopy    HISTORY OF PRESENT ILLNESS:  The patient is a 70 y.o. female  who presents for colonoscopy due to history of colon adenocarcinoma s/p right hemicolectomy 2018 with negative margins, and negative lymph nodes. Past Medical History:        Diagnosis Date    Anemia     CKD (chronic kidney disease) stage 3, GFR 30-59 ml/min (HCC)     Colon cancer (HCC)     Diabetes ([address]     Diabetes ([address]     Diabetes with retinopathy ([address]     DM eye exam 17    Hypertension     Positive FIT (fecal immunochemical test)     Pulmonary hypertension ([address]      Past Surgical History:        Procedure Laterality Date    COLONOSCOPY N/A 2018    COLONOSCOPY WITH BIOPSY performed by Madonna Eddy MD at [address] [place] [address] Right 2018    LAPAROSCOPIC RIGHT HEMICOLECTOMY and umbilical hernia repair    [place] OFFICE/OUTPT VISIT,PROCEDURE ONLY Right 2018    LAPAROSCOPIC RIGHT HEMICOLECTOMY and umbilical hernia repair performed by Asim Saba MD at [address] [place] [address] 2018    EGD BIOPSY performed by Madonna Eddy MD at [address] [place] [address]       Medications Prior to Admission:   No current facility-administered medications on file prior to encounter. Current Outpatient Medications on File Prior to Encounter   Medication Sig Dispense Refill    loratadine (CLARITIN) 10 MG tablet Take 10 mg by mouth daily      Multiple Vitamins-Minerals (ADULT ONE DAILY GUMMIES) CHEW Take 1 tablet by mouth daily      potassium chloride (KLOR-CON M) 20 MEQ TBCR extended release tablet Take 1 tablet by mouth daily Take one tablet po daily 90 tablet 1        Allergies:  Pcn [penicillins]; Spironolactone; Coreg [carvedilol]; and Lisinopril    Social History:   TOBACCO:   reports that she has never smoked.  She has never used smokeless tobacco.  ETOH:   reports that she drinks about 1.2 oz of alcohol per week. DRUGS:   reports that she does not use drugs. PHYSICAL EXAM:      Vital Signs: BP (!) 191/99   Pulse 100   Temp 98.5 °F (36.9 °C) (Oral)   Resp 20   Ht 5' 3\" (1.6 m)   Wt 266 lb 1.5 oz (120.7 kg)   SpO2 96%   BMI 47.14 kg/m²    Airway: No stridor or wheezing noted. Good air movement  Pulmonary: without wheezes. Clear to auscultation  Cardiac:regular rate and rhythm without loud murmurs  Abdomen:soft, nontender,  Bowel sounds present    Pre-Procedure Assessment / Plan:  1) Colonoscopy    ASA Grade:  ASA 3 - Patient with moderate systemic disease with functional limitations  Mallampati Classification:  Class II    Level of Sedation Plan:MAC    Post Procedure plan: Return to same level of care    I assessed the patient and find that the patient is in satisfactory condition to proceed with the planned procedure and sedation plan. I have explained the risk, benefits, and alternatives to the procedure; the patient understands and agrees to proceed.        Shelia Felipe MD  5/14/2019

## 2019-05-14 NOTE — PROGRESS NOTES
Pt arrived to PACU awake and alert, abd soft and round. Denies pain at this time. Provided snack and drink.

## 2019-05-14 NOTE — PROGRESS NOTES
Discharge instructions given to patient and family, PIV removed, denies pain at this time, instructed to dress for discharge.

## 2019-05-14 NOTE — ANESTHESIA POSTPROCEDURE EVALUATION
Department of Anesthesiology  Postprocedure Note    Patient: Marlin Borges  MRN: [id number]  YOB: 1955  Date of evaluation: 5/14/2019  Time:  2:27 PM     Procedure Summary     Date:  05/14/19 Room / Location:  [facility] 04 / [facility] ENDOSCOPY    Anesthesia Start:  0831 Anesthesia Stop:  8023    Procedure:  COLONOSCOPY POLYPECTOMY SNARE/COLD BIOPSY (N/A ) Diagnosis:       Malignant neoplasm of ascending colon ([address]      (MALIGNANT NEOPLASM OF ASCENDING COLON)    Surgeon:  Nirmala Lockwood MD Responsible Provider:  Chito Dominguez MD    Anesthesia Type:  MAC, TIVA ASA Status:  3          Anesthesia Type: MAC, TIVA    Connie Phase I: Connie Score: 10    Connie Phase II: Connie Score: 10    Last vitals: Reviewed and per EMR flowsheets.        Anesthesia Post Evaluation    Patient location during evaluation: PACU  Patient participation: complete - patient participated  Level of consciousness: awake and alert  Pain score: 2  Airway patency: patent  Nausea & Vomiting: no nausea and no vomiting  Complications: no  Cardiovascular status: blood pressure returned to baseline  Respiratory status: acceptable  Hydration status: euvolemic

## 2019-05-15 ENCOUNTER — APPOINTMENT (OUTPATIENT)
Dept: PHYSICAL THERAPY | Age: 64
End: 2019-05-15
Payer: MEDICARE

## 2019-05-17 ENCOUNTER — APPOINTMENT (OUTPATIENT)
Dept: PHYSICAL THERAPY | Age: 64
End: 2019-05-17
Payer: MEDICARE

## 2019-05-18 DIAGNOSIS — I10 ESSENTIAL HYPERTENSION: ICD-10-CM

## 2019-05-21 RX ORDER — METOPROLOL SUCCINATE 25 MG/1
25 TABLET, EXTENDED RELEASE ORAL DAILY
Qty: 90 TABLET | Refills: 1 | Status: SHIPPED | OUTPATIENT
Start: 2019-05-21 | End: 2019-07-22 | Stop reason: SDUPTHER

## 2019-05-22 ENCOUNTER — HOSPITAL ENCOUNTER (OUTPATIENT)
Dept: PHYSICAL THERAPY | Age: 64
Setting detail: THERAPIES SERIES
Discharge: HOME | End: 2019-05-22
Payer: MEDICARE

## 2019-05-22 PROCEDURE — 97110 THERAPEUTIC EXERCISES: CPT

## 2019-05-22 NOTE — FLOWSHEET NOTE
Physical Therapy Daily Treatment Note  Date:  2019    Patient Name:  Ab Narvaez    :  1955  MRN: [id number]    Restrictions/Precautions:      Pertinent Medical History:      Medical/Treatment Diagnosis Information:  · Diagnosis: Physical deconditioning  · Treatment Diagnosis: General weakness, tightness on hip and knee joints, and difficulty with walking on level surfaces and on steps     Insurance/Certification information:  PT Insurance Information: Medicare  Physician Information:  Referring Practitioner: Dr. Boles Ashley of care signed (Y/N):  Sent to Dr. Durga Simental 19  Visit# / total visits: 5/10  Pain level:  1/10     G-Code (if applicable):      Date / Visit # G-Code Applied:  /       Progress Note: [x]  Yes  [x]  No  Next due by: Visit #10      History of Injury: See below    Subjective:  Subjective  Subjective: In  pt gradually began to feel weaker. PCP referrred her to a nephrologist. She went to hospital and hospitalist learned that her hemoglobin was 4. She was very weak and fatigued. She has kidney disease and had a bleeding polyp . She had  a colon resection in November. She was having LE edema and saw Dr. Radha Schwab. She does feel better than she did  and is more energetic. She has no pain  but has oll over stiffness mostly in legs and ankles. 5/3/19: Pt reports that she has muscle soreness but already notes more flexibility. 19: Patient reports some soreness in low back,overall flexibility is better. 5/10/19: Pt reports that she has an annoying discomfort in her back  maybe from getting out on the right side of her bed. She feels stronger in her legs. 19: Pt reports shooting pain on left side of hip. Feels stronger in legs.     Objective: See eval  Observation:   Test measurements:        Exercises:  Exercise/Equipment Resistance/Repetitions Other comments   Calf stretching with towel 10 sec x 10    Hamstring stretch 3 x 30 \"    PPT with marching 5 sec x 20    Nustep 5 min , WL 7      Add   Sit to stand 12X in 30 sec Add   Leg press - 5 holes showing 70# x 30 Added 5/3/19   Incline Board 3 x 30 sec Added 5/3/19   Step lunges 3 x 30 sec Added 5/3/19    TKE 2 # x 30    Hip flexor with resistiance 10X 5/3/19   r 2 springs with arms overhead 5/10/19, too difficult getting on and off so may avoid in the future    6 inch x 20 5/10/19   SLR 10X 5/10/19   Sidestepping with orange t band around ankles 2 min 5/22/19, If OK give band for HEP    Bridging 5 sec x 10 5/22   Side circles 10X 5/22   Clamshells 20X 5/22     Other Therapeutic Activities:  Patient was educated on diagnosis, plan of care and prognosis of their complaint. Also, frequency and duration of treatments was discussed. Patient was informed of the attendance policy and issued a copy for their records. 4/30/19: Pt will get light wt acewrap. 5/10/19: Assisted with ace wap on legs    Home Exercise Program: Patient was given written instructions for home exercises as above. Patient performs them correctly and understands purpose. 5/3/19: Added exercises as above. Gave orange t band. 5/22/19: Added exercises as above- clamshells, side circles and bridging. Manual Treatments:      Modalities:      Timed Code Treatment Minutes:    37  Total Treatment Minutes:    37  Treatment/Activity Tolerance:  [x] Patient tolerated treatment well [] Patient limited by fatigue  [] Patient limited by pain  [] Patient limited by other medical complications  [] Other:     Prognosis: [x] Good [] Fair  [] Poor    Goals:    Short term goals  Time Frame for Short term goals: 5 weeks  Short term goal 1: Pt will increase bilateral hip flexor strength by at least 1/2 grade   Short term goal 2: Pt will note better flexiibility in hips and knees  Short term goal 3: Pt will be able to negotiate steps better and ambuation on level surfaces.  as well      Long term goals  Time Frame for Long term goals : Upon DC  Long term goal 1: Pt will be independent in HEP     Patient goals : \"To get back to normal activity and have the ability to exercise      Patient Requires Follow-up: [x] Yes  [] No    Plan:   [x] Continue per plan of care [] Alter current plan (see comments)  [] Plan of care initiated [] Hold pending MD visit [] Discharge    Plan for Next Session: Continue with exercise progression.   Electronically signed by:  Gertrude Alegria, PT,

## 2019-05-24 ENCOUNTER — HOSPITAL ENCOUNTER (OUTPATIENT)
Dept: PHYSICAL THERAPY | Age: 64
Setting detail: THERAPIES SERIES
Discharge: HOME | End: 2019-05-24
Payer: MEDICARE

## 2019-05-24 PROCEDURE — 97110 THERAPEUTIC EXERCISES: CPT

## 2019-05-24 NOTE — FLOWSHEET NOTE
Hamstring stretch 3 x 30 \"    PPT with marching with band 5 sec x 20    Nustep 5 min , WL 7      Add   Sit to stand 12X in 30 sec Add   Leg press - 5 holes showing 70# x 20 - 2 sets Added 5/3/19   Incline Board 3 x 30 sec Added 5/3/19   Step lunges 3 x 30 sec Added 5/3/19    TKE 3 # x 30    Hip flexor with resistiance 10X 5/3/19   r 2 springs with arms overhead 5/10/19, too difficult getting on and off so may avoid in the future    6 inch x 20 5/10/19   SLR 10X 5/10/19   Sidestepping with orange t band around ankles 2 min 5/22/19,  5/24/19 - gave band for HEP    Bridging 5 sec x 10 5/22   Side circles 10X 5/22   Clamshells with band 20X 5/22          Other Therapeutic Activities:  Patient was educated on diagnosis, plan of care and prognosis of their complaint. Also, frequency and duration of treatments was discussed. Patient was informed of the attendance policy and issued a copy for their records. 4/30/19: Pt will get light wt acewrap. 5/10/19: Assisted with ace wap on legs    Home Exercise Program: Patient was given written instructions for home exercises as above. Patient performs them correctly and understands purpose. 5/3/19: Added exercises as above. Gave orange t band. 5/22/19: Added exercises as above- clamshells, side circles and bridging. 5/24/19: Reviewed new exercises, added band for clamshells and PPT with marching. Gave another orange band for HEP.     Manual Treatments:      Modalities:      Timed Code Treatment Minutes:    37  Total Treatment Minutes:    37  Treatment/Activity Tolerance:  [x] Patient tolerated treatment well [] Patient limited by fatigue  [] Patient limited by pain  [] Patient limited by other medical complications  [] Other:     Prognosis: [x] Good [] Fair  [] Poor    Goals:    Short term goals  Time Frame for Short term goals: 5 weeks  Short term goal 1: Pt will increase bilateral hip flexor strength by at least 1/2 grade   Short term goal 2: Pt will note better flexiibility in hips and knees  Short term goal 3: Pt will be able to negotiate steps better and ambuation on level surfaces. as well      Long term goals  Time Frame for Long term goals : Upon DC  Long term goal 1: Pt will be independent in HEP     Patient goals : \"To get back to normal activity and have the ability to exercise      Patient Requires Follow-up: [x] Yes  [] No    Plan:   [x] Continue per plan of care [] Alter current plan (see comments)  [] Plan of care initiated [] Hold pending MD visit [] Discharge    Plan for Next Session: Continue with exercise progression.   Electronically signed by:  Omayra Claire, PT,

## 2019-05-26 DIAGNOSIS — E11.9 TYPE 2 DIABETES MELLITUS WITHOUT COMPLICATION, WITHOUT LONG-TERM CURRENT USE OF INSULIN (HCC): ICD-10-CM

## 2019-05-28 RX ORDER — GLIPIZIDE 10 MG/1
10 TABLET ORAL
Qty: 180 TABLET | Refills: 0 | Status: SHIPPED | OUTPATIENT
Start: 2019-05-28 | End: 2019-08-10 | Stop reason: SDUPTHER

## 2019-05-29 ENCOUNTER — HOSPITAL ENCOUNTER (OUTPATIENT)
Dept: PHYSICAL THERAPY | Age: 64
Setting detail: THERAPIES SERIES
Discharge: HOME | End: 2019-05-29
Payer: MEDICARE

## 2019-05-29 PROCEDURE — 97110 THERAPEUTIC EXERCISES: CPT

## 2019-05-29 NOTE — FLOWSHEET NOTE
Physical Therapy Daily Treatment Note  Date:  2019    Patient Name:  Sara Garcia    :  1955  MRN: [id number]    Restrictions/Precautions:      Pertinent Medical History:      Medical/Treatment Diagnosis Information:  · Diagnosis: Physical deconditioning  · Treatment Diagnosis: General weakness, tightness on hip and knee joints, and difficulty with walking on level surfaces and on steps     Insurance/Certification information:  PT Insurance Information: Medicare  Physician Information:  Referring Practitioner: Dr. Irving Generous of care signed (Y/N):  Sent to Dr. Vincent Orozco 19  Visit# / total visits: 7/10  Pain level:  3/10  left hip    G-Code (if applicable):      Date / Visit # G-Code Applied:  /       Progress Note: [x]  Yes  [x]  No  Next due by: Visit #10      History of Injury: See below    Subjective:  Subjective  Subjective: In  pt gradually began to feel weaker. PCP referrred her to a nephrologist. She went to hospital and hospitalist learned that her hemoglobin was 4. She was very weak and fatigued. She has kidney disease and had a bleeding polyp . She had  a colon resection in November. She was having LE edema and saw Dr. Juana Steen. She does feel better than she did  and is more energetic. She has no pain  but has oll over stiffness mostly in legs and ankles. 5/3/19: Pt reports that she has muscle soreness but already notes more flexibility. 19: Patient reports some soreness in low back,overall flexibility is better. 5/10/19: Pt reports that she has an annoying discomfort in her back  maybe from getting out on the right side of her bed. She feels stronger in her legs. 19: Pt reports shooting pain on left side of hip. Feels stronger in legs. 19: \"No shooting pain on left hip today. \"  19: Patient reports left hip has been hurting some when walking. States has been getting more sore after doing hip exercises at home.     Objective:   Observation: Test measurements:        Exercises:  Exercise/Equipment Resistance/Repetitions Other comments   Calf stretching with towel 10 sec x 10    Hamstring stretch 3 x 30 \"    PPT with marching with band 5 sec x 20    Nustep 5 min , WL 7      Add   Sit to stand 12X in 30 sec Add   Leg press - 5 holes showing 70# x 20 - 2 sets Added 5/3/19   Incline Board 3 x 30 sec Added 5/3/19   Step lunges 3 x 30 sec Added 5/3/19    TKE 2 # x 30    Hip flexor with resistiance 10X 5/3/19   r 2 springs with arms overhead 5/10/19, too difficult getting on and off so may avoid in the future    6 inch x 20 5/10/19   SLR 10X 5/10/19   Sidestepping with orange t band around ankles 2 min 5/22/19,  5/24/19 - gave band for HEP    Bridging 5 sec x 10 5/22   Side circles 5/22   Clamshells with band 5/22          Other Therapeutic Activities:  Patient was educated on diagnosis, plan of care and prognosis of their complaint. Also, frequency and duration of treatments was discussed. Patient was informed of the attendance policy and issued a copy for their records. 4/30/19: Pt will get light wt acewrap. 5/10/19: Assisted with ace wap on legs    Home Exercise Program: Patient was given written instructions for home exercises as above. Patient performs them correctly and understands purpose. 5/3/19: Added exercises as above. Gave orange t band. 5/22/19: Added exercises as above- clamshells, side circles and bridging. 5/24/19: Reviewed new exercises, added band for clamshells and PPT with marching. Gave another orange band for HEP.     Manual Treatments:      Modalities:      Timed Code Treatment Minutes:    35  Total Treatment Minutes:    35  Treatment/Activity Tolerance:  [x] Patient tolerated treatment well [] Patient limited by fatigue  [] Patient limited by pain  [] Patient limited by other medical complications  [] Other:     Prognosis: [x] Good [] Fair  [] Poor    Goals:    Short term goals  Time Frame for Short term goals: 5 weeks  Short term goal 1: Pt will increase bilateral hip flexor strength by at least 1/2 grade   Short term goal 2: Pt will note better flexiibility in hips and knees  Short term goal 3: Pt will be able to negotiate steps better and ambuation on level surfaces. as well      Long term goals  Time Frame for Long term goals : Upon DC  Long term goal 1: Pt will be independent in HEP     Patient goals : \"To get back to normal activity and have the ability to exercise      Patient Requires Follow-up: [x] Yes  [] No    Plan:   [x] Continue per plan of care [] Alter current plan (see comments)  [] Plan of care initiated [] Hold pending MD visit [] Discharge    Plan for Next Session: Continue with exercise progression.   Electronically signed by:  Donis Tony PTA,

## 2019-05-31 ENCOUNTER — APPOINTMENT (OUTPATIENT)
Dept: PHYSICAL THERAPY | Age: 64
End: 2019-05-31
Payer: MEDICARE

## 2019-06-05 NOTE — DISCHARGE SUMMARY
Outpatient Physical Therapy  [] [facility]    Phone: [phone]   Fax: [phone]   [x] [facility]  Phone: [phone]   Fax: [phone]  [] [facility]              Phone: [phone]   Fax: [phone]     Physical Therapy Progress/Discharge Note  Date: 2019        Patient Name:  Chetna Mcgovern    :  1955  MRN: [id number]  Restrictions/Precautions:    · Diagnosis:  Physical deconditioning  · Treatment Diagnosis: General weakness, tightness on hip and knee joints, and difficulty with walking on level surfaces and on steps      Insurance/Certification information:  PT Insurance Information: Medicare  Physician Information:  Referring Practitioner: Dr. Shirley Coe of care signed (Y/N):  Sent to Dr. Winter Gonsalez 19  Visit# / total visits: 7/10  Pain level:       3/10     left hip          Time Period for Report:  19-19  Cancels/No-shows to date:  Last 3 appts were cancelled. Plan of Care/Treatment to date:  [x] Therapeutic Exercise    [] Modalities:  [x] Therapeutic Activity     [] Ultrasound  [] Electrical Stimulation  [] Gait Training      [] Cervical Traction    [] Lumbar Traction  [] Neuromuscular Re-education  [] Cold/hotpack [] Iontophoresis  [x] Instruction in HEP      Other:  [] Manual Therapy       []    [] Aquatic Therapy       []                    ? Significant Findings At Last Visit/Comments:    Subjective:     Subjective: In  pt gradually began to feel weaker. PCP referrred her to a nephrologist. She went to hospital and hospitalist learned that her hemoglobin was 4. She was very weak and fatigued. She has kidney disease and had a bleeding polyp . She had  a colon resection in November. She was having LE edema and saw Dr. Amaury Melchor. She does feel better than she did  and is more energetic.    She has no pain  but has oll over stiffness mostly in legs and ankles.       5/3/19: Pt reports that she has muscle soreness but already notes more per initial plan of Care     [x] Patient now discharged as she chose to cancel remaining visits. [] Additional visits requested, Please re-certify for additional visits:      Requested frequency/duration:  X/week for weeks    Electronically signed by:  Carlos Bentley PT    If you have any questions or concerns, please don't hesitate to call.   Thank you for your referral.    Physician Signature:________________________________Date:__________________  By signing above, therapists plan is approved by physician

## 2019-06-10 DIAGNOSIS — E11.9 TYPE 2 DIABETES MELLITUS WITHOUT COMPLICATION, WITHOUT LONG-TERM CURRENT USE OF INSULIN (HCC): ICD-10-CM

## 2019-06-10 RX ORDER — GLIPIZIDE 5 MG/1
TABLET ORAL
Qty: 180 TABLET | Refills: 0 | OUTPATIENT
Start: 2019-06-10

## 2019-07-17 DIAGNOSIS — E11.9 TYPE 2 DIABETES MELLITUS WITHOUT COMPLICATION, WITHOUT LONG-TERM CURRENT USE OF INSULIN (HCC): ICD-10-CM

## 2019-07-17 RX ORDER — ATORVASTATIN CALCIUM 20 MG/1
TABLET, FILM COATED ORAL
Qty: 90 TABLET | Refills: 0 | Status: SHIPPED | OUTPATIENT
Start: 2019-07-17 | End: 2019-09-20

## 2019-07-20 DIAGNOSIS — E11.9 TYPE 2 DIABETES MELLITUS WITHOUT COMPLICATION, WITHOUT LONG-TERM CURRENT USE OF INSULIN (HCC): ICD-10-CM

## 2019-07-21 NOTE — PROGRESS NOTES
PROGRESS NOTE     Fatuma Lang MD  [facility]  [address] [place] [address]  [phone] office  [phone] fax    Date of Service:  7/22/2019    Subjective:      Patient ID: .Ramya Hernandez is a 70 y.o. female      CC: DM2 with retinopathy and stage 3 CKD, HTN, HLD, BRIANNA, colon cancer, uterine mass, pancreatic cyst    HPI  LE edema/ venous insufficiency  Vs diastolic HF  I do believe patient likely has some diastolic heart failure, because when she presented initially, she had significant dyspnea on exertion associated with her lower extremity edema, and both of these were documented to be significantly improved after being placed on Lasix, according to my note on 6/15/18. She had an initial BNP that was slightly over 100, but this increased to 708 on 11/17/18. Echocardiogram also showed some mild concentric left ventricular hypertrophy, normal ejection fraction, and stated diastolic function could not be well determined due to elevated heart rate. Patient later had some significant GI blood loss anemia that complicated the picture worsened her dyspnea on exertion, however: Dyspnea on exertion did initially improve greatly after Lasix was started. Last saw Dr Esequiel Adams on 4/8/19. She reported a worsening of her LE edema after stopping her metolazone, that improved after restarting it. Dr Esequiel Adams increased her metolazone to 5mg twice weekly. uncontrolled edema. When BMP decreased at last visit, I decreased metolazone back down to 2.5mg twice weekly    Patient still has some chronic lower extremity edema. .  Can't tolerate compression stockings. Denies current dyspnea on exertion. No orthopnea, paroxysmal nocturnal dyspnea, palpitations, lightheadedness.     Pro-BWL=493 on 11/17/18    Stress perfusion 1/9/18  Small area of anterior wall breast artifact but no clear reversible    perfusion defect seen to suggest myocardial ischemia.    Hypertensive response to

## 2019-07-22 ENCOUNTER — TELEPHONE (OUTPATIENT)
Dept: FAMILY MEDICINE CLINIC | Age: 64
End: 2019-07-22

## 2019-07-22 ENCOUNTER — OFFICE VISIT (OUTPATIENT)
Dept: FAMILY MEDICINE CLINIC | Age: 64
End: 2019-07-22
Payer: MEDICARE

## 2019-07-22 VITALS
HEART RATE: 103 BPM | WEIGHT: 270 LBS | DIASTOLIC BLOOD PRESSURE: 94 MMHG | SYSTOLIC BLOOD PRESSURE: 170 MMHG | BODY MASS INDEX: 47.83 KG/M2

## 2019-07-22 DIAGNOSIS — C18.2 MALIGNANT NEOPLASM OF ASCENDING COLON (HCC): ICD-10-CM

## 2019-07-22 DIAGNOSIS — E11.9 TYPE 2 DIABETES MELLITUS WITHOUT COMPLICATION, WITHOUT LONG-TERM CURRENT USE OF INSULIN (HCC): ICD-10-CM

## 2019-07-22 DIAGNOSIS — G47.33 OSA (OBSTRUCTIVE SLEEP APNEA): ICD-10-CM

## 2019-07-22 DIAGNOSIS — E11.319 TYPE 2 DIABETES MELLITUS WITH RETINOPATHY, WITHOUT LONG-TERM CURRENT USE OF INSULIN, MACULAR EDEMA PRESENCE UNSPECIFIED, UNSPECIFIED LATERALITY, UNSPECIFIED RETINOPATHY SEVERITY (HCC): Primary | ICD-10-CM

## 2019-07-22 DIAGNOSIS — I10 ESSENTIAL HYPERTENSION: ICD-10-CM

## 2019-07-22 DIAGNOSIS — E78.00 PURE HYPERCHOLESTEROLEMIA: ICD-10-CM

## 2019-07-22 DIAGNOSIS — N18.30 CKD (CHRONIC KIDNEY DISEASE) STAGE 3, GFR 30-59 ML/MIN (HCC): ICD-10-CM

## 2019-07-22 DIAGNOSIS — N85.8 UTERINE MASS: ICD-10-CM

## 2019-07-22 DIAGNOSIS — K86.2 PANCREATIC CYST: ICD-10-CM

## 2019-07-22 LAB
ANION GAP SERPL CALCULATED.3IONS-SCNC: 17 MMOL/L (ref 3–16)
BASOPHILS ABSOLUTE: 0.1 K/UL (ref 0–0.2)
BASOPHILS RELATIVE PERCENT: 0.7 %
BUN BLDV-MCNC: 45 MG/DL (ref 7–20)
CALCIUM SERPL-MCNC: 10.4 MG/DL (ref 8.3–10.6)
CHLORIDE BLD-SCNC: 90 MMOL/L (ref 99–110)
CO2: 32 MMOL/L (ref 21–32)
CREAT SERPL-MCNC: 1.7 MG/DL (ref 0.6–1.2)
EOSINOPHILS ABSOLUTE: 0.5 K/UL (ref 0–0.6)
EOSINOPHILS RELATIVE PERCENT: 5.3 %
GFR AFRICAN AMERICAN: 36
GFR NON-AFRICAN AMERICAN: 30
GLUCOSE BLD-MCNC: 288 MG/DL (ref 70–99)
HBA1C MFR BLD: 10.1 %
HCT VFR BLD CALC: 41.3 % (ref 36–48)
HEMOGLOBIN: 14 G/DL (ref 12–16)
LYMPHOCYTES ABSOLUTE: 2 K/UL (ref 1–5.1)
LYMPHOCYTES RELATIVE PERCENT: 23.4 %
MCH RBC QN AUTO: 30.6 PG (ref 26–34)
MCHC RBC AUTO-ENTMCNC: 33.8 G/DL (ref 31–36)
MCV RBC AUTO: 90.4 FL (ref 80–100)
MONOCYTES ABSOLUTE: 0.6 K/UL (ref 0–1.3)
MONOCYTES RELATIVE PERCENT: 7.3 %
NEUTROPHILS ABSOLUTE: 5.5 K/UL (ref 1.7–7.7)
NEUTROPHILS RELATIVE PERCENT: 63.3 %
PDW BLD-RTO: 14 % (ref 12.4–15.4)
PLATELET # BLD: 176 K/UL (ref 135–450)
PMV BLD AUTO: 9.6 FL (ref 5–10.5)
POTASSIUM SERPL-SCNC: 3.2 MMOL/L (ref 3.5–5.1)
RBC # BLD: 4.57 M/UL (ref 4–5.2)
SODIUM BLD-SCNC: 139 MMOL/L (ref 136–145)
WBC # BLD: 8.8 K/UL (ref 4–11)

## 2019-07-22 PROCEDURE — 99215 OFFICE O/P EST HI 40 MIN: CPT | Performed by: FAMILY MEDICINE

## 2019-07-22 PROCEDURE — 1036F TOBACCO NON-USER: CPT | Performed by: FAMILY MEDICINE

## 2019-07-22 PROCEDURE — 1123F ACP DISCUSS/DSCN MKR DOCD: CPT | Performed by: FAMILY MEDICINE

## 2019-07-22 PROCEDURE — 36415 COLL VENOUS BLD VENIPUNCTURE: CPT | Performed by: FAMILY MEDICINE

## 2019-07-22 PROCEDURE — 2022F DILAT RTA XM EVC RTNOPTHY: CPT | Performed by: FAMILY MEDICINE

## 2019-07-22 PROCEDURE — 1090F PRES/ABSN URINE INCON ASSESS: CPT | Performed by: FAMILY MEDICINE

## 2019-07-22 PROCEDURE — 83036 HEMOGLOBIN GLYCOSYLATED A1C: CPT | Performed by: FAMILY MEDICINE

## 2019-07-22 PROCEDURE — G8427 DOCREV CUR MEDS BY ELIG CLIN: HCPCS | Performed by: FAMILY MEDICINE

## 2019-07-22 PROCEDURE — G8417 CALC BMI ABV UP PARAM F/U: HCPCS | Performed by: FAMILY MEDICINE

## 2019-07-22 PROCEDURE — 3017F COLORECTAL CA SCREEN DOC REV: CPT | Performed by: FAMILY MEDICINE

## 2019-07-22 PROCEDURE — 4040F PNEUMOC VAC/ADMIN/RCVD: CPT | Performed by: FAMILY MEDICINE

## 2019-07-22 PROCEDURE — 3046F HEMOGLOBIN A1C LEVEL >9.0%: CPT | Performed by: FAMILY MEDICINE

## 2019-07-22 PROCEDURE — G8400 PT W/DXA NO RESULTS DOC: HCPCS | Performed by: FAMILY MEDICINE

## 2019-07-22 RX ORDER — ATORVASTATIN CALCIUM 20 MG/1
TABLET, FILM COATED ORAL
Qty: 90 TABLET | Refills: 0 | OUTPATIENT
Start: 2019-07-22

## 2019-07-22 RX ORDER — POTASSIUM CHLORIDE 1500 MG/1
20 TABLET, FILM COATED, EXTENDED RELEASE ORAL 2 TIMES DAILY
Qty: 180 TABLET | Refills: 1 | Status: SHIPPED | OUTPATIENT
Start: 2019-07-22 | End: 2019-10-21 | Stop reason: SDUPTHER

## 2019-07-22 RX ORDER — METOPROLOL SUCCINATE 50 MG/1
50 TABLET, EXTENDED RELEASE ORAL DAILY
Qty: 90 TABLET | Refills: 0 | Status: SHIPPED | OUTPATIENT
Start: 2019-07-22 | End: 2019-09-06 | Stop reason: SDUPTHER

## 2019-07-23 ENCOUNTER — TELEPHONE (OUTPATIENT)
Dept: FAMILY MEDICINE CLINIC | Age: 64
End: 2019-07-23

## 2019-07-24 ENCOUNTER — PATIENT MESSAGE (OUTPATIENT)
Dept: FAMILY MEDICINE CLINIC | Age: 64
End: 2019-07-24

## 2019-07-24 DIAGNOSIS — E11.319 TYPE 2 DIABETES MELLITUS WITH RETINOPATHY, WITHOUT LONG-TERM CURRENT USE OF INSULIN, MACULAR EDEMA PRESENCE UNSPECIFIED, UNSPECIFIED LATERALITY, UNSPECIFIED RETINOPATHY SEVERITY (HCC): ICD-10-CM

## 2019-07-24 DIAGNOSIS — N18.30 STAGE 3 CHRONIC KIDNEY DISEASE (HCC): Primary | ICD-10-CM

## 2019-07-24 RX ORDER — METOLAZONE 2.5 MG/1
2.5 TABLET ORAL
Qty: 10 TABLET | Refills: 2 | Status: SHIPPED | OUTPATIENT
Start: 2019-07-25 | End: 2019-08-10 | Stop reason: SDUPTHER

## 2019-07-24 NOTE — TELEPHONE ENCOUNTER
Signed forms. Let Ms García Sangeeta know that the form states that the doctor must have \"a cope on file of your patient's current and completed hip authorization, so that we are able to share patient health information with the program, as affiliates, agents, representatives, and service providers\"      Patient therefore must come in to sign a HIPAA form, putting twice daily cares and its affiliates on the hip of form, to allow us to communicate with them, before we can send in the form.

## 2019-07-25 NOTE — TELEPHONE ENCOUNTER
Pt. Called said her medication was changed from a k-tab to a klor-con tab. . She doesn't want to switch to the new kind of tab if she don't have too. The other k tab doesn't upset her stomach.  is at pharmacy now to get script and is awaiting an answer on the .      Please advise   [phone]

## 2019-08-10 DIAGNOSIS — E11.9 TYPE 2 DIABETES MELLITUS WITHOUT COMPLICATION, WITHOUT LONG-TERM CURRENT USE OF INSULIN (HCC): ICD-10-CM

## 2019-08-12 ENCOUNTER — PATIENT MESSAGE (OUTPATIENT)
Dept: FAMILY MEDICINE CLINIC | Age: 64
End: 2019-08-12

## 2019-08-12 RX ORDER — TORSEMIDE 20 MG/1
20 TABLET ORAL 2 TIMES DAILY
Qty: 180 TABLET | Refills: 0 | Status: SHIPPED | OUTPATIENT
Start: 2019-08-12 | End: 2019-09-20

## 2019-08-12 RX ORDER — METOLAZONE 2.5 MG/1
2.5 TABLET ORAL
Qty: 10 TABLET | Refills: 2 | Status: SHIPPED | OUTPATIENT
Start: 2019-08-12 | End: 2019-10-14 | Stop reason: SDUPTHER

## 2019-08-12 RX ORDER — GLIPIZIDE 10 MG/1
10 TABLET ORAL
Qty: 180 TABLET | Refills: 0 | Status: SHIPPED | OUTPATIENT
Start: 2019-08-12 | End: 2019-11-10 | Stop reason: SDUPTHER

## 2019-08-12 RX ORDER — TORSEMIDE 20 MG/1
20 TABLET ORAL 2 TIMES DAILY
Qty: 180 TABLET | Refills: 0 | Status: SHIPPED | OUTPATIENT
Start: 2019-08-12 | End: 2020-02-03 | Stop reason: SDUPTHER

## 2019-08-13 ENCOUNTER — PATIENT MESSAGE (OUTPATIENT)
Dept: FAMILY MEDICINE CLINIC | Age: 64
End: 2019-08-13

## 2019-08-13 DIAGNOSIS — N18.30 STAGE 3 CHRONIC KIDNEY DISEASE (HCC): Primary | ICD-10-CM

## 2019-09-06 ENCOUNTER — TELEPHONE (OUTPATIENT)
Dept: FAMILY MEDICINE CLINIC | Age: 64
End: 2019-09-06

## 2019-09-06 DIAGNOSIS — N18.30 CKD (CHRONIC KIDNEY DISEASE) STAGE 3, GFR 30-59 ML/MIN (HCC): Primary | ICD-10-CM

## 2019-09-06 DIAGNOSIS — M79.10 MYALGIA: ICD-10-CM

## 2019-09-09 ENCOUNTER — NURSE ONLY (OUTPATIENT)
Dept: FAMILY MEDICINE CLINIC | Age: 64
End: 2019-09-09
Payer: MEDICARE

## 2019-09-09 DIAGNOSIS — N18.30 CKD (CHRONIC KIDNEY DISEASE) STAGE 3, GFR 30-59 ML/MIN (HCC): ICD-10-CM

## 2019-09-09 DIAGNOSIS — M79.10 MYALGIA: ICD-10-CM

## 2019-09-09 LAB
ANION GAP SERPL CALCULATED.3IONS-SCNC: 18 MMOL/L (ref 3–16)
BUN BLDV-MCNC: 42 MG/DL (ref 7–20)
CALCIUM SERPL-MCNC: 10 MG/DL (ref 8.3–10.6)
CHLORIDE BLD-SCNC: 93 MMOL/L (ref 99–110)
CO2: 29 MMOL/L (ref 21–32)
CREAT SERPL-MCNC: 1.5 MG/DL (ref 0.6–1.2)
GFR AFRICAN AMERICAN: 42
GFR NON-AFRICAN AMERICAN: 35
GLUCOSE BLD-MCNC: 247 MG/DL (ref 70–99)
MAGNESIUM: 2.3 MG/DL (ref 1.8–2.4)
POTASSIUM SERPL-SCNC: 3.8 MMOL/L (ref 3.5–5.1)
SODIUM BLD-SCNC: 140 MMOL/L (ref 136–145)
TOTAL CK: 166 U/L (ref 26–192)

## 2019-09-09 PROCEDURE — 36415 COLL VENOUS BLD VENIPUNCTURE: CPT | Performed by: FAMILY MEDICINE

## 2019-09-20 ENCOUNTER — OFFICE VISIT (OUTPATIENT)
Dept: SURGERY | Age: 64
End: 2019-09-20
Payer: MEDICARE

## 2019-09-20 ENCOUNTER — TELEPHONE (OUTPATIENT)
Dept: FAMILY MEDICINE CLINIC | Age: 64
End: 2019-09-20

## 2019-09-20 ENCOUNTER — OFFICE VISIT (OUTPATIENT)
Dept: FAMILY MEDICINE CLINIC | Age: 64
End: 2019-09-20
Payer: MEDICARE

## 2019-09-20 VITALS
BODY MASS INDEX: 50.79 KG/M2 | HEIGHT: 62 IN | HEART RATE: 66 BPM | SYSTOLIC BLOOD PRESSURE: 162 MMHG | OXYGEN SATURATION: 98 % | WEIGHT: 276 LBS | TEMPERATURE: 98.1 F | RESPIRATION RATE: 18 BRPM | DIASTOLIC BLOOD PRESSURE: 88 MMHG

## 2019-09-20 VITALS
WEIGHT: 276 LBS | SYSTOLIC BLOOD PRESSURE: 162 MMHG | HEIGHT: 62 IN | HEART RATE: 66 BPM | BODY MASS INDEX: 50.79 KG/M2 | DIASTOLIC BLOOD PRESSURE: 88 MMHG

## 2019-09-20 DIAGNOSIS — L97.912 ULCER OF RIGHT LOWER EXTREMITY WITH FAT LAYER EXPOSED (HCC): ICD-10-CM

## 2019-09-20 DIAGNOSIS — N18.30 CKD (CHRONIC KIDNEY DISEASE) STAGE 3, GFR 30-59 ML/MIN (HCC): ICD-10-CM

## 2019-09-20 DIAGNOSIS — R60.0 BILATERAL LEG EDEMA: ICD-10-CM

## 2019-09-20 DIAGNOSIS — L97.919 VENOUS ULCER OF RIGHT LEG (HCC): Primary | ICD-10-CM

## 2019-09-20 DIAGNOSIS — E66.01 MORBID OBESITY (HCC): ICD-10-CM

## 2019-09-20 DIAGNOSIS — E87.70 HYPERVOLEMIA, UNSPECIFIED HYPERVOLEMIA TYPE: Primary | ICD-10-CM

## 2019-09-20 DIAGNOSIS — I83.019 VENOUS ULCER OF RIGHT LEG (HCC): Primary | ICD-10-CM

## 2019-09-20 PROCEDURE — 99204 OFFICE O/P NEW MOD 45 MIN: CPT | Performed by: SURGERY

## 2019-09-20 PROCEDURE — G8417 CALC BMI ABV UP PARAM F/U: HCPCS | Performed by: SURGERY

## 2019-09-20 PROCEDURE — 3017F COLORECTAL CA SCREEN DOC REV: CPT | Performed by: FAMILY MEDICINE

## 2019-09-20 PROCEDURE — G8417 CALC BMI ABV UP PARAM F/U: HCPCS | Performed by: FAMILY MEDICINE

## 2019-09-20 PROCEDURE — 1090F PRES/ABSN URINE INCON ASSESS: CPT | Performed by: FAMILY MEDICINE

## 2019-09-20 PROCEDURE — G8400 PT W/DXA NO RESULTS DOC: HCPCS | Performed by: SURGERY

## 2019-09-20 PROCEDURE — 4040F PNEUMOC VAC/ADMIN/RCVD: CPT | Performed by: SURGERY

## 2019-09-20 PROCEDURE — G8427 DOCREV CUR MEDS BY ELIG CLIN: HCPCS | Performed by: FAMILY MEDICINE

## 2019-09-20 PROCEDURE — 4040F PNEUMOC VAC/ADMIN/RCVD: CPT | Performed by: FAMILY MEDICINE

## 2019-09-20 PROCEDURE — 1036F TOBACCO NON-USER: CPT | Performed by: FAMILY MEDICINE

## 2019-09-20 PROCEDURE — 1123F ACP DISCUSS/DSCN MKR DOCD: CPT | Performed by: FAMILY MEDICINE

## 2019-09-20 PROCEDURE — 3017F COLORECTAL CA SCREEN DOC REV: CPT | Performed by: SURGERY

## 2019-09-20 PROCEDURE — 99214 OFFICE O/P EST MOD 30 MIN: CPT | Performed by: FAMILY MEDICINE

## 2019-09-20 PROCEDURE — 1090F PRES/ABSN URINE INCON ASSESS: CPT | Performed by: SURGERY

## 2019-09-20 PROCEDURE — 1036F TOBACCO NON-USER: CPT | Performed by: SURGERY

## 2019-09-20 PROCEDURE — 1123F ACP DISCUSS/DSCN MKR DOCD: CPT | Performed by: SURGERY

## 2019-09-20 PROCEDURE — G8400 PT W/DXA NO RESULTS DOC: HCPCS | Performed by: FAMILY MEDICINE

## 2019-09-20 PROCEDURE — G8427 DOCREV CUR MEDS BY ELIG CLIN: HCPCS | Performed by: SURGERY

## 2019-09-20 NOTE — PROGRESS NOTES
PROGRESS NOTE     Tomasz Rich MD  [facility]  [address] [place] [address]  [phone] office  [phone] fax    Date of Service:  9/20/2019    Subjective:      Patient ID: .Leonor Klein is a 70 y.o. female      CC: LE edema, right leg ulcer, venous insufficiency vs diastolic HF, CKD 3    HPI    I do believe patient likely has some diastolic heart failure, because when she presented initially, she had significant dyspnea on exertion associated with her lower extremity edema, and both of these were documented to be significantly improved after being placed on Lasix, according to my note on 6/15/18. She had an initial BNP that was slightly over 100, but this increased to 708 on 11/17/18. Echocardiogram also showed some mild concentric left ventricular hypertrophy, normal ejection fraction, and stated diastolic function could not be well determined due to elevated heart rate. Patient has not been able to tolerate compression stockings in the past.  She has been taking toresemide 20 mg twice daily metolazone 2.5 mg twice weekly. Complicating matters, she does have some worsening CKD. Currently in low stage III level. She is establishing with a new nephrologist next week. The reason patient comes in today, as she is had a day and a half of a new ulcer in her right lower leg, associated with worsening lower extremity edema. Her weight is also acutely up 6 pounds. She denies any significant erythema from the ulcer, and states it is draining clear fluid. She denies any fever.           Vitals:    09/20/19 1212 09/20/19 1223   BP: (!) 152/96 (!) 162/88   Site: Left Upper Arm Right Upper Arm   Position: Sitting Sitting   Cuff Size: Large Adult Large Adult   Pulse: 66    Resp: 18    Temp: 98.1 °F (36.7 °C)    TempSrc: Oral    SpO2: 98%    Weight: 276 lb (125.2 kg)    Height: 5' 2\" (1.575 m)        Outpatient Medications Marked as Taking for the 9/20/19 encounter TOOTH EXTRACTION         Social History     Tobacco Use    Smoking status: Never Smoker    Smokeless tobacco: Never Used   Substance Use Topics    Alcohol use: Yes     Alcohol/week: 2.0 standard drinks     Types: 2 Glasses of wine per week     Comment: only on occasional       Family History   Problem Relation Age of Onset    Lung Cancer Mother     High Blood Pressure Father     Lung Cancer Father     Lupus Sister            Review of Systems  Constitutional:  Negative for activity or appetite change, fever or fatigue  Resp:  Negative for SOB, chest tightness, cough  Cardiovascular: Negative for CP, palpitations, +LE edema  Neuro:  Negative for dizziness or lightheadedness      Objective:   Constitutional:   · Reviewed vitals above  · Well Nourished, well developed, no distress       HENT:  · Normal external nose without lesions  · Normal nasal mucosa without swelling or erythema  Resp:  · Normal effort  · Clear to auscultation bilaterally without rhonchi, wheezing or crackles  Cardiovascular:  · On auscultation, normal S1 and S2 without murmurs, rubs or gallops  Gastrointestinal:  · Nontender, nondistended, and no masses  · No hepatosplenomegaly  Musculoskeletal:  · +3 pitting edema bilateral lower extremities  Skin:  · +shallow ulceration of skin right lower leg, in front of tibia  · + Serosanguineous fluid leaking from ulcer with palpation w/o any evidence of purulent fluid  · No areas of increased heat or induration on palpation  Psych:  · Normal mood and affect  · Normal insight and judgement    Assessment / Plan:     1. Venous ulcer of right leg ([address]  Called and spoke to vascular surgeon, Dr. Kaela Anderson, to see if he can see patient today for possible Unna boot. Explained to patient, the ulcer would never heal, unless we got fluid out of the leg. - Arvind Bazan MD, General Surgery, [facility]    2.  CKD (chronic kidney disease) stage 3, GFR 30-59 ml/min ([facility])  Will temporarily increase

## 2019-09-20 NOTE — PROGRESS NOTES
Daily Progress Note   Vel Bird MD      9/20/2019    Chief Complaint   Patient presents with    New Patient     Pt presents for a new patient visit per referral of Dr. Arnulfo Omalley for a  Rt leg venous Ulcer. she noticed the ulcer late Wed night saying it started as a tiny hole of the Rt shin region. No pain associated with the ulcer, no burning or itch. Is a diabetic. Does not recall injuring the leg.  Leg Swelling     Has tried over the counter compression stockings has said the dig into her leg, she also ordered stockings online with no relief of symtpoms. HISTORY OF PRESENT ILLNESS:                The patient is a 70 y.o. female who presents for a new patient visit for an ulcer of the lower right leg. She complains today of hip pain, but says he is from PT. She was going to help her get over her anemia. She does say she has swelling of her legs and feet.       Past Medical History:   Diagnosis Date    Anemia     CKD (chronic kidney disease) stage 3, GFR 30-59 ml/min (HCC)     Colon cancer (HCC)     Diabetes ([address]     Diabetes ([address]     Diabetes with retinopathy ([address]     DM eye exam 12/18/17    Hypertension     Pancreatic cyst     Positive FIT (fecal immunochemical test)     Pulmonary hypertension ([address]        Past Surgical History:   Procedure Laterality Date    COLONOSCOPY N/A 11/5/2018    COLONOSCOPY WITH BIOPSY performed by Loren Zapien MD at [address] [place] [address]  05/14/2019    Colonoscopy with polypectomy (cold snare)    COLONOSCOPY N/A 5/14/2019    COLONOSCOPY POLYPECTOMY SNARE/COLD BIOPSY performed by Loren Zapien MD at [address] [place] [address] Right 11/08/2018    [address] [place] [address] and umbilical hernia repair    [facility] OFFICE/OUTPT VISIT,PROCEDURE ONLY Right 11/8/2018    LAPAROSCOPIC RIGHT HEMICOLECTOMY and umbilical hernia repair performed by Carole David MD at [facility] 11/5/2018    EGD BIOPSY

## 2019-09-23 ENCOUNTER — HOSPITAL ENCOUNTER (OUTPATIENT)
Dept: ULTRASOUND IMAGING | Age: 64
Discharge: HOME | End: 2019-09-23
Payer: MEDICARE

## 2019-09-23 DIAGNOSIS — N18.30 CHRONIC KIDNEY DISEASE, STAGE III (MODERATE) (HCC): ICD-10-CM

## 2019-09-23 PROCEDURE — 76770 US EXAM ABDO BACK WALL COMP: CPT

## 2019-09-26 ENCOUNTER — OFFICE VISIT (OUTPATIENT)
Dept: SURGERY | Age: 64
End: 2019-09-26
Payer: MEDICARE

## 2019-09-26 VITALS — DIASTOLIC BLOOD PRESSURE: 85 MMHG | HEART RATE: 58 BPM | SYSTOLIC BLOOD PRESSURE: 193 MMHG

## 2019-09-26 DIAGNOSIS — L97.912 ULCER OF RIGHT LOWER EXTREMITY WITH FAT LAYER EXPOSED (HCC): Primary | ICD-10-CM

## 2019-09-26 DIAGNOSIS — M79.89 LEG SWELLING: ICD-10-CM

## 2019-09-26 DIAGNOSIS — E66.01 MORBID OBESITY (HCC): ICD-10-CM

## 2019-09-26 PROCEDURE — 11042 DBRDMT SUBQ TIS 1ST 20SQCM/<: CPT | Performed by: NURSE PRACTITIONER

## 2019-09-27 NOTE — PROGRESS NOTES
of right lower extremity with fat layer exposed ([address]    2. Leg swelling    3. Morbid obesity ([address]      Debrided skin and subcutaneous tissue (epidermis and dermis), first 20 sq cm or less, located right shin. Topical Lidocaine 5% Gel was indicated. Dimensions of wound are 1.2 cm x 1.2 cm x 0.2 cm. Dressed with Opticell Ag, gauze dressing and Unna boot. No immediate complications observed. Call office if signs of infection or fever, excessive swelling or pain occur. Unna boot applied to right lower extremity (Opticell Ag applied to ulcer). PATIENT EDUCATION focused on the need for compression stockings, 30-40 mmHg, to provide compression therapy on a daily basis, off every night. They are specially designed hosiery that improve blood flow in the legs, prevent blood clotting and inhibit the progression of a variety of venous disorders. They are designed to be tighter around the ankles with gradually less pressure moving up the lower limbs toward the knees. Working with the calf muscle, the stocking is designed to help squeeze the venous blood back up toward the heart, to enhance circulation. We discussed custom stockings       PLAN:    Return in about 1 week (around 10/3/2019) for Wound check and Unna Boot Change .

## 2019-09-29 PROBLEM — M79.89 LEG SWELLING: Status: ACTIVE | Noted: 2019-09-29

## 2019-10-03 ENCOUNTER — OFFICE VISIT (OUTPATIENT)
Dept: SURGERY | Age: 64
End: 2019-10-03
Payer: MEDICARE

## 2019-10-03 VITALS — SYSTOLIC BLOOD PRESSURE: 156 MMHG | DIASTOLIC BLOOD PRESSURE: 84 MMHG | HEART RATE: 61 BPM

## 2019-10-03 DIAGNOSIS — M79.89 LEG SWELLING: ICD-10-CM

## 2019-10-03 DIAGNOSIS — I10 ESSENTIAL HYPERTENSION: ICD-10-CM

## 2019-10-03 DIAGNOSIS — L97.911 ULCER OF RIGHT LOWER EXTREMITY, LIMITED TO BREAKDOWN OF SKIN (HCC): Primary | ICD-10-CM

## 2019-10-03 DIAGNOSIS — E66.01 MORBID OBESITY (HCC): ICD-10-CM

## 2019-10-03 PROCEDURE — 1123F ACP DISCUSS/DSCN MKR DOCD: CPT | Performed by: NURSE PRACTITIONER

## 2019-10-03 PROCEDURE — 1090F PRES/ABSN URINE INCON ASSESS: CPT | Performed by: NURSE PRACTITIONER

## 2019-10-03 PROCEDURE — 1036F TOBACCO NON-USER: CPT | Performed by: NURSE PRACTITIONER

## 2019-10-03 PROCEDURE — 99213 OFFICE O/P EST LOW 20 MIN: CPT | Performed by: NURSE PRACTITIONER

## 2019-10-03 PROCEDURE — 3017F COLORECTAL CA SCREEN DOC REV: CPT | Performed by: NURSE PRACTITIONER

## 2019-10-03 PROCEDURE — G8400 PT W/DXA NO RESULTS DOC: HCPCS | Performed by: NURSE PRACTITIONER

## 2019-10-03 PROCEDURE — G8417 CALC BMI ABV UP PARAM F/U: HCPCS | Performed by: NURSE PRACTITIONER

## 2019-10-03 PROCEDURE — G8484 FLU IMMUNIZE NO ADMIN: HCPCS | Performed by: NURSE PRACTITIONER

## 2019-10-03 PROCEDURE — G8427 DOCREV CUR MEDS BY ELIG CLIN: HCPCS | Performed by: NURSE PRACTITIONER

## 2019-10-03 PROCEDURE — 4040F PNEUMOC VAC/ADMIN/RCVD: CPT | Performed by: NURSE PRACTITIONER

## 2019-10-14 RX ORDER — METOLAZONE 2.5 MG/1
2.5 TABLET ORAL
Qty: 10 TABLET | Refills: 2 | Status: SHIPPED | OUTPATIENT
Start: 2019-10-14 | End: 2019-12-08 | Stop reason: SDUPTHER

## 2019-10-16 DIAGNOSIS — I10 ESSENTIAL HYPERTENSION: ICD-10-CM

## 2019-10-16 RX ORDER — METOPROLOL SUCCINATE 50 MG/1
TABLET, EXTENDED RELEASE ORAL
Qty: 90 TABLET | Refills: 0 | OUTPATIENT
Start: 2019-10-16

## 2019-10-21 ENCOUNTER — OFFICE VISIT (OUTPATIENT)
Dept: FAMILY MEDICINE CLINIC | Age: 64
End: 2019-10-21
Payer: MEDICARE

## 2019-10-21 VITALS
DIASTOLIC BLOOD PRESSURE: 82 MMHG | WEIGHT: 262 LBS | HEIGHT: 63 IN | SYSTOLIC BLOOD PRESSURE: 145 MMHG | BODY MASS INDEX: 46.42 KG/M2 | HEART RATE: 89 BPM

## 2019-10-21 DIAGNOSIS — Z00.00 ROUTINE GENERAL MEDICAL EXAMINATION AT A HEALTH CARE FACILITY: Primary | ICD-10-CM

## 2019-10-21 DIAGNOSIS — N18.30 CKD (CHRONIC KIDNEY DISEASE) STAGE 3, GFR 30-59 ML/MIN (HCC): ICD-10-CM

## 2019-10-21 DIAGNOSIS — C18.2 MALIGNANT NEOPLASM OF ASCENDING COLON (HCC): ICD-10-CM

## 2019-10-21 DIAGNOSIS — E78.00 PURE HYPERCHOLESTEROLEMIA: ICD-10-CM

## 2019-10-21 DIAGNOSIS — Z12.39 SCREENING FOR BREAST CANCER: ICD-10-CM

## 2019-10-21 DIAGNOSIS — E11.9 TYPE 2 DIABETES MELLITUS WITHOUT COMPLICATION, WITHOUT LONG-TERM CURRENT USE OF INSULIN (HCC): ICD-10-CM

## 2019-10-21 LAB
ALBUMIN SERPL-MCNC: 4.8 G/DL (ref 3.4–5)
ANION GAP SERPL CALCULATED.3IONS-SCNC: 19 MMOL/L (ref 3–16)
BASOPHILS ABSOLUTE: 0.1 K/UL (ref 0–0.2)
BASOPHILS RELATIVE PERCENT: 0.7 %
BUN BLDV-MCNC: 75 MG/DL (ref 7–20)
CALCIUM SERPL-MCNC: 9.8 MG/DL (ref 8.3–10.6)
CHLORIDE BLD-SCNC: 89 MMOL/L (ref 99–110)
CHOLESTEROL, TOTAL: 177 MG/DL (ref 0–199)
CO2: 29 MMOL/L (ref 21–32)
CREAT SERPL-MCNC: 1.9 MG/DL (ref 0.6–1.2)
EOSINOPHILS ABSOLUTE: 0.5 K/UL (ref 0–0.6)
EOSINOPHILS RELATIVE PERCENT: 5.7 %
GFR AFRICAN AMERICAN: 32
GFR NON-AFRICAN AMERICAN: 26
GLUCOSE BLD-MCNC: 246 MG/DL (ref 70–99)
HBA1C MFR BLD: 8.5 %
HCT VFR BLD CALC: 42.2 % (ref 36–48)
HDLC SERPL-MCNC: 38 MG/DL (ref 40–60)
HEMOGLOBIN: 14 G/DL (ref 12–16)
LDL CHOLESTEROL CALCULATED: ABNORMAL MG/DL
LDL CHOLESTEROL DIRECT: 82 MG/DL
LYMPHOCYTES ABSOLUTE: 0.9 K/UL (ref 1–5.1)
LYMPHOCYTES RELATIVE PERCENT: 10.7 %
MAGNESIUM: 2.6 MG/DL (ref 1.8–2.4)
MCH RBC QN AUTO: 29.4 PG (ref 26–34)
MCHC RBC AUTO-ENTMCNC: 33.1 G/DL (ref 31–36)
MCV RBC AUTO: 88.9 FL (ref 80–100)
MONOCYTES ABSOLUTE: 0.5 K/UL (ref 0–1.3)
MONOCYTES RELATIVE PERCENT: 6.3 %
NEUTROPHILS ABSOLUTE: 6.2 K/UL (ref 1.7–7.7)
NEUTROPHILS RELATIVE PERCENT: 76.6 %
PDW BLD-RTO: 13.9 % (ref 12.4–15.4)
PHOSPHORUS: 4.3 MG/DL (ref 2.5–4.9)
PLATELET # BLD: 192 K/UL (ref 135–450)
PMV BLD AUTO: 9.8 FL (ref 5–10.5)
POTASSIUM SERPL-SCNC: 3.2 MMOL/L (ref 3.5–5.1)
RBC # BLD: 4.74 M/UL (ref 4–5.2)
SODIUM BLD-SCNC: 137 MMOL/L (ref 136–145)
TRIGL SERPL-MCNC: 452 MG/DL (ref 0–150)
URIC ACID, SERUM: 10.9 MG/DL (ref 2.6–6)
VLDLC SERPL CALC-MCNC: ABNORMAL MG/DL
WBC # BLD: 8.2 K/UL (ref 4–11)

## 2019-10-21 PROCEDURE — G0438 PPPS, INITIAL VISIT: HCPCS | Performed by: FAMILY MEDICINE

## 2019-10-21 PROCEDURE — 36415 COLL VENOUS BLD VENIPUNCTURE: CPT | Performed by: FAMILY MEDICINE

## 2019-10-21 PROCEDURE — 1123F ACP DISCUSS/DSCN MKR DOCD: CPT | Performed by: FAMILY MEDICINE

## 2019-10-21 PROCEDURE — 3017F COLORECTAL CA SCREEN DOC REV: CPT | Performed by: FAMILY MEDICINE

## 2019-10-21 PROCEDURE — 83036 HEMOGLOBIN GLYCOSYLATED A1C: CPT | Performed by: FAMILY MEDICINE

## 2019-10-21 PROCEDURE — G8482 FLU IMMUNIZE ORDER/ADMIN: HCPCS | Performed by: FAMILY MEDICINE

## 2019-10-21 PROCEDURE — 4040F PNEUMOC VAC/ADMIN/RCVD: CPT | Performed by: FAMILY MEDICINE

## 2019-10-21 PROCEDURE — G0008 ADMIN INFLUENZA VIRUS VAC: HCPCS | Performed by: FAMILY MEDICINE

## 2019-10-21 PROCEDURE — 90653 IIV ADJUVANT VACCINE IM: CPT | Performed by: FAMILY MEDICINE

## 2019-10-21 RX ORDER — POTASSIUM CHLORIDE 1500 MG/1
20 TABLET, FILM COATED, EXTENDED RELEASE ORAL 2 TIMES DAILY
Qty: 180 TABLET | Refills: 1 | Status: SHIPPED | OUTPATIENT
Start: 2019-10-21 | End: 2019-12-04 | Stop reason: SDUPTHER

## 2019-10-21 RX ORDER — ALLOPURINOL 100 MG/1
200 TABLET ORAL DAILY
Qty: 180 TABLET | Refills: 1 | Status: SHIPPED | OUTPATIENT
Start: 2019-10-21 | End: 2020-03-26 | Stop reason: SDUPTHER

## 2019-10-21 ASSESSMENT — PATIENT HEALTH QUESTIONNAIRE - PHQ9
SUM OF ALL RESPONSES TO PHQ QUESTIONS 1-9: 0
SUM OF ALL RESPONSES TO PHQ QUESTIONS 1-9: 0

## 2019-10-21 ASSESSMENT — LIFESTYLE VARIABLES
HOW OFTEN DO YOU HAVE A DRINK CONTAINING ALCOHOL: 1
HAVE YOU OR SOMEONE ELSE BEEN INJURED AS A RESULT OF YOUR DRINKING: 0
AUDIT TOTAL SCORE: 1
HOW OFTEN DO YOU HAVE SIX OR MORE DRINKS ON ONE OCCASION: 0
HOW OFTEN DURING THE LAST YEAR HAVE YOU FAILED TO DO WHAT WAS NORMALLY EXPECTED FROM YOU BECAUSE OF DRINKING: 0
HOW OFTEN DURING THE LAST YEAR HAVE YOU BEEN UNABLE TO REMEMBER WHAT HAPPENED THE NIGHT BEFORE BECAUSE YOU HAD BEEN DRINKING: 0
HAS A RELATIVE, FRIEND, DOCTOR, OR ANOTHER HEALTH PROFESSIONAL EXPRESSED CONCERN ABOUT YOUR DRINKING OR SUGGESTED YOU CUT DOWN: 0
HOW OFTEN DURING THE LAST YEAR HAVE YOU FOUND THAT YOU WERE NOT ABLE TO STOP DRINKING ONCE YOU HAD STARTED: 0
HOW OFTEN DURING THE LAST YEAR HAVE YOU NEEDED AN ALCOHOLIC DRINK FIRST THING IN THE MORNING TO GET YOURSELF GOING AFTER A NIGHT OF HEAVY DRINKING: 0
HOW OFTEN DURING THE LAST YEAR HAVE YOU HAD A FEELING OF GUILT OR REMORSE AFTER DRINKING: 0
AUDIT-C TOTAL SCORE: 1
HOW MANY STANDARD DRINKS CONTAINING ALCOHOL DO YOU HAVE ON A TYPICAL DAY: 0

## 2019-11-10 DIAGNOSIS — E11.9 TYPE 2 DIABETES MELLITUS WITHOUT COMPLICATION, WITHOUT LONG-TERM CURRENT USE OF INSULIN (HCC): ICD-10-CM

## 2019-11-11 ENCOUNTER — TELEPHONE (OUTPATIENT)
Dept: FAMILY MEDICINE CLINIC | Age: 64
End: 2019-11-11

## 2019-11-11 RX ORDER — GLIPIZIDE 10 MG/1
TABLET ORAL
Qty: 180 TABLET | Refills: 0 | Status: SHIPPED | OUTPATIENT
Start: 2019-11-11 | End: 2019-12-04 | Stop reason: SDUPTHER

## 2019-11-17 DIAGNOSIS — I10 ESSENTIAL HYPERTENSION: ICD-10-CM

## 2019-11-18 RX ORDER — METOPROLOL SUCCINATE 100 MG/1
100 TABLET, EXTENDED RELEASE ORAL DAILY
Qty: 90 TABLET | Refills: 0 | Status: SHIPPED | OUTPATIENT
Start: 2019-11-18 | End: 2020-03-26 | Stop reason: SDUPTHER

## 2019-11-18 RX ORDER — METOPROLOL SUCCINATE 25 MG/1
TABLET, EXTENDED RELEASE ORAL
Qty: 90 TABLET | Refills: 1 | OUTPATIENT
Start: 2019-11-18

## 2019-12-04 DIAGNOSIS — E11.9 TYPE 2 DIABETES MELLITUS WITHOUT COMPLICATION, WITHOUT LONG-TERM CURRENT USE OF INSULIN (HCC): ICD-10-CM

## 2019-12-05 RX ORDER — POTASSIUM CHLORIDE 1500 MG/1
40 TABLET, FILM COATED, EXTENDED RELEASE ORAL 2 TIMES DAILY
Qty: 180 TABLET | Refills: 1 | Status: SHIPPED | OUTPATIENT
Start: 2019-12-05 | End: 2020-02-29 | Stop reason: SDUPTHER

## 2019-12-05 RX ORDER — GLIPIZIDE 10 MG/1
10 TABLET ORAL
Qty: 180 TABLET | Refills: 0 | Status: SHIPPED | OUTPATIENT
Start: 2019-12-05 | End: 2020-02-29 | Stop reason: SDUPTHER

## 2019-12-09 RX ORDER — METOLAZONE 2.5 MG/1
2.5 TABLET ORAL
Qty: 10 TABLET | Refills: 2 | Status: SHIPPED | OUTPATIENT
Start: 2019-12-09 | End: 2019-12-29 | Stop reason: SDUPTHER

## 2019-12-12 ENCOUNTER — HOSPITAL ENCOUNTER (OUTPATIENT)
Dept: CT IMAGING | Age: 64
Discharge: HOME | End: 2019-12-12
Payer: MEDICARE

## 2019-12-12 DIAGNOSIS — C18.2 MALIGNANT NEOPLASM OF ASCENDING COLON (HCC): ICD-10-CM

## 2019-12-12 PROCEDURE — 74176 CT ABD & PELVIS W/O CONTRAST: CPT

## 2019-12-30 RX ORDER — METOLAZONE 2.5 MG/1
2.5 TABLET ORAL
Qty: 10 TABLET | Refills: 2 | Status: SHIPPED | OUTPATIENT
Start: 2019-12-30 | End: 2019-12-31

## 2019-12-31 ENCOUNTER — TELEPHONE (OUTPATIENT)
Dept: FAMILY MEDICINE CLINIC | Age: 64
End: 2019-12-31

## 2019-12-31 RX ORDER — METOLAZONE 5 MG/1
5 TABLET ORAL
Qty: 10 TABLET | Refills: 2 | Status: SHIPPED | OUTPATIENT
Start: 2020-01-02 | End: 2020-03-26 | Stop reason: SDUPTHER

## 2020-01-06 ENCOUNTER — TELEPHONE (OUTPATIENT)
Dept: FAMILY MEDICINE CLINIC | Age: 65
End: 2020-01-06

## 2020-01-06 RX ORDER — HYDROCODONE BITARTRATE AND ACETAMINOPHEN 5; 325 MG/1; MG/1
1 TABLET ORAL EVERY 8 HOURS PRN
Qty: 30 TABLET | Refills: 0 | OUTPATIENT
Start: 2020-01-06 | End: 2020-02-05

## 2020-01-06 NOTE — TELEPHONE ENCOUNTER
I need to see patient before I can refill    Inform pt    Please document call and then close encounter.   thanks

## 2020-02-02 NOTE — PATIENT INSTRUCTIONS
Get Tdap and Shingrix vaccines at your pharmacy    Call central scheduling for mammogram and bone DEXA scan:   [phone]    Schedule diabetic eye exam

## 2020-02-02 NOTE — PROGRESS NOTES
PROGRESS NOTE     Sherry Beard MD  [address]  [place] [address]  [phone] office  [phone] fax    Date of Service:  2/3/2020    Subjective:      Patient ID: .Saulo Villa is a 75 y.o. female      CC: chronic back pain,  diastolic CHF/venous insufficiency, Venous stasis ulcer, DM2 with retinopathy and stage 3 CKD, HTN, HLD, BRIANNA, colon cancer, uterine mass, pancreatic cyst    HPI     chronic back pain  Over the last year, patient is complained of pain in left lower back radiating to left glute. She had been seen different homeopathic doctors for some alternative treatments, and feels that they have helped some. She did finally see orthopedic, and had x-ray of her back, that showed the following:     lumbar spine 12/17/19: IMPRESSION:   Multilevel lumbar spondylosis with anterolisthesis of L4 on L5 and translational instability at    L3-4 and L4-5         Hip xray bilateral 12/17/19  IMPRESSION:   No significant abnormality       Patient is convinced that the findings of the lumbar spine x-ray have nothing to do with her pain. She feels that it is \"muscular. She does deny any radiation of pain down her legs. No numbness or weakness in her legs. LE edema/ venous insufficiency  Vs diastolic HF  I do believe patient likely has some diastolic heart failure, because when she presented initially, she had significant dyspnea on exertion associated with her lower extremity edema, and both of these were documented to be significantly improved after being placed on Lasix, according to my note on 6/15/18. She had an initial BNP that was slightly over 100, but this increased to 708 on 11/17/18. Echocardiogram also showed some mild concentric left ventricular hypertrophy, normal ejection fraction, and stated diastolic function could not be well determined due to elevated heart rate. Patient later had some significant GI blood loss anemia that complicated the picture worsened her dyspnea on exertion, however: Dyspnea on exertion did initially improve greatly after Lasix was started. Last saw Dr Lilia Kirkland on 4/8/19. She reported a worsening of her LE edema after stopping her metolazone, that improved after restarting it. Dr Lilia Kirkland increased her metolazone to 5mg twice weekly. uncontrolled edema. Kidney doctor is now adjusting both her semi-and metolazone. See below for details. Patient still has some chronic lower extremity edema, but mostly controlled. Wears compression stockings most the time. Denies current dyspnea on exertion. No orthopnea, paroxysmal nocturnal dyspnea, palpitations, lightheadedness. Pro-AMG=204 on 11/17/18    Stress perfusion 1/9/18  Small area of anterior wall breast artifact but no clear reversible    perfusion defect seen to suggest myocardial ischemia.    Hypertensive response to stress test    Overall findings represent a low risk study.    Normal LV size and systolic function.      Echo 10/17/18  Left ventricular cavity size is normal. There is mild concentric left  ventricular hypertrophy. Overall left ventricular systolic function appears normal with an estimated  ejection fraction of 55-60%. No regional wall motion abnormalities are noted. Diastolic function is indeterminate due to increased heart rate. Mild aortic stenosis with a peak velocity of 2.73m/s and a mean pressure  gradient of 16mmHg. IVC size is dilated (>2.1 cm) and collapses < 50% with respiration  consistent with elevated RA pressure (15 mmHg). Venous stasis ulcer  Saw Dr Vinay Kamara on 9/20/19Carren Lennox boot placed. Ulcer is now healed. States wears compression stockings most the time. Doing much better now that lower extremity edema is controlled.       Diabetes Mellitus Type 2 with diabetic retinopathy and stage 3 CKD: pt denies: blurry vision, foot ulcerations, neuropathy, polyphagia, polyuria, polydipsia, nausea, vomiting and diarrhea. Pt started on Trajenta a little over 3 months ago. Pt was continued on glipizide 10mg BID. No current exercise due to back pain    Home blood sugar records: patient does not test  Any episodes of hypoglycemia? no  Eye exam current (within one year): no  Tobacco history: She  reports that she has never smoked. She has never used smokeless tobacco.   Daily Aspirin? No    Established with Dr. Chidi Lepe on 10/10/2019. He increased her torsemide to 100 mg twice daily, and told her only to take the metolazone 5 mg as needed when her weight gets to 270lbs. He last saw her on 11/26/19:  States CKD is stable with most recent Cr=1.35. Continued to recommend metolazone when weight above 270lbs. He also increased her KCl to 40meq twice daily. He continued allopurinol as uric acid =7.8. He also recommended a low salt diet      Hypertension:  Home blood pressure monitoring: Yes - 130s/70-80s. She is adherent to a low sodium diet. Patient denies chest pain, shortness of breath, headache, lightheadedness, blurred vision, palpitations, dry cough and fatigue. Antihypertensive medication side effects: no medication side effects noted. Use of agents associated with hypertension: none. Hyperlipidemia: Lipitor stopped on 9/6/2019, when patient was complaining of muscle aches in her hips. Wanted to ensure statin was not the cause of her symptoms. Pain did not go away. See below for details on back pain and left gluteal pain. CK level checked at this time was normal at 166.     Lab Results   Component Value Date    LABA1C 8.3 02/03/2020    LABA1C 8.5 10/21/2019    LABA1C 10.1 07/22/2019     Lab Results   Component Value Date    LABMICR <1.20 04/26/2019    CREATININE 1.9 (H) 10/21/2019     Lab Results   Component Value Date    ALT 14 05/07/2019    AST 15 05/07/2019     Lab Results   Component Value Date    CHOL 177 10/21/2019    TRIG 452 (H) 10/21/2019    HDL 38 (L) 10/21/2019    LDLCALC see below 10/21/2019    LDLDIRECT 82 10/21/2019          Sleep Apnea:  Current treatment: cPAP: 4-5 hours a night. Compliance: compliant most of the time. Residual symptoms include: none. 1/31/19: found to have moderate sleep apnea      Colon cancer, iron def anemia  In November 2018, patient was discovered to have stage I colon cancer. Status post partial colectomy. Dr Arley Valverde performed repeat colonoscopy on 5/14/19: and she was told to repeat colonoscopy in one year for surveillance. She is also followed by Dr. Roseanna Bruno. She las saw him on 12/13/19. He checked CMP and CEA and reviewed normal recent CT scan. He states next colonoscopy due in May 2020. Had CT scan DEC 2019:  Impression   No definite metastatic disease chest, abdomen, or pelvis       Bilateral nonobstructing renal calculi           She had a positive fit test 2 years prior, but couldn't afford colonoscopy until she went on Medicare. Lab Results   Component Value Date    WBC 8.2 10/21/2019    HGB 14.0 10/21/2019    HCT 42.2 10/21/2019    MCV 88.9 10/21/2019     10/21/2019       Uterine mass,  Found on US in November 2018. Thought to be fibroid, but endometrial cancer couldn't be excluded on imaging. Pt was referred to Dr Vaishali Mora on 11/19/18, but has not went. Patient still not interested in follow up. Pancreatic cyst  Found incidentally in November when hospitalized for colon cancer and chronic blood loss anemia.     Repeat MRI due November 2020 per current management guidelines    Vitals:    02/03/20 0857 02/03/20 0913   BP: (!) 142/88 136/88   Pulse: 100    Resp: 16    SpO2: 96%    Weight: 267 lb (121.1 kg)        Outpatient Medications Marked as Taking for the 2/3/20 encounter (Office Visit) with Farrah Mayo MD   Medication Sig Dispense Refill    metOLazone (ZAROXOLYN) 5 MG tablet Take 1 tablet by mouth Twice a Week 10 tablet 2    glipiZIDE (GLUCOTROL) 10 MG tablet Take 1 tablet by mouth 2 times daily (before meals) 180 tablet 0    potassium chloride (KLOR-CON M) 20 MEQ TBCR extended release tablet Take 2 tablets by mouth 2 times daily 180 tablet 1    metoprolol succinate (TOPROL XL) 100 MG extended release tablet Take 1 tablet by mouth daily 90 tablet 0    allopurinol (ZYLOPRIM) 100 MG tablet Take 2 tablets by mouth daily 180 tablet 1    torsemide (DEMADEX) 20 MG tablet Take 1 tablet by mouth 2 times daily 180 tablet 0    linagliptin (TRADJENTA) 5 MG tablet Take 1 tablet by mouth daily 90 tablet 3    Multiple Vitamins-Minerals (ADULT ONE DAILY GUMMIES) CHEW Take 1 tablet by mouth daily           Past Medical History:   Diagnosis Date    Anemia     CKD (chronic kidney disease) stage 3, GFR 30-59 ml/min (HCC)     Colon cancer (HCC)     [facility]     Diabetes [facility]     Diabetes with retinopathy ([facility]     DM eye exam 12/18/17    Hypertension     Pancreatic cyst     Positive FIT (fecal immunochemical test)     Pulmonary hypertension ([facility]        Past Surgical History:   Procedure Laterality Date    COLONOSCOPY N/A 11/5/2018    COLONOSCOPY WITH BIOPSY performed by Mil Lucero MD at [address]  05/14/2019    Colonoscopy with polypectomy (cold snare)    COLONOSCOPY N/A 5/14/2019    COLONOSCOPY POLYPECTOMY SNARE/COLD BIOPSY performed by Mil Lucero MD at [address] [place] [address] Right 11/08/2018    LAPAROSCOPIC RIGHT HEMICOLECTOMY and umbilical hernia repair    AL OFFICE/OUTPT VISIT,PROCEDURE ONLY Right 11/8/2018    LAPAROSCOPIC RIGHT HEMICOLECTOMY and umbilical hernia repair performed by Chidi Melo MD at [address] [place] [address] N/A 11/5/2018    EGD BIOPSY performed by Mil Lucero MD at [address] [place] [address] History     Tobacco Use    Smoking status: Never Smoker    Smokeless tobacco: Never Used   Substance Use Topics    Alcohol use:  Yes     Alcohol/week: 2.0 standard drinks     Types: 2 Glasses of wine per week     Comment: only on occasional       Family History   Problem Relation Age of Onset    Lung Cancer Mother     High Blood Pressure Father     Lung Cancer Father     Lupus Sister            Review of Systems  Constitutional:  Negative for activity or appetite change, fever or fatigue  HENT:  Negative for congestion,sinus pressure, or rhinorrhea  Eyes:  Negative for eye pain or visual changes  Resp:  Negative for SOB, chest tightness, cough  Cardiovascular: Negative for CP, palpitations, CHÁVEZ, orthopnea, PND, +LE edema much improved  Gastrointestinal: Negative for abd pain, melena, BRBPR, N/V/D  Endocrine:  Negative for polydipsia and polyuria  :  Negative for dysuria, flank pain or urinary frequency  Musculoskeletal:  +chronic left lower back and left glute pain  Neuro:  Negative for dizziness or lightheadedness  Psych: negative for depression or anxiety      Objective:   Constitutional:   · Reviewed vitals above  · Well Nourished, well developed, no distress       HENT:  · Normal external nose without lesions  · Normal nasal mucosa without swelling or erythema  Neck:  · Symmetric and without masses  · No thyromegaly  Resp:  · Normal effort  · Clear to auscultation bilaterally without rhonchi, wheezing or crackles  Cardiovascular:  · On auscultation, normal S1 and S2 without murmurs, rubs or gallops  · No bruits of bilateral carotids and no JVD  Gastrointestinal:  · Nontender, nondistended, and no masses  · No hepatosplenomegaly  Musculoskeletal:  · Normal Gait  · Trace edema bilateral LE, best I've seen. Ulcer left lower leg is healed  Skin:  · No rashes on inspection  · No areas of increased heat or induration on palpation  Psych:  · Normal mood and affect  · Normal insight and judgement        Assessment / Plan:     1.  Type 2 diabetes mellitus with retinopathy, without long-term current use of insulin, macular edema presence unspecified, unspecified laterality, unspecified retinopathy severity ([address]  - POCT glycosylated hemoglobin (Hb A1C)=8.3     Some mild improvement, but still above goal.  Discussed adding basal insulin to patient's regimen, but still wants to work on lifestyle change first.  Only wants to take Tradjenta and glipizide for now. Other oral medications not an option due to her kidney function currently. Encouraged patient to get diabetic eye exam.     Encouraged patient to continue to work on healthy lifestyle change. Not on ACE/ARB due to CKD, not on statin as stopped in September when having hip pain    2. CKD (chronic kidney disease) stage 3, GFR 30-59 ml/min ([facility])  -By Dr. Josie Lennox. Baseline creatinine around 1.3. Continue torsemide, metolazone, potassium and allopurinol as prescribed by renal.    Repeating BMP today. 3. Essential hypertension  Slightly above goal here, but getting normal blood pressures at home. Patient to continue Toprol  mg, along with torsemide and metolazone. Checking BMP. 4. Pure hypercholesterolemia  Reviewed most recent fasting lipid panel and LFTs. Cholesterol well controlled. Recommend restarting lipitor 20mg, since pain in back and left glute has not resolved after stoping    5. BRIANNA (obstructive sleep apnea)  Patient finally using CPAP. Encouraged her to continue. 6. Iron deficiency anemia due to chronic blood loss  None since colon cancer surgery. 7. Malignant neoplasm of ascending colon (HCC)  Was stage I, and hopefully cured after surgery. Repeat colonoscopy showed now evidence of continued disease. Recent CT scan also showed no evidence of recurrence. Pt to have repeat colonoscopy this year. Patient still follow-up with Dr. Bety Brooke. 8. Uterine mass  Advised patient to see ObGyn to further evaluate uterine mass. Made it clear I can't ensure it wasn't a sign of gyn cancer. Also overdue for Pap smear. She declines both. She states she understands morbidity mortality risk of doing so.     9. Pancreatic cyst  Not due for repeat MRI until 2020. 10. Chronic back pain  Reviewed x-ray findings, and recommend MRI of back. Patient declining at this time. States she will consider if symptoms worsen in the future. It is concerning that there is almost 1 cm of anterolisthesis with flexion. Prescribing Vicodin to take sparingly. States takes a half a tablet every 2 to 3 days. Okay to continue as long as taking sparingly. She understands if starts needing it more routinely, we will need to send to pain doctor. Controlled Substance Monitoring:    Acute and Chronic Pain Monitoring:   RX Monitoring 2/3/2020   Attestation -   Periodic Controlled Substance Monitoring Possible medication side effects, risk of tolerance/dependence & alternative treatments discussed. ;No signs of potential drug abuse or diversion identified. ;Assessed functional status. HM:    Pt declining hep C and HIV screening    Pap smear declined    DEXA scan and mammogram both ordered a couple months ago, and patient given number to schedule. Pneumovax 23 given today with VIS form. She is up-to-date with colonoscopy, next due May 2020.

## 2020-02-03 ENCOUNTER — OFFICE VISIT (OUTPATIENT)
Dept: FAMILY MEDICINE CLINIC | Age: 65
End: 2020-02-03
Payer: MEDICARE

## 2020-02-03 VITALS
WEIGHT: 267 LBS | OXYGEN SATURATION: 96 % | BODY MASS INDEX: 47.3 KG/M2 | SYSTOLIC BLOOD PRESSURE: 136 MMHG | DIASTOLIC BLOOD PRESSURE: 88 MMHG | RESPIRATION RATE: 16 BRPM | HEART RATE: 100 BPM

## 2020-02-03 LAB
ANION GAP SERPL CALCULATED.3IONS-SCNC: 21 MMOL/L (ref 3–16)
BUN BLDV-MCNC: 61 MG/DL (ref 7–20)
CALCIUM SERPL-MCNC: 9.9 MG/DL (ref 8.3–10.6)
CHLORIDE BLD-SCNC: 87 MMOL/L (ref 99–110)
CO2: 30 MMOL/L (ref 21–32)
CREAT SERPL-MCNC: 1.8 MG/DL (ref 0.6–1.2)
GFR AFRICAN AMERICAN: 34
GFR NON-AFRICAN AMERICAN: 28
GLUCOSE BLD-MCNC: 236 MG/DL (ref 70–99)
HBA1C MFR BLD: 8.3 %
POTASSIUM SERPL-SCNC: 3.2 MMOL/L (ref 3.5–5.1)
SODIUM BLD-SCNC: 138 MMOL/L (ref 136–145)

## 2020-02-03 PROCEDURE — 36415 COLL VENOUS BLD VENIPUNCTURE: CPT | Performed by: FAMILY MEDICINE

## 2020-02-03 PROCEDURE — 83036 HEMOGLOBIN GLYCOSYLATED A1C: CPT | Performed by: FAMILY MEDICINE

## 2020-02-03 PROCEDURE — 2022F DILAT RTA XM EVC RTNOPTHY: CPT | Performed by: FAMILY MEDICINE

## 2020-02-03 PROCEDURE — G8482 FLU IMMUNIZE ORDER/ADMIN: HCPCS | Performed by: FAMILY MEDICINE

## 2020-02-03 PROCEDURE — 99215 OFFICE O/P EST HI 40 MIN: CPT | Performed by: FAMILY MEDICINE

## 2020-02-03 PROCEDURE — 1036F TOBACCO NON-USER: CPT | Performed by: FAMILY MEDICINE

## 2020-02-03 PROCEDURE — 3017F COLORECTAL CA SCREEN DOC REV: CPT | Performed by: FAMILY MEDICINE

## 2020-02-03 PROCEDURE — 4040F PNEUMOC VAC/ADMIN/RCVD: CPT | Performed by: FAMILY MEDICINE

## 2020-02-03 PROCEDURE — 90732 PPSV23 VACC 2 YRS+ SUBQ/IM: CPT | Performed by: FAMILY MEDICINE

## 2020-02-03 PROCEDURE — G0009 ADMIN PNEUMOCOCCAL VACCINE: HCPCS | Performed by: FAMILY MEDICINE

## 2020-02-03 PROCEDURE — G8417 CALC BMI ABV UP PARAM F/U: HCPCS | Performed by: FAMILY MEDICINE

## 2020-02-03 PROCEDURE — 1123F ACP DISCUSS/DSCN MKR DOCD: CPT | Performed by: FAMILY MEDICINE

## 2020-02-03 PROCEDURE — G8400 PT W/DXA NO RESULTS DOC: HCPCS | Performed by: FAMILY MEDICINE

## 2020-02-03 PROCEDURE — 1090F PRES/ABSN URINE INCON ASSESS: CPT | Performed by: FAMILY MEDICINE

## 2020-02-03 PROCEDURE — G8427 DOCREV CUR MEDS BY ELIG CLIN: HCPCS | Performed by: FAMILY MEDICINE

## 2020-02-03 RX ORDER — ATORVASTATIN CALCIUM 20 MG/1
TABLET, FILM COATED ORAL
Qty: 90 TABLET | Refills: 1 | Status: SHIPPED | OUTPATIENT
Start: 2020-02-03 | End: 2022-06-08

## 2020-02-03 RX ORDER — HYDROCODONE BITARTRATE AND ACETAMINOPHEN 5; 325 MG/1; MG/1
1 TABLET ORAL EVERY 8 HOURS PRN
Qty: 30 TABLET | Refills: 0 | Status: SHIPPED | OUTPATIENT
Start: 2020-02-03 | End: 2020-05-01 | Stop reason: SDUPTHER

## 2020-02-03 RX ORDER — TORSEMIDE 100 MG/1
100 TABLET ORAL 2 TIMES DAILY
Qty: 180 TABLET | Refills: 1
Start: 2020-02-03 | End: 2020-03-26 | Stop reason: SDUPTHER

## 2020-02-03 ASSESSMENT — PATIENT HEALTH QUESTIONNAIRE - PHQ9
SUM OF ALL RESPONSES TO PHQ QUESTIONS 1-9: 0
SUM OF ALL RESPONSES TO PHQ9 QUESTIONS 1 & 2: 0
2. FEELING DOWN, DEPRESSED OR HOPELESS: 0
SUM OF ALL RESPONSES TO PHQ QUESTIONS 1-9: 0
1. LITTLE INTEREST OR PLEASURE IN DOING THINGS: 0

## 2020-02-11 ENCOUNTER — TELEPHONE (OUTPATIENT)
Dept: FAMILY MEDICINE CLINIC | Age: 65
End: 2020-02-11

## 2020-03-02 RX ORDER — POTASSIUM CHLORIDE 1500 MG/1
40 TABLET, FILM COATED, EXTENDED RELEASE ORAL 2 TIMES DAILY
Qty: 180 TABLET | Refills: 1 | Status: SHIPPED | OUTPATIENT
Start: 2020-03-02 | End: 2020-03-26 | Stop reason: SDUPTHER

## 2020-03-02 RX ORDER — GLIPIZIDE 10 MG/1
10 TABLET ORAL
Qty: 180 TABLET | Refills: 0 | Status: SHIPPED | OUTPATIENT
Start: 2020-03-02 | End: 2020-03-26 | Stop reason: SDUPTHER

## 2020-03-26 RX ORDER — GLIPIZIDE 10 MG/1
10 TABLET ORAL
Qty: 180 TABLET | Refills: 0 | Status: SHIPPED | OUTPATIENT
Start: 2020-03-26 | End: 2020-07-22

## 2020-03-31 RX ORDER — METOLAZONE 5 MG/1
5 TABLET ORAL
Qty: 10 TABLET | Refills: 2 | Status: SHIPPED | OUTPATIENT
Start: 2020-04-02 | End: 2020-08-10 | Stop reason: SDUPTHER

## 2020-04-02 RX ORDER — METOPROLOL SUCCINATE 25 MG/1
TABLET, EXTENDED RELEASE ORAL
Qty: 90 TABLET | Refills: 1 | Status: SHIPPED | OUTPATIENT
Start: 2020-04-02 | End: 2020-06-30 | Stop reason: SDUPTHER

## 2020-05-01 ENCOUNTER — TELEPHONE (OUTPATIENT)
Dept: FAMILY MEDICINE CLINIC | Age: 65
End: 2020-05-01

## 2020-05-01 RX ORDER — HYDROCODONE BITARTRATE AND ACETAMINOPHEN 5; 325 MG/1; MG/1
1 TABLET ORAL EVERY 8 HOURS PRN
Qty: 30 TABLET | Refills: 0 | Status: SHIPPED | OUTPATIENT
Start: 2020-05-01 | End: 2020-07-22 | Stop reason: SDUPTHER

## 2020-05-01 NOTE — TELEPHONE ENCOUNTER
PT called in wanting to know if hydrocodone can be called in for her to the [facility] on [address]. Pt is complaining of hip pain.      Please call PT back: [phone]

## 2020-06-29 RX ORDER — METOPROLOL SUCCINATE 100 MG/1
100 TABLET, EXTENDED RELEASE ORAL DAILY
Qty: 90 TABLET | Refills: 0 | OUTPATIENT
Start: 2020-06-29

## 2020-06-30 NOTE — TELEPHONE ENCOUNTER
PT called back in regarding message from Kenneth murillo. PT is now scheduled for 7/22 @ 9 am.     PT would also like to have blood work done that day to check her kidney function, hemoglobin & potassium.

## 2020-07-01 RX ORDER — METOPROLOL SUCCINATE 25 MG/1
TABLET, EXTENDED RELEASE ORAL
Qty: 30 TABLET | Refills: 0 | Status: SHIPPED | OUTPATIENT
Start: 2020-07-01 | End: 2020-07-02

## 2020-07-02 RX ORDER — METOPROLOL SUCCINATE 25 MG/1
TABLET, EXTENDED RELEASE ORAL
Qty: 90 TABLET | Refills: 1 | Status: SHIPPED | OUTPATIENT
Start: 2020-07-02 | End: 2020-07-22

## 2020-07-06 RX ORDER — POTASSIUM CHLORIDE 1500 MG/1
TABLET, FILM COATED, EXTENDED RELEASE ORAL
Qty: 360 TABLET | Refills: 0 | Status: SHIPPED | OUTPATIENT
Start: 2020-07-06 | End: 2020-09-18 | Stop reason: SDUPTHER

## 2020-07-06 RX ORDER — METOPROLOL SUCCINATE 100 MG/1
TABLET, EXTENDED RELEASE ORAL
Qty: 90 TABLET | Refills: 0 | Status: SHIPPED | OUTPATIENT
Start: 2020-07-06 | End: 2020-09-18 | Stop reason: SDUPTHER

## 2020-07-21 NOTE — PATIENT INSTRUCTIONS
Schedule follow up appointment with Dr Andrea Argueta    How often does Dr Sasha Deluca want to see you???    Get Tdap and Shingrix vaccines at your pharmacy    Call central scheduling for mammogram, transvaginal ultrasound,  bone DEXA scan, and MRI of abdomen (this isn't due until November 2020):   [phone]    Schedule diabetic eye exam    Call Dr Mindi Warren for colonoscopy as you are overdue:    [phone]

## 2020-07-21 NOTE — PROGRESS NOTES
PROGRESS NOTE     Bradford Bradford MD  [address] [facility]  [place] [address]  [phone] office  [phone] fax    Date of Service:  7/22/2020    Subjective:      Patient ID: .Ethel Cordon is a 75 y.o. female      CC: chronic back pain,  diastolic CHF/venous insufficiency, Venous stasis ulcer, DM2 with retinopathy and stage 3 CKD, HTN, HLD, BRIANNA, colon cancer, uterine mass, pancreatic cyst    HPI     chronic back pain  Over the last year, patient is complained of pain in left lower back radiating to left glute. She had been seen different homeopathic doctors for some alternative treatments, and feels that they have helped some. She did finally see orthopedic, and had x-ray of her back, that showed the following:     lumbar spine 12/17/19: IMPRESSION:   Multilevel lumbar spondylosis with anterolisthesis of L4 on L5 and translational instability at    L3-4 and L4-5         Hip xray bilateral 12/17/19  IMPRESSION:   No significant abnormality       Patient is convinced that the findings of the lumbar spine x-ray have nothing to do with her pain. She feels that it is \"muscular. She is now being discomfort in her right glutes down to her right lower leg. States is not sharp in nature but feels like a tightness. .  No numbness or weakness in her legs. LE edema/ venous insufficiency  Vs diastolic HF  I do believe patient likely has some diastolic heart failure, because when she presented initially, she had significant dyspnea on exertion associated with her lower extremity edema, and both of these were documented to be significantly improved after being placed on Lasix, according to my note on 6/15/18. She had an initial BNP that was slightly over 100, but this increased to 708 on 11/17/18.   Echocardiogram also showed some mild concentric left ventricular hypertrophy, normal ejection fraction, and stated diastolic function could not be well determined due to elevated heart rate. Patient later had some significant GI blood loss anemia that complicated the picture worsened her dyspnea on exertion, however: Dyspnea on exertion did initially improve greatly after Lasix was started. Last saw Dr Gisela Giordano on 4/8/19. She reported a worsening of her LE edema after stopping her metolazone, that improved after restarting it. Dr Gisela Giordano increased her metolazone to 5mg twice weekly. uncontrolled edema. Kidney doctor is now adjusting both her torsemide and metolazone. See below for details. Patient still has some chronic lower extremity edema, but mostly controlled. Wears compression stockings most the time. Denies current dyspnea on exertion. No orthopnea, paroxysmal nocturnal dyspnea, palpitations, lightheadedness. Pro-HCU=[id number] on 11/17/18    Stress perfusion 1/9/18  Small area of anterior wall breast artifact but no clear reversible    perfusion defect seen to suggest myocardial ischemia.    Hypertensive response to stress test    Overall findings represent a low risk study.    Normal LV size and systolic function.      Echo 10/17/18  Left ventricular cavity size is normal. There is mild concentric left  ventricular hypertrophy. Overall left ventricular systolic function appears normal with an estimated  ejection fraction of 55-60%. No regional wall motion abnormalities are noted. Diastolic function is indeterminate due to increased heart rate. Mild aortic stenosis with a peak velocity of 2.73m/s and a mean pressure  gradient of 16mmHg. IVC size is dilated (>2.1 cm) and collapses < 50% with respiration  consistent with elevated RA pressure (15 mmHg). Venous stasis ulcer  Saw Dr Alfonso Kelly on 9/20/19Tita Matsu boot placed. Ulcer is now healed. States wears compression stockings most the time. Doing much better now that lower extremity edema is controlled.       Diabetes Mellitus Type 2 with diabetic retinopathy and stage 3 CKD: pt denies: blurry vision, foot LABA1C 8.5 10/21/2019    LABA1C 10.1 07/22/2019     Lab Results   Component Value Date    LABMICR <1.20 04/26/2019    CREATININE 1.8 (H) 02/03/2020     Lab Results   Component Value Date    ALT 14 05/07/2019    AST 15 05/07/2019     Lab Results   Component Value Date    CHOL 177 10/21/2019    TRIG 452 (H) 10/21/2019    HDL 38 (L) 10/21/2019    LDLCALC see below 10/21/2019    LDLDIRECT 82 10/21/2019          Sleep Apnea:  Current treatment: cPAP: 4-5 hours a night. Compliance: compliant most of the time. Residual symptoms include: none. 1/31/19: found to have moderate sleep apnea      Colon cancer, iron def anemia  In November 2018, patient was discovered to have stage I colon cancer. Status post partial colectomy. Dr Bright Villa performed repeat colonoscopy on 5/14/19: and she was told to repeat colonoscopy in one year for surveillance. She is also followed by Dr. Cheyenne Alston. She las saw him on 12/13/19. He checked CMP and CEA and reviewed normal recent CT scan. He states next colonoscopy due in May 2020. Had CT scan DEC 2019:  Impression   No definite metastatic disease chest, abdomen, or pelvis       Bilateral nonobstructing renal calculi         She had a positive fit test 2 years prior, but couldn't afford colonoscopy until she went on Medicare. Patient states she has normal bowel movements. Denies any bright red blood per rectum, or melena. No abdominal pain. Lab Results   Component Value Date    WBC 8.2 10/21/2019    HGB 14.0 10/21/2019    HCT 42.2 10/21/2019    MCV 88.9 10/21/2019     10/21/2019       Uterine mass,  Found on US in November 2018. Thought to be fibroid, but endometrial cancer couldn't be excluded on imaging. Pt was referred to Dr Luigi Carroll on 11/19/18, but has not went. Patient still not interested in follow up. Pancreatic cyst  Found incidentally in November when hospitalized for colon cancer and chronic blood loss anemia.     Repeat MRI due November 2020 per Leyda Abel MD at [address] [place] [address] Bola Right 11/08/2018    LAPAROSCOPIC RIGHT HEMICOLECTOMY and umbilical hernia repair    [facility] OFFICE/OUTPT VISIT,PROCEDURE ONLY Right 11/8/2018    LAPAROSCOPIC RIGHT HEMICOLECTOMY and umbilical hernia repair performed by Francie Mejia MD at [address] [facility] N/A 11/5/2018    EGD BIOPSY performed by Aleshia Patterson MD at [address] [facility] EXTRACTION         Social History     Tobacco Use    Smoking status: Never Smoker    Smokeless tobacco: Never Used   Substance Use Topics    Alcohol use:  Yes     Alcohol/week: 2.0 standard drinks     Types: 2 Glasses of wine per week     Comment: only on occasional       Family History   Problem Relation Age of Onset    Lung Cancer Mother     High Blood Pressure Father     Lung Cancer Father     Lupus Sister            Review of Systems  Constitutional:  Negative for activity or appetite change, fever or fatigue  HENT:  Negative for congestion,sinus pressure, or rhinorrhea  Eyes:  Negative for eye pain or visual changes  Resp:  Negative for SOB, chest tightness, cough  Cardiovascular: Negative for CP, palpitations, CHÁVEZ, orthopnea, PND, +LE edema mild bilateral lower legs with some hemosiderin deposits  Gastrointestinal: Negative for abd pain, melena, BRBPR, N/V/D  Endocrine:  Negative for polydipsia and polyuria  :  Negative for dysuria, flank pain or urinary frequency  Musculoskeletal:  +chronic left lower back and left glute pain radiating down right leg  Neuro:  Negative for dizziness or lightheadedness  Psych: negative for depression or anxiety      Objective:   Constitutional:   · Reviewed vitals above  · Well Nourished, well developed, no distress       HENT:  · Normal external nose without lesions  · Normal nasal mucosa without swelling or erythema  Neck:  · Symmetric and without masses  · No thyromegaly  Resp:  · Normal effort  · Clear to auscultation bilaterally without rhonchi, wheezing or crackles  Cardiovascular:  · On auscultation, normal S1 and S2 without murmurs, rubs or gallops  · No bruits of bilateral carotids and no JVD  Gastrointestinal:  · Nontender, nondistended, and no masses  · No hepatosplenomegaly  Musculoskeletal:  · Normal Gait  · Trace edema bilateral LE  Skin:  · No rashes on inspection  · No areas of increased heat or induration on palpation  Psych:  · Normal mood and affect  · Normal insight and judgement    FOOT EXAM:    +1 distal tibialis anterior and dorsalis pedis pulses bilaterally  Normal sensation to monofilament testing bilaterally  No calluses  No ulcers  Non mycotic toe nails. Assessment / Plan:     1. Type 2 diabetes mellitus with retinopathy, without long-term current use of insulin, macular edema presence unspecified, unspecified laterality, unspecified retinopathy severity ([facility])  - POCT glycosylated hemoglobin (Hb A1C)>14, sending HgA1c in blood to confirm     Discussed that we truly do need to start basal bolus insulin now with A1c this high. She is not a candidate for many oral medications due to her kidney disease. Okay to continue Marine Gibson but will stop glipizide. Patient to start 5 units of NovoLog q. before meals, and 10 units of Levemir nightly. Gave patient blood sugar tracker and paperwork to log blood sugars. Patient will record sugars for the next 2 weeks and then do video visit at that time so we can further adjust insulins. Encouraged patient to get diabetic eye exam.     Encouraged patient to continue to work on healthy lifestyle change. Not on ACE/ARB due to CKD, not on statin because \"she has read too many bad things about them \". We talked about this in detail but she is not willing to start it. 2. CKD (chronic kidney disease) stage 3, GFR 30-59 ml/min ([facility])  -By Dr. Santa Bundy. Baseline creatinine around 1.3.   Continue torsemide, metolazone, potassium and allopurinol as prescribed by renal.    Repeating BMP today. (With is high as her A1c is, would not be surprised if there is some dehydration causing some worsening kidney function and electrolyte derangements)    3. Essential hypertension  above goal here. Encouraged to schedule with Dr. Naa DESHPANDE. Patient to continue Toprol  mg, along with torsemide and metolazone. Checking BMP. 4. Pure hypercholesterolemia  Patient is fasting. We will repeat fasting lipid panel and LFTs. She still is not agreeing to start statin therapy despite multiple talks about his reduction in morbidity and mortality. We will continue to discuss with her in the future. 5. BRIANNA (obstructive sleep apnea)  Patient finally using CPAP. Encouraged her to continue. 6. Iron deficiency anemia due to chronic blood loss  None since colon cancer surgery. 7. Malignant neoplasm of ascending colon (HCC)  Was stage I, and hopefully cured after surgery. Repeat colonoscopy showed now evidence of continued disease. Recent CT scan also showed no evidence of recurrence. Patient is overdue for 1 year colonoscopy. Gave her number to schedule. Also checked CEA level. Patient is not sure how often she is supposed to be seeing Dr. Ranjeet Medeiros. Not sure if it is annually or every 6 months. Patient told to call to find out. 8. Uterine mass  Advised patient to see ObGyn to further evaluate uterine mass. Made it clear I can't ensure it wasn't a sign of gyn cancer. Also overdue for Pap smear. She declines both. She states she understands morbidity mortality risk of doing so. I went ahead and placed an order for a transvaginal ultrasound so we can at least look at the area and see if there is any change. Unclear if patient will do this either    9. Pancreatic cyst  Repeat MRI due in November 2020. Order placed today and gave patient number to schedule    10. Chronic back pain  Reviewed x-ray findings, and recommend MRI of back and/or EMG of lower extremities. .  Patient declining at this time. States she will consider if symptoms worsen in the future. It is concerning that there is almost 1 cm of anterolisthesis with flexion. Prescribing Vicodin to take sparingly. States takes a half a tablet every 2 to 3 days. Okay to continue as long as taking sparingly. She understands if starts needing it more routinely, we will need to send to pain doctor. 11. Insomnia  Patient requesting Valium to be taken 2 times a week at most to help with her sleeping. Told her okay to do so as long as she does not take within [employer], and does not need it more frequently than this. Controlled Substance Monitoring:    Acute and Chronic Pain Monitoring:   RX Monitoring 7/22/2020   Attestation -   Periodic Controlled Substance Monitoring No signs of potential drug abuse or diversion identified. ;Possible medication side effects, risk of tolerance/dependence & alternative treatments discussed. ;Assessed functional status. HM:    Pt declining hep C and HIV screening    Pap smear declined    DEXA scan and mammogram both ordered a couple months ago, and patient given number to schedule. Colonoscopy was due May 2020.; encouraged to schedule    Encouraged to get Tdap and Shingrix at [facility].   She is UTD on rest of immunizations

## 2020-07-22 ENCOUNTER — OFFICE VISIT (OUTPATIENT)
Dept: FAMILY MEDICINE CLINIC | Age: 65
End: 2020-07-22
Payer: MEDICARE

## 2020-07-22 VITALS
SYSTOLIC BLOOD PRESSURE: 148 MMHG | DIASTOLIC BLOOD PRESSURE: 80 MMHG | BODY MASS INDEX: 48.89 KG/M2 | HEART RATE: 105 BPM | RESPIRATION RATE: 18 BRPM | WEIGHT: 276 LBS

## 2020-07-22 LAB
BASOPHILS ABSOLUTE: 0.1 K/UL (ref 0–0.2)
BASOPHILS RELATIVE PERCENT: 0.7 %
EOSINOPHILS ABSOLUTE: 0.2 K/UL (ref 0–0.6)
EOSINOPHILS RELATIVE PERCENT: 2.8 %
HCT VFR BLD CALC: 43.3 % (ref 36–48)
HEMOGLOBIN: 14 G/DL (ref 12–16)
LYMPHOCYTES ABSOLUTE: 1.5 K/UL (ref 1–5.1)
LYMPHOCYTES RELATIVE PERCENT: 18 %
MCH RBC QN AUTO: 29.7 PG (ref 26–34)
MCHC RBC AUTO-ENTMCNC: 32.4 G/DL (ref 31–36)
MCV RBC AUTO: 91.8 FL (ref 80–100)
MONOCYTES ABSOLUTE: 0.7 K/UL (ref 0–1.3)
MONOCYTES RELATIVE PERCENT: 7.9 %
NEUTROPHILS ABSOLUTE: 5.9 K/UL (ref 1.7–7.7)
NEUTROPHILS RELATIVE PERCENT: 70.6 %
PDW BLD-RTO: 15.2 % (ref 12.4–15.4)
PLATELET # BLD: 175 K/UL (ref 135–450)
PMV BLD AUTO: 9.3 FL (ref 5–10.5)
RBC # BLD: 4.72 M/UL (ref 4–5.2)
WBC # BLD: 8.3 K/UL (ref 4–11)

## 2020-07-22 PROCEDURE — 2022F DILAT RTA XM EVC RTNOPTHY: CPT | Performed by: FAMILY MEDICINE

## 2020-07-22 PROCEDURE — 3052F HG A1C>EQUAL 8.0%<EQUAL 9.0%: CPT | Performed by: FAMILY MEDICINE

## 2020-07-22 PROCEDURE — 1090F PRES/ABSN URINE INCON ASSESS: CPT | Performed by: FAMILY MEDICINE

## 2020-07-22 PROCEDURE — 36415 COLL VENOUS BLD VENIPUNCTURE: CPT | Performed by: FAMILY MEDICINE

## 2020-07-22 PROCEDURE — 1123F ACP DISCUSS/DSCN MKR DOCD: CPT | Performed by: FAMILY MEDICINE

## 2020-07-22 PROCEDURE — 99215 OFFICE O/P EST HI 40 MIN: CPT | Performed by: FAMILY MEDICINE

## 2020-07-22 PROCEDURE — G8417 CALC BMI ABV UP PARAM F/U: HCPCS | Performed by: FAMILY MEDICINE

## 2020-07-22 PROCEDURE — 3017F COLORECTAL CA SCREEN DOC REV: CPT | Performed by: FAMILY MEDICINE

## 2020-07-22 PROCEDURE — G8427 DOCREV CUR MEDS BY ELIG CLIN: HCPCS | Performed by: FAMILY MEDICINE

## 2020-07-22 PROCEDURE — G8400 PT W/DXA NO RESULTS DOC: HCPCS | Performed by: FAMILY MEDICINE

## 2020-07-22 PROCEDURE — 1036F TOBACCO NON-USER: CPT | Performed by: FAMILY MEDICINE

## 2020-07-22 PROCEDURE — 4040F PNEUMOC VAC/ADMIN/RCVD: CPT | Performed by: FAMILY MEDICINE

## 2020-07-22 RX ORDER — INSULIN DETEMIR 100 [IU]/ML
INJECTION, SOLUTION SUBCUTANEOUS
Qty: 20 PEN | Refills: 0 | Status: SHIPPED | OUTPATIENT
Start: 2020-07-22 | End: 2020-08-03 | Stop reason: SDUPTHER

## 2020-07-22 RX ORDER — GLUCOSAMINE HCL/CHONDROITIN SU 500-400 MG
CAPSULE ORAL
Qty: 120 STRIP | Refills: 3 | Status: SHIPPED | OUTPATIENT
Start: 2020-07-22 | End: 2021-01-07

## 2020-07-22 RX ORDER — GLUCOSAMINE HCL/CHONDROITIN SU 500-400 MG
CAPSULE ORAL
Qty: 120 STRIP | Refills: 3 | Status: SHIPPED | OUTPATIENT
Start: 2020-07-22 | End: 2020-07-22 | Stop reason: SDUPTHER

## 2020-07-22 RX ORDER — DIAZEPAM 5 MG/1
5 TABLET ORAL NIGHTLY PRN
Qty: 30 TABLET | Refills: 0 | Status: SHIPPED | OUTPATIENT
Start: 2020-07-22 | End: 2020-08-21

## 2020-07-22 RX ORDER — HYDROCODONE BITARTRATE AND ACETAMINOPHEN 5; 325 MG/1; MG/1
1 TABLET ORAL EVERY 8 HOURS PRN
Qty: 30 TABLET | Refills: 0 | Status: SHIPPED | OUTPATIENT
Start: 2020-07-22 | End: 2020-09-11 | Stop reason: SDUPTHER

## 2020-07-22 RX ORDER — INSULIN ASPART 100 [IU]/ML
INJECTION, SOLUTION INTRAVENOUS; SUBCUTANEOUS
Qty: 35 PEN | Refills: 0 | Status: SHIPPED | OUTPATIENT
Start: 2020-07-22 | End: 2020-08-03 | Stop reason: SDUPTHER

## 2020-07-22 RX ORDER — LANCETS 30 GAUGE
EACH MISCELLANEOUS
Qty: 100 EACH | Refills: 3 | Status: SHIPPED | OUTPATIENT
Start: 2020-07-22 | End: 2021-01-07

## 2020-07-22 NOTE — TELEPHONE ENCOUNTER
Had appt this morning. Was supposed to have had insulin and other [facility]. Has not heard anything regarding and is supposed to start med tomorrow. Please advise.  Pt is reachable at [phone]

## 2020-07-23 ENCOUNTER — TELEPHONE (OUTPATIENT)
Dept: FAMILY MEDICINE CLINIC | Age: 65
End: 2020-07-23

## 2020-07-23 LAB
ALBUMIN SERPL-MCNC: 4.3 G/DL (ref 3.4–5)
ALP BLD-CCNC: 111 U/L (ref 40–129)
ALT SERPL-CCNC: 19 U/L (ref 10–40)
ANION GAP SERPL CALCULATED.3IONS-SCNC: 20 MMOL/L (ref 3–16)
AST SERPL-CCNC: 24 U/L (ref 15–37)
BILIRUB SERPL-MCNC: 0.4 MG/DL (ref 0–1)
BILIRUBIN DIRECT: <0.2 MG/DL (ref 0–0.3)
BILIRUBIN, INDIRECT: NORMAL MG/DL (ref 0–1)
BUN BLDV-MCNC: 40 MG/DL (ref 7–20)
CALCIUM SERPL-MCNC: 9.8 MG/DL (ref 8.3–10.6)
CEA: 4.3 NG/ML (ref 0–5)
CHLORIDE BLD-SCNC: 81 MMOL/L (ref 99–110)
CHOLESTEROL, TOTAL: 221 MG/DL (ref 0–199)
CO2: 29 MMOL/L (ref 21–32)
CREAT SERPL-MCNC: 1.6 MG/DL (ref 0.6–1.2)
ESTIMATED AVERAGE GLUCOSE: 314.9 MG/DL
GFR AFRICAN AMERICAN: 39
GFR NON-AFRICAN AMERICAN: 32
GLUCOSE BLD-MCNC: 442 MG/DL (ref 70–99)
HBA1C MFR BLD: 12.6 %
HDLC SERPL-MCNC: 37 MG/DL (ref 40–60)
LDL CHOLESTEROL CALCULATED: ABNORMAL MG/DL
LDL CHOLESTEROL DIRECT: 69 MG/DL
POTASSIUM SERPL-SCNC: 3.3 MMOL/L (ref 3.5–5.1)
SODIUM BLD-SCNC: 130 MMOL/L (ref 136–145)
TOTAL PROTEIN: 7.3 G/DL (ref 6.4–8.2)
TRIGL SERPL-MCNC: 793 MG/DL (ref 0–150)
VLDLC SERPL CALC-MCNC: ABNORMAL MG/DL

## 2020-07-23 RX ORDER — LANCETS 30 GAUGE
1 EACH MISCELLANEOUS 3 TIMES DAILY
Qty: 100 EACH | Refills: 3 | Status: SHIPPED | OUTPATIENT
Start: 2020-07-23 | End: 2020-08-26 | Stop reason: SDUPTHER

## 2020-07-23 RX ORDER — BLOOD-GLUCOSE METER
1 EACH MISCELLANEOUS DAILY
Qty: 1 KIT | Refills: 0 | Status: SHIPPED | OUTPATIENT
Start: 2020-07-23 | End: 2021-01-07

## 2020-07-23 NOTE — TELEPHONE ENCOUNTER
PT called in stating that [facility] on [address] will not release the diabetic test kit because they state that what Dr. Xavier Dubose called in is missing a code.      Please reach out to pharmacy and call Pt back at: [phone]

## 2020-07-23 NOTE — TELEPHONE ENCOUNTER
The pharmacy needed a diagnosis code for the scripts.  The order has been updated in the computer after talking to the pharmacy

## 2020-07-23 NOTE — TELEPHONE ENCOUNTER
Please call patient and let her know it does look like on our end that the insulin and supplies were sent to [facility] on [address] yesterday. I would suggest she calls the pharmacy to check. Please document call and then close encounter.   thanks

## 2020-07-23 NOTE — TELEPHONE ENCOUNTER
Sending glucose test strips [facility] per [facility]'s request as [facility]'t [facility] despite calls to correct the diagnosis issue. Please call pharmacy: [facility] on [phone], to ensure they received order and will fill these today.   Patient needs them to start her insulins    Let pt know afterwards

## 2020-07-24 ENCOUNTER — TELEPHONE (OUTPATIENT)
Dept: FAMILY MEDICINE CLINIC | Age: 65
End: 2020-07-24

## 2020-07-24 NOTE — TELEPHONE ENCOUNTER
Pt called in stating that part B of Medicare will only pay for her test strips if she tests 3 times a day but Dr. Mariya Barnhart has her testing 4 times a day.      Give pt a call back: [phone]

## 2020-07-27 ENCOUNTER — TELEPHONE (OUTPATIENT)
Dept: FAMILY MEDICINE CLINIC | Age: 65
End: 2020-07-27

## 2020-07-27 NOTE — TELEPHONE ENCOUNTER
PT called in stating that she texted Dr. Juan Miguel Fried her numbers this morning for her insulin.  She says that she needs to know if she should take 10 or 15 of her Novolog before breakfast.     Please call back to adv: [phone]

## 2020-07-27 NOTE — TELEPHONE ENCOUNTER
I sent new script to [facility] pharmacy with diagnosis of uncontrolled Diabetes and diabetes with insulin    Please call [facility] and see if you can get approved

## 2020-07-27 NOTE — TELEPHONE ENCOUNTER
Tierra,    See if we can get a PA to approve for more. She is on basal bolus insulin and I need blood sugars before meals and at bedtime.     And because sugars are so uncontrolled right now, she might need an extra one in the middle of the day

## 2020-07-28 NOTE — TELEPHONE ENCOUNTER
I called [facility] and they said again that it was denied, I spent an hour plus some on the phone with her medicare and they finally said as long as it is noted in the chart and at the place that is filling her supplies that she is uncontrolled and the testing frequency that the supplies will be coved and if there is any excess bills for the patient she should mail them to her insurance company to cover

## 2020-07-28 NOTE — TELEPHONE ENCOUNTER
Please call patient and give her this information. Thanks    Please document call and then close encounter.   thanks

## 2020-08-03 RX ORDER — INSULIN DETEMIR 100 [IU]/ML
INJECTION, SOLUTION SUBCUTANEOUS
Qty: 9 PEN | Refills: 2 | Status: SHIPPED | OUTPATIENT
Start: 2020-08-03 | End: 2020-09-18 | Stop reason: SDUPTHER

## 2020-08-03 RX ORDER — INSULIN ASPART 100 [IU]/ML
INJECTION, SOLUTION INTRAVENOUS; SUBCUTANEOUS
Qty: 35 PEN | Refills: 0 | Status: CANCELLED | OUTPATIENT
Start: 2020-08-03

## 2020-08-03 RX ORDER — INSULIN ASPART 100 [IU]/ML
INJECTION, SOLUTION INTRAVENOUS; SUBCUTANEOUS
Qty: 17 PEN | Refills: 2 | Status: SHIPPED | OUTPATIENT
Start: 2020-08-03 | End: 2020-08-26 | Stop reason: SDUPTHER

## 2020-08-03 NOTE — TELEPHONE ENCOUNTER
Spoke with pharmacist.  Sent new prescription for basal bolus insulin per his instructions. Notified patient. He says Medicare part B only pays for 3 test strips daily, and for some reason have to bill through Medicare part B instead of Medicare part D. Informed patient.         Please do the followin) remove all pharmacies from [facility] chart [facility] [address]    2) cancel her VV on this Wednesday      Document once done and close encounter

## 2020-08-06 ENCOUNTER — TELEPHONE (OUTPATIENT)
Dept: FAMILY MEDICINE CLINIC | Age: 65
End: 2020-08-06

## 2020-08-06 NOTE — TELEPHONE ENCOUNTER
Let pt know, she should as the pharmacy to have them shipped from a different pharmacy to hers    Please document call and then close encounter.   thanks

## 2020-08-06 NOTE — TELEPHONE ENCOUNTER
PT called in stating that she is unable to find One Touch Ultra test strips at any pharmacy. She says that her usual pharmacy doesn't have them and she's called other places and they aren't in stock. She would like to know where she can find them.      Please call PT back: [phone]

## 2020-08-10 RX ORDER — METOLAZONE 5 MG/1
5 TABLET ORAL
Qty: 10 TABLET | Refills: 2 | Status: SHIPPED | OUTPATIENT
Start: 2020-08-10 | End: 2020-10-21 | Stop reason: SDUPTHER

## 2020-08-12 ENCOUNTER — VIRTUAL VISIT (OUTPATIENT)
Dept: FAMILY MEDICINE CLINIC | Age: 65
End: 2020-08-12
Payer: MEDICARE

## 2020-08-12 PROCEDURE — G8428 CUR MEDS NOT DOCUMENT: HCPCS | Performed by: FAMILY MEDICINE

## 2020-08-12 PROCEDURE — 99214 OFFICE O/P EST MOD 30 MIN: CPT | Performed by: FAMILY MEDICINE

## 2020-08-12 PROCEDURE — G8400 PT W/DXA NO RESULTS DOC: HCPCS | Performed by: FAMILY MEDICINE

## 2020-08-12 PROCEDURE — 1090F PRES/ABSN URINE INCON ASSESS: CPT | Performed by: FAMILY MEDICINE

## 2020-08-12 PROCEDURE — 4040F PNEUMOC VAC/ADMIN/RCVD: CPT | Performed by: FAMILY MEDICINE

## 2020-08-12 PROCEDURE — G8417 CALC BMI ABV UP PARAM F/U: HCPCS | Performed by: FAMILY MEDICINE

## 2020-08-12 PROCEDURE — 1123F ACP DISCUSS/DSCN MKR DOCD: CPT | Performed by: FAMILY MEDICINE

## 2020-08-12 PROCEDURE — 3017F COLORECTAL CA SCREEN DOC REV: CPT | Performed by: FAMILY MEDICINE

## 2020-08-12 PROCEDURE — 3046F HEMOGLOBIN A1C LEVEL >9.0%: CPT | Performed by: FAMILY MEDICINE

## 2020-08-12 PROCEDURE — 1036F TOBACCO NON-USER: CPT | Performed by: FAMILY MEDICINE

## 2020-08-12 PROCEDURE — 2022F DILAT RTA XM EVC RTNOPTHY: CPT | Performed by: FAMILY MEDICINE

## 2020-08-12 NOTE — PROGRESS NOTES
2020    TELEHEALTH EVALUATION -- Audio/Visual (During CHRXQ-82 public health emergency)    HPI:    Yenny Phillips (:  1955) has requested an audio/video evaluation for the following concern(s):    Patient asked to do a video visit today to go over adjustments and insulins. She has been taking 70 units of Levemir in the evening and 45 units of NovoLog with each meal.    Fasting sugars: 140-207  Pre lunch: 180--240  Pre dinner: 125--184  Bedtime:  140--230    Patient is trying to eat the same amount of carbohydrates at each meal.  She thinks her prelunch sugars may be slightly higher than the rest of the day only because her lunchtime is closer to her breakfast time, and there are 7 hours between her lunch and dinner. She is currently not snacking on any carbs between meals until we can get her blood sugars well controlled. She still is taking Tradjenta 5 mg daily. Can no longer take other oral medications due to her chronic kidney disease. Review of Systems  Patient denies numbness or tingling in her feet, pain in her feet, polyuria, polydipsia, headache or lightheadedness    Prior to Visit Medications    Medication Sig Taking? Authorizing Provider   metOLazone (ZAROXOLYN) 5 MG tablet Take 1 tablet by mouth Twice a Week  SANDY Molina - CNP   insulin aspart (NOVOLOG FLEXPEN) 100 UNIT/ML injection pen Take as directed qac.   Maximum 165units daily  Vicki Healy MD   insulin detemir (LEVEMIR FLEXTOUCH) 100 UNIT/ML injection pen Take as directed qhs (maximum 90 units at night)  Vicki Healy MD   blood glucose test strips (ASCENSIA AUTODISC VI;ONE TOUCH ULTRA TEST VI) strip Check blood sugars aqc and qhs  Vale Ladd MD   Blood Glucose Monitoring Suppl (ONE TOUCH ULTRA 2) w/Device KIT 1 kit by Does not apply route daily  Vicki Healy MD   Lancets MISC 1 each by Does not apply route 3 times daily  Vicki Healy MD   blood glucose monitor kit and supplies Test blood sugars qa and qhs  Vale Alves MD   Lancets MISC Test blood sugars qa and qhs  Vale Alves MD   Insulin Pen Needle 31G X 6 MM MISC 1 each by Does not apply route daily  Vale Alves MD   diazePAM (VALIUM) 5 MG tablet Take 1 tablet by mouth nightly as needed for Anxiety or Sleep for up to 30 days. Sheila Bright MD   HYDROcodone-acetaminophen [facility]) 5-325 MG per tablet Take 1 tablet by mouth every 8 hours as needed for Pain for up to 30 days. Angely Skelton MD   blood glucose monitor strips Test blood sugars qa and qhs  Vale Alves MD   metoprolol succinate (TOPROL XL) 100 MG extended release tablet TAKE 1 TABLET BY MOUTH EVERY DAY  Vale Alves MD   potassium chloride (KLOR-CON M) 20 MEQ TBCR extended release tablet TAKE 2 TABLETS BY MOUTH TWICE A DAY  Vale Alves MD   allopurinol (ZYLOPRIM) 100 MG tablet Take 2 tablets by mouth daily  Juan Clinton MD   torsemide (DEMADEX) 100 MG tablet Take 1 tablet by mouth 2 times daily  Juan Clinton MD   atorvastatin (LIPITOR) 20 MG tablet One po qhs  Angely Skelton MD   linagliptin (TRADJENTA) 5 MG tablet Take 1 tablet by mouth daily  Angely Skelton MD   Multiple Vitamins-Minerals (ADULT ONE DAILY GUMMIES) CHEW Take 1 tablet by mouth daily  Historical Provider, MD       Social History     Tobacco Use    Smoking status: Never Smoker    Smokeless tobacco: Never Used   Substance Use Topics    Alcohol use:  Yes     Alcohol/week: 2.0 standard drinks     Types: 2 Glasses of wine per week     Comment: only on occasional    Drug use: No        Allergies   Allergen Reactions    Pcn [Penicillins] Shortness Of Breath    Spironolactone Rash    Coreg [Carvedilol]      Side effects:  SOB, palpitations    Lisinopril Other (See Comments)     cough   ,   Past Medical History:   Diagnosis Date    Anemia     CKD (chronic [facility] disease) stage 3, GFR 30-59 ml[facility]min [facility]     [facility]     Diabetes [facility]     Diabetes ([facility]     Diabetes with retinopathy ([facility]     DM eye exam 12/18/17    Hypertension     Pancreatic cyst     Positive FIT (fecal immunochemical test)     Pulmonary hypertension (HCC)        PHYSICAL EXAMINATION:      Constitutional: [x] Appears well-developed and well-nourished [x] No apparent distress      [] Abnormal-   Mental status  [x] Alert and awake  [x] Oriented to person/place/time []Able to follow commands      Eyes:  EOM    [x]  Normal  [] Abnormal-  Sclera  [x]  Normal  [] Abnormal -         Discharge [x]  None visible  [] Abnormal -    HENT:   [x] Normocephalic, atraumatic. [] Abnormal   [] Mouth/Throat: Mucous membranes are moist.     External Ears [x] Normal  [] Abnormal-     Neck: [x] No visualized mass     Pulmonary/Chest: [x] Respiratory effort normal.  [x] No visualized signs of difficulty breathing or respiratory distress        [] Abnormal-         Neurological:        [x] No Facial Asymmetry (Cranial nerve 7 motor function) (limited exam to video visit)          [] No gaze palsy        [] Abnormal-         Skin:        [x] No significant exanthematous lesions or discoloration noted on facial skin         [] Abnormal-            Psychiatric:       [x] Normal Affect [] No Hallucinations        [] Abnormal-     Other pertinent observable physical exam findings-     ASSESSMENT/PLAN:  1. Type 2 diabetes mellitus with retinopathy, with long-term current use of insulin, macular edema presence unspecified, unspecified laterality, unspecified retinopathy severity (HCC)  Sugars are slowly improving as I have titrated insulin levels over the last couple of weeks. We reviewed the following instructions below, and I emailed her a written copy per her permission: We titrate BASAL insulin (Levemir) until we reach a fasting blood sugar goal of     1.  Increase Levemir to 35units every morning and 40 units every evening for a

## 2020-08-25 NOTE — TELEPHONE ENCOUNTER
I called her pharmacy and they thin she is in the \"donut hole\" and said there is nothing they can do, I called one of our drug reps and she directed me to a web site with savings information, I filled out most of the form and placed it in Dr. Luis Angulo for completion and then called the patient and asked her to come into the office to complete her part and bring the necessary documents to send in the paperwork

## 2020-08-25 NOTE — TELEPHONE ENCOUNTER
States there is an issue with Rx for novalog pens. Was informed by pharm novalog pens are going to cost her $430. Pt saying it is a quantity issue. Please advise.  Pt is reachable at [phone]

## 2020-08-26 ENCOUNTER — TELEPHONE (OUTPATIENT)
Dept: FAMILY MEDICINE CLINIC | Age: 65
End: 2020-08-26

## 2020-08-26 RX ORDER — INSULIN ASPART 100 [IU]/ML
INJECTION, SOLUTION INTRAVENOUS; SUBCUTANEOUS
Qty: 17 PEN | Refills: 2 | Status: SHIPPED | OUTPATIENT
Start: 2020-08-26 | End: 2020-09-18 | Stop reason: SDUPTHER

## 2020-08-26 RX ORDER — LANCETS 30 GAUGE
1 EACH MISCELLANEOUS 3 TIMES DAILY
Qty: 90 EACH | Refills: 3 | Status: SHIPPED | OUTPATIENT
Start: 2020-08-26 | End: 2020-09-11 | Stop reason: SDUPTHER

## 2020-08-26 NOTE — TELEPHONE ENCOUNTER
PT spouse Vikas Stark came in with an envelope for Dr. Xavier Dubose with paperwork to be reviewed. Placed envelope in Tierra's basket.

## 2020-08-26 NOTE — TELEPHONE ENCOUNTER
Tierra,    Patient needs to refill her NovoLog earlier than usual because I had to titrate the dose, and she is using more than we thought she would have to. New dosage was sent into [facility] today, but insurance is saying it cannot be filled for another couple weeks. Can you please call [facility], explained that she is taking up to 55 units per meal now, and this is why she went through the prior prescription sooner than expected, because we had to keep titrating the dosage.     As soon as this is figured out, let patient and myself know

## 2020-08-26 NOTE — TELEPHONE ENCOUNTER
Per [facility] - $430 with her ins. PT is aware of this price. I advised the pharm she is taking 55 units per meal, and that is still the price. Not sure what else can be done?

## 2020-08-26 NOTE — TELEPHONE ENCOUNTER
PT calling in again regarding med request.  I talked to [facility] and PT .   PT is aware [facility] is reaching out to reps for samples of the med request.

## 2020-08-26 NOTE — TELEPHONE ENCOUNTER
Is patient aware of the \"donut hole\".     Also, can we ask this drug rep for some novolog until she can get the saving plan completed

## 2020-08-26 NOTE — TELEPHONE ENCOUNTER
Patient assistance forms filled out, faxed and scanned into the chart. In the meantime I will try to get samples of insulin.

## 2020-08-26 NOTE — TELEPHONE ENCOUNTER
I called and talked with a rep and she is going to see what she can do about getting us samples, otherwise they don't sample it any longer

## 2020-08-27 NOTE — TELEPHONE ENCOUNTER
HEIDE--Tierra,  calling back. She found some novalog samples.  Will bring them to office tomorrow morning

## 2020-08-28 RX ORDER — INSULIN DETEMIR 100 [IU]/ML
INJECTION, SOLUTION SUBCUTANEOUS
Qty: 9 PEN | Refills: 2 | OUTPATIENT
Start: 2020-08-28

## 2020-08-28 NOTE — TELEPHONE ENCOUNTER
Let pt know we are getting a request for this, but the pharmacy should still have refills    Make sure she doesn't need this    If she does, maybe we can give her samples of long acting insulin if we have any

## 2020-08-28 NOTE — TELEPHONE ENCOUNTER
I called her and she says she doesn't need anymore right now and she is not sure why they sent this request

## 2020-09-04 ENCOUNTER — TELEPHONE (OUTPATIENT)
Dept: FAMILY MEDICINE CLINIC | Age: 65
End: 2020-09-04

## 2020-09-11 ENCOUNTER — TELEPHONE (OUTPATIENT)
Dept: FAMILY MEDICINE CLINIC | Age: 65
End: 2020-09-11

## 2020-09-11 RX ORDER — HYDROCODONE BITARTRATE AND ACETAMINOPHEN 5; 325 MG/1; MG/1
1 TABLET ORAL EVERY 8 HOURS PRN
Qty: 30 TABLET | Refills: 0 | Status: SHIPPED | OUTPATIENT
Start: 2020-09-11 | End: 2020-10-21 | Stop reason: SDUPTHER

## 2020-09-11 RX ORDER — LANCETS 30 GAUGE
1 EACH MISCELLANEOUS
Qty: 150 EACH | Refills: 3 | Status: SHIPPED | OUTPATIENT
Start: 2020-09-11 | End: 2020-10-05 | Stop reason: SDUPTHER

## 2020-09-11 RX ORDER — LANCETS 30 GAUGE
1 EACH MISCELLANEOUS
Qty: 150 EACH | Refills: 3 | Status: SHIPPED | OUTPATIENT
Start: 2020-09-11 | End: 2020-09-11 | Stop reason: SDUPTHER

## 2020-09-11 NOTE — TELEPHONE ENCOUNTER
Tierra    Let pt know that vicodin and lancets were sent to pharmacy    Please document call and then close encounter.   thanks

## 2020-09-11 NOTE — TELEPHONE ENCOUNTER
PT calling in wanting a call back regarding her lancets. PT says she would like the lancets to be the 33 gauge one touch.

## 2020-09-11 NOTE — TELEPHONE ENCOUNTER
----- Message from Daiana Joseph sent at 9/11/2020  9:28 AM EDT -----  Subject: Message to Provider    QUESTIONS  Information for Provider? Patient leaving a messgae for Tierra- when   ordering lancets please specify 5/day and she would like OneTouch Delica   Plus  ---------------------------------------------------------------------------  --------------  CALL BACK INFO  What is the best way for the office to contact you? OK to leave message on   voicemail  Preferred Call Back Phone Number? [phone]  ---------------------------------------------------------------------------  --------------  SCRIPT ANSWERS  Relationship to Patient?  Self

## 2020-09-11 NOTE — TELEPHONE ENCOUNTER
Let her know I am just calling in \"GENERIC LANCETS\" to her pharmacy without any specifics    She needs to talk to pharmacy about a specific type of lancet that she wants to see if it is covered    Please document call and then close encounter.   thanks

## 2020-09-16 ENCOUNTER — TELEPHONE (OUTPATIENT)
Dept: FAMILY MEDICINE CLINIC | Age: 65
End: 2020-09-16

## 2020-09-16 NOTE — TELEPHONE ENCOUNTER
Requesting to speak with Tierra. Stating paper work that was faxed for medication assistance did not go through. Would like to discuss.  Please call Sandi Farris back at [phone]

## 2020-09-18 ENCOUNTER — PATIENT MESSAGE (OUTPATIENT)
Dept: FAMILY MEDICINE CLINIC | Age: 65
End: 2020-09-18

## 2020-09-18 RX ORDER — INSULIN ASPART 100 [IU]/ML
INJECTION, SOLUTION INTRAVENOUS; SUBCUTANEOUS
Qty: 10 PEN | Refills: 1 | Status: SHIPPED | OUTPATIENT
Start: 2020-09-18 | End: 2021-06-01 | Stop reason: SDUPTHER

## 2020-09-18 RX ORDER — METOPROLOL SUCCINATE 100 MG/1
TABLET, EXTENDED RELEASE ORAL
Qty: 90 TABLET | Refills: 0 | Status: SHIPPED | OUTPATIENT
Start: 2020-09-18 | End: 2020-12-30 | Stop reason: SDUPTHER

## 2020-09-18 RX ORDER — INSULIN DETEMIR 100 [IU]/ML
INJECTION, SOLUTION SUBCUTANEOUS
Qty: 10 PEN | Refills: 2 | Status: SHIPPED | OUTPATIENT
Start: 2020-09-18 | End: 2022-06-08 | Stop reason: DRUGHIGH

## 2020-09-18 RX ORDER — POTASSIUM CHLORIDE 1500 MG/1
TABLET, FILM COATED, EXTENDED RELEASE ORAL
Qty: 360 TABLET | Refills: 0 | Status: SHIPPED | OUTPATIENT
Start: 2020-09-18 | End: 2020-12-30 | Stop reason: SDUPTHER

## 2020-09-18 RX ORDER — TORSEMIDE 100 MG/1
100 TABLET ORAL 2 TIMES DAILY
Qty: 180 TABLET | Refills: 0 | Status: SHIPPED | OUTPATIENT
Start: 2020-09-18 | End: 2020-12-30 | Stop reason: SDUPTHER

## 2020-09-18 RX ORDER — ALLOPURINOL 100 MG/1
200 TABLET ORAL DAILY
Qty: 180 TABLET | Refills: 0 | Status: SHIPPED | OUTPATIENT
Start: 2020-09-18 | End: 2020-12-30 | Stop reason: SDUPTHER

## 2020-09-18 NOTE — TELEPHONE ENCOUNTER
Tierra    Call patient today during a break, and pend all refills EXACTLY as she wants, and make sure it is going to the [address] [place] [address]

## 2020-09-18 NOTE — TELEPHONE ENCOUNTER
From: Jesus Garcia  To: Tanya Du MD  Sent: 9/18/2020 11:01 AM EDT  Subject: Prescription Question    Can you write new prescriptions (with all pertinent details) for all of my current medications to [facility] on [address], and have [facility] transfer refills for [employer] (2 boxes currently on hold) and BD Ultrafine Pen Needles ([id number]) (11 refills on current prescription?) I will need to order a bunch of prescriptions this week, to get a fresh start with the new pharmacy. If I can do that through [address], just let me know. The ones I am most concerned about are the Novolog and Levemir. I will probably need a new prescription for Levemir until [facility] comes through. (The fax Inga sent got screwed up, so we had to do it all again 2 days ago.) I currently have 5 Levemir pens left, so another box should do it. Thanks so much!

## 2020-09-22 ENCOUNTER — PATIENT MESSAGE (OUTPATIENT)
Dept: FAMILY MEDICINE CLINIC | Age: 65
End: 2020-09-22

## 2020-09-22 NOTE — TELEPHONE ENCOUNTER
PT called in wanting to speak to [facility] regarding the patient assistance program through [employer]. She states they have not received any of the paperwork that was supposed to be faxed to them.      Please call PT back: [phone]

## 2020-09-25 NOTE — TELEPHONE ENCOUNTER
Haether Orellana,    Patient is calling. We have faxed prescription twice for the following, but the [facility] [employer] assistant program keep sending her not getting her faxes. Please call [phone], and change the original 6 mm NovoFine needles to the 5 mm NovoTwist needles. The [employer] program will not process this for patient until this changes made.     Call pt and let her know once done

## 2020-10-01 ENCOUNTER — TELEPHONE (OUTPATIENT)
Dept: FAMILY MEDICINE CLINIC | Age: 65
End: 2020-10-01

## 2020-10-01 NOTE — LETTER
[address],[place] [address]  Phone: [phone]  Fax: [phone]    Sahil Schwab MD        October 1, 2020     Patient: Angela Mendoza   YOB: 1955   Date of Visit: 10/1/2020       To Whom It May Concern: It is my medical opinion that Galdino Garcia requires a disability parking placard for the following reasons:  She has limited walking ability due to PULMONARY HYPERTENSION condition. Duration of need: 4 years    If you have any questions or concerns, please don't hesitate to call.     Sincerely,        Sahil Schwab MD

## 2020-10-06 RX ORDER — LANCETS 30 GAUGE
1 EACH MISCELLANEOUS
Qty: 150 EACH | Refills: 3 | Status: SHIPPED | OUTPATIENT
Start: 2020-10-06 | End: 2020-12-30 | Stop reason: SDUPTHER

## 2020-10-21 RX ORDER — HYDROCODONE BITARTRATE AND ACETAMINOPHEN 5; 325 MG/1; MG/1
1 TABLET ORAL EVERY 8 HOURS PRN
Qty: 30 TABLET | Refills: 0 | Status: SHIPPED | OUTPATIENT
Start: 2020-10-21 | End: 2020-11-20 | Stop reason: SDUPTHER

## 2020-10-21 RX ORDER — METOLAZONE 5 MG/1
5 TABLET ORAL
Qty: 10 TABLET | Refills: 2 | Status: SHIPPED | OUTPATIENT
Start: 2020-10-22 | End: 2020-11-22 | Stop reason: SDUPTHER

## 2020-10-21 NOTE — TELEPHONE ENCOUNTER
PT called in requesting a refill for her prescription for Norco.     Please send to the [address] on [address] [facility].

## 2020-10-28 ENCOUNTER — NURSE ONLY (OUTPATIENT)
Dept: FAMILY MEDICINE CLINIC | Age: 65
End: 2020-10-28
Payer: MEDICARE

## 2020-10-28 PROCEDURE — 90694 VACC AIIV4 NO PRSRV 0.5ML IM: CPT | Performed by: FAMILY MEDICINE

## 2020-10-28 PROCEDURE — G0008 ADMIN INFLUENZA VIRUS VAC: HCPCS | Performed by: FAMILY MEDICINE

## 2020-10-29 ENCOUNTER — TELEPHONE (OUTPATIENT)
Dept: FAMILY MEDICINE CLINIC | Age: 65
End: 2020-10-29

## 2020-10-29 NOTE — TELEPHONE ENCOUNTER
Wanting to know if paper work from advocate for meds has been received yet. Please advise.  Pt is reachable at [phone]

## 2020-11-20 ENCOUNTER — TELEPHONE (OUTPATIENT)
Dept: FAMILY MEDICINE CLINIC | Age: 65
End: 2020-11-20

## 2020-11-20 RX ORDER — HYDROCODONE BITARTRATE AND ACETAMINOPHEN 5; 325 MG/1; MG/1
1 TABLET ORAL EVERY 8 HOURS PRN
Qty: 30 TABLET | Refills: 0 | Status: SHIPPED | OUTPATIENT
Start: 2020-11-20 | End: 2020-12-20

## 2020-11-20 NOTE — TELEPHONE ENCOUNTER
Let patient know pain medicine was sent to pharmacy. Also let her know that she is now overdue for MRI of her pancreas. She has a cyst that was found 2 years ago, and the radiologist recommended a repeat imaging study this month. Order is already in the computer. Give her central scheduling number to schedule. Please document call and then close encounter.   thanks

## 2020-11-20 NOTE — TELEPHONE ENCOUNTER
----- Message from Binta Zapien MD sent at 11/19/2018 12:47 PM EST -----  Needs 1 yo MRI abd to f/u pancreatic cyst

## 2020-11-24 NOTE — TELEPHONE ENCOUNTER
Patient says she will look at her schedule and set up an appointment for December, she has some dental work getting done and she has to see what dates she is scheduled for

## 2020-11-25 RX ORDER — METOLAZONE 5 MG/1
5 TABLET ORAL
Qty: 10 TABLET | Refills: 0 | Status: SHIPPED | OUTPATIENT
Start: 2020-11-26 | End: 2020-12-01 | Stop reason: SDUPTHER

## 2020-11-30 NOTE — TELEPHONE ENCOUNTER
Last seen 8/12/2020, no future appointments, last script failed- [facility] system was down

## 2020-12-01 RX ORDER — METOLAZONE 5 MG/1
5 TABLET ORAL
Qty: 10 TABLET | Refills: 0 | Status: SHIPPED | OUTPATIENT
Start: 2020-12-03 | End: 2020-12-27 | Stop reason: SDUPTHER

## 2020-12-13 RX ORDER — HYDROCODONE BITARTRATE AND ACETAMINOPHEN 5; 325 MG/1; MG/1
1 TABLET ORAL EVERY 8 HOURS PRN
Qty: 30 TABLET | Refills: 0 | OUTPATIENT
Start: 2020-12-13 | End: 2021-01-12

## 2020-12-14 NOTE — TELEPHONE ENCOUNTER
SANDY,    1)  Let pt know that medication has been requested too soon. If she is needing pain medication on a daily basis, we need to get her in the hands of pain management physician or spine doctor to further evaluate her pain. FIND OUT if agreeable and route back      2)  ALSO, I don't see a kidney function since July. It is important to have this monitored closely.   FInd out if she is scheduled to see Dr Shira Fernandez, her kidney doctor, soon and route back

## 2020-12-16 ENCOUNTER — TELEPHONE (OUTPATIENT)
Dept: FAMILY MEDICINE CLINIC | Age: 65
End: 2020-12-16

## 2020-12-16 RX ORDER — HYDROCODONE BITARTRATE AND ACETAMINOPHEN 5; 325 MG/1; MG/1
1 TABLET ORAL EVERY 6 HOURS PRN
Qty: 30 TABLET | Refills: 0 | Status: SHIPPED | OUTPATIENT
Start: 2020-12-16 | End: 2021-01-15

## 2020-12-16 NOTE — TELEPHONE ENCOUNTER
Pt states is needing pain medication daily. Pt states she has been to pain management  and it was too expensive for her and did not help her. She is willing to try one more time after the first of the year. She will be scheduling an appt with Kidney doctor the first of the year.

## 2020-12-16 NOTE — TELEPHONE ENCOUNTER
Spoke with patient. She has abnormal x-ray from a year ago. Truly needs MRI and evaluation by specialist.    lumbar spine 12/17/19: IMPRESSION:   Multilevel lumbar spondylosis with anterolisthesis of L4 on L5 and translational instability at    L3-4 and L4-5                Discussed that I would give her 30 days of pain pills to give her time to get in to Dr. Jean Brewer for evaluation. Her pain is certainly worsening. Patient expressed understanding.

## 2020-12-28 ENCOUNTER — TELEPHONE (OUTPATIENT)
Dept: FAMILY MEDICINE CLINIC | Age: 65
End: 2020-12-28

## 2020-12-28 RX ORDER — METOLAZONE 5 MG/1
5 TABLET ORAL
Qty: 10 TABLET | Refills: 0 | Status: SHIPPED | OUTPATIENT
Start: 2020-12-28 | End: 2021-02-12 | Stop reason: SDUPTHER

## 2020-12-28 NOTE — TELEPHONE ENCOUNTER
PT called in stating that she was getting her insulin and pen needles through [facility] but they only sent her 300 and she only has 1 box of 100 left and she tests 5 times a day which isn't enough for testing. PT isn't sure what to do. She's not sure if this should go through her normal pharmacy because she hasn't gotten a hold of [facility].      Please call Pt back: [phone]

## 2020-12-29 RX ORDER — METOPROLOL SUCCINATE 100 MG/1
TABLET, EXTENDED RELEASE ORAL
Qty: 90 TABLET | Refills: 0 | OUTPATIENT
Start: 2020-12-29

## 2020-12-29 RX ORDER — POTASSIUM CHLORIDE 1500 MG/1
TABLET, FILM COATED, EXTENDED RELEASE ORAL
Qty: 360 TABLET | Refills: 0 | OUTPATIENT
Start: 2020-12-29

## 2020-12-29 RX ORDER — ALLOPURINOL 100 MG/1
200 TABLET ORAL DAILY
Qty: 180 TABLET | Refills: 0 | OUTPATIENT
Start: 2020-12-29

## 2020-12-29 RX ORDER — TORSEMIDE 100 MG/1
100 TABLET ORAL 2 TIMES DAILY
Qty: 180 TABLET | Refills: 0 | OUTPATIENT
Start: 2020-12-29

## 2020-12-29 RX ORDER — LANCETS 30 GAUGE
1 EACH MISCELLANEOUS
Qty: 150 EACH | Refills: 3 | OUTPATIENT
Start: 2020-12-29

## 2020-12-30 RX ORDER — TORSEMIDE 100 MG/1
100 TABLET ORAL 2 TIMES DAILY
Qty: 60 TABLET | Refills: 0 | Status: SHIPPED | OUTPATIENT
Start: 2020-12-30 | End: 2021-02-12 | Stop reason: SDUPTHER

## 2020-12-30 RX ORDER — ALLOPURINOL 100 MG/1
200 TABLET ORAL DAILY
Qty: 180 TABLET | Refills: 0 | Status: SHIPPED | OUTPATIENT
Start: 2020-12-30 | End: 2021-04-08

## 2020-12-30 RX ORDER — POTASSIUM CHLORIDE 1500 MG/1
TABLET, FILM COATED, EXTENDED RELEASE ORAL
Qty: 120 TABLET | Refills: 0 | Status: SHIPPED | OUTPATIENT
Start: 2020-12-30 | End: 2021-02-12 | Stop reason: SDUPTHER

## 2020-12-30 RX ORDER — LANCETS 30 GAUGE
1 EACH MISCELLANEOUS
Qty: 150 EACH | Refills: 3 | Status: SHIPPED | OUTPATIENT
Start: 2020-12-30

## 2020-12-30 RX ORDER — METOPROLOL SUCCINATE 100 MG/1
TABLET, EXTENDED RELEASE ORAL
Qty: 30 TABLET | Refills: 0 | Status: SHIPPED | OUTPATIENT
Start: 2020-12-30 | End: 2021-02-12 | Stop reason: SDUPTHER

## 2020-12-30 NOTE — TELEPHONE ENCOUNTER
I sent a month of meds to pharmacy    Make sure patient understands how important it is to keep appointment in January as she is very much overdue to recheck her kidneys, and we need to ensure they aren't worsening    Also make sure she knows she is scheduled with Elisabeth and make sure that is ok with her

## 2020-12-30 NOTE — TELEPHONE ENCOUNTER
Call pt and see if you can get this figured out for her    Please document call and then close encounter.   thanks

## 2021-01-05 ENCOUNTER — TELEPHONE (OUTPATIENT)
Dept: FAMILY MEDICINE CLINIC | Age: 66
End: 2021-01-05

## 2021-01-05 NOTE — TELEPHONE ENCOUNTER
Pt is wanting to speak with whomever put in refill for her disposable needles for insulin. She states it was sent in incorrectly.  Pt is reachable at [phone]

## 2021-01-05 NOTE — TELEPHONE ENCOUNTER
I called and spoke with the patient and fixed the needles in the chart for her so she has only the right ones in the chart

## 2021-01-05 NOTE — TELEPHONE ENCOUNTER
Spoke with PT and informed that insulin pens and needles from [address] [place] [address] Patient Assistance Program have arrived and ready for . [place] in tierra's inbasket and insulin is in fridge in a bag with PT name and packing slip.     PT requesting a call from Tierra when she comes in today

## 2021-01-07 ENCOUNTER — OFFICE VISIT (OUTPATIENT)
Dept: FAMILY MEDICINE CLINIC | Age: 66
End: 2021-01-07
Payer: MEDICARE

## 2021-01-07 VITALS
SYSTOLIC BLOOD PRESSURE: 142 MMHG | TEMPERATURE: 97 F | HEART RATE: 92 BPM | HEIGHT: 63 IN | BODY MASS INDEX: 51.91 KG/M2 | DIASTOLIC BLOOD PRESSURE: 78 MMHG | WEIGHT: 293 LBS

## 2021-01-07 DIAGNOSIS — E11.319 TYPE 2 DIABETES MELLITUS WITH RETINOPATHY, WITH LONG-TERM CURRENT USE OF INSULIN, MACULAR EDEMA PRESENCE UNSPECIFIED, UNSPECIFIED LATERALITY, UNSPECIFIED RETINOPATHY SEVERITY (HCC): ICD-10-CM

## 2021-01-07 DIAGNOSIS — I10 ESSENTIAL HYPERTENSION: ICD-10-CM

## 2021-01-07 DIAGNOSIS — Z79.4 TYPE 2 DIABETES MELLITUS WITH RETINOPATHY, WITH LONG-TERM CURRENT USE OF INSULIN, MACULAR EDEMA PRESENCE UNSPECIFIED, UNSPECIFIED LATERALITY, UNSPECIFIED RETINOPATHY SEVERITY (HCC): ICD-10-CM

## 2021-01-07 DIAGNOSIS — E78.00 PURE HYPERCHOLESTEROLEMIA: ICD-10-CM

## 2021-01-07 DIAGNOSIS — N18.30 STAGE 3 CHRONIC KIDNEY DISEASE, UNSPECIFIED WHETHER STAGE 3A OR 3B CKD (HCC): Primary | ICD-10-CM

## 2021-01-07 DIAGNOSIS — G47.33 OSA (OBSTRUCTIVE SLEEP APNEA): ICD-10-CM

## 2021-01-07 DIAGNOSIS — D50.0 IRON DEFICIENCY ANEMIA DUE TO CHRONIC BLOOD LOSS: ICD-10-CM

## 2021-01-07 LAB
ANION GAP SERPL CALCULATED.3IONS-SCNC: 14 MMOL/L (ref 3–16)
BASOPHILS ABSOLUTE: 0.1 K/UL (ref 0–0.2)
BASOPHILS RELATIVE PERCENT: 0.5 %
BUN BLDV-MCNC: 48 MG/DL (ref 7–20)
CALCIUM SERPL-MCNC: 9.5 MG/DL (ref 8.3–10.6)
CHLORIDE BLD-SCNC: 95 MMOL/L (ref 99–110)
CO2: 31 MMOL/L (ref 21–32)
CREAT SERPL-MCNC: 1.8 MG/DL (ref 0.6–1.2)
EOSINOPHILS ABSOLUTE: 0.2 K/UL (ref 0–0.6)
EOSINOPHILS RELATIVE PERCENT: 2.2 %
GFR AFRICAN AMERICAN: 34
GFR NON-AFRICAN AMERICAN: 28
GLUCOSE BLD-MCNC: 175 MG/DL (ref 70–99)
HBA1C MFR BLD: 6.5 %
HCT VFR BLD CALC: 40 % (ref 36–48)
HEMOGLOBIN: 12.9 G/DL (ref 12–16)
LYMPHOCYTES ABSOLUTE: 1.5 K/UL (ref 1–5.1)
LYMPHOCYTES RELATIVE PERCENT: 15.4 %
MCH RBC QN AUTO: 29.3 PG (ref 26–34)
MCHC RBC AUTO-ENTMCNC: 32.3 G/DL (ref 31–36)
MCV RBC AUTO: 90.7 FL (ref 80–100)
MONOCYTES ABSOLUTE: 0.7 K/UL (ref 0–1.3)
MONOCYTES RELATIVE PERCENT: 7.2 %
NEUTROPHILS ABSOLUTE: 7.4 K/UL (ref 1.7–7.7)
NEUTROPHILS RELATIVE PERCENT: 74.7 %
PDW BLD-RTO: 16.5 % (ref 12.4–15.4)
PLATELET # BLD: 196 K/UL (ref 135–450)
PMV BLD AUTO: 8.3 FL (ref 5–10.5)
POTASSIUM SERPL-SCNC: 3 MMOL/L (ref 3.5–5.1)
RBC # BLD: 4.41 M/UL (ref 4–5.2)
SODIUM BLD-SCNC: 140 MMOL/L (ref 136–145)
WBC # BLD: 9.9 K/UL (ref 4–11)

## 2021-01-07 PROCEDURE — 83036 HEMOGLOBIN GLYCOSYLATED A1C: CPT | Performed by: NURSE PRACTITIONER

## 2021-01-07 PROCEDURE — 99214 OFFICE O/P EST MOD 30 MIN: CPT | Performed by: NURSE PRACTITIONER

## 2021-01-07 PROCEDURE — 3017F COLORECTAL CA SCREEN DOC REV: CPT | Performed by: NURSE PRACTITIONER

## 2021-01-07 PROCEDURE — G8484 FLU IMMUNIZE NO ADMIN: HCPCS | Performed by: NURSE PRACTITIONER

## 2021-01-07 PROCEDURE — 4040F PNEUMOC VAC/ADMIN/RCVD: CPT | Performed by: NURSE PRACTITIONER

## 2021-01-07 PROCEDURE — 1090F PRES/ABSN URINE INCON ASSESS: CPT | Performed by: NURSE PRACTITIONER

## 2021-01-07 PROCEDURE — G8427 DOCREV CUR MEDS BY ELIG CLIN: HCPCS | Performed by: NURSE PRACTITIONER

## 2021-01-07 PROCEDURE — 1123F ACP DISCUSS/DSCN MKR DOCD: CPT | Performed by: NURSE PRACTITIONER

## 2021-01-07 PROCEDURE — 2022F DILAT RTA XM EVC RTNOPTHY: CPT | Performed by: NURSE PRACTITIONER

## 2021-01-07 PROCEDURE — G8400 PT W/DXA NO RESULTS DOC: HCPCS | Performed by: NURSE PRACTITIONER

## 2021-01-07 PROCEDURE — 3044F HG A1C LEVEL LT 7.0%: CPT | Performed by: NURSE PRACTITIONER

## 2021-01-07 PROCEDURE — 1036F TOBACCO NON-USER: CPT | Performed by: NURSE PRACTITIONER

## 2021-01-07 PROCEDURE — G8510 SCR DEP NEG, NO PLAN REQD: HCPCS | Performed by: NURSE PRACTITIONER

## 2021-01-07 PROCEDURE — 36415 COLL VENOUS BLD VENIPUNCTURE: CPT | Performed by: NURSE PRACTITIONER

## 2021-01-07 PROCEDURE — G8417 CALC BMI ABV UP PARAM F/U: HCPCS | Performed by: NURSE PRACTITIONER

## 2021-01-07 PROCEDURE — 3288F FALL RISK ASSESSMENT DOCD: CPT | Performed by: NURSE PRACTITIONER

## 2021-01-07 ASSESSMENT — PATIENT HEALTH QUESTIONNAIRE - PHQ9
SUM OF ALL RESPONSES TO PHQ QUESTIONS 1-9: 0
SUM OF ALL RESPONSES TO PHQ QUESTIONS 1-9: 0

## 2021-01-07 NOTE — PROGRESS NOTES
PROGRESS NOTE     [facility] ([facility] Physicians  Alexander [address] [address] [place] [address]  [phone] office  [phone] fax    Date of Service:  1/7/2021    Subjective:      Patient ID: Valerie Aguilar is a 77 y.o. female      CC: DM, CKD, HTN, HLD, BRIANNA, Iron deficiency anemia    HPI  Treatment Adherence:   Medication compliance:  compliant all of the time  Diet compliance:  compliant most of the time  Weight trend: increasing- up 20# in past 6 months  Current exercise: no regular exercise- patient has left low back/hip pain that limits her mobility. She is using a walker for ambulation. She is seeing a pain management specialist next week. Barriers: Pain    Diabetes Mellitus Type 2: Current symptoms/problems include none. Home blood sugar records: fasting range: 150-200, preprandial range: 150  Any episodes of hypoglycemia? no  Eye exam current (within one year): no- she will schedule  Tobacco history: She  reports that she has never smoked. She has never used smokeless tobacco.   Daily Aspirin? No    Patient is on sliding scale Novolog with meals (usually 40-45 units) and 40 units Levemir BID and tradjenta. Sliding scale insulin     Pre meal blood sugars                 insulin given  80100                                           35N  101-150                                           40N  151-200                                            45N  201-250                                            50N  251300                                          55N    Hypertension:  Home blood pressure monitoring: Yes - on occasionally- reports 120/80. She is adherent to a low sodium diet. Patient denies chest pain, shortness of breath, headache, lightheadedness and blurred vision. Antihypertensive medication side effects: no medication side effects noted. Use of agents associated with hypertension: none. Patient is taking metoprolol, torsemide and zaroxolyn. Managed by nephrology. Hyperlipidemia: She is not taking atorvastatin due to worry of side effects. The 10-year ASCVD risk score (Scott Green, et al., 2013) is: 24.1%    Values used to calculate the score:      Age: 77 years      Sex: Female      Is Non- : No      Diabetic: Yes      Tobacco smoker: No      Systolic Blood Pressure: 350 mmHg      Is BP treated: Yes      HDL Cholesterol: 37 mg/dL      Total Cholesterol: 221 mg/dL      Lab Results   Component Value Date    LABA1C 6.5 01/07/2021    LABA1C 12.6 07/22/2020    LABA1C 8.3 02/03/2020     Lab Results   Component Value Date    LABMICR <1.20 04/26/2019    CREATININE 1.6 (H) 07/22/2020     Lab Results   Component Value Date    ALT 19 07/22/2020    AST 24 07/22/2020     Lab Results   Component Value Date    CHOL 221 (H) 07/22/2020    TRIG 793 (H) 07/22/2020    HDL 37 (L) 07/22/2020    LDLCALC see below 07/22/2020    LDLDIRECT 69 07/22/2020            Sleep Apnea:  Current treatment: cPAP. Compliance: compliant most of the time. Residual symptoms include: none.           Vitals:    01/07/21 0900 01/07/21 0930   BP: (!) 179/89 (!) 142/78   Pulse: 92    Temp: 97 °F (36.1 °C)    TempSrc: Infrared    Weight: 294 lb (133.4 kg)    Height: 5' 3\" (1.6 m)        Outpatient Medications Marked as Taking for the 1/7/21 encounter (Office Visit) with SANDY Mora CNP   Medication Sig Dispense Refill    Insulin Pen Needle 32G X 4 MM MISC 1 each by Does not apply route daily 100 each 3    metoprolol succinate (TOPROL XL) 100 MG extended release tablet TAKE 1 TABLET BY MOUTH EVERY DAY 30 tablet 0    potassium chloride (KLOR-CON M) 20 MEQ TBCR extended release tablet TAKE 2 TABLETS BY MOUTH TWICE A  tablet 0    allopurinol (ZYLOPRIM) 100 MG tablet Take 2 tablets by mouth daily 180 tablet 0  torsemide (DEMADEX) 100 MG tablet Take 1 tablet by mouth 2 times daily 60 tablet 0    Lancets MISC 1 each by Does not apply route 5 times daily Please fill for 33g one touch lancets 150 each 3    metOLazone (ZAROXOLYN) 5 MG tablet Take 1 tablet by mouth Twice a Week 10 tablet 0    HYDROcodone-acetaminophen (NORCO) 5-325 MG per tablet Take 1 tablet by mouth every 6 hours as needed for Pain for up to 30 days. Intended supply: 30 days 30 tablet 0    blood glucose test strips (ASCENSIA AUTODISC VI;ONE TOUCH ULTRA TEST VI) strip Check blood sugars TID 90 each 3    insulin aspart (NOVOLOG FLEXPEN) 100 UNIT/ML injection pen Take as directed qac.   Maximum 165units daily 10 pen 1    insulin detemir (LEVEMIR FLEXTOUCH) 100 UNIT/ML injection pen Take as directed qhs (maximum 90 units at night) 10 pen 2    blood glucose monitor kit and supplies Test blood sugars qac and qhs 1 kit 0    atorvastatin (LIPITOR) 20 MG tablet One po qhs 90 tablet 1    linagliptin (TRADJENTA) 5 MG tablet Take 1 tablet by mouth daily 90 tablet 3    Multiple Vitamins-Minerals (ADULT ONE DAILY GUMMIES) CHEW Take 1 tablet by mouth daily         Past Medical History:   Diagnosis Date    Anemia     CKD (chronic kidney disease) stage 3, GFR 30-59 ml/min     Colon cancer ([address]     Diabetes ([address]     Diabetes ([address]     Diabetes with retinopathy ([address]     DM eye exam 12/18/17    Hypertension     Pancreatic cyst     Positive FIT (fecal immunochemical test)     Pulmonary hypertension ([address]        Past Surgical History:   Procedure Laterality Date    COLONOSCOPY N/A 11/5/2018    COLONOSCOPY WITH BIOPSY performed by Fay Can MD at [address]  05/14/2019    Colonoscopy with polypectomy (cold snare)    COLONOSCOPY N/A 5/14/2019    COLONOSCOPY POLYPECTOMY SNARE/COLD BIOPSY performed by Fay Can MD at [address] [place] [address] Right 11/08/2018    LAPAROSCOPIC RIGHT HEMICOLECTOMY and umbilical hernia repair  IN OFFICE/OUTPT VISIT,PROCEDURE ONLY Right 11/8/2018    LAPAROSCOPIC RIGHT HEMICOLECTOMY and umbilical hernia repair performed by Mahendra Montoya MD at [facility] N/A 11/5/2018    EGD BIOPSY performed by Leo Shaw MD at [address] EXTRACTION         Social History     Tobacco Use    Smoking status: Never Smoker    Smokeless tobacco: Never Used   Substance Use Topics    Alcohol use:  Yes     Alcohol/week: 2.0 standard drinks     Types: 2 Glasses of wine per week     Comment: only on occasional       Family History   Problem Relation Age of Onset    Lung Cancer Mother     High Blood Pressure Father     Lung Cancer Father     Lupus Sister            Review of Systems  Constitutional:  Negative for activity or appetite change, fever or fatigue  HENT:  Negative for congestion,sinus pressure, or rhinorrhea  Eyes:  Negative for eye pain or visual changes  Resp:  Negative for SOB, chest tightness, cough  Cardiovascular: Negative for CP, palpitations, CHÁVEZ, orthopnea, PND, LE edema  Gastrointestinal: Negative for abd pain, melena, BRBPR, N/V/D  Endocrine:  Negative for polydipsia and polyuria  :  Negative for dysuria, flank pain or urinary frequency  Musculoskeletal:  Negative for back pain or myalgias  Neuro:  Negative for dizziness or lightheadedness  Psych: negative for depression or anxiety      Objective:   Constitutional:   · Reviewed vitals above  · Well Nourished, well developed, no distress       HENT:  · Normal external nose without lesions  · Normal nasal mucosa without swelling or erythema  Neck:  · Symmetric and without masses  · No thyromegaly  Resp:  · Normal effort  · Clear to auscultation bilaterally without rhonchi, wheezing or crackles  Cardiovascular:  · On auscultation, normal S1 and S2 without murmurs, rubs or gallops  · No bruits of bilateral carotids and no JVD  · +1 edema to bilateral lower extremities  Gastrointestinal: · Nontender, nondistended, and no masses  Musculoskeletal:  · Assisted with walker  · All extremities without clubbing, cyanosis or edema  Skin:  · No rashes on inspection  · No areas of increased heat or induration on palpation  Psych:  · Normal mood and affect  · Normal insight and judgement    Assessment / Plan:     1. Stage 3 chronic kidney disease, unspecified whether stage 3a or 3b CKD  Managed by Dr. Dakota Lovelace. Patient has not been seen by him since last March. Encourage patient to schedule appointment as soon as possible. Checking renal function today. - Basic Metabolic Panel    2. Type 2 diabetes mellitus with retinopathy, with long-term current use of insulin, macular edema presence unspecified, unspecified laterality, unspecified retinopathy severity (HCC)  A1c today 6.5. This is likely due to addition of insulin however patient states she has been eating healthier so congratulated her on that. Patient would like to exercise more but is having too much pain in her left hip area. She is seeing a pain management doctor next week and hopes to be able to start exercising soon. Continue with current regimen. Patient knows to notify office if signs of hypoglycemia or blood sugar less than 70.    - POCT glycosylated hemoglobin (Hb A1C)    3. BRIANNA (obstructive sleep apnea)  Compliant with CPAP. 4. Iron deficiency anemia due to chronic blood loss  Last CBC was normal but patient requesting recheck today. - CBC Auto Differential    5. Pure hypercholesterolemia  Given patient's risk score of 24.1% and diabetes would highly recommend statin. Patient states she has read about all the ways they can harm her body and she declines at this time. 6. Essential hypertension  Above goal in office. Patient states she is getting normal blood pressure readings at home. Continue metoprolol, torsemide and metolazone. Follow-up with nephrology. HM: Overdue for mammogram-encouraged to schedule. Overdue for eye exam-encouraged to schedule. Due for diabetic microalbuminemia. Patient states she will not be able to urinate because she just went before leaving her house and would like to get it checked next time.

## 2021-01-11 ENCOUNTER — TELEPHONE (OUTPATIENT)
Dept: FAMILY MEDICINE CLINIC | Age: 66
End: 2021-01-11

## 2021-01-11 NOTE — TELEPHONE ENCOUNTER
Patient called back and wanted me to order her pen needles for her to [facility], I sent them over to the pharmacy for the patient as requested

## 2021-01-19 ENCOUNTER — TELEPHONE (OUTPATIENT)
Dept: FAMILY MEDICINE CLINIC | Age: 66
End: 2021-01-19

## 2021-01-19 DIAGNOSIS — N18.30 STAGE 3 CHRONIC KIDNEY DISEASE, UNSPECIFIED WHETHER STAGE 3A OR 3B CKD (HCC): Primary | ICD-10-CM

## 2021-01-19 NOTE — TELEPHONE ENCOUNTER
Pt called in wanting to come in tomorrow @ 9 am for a kidney panel. No orders in Epic. Please call PT back to schedule once order has been put into Epic.      Best call back number: [phone]

## 2021-01-19 NOTE — TELEPHONE ENCOUNTER
Admission Status; Secondary Review Determination       Under the authority of the Utilization Management Committee, the utilization review process indicated a secondary review on the above patient. The review outcome is based on review of the medical records, discussions with staff, and applying clinical experience noted on the date of the review.     (x) Inpatient Status Appropriate - This patient's medical care is consistent with medical management for inpatient care and reasonable inpatient medical practice.     RATIONALE FOR DETERMINATION   36 yo female  female at 35 weeks 5 days gestation, with gestational diabetes, advanced maternal age who presented with abdominal pain, was found to have abnormal urinalysis and now with urine culture showing >100k E. Coli, sensitivities pending. Had pulse 117, WBC 15.5k. Staying another night in the hospital for fetal monitoring and to ensure tachycardia and WBC improve. High risk for complications given pregnancy with risk factors and prior premature birth, as well as prior intra-uterine fetal death at 32 weeks gestation.    Patient needs .     At the time of admission with the information available to the attending physician more than 2 nights hospital complex care was anticipated, based on patient risk of adverse outcome if treated as outpatient and complex care required. Inpatient admission is appropriate based on the Medicare guidelines.     This document was produced using voice recognition software.    The information on this document is developed by the utilization review team in order for the business office to ensure compliance. This only denotes the appropriateness of proper admission status and does not reflect the quality of care rendered.   The definitions of Inpatient Status and Observation Status used in making the determination above are those provided in the CMS Coverage Manual, Chapter 1 and Chapter 6, section 70.4.     Sincerely,  Spoke with pt. Scheduled nurse visit 1/21/21 at [address] [place] [address] for blood work.   Kristina Willoughby MD  Utilization Review  Physician Advisor  [facility].

## 2021-01-21 ENCOUNTER — NURSE ONLY (OUTPATIENT)
Dept: FAMILY MEDICINE CLINIC | Age: 66
End: 2021-01-21
Payer: MEDICARE

## 2021-01-21 DIAGNOSIS — N18.30 STAGE 3 CHRONIC KIDNEY DISEASE, UNSPECIFIED WHETHER STAGE 3A OR 3B CKD (HCC): ICD-10-CM

## 2021-01-21 LAB
ANION GAP SERPL CALCULATED.3IONS-SCNC: 13 MMOL/L (ref 3–16)
BUN BLDV-MCNC: 23 MG/DL (ref 7–20)
CALCIUM SERPL-MCNC: 9.2 MG/DL (ref 8.3–10.6)
CHLORIDE BLD-SCNC: 101 MMOL/L (ref 99–110)
CO2: 27 MMOL/L (ref 21–32)
CREAT SERPL-MCNC: 1.4 MG/DL (ref 0.6–1.2)
GFR AFRICAN AMERICAN: 45
GFR NON-AFRICAN AMERICAN: 37
GLUCOSE BLD-MCNC: 138 MG/DL (ref 70–99)
POTASSIUM SERPL-SCNC: 4.5 MMOL/L (ref 3.5–5.1)
SODIUM BLD-SCNC: 141 MMOL/L (ref 136–145)

## 2021-01-21 PROCEDURE — 36415 COLL VENOUS BLD VENIPUNCTURE: CPT | Performed by: NURSE PRACTITIONER

## 2021-01-25 ENCOUNTER — TELEPHONE (OUTPATIENT)
Dept: FAMILY MEDICINE CLINIC | Age: 66
End: 2021-01-25

## 2021-01-25 NOTE — TELEPHONE ENCOUNTER
PT's  came in with a manila envelope for Dr. Wheeler Brunner and Diana Mathews. Placed envelope in Tierra's basket.

## 2021-01-27 NOTE — TELEPHONE ENCOUNTER
Signed and placed on christina's desk    1) call pt once mailed (wants it mailed and not faxed)    2) print most recent lumbar and hip xrays and fax to Dr Leta Johnson (neurosurgeon at [address] [place] [address]). She has appt with him Monday    Please document call and then close encounter.   thanks

## 2021-01-28 NOTE — TELEPHONE ENCOUNTER
[facility] into [facility]'s chart [facility] and fax[facility] as requested

## 2021-02-12 DIAGNOSIS — I10 ESSENTIAL HYPERTENSION: ICD-10-CM

## 2021-02-12 RX ORDER — METOLAZONE 5 MG/1
5 TABLET ORAL
Qty: 24 TABLET | Refills: 0 | Status: SHIPPED | OUTPATIENT
Start: 2021-02-15 | End: 2021-05-02 | Stop reason: SDUPTHER

## 2021-02-12 RX ORDER — METOPROLOL SUCCINATE 100 MG/1
TABLET, EXTENDED RELEASE ORAL
Qty: 90 TABLET | Refills: 0 | Status: SHIPPED | OUTPATIENT
Start: 2021-02-12 | End: 2021-05-13 | Stop reason: SDUPTHER

## 2021-02-12 RX ORDER — POTASSIUM CHLORIDE 1500 MG/1
TABLET, FILM COATED, EXTENDED RELEASE ORAL
Qty: 480 TABLET | Refills: 0 | Status: SHIPPED | OUTPATIENT
Start: 2021-02-12 | End: 2021-05-13 | Stop reason: SDUPTHER

## 2021-02-23 ENCOUNTER — TELEPHONE (OUTPATIENT)
Dept: FAMILY MEDICINE CLINIC | Age: 66
End: 2021-02-23

## 2021-02-23 NOTE — TELEPHONE ENCOUNTER
PT called in wanting to speak to Dr. Jose Angel Murguia assistant regarding a [facility] Patient Assistance application that she dropped off. There is some information PT would like to ask about and discuss.      Please call back: [phone]

## 2021-03-08 ENCOUNTER — TELEPHONE (OUTPATIENT)
Dept: FAMILY MEDICINE CLINIC | Age: 66
End: 2021-03-08

## 2021-03-08 NOTE — TELEPHONE ENCOUNTER
Spoke with pt. I informed pt I will try to call them tomorrow to see the status and return her call.

## 2021-03-08 NOTE — TELEPHONE ENCOUNTER
Has been trying to get a hold of nova nordick. RE: medication. Calling to find out if everything is in order for her medication help. Please advise. Pt is reachable at [phone]    Was wanting to know if medical assistant is able to get a hold of pharmaceutical company for status.

## 2021-03-09 NOTE — TELEPHONE ENCOUNTER
Abby Osorio and the hold time was 47 minutes. I requested a return call to the office via automated system.

## 2021-03-10 NOTE — TELEPHONE ENCOUNTER
TIM,    Can we call and tell them, or do we need to resend form?     If we need to resend, they always say our fax machine isn't compliant with theirs, and the  [facility]

## 2021-03-10 NOTE — TELEPHONE ENCOUNTER
The maximum daily dose is 80 units       (40units BID= 80units:  Not sure why this confuses them!!!)

## 2021-03-15 NOTE — TELEPHONE ENCOUNTER
Let pt know they want us to enter a \"max amount\" on the form and re-fax it again    Find out if she wants to pick it up and fax herself or have us fax    Route back with info

## 2021-03-15 NOTE — TELEPHONE ENCOUNTER
Pt states she wants us to fax it to the emergency fax number which she provided [phone] for pt# [phone]: Abdiaziz.

## 2021-03-17 NOTE — TELEPHONE ENCOUNTER
DOCUMENTS SCANNED. FAXED MULTIPLE TIMES AND IT SAYS \"BUSY\". WILL CONTACT PT WHEN FAX IS SUCCESSFUL.

## 2021-03-18 NOTE — TELEPHONE ENCOUNTER
More attempts made to fax documents. Pt contacted and notified. Pt's  will  at  and fax from [facility] again.

## 2021-04-08 RX ORDER — ALLOPURINOL 100 MG/1
200 TABLET ORAL DAILY
Qty: 180 TABLET | Refills: 0 | Status: SHIPPED | OUTPATIENT
Start: 2021-04-08 | End: 2021-05-14 | Stop reason: SDUPTHER

## 2021-05-02 DIAGNOSIS — I10 ESSENTIAL HYPERTENSION: ICD-10-CM

## 2021-05-03 RX ORDER — METOLAZONE 5 MG/1
5 TABLET ORAL
Qty: 24 TABLET | Refills: 0 | Status: SHIPPED | OUTPATIENT
Start: 2021-05-03 | End: 2021-05-14 | Stop reason: SDUPTHER

## 2021-05-03 NOTE — TELEPHONE ENCOUNTER
Informed pt of notes per Elisabeth.  Gave pt number for Dr Vladislav Garcia [facility]

## 2021-05-13 ENCOUNTER — PATIENT MESSAGE (OUTPATIENT)
Dept: PRIMARY CARE CLINIC | Age: 66
End: 2021-05-13

## 2021-05-13 DIAGNOSIS — I10 ESSENTIAL HYPERTENSION: ICD-10-CM

## 2021-05-13 DIAGNOSIS — M79.89 LEG SWELLING: Primary | ICD-10-CM

## 2021-05-14 RX ORDER — METOPROLOL SUCCINATE 100 MG/1
TABLET, EXTENDED RELEASE ORAL
Qty: 90 TABLET | Refills: 0 | Status: SHIPPED | OUTPATIENT
Start: 2021-05-14 | End: 2021-08-10

## 2021-05-14 RX ORDER — METOLAZONE 5 MG/1
5 TABLET ORAL
Qty: 24 TABLET | Refills: 0 | Status: SHIPPED | OUTPATIENT
Start: 2021-05-17 | End: 2021-07-11 | Stop reason: SDUPTHER

## 2021-05-14 RX ORDER — TORSEMIDE 100 MG/1
100 TABLET ORAL 2 TIMES DAILY
Qty: 180 TABLET | Refills: 0 | Status: SHIPPED | OUTPATIENT
Start: 2021-05-14 | End: 2021-08-10

## 2021-05-14 RX ORDER — ALLOPURINOL 100 MG/1
200 TABLET ORAL DAILY
Qty: 180 TABLET | Refills: 0 | Status: SHIPPED | OUTPATIENT
Start: 2021-05-14 | End: 2021-07-11 | Stop reason: SDUPTHER

## 2021-05-14 RX ORDER — POTASSIUM CHLORIDE 1500 MG/1
TABLET, FILM COATED, EXTENDED RELEASE ORAL
Qty: 180 TABLET | Refills: 0 | Status: SHIPPED | OUTPATIENT
Start: 2021-05-14 | End: 2021-05-17 | Stop reason: SDUPTHER

## 2021-05-14 NOTE — TELEPHONE ENCOUNTER
From: Osorio Sharpe  To: Isabel Poole MD  Sent: 5/13/2021 11:28 PM EDT  Subject: Prescription Question    Could you please remove Marnie Oliveros's name from my Allopurinol and Metolazone prescriptions and put them under your name? Oni Harden was confused the last time I ordered Metolazone. Thanks.

## 2021-05-17 RX ORDER — POTASSIUM CHLORIDE 1500 MG/1
TABLET, FILM COATED, EXTENDED RELEASE ORAL
Qty: 360 TABLET | Refills: 0 | Status: SHIPPED | OUTPATIENT
Start: 2021-05-17 | End: 2021-08-22 | Stop reason: SDUPTHER

## 2021-05-17 RX ORDER — POTASSIUM CHLORIDE 1500 MG/1
TABLET, FILM COATED, EXTENDED RELEASE ORAL
Qty: 360 TABLET | OUTPATIENT
Start: 2021-05-17

## 2021-05-17 NOTE — TELEPHONE ENCOUNTER
Please call Dr Sarahy Grullon office and get most recent consult note from patient    While waiting for the note, ask when the last time she was seen in his office      Route back with info

## 2021-05-17 NOTE — TELEPHONE ENCOUNTER
Ok,    Please schedule VV diabetes f/u sometime this month or next    Please document call and then close encounter.   thanks

## 2021-05-31 NOTE — PROGRESS NOTES
PROGRESS NOTE     Irlanda Manzano MD  [address]                                  [address] [place] [address]. [address], [address] [id number]         Phone: [phone]      Name:  Giselle Nichole  :  1955  Date:  2021    ASSESSMENT/PLAN:        1. Type 2 diabetes mellitus with retinopathy, with long-term current use of insulin, macular edema presence unspecified, unspecified laterality, unspecified retinopathy severity ([address]  Most recent A1c from 5 months ago was within normal limits. Repeating to ensure this is still the case. We will follow up on results and act accordingly. Continue basal bolus insulin. Will make adjustments if needed depending on A1c. Patient is can a send in a picture of her blood sugar log. It sounds like her fasting sugars are currently too high but the rest of the day is closer to normal limits. We will take a look once received. Patient is up-to-date with diabetic eye exam.  It does show stable diabetic retinopathy. Not on ACE or ARB due to kidney disease, does not want statin due to \"things she is read about them \". We did discuss the benefits of statin, and the creased risk of morbidity mortality by avoiding them with her set of diagnoses    2. Essential hypertension  Adding to home blood pressures. Currently well controlled. Patient on Toprol  mg, torsemide 100 mg twice daily, and metolazone 5 mg twice a week. Repeating BMP. 3. Pure hypercholesterolemia  Ordering fasting lipid panel and LFTs. Again, as stated above, patient should be on statin, but is too worried about the things she has read about them. We will discuss again in the future. 4. BRIANNA (obstructive sleep apnea)  Pt to continue CPAP.    19: found to have moderate sleep apnea    5. Uterine mass  Likely fibroid on initial imaging. Patient has declined all follow-up imaging.     6. Pancreatic cyst  She is overdue for MRI of abdomen. Reordered, told her to discuss with kidney doctor if she can receive gadolinium contrast for this. 7. Le edema/venous insufficiency vs diastolic HF    Currently euvolemic according to patient and recent nephrology consult note. To continue torsemide and metolazone. I do believe patient likely has some diastolic heart failure, because when she presented initially, she had significant dyspnea on exertion associated with her lower extremity edema, and both of these were documented to be significantly improved after being placed on Lasix, according to my note on 6/15/18. She had an initial BNP that was slightly over 100, but this increased to 708 on 11/17/18. Echocardiogram also showed some mild concentric left ventricular hypertrophy, normal ejection fraction, and stated diastolic function could not be well determined due to elevated heart rate. Patient later had some significant GI blood loss anemia that complicated the picture worsened her dyspnea on exertion, however: Dyspnea on exertion did initially improve greatly after Lasix was started. Kidney doctor is now adjusting both her torsemide and metolazone. See below for details. Stress perfusion 1/9/18  Small area of anterior wall breast artifact but no clear reversible    perfusion defect seen to suggest myocardial ischemia.    Hypertensive response to stress test    Overall findings represent a low risk study.    Normal LV size and systolic function.      Echo 10/17/18  Left ventricular cavity size is normal. There is mild concentric left  ventricular hypertrophy. Overall left ventricular systolic function appears normal with an estimated  ejection fraction of 55-60%. No regional wall motion abnormalities are noted. Diastolic function is indeterminate due to increased heart rate. Mild aortic stenosis with a peak velocity of 2.73m/s and a mean pressure  gradient of 16mmHg.   IVC size is dilated (>2.1 cm) and collapses < 50% with respiration  consistent with elevated RA pressure (15 mmHg). 7. Stage 3b chronic kidney disease  Saw Dr Otoniel Pepper on 4/5/21:  He states \"chronic renail failure is worse: creatinine fluctuates with volume status. Creatiine increased with more freq metolazone increase. Recent BMP showed creatinine of 1.52 as shown below. Repeating BMP. Contains abnormal data Renal Function Panel 3/11/2021 (NA,K,CL,CO2,GLU,BUN,CREAT,CA,ALK,PHOS)  Specimen:  Whole blood   Ref Range & Units 1 yr ago   CALCIUM 8.5 - 10.5 mg/dL 9.3        PHOSPHORUS 2.5 - 4.5 mg/dL 4.3        GLUCOSE, RANDOM 70 - 99 mg/dL 240High         BLD UREA NITROGEN 8 - 26 mg/dL 50High         CREATININE 0.44 - 1.03 mg/dL 1. 52High         ALBUMIN 3.5 - 5.0 g/dL 3.8        SODIUM 135 - 145 mEq/L 137        POTASSIUM 3.6 - 5.1 mEq/L 3.2Low         CHLORIDE 101 - 111 mEq/L 92Low         CO2 24 - 36 mmol/L 32        GFR NON-AFRICAN AMER >59 mL/min/1.73 m2 35Low         GFR AFRICAN AMERICAN >59 mL/min/1.73 m2 40Low         8. Chronic back pain  On vicodin by Dr Farzana Romo. Steroid injections did not help. Requesting records from Dr. Greg Sherwood along with most recent lumbar MRI. 9. Colon cancer s/p partial colectomy  Thought to be in 1st stage and curable. Dr pulido's note from 12/13/19: says checking CMP and CEA. Recent yealy CT neg for recurrence. Next colonoscopy May 2020. He states pt to return to see him 3 months later. Reviewed the importance of getting colonoscopy and follow-up with Dr. Kathya Rain ASAP. Patient is aware that she is overdue. Checking CEA.   Reviewed recent normal CBC as shown below:      CBC without Differential 6/1/21  Specimen:  Whole blood   Ref Range & Units 2 mo ago   WBC 3.6 - 10.5 THOU/mcL 9.7        RBC 3.80 - 5.20 MIL/mcL 4.24        HEMOGLOBIN 12.0 - 15.2 g/dL 12.4        HEMATOCRIT 36 - 46 % 37.6        MCV 82 - 97 fL 88.7        MCH 27 - 33 pg 29.3        MCHC 32 - 36 g/dL 33.1        RDW 12.3 - 17.0 % 16.2 PLATELET 546 - 262 THOU/mcL 178        MPV 7.4 - 11.5 fL 7.7        today, A total of 40 minutes were spent face-to-face and nonface-to-face time during encounter. HM:     Pt declining hep C and HIV screening     Pap smear declined     DEXA scan and mammogram both ordered a couple months ago, and patient given number to schedule.     Colonoscopy was due May 2020.; encouraged to schedule     Encouraged to get Tdap and Shingrix at [facility]. She is UTD on rest of immunizations        SUBJECTIVE/OBJECTIVE:    CC:  chronic back pain,  diastolic CHF/venous insufficiency, Venous stasis ulcer, DM2 with retinopathy and stage 3 CKD, HTN, HLD, BRIANNA, colon cancer, uterine mass, pancreatic cyst      HPI:   57-year-old white female here for video visit to address the above chronic medical conditions. Patient is taking all medicine as prescribed except for her Lipitor. This was prescribed over a year ago, but she has declined to take a statin on multiple occasions because she has read that they are bad for you. Patient is still dealing with some chronic back pain. On Vicodin prescribed by Dr. Roxie Gonzalez. Tried steroid injections by his office, but states they did not work. She states she is assuming since they did not work that her issue is all muscular, and is seeing a alternative doctor for nitrous oxide injections. Patient does check her blood pressures before every meal and nightly. States most fasting blood sugars are around 200, but premeal sugars are around 150s the rest of the day.     Pre meal blood sugars                 insulin given  80100                                           35N  101-150                                           40N  151-200                                            45N  201-250                                            50N  251300                                          55N     Levemir  40 units  BID      States she is compliant with her CPAP machine every night and denies chronic fatigue. Patient states she has not seen Dr. Leopoldo Monk since 2019. She states she was unaware she was supposed to follow-up with him for repeat imaging testing. She denies any change in her stool, no bright red blood per rectum, no melena. No weight loss. The uterine mass was found ound on US in November 2018. Thought to be fibroid, but endometrial cancer couldn't be excluded on imaging. Pt was referred to Dr Uvaldo Alfaro on 11/19/18, but has not went. Patient still not interested in follow up.        Pancreatic cyst found incidentally in November when hospitalized for colon cancer and chronic blood loss anemia. Repeat MRI was due November 2020 per current management guidelines.   Patient states she never had this done due to the COVID-19 pandemic.         Lab Results   Component Value Date    LABA1C 6.5 01/07/2021    LABA1C 12.6 07/22/2020    LABA1C 8.3 02/03/2020     Lab Results   Component Value Date    LABMICR <1.20 04/26/2019    CREATININE 1.4 (H) 01/21/2021     Lab Results   Component Value Date    ALT 19 07/22/2020    AST 24 07/22/2020     Lab Results   Component Value Date    CHOL 221 (H) 07/22/2020    TRIG 793 (H) 07/22/2020    HDL 37 (L) 07/22/2020    LDLCALC see below 07/22/2020    LDLDIRECT 69 07/22/2020              ROS:  Constitutional:  Negative for activity or appetite change, fever or fatigue  Resp:  Negative for SOB, chest tightness, cough  Cardiovascular: Negative for CP, palpitations, CHÁVEZ, orthopnea, PND, LE edema  Gastrointestinal: Negative for abd pain, melena, BRBPR, N/V/D  Musculoskeletal:  +chronic back pain  Neuro:  Negative for dizziness or lightheadedness  Psych: negative for depression or anxiety      Patient-Reported Vitals 6/1/2021 6/1/2021   Patient-Reported Weight 288 288   Patient-Reported Height 5'3 5'3\"   Patient-Reported Systolic - 537   Patient-Reported Diastolic - 66   Patient-Reported Pulse - N/A   Patient-Reported Temperature - 97.8 Patient-Reported SpO2 - N/A   Patient-Reported Peak Flow - N/A        Outpatient Medications Marked as Taking for the 6/1/21 encounter (Virtual Visit) with Zuleyma Braun MD   Medication Sig Dispense Refill    potassium chloride (KLOR-CON M) 20 MEQ TBCR extended release tablet TAKE 2 TABLETS BY MOUTH TWICE A  tablet 0    metoprolol succinate (TOPROL XL) 100 MG extended release tablet TAKE 1 TABLET BY MOUTH EVERY DAY 90 tablet 0    torsemide (DEMADEX) 100 MG tablet Take 1 tablet by mouth 2 times daily 180 tablet 0    metOLazone (ZAROXOLYN) 5 MG tablet Take 1 tablet by mouth Twice a Week 24 tablet 0    allopurinol (ZYLOPRIM) 100 MG tablet Take 2 tablets by mouth daily 180 tablet 0    Insulin Pen Needle 32G X 4 MM MISC 1 each by Does not apply route daily 300 each 3    Lancets MISC 1 each by Does not apply route 5 times daily Please fill for 33g one touch lancets 150 each 3    blood glucose test strips (ASCENSIA AUTODISC VI;ONE TOUCH ULTRA TEST VI) strip Check blood sugars TID 90 each 3    insulin aspart (NOVOLOG FLEXPEN) 100 UNIT/ML injection pen Take as directed qac.   Maximum 165units daily 10 pen 1    insulin detemir (LEVEMIR FLEXTOUCH) 100 UNIT/ML injection pen Take as directed qhs (maximum 90 units at night) 10 pen 2    blood glucose monitor kit and supplies Test blood sugars qac and qhs 1 kit 0    atorvastatin (LIPITOR) 20 MG tablet One po qhs 90 tablet 1    linagliptin (TRADJENTA) 5 MG tablet Take 1 tablet by mouth daily 90 tablet 3    Multiple Vitamins-Minerals (ADULT ONE DAILY GUMMIES) CHEW Take 1 tablet by mouth daily         Physical Exam:    Constitutional:   · Reviewed vitals above  · Well Nourished, well developed, no distress       HENT:  · No trouble breathing through nose  Resp:  · Normal effort  · No visualized signs of difficulty breathing or respiratory distress  Skin:  · No rashes on inspection of facial skin  Psych:  · Normal mood and affect  · Normal insight and judgement            Erna Lema  is a 77 y.o.  female being evaluated by a Virtual Visit (video visit) encounter to address concerns as mentioned above. A caregiver was present when appropriate. Due to this being a TeleHealth encounter (During CONOU-90 public health emergency), evaluation of the following organ systems was limited: Vitals/Constitutional/EENT/Resp/CV/GI//MS/Neuro/Skin/Heme-Lymph-Imm. Pursuant to the emergency declaration under the [address] [place] [address] and the [employer] Act, this Virtual Visit was conducted with patient's (and/or legal guardian's) consent, to reduce the patient's risk of exposure to COVID-19 and provide necessary medical care. The patient (and/or legal guardian) has also been advised to contact this office for worsening conditions or problems, and seek emergency medical treatment and/or call 911 if deemed necessary. Patient identification was verified at the start of the visit: Yes    Services were provided through a video synchronous discussion virtually to substitute for in-person clinic visit. Patient was located at home and provider was located in office or at home. An electronic signature was used to authenticate this note.     --Jorge A Dejesus MD

## 2021-05-31 NOTE — PATIENT INSTRUCTIONS
Schedule follow up with Dr Micehlle Diaz. He wanted to see you in March 2020. Very overdue!!!     Get Tdap and Shingrix vaccines at your pharmacy    Call central scheduling for mammogram, transvaginal ultrasound,  bone DEXA scan, and MRI of abdomen (this isn't due until November 2020):   [phone]    Call Dr Ortiz Garcia for colonoscopy as you are overdue:    [phone]

## 2021-06-01 ENCOUNTER — TELEPHONE (OUTPATIENT)
Dept: PRIMARY CARE CLINIC | Age: 66
End: 2021-06-01

## 2021-06-01 ENCOUNTER — VIRTUAL VISIT (OUTPATIENT)
Dept: PRIMARY CARE CLINIC | Age: 66
End: 2021-06-01
Payer: MEDICARE

## 2021-06-01 DIAGNOSIS — E11.319 TYPE 2 DIABETES MELLITUS WITH RETINOPATHY, WITH LONG-TERM CURRENT USE OF INSULIN, MACULAR EDEMA PRESENCE UNSPECIFIED, UNSPECIFIED LATERALITY, UNSPECIFIED RETINOPATHY SEVERITY (HCC): Primary | ICD-10-CM

## 2021-06-01 DIAGNOSIS — Z12.31 ENCOUNTER FOR SCREENING MAMMOGRAM FOR MALIGNANT NEOPLASM OF BREAST: ICD-10-CM

## 2021-06-01 DIAGNOSIS — Z79.4 TYPE 2 DIABETES MELLITUS WITH RETINOPATHY, WITH LONG-TERM CURRENT USE OF INSULIN, MACULAR EDEMA PRESENCE UNSPECIFIED, UNSPECIFIED LATERALITY, UNSPECIFIED RETINOPATHY SEVERITY (HCC): Primary | ICD-10-CM

## 2021-06-01 DIAGNOSIS — C18.2 MALIGNANT NEOPLASM OF ASCENDING COLON (HCC): ICD-10-CM

## 2021-06-01 DIAGNOSIS — K86.2 PANCREATIC CYST: ICD-10-CM

## 2021-06-01 DIAGNOSIS — I10 ESSENTIAL HYPERTENSION: ICD-10-CM

## 2021-06-01 DIAGNOSIS — N18.32 STAGE 3B CHRONIC KIDNEY DISEASE (HCC): ICD-10-CM

## 2021-06-01 DIAGNOSIS — N85.8 UTERINE MASS: ICD-10-CM

## 2021-06-01 DIAGNOSIS — E78.00 PURE HYPERCHOLESTEROLEMIA: ICD-10-CM

## 2021-06-01 DIAGNOSIS — Z78.0 POST-MENOPAUSAL: ICD-10-CM

## 2021-06-01 DIAGNOSIS — G47.33 OSA (OBSTRUCTIVE SLEEP APNEA): ICD-10-CM

## 2021-06-01 PROCEDURE — 3044F HG A1C LEVEL LT 7.0%: CPT | Performed by: FAMILY MEDICINE

## 2021-06-01 PROCEDURE — 4040F PNEUMOC VAC/ADMIN/RCVD: CPT | Performed by: FAMILY MEDICINE

## 2021-06-01 PROCEDURE — 1123F ACP DISCUSS/DSCN MKR DOCD: CPT | Performed by: FAMILY MEDICINE

## 2021-06-01 PROCEDURE — 2022F DILAT RTA XM EVC RTNOPTHY: CPT | Performed by: FAMILY MEDICINE

## 2021-06-01 PROCEDURE — 3017F COLORECTAL CA SCREEN DOC REV: CPT | Performed by: FAMILY MEDICINE

## 2021-06-01 PROCEDURE — G8417 CALC BMI ABV UP PARAM F/U: HCPCS | Performed by: FAMILY MEDICINE

## 2021-06-01 PROCEDURE — G8427 DOCREV CUR MEDS BY ELIG CLIN: HCPCS | Performed by: FAMILY MEDICINE

## 2021-06-01 PROCEDURE — 1036F TOBACCO NON-USER: CPT | Performed by: FAMILY MEDICINE

## 2021-06-01 PROCEDURE — 1090F PRES/ABSN URINE INCON ASSESS: CPT | Performed by: FAMILY MEDICINE

## 2021-06-01 PROCEDURE — 99215 OFFICE O/P EST HI 40 MIN: CPT | Performed by: FAMILY MEDICINE

## 2021-06-01 PROCEDURE — G8400 PT W/DXA NO RESULTS DOC: HCPCS | Performed by: FAMILY MEDICINE

## 2021-06-01 RX ORDER — INSULIN ASPART 100 [IU]/ML
INJECTION, SOLUTION INTRAVENOUS; SUBCUTANEOUS
Qty: 65 PEN | Refills: 1
Start: 2021-06-01 | End: 2022-06-08

## 2021-06-01 NOTE — TELEPHONE ENCOUNTER
Request records from Dr Vidal Ramirez and also MRI results from his office ([facility] neurology)      Please confirm and route back once done.

## 2021-06-07 ENCOUNTER — TELEPHONE (OUTPATIENT)
Dept: PRIMARY CARE CLINIC | Age: 66
End: 2021-06-07

## 2021-06-07 NOTE — TELEPHONE ENCOUNTER
Spoke w/ pt concerning paperwork. Paperwork received and is being completed. Advised pt that we give her a call once paperwork is complete and has been faxed.   Pt expressed understanding

## 2021-06-07 NOTE — TELEPHONE ENCOUNTER
Please call patient concerning question about insulin refills    She gets insulins thorough financial program with [employer]

## 2021-06-22 ENCOUNTER — TELEPHONE (OUTPATIENT)
Dept: PRIMARY CARE CLINIC | Age: 66
End: 2021-06-22

## 2021-06-22 NOTE — TELEPHONE ENCOUNTER
Patient is calling stating that Adán perez did not get all of her supplies on the order and are needing the needles and all to be included  Nova fine plus 32t v 4ml order 4 boxes

## 2021-07-11 DIAGNOSIS — I10 ESSENTIAL HYPERTENSION: ICD-10-CM

## 2021-07-12 RX ORDER — ALLOPURINOL 100 MG/1
200 TABLET ORAL DAILY
Qty: 180 TABLET | Refills: 0 | Status: SHIPPED | OUTPATIENT
Start: 2021-07-12 | End: 2021-10-16 | Stop reason: SDUPTHER

## 2021-07-12 RX ORDER — METOLAZONE 5 MG/1
5 TABLET ORAL
Qty: 24 TABLET | Refills: 0 | Status: SHIPPED | OUTPATIENT
Start: 2021-07-12 | End: 2021-10-14 | Stop reason: SDUPTHER

## 2021-07-12 NOTE — TELEPHONE ENCOUNTER
Please find out if patient had a repeat kidney function since her last on in June.       Patient was notified of and labs were sent to her kidney doctor, Dr Jalen Bowers, but I need to ensure these were followed up on    [address]

## 2021-07-12 NOTE — TELEPHONE ENCOUNTER
PT had blood work done ordered by Dr Lyly Atkinson.   Labs can be found in [facility]

## 2021-08-03 ENCOUNTER — PATIENT MESSAGE (OUTPATIENT)
Dept: PRIMARY CARE CLINIC | Age: 66
End: 2021-08-03

## 2021-08-03 DIAGNOSIS — M54.41 CHRONIC BILATERAL LOW BACK PAIN WITH BILATERAL SCIATICA: Primary | ICD-10-CM

## 2021-08-03 DIAGNOSIS — G89.29 CHRONIC BILATERAL LOW BACK PAIN WITH BILATERAL SCIATICA: Primary | ICD-10-CM

## 2021-08-03 DIAGNOSIS — M54.42 CHRONIC BILATERAL LOW BACK PAIN WITH BILATERAL SCIATICA: Primary | ICD-10-CM

## 2021-08-03 NOTE — TELEPHONE ENCOUNTER
From: Lynsey Mae  To: Silverio Yanez MD  Sent: 8/3/2021 10:18 AM EDT  Subject: Non-Urgent Medical Question    I called Dr. Keshia Yu office and they need a referral for new patients. Please fax over the following [phone]: Last three office notes, including diagnosis code for lower back problem, and any imaging pertaining to same (i.e. MRI, hip xrays)  Thanks.

## 2021-08-07 DIAGNOSIS — I10 ESSENTIAL HYPERTENSION: ICD-10-CM

## 2021-08-07 DIAGNOSIS — M79.89 LEG SWELLING: ICD-10-CM

## 2021-08-10 RX ORDER — METOPROLOL SUCCINATE 100 MG/1
TABLET, EXTENDED RELEASE ORAL
Qty: 90 TABLET | Refills: 0 | Status: SHIPPED | OUTPATIENT
Start: 2021-08-10 | End: 2021-10-16 | Stop reason: SDUPTHER

## 2021-08-10 RX ORDER — TORSEMIDE 100 MG/1
TABLET ORAL
Qty: 180 TABLET | Refills: 0 | Status: SHIPPED | OUTPATIENT
Start: 2021-08-10 | End: 2021-11-24

## 2021-08-23 RX ORDER — POTASSIUM CHLORIDE 1500 MG/1
TABLET, FILM COATED, EXTENDED RELEASE ORAL
Qty: 360 TABLET | Refills: 0 | Status: SHIPPED | OUTPATIENT
Start: 2021-08-23 | End: 2021-09-26 | Stop reason: SDUPTHER

## 2021-09-09 ENCOUNTER — TELEPHONE (OUTPATIENT)
Dept: PRIMARY CARE CLINIC | Age: 66
End: 2021-09-09

## 2021-09-09 NOTE — TELEPHONE ENCOUNTER
Patient is calling needing a copy of the referral for her acupuncture. Patient states that she thought this was already sent to UC by our office by UC is stating that they did not receive this referral for lower back pain.       Please advise  Anastasiia Carrillo [phone] (home)

## 2021-09-30 RX ORDER — POTASSIUM CHLORIDE 1500 MG/1
TABLET, FILM COATED, EXTENDED RELEASE ORAL
Qty: 360 TABLET | Refills: 0 | Status: SHIPPED | OUTPATIENT
Start: 2021-09-30 | End: 2022-06-08

## 2021-10-14 DIAGNOSIS — I10 ESSENTIAL HYPERTENSION: ICD-10-CM

## 2021-10-14 RX ORDER — METOLAZONE 5 MG/1
5 TABLET ORAL
Qty: 24 TABLET | Refills: 0 | Status: SHIPPED | OUTPATIENT
Start: 2021-10-14

## 2021-10-14 NOTE — TELEPHONE ENCOUNTER
LOV 1/7/21  Future Appointments   Date Time Provider Arun Gonzalez   12/28/2021  8:00 AM Vale Wynn MD [facility]

## 2021-10-18 RX ORDER — ALLOPURINOL 100 MG/1
200 TABLET ORAL DAILY
Qty: 180 TABLET | Refills: 0 | Status: SHIPPED | OUTPATIENT
Start: 2021-10-18 | End: 2022-01-24 | Stop reason: SDUPTHER

## 2021-11-05 DIAGNOSIS — M79.89 LEG SWELLING: ICD-10-CM

## 2021-11-09 NOTE — TELEPHONE ENCOUNTER
Called and spoke with pt, she will be having blood work done on Friday for her kidney panel and she also spoke with specialist about her potassium as well.

## 2021-11-09 NOTE — TELEPHONE ENCOUNTER
Call pt and let her know I can't refill without an up to date kidney panel. The last kidney panel I have on record is form June, and her potassium was critically low, and her kidney function was worse than normal    If she has seen her kidney or since then and the kidney doctor has a more up-to-date panel, he will be able to refill for her. If She has not had a kidney panel since then, route back to let me know ASAP so I can put in blood work.

## 2021-11-23 NOTE — TELEPHONE ENCOUNTER
Pt had bloodwork done on 11/12  Results can be found in care everywhere. Pt also had a visit with nephrology Dr Kenya Wright on 11/15 and discussed results.

## 2021-11-23 NOTE — TELEPHONE ENCOUNTER
Call pt and see if she ever got her kidney levels rechecked. If so, can we please track down the results.     Route back with info

## 2021-11-24 RX ORDER — TORSEMIDE 100 MG/1
TABLET ORAL
Qty: 180 TABLET | Refills: 0 | Status: SHIPPED | OUTPATIENT
Start: 2021-11-24 | End: 2022-02-04

## 2022-01-16 DIAGNOSIS — I10 ESSENTIAL HYPERTENSION: ICD-10-CM

## 2022-01-17 NOTE — TELEPHONE ENCOUNTER
Inform pt that I haven't seen her in person since July 2020.     Serious things can be missed if patient is only seen virtually, especially with the medical conditions that she has    Please let her know I would like her to change February's appointment to in person    Route back with info

## 2022-01-20 RX ORDER — ALLOPURINOL 100 MG/1
200 TABLET ORAL DAILY
Qty: 180 TABLET | Refills: 0 | OUTPATIENT
Start: 2022-01-20

## 2022-01-20 RX ORDER — METOPROLOL SUCCINATE 100 MG/1
100 TABLET, EXTENDED RELEASE ORAL DAILY
Qty: 90 TABLET | Refills: 0 | OUTPATIENT
Start: 2022-01-20

## 2022-01-24 NOTE — TELEPHONE ENCOUNTER
PT called asking why her medications were denied. I let her know that Dr. Faisal Orellana wants her to come in office on 2/1 instead of doing a VV. The PT refuses to come to the office and says that she can take her BP and that if Dr. Faisal Orellana wants an A1C that she can go get it at the hospital that is \"10 minutes from her house\". She said that she absolutely will NOT come into the office and that Radha Vo agreed to do a VV and so that's what it needs to be.

## 2022-01-25 NOTE — TELEPHONE ENCOUNTER
Spoke with patient    I explained I haven't seen her in 1.5 years and need to see her in person. She asked if I could do one more virtual visit and then see her in person in 6 months. I let her know I was not comfortable with that. She has many chronic medical conditions, is on high doses of diuretics and I need an accurate weight, blood pressure and physical exam on her. She was not very happy and told me \"all you do is check my blood pressure, you don't do anything else\".         I let her know she can think about things over night and let me know how she wants to proceed in the morning, but if I am to be her doctor, I can't do all her chronic follow up appointments virtually

## 2022-02-02 ENCOUNTER — TELEPHONE (OUTPATIENT)
Dept: PRIMARY CARE CLINIC | Age: 67
End: 2022-02-02

## 2022-02-02 DIAGNOSIS — I10 ESSENTIAL HYPERTENSION: ICD-10-CM

## 2022-02-02 RX ORDER — CYCLOBENZAPRINE HCL 5 MG
5 TABLET ORAL NIGHTLY PRN
Qty: 30 TABLET | Refills: 0 | Status: SHIPPED | OUTPATIENT
Start: 2022-02-02 | End: 2022-02-12

## 2022-02-02 RX ORDER — ALLOPURINOL 100 MG/1
200 TABLET ORAL DAILY
Qty: 180 TABLET | Refills: 0 | Status: SHIPPED | OUTPATIENT
Start: 2022-02-02 | End: 2022-04-23 | Stop reason: SDUPTHER

## 2022-02-02 RX ORDER — METOPROLOL SUCCINATE 100 MG/1
100 TABLET, EXTENDED RELEASE ORAL DAILY
Qty: 90 TABLET | Refills: 0 | Status: SHIPPED | OUTPATIENT
Start: 2022-02-02 | End: 2022-05-04

## 2022-02-02 NOTE — TELEPHONE ENCOUNTER
Patient is calling stating that she requested her medications to be filled on 1/24 and is now out of medications and needing a refill of       allopurinol (ZYLOPRIM) 100 MG tablet    flexiril  (first fill for our office)    metoprolol succinate (TOPROL XL) 100 MG extended release tablet    Patient is requesting a 90 day supply of each these    Please advise  Tim Herr [phone] (home)

## 2022-02-02 NOTE — TELEPHONE ENCOUNTER
meds have been sent    Let pt know    Schedule in person visit sometime within this month, and let me know when it is

## 2022-02-03 DIAGNOSIS — M79.89 LEG SWELLING: ICD-10-CM

## 2022-02-04 RX ORDER — TORSEMIDE 100 MG/1
TABLET ORAL
Qty: 180 TABLET | Refills: 0 | Status: SHIPPED | OUTPATIENT
Start: 2022-02-04 | End: 2022-05-04

## 2022-02-04 NOTE — TELEPHONE ENCOUNTER
Reviewed BMP from nov '21 in care-everywhere:  potassium improved at 3.2, and kidney function stable    Pt is going to call to schedule f/u visit    Please document call and then close encounter.   thanks

## 2022-02-16 ENCOUNTER — PATIENT MESSAGE (OUTPATIENT)
Dept: PRIMARY CARE CLINIC | Age: 67
End: 2022-02-16

## 2022-02-16 NOTE — LETTER
[facility]  [address]  [address] [place] [address]  Phone: [phone]  Fax: [phone]        February 21, 2022       Patient: Stephanie Hall   MR Number: <[id number]>   YOB: 1955   Date of Visit: 2/16/2022       To Whom it may concern        The above patient has a medical necessity for a stair lift in her home due to her medical conditions. Feel free to reach out on call with any questions.             Sincerely,        Nick Muniz MD

## 2022-03-08 ENCOUNTER — HOSPITAL ENCOUNTER (OUTPATIENT)
Age: 67
Discharge: HOME | End: 2022-03-08
Payer: MEDICARE

## 2022-03-08 ENCOUNTER — OFFICE VISIT (OUTPATIENT)
Dept: PRIMARY CARE CLINIC | Age: 67
End: 2022-03-08
Payer: MEDICARE

## 2022-03-08 VITALS
RESPIRATION RATE: 30 BRPM | OXYGEN SATURATION: 95 % | SYSTOLIC BLOOD PRESSURE: 130 MMHG | BODY MASS INDEX: 55.94 KG/M2 | WEIGHT: 293 LBS | DIASTOLIC BLOOD PRESSURE: 82 MMHG | HEART RATE: 82 BPM | TEMPERATURE: 97.2 F

## 2022-03-08 DIAGNOSIS — Z79.4 TYPE 2 DIABETES MELLITUS WITH RETINOPATHY, WITH LONG-TERM CURRENT USE OF INSULIN, MACULAR EDEMA PRESENCE UNSPECIFIED, UNSPECIFIED LATERALITY, UNSPECIFIED RETINOPATHY SEVERITY (HCC): ICD-10-CM

## 2022-03-08 DIAGNOSIS — E11.319 TYPE 2 DIABETES MELLITUS WITH RETINOPATHY, WITH LONG-TERM CURRENT USE OF INSULIN, MACULAR EDEMA PRESENCE UNSPECIFIED, UNSPECIFIED LATERALITY, UNSPECIFIED RETINOPATHY SEVERITY (HCC): ICD-10-CM

## 2022-03-08 DIAGNOSIS — M54.50 CHRONIC BILATERAL LOW BACK PAIN WITHOUT SCIATICA: Primary | ICD-10-CM

## 2022-03-08 DIAGNOSIS — E78.00 PURE HYPERCHOLESTEROLEMIA: ICD-10-CM

## 2022-03-08 DIAGNOSIS — K86.2 PANCREATIC CYST: ICD-10-CM

## 2022-03-08 DIAGNOSIS — R06.02 SOB (SHORTNESS OF BREATH): ICD-10-CM

## 2022-03-08 DIAGNOSIS — G89.29 CHRONIC BILATERAL LOW BACK PAIN WITHOUT SCIATICA: Primary | ICD-10-CM

## 2022-03-08 DIAGNOSIS — I10 PRIMARY HYPERTENSION: ICD-10-CM

## 2022-03-08 DIAGNOSIS — E66.01 MORBID OBESITY (HCC): ICD-10-CM

## 2022-03-08 DIAGNOSIS — N18.32 STAGE 3B CHRONIC KIDNEY DISEASE (HCC): ICD-10-CM

## 2022-03-08 DIAGNOSIS — C18.2 MALIGNANT NEOPLASM OF ASCENDING COLON (HCC): ICD-10-CM

## 2022-03-08 DIAGNOSIS — N85.8 UTERINE MASS: ICD-10-CM

## 2022-03-08 DIAGNOSIS — G47.33 OSA (OBSTRUCTIVE SLEEP APNEA): ICD-10-CM

## 2022-03-08 PROBLEM — L97.912 ULCER OF RIGHT LOWER EXTREMITY WITH FAT LAYER EXPOSED (HCC): Status: RESOLVED | Noted: 2019-09-20 | Resolved: 2022-03-08

## 2022-03-08 PROBLEM — M79.89 LEG SWELLING: Status: RESOLVED | Noted: 2019-09-29 | Resolved: 2022-03-08

## 2022-03-08 LAB
A/G RATIO: 1.1 (ref 1.1–2.2)
ALBUMIN SERPL-MCNC: 3.9 G/DL (ref 3.4–5)
ALP BLD-CCNC: 85 U/L (ref 40–129)
ALT SERPL-CCNC: 15 U/L (ref 10–40)
ANION GAP SERPL CALCULATED.3IONS-SCNC: 18 MMOL/L (ref 3–16)
AST SERPL-CCNC: 20 U/L (ref 15–37)
BASOPHILS ABSOLUTE: 0.1 K/UL (ref 0–0.2)
BASOPHILS RELATIVE PERCENT: 0.9 %
BILIRUB SERPL-MCNC: 0.4 MG/DL (ref 0–1)
BUN BLDV-MCNC: 35 MG/DL (ref 7–20)
CALCIUM SERPL-MCNC: 9.6 MG/DL (ref 8.3–10.6)
CHLORIDE BLD-SCNC: 95 MMOL/L (ref 99–110)
CHOLESTEROL, TOTAL: 151 MG/DL (ref 0–199)
CO2: 29 MMOL/L (ref 21–32)
CREAT SERPL-MCNC: 1.5 MG/DL (ref 0.6–1.2)
CREATININE URINE: 33.8 MG/DL (ref 28–259)
EOSINOPHILS ABSOLUTE: 0.3 K/UL (ref 0–0.6)
EOSINOPHILS RELATIVE PERCENT: 3.4 %
GFR AFRICAN AMERICAN: 42
GFR NON-AFRICAN AMERICAN: 34
GLUCOSE BLD-MCNC: 187 MG/DL (ref 70–99)
HCT VFR BLD CALC: 40.7 % (ref 36–48)
HDLC SERPL-MCNC: 32 MG/DL (ref 40–60)
HEMOGLOBIN: 13.3 G/DL (ref 12–16)
LDL CHOLESTEROL CALCULATED: ABNORMAL MG/DL
LDL CHOLESTEROL DIRECT: 72 MG/DL
LYMPHOCYTES ABSOLUTE: 1.1 K/UL (ref 1–5.1)
LYMPHOCYTES RELATIVE PERCENT: 12.5 %
MCH RBC QN AUTO: 29.5 PG (ref 26–34)
MCHC RBC AUTO-ENTMCNC: 32.7 G/DL (ref 31–36)
MCV RBC AUTO: 90.3 FL (ref 80–100)
MICROALBUMIN UR-MCNC: 1.4 MG/DL
MICROALBUMIN/CREAT UR-RTO: 41.4 MG/G (ref 0–30)
MONOCYTES ABSOLUTE: 0.7 K/UL (ref 0–1.3)
MONOCYTES RELATIVE PERCENT: 7.8 %
NEUTROPHILS ABSOLUTE: 6.7 K/UL (ref 1.7–7.7)
NEUTROPHILS RELATIVE PERCENT: 75.4 %
PDW BLD-RTO: 16.2 % (ref 12.4–15.4)
PLATELET # BLD: 196 K/UL (ref 135–450)
PMV BLD AUTO: 7.9 FL (ref 5–10.5)
POTASSIUM SERPL-SCNC: 3.5 MMOL/L (ref 3.5–5.1)
PRO-BNP: 146 PG/ML (ref 0–124)
RBC # BLD: 4.51 M/UL (ref 4–5.2)
SODIUM BLD-SCNC: 142 MMOL/L (ref 136–145)
TOTAL PROTEIN: 7.4 G/DL (ref 6.4–8.2)
TRIGL SERPL-MCNC: 307 MG/DL (ref 0–150)
VLDLC SERPL CALC-MCNC: ABNORMAL MG/DL
WBC # BLD: 8.8 K/UL (ref 4–11)

## 2022-03-08 PROCEDURE — 85025 COMPLETE CBC W/AUTO DIFF WBC: CPT

## 2022-03-08 PROCEDURE — 1090F PRES/ABSN URINE INCON ASSESS: CPT | Performed by: FAMILY MEDICINE

## 2022-03-08 PROCEDURE — 80061 LIPID PANEL: CPT

## 2022-03-08 PROCEDURE — G8427 DOCREV CUR MEDS BY ELIG CLIN: HCPCS | Performed by: FAMILY MEDICINE

## 2022-03-08 PROCEDURE — 36415 COLL VENOUS BLD VENIPUNCTURE: CPT

## 2022-03-08 PROCEDURE — 3017F COLORECTAL CA SCREEN DOC REV: CPT | Performed by: FAMILY MEDICINE

## 2022-03-08 PROCEDURE — 3046F HEMOGLOBIN A1C LEVEL >9.0%: CPT | Performed by: FAMILY MEDICINE

## 2022-03-08 PROCEDURE — 1036F TOBACCO NON-USER: CPT | Performed by: FAMILY MEDICINE

## 2022-03-08 PROCEDURE — 99215 OFFICE O/P EST HI 40 MIN: CPT | Performed by: FAMILY MEDICINE

## 2022-03-08 PROCEDURE — 80053 COMPREHEN METABOLIC PANEL: CPT

## 2022-03-08 PROCEDURE — 83036 HEMOGLOBIN GLYCOSYLATED A1C: CPT

## 2022-03-08 PROCEDURE — G8484 FLU IMMUNIZE NO ADMIN: HCPCS | Performed by: FAMILY MEDICINE

## 2022-03-08 PROCEDURE — 4040F PNEUMOC VAC/ADMIN/RCVD: CPT | Performed by: FAMILY MEDICINE

## 2022-03-08 PROCEDURE — 83880 ASSAY OF NATRIURETIC PEPTIDE: CPT

## 2022-03-08 PROCEDURE — 82570 ASSAY OF URINE CREATININE: CPT

## 2022-03-08 PROCEDURE — 1123F ACP DISCUSS/DSCN MKR DOCD: CPT | Performed by: FAMILY MEDICINE

## 2022-03-08 PROCEDURE — G8417 CALC BMI ABV UP PARAM F/U: HCPCS | Performed by: FAMILY MEDICINE

## 2022-03-08 PROCEDURE — 82043 UR ALBUMIN QUANTITATIVE: CPT

## 2022-03-08 PROCEDURE — 2022F DILAT RTA XM EVC RTNOPTHY: CPT | Performed by: FAMILY MEDICINE

## 2022-03-08 PROCEDURE — G8400 PT W/DXA NO RESULTS DOC: HCPCS | Performed by: FAMILY MEDICINE

## 2022-03-08 RX ORDER — HUMAN INSULIN 100 [IU]/ML
INJECTION, SUSPENSION SUBCUTANEOUS
COMMUNITY
End: 2022-06-08

## 2022-03-08 RX ORDER — HYDROCODONE BITARTRATE AND ACETAMINOPHEN 5; 325 MG/1; MG/1
1 TABLET ORAL EVERY 8 HOURS PRN
COMMUNITY
Start: 2022-02-17

## 2022-03-08 RX ORDER — POTASSIUM CHLORIDE 20 MEQ/1
40 TABLET, EXTENDED RELEASE ORAL 3 TIMES DAILY
COMMUNITY
Start: 2022-01-29

## 2022-03-08 RX ORDER — CALCIUM CARBONATE/VITAMIN D3 500-10/5ML
1 LIQUID (ML) ORAL DAILY
COMMUNITY

## 2022-03-08 SDOH — ECONOMIC STABILITY: FOOD INSECURITY: WITHIN THE PAST 12 MONTHS, THE FOOD YOU BOUGHT JUST DIDN'T LAST AND YOU DIDN'T HAVE MONEY TO GET MORE.: NEVER TRUE

## 2022-03-08 SDOH — ECONOMIC STABILITY: FOOD INSECURITY: WITHIN THE PAST 12 MONTHS, YOU WORRIED THAT YOUR FOOD WOULD RUN OUT BEFORE YOU GOT MONEY TO BUY MORE.: NEVER TRUE

## 2022-03-08 ASSESSMENT — ANXIETY QUESTIONNAIRES
6. BECOMING EASILY ANNOYED OR IRRITABLE: 0
7. FEELING AFRAID AS IF SOMETHING AWFUL MIGHT HAPPEN: 0
1. FEELING NERVOUS, ANXIOUS, OR ON EDGE: 0
4. TROUBLE RELAXING: 0
5. BEING SO RESTLESS THAT IT IS HARD TO SIT STILL: 0
IF YOU CHECKED OFF ANY PROBLEMS ON THIS QUESTIONNAIRE, HOW DIFFICULT HAVE THESE PROBLEMS MADE IT FOR YOU TO DO YOUR WORK, TAKE CARE OF THINGS AT HOME, OR GET ALONG WITH OTHER PEOPLE: NOT DIFFICULT AT ALL
GAD7 TOTAL SCORE: 0
2. NOT BEING ABLE TO STOP OR CONTROL WORRYING: 0
3. WORRYING TOO MUCH ABOUT DIFFERENT THINGS: 0

## 2022-03-08 ASSESSMENT — SOCIAL DETERMINANTS OF HEALTH (SDOH): HOW HARD IS IT FOR YOU TO PAY FOR THE VERY BASICS LIKE FOOD, HOUSING, MEDICAL CARE, AND HEATING?: NOT HARD AT ALL

## 2022-03-08 ASSESSMENT — PATIENT HEALTH QUESTIONNAIRE - PHQ9
SUM OF ALL RESPONSES TO PHQ QUESTIONS 1-9: 0
SUM OF ALL RESPONSES TO PHQ QUESTIONS 1-9: 0
SUM OF ALL RESPONSES TO PHQ9 QUESTIONS 1 & 2: 0
9. THOUGHTS THAT YOU WOULD BE BETTER OFF DEAD, OR OF HURTING YOURSELF: 0
1. LITTLE INTEREST OR PLEASURE IN DOING THINGS: 0
4. FEELING TIRED OR HAVING LITTLE ENERGY: 0
5. POOR APPETITE OR OVEREATING: 0
10. IF YOU CHECKED OFF ANY PROBLEMS, HOW DIFFICULT HAVE THESE PROBLEMS MADE IT FOR YOU TO DO YOUR WORK, TAKE CARE OF THINGS AT HOME, OR GET ALONG WITH OTHER PEOPLE: 0
7. TROUBLE CONCENTRATING ON THINGS, SUCH AS READING THE NEWSPAPER OR WATCHING TELEVISION: 0
SUM OF ALL RESPONSES TO PHQ QUESTIONS 1-9: 0
2. FEELING DOWN, DEPRESSED OR HOPELESS: 0
6. FEELING BAD ABOUT YOURSELF - OR THAT YOU ARE A FAILURE OR HAVE LET YOURSELF OR YOUR FAMILY DOWN: 0
8. MOVING OR SPEAKING SO SLOWLY THAT OTHER PEOPLE COULD HAVE NOTICED. OR THE OPPOSITE, BEING SO FIGETY OR RESTLESS THAT YOU HAVE BEEN MOVING AROUND A LOT MORE THAN USUAL: 0
3. TROUBLE FALLING OR STAYING ASLEEP: 0
SUM OF ALL RESPONSES TO PHQ QUESTIONS 1-9: 0

## 2022-03-08 NOTE — PROGRESS NOTES
PROGRESS NOTE     Tano Romero MD  [address], [address] [place] [address] [address], [address] [place] [address]         Phone: [phone]    Date of Service:  3/8/2022     Patient ID: Brent Frazier is a 74 y.o. female      Assessment / Plan:            1. Type 2 diabetes mellitus with retinopathy, with long-term current use of insulin, macular edema presence unspecified, unspecified laterality, unspecified retinopathy severity (HCC)  poc A1c = 5.5, but since blood sugars are between 150-200, will check A1c in blood to confirm. If truly doing back on him on insulin given. Please deliver medicines, patient has not been to take secondary to her chronic kidney disease. She is still taking Tradjenta 5 mg daily. Patient requesting referral to endocrinology for second opinion. She has a doctor at [facility] she wants to see. Generic referral placed and printed, given to patient     Continue basal bolus insulin. Will make adjustments if needed depending on A1c.      Patient is up-to-date with diabetic eye exam.  It does show stable diabetic retinopathy. Reminded that next eye exam is due in May 2022     Not on ACE or ARB due to kidney disease, does not want statin due to \"things she is read about them        2. Essential hypertension  Currently well controlled. Patient on Toprol  mg, torsemide 100 mg twice daily, and metolazone 5 mg twice a week.     Repeating BMP.     3. Pure hypercholesterolemia  Patient ate a yogurt at 3 AM, but will check lipid panel and LFTs. As stated above, has declined statin up into this point.     4. BRIANNA (obstructive sleep apnea)  Pt to continue CPAP.     1/31/19: found to have moderate sleep apnea     5. Uterine mass  Likely fibroid on initial imaging. Patient has declined all follow-up imaging.   Transvaginal ultrasound ordered is still current, gave number to schedule she changes her mind.     6. Pancreatic cyst  She is overdue for MRI of abdomen. Still current, told her to discuss with kidney doctor if she can receive gadolinium contrast for this.     7. Le edema/venous insufficiency vs diastolic HF    Patient is currently hypervolemic with significant lower extremity edema causing small ulceration of left skin. The hemosiderin deposits and thickening of the skin showed that this venous stasis is chronic in nature. Patient currently denies shortness of breath, but does breathe 30 times per minute. She sleeps on left side and denies orthopnea, PND, but does sleep with a BRIANNA    Dr Brian Deal, nephrology, has been treating edema with torsemide 100mg BID and metolazone 5mg twice a week. I am concerned about possible diastolic heart function. On remote echo from 0061, diastolic function was not able to be determined, and she did have some signs of LVH. Recommended repeat echocardiogram, the patient declining. Understands risk of doing so. I will order a BNP to assess. Patient's weight is up 1 pounds in the last year. Some of this weight is likely fluid weight. Heart and lung exam currently normal.  Was not able to witness if patient had dyspnea on exertion, as she was wheeled in and out of the office with a wheelchair. Also recommended Unna boots since patient now has ulcer on the left lower leg, but she is refusing. She has had one in the remote past.  She states she will wear her compression stockings instead. Recommend that she decrease her salt and try to keep her legs elevated when she is sitting. Discussed with patient that if we do not get the fluid out of her legs, the ulcer will never heal, and she would then be at increased risk of infection.   Patient expressed understanding     I do believe patient likely has some diastolic heart failure, because when she presented initially, she had significant dyspnea on exertion associated with her lower extremity edema, and both of these were documented to be significantly improved after being placed on Lasix, according to my note on 6/15/18.  She had an initial BNP that was slightly over 100, but this increased to 708 on 11/17/18.  Echocardiogram also showed some mild concentric left ventricular hypertrophy, normal ejection fraction, and stated diastolic function could not be well determined due to elevated heart rate. Patient later had some significant GI blood loss anemia that complicated the picture worsened her dyspnea on exertion, however: Dyspnea on exertion did initially improve greatly after Lasix was started.     Kidney doctor is now adjusting both her torsemide and metolazone.  See below for details.     Stress perfusion 1/9/18  Small area of anterior wall breast artifact but no clear reversible    perfusion defect seen to suggest myocardial ischemia.    Hypertensive response to stress test    Overall findings represent a low risk study.    Normal LV size and systolic function.      Echo 10/17/18  Left ventricular cavity size is normal. There is mild concentric left  ventricular hypertrophy. Overall left ventricular systolic function appears normal with an estimated  ejection fraction of 55-60%. No regional wall motion abnormalities are noted. Diastolic function is indeterminate due to increased heart rate. Mild aortic stenosis with a peak velocity of 2.73m/s and a mean pressure  gradient of 16mmHg. IVC size is dilated (>2.1 cm) and collapses < 50% with respiration  consistent with elevated RA pressure (15 mmHg).       7. Stage 3b chronic kidney disease  Saw Dr Maryjane Cogan on 11/15/21:  He states \"chronic renail failure is worse: creatinine fluctuates with volume status. Creatine increased with more freq metolazone increase. \"  He is replacing pt's potassium and prescribing allopurinol  Recent BMP showed creatinine of 1.52 as shown below. Repeating BMP.         8.  Chronic back pain and leg pain  As stated below, back pain and upper leg pain is not what is hurting her the most right now, she is complaining of pain and stiffness in her calfs bilaterally. This is causing significant disability. The only exercise she gets is walking around the apartment. She has an exercise bike but has trouble getting on and off of it. Explained that the calf pain is likely due to all the fluid and chronic venous stasis, and treating that will help the calf pain. I did steroid back injections patient seen integrative medicine doctor have not helped her chronic back pain, referring to [address] [place] [address], Dr. Marnie Rosales for full evaluation. Gave number to schedule.     9. Colon cancer s/p partial colectomy  Thought to be in 1st stage and curable.     Dr pulido's note from 12/13/19: says checking CMP and CEA. Recent yealy CT neg for recurrence. Next colonoscopy May 2020. He states pt to return to see him 3 months later.     Reviewed the importance of getting colonoscopy and follow-up with Dr. Kathya DESHPANDE. Patient is aware that she is overdue. Checking CBC today to r/o anemia       Today, a total of 45 minutes was spent on encounter, both face-to-face and on face to face time.     HM:     Pt declining hep C and HIV screening     Pap smear declined     DEXA scan and mammogram both ordered last year, and patient given number to schedule.     Colonoscopy was due May 2020.; encouraged to schedule. Pt understands risk of morbidity and mortality if fails to follow through     Encouraged to get Tdap and Shingrix at pharamcy. Micheal Mcfadden is UTD on rest of immunizations             Subjective:     CC:  chronic back pain,  diastolic CHF/venous insufficiency, Venous stasis ulcer, DM2 with retinopathy and stage 3 CKD, HTN, HLD, BRIANNA, colon cancer, uterine mass, pancreatic cyst        HPI:     58-year-old white female presenting for reevaluation of the above chronic medical conditions.     Since I last saw patient, she states this been very hard for her her diabetes. She does not like to think that she has read about the medications, and still is not interested in restarting.          Pre meal blood sugars                 insulin given  80--100                                           35N  101-150                                           40N  151-200                                            45N  201-250                                            50N  251--300                                          55N     Levemir  40 units  BID       Patient was diagnosed with colon cancer in November 2018 after not being able to afford a colonoscopy when she had a positive fit test in July 2016. Her presenting symptom at that time was paleness and fatigue and she was found to have a hemoglobin of 4.3 on admission to the hospital.  Likely, her colon cancer was thought to be age 1 and curable with resection. She had resection of the tumor. Patient states she has not seen Dr. Lexi Dunham since 2019, had repeat imaging, nor has had repeat colonoscopy as recommended. Patient was notified at last few visits she was overdue for these things, but states she has not yet done so. .  She denies any change in her stool, no bright red blood per rectum, no melena. No weight loss.        The uterine mass was found ound on US in November 2018.  Thought to be fibroid, but endometrial cancer couldn't be excluded on imaging.  Pt was referred to Dr Ketty Jones on 11/19/18, but has not went. Rodriguez Reid still not interested in follow up.        Pancreatic cyst found incidentally in November when hospitalized for colon cancer and chronic blood loss anemia. Repeat MRI was due November 2020 per current management guidelines. Patient states she never had this done due to the COVID-19 pandemic.   She states she likely will not do so as she is tired of doctor visits and tests             Lab Results   Component Value Date    LABA1C 7.6 (H) 06/07/2021    LABA1C 6.5 01/07/2021    LABA1C 12.6 07/22/2020 Lab Results   Component Value Date    LABMICR <1.20 04/26/2019    CREATININE 1.83 (H) 06/07/2021     Lab Results   Component Value Date    ALT 17 06/07/2021    ALT 17 06/07/2021    AST 19 06/07/2021    AST 19 06/07/2021     Lab Results   Component Value Date    CHOL 133 06/07/2021    TRIG 290 (H) 06/07/2021    HDL 29 (L) 06/07/2021    LDLCALC 46 06/07/2021    LDLDIRECT 69 07/22/2020                ROS:  Constitutional:  Negative for activity or appetite change, fever or fatigue  Resp:  Negative for SOB, chest tightness, cough  Cardiovascular: Negative for CP, palpitations, CHÁVEZ, orthopnea, PND, + LE edema with calf pain and ulcer  Gastrointestinal: Negative for abd pain, melena, BRBPR, N/V/D  Musculoskeletal:  +chronic back pain  Neuro:  Negative for dizziness or lightheadedness  Psych: negative for depression or anxiety         Vitals:    03/08/22 0921   BP: 130/82   Pulse: 82   Resp: 30   Temp: 97.2 °F (36.2 °C)   TempSrc: Temporal   SpO2: 95%   Weight: (!) 315 lb 12.8 oz (143.2 kg)       Outpatient Medications Marked as Taking for the 3/8/22 encounter (Office Visit) with Tanya Ramirez MD   Medication Sig Dispense Refill    HYDROcodone-acetaminophen (NORCO) 5-325 MG per tablet TAKE 1/2 TO 1 TABLET BY MOUTH UP TO EVERY 12 HOURS - NO MORE THAN 2 TABLETS PER DAY      insulin NPH (NOVOLIN N FLEXPEN) 100 UNIT/ML injection pen Inject into the skin      Magnesium Oxide 400 MG CAPS Take 1 tablet by mouth daily      potassium chloride (KLOR-CON M) 20 MEQ extended release tablet       tetrahydrozoline-zinc (VISINE-AC) 0.05-0.25 % ophthalmic solution one drop into each eye BID      torsemide (DEMADEX) 100 MG tablet TAKE 1 TABLET BY MOUTH TWICE DAILY 180 tablet 0    metoprolol succinate (TOPROL XL) 100 MG extended release tablet Take 1 tablet by mouth daily 90 tablet 0    allopurinol (ZYLOPRIM) 100 MG tablet Take 2 tablets by mouth daily 180 tablet 0    metOLazone (ZAROXOLYN) 5 MG tablet Take 1 tablet by mouth Twice a Week 24 tablet 0    potassium chloride (KLOR-CON M) 20 MEQ TBCR extended release tablet TAKE 2 TABLETS BY MOUTH TWICE A  tablet 0    Insulin Pen Needle 32G X 4 MM MISC 1 each by Does not apply route daily 300 each 3    Lancets MISC 1 each by Does not apply route 5 times daily Please fill for 33g one touch lancets 150 each 3    blood glucose test strips (ASCENSIA AUTODISC VI;ONE TOUCH ULTRA TEST VI) strip Check blood sugars TID 90 each 3    insulin detemir (LEVEMIR FLEXTOUCH) 100 UNIT/ML injection pen Take as directed qhs (maximum 90 units at night) 10 pen 2    blood glucose monitor kit and supplies Test blood sugars qac and qhs 1 kit 0    atorvastatin (LIPITOR) 20 MG tablet One po qhs 90 tablet 1    linagliptin (TRADJENTA) 5 MG tablet Take 1 tablet by mouth daily 90 tablet 3    Multiple Vitamins-Minerals (ADULT ONE DAILY GUMMIES) CHEW Take 1 tablet by mouth daily               Objective:   Constitutional:   · Reviewed vitals above  · Well Nourished, well developed, no distress       Neck:  · Symmetric and without masses  · No thyromegaly  Resp:  · Normal effort  · Clear to auscultation bilaterally without rhonchi, wheezing or crackles  Cardiovascular:  · On auscultation, normal S1 and S2 without murmurs, rubs or gallops  · No bruits of bilateral carotids and no JVD  Gastrointestinal:  · Nontender, nondistended, and no masses  · No hepatosplenomegaly  Musculoskeletal:  · In wheelchair. · +3 pitting edema to level of mid shin bilaterally  Skin:  · +thickened skin to level of mid shin bilateral lower legs, with hemosiderin deposits, and small 1 cm x 1.5 cm skin ulcer on front of left shin. · No areas of increased heat or induration on palpation. No areas of drainage  Psych:  · Normal mood and affect  · Normal insight and judgement        EMR Dragon/transcription disclaimer:  Much of this encounter note is electronic transcription/translation of spoken language to printed texts.   The electronic translation of spoken language may be erroneous, or at times, nonsensical words or phrases may be inadvertently transcribed.   Although I have reviewed the note for such errors, some may still exist.

## 2022-03-09 LAB
ESTIMATED AVERAGE GLUCOSE: 174.3 MG/DL
HBA1C MFR BLD: 7.7 %

## 2022-03-10 RX ORDER — DULAGLUTIDE 0.75 MG/.5ML
0.75 INJECTION, SOLUTION SUBCUTANEOUS WEEKLY
Qty: 4 PEN | Refills: 0 | Status: SHIPPED | OUTPATIENT
Start: 2022-03-10 | End: 2022-06-08

## 2022-03-24 ENCOUNTER — PATIENT MESSAGE (OUTPATIENT)
Dept: PRIMARY CARE CLINIC | Age: 67
End: 2022-03-24

## 2022-03-24 DIAGNOSIS — Z79.4 TYPE 2 DIABETES MELLITUS WITH RETINOPATHY, WITH LONG-TERM CURRENT USE OF INSULIN, MACULAR EDEMA PRESENCE UNSPECIFIED, UNSPECIFIED LATERALITY, UNSPECIFIED RETINOPATHY SEVERITY (HCC): Primary | ICD-10-CM

## 2022-03-24 DIAGNOSIS — E11.319 TYPE 2 DIABETES MELLITUS WITH RETINOPATHY, WITH LONG-TERM CURRENT USE OF INSULIN, MACULAR EDEMA PRESENCE UNSPECIFIED, UNSPECIFIED LATERALITY, UNSPECIFIED RETINOPATHY SEVERITY (HCC): Primary | ICD-10-CM

## 2022-03-24 PROCEDURE — 3051F HG A1C>EQUAL 7.0%<8.0%: CPT | Performed by: FAMILY MEDICINE

## 2022-03-24 NOTE — TELEPHONE ENCOUNTER
From: Nate Monk  To: Dr. Shayan Fried: 3/24/2022 3:20 PM EDT  Subject: Referral to Endocrinologist    Could you please send a referral to Dr. Maribel Denny. Romario at [place] [address], [address]  [phone] so that they can fit me into their schedule?  Thanks

## 2022-04-25 RX ORDER — ALLOPURINOL 100 MG/1
200 TABLET ORAL DAILY
Qty: 180 TABLET | Refills: 0 | Status: SHIPPED | OUTPATIENT
Start: 2022-04-25

## 2022-05-04 DIAGNOSIS — I10 ESSENTIAL HYPERTENSION: ICD-10-CM

## 2022-05-04 DIAGNOSIS — M79.89 LEG SWELLING: ICD-10-CM

## 2022-05-04 RX ORDER — METOPROLOL SUCCINATE 100 MG/1
100 TABLET, EXTENDED RELEASE ORAL DAILY
Qty: 90 TABLET | Refills: 0 | Status: ON HOLD | OUTPATIENT
Start: 2022-05-04 | End: 2022-06-16 | Stop reason: HOSPADM

## 2022-05-04 RX ORDER — TORSEMIDE 100 MG/1
TABLET ORAL
Qty: 180 TABLET | Refills: 0 | Status: ON HOLD | OUTPATIENT
Start: 2022-05-04 | End: 2022-06-16 | Stop reason: HOSPADM

## 2022-06-04 ENCOUNTER — PATIENT MESSAGE (OUTPATIENT)
Dept: PRIMARY CARE CLINIC | Age: 67
End: 2022-06-04

## 2022-06-04 DIAGNOSIS — M62.838 MUSCLE SPASM: Primary | ICD-10-CM

## 2022-06-07 RX ORDER — SEMAGLUTIDE 1.34 MG/ML
0.5 INJECTION, SOLUTION SUBCUTANEOUS WEEKLY
Qty: 1 PEN | Refills: 0 | Status: SHIPPED | OUTPATIENT
Start: 2022-06-07

## 2022-06-07 NOTE — TELEPHONE ENCOUNTER
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

## 2022-06-08 ENCOUNTER — APPOINTMENT (OUTPATIENT)
Dept: CT IMAGING | Age: 67
DRG: 623 | End: 2022-06-08
Payer: MEDICARE

## 2022-06-08 ENCOUNTER — HOSPITAL ENCOUNTER (INPATIENT)
Age: 67
LOS: 9 days | Discharge: NURSING, REHAB OR LONG-TERM CARE | DRG: 623 | End: 2022-06-17
Attending: EMERGENCY MEDICINE | Admitting: INTERNAL MEDICINE
Payer: MEDICARE

## 2022-06-08 ENCOUNTER — APPOINTMENT (OUTPATIENT)
Dept: GENERAL RADIOLOGY | Age: 67
DRG: 623 | End: 2022-06-08
Payer: MEDICARE

## 2022-06-08 DIAGNOSIS — N17.9 AKI (ACUTE KIDNEY INJURY) (HCC): ICD-10-CM

## 2022-06-08 DIAGNOSIS — R77.8 ELEVATED TROPONIN: ICD-10-CM

## 2022-06-08 DIAGNOSIS — J96.01 ACUTE RESPIRATORY FAILURE WITH HYPOXIA (HCC): Primary | ICD-10-CM

## 2022-06-08 DIAGNOSIS — R09.89 PULMONARY VASCULAR CONGESTION: ICD-10-CM

## 2022-06-08 DIAGNOSIS — L03.115 CELLULITIS OF RIGHT LOWER EXTREMITY: ICD-10-CM

## 2022-06-08 DIAGNOSIS — N39.0 URINARY TRACT INFECTION WITHOUT HEMATURIA, SITE UNSPECIFIED: ICD-10-CM

## 2022-06-08 PROBLEM — L03.90 CELLULITIS: Status: ACTIVE | Noted: 2022-06-08

## 2022-06-08 LAB
A/G RATIO: 1 (ref 1.1–2.2)
ALBUMIN SERPL-MCNC: 4 G/DL (ref 3.4–5)
ALP BLD-CCNC: 100 U/L (ref 40–129)
ALT SERPL-CCNC: 19 U/L (ref 10–40)
ANION GAP SERPL CALCULATED.3IONS-SCNC: 14 MMOL/L (ref 3–16)
APTT: 26.4 SEC (ref 23–34.3)
APTT: 26.5 SEC (ref 23–34.3)
AST SERPL-CCNC: 19 U/L (ref 15–37)
BACTERIA: ABNORMAL /HPF
BASE EXCESS VENOUS: 12.7 MMOL/L
BASOPHILS ABSOLUTE: 0.1 K/UL (ref 0–0.2)
BASOPHILS RELATIVE PERCENT: 0.5 %
BETA-HYDROXYBUTYRATE: 0.76 MMOL/L (ref 0–0.27)
BILIRUB SERPL-MCNC: 0.8 MG/DL (ref 0–1)
BILIRUBIN URINE: NEGATIVE
BLOOD, URINE: ABNORMAL
BUN BLDV-MCNC: 38 MG/DL (ref 7–20)
C-REACTIVE PROTEIN: 118.9 MG/L (ref 0–5.1)
CALCIUM SERPL-MCNC: 7.8 MG/DL (ref 8.3–10.6)
CARBOXYHEMOGLOBIN: 1.6 %
CHLORIDE BLD-SCNC: 91 MMOL/L (ref 99–110)
CLARITY: CLEAR
CO2: 33 MMOL/L (ref 21–32)
COLOR: YELLOW
CREAT SERPL-MCNC: 1.7 MG/DL (ref 0.6–1.2)
EKG ATRIAL RATE: 106 BPM
EKG DIAGNOSIS: NORMAL
EKG P AXIS: 63 DEGREES
EKG P-R INTERVAL: 184 MS
EKG Q-T INTERVAL: 368 MS
EKG QRS DURATION: 112 MS
EKG QTC CALCULATION (BAZETT): 488 MS
EKG R AXIS: -67 DEGREES
EKG T AXIS: 93 DEGREES
EKG VENTRICULAR RATE: 106 BPM
EOSINOPHILS ABSOLUTE: 0 K/UL (ref 0–0.6)
EOSINOPHILS RELATIVE PERCENT: 0.2 %
EPITHELIAL CELLS, UA: 1 /HPF (ref 0–5)
GFR AFRICAN AMERICAN: 36
GFR NON-AFRICAN AMERICAN: 30
GLUCOSE BLD-MCNC: 206 MG/DL (ref 70–99)
GLUCOSE BLD-MCNC: 211 MG/DL (ref 70–99)
GLUCOSE URINE: NEGATIVE MG/DL
HCO3 VENOUS: 39 MMOL/L (ref 23–29)
HCT VFR BLD CALC: 36.7 % (ref 36–48)
HCT VFR BLD CALC: 39.5 % (ref 36–48)
HEMOGLOBIN: 12.1 G/DL (ref 12–16)
HEMOGLOBIN: 13 G/DL (ref 12–16)
HYALINE CASTS: 0 /LPF (ref 0–8)
KETONES, URINE: NEGATIVE MG/DL
LACTIC ACID: 1.4 MMOL/L (ref 0.4–2)
LEUKOCYTE ESTERASE, URINE: ABNORMAL
LYMPHOCYTES ABSOLUTE: 0.6 K/UL (ref 1–5.1)
LYMPHOCYTES RELATIVE PERCENT: 5.9 %
MCH RBC QN AUTO: 29.9 PG (ref 26–34)
MCH RBC QN AUTO: 30.1 PG (ref 26–34)
MCHC RBC AUTO-ENTMCNC: 33 G/DL (ref 31–36)
MCHC RBC AUTO-ENTMCNC: 33 G/DL (ref 31–36)
MCV RBC AUTO: 90.7 FL (ref 80–100)
MCV RBC AUTO: 91.1 FL (ref 80–100)
METHEMOGLOBIN VENOUS: 0.3 %
MICROSCOPIC EXAMINATION: YES
MONOCYTES ABSOLUTE: 0.7 K/UL (ref 0–1.3)
MONOCYTES RELATIVE PERCENT: 6.7 %
NEUTROPHILS ABSOLUTE: 9.4 K/UL (ref 1.7–7.7)
NEUTROPHILS RELATIVE PERCENT: 86.7 %
NITRITE, URINE: POSITIVE
O2 SAT, VEN: 79 %
O2 THERAPY: ABNORMAL
PCO2, VEN: 53.6 MMHG (ref 40–50)
PDW BLD-RTO: 16.2 % (ref 12.4–15.4)
PDW BLD-RTO: 16.4 % (ref 12.4–15.4)
PERFORMED ON: ABNORMAL
PH UA: 6 (ref 5–8)
PH VENOUS: 7.46 (ref 7.35–7.45)
PLATELET # BLD: 157 K/UL (ref 135–450)
PLATELET # BLD: 189 K/UL (ref 135–450)
PMV BLD AUTO: 7.4 FL (ref 5–10.5)
PMV BLD AUTO: 7.4 FL (ref 5–10.5)
PO2, VEN: 43 MMHG
POTASSIUM SERPL-SCNC: 3.5 MMOL/L (ref 3.5–5.1)
PRO-BNP: 282 PG/ML (ref 0–124)
PROTEIN UA: 30 MG/DL
RBC # BLD: 4.03 M/UL (ref 4–5.2)
RBC # BLD: 4.35 M/UL (ref 4–5.2)
RBC UA: ABNORMAL /HPF (ref 0–4)
SARS-COV-2, NAAT: NOT DETECTED
SEDIMENTATION RATE, ERYTHROCYTE: >130 MM/HR (ref 0–30)
SODIUM BLD-SCNC: 138 MMOL/L (ref 136–145)
SPECIFIC GRAVITY UA: 1.01 (ref 1–1.03)
TCO2 CALC VENOUS: 40 MMOL/L
TOTAL CK: 143 U/L (ref 26–192)
TOTAL PROTEIN: 7.9 G/DL (ref 6.4–8.2)
TROPONIN: 0.04 NG/ML
TROPONIN: 0.05 NG/ML
TROPONIN: 0.05 NG/ML
URINE REFLEX TO CULTURE: YES
URINE TYPE: ABNORMAL
UROBILINOGEN, URINE: 0.2 E.U./DL
WBC # BLD: 10.8 K/UL (ref 4–11)
WBC # BLD: 8 K/UL (ref 4–11)
WBC UA: 25 /HPF (ref 0–5)

## 2022-06-08 PROCEDURE — 87205 SMEAR GRAM STAIN: CPT

## 2022-06-08 PROCEDURE — 83036 HEMOGLOBIN GLYCOSYLATED A1C: CPT

## 2022-06-08 PROCEDURE — 71045 X-RAY EXAM CHEST 1 VIEW: CPT

## 2022-06-08 PROCEDURE — 83880 ASSAY OF NATRIURETIC PEPTIDE: CPT

## 2022-06-08 PROCEDURE — 6360000002 HC RX W HCPCS: Performed by: INTERNAL MEDICINE

## 2022-06-08 PROCEDURE — 99285 EMERGENCY DEPT VISIT HI MDM: CPT

## 2022-06-08 PROCEDURE — 6360000002 HC RX W HCPCS: Performed by: NURSE PRACTITIONER

## 2022-06-08 PROCEDURE — 85730 THROMBOPLASTIN TIME PARTIAL: CPT

## 2022-06-08 PROCEDURE — 85652 RBC SED RATE AUTOMATED: CPT

## 2022-06-08 PROCEDURE — 87086 URINE CULTURE/COLONY COUNT: CPT

## 2022-06-08 PROCEDURE — 87150 DNA/RNA AMPLIFIED PROBE: CPT

## 2022-06-08 PROCEDURE — 80053 COMPREHEN METABOLIC PANEL: CPT

## 2022-06-08 PROCEDURE — 87070 CULTURE OTHR SPECIMN AEROBIC: CPT

## 2022-06-08 PROCEDURE — 85025 COMPLETE CBC W/AUTO DIFF WBC: CPT

## 2022-06-08 PROCEDURE — 71250 CT THORAX DX C-: CPT

## 2022-06-08 PROCEDURE — 2700000000 HC OXYGEN THERAPY PER DAY

## 2022-06-08 PROCEDURE — 36415 COLL VENOUS BLD VENIPUNCTURE: CPT

## 2022-06-08 PROCEDURE — 82010 KETONE BODYS QUAN: CPT

## 2022-06-08 PROCEDURE — 96365 THER/PROPH/DIAG IV INF INIT: CPT

## 2022-06-08 PROCEDURE — 73630 X-RAY EXAM OF FOOT: CPT

## 2022-06-08 PROCEDURE — 94761 N-INVAS EAR/PLS OXIMETRY MLT: CPT

## 2022-06-08 PROCEDURE — 87635 SARS-COV-2 COVID-19 AMP PRB: CPT

## 2022-06-08 PROCEDURE — 82803 BLOOD GASES ANY COMBINATION: CPT

## 2022-06-08 PROCEDURE — 6370000000 HC RX 637 (ALT 250 FOR IP): Performed by: NURSE PRACTITIONER

## 2022-06-08 PROCEDURE — 87186 SC STD MICRODIL/AGAR DIL: CPT

## 2022-06-08 PROCEDURE — 93005 ELECTROCARDIOGRAM TRACING: CPT | Performed by: NURSE PRACTITIONER

## 2022-06-08 PROCEDURE — 81001 URINALYSIS AUTO W/SCOPE: CPT

## 2022-06-08 PROCEDURE — 87077 CULTURE AEROBIC IDENTIFY: CPT

## 2022-06-08 PROCEDURE — 85379 FIBRIN DEGRADATION QUANT: CPT

## 2022-06-08 PROCEDURE — 6370000000 HC RX 637 (ALT 250 FOR IP): Performed by: INTERNAL MEDICINE

## 2022-06-08 PROCEDURE — 82550 ASSAY OF CK (CPK): CPT

## 2022-06-08 PROCEDURE — 96367 TX/PROPH/DG ADDL SEQ IV INF: CPT

## 2022-06-08 PROCEDURE — 93010 ELECTROCARDIOGRAM REPORT: CPT | Performed by: INTERNAL MEDICINE

## 2022-06-08 PROCEDURE — 83605 ASSAY OF LACTIC ACID: CPT

## 2022-06-08 PROCEDURE — 2580000003 HC RX 258: Performed by: NURSE PRACTITIONER

## 2022-06-08 PROCEDURE — 87040 BLOOD CULTURE FOR BACTERIA: CPT

## 2022-06-08 PROCEDURE — 84484 ASSAY OF TROPONIN QUANT: CPT

## 2022-06-08 PROCEDURE — 2060000000 HC ICU INTERMEDIATE R&B

## 2022-06-08 PROCEDURE — 86140 C-REACTIVE PROTEIN: CPT

## 2022-06-08 PROCEDURE — 87181 SC STD AGAR DILUTION PER AGT: CPT

## 2022-06-08 PROCEDURE — 85027 COMPLETE CBC AUTOMATED: CPT

## 2022-06-08 PROCEDURE — 2580000003 HC RX 258: Performed by: INTERNAL MEDICINE

## 2022-06-08 RX ORDER — ACETAMINOPHEN 650 MG/1
650 SUPPOSITORY RECTAL EVERY 6 HOURS PRN
Status: DISCONTINUED | OUTPATIENT
Start: 2022-06-08 | End: 2022-06-17 | Stop reason: HOSPADM

## 2022-06-08 RX ORDER — HEPARIN SODIUM 10000 [USP'U]/100ML
0-3000 INJECTION, SOLUTION INTRAVENOUS CONTINUOUS
Status: DISCONTINUED | OUTPATIENT
Start: 2022-06-08 | End: 2022-06-10

## 2022-06-08 RX ORDER — FUROSEMIDE 10 MG/ML
40 INJECTION INTRAMUSCULAR; INTRAVENOUS 2 TIMES DAILY
Status: DISCONTINUED | OUTPATIENT
Start: 2022-06-09 | End: 2022-06-11

## 2022-06-08 RX ORDER — PROMETHAZINE HYDROCHLORIDE 25 MG/1
12.5 TABLET ORAL EVERY 6 HOURS PRN
Status: DISCONTINUED | OUTPATIENT
Start: 2022-06-08 | End: 2022-06-17 | Stop reason: HOSPADM

## 2022-06-08 RX ORDER — SODIUM CHLORIDE 9 MG/ML
INJECTION, SOLUTION INTRAVENOUS PRN
Status: DISCONTINUED | OUTPATIENT
Start: 2022-06-08 | End: 2022-06-17 | Stop reason: HOSPADM

## 2022-06-08 RX ORDER — HYDROCODONE BITARTRATE AND ACETAMINOPHEN 5; 325 MG/1; MG/1
1 TABLET ORAL EVERY 8 HOURS PRN
Status: DISCONTINUED | OUTPATIENT
Start: 2022-06-08 | End: 2022-06-10

## 2022-06-08 RX ORDER — INSULIN LISPRO 100 [IU]/ML
30 INJECTION, SOLUTION INTRAVENOUS; SUBCUTANEOUS
Status: DISCONTINUED | OUTPATIENT
Start: 2022-06-09 | End: 2022-06-11

## 2022-06-08 RX ORDER — ACETAMINOPHEN 160 MG
2000 TABLET,DISINTEGRATING ORAL DAILY
COMMUNITY

## 2022-06-08 RX ORDER — DEXTROSE MONOHYDRATE 50 MG/ML
100 INJECTION, SOLUTION INTRAVENOUS PRN
Status: DISCONTINUED | OUTPATIENT
Start: 2022-06-08 | End: 2022-06-17 | Stop reason: HOSPADM

## 2022-06-08 RX ORDER — SODIUM CHLORIDE 0.9 % (FLUSH) 0.9 %
10 SYRINGE (ML) INJECTION EVERY 12 HOURS SCHEDULED
Status: DISCONTINUED | OUTPATIENT
Start: 2022-06-09 | End: 2022-06-17 | Stop reason: HOSPADM

## 2022-06-08 RX ORDER — METOPROLOL SUCCINATE 50 MG/1
100 TABLET, EXTENDED RELEASE ORAL DAILY
Status: DISCONTINUED | OUTPATIENT
Start: 2022-06-09 | End: 2022-06-15

## 2022-06-08 RX ORDER — LORATADINE 10 MG/1
10 TABLET ORAL EVERY EVENING
COMMUNITY

## 2022-06-08 RX ORDER — HEPARIN SODIUM 1000 [USP'U]/ML
30 INJECTION, SOLUTION INTRAVENOUS; SUBCUTANEOUS PRN
Status: DISCONTINUED | OUTPATIENT
Start: 2022-06-08 | End: 2022-06-08

## 2022-06-08 RX ORDER — ACETAMINOPHEN 325 MG/1
650 TABLET ORAL EVERY 6 HOURS PRN
Status: DISCONTINUED | OUTPATIENT
Start: 2022-06-08 | End: 2022-06-17 | Stop reason: HOSPADM

## 2022-06-08 RX ORDER — HEPARIN SODIUM 1000 [USP'U]/ML
4000 INJECTION, SOLUTION INTRAVENOUS; SUBCUTANEOUS ONCE
Status: DISCONTINUED | OUTPATIENT
Start: 2022-06-08 | End: 2022-06-10

## 2022-06-08 RX ORDER — ONDANSETRON 2 MG/ML
4 INJECTION INTRAMUSCULAR; INTRAVENOUS EVERY 6 HOURS PRN
Status: DISCONTINUED | OUTPATIENT
Start: 2022-06-08 | End: 2022-06-17 | Stop reason: HOSPADM

## 2022-06-08 RX ORDER — INSULIN LISPRO 100 [IU]/ML
0-6 INJECTION, SOLUTION INTRAVENOUS; SUBCUTANEOUS NIGHTLY
Status: DISCONTINUED | OUTPATIENT
Start: 2022-06-08 | End: 2022-06-09

## 2022-06-08 RX ORDER — HYDROCODONE BITARTRATE AND ACETAMINOPHEN 5; 325 MG/1; MG/1
1 TABLET ORAL ONCE
Status: COMPLETED | OUTPATIENT
Start: 2022-06-08 | End: 2022-06-08

## 2022-06-08 RX ORDER — METOLAZONE 5 MG/1
5 TABLET ORAL
Status: DISCONTINUED | OUTPATIENT
Start: 2022-06-09 | End: 2022-06-09

## 2022-06-08 RX ORDER — HEPARIN SODIUM 1000 [USP'U]/ML
60 INJECTION, SOLUTION INTRAVENOUS; SUBCUTANEOUS PRN
Status: DISCONTINUED | OUTPATIENT
Start: 2022-06-08 | End: 2022-06-08

## 2022-06-08 RX ORDER — SODIUM CHLORIDE 0.9 % (FLUSH) 0.9 %
10 SYRINGE (ML) INJECTION PRN
Status: DISCONTINUED | OUTPATIENT
Start: 2022-06-08 | End: 2022-06-17 | Stop reason: HOSPADM

## 2022-06-08 RX ORDER — INSULIN DETEMIR 100 [IU]/ML
40 INJECTION, SOLUTION SUBCUTANEOUS 2 TIMES DAILY
COMMUNITY

## 2022-06-08 RX ORDER — LORATADINE 10 MG/1
10 TABLET ORAL EVERY EVENING
Status: DISCONTINUED | OUTPATIENT
Start: 2022-06-08 | End: 2022-06-08

## 2022-06-08 RX ORDER — INSULIN LISPRO 100 [IU]/ML
0-12 INJECTION, SOLUTION INTRAVENOUS; SUBCUTANEOUS
Status: DISCONTINUED | OUTPATIENT
Start: 2022-06-09 | End: 2022-06-09

## 2022-06-08 RX ORDER — ALLOPURINOL 100 MG/1
200 TABLET ORAL DAILY
Status: DISCONTINUED | OUTPATIENT
Start: 2022-06-09 | End: 2022-06-17 | Stop reason: HOSPADM

## 2022-06-08 RX ORDER — INSULIN ASPART 100 [IU]/ML
30 INJECTION, SOLUTION INTRAVENOUS; SUBCUTANEOUS
Status: DISCONTINUED | OUTPATIENT
Start: 2022-06-09 | End: 2022-06-08 | Stop reason: CLARIF

## 2022-06-08 RX ORDER — POTASSIUM CHLORIDE 20 MEQ/1
40 TABLET, EXTENDED RELEASE ORAL PRN
Status: DISCONTINUED | OUTPATIENT
Start: 2022-06-08 | End: 2022-06-17 | Stop reason: HOSPADM

## 2022-06-08 RX ORDER — INSULIN ASPART 100 [IU]/ML
40-50 INJECTION, SOLUTION INTRAVENOUS; SUBCUTANEOUS
COMMUNITY

## 2022-06-08 RX ORDER — HEPARIN SODIUM 1000 [USP'U]/ML
60 INJECTION, SOLUTION INTRAVENOUS; SUBCUTANEOUS ONCE
Status: DISCONTINUED | OUTPATIENT
Start: 2022-06-08 | End: 2022-06-08

## 2022-06-08 RX ORDER — INSULIN GLARGINE 100 [IU]/ML
40 INJECTION, SOLUTION SUBCUTANEOUS 2 TIMES DAILY
Status: DISCONTINUED | OUTPATIENT
Start: 2022-06-08 | End: 2022-06-17 | Stop reason: HOSPADM

## 2022-06-08 RX ORDER — POTASSIUM CHLORIDE 7.45 MG/ML
10 INJECTION INTRAVENOUS PRN
Status: DISCONTINUED | OUTPATIENT
Start: 2022-06-08 | End: 2022-06-08 | Stop reason: SDUPTHER

## 2022-06-08 RX ORDER — POTASSIUM CHLORIDE 7.45 MG/ML
10 INJECTION INTRAVENOUS PRN
Status: DISCONTINUED | OUTPATIENT
Start: 2022-06-08 | End: 2022-06-17 | Stop reason: HOSPADM

## 2022-06-08 RX ORDER — CETIRIZINE HYDROCHLORIDE 10 MG/1
10 TABLET ORAL NIGHTLY
Status: DISCONTINUED | OUTPATIENT
Start: 2022-06-08 | End: 2022-06-17 | Stop reason: HOSPADM

## 2022-06-08 RX ORDER — MAGNESIUM SULFATE IN WATER 40 MG/ML
2000 INJECTION, SOLUTION INTRAVENOUS PRN
Status: DISCONTINUED | OUTPATIENT
Start: 2022-06-08 | End: 2022-06-17 | Stop reason: HOSPADM

## 2022-06-08 RX ADMIN — CETIRIZINE HYDROCHLORIDE 10 MG: 10 TABLET, FILM COATED ORAL at 22:42

## 2022-06-08 RX ADMIN — INSULIN LISPRO 2 UNITS: 100 INJECTION, SOLUTION INTRAVENOUS; SUBCUTANEOUS at 22:05

## 2022-06-08 RX ADMIN — CEFTRIAXONE 1000 MG: 1 INJECTION, POWDER, FOR SOLUTION INTRAMUSCULAR; INTRAVENOUS at 15:58

## 2022-06-08 RX ADMIN — INSULIN GLARGINE 40 UNITS: 100 INJECTION, SOLUTION SUBCUTANEOUS at 22:40

## 2022-06-08 RX ADMIN — CEFEPIME HYDROCHLORIDE 2000 MG: 2 INJECTION, POWDER, FOR SOLUTION INTRAVENOUS at 22:05

## 2022-06-08 RX ADMIN — SODIUM CHLORIDE: 9 INJECTION, SOLUTION INTRAVENOUS at 22:04

## 2022-06-08 RX ADMIN — HYDROCODONE BITARTRATE AND ACETAMINOPHEN 1 TABLET: 5; 325 TABLET ORAL at 11:54

## 2022-06-08 RX ADMIN — HYDROCODONE BITARTRATE AND ACETAMINOPHEN 1 TABLET: 5; 325 TABLET ORAL at 16:33

## 2022-06-08 RX ADMIN — PIPERACILLIN SODIUM AND TAZOBACTAM SODIUM 3375 MG: 3; .375 INJECTION, POWDER, FOR SOLUTION INTRAVENOUS at 10:10

## 2022-06-08 RX ADMIN — VANCOMYCIN HYDROCHLORIDE 1500 MG: 1 INJECTION, POWDER, LYOPHILIZED, FOR SOLUTION INTRAVENOUS at 12:53

## 2022-06-08 ASSESSMENT — PAIN SCALES - GENERAL
PAINLEVEL_OUTOF10: 10
PAINLEVEL_OUTOF10: 10

## 2022-06-08 ASSESSMENT — PAIN - FUNCTIONAL ASSESSMENT: PAIN_FUNCTIONAL_ASSESSMENT: NONE - DENIES PAIN

## 2022-06-08 NOTE — ED NOTES
Dr Sukumar Irvin aware of pt refusing Heparin Drip per Cameron Torrez Pharmacist,         Report called to  [facility]      RN   To Room   5279,   Cardiac monitor on  during transfer  Pt's pain level   Denies   VSS, Afebrile   IV site is clean, dry and intact, Normal saline locked   Family updated on transfer, spouse at bedside             Emiliano Kidd RN  06/08/22 4599

## 2022-06-08 NOTE — ED NOTES
Pt resting in bed at this time, laying in a supine position with head of bed elevated . Call light remains in reach instructed pt how to use, and encouraged pt to call if needed assistance, no distress noted. RR even and unlabored, skin warm and dry. No needs at this time. Will continue to monitor closely.          Brooke Gasca RN  06/08/22 [id number]

## 2022-06-08 NOTE — ED NOTES
Pharmacy Medication Reconciliation Note     List of medications patient is currently taking is complete. Source of information:   1. EMR  2. Patient list  3. Patient and     Notes regarding home medications:   1.  Did not take anything at home prior to coming in    Denies taking any other OTC or herbal medications    Michael Martinez PharmD, BCPS  6/8/2022  1:22 PM

## 2022-06-08 NOTE — ED PROVIDER NOTES
I did not perform an evaluation of Kathie Thornton but was asked to review her EKG. EKG interpreted by myself.    Sinus tachycardia with a rate of 106, left axis deviation, , , QTc 48, incomplete right bundle branch block, LVH present, poor R wave progression, no acute change compared EKG from 1/14/2019        Hayden Guevara MD  06/08/22 1188

## 2022-06-08 NOTE — TELEPHONE ENCOUNTER
Patient called this morning wanting a direct admit to [facility]. Spoke to Attending in the office and explained to the patient she would need to go thru the ER. Once I said this patient hung up.     HEIDE

## 2022-06-08 NOTE — CONSULTS
Clinical Pharmacy Note  Vancomycin Consult    Pharmacy consult received for one-time dose of vancomycin in the Emergency Department. Ht Readings from Last 1 Encounters:   06/08/22 5' 3\" (1.6 m)        Wt Readings from Last 1 Encounters:   06/08/22 (!) 325 lb 2.9 oz (147.5 kg)         Assessment/Plan:   Vancomycin 1500 mg x 1 in ED.  If Vancomycin is to continue on admission and pharmacy is to manage dosing, please re-consult with admission orders.

## 2022-06-08 NOTE — ED NOTES
Pt resting in bed at this time, laying in a supine position with head of bed elevated . Call light remains in reach instructed pt how to use, and encouraged pt to call if needed assistance, no distress noted. RR even and unlabored, skin warm and dry. No needs at this time. Will continue to monitor closely.          Jelena Melo RN  06/08/22 1590

## 2022-06-08 NOTE — ED NOTES
Pt resting in bed at this time, laying in a supine position with head of bed elevated . Call light remains in reach instructed pt how to use, and encouraged pt to call if needed assistance, no distress noted. RR even and unlabored, skin warm and dry. No needs at this time. Will continue to monitor closely.          Xander Kline, MARGIE  06/08/22 8474

## 2022-06-08 NOTE — ED NOTES
Pt resting in bed at this time, laying in a supine position with head of bed elevated . Call light remains in reach instructed pt how to use, and encouraged pt to call if needed assistance, no distress noted. RR even and unlabored, skin warm and dry. No needs at this time. Will continue to monitor closely.     Pt's  is at bedside at this time      Basim Bolanos RN  06/08/22 1018

## 2022-06-08 NOTE — ED PROVIDER NOTES
[address]  [address] [address]  Dept: [phone]  Loc: [address] [place] [address] COMPLAINT    Chief Complaint   Patient presents with    Leg Pain     right lower extremity, red hot and swollen     Shortness of Breath       HPI    Yeni Camargo is a 74 y.o. female who presents emergency department via EMS with complaints of leg pain and shortness of breath. Patient is a poor historian and gives a different timeline of events but ultimately she is a diabetic. She has had shortness of breath. She does not wear oxygen. She does not have a history of congestive heart failure or COPD. She has chronic leg swelling. She states her legs have gotten more swollen, painful and tight. She is reporting the right leg being red and hot for at least the last couple of days. She is unable to state whether or not she feels like she has been having any fevers. She denies chest pain. Lives at home with her . She uses a walker for ambulation. She states over the last few days she is unable to lay in bed so she has been sleeping upright in a nonreclining chair with her legs dangling. She denies wounds but she does have wounds to her lower extremities. She has never tested positive for Covid. She had a recent negative study. REVIEW OF SYSTEMS    Neurologic: np LOC  Cardiac: No Chest Pain, no syncope  Respiratory: + SOB  GI: No Bloody Stool or Diarrhea  : No Dysuria or Hematuria  General: no Fever  All other systems reviewed and are negative.     PAST MEDICAL & SURGICAL HISTORY    Past Medical History:   Diagnosis Date    Anemia     Chronic bilateral low back pain without sciatica     CKD (chronic kidney disease) stage 3, GFR 30-59 ml/min ([facility]     [facility]     Diabetes ([facility]     Diabetes ([facility]     [facility]     DM eye exam 12/18/17    Hypertension     Pancreatic cyst     Positive FIT (fecal immunochemical test)     Pulmonary hypertension (HCC)      Past Surgical History:   Procedure Laterality Date    COLONOSCOPY N/A 11/5/2018    COLONOSCOPY WITH BIOPSY performed by Robin Hood MD at [address] [place] [address]  05/14/2019    Colonoscopy with polypectomy (cold snare)    COLONOSCOPY N/A 5/14/2019    COLONOSCOPY POLYPECTOMY SNARE/COLD BIOPSY performed by Robin Hood MD at [address] [place] [address] Right 11/08/2018    [address] [place] [address] [place] and umbilical hernia repair    TX OFFICE/OUTPT VISIT,PROCEDURE ONLY Right 11/8/2018    LAPAROSCOPIC RIGHT HEMICOLECTOMY and umbilical hernia repair performed by Kira Ayers MD at [address] [place] [address] 11/5/2018    [facility]GD [facility]OPSY performed by Robin [facility] at [facility]    Current Outpatient Rx   Medication Sig Dispense Refill    insulin aspart (NOVOLOG) 100 UNIT/ML injection vial Inject 40-50 Units into the skin 3 times daily (before meals)      insulin detemir (LEVEMIR) 100 UNIT/ML injection vial Inject 40 Units into the skin 2 times daily      loratadine (CLARITIN) 10 MG tablet Take 10 mg by mouth every evening      Cholecalciferol (VITAMIN D3) 50 MCG (2000 UT) CAPS Take 2,000 Units by mouth daily      Semaglutide,0.25 or 0.5MG/DOS, (OZEMPIC, 0.25 OR 0.5 MG/DOSE,) 2 MG/1.5ML SOPN Inject 0.5 mg into the skin once a week 1 pen 0    metoprolol succinate (TOPROL XL) 100 MG extended release tablet TAKE 1 TABLET BY MOUTH DAILY 90 tablet 0    torsemide (DEMADEX) 100 MG tablet TAKE 1 TABLET BY MOUTH TWICE DAILY 180 tablet 0    allopurinol (ZYLOPRIM) 100 MG tablet Take 2 tablets by mouth daily 180 tablet 0    HYDROcodone-acetaminophen (NORCO) 5-325 MG per tablet Take 1 tablet by mouth every 8 hours as needed for Pain.        Magnesium Oxide 400 MG CAPS Take 1 tablet by mouth daily  potassium chloride (KLOR-CON M) 20 MEQ extended release tablet Take 40 mEq by mouth 3 times daily       tetrahydrozoline-zinc (VISINE-AC) 0.05-0.25 % ophthalmic solution Place 2 drops into both eyes 3 times daily as needed (itchiness)       metOLazone (ZAROXOLYN) 5 MG tablet Take 1 tablet by mouth Twice a Week 24 tablet 0    Insulin Pen Needle 32G X 4 MM MISC 1 each by Does not apply route daily 300 each 3    Lancets MISC 1 each by Does not apply route 5 times daily Please fill for 33g one touch lancets 150 each 3    blood glucose test strips (ASCENSIA AUTODISC VI;ONE TOUCH ULTRA TEST VI) strip Check blood sugars TID 90 each 3    blood glucose monitor kit and supplies Test blood sugars qa and qhs 1 kit 0    linagliptin (TRADJENTA) 5 MG tablet Take 1 tablet by mouth daily 90 tablet 3    Multiple Vitamins-Minerals (ADULT ONE DAILY GUMMIES) CHEW Take 1 tablet by mouth daily         ALLERGIES    Allergies   Allergen Reactions    Pcn [Penicillins] Shortness Of Breath    Spironolactone Rash    Coreg [Carvedilol]      Side effects:  SOB, palpitations    Lisinopril Other (See Comments)     cough       SOCIAL & FAMILY HISTORY    Social History     Socioeconomic History    Marital status:      Spouse name: Not on file    Number of children: Not on file    Years of education: Not on file    Highest education level: Not on file   Occupational History    Not on file   Tobacco Use    Smoking status: Never Smoker    Smokeless tobacco: Never Used   Vaping Use    Vaping Use: Never used   Substance and Sexual Activity    Alcohol use:  Yes     Alcohol/week: 2.0 standard drinks     Types: 2 Glasses of wine per week     Comment: only on occasional    Drug use: No    Sexual activity: Yes     Partners: Female   Other Topics Concern    Not on file   Social History Narrative    Not on file     Social Determinants of Health     Financial Resource Strain: Low Risk     Difficulty of Paying Living Expenses: sentences. Cardiovascular: Tachycardic rate with regular rhythm  GI:  Soft, obese abdomen that is nontender nondistended without rebound or guarding. Normal active bowel sounds are to auscultation  Musculoskeletal: Chronic bilateral lower extremity leg edema. Right leg has circumferential erythema below the knee to the ankle. The skin is taut and hot  Vascular: Radial pulses are 2+ equal bilaterally. Brisk capillary refill to fingers and toes. Pedal and posttibial pulses are palpable with diminished  Integument:  Well hydrated, no petechiae. Circumferential erythema to the right lower extremity between the knee and the ankle. The skin is red and hot. It is edematous. There is no open wound to behind the left ankle that was cultured  Neurologic:  Awake, alert, oriented x3, no aphasia, no slurred speech, CN II-XII intact, normal finger to nose test bilaterally    EKG   Ventricular rate 106 bpm, AK interval 184 ms, QRS duration 112 ms,  ms  No ST elevation or depression. Interpretation is a sinus tachycardia with left axis deviation    Today's tracing was compared  January 14, 2019 where it was noted that the patient had a left axis deviation with sinus tachycardia. Otherwise no acute changes    RADIOLOGY  (interpreted by the radiologist)  CT CHEST WO CONTRAST   Final Result   Mild degree of subsegmental atelectasis. Otherwise no acute cardiopulmonary disease. XR CHEST PORTABLE   Final Result   Pulmonary vascular congestion. Evaluation of the left base limited by soft   tissue attenuation artifact.          NM LUNG SCAN PERFUSION ONLY    (Results Pending)     Labs Reviewed   CBC WITH AUTO DIFFERENTIAL - Abnormal; Notable for the following components:       Result Value    RDW 16.2 (*)     Neutrophils Absolute 9.4 (*)     Lymphocytes Absolute 0.6 (*)     All other components within normal limits   BRAIN NATRIURETIC PEPTIDE - Abnormal; Notable for the following components:    Pro- (*)     All other components within normal limits   TROPONIN - Abnormal; Notable for the following components:    Troponin 0.04 (*)     All other components within normal limits   COMPREHENSIVE METABOLIC PANEL - Abnormal; Notable for the following components:    Chloride 91 (*)     CO2 33 (*)     Glucose 211 (*)     BUN 38 (*)     CREATININE 1.7 (*)     GFR Non- 30 (*)     GFR  36 (*)     Calcium 7.8 (*)     Albumin/Globulin Ratio 1.0 (*)     All other components within normal limits   BLOOD GAS, VENOUS - Abnormal; Notable for the following components:    pH, Danial 7.464 (*)     pCO2, Danial 53.6 (*)     HCO3, Venous 39 (*)     All other components within normal limits   BETA-HYDROXYBUTYRATE - Abnormal; Notable for the following components:    Beta-Hydroxybutyrate 0.76 (*)     All other components within normal limits   URINALYSIS WITH REFLEX TO CULTURE - Abnormal; Notable for the following components:    Blood, Urine MODERATE (*)     Protein, UA 30 (*)     Nitrite, Urine POSITIVE (*)     Leukocyte Esterase, Urine LARGE (*)     All other components within normal limits   MICROSCOPIC URINALYSIS - Abnormal; Notable for the following components:    RBC, UA 5-10 (*)     Bacteria, UA 3+ (*)     WBC, UA 25 (*)     All other components within normal limits   TROPONIN - Abnormal; Notable for the following components:    Troponin 0.05 (*)     All other components within normal limits   CULTURE, BLOOD 1   CULTURE, BLOOD 2   CULTURE, WOUND   CULTURE, URINE   COVID-19, RAPID   LACTIC ACID   CK   D-DIMER, QUANTITATIVE   CBC   APTT       ED COURSE & MEDICAL DECISION MAKING    Pertinent Labs & Imaging studies reviewed and interpreted. (See chart for details)    See chart for details of medications given during the ED stay.     Vitals:    06/08/22 1645 06/08/22 1700 06/08/22 1715 06/08/22 1730   BP: (!) 178/79 (!) 185/73 (!) 183/78 (!) 183/75   Pulse: 99 98 (!) 102 (!) 101   Resp: (!) 33 27 (!) 34 27 Temp:       TempSrc:       SpO2: 98% 97% 98% 98%   Weight:       Height:         Medications   heparin 25,000 unit in sodium chloride 0.45% 250 mL (premix) infusion (0 Units/hr IntraVENous Not Given 6/8/22 1735)   heparin (porcine) injection 4,000 Units (4,000 Units IntraVENous Not Given 6/8/22 1735)   piperacillin-tazobactam (ZOSYN) 3,375 mg in dextrose 5 % 50 mL IVPB (mini-bag) (0 mg IntraVENous Stopped 6/8/22 1049)   HYDROcodone-acetaminophen (NORCO) 5-325 MG per tablet 1 tablet (1 tablet Oral Given 6/8/22 1154)   vancomycin (VANCOCIN) 1500 mg in dextrose 5 % 250 mL IVPB (0 mg IntraVENous Stopped 6/8/22 1421)   cefTRIAXone (ROCEPHIN) 1000 mg IVPB in 50 mL D5W minibag (0 mg IntraVENous Stopped 6/8/22 1619)   HYDROcodone-acetaminophen (NORCO) 5-325 MG per tablet 1 tablet (1 tablet Oral Given 6/8/22 1633)     This patient was seen evaluated by myself my attending physician Dr. Tiffanie Tan. Differential Diagnosis: Cardiac Arrhythmia, Stroke, intravascular source of sepsis (infected intravenous line), CNS source of sepsis (bacterial meningitis), lower respiratory tract cause of sepsis (pneumonia, empyema, lung abscess), genitourinary cause of sepsis (UTI, pyelonephritis), gastrointestinal source of sepsis (liver, gallbladder, colon)    This is a chronically ill appearing female who arrives hypoxic, tachypneic and tachycardic. She is afebrile. She is hypertensive coming in with a blood pressure of 161/74    O2 at 4 L per nasal cannula was applied initially    White blood cell count is 10.8. Hemoglobin 13. Chloride 91, CO2 33, BUN 38, creatinine 1.7, GFR 30, lactic acid 1.4, serum glucose 211, calcium 7.8. CK was added on because the patient is complaining of severe myalgias. Is 143. proBNP 282. Troponin is bumped at 0.04 with no EKG changes. There is no troponins available in the system for comparison. Beta hydroxybutyrate 0.76. Blood cultures x2 were drawn in the ED.   Wound culture was taken from the back of the left ankle. Urinalysis reveals moderate blood with nitrites, leukocytes, 25 whites and 1 epithelial.  She was treated for UTI. She is treated for cellulitis. She is venous pH is 7.464 with a PCO2 of 53.6    CT of the chest without showed  Impression   Mild degree of subsegmental atelectasis.       Otherwise no acute cardiopulmonary disease.         She was unable to get a CTA to rule out PE due to her GFR. I ordered a VQ scan and unfortunately due to the pandemic they are only able to do the perfusion study and it will be an indeterminant result if her chest x-ray was not completely normal and the chest x-ray shows some pulmonary vascular congestion therefore I notified the hospitalist service. We will add on a D-dimer and admit her without the V/Q test.    October 17, 2018 echocardiogramSummary   Left ventricular cavity size is normal. There is mild concentric left   ventricular hypertrophy. Overall left ventricular systolic function appears normal with an estimated   ejection fraction of 55-60%. No regional wall motion abnormalities are noted. Diastolic function is indeterminate due to increased heart rate. Mild aortic stenosis with a peak velocity of 2.73m/s and a mean pressure   gradient of 16mmHg. IVC size is dilated (>2.1 cm) and collapses < 50% with respiration   consistent with elevated RA pressure (15 mmHg). CRITICAL CARE NOTE:  There was a high probability of clinically significant life-threatening deterioration of the patient's condition requiring my urgent intervention. Total critical care time is 40 minutes. This includes multiple reevaluations, vital sign monitoring, pulse oximetry monitoring, telemetry monitoring, clinical response to the IV medications, reviewing the nursing notes, consultation time, dictation/documentation time, and interpretation of the labwork. (This time excludes time spent performing procedures). FINAL IMPRESSION    1.  Acute respiratory failure with hypoxia ([facility]    2. Cellulitis of right lower extremity    3. JOB (acute kidney injury) ([facility]    4. Elevated troponin    5. Pulmonary vascular congestion    6.  Urinary tract infection without hematuria, site unspecified        PLAN  Admission to the hospital      (Please note that this note was completed with a voice recognition program.  Every attempt was made to edit the dictations, but inevitably there remain words that are mis-transcribed.)           Lesvia , SANDY - CNP  06/08/22 5160

## 2022-06-08 NOTE — H&P
Hospital Medicine History & Physical      PCP: Roxie Bolden MD    Date of Admission: 6/8/2022    Chief Complaint:  SOB    History Of Present Illness:    Patient is a 73-year-old female with past medical history of diabetes mellitus, CKD, essential hypertension, pulmonary hypertension who presents to the hospital due to multiple complaints, according to the patient she has been having bilateral lower extremity swelling, redness, she is not able to walk, she also has noticed wound on her right heel and she is concerned it may be infected. Patient also mentions she has pain in her legs due to the swelling and redness. Patient also mentions she feels shortness of breath as well, mentions it feels heavy for her to take a deep breathing. She denies chest pain nausea vomiting diarrhea constipation, mentions her urine appears cloudy.       Past Medical History:          Diagnosis Date    Anemia     Chronic bilateral low back pain without sciatica     CKD (chronic kidney disease) stage 3, GFR 30-59 ml/min (HCC)     Colon cancer (HCC)     Diabetes ([facility]     Diabetes ([facility]     Diabetes with retinopathy ([facility]     DM eye exam 12/18/17    Hypertension     Pancreatic cyst     Positive FIT (fecal immunochemical test)     Pulmonary hypertension ([facility]        Past Surgical History:          Procedure Laterality Date    COLONOSCOPY N/A 11/5/2018    COLONOSCOPY WITH BIOPSY performed by Hiwot Veronica MD at [address] [facility] [address]  05/14/2019    Colonoscopy with polypectomy (cold snare)    COLONOSCOPY N/A 5/14/2019    COLONOSCOPY POLYPECTOMY SNARE/COLD BIOPSY performed by Hiwot Veronica MD at [id number] [place] [address] Right 11/08/2018    [address] [place] [address] and umbilical hernia repair    DC OFFICE/OUTPT VISIT,PROCEDURE ONLY Right 11/8/2018    LAPAROSCOPIC RIGHT HEMICOLECTOMY and umbilical hernia repair performed by Linwood Jack MD at [address] ENDOSCOPY N/A 11/5/2018    EGD BIOPSY performed by Audie Romo MD at [address] [facility] EXTRACTION         Medications Prior to Admission:      Prior to Admission medications    Medication Sig Start Date End Date Taking? Authorizing Provider   insulin aspart (NOVOLOG) 100 UNIT/ML injection vial Inject 40-50 Units into the skin 3 times daily (before meals)   Yes Historical Provider, MD   insulin detemir (LEVEMIR) 100 UNIT/ML injection vial Inject 40 Units into the skin 2 times daily   Yes Historical Provider, MD   loratadine (CLARITIN) 10 MG tablet Take 10 mg by mouth every evening   Yes Historical Provider, MD   Cholecalciferol (VITAMIN D3) 50 MCG (2000 UT) CAPS Take 2,000 Units by mouth daily   Yes Historical Provider, MD   Semaglutide,0.25 or 0.5MG/DOS, (OZEMPIC, 0.25 OR 0.5 MG/DOSE,) 2 MG/1.5ML SOPN Inject 0.5 mg into the skin once a week 6/7/22   Vale Frazier MD   metoprolol succinate (TOPROL XL) 100 MG extended release tablet TAKE 1 TABLET BY MOUTH DAILY 5/4/22   Venkatsuzanne Saucedo MD   torsemide (DEMADEX) 100 MG tablet TAKE 1 TABLET BY MOUTH TWICE DAILY 5/4/22   Vale Frazier MD   allopurinol (ZYLOPRIM) 100 MG tablet Take 2 tablets by mouth daily 4/25/22   Vale Frazier MD   HYDROcodone-acetaminophen (NORCO) 5-325 MG per tablet Take 1 tablet by mouth every 8 hours as needed for Pain.   2/17/22   Historical Provider, MD   Magnesium Oxide 400 MG CAPS Take 1 tablet by mouth daily    Historical Provider, MD   potassium chloride (KLOR-CON M) 20 MEQ extended release tablet Take 40 mEq by mouth 3 times daily  1/29/22   Historical Provider, MD   tetrahydrozoline-zinc (VISINE-AC) 0.05-0.25 % ophthalmic solution Place 2 drops into both eyes 3 times daily as needed (itchiness)     Historical Provider, MD   metOLazone (ZAROXOLYN) 5 MG tablet Take 1 tablet by mouth Twice a Week 10/14/21   Vale Frazier MD   Insulin Pen Needle 32G X 4 MM MISC 1 each by Does not apply route daily 1/11/21   Rafael Marie MD   Lancets MISC 1 each by Does not apply route 5 times daily Please fill for 33g one touch lancets 12/30/20   Rafael Marie MD   blood glucose test strips (ASCENSIA AUTODISC VI;ONE TOUCH ULTRA TEST VI) strip Check blood sugars TID 11/2/20   Vale Butt MD   blood glucose monitor kit and supplies Test blood sugars qac and qhs 7/23/20   Rafael Marie MD   linagliptin (TRADJENTA) 5 MG tablet Take 1 tablet by mouth daily 7/24/19   Vale Butt MD   Multiple Vitamins-Minerals (ADULT ONE DAILY GUMMIES) CHEW Take 1 tablet by mouth daily    Historical Provider, MD       Allergies:  Pcn [penicillins], Spironolactone, Coreg [carvedilol], and Lisinopril    Social History:      TOBACCO:   reports that she has never smoked. She has never used smokeless tobacco.  ETOH:   reports current alcohol use of about 2.0 standard drinks of alcohol per week. Family History:       Reviewed in detail and non contributory          Problem Relation Age of Onset    Lung Cancer Mother     High Blood Pressure Father     Lung Cancer Father     Lupus Sister        REVIEW OF SYSTEMS:   Pertinent positives as noted in the HPI. All other systems reviewed and negative. PHYSICAL EXAM PERFORMED:    BP (!) 183/75   Pulse (!) 101   Temp 98.4 °F (36.9 °C) (Oral)   Resp 27   Ht 5' 3\" (1.6 m)   Wt (!) 325 lb 2.9 oz (147.5 kg)   SpO2 98%   BMI 57.60 kg/m²     General appearance:  No apparent distress, cooperative. HEENT:  Normal cephalic, atraumatic without obvious deformity. Conjunctivae/corneas clear. Neck: Supple, with full range of motion. No cervical lymphadenopathy  Respiratory: Decreased breathing sounds bilaterally  Cardiovascular:  Regular rate and rhythm with normal S1/S2 without murmurs, rubs or gallops. Abdomen: Soft, non-tender, non-distended, normal bowel sounds.   Musculoskeletal: Right heel has wound, bilateral lower extremities has pitting edema, redness, tenderness to palpation, warmth  Skin: no rash visible  Neurologic:  Neurologically intact without any focal sensory/motor deficits. grossly non-focal.  Psychiatric:  Alert and oriented, normal mood  Peripheral Pulses: +2 palpable, equal bilaterally       Labs:     Recent Labs     06/08/22  0935   WBC 10.8   HGB 13.0   HCT 39.5        Recent Labs     06/08/22  0935      K 3.5   CL 91*   CO2 33*   BUN 38*   CREATININE 1.7*   CALCIUM 7.8*     Recent Labs     06/08/22  0935   AST 19   ALT 19   BILITOT 0.8   ALKPHOS 100     No results for input(s): INR in the last 72 hours. Recent Labs     06/08/22  0935 06/08/22  1549   CKTOTAL 143  --    TROPONINI 0.04* 0.05*       Urinalysis:      Lab Results   Component Value Date    NITRU POSITIVE 06/08/2022    WBCUA 25 06/08/2022    BACTERIA 3+ 06/08/2022    RBCUA 5-10 06/08/2022    RBCUA NEGATIVE 04/18/2018    BLOODU MODERATE 06/08/2022    SPECGRAV 1.013 06/08/2022    GLUCOSEU Negative 06/08/2022       Radiology:       CT CHEST WO CONTRAST   Final Result   Mild degree of subsegmental atelectasis. Otherwise no acute cardiopulmonary disease. XR CHEST PORTABLE   Final Result   Pulmonary vascular congestion. Evaluation of the left base limited by soft   tissue attenuation artifact. NM LUNG SCAN PERFUSION ONLY    (Results Pending)           Active Hospital Problems    Diagnosis Date Noted    Cellulitis [L03.90] 06/08/2022     Priority: Medium       Patient is a 67-year-old female with past medical history of diabetes mellitus, CKD, essential hypertension, pulmonary hypertension who presents to the hospital due to multiple complaints, according to the patient she has been having bilateral lower extremity swelling, redness, she is not able to walk, she also has noticed wound on her right heel and she is concerned it may be infected. Patient also mentions she has pain in her legs due to the swelling and redness.   Patient also mentions she feels shortness of breath as well, mentions it feels heavy for her to take a deep breathing. She denies chest pain nausea vomiting diarrhea constipation, mentions her urine appears cloudy. Assessment  Acute hypoxic respiratory failure satting less than 90% on room air-differentials include PE, new onset CHF  Chronic kidney disease, baseline creatinine around 1.5  Elevated troponin, shortness of breath, tachypnea, tachycardia-cannot rule out PE, NSTEMI  Bilateral lower extremity cellulitis  Right heel wound  UTI  Diabetes mellitus  Morbid obesity    Plan  Start 20 IV twice daily Lasix  Check echocardiogram  Start empirical IV heparin  Monitor on cardiac telemetry  ED was requested to order CTA chest or nuclear lung scan  Consult cardiology  Start IV vancomycin, cefepime  Consult podiatry, check ESR, CRP  Check x-ray right foot  Check blood cultures, urine culture  Insulin sliding scale  Consult PT/OT  DVT prophylaxis-on IV heparin  Diet: No diet orders on file  Code Status: Full Code    PT/OT Eval Status: ordered    Dispo - pending clinical improvement       Leslie Kline MD    The note was completed using EMR and Dragon dictation system. Every effort was made to ensure accuracy; however, inadvertent computerized transcription errors may be present. Thank you Radha Mckinney MD for the opportunity to be involved in this patient's care. If you have any questions or concerns please feel free to contact me at [phone].     Leslie Kline MD

## 2022-06-08 NOTE — ED PROVIDER NOTES
Date of evaluation: 6/8/2022    ED Attending Attestation Note     CHIEF COMPLAINT     sob  HISTORY OF PRESENT ILLNESS  (Location/Symptom, Timing/Onset,Context/Setting, Quality, Duration, Modifying Factors, Severity). Note limiting factors. This patient was seen by the advance practice provider. I have seen and examined the patient, agree with the workup, evaluation, management and diagnosis. The care plan has been discussed. Chief Complaint   Patient presents with    Leg Pain     right lower extremity, red hot and swollen     Shortness of Breath        Lynsey Mae is a 74 y.o. female who presents to the emergency department secondary to concern for sob. Going on for over a week. No known lung disease. Has been sleeping in chair (but legs hanging). Past medical history noted below:    has a past medical history of Anemia, Chronic bilateral low back pain without sciatica, CKD (chronic kidney disease) stage 3, GFR 30-59 ml/min ([facility]), Colon cancer ([address] Diabetes ([address] Diabetes ([address] Diabetes with retinopathy ([address] Hypertension, Pancreatic cyst, Positive FIT (fecal immunochemical test), and Pulmonary hypertension ([address] Social History     Socioeconomic History    Marital status:      Spouse name: Not on file    Number of children: Not on file    Years of education: Not on file    Highest education level: Not on file   Occupational History    Not on file   Tobacco Use    Smoking status: Never Smoker    Smokeless tobacco: Never Used   Vaping Use    Vaping Use: Never used   Substance and Sexual Activity    Alcohol use:  Yes     Alcohol/week: 2.0 standard drinks     Types: 2 Glasses of wine per week     Comment: only on occasional    Drug use: No    Sexual activity: Yes     Partners: Female   Other Topics Concern    Not on file   Social History Narrative    Not on file     Social Determinants of Health     Financial Resource Strain: Low Risk     Difficulty of Paying Living Expenses: Not hard at all   Food Insecurity: No Food Insecurity    Worried About [facility] in the Last Year: Never true    Filipe of Food in the Last Year: Never true   Transportation Needs:     Lack of Transportation (Medical): Not on file    Lack of Transportation (Non-Medical): Not on file   Physical Activity:     Days of Exercise per Week: Not on file    Minutes of Exercise per Session: Not on file   Stress:     Feeling of Stress : Not on file   Social Connections:     Frequency of Communication with Friends and Family: Not on file    Frequency of Social Gatherings with Friends and Family: Not on file    Attends Gnosticist Services: Not on file    Active Member of [address] [place] [address] or Organizations: Not on file    Attends Club or Organization Meetings: Not on file    Marital Status: Not on file   Intimate Partner Violence:     Fear of Current or Ex-Partner: Not on file    Emotionally Abused: Not on file    Physically Abused: Not on file    Sexually Abused: Not on file   Housing Stability:     Unable to Pay for Housing in the Last Year: Not on file    Number of [place] in the Last Year: Not on file    Unstable Housing in the Last Year: Not on file     Aside from what is stated above denies any other symptoms or modifying factors. Nursing Notes reviewed.     Past Surgical History:   Procedure Laterality Date    COLONOSCOPY N/A 11/5/2018    COLONOSCOPY WITH BIOPSY performed by Josie Tripathi MD at [address]  05/14/2019    Colonoscopy with polypectomy (cold snare)    COLONOSCOPY N/A 5/14/2019    COLONOSCOPY POLYPECTOMY SNARE/COLD BIOPSY performed by Josie Tripathi MD at [address] [place] [address] Right 11/08/2018    [address] [place] [address] and umbilical hernia repair    CA OFFICE/OUTPT VISIT,PROCEDURE ONLY Right 11/8/2018    LAPAROSCOPIC RIGHT HEMICOLECTOMY and umbilical hernia repair performed by Marian Borges MD at  [facility] GASTROINTESTINAL ENDOSCOPY N/A 11/5/2018    EGD BIOPSY performed by Kwasi Resendiz MD at [facility] EXTRACTION         Family History   Problem Relation Age of Onset    Lung Cancer Mother     High Blood Pressure Father     Lung Cancer Father     Lupus Sister        CURRENT MEDICATIONS       Previous Medications    ALLOPURINOL (ZYLOPRIM) 100 MG TABLET    Take 2 tablets by mouth daily    BLOOD GLUCOSE MONITOR KIT AND SUPPLIES    Test blood sugars [facility] and qhs    BLOOD GLUCOSE TEST STRIPS (ASCENSIA AUTODISC VI;ONE TOUCH ULTRA TEST VI) STRIP    Check blood sugars TID    CHOLECALCIFEROL (VITAMIN D3) 50 MCG (2000 UT) CAPS    Take 2,000 Units by mouth daily    HYDROCODONE-ACETAMINOPHEN (NORCO) 5-325 MG PER TABLET    Take 1 tablet by mouth every 8 hours as needed for Pain.      INSULIN ASPART (NOVOLOG) 100 UNIT/ML INJECTION VIAL    Inject 40-50 Units into the skin 3 times daily (before meals)    INSULIN DETEMIR (LEVEMIR) 100 UNIT/ML INJECTION VIAL    Inject 40 Units into the skin 2 times daily    INSULIN PEN NEEDLE 32G X 4 MM MISC    1 each by Does not apply route daily    LANCETS MISC    1 each by Does not apply route 5 times daily Please fill for 33g one touch lancets    LINAGLIPTIN (TRADJENTA) 5 MG TABLET    Take 1 tablet by mouth daily    LORATADINE (CLARITIN) 10 MG TABLET    Take 10 mg by mouth every evening    MAGNESIUM OXIDE 400 MG CAPS    Take 1 tablet by mouth daily    METOLAZONE (ZAROXOLYN) 5 MG TABLET    Take 1 tablet by mouth Twice a Week    METOPROLOL SUCCINATE (TOPROL XL) 100 MG EXTENDED RELEASE TABLET    TAKE 1 TABLET BY MOUTH DAILY    MULTIPLE VITAMINS-MINERALS (ADULT ONE DAILY GUMMIES) CHEW    Take 1 tablet by mouth daily    POTASSIUM CHLORIDE (KLOR-CON M) 20 MEQ EXTENDED RELEASE TABLET    Take 40 mEq by mouth 3 times daily     SEMAGLUTIDE,0.25 OR 0.5MG/DOS, (OZEMPIC, 0.25 OR 0.5 MG/DOSE,) 2 MG/1.5ML SOPN    Inject 0.5 mg into the skin once a week    TETRAHYDROZOLINE-ZINC (VISINE-AC) Resp: 28, SpO2: 98 %     I personally saw the patient and performed a substantive portion of the visit including all aspects of the medical decision making. Is this patient to be included in the SEP-1 Core Measure due to severe sepsis or septic shock? No   Exclusion criteria - the patient is NOT to be included for SEP-1 Core Measure due to:  2+ SIRS criteria are not met    My assessment reveals an acute on chronically appearing female who is in bed. Appears short of breath. RLE red and warm concerning for cellulitis. She answers questions in short sentences. Abdomen benign. Workup here concerning for multiple different things including cellulitis, respiratory failure, JOB, trop bump, and UTI. She received antibiotics and BEVERLY consulted hospitalist who kindly excepted patient for admission. FINAL IMPRESSION      1. Acute respiratory failure with hypoxia ([address]    2. Cellulitis of right lower extremity    3. JOB (acute kidney injury) ([address]    4. Elevated troponin    5. Pulmonary vascular congestion    6.  Urinary tract infection without hematuria, site unspecified        DISPOSITION/PLAN   DISPOSITION  admission            (Please note that portions of this note were completed with a voice recognition program. Efforts were made to edit the dictations but occasionally words are mis-transcribed.)    Christina Mcgee MD (electronically signed)  Attending Emergency Physician        Christina Mcgee MD  06/08/22 0745

## 2022-06-08 NOTE — ED NOTES
Pt arrived to the ED via EMS, pt states that she has been \"having tremors and muscle weakness\" pt also stated that she is short of breath, pt has BLE swelling + 4 pitting edema to BLE, pt has open ulcers on back of both Lower extremities, pt was 87% on room air, pt is now 96% on 3 liters of oxygen per nasal cannula, pt is alert and orient, pt states that she has been sitting in a chair for 4 days unable to lay in bed due to back pain , pt lives at home with  and uses a walker to ambulate vss afebrile      Kee Cole RN  06/08/22 [id number]

## 2022-06-09 LAB
ANION GAP SERPL CALCULATED.3IONS-SCNC: 11 MMOL/L (ref 3–16)
APTT: 21.9 SEC (ref 23–34.3)
APTT: 26.9 SEC (ref 23–34.3)
BASOPHILS ABSOLUTE: 0.1 K/UL (ref 0–0.2)
BASOPHILS RELATIVE PERCENT: 1.3 %
BUN BLDV-MCNC: 31 MG/DL (ref 7–20)
CALCIUM SERPL-MCNC: 8.9 MG/DL (ref 8.3–10.6)
CHLORIDE BLD-SCNC: 96 MMOL/L (ref 99–110)
CO2: 31 MMOL/L (ref 21–32)
CREAT SERPL-MCNC: 1.5 MG/DL (ref 0.6–1.2)
EOSINOPHILS ABSOLUTE: 0 K/UL (ref 0–0.6)
EOSINOPHILS RELATIVE PERCENT: 0.2 %
ESTIMATED AVERAGE GLUCOSE: 162.8 MG/DL
GFR AFRICAN AMERICAN: 42
GFR NON-AFRICAN AMERICAN: 34
GLUCOSE BLD-MCNC: 137 MG/DL (ref 70–99)
GLUCOSE BLD-MCNC: 199 MG/DL (ref 70–99)
GLUCOSE BLD-MCNC: 203 MG/DL (ref 70–99)
GLUCOSE BLD-MCNC: 206 MG/DL (ref 70–99)
GLUCOSE BLD-MCNC: 70 MG/DL (ref 70–99)
HBA1C MFR BLD: 7.3 %
HCT VFR BLD CALC: 36.2 % (ref 36–48)
HEMOGLOBIN: 12 G/DL (ref 12–16)
LV EF: 60 %
LVEF MODALITY: NORMAL
LYMPHOCYTES ABSOLUTE: 0.8 K/UL (ref 1–5.1)
LYMPHOCYTES RELATIVE PERCENT: 9.5 %
MAGNESIUM: 2.7 MG/DL (ref 1.8–2.4)
MCH RBC QN AUTO: 30.3 PG (ref 26–34)
MCHC RBC AUTO-ENTMCNC: 33 G/DL (ref 31–36)
MCV RBC AUTO: 91.7 FL (ref 80–100)
MONOCYTES ABSOLUTE: 0.8 K/UL (ref 0–1.3)
MONOCYTES RELATIVE PERCENT: 9.3 %
NEUTROPHILS ABSOLUTE: 7 K/UL (ref 1.7–7.7)
NEUTROPHILS RELATIVE PERCENT: 79.7 %
PDW BLD-RTO: 16.3 % (ref 12.4–15.4)
PERFORMED ON: ABNORMAL
PERFORMED ON: NORMAL
PLATELET # BLD: 170 K/UL (ref 135–450)
PMV BLD AUTO: 7.3 FL (ref 5–10.5)
POTASSIUM REFLEX MAGNESIUM: 3.2 MMOL/L (ref 3.5–5.1)
RBC # BLD: 3.95 M/UL (ref 4–5.2)
REPORT: NORMAL
SODIUM BLD-SCNC: 138 MMOL/L (ref 136–145)
TROPONIN: 0.05 NG/ML
VANCOMYCIN RANDOM: 9.2 UG/ML
WBC # BLD: 8.8 K/UL (ref 4–11)

## 2022-06-09 PROCEDURE — 87186 SC STD MICRODIL/AGAR DIL: CPT

## 2022-06-09 PROCEDURE — 97162 PT EVAL MOD COMPLEX 30 MIN: CPT | Performed by: PHYSICAL THERAPIST

## 2022-06-09 PROCEDURE — 83735 ASSAY OF MAGNESIUM: CPT

## 2022-06-09 PROCEDURE — 97116 GAIT TRAINING THERAPY: CPT | Performed by: PHYSICAL THERAPIST

## 2022-06-09 PROCEDURE — 6360000002 HC RX W HCPCS: Performed by: INTERNAL MEDICINE

## 2022-06-09 PROCEDURE — 84484 ASSAY OF TROPONIN QUANT: CPT

## 2022-06-09 PROCEDURE — 97165 OT EVAL LOW COMPLEX 30 MIN: CPT

## 2022-06-09 PROCEDURE — C8929 TTE W OR WO FOL WCON,DOPPLER: HCPCS

## 2022-06-09 PROCEDURE — 2580000003 HC RX 258: Performed by: INTERNAL MEDICINE

## 2022-06-09 PROCEDURE — 2700000000 HC OXYGEN THERAPY PER DAY

## 2022-06-09 PROCEDURE — 87205 SMEAR GRAM STAIN: CPT

## 2022-06-09 PROCEDURE — 80048 BASIC METABOLIC PNL TOTAL CA: CPT

## 2022-06-09 PROCEDURE — 85025 COMPLETE CBC W/AUTO DIFF WBC: CPT

## 2022-06-09 PROCEDURE — 1200000000 HC SEMI PRIVATE

## 2022-06-09 PROCEDURE — 99223 1ST HOSP IP/OBS HIGH 75: CPT | Performed by: INTERNAL MEDICINE

## 2022-06-09 PROCEDURE — 87070 CULTURE OTHR SPECIMN AEROBIC: CPT

## 2022-06-09 PROCEDURE — 80202 ASSAY OF VANCOMYCIN: CPT

## 2022-06-09 PROCEDURE — 6370000000 HC RX 637 (ALT 250 FOR IP): Performed by: INTERNAL MEDICINE

## 2022-06-09 PROCEDURE — 87077 CULTURE AEROBIC IDENTIFY: CPT

## 2022-06-09 PROCEDURE — 0JBQ0ZZ EXCISION OF RIGHT FOOT SUBCUTANEOUS TISSUE AND FASCIA, OPEN APPROACH: ICD-10-PCS | Performed by: PODIATRIST

## 2022-06-09 PROCEDURE — 6360000004 HC RX CONTRAST MEDICATION: Performed by: INTERNAL MEDICINE

## 2022-06-09 PROCEDURE — 85730 THROMBOPLASTIN TIME PARTIAL: CPT

## 2022-06-09 PROCEDURE — 36415 COLL VENOUS BLD VENIPUNCTURE: CPT

## 2022-06-09 PROCEDURE — 97530 THERAPEUTIC ACTIVITIES: CPT

## 2022-06-09 PROCEDURE — 94761 N-INVAS EAR/PLS OXIMETRY MLT: CPT

## 2022-06-09 RX ORDER — FLUTICASONE PROPIONATE 50 MCG
1 SPRAY, SUSPENSION (ML) NASAL DAILY
Status: DISCONTINUED | OUTPATIENT
Start: 2022-06-09 | End: 2022-06-17 | Stop reason: HOSPADM

## 2022-06-09 RX ORDER — GENTAMICIN SULFATE 1 MG/G
CREAM TOPICAL DAILY
Status: DISCONTINUED | OUTPATIENT
Start: 2022-06-09 | End: 2022-06-17 | Stop reason: HOSPADM

## 2022-06-09 RX ORDER — INSULIN LISPRO 100 [IU]/ML
0-18 INJECTION, SOLUTION INTRAVENOUS; SUBCUTANEOUS
Status: DISCONTINUED | OUTPATIENT
Start: 2022-06-09 | End: 2022-06-17 | Stop reason: HOSPADM

## 2022-06-09 RX ORDER — INSULIN LISPRO 100 [IU]/ML
0-9 INJECTION, SOLUTION INTRAVENOUS; SUBCUTANEOUS NIGHTLY
Status: DISCONTINUED | OUTPATIENT
Start: 2022-06-09 | End: 2022-06-17 | Stop reason: HOSPADM

## 2022-06-09 RX ADMIN — INSULIN GLARGINE 40 UNITS: 100 INJECTION, SOLUTION SUBCUTANEOUS at 21:28

## 2022-06-09 RX ADMIN — INSULIN LISPRO 30 UNITS: 100 INJECTION, SOLUTION INTRAVENOUS; SUBCUTANEOUS at 09:12

## 2022-06-09 RX ADMIN — SODIUM CHLORIDE: 9 INJECTION, SOLUTION INTRAVENOUS at 06:43

## 2022-06-09 RX ADMIN — ACETAMINOPHEN 650 MG: 325 TABLET ORAL at 03:13

## 2022-06-09 RX ADMIN — FLUTICASONE PROPIONATE 1 SPRAY: 50 SPRAY, METERED NASAL at 12:23

## 2022-06-09 RX ADMIN — METOPROLOL SUCCINATE 100 MG: 50 TABLET, EXTENDED RELEASE ORAL at 09:02

## 2022-06-09 RX ADMIN — PERFLUTREN 1.5 ML: 6.52 INJECTION, SUSPENSION INTRAVENOUS at 13:20

## 2022-06-09 RX ADMIN — INSULIN LISPRO 4 UNITS: 100 INJECTION, SOLUTION INTRAVENOUS; SUBCUTANEOUS at 12:25

## 2022-06-09 RX ADMIN — HYDROCODONE BITARTRATE AND ACETAMINOPHEN 1 TABLET: 5; 325 TABLET ORAL at 11:25

## 2022-06-09 RX ADMIN — INSULIN LISPRO 30 UNITS: 100 INJECTION, SOLUTION INTRAVENOUS; SUBCUTANEOUS at 12:24

## 2022-06-09 RX ADMIN — METOLAZONE 5 MG: 5 TABLET ORAL at 09:02

## 2022-06-09 RX ADMIN — CETIRIZINE HYDROCHLORIDE 10 MG: 10 TABLET, FILM COATED ORAL at 21:27

## 2022-06-09 RX ADMIN — FUROSEMIDE 40 MG: 10 INJECTION, SOLUTION INTRAMUSCULAR; INTRAVENOUS at 09:02

## 2022-06-09 RX ADMIN — SODIUM CHLORIDE, PRESERVATIVE FREE 10 ML: 5 INJECTION INTRAVENOUS at 21:36

## 2022-06-09 RX ADMIN — Medication 1500 MG: at 09:09

## 2022-06-09 RX ADMIN — HYDROCODONE BITARTRATE AND ACETAMINOPHEN 1 TABLET: 5; 325 TABLET ORAL at 01:29

## 2022-06-09 RX ADMIN — SODIUM CHLORIDE, PRESERVATIVE FREE 10 ML: 5 INJECTION INTRAVENOUS at 09:07

## 2022-06-09 RX ADMIN — FUROSEMIDE 40 MG: 10 INJECTION, SOLUTION INTRAMUSCULAR; INTRAVENOUS at 18:00

## 2022-06-09 RX ADMIN — CEFEPIME HYDROCHLORIDE 2000 MG: 2 INJECTION, POWDER, FOR SOLUTION INTRAVENOUS at 18:05

## 2022-06-09 RX ADMIN — ALLOPURINOL 200 MG: 100 TABLET ORAL at 09:03

## 2022-06-09 RX ADMIN — CEFEPIME HYDROCHLORIDE 2000 MG: 2 INJECTION, POWDER, FOR SOLUTION INTRAVENOUS at 06:45

## 2022-06-09 RX ADMIN — INSULIN LISPRO 4 UNITS: 100 INJECTION, SOLUTION INTRAVENOUS; SUBCUTANEOUS at 09:12

## 2022-06-09 RX ADMIN — INSULIN GLARGINE 40 UNITS: 100 INJECTION, SOLUTION SUBCUTANEOUS at 09:12

## 2022-06-09 ASSESSMENT — PAIN DESCRIPTION - LOCATION: LOCATION: LEG

## 2022-06-09 ASSESSMENT — PAIN DESCRIPTION - DESCRIPTORS: DESCRIPTORS: ACHING

## 2022-06-09 ASSESSMENT — PAIN DESCRIPTION - ORIENTATION: ORIENTATION: RIGHT;LEFT

## 2022-06-09 ASSESSMENT — PAIN SCALES - GENERAL: PAINLEVEL_OUTOF10: 7

## 2022-06-09 NOTE — PROGRESS NOTES
Clinical Pharmacy Note  Vancomycin Consult    Saritha Fermin is a 74 y.o. female ordered Vancomycin for cellulitis; consult received from Dr. Gwendolyn Cochran to manage therapy. Also receiving Cefepime. Patient Active Problem List   Diagnosis    Hypertension    HLD (hyperlipidemia)    [facility]opathy [facility]    Volume overload    Malignant neoplasm of ascending colon (HCC)    Anemia    CKD (chronic kidney disease) stage 3, GFR 30-59 ml/min (HCC)    Uterine mass    Morbid obesity (HCC)    BRIANNA (obstructive sleep apnea)    PLMD (periodic limb movement disorder)    Pancreatic cyst    Chronic bilateral low back pain without sciatica    Cellulitis       Allergies:  Pcn [penicillins], Spironolactone, Coreg [carvedilol], Lisinopril, and Flexeril [cyclobenzaprine]     Temp max:  Temp (24hrs), Av.4 °F (36.9 °C), Min:98.3 °F (36.8 °C), Max:98.4 °F (36.9 °C)      Recent Labs     22  0922  0608   WBC 10.8 8.0 8.8       Recent Labs     22  0935 22  0608   BUN 38* 31*   CREATININE 1.7* 1.5*         Intake/Output Summary (Last 24 hours) at 2022 0718  Last data filed at 2022 5129  Gross per 24 hour   Intake 127.6 ml   Output 700 ml   Net -572.4 ml         Ht Readings from Last 1 Encounters:   22 5' 3\" (1.6 m)        Wt Readings from Last 1 Encounters:   22 (!) 321 lb 6.9 oz (145.8 kg)         Estimated Creatinine Clearance: 51 mL/min (A) (based on SCr of 1.5 mg/dL (H)). Assessment/Plan:  Vancomycin day 2 of 5 day course  Vancomycin random level 9.2mg/L 17 hours post 1500mg dose  Vancomycin 1500 mg IV once ordered for today at 0900. Level ordered for 6/10/22 0600. Thank you for the consult.    Erin Prince RPH,2022,7:18 AM

## 2022-06-09 NOTE — PROGRESS NOTES
Hospitalist Progress Note      PCP: Thad Juárez MD    Date of Admission: 6/8/2022    Chief Complaint:   SOB    Hospital Course:   Patient is a 67-year-old female with past medical history of diabetes mellitus, CKD, essential hypertension, pulmonary hypertension who presents to the hospital due to multiple complaints, according to the patient she has been having bilateral lower extremity swelling, redness, she is not able to walk, she also has noticed wound on her right heel and she is concerned it may be infected. Patient also mentions she has pain in her legs due to the swelling and redness. Patient also mentions she feels shortness of breath as well, mentions it feels heavy for her to take a deep breathing. She denies chest pain nausea vomiting diarrhea constipation, mentions her urine appears cloudy. Subjective:   Not keen to have insulin- thinks it has increased fluid retenion  Has SOA/orthopnea/ PND/weight gain.   Sleeps with CPAP      Medications:  Reviewed    Infusion Medications    sodium chloride 100 mL/hr at 06/09/22 0643    heparin (PORCINE) Infusion      dextrose       Scheduled Medications    vancomycin  1,500 mg IntraVENous Once    sodium chloride flush  10 mL IntraVENous 2 times per day    heparin (porcine)  4,000 Units IntraVENous Once    cefepime  2,000 mg IntraVENous Q12H    insulin lispro  0-12 Units SubCUTAneous TID WC    insulin lispro  0-6 Units SubCUTAneous Nightly    allopurinol  200 mg Oral Daily    metOLazone  5 mg Oral Once per day on Mon Thu    metoprolol succinate  100 mg Oral Daily    furosemide  40 mg IntraVENous BID    vancomycin (VANCOCIN) intermittent dosing (placeholder)   Other RX Placeholder    insulin lispro  30 Units SubCUTAneous TID WC    insulin glargine  40 Units SubCUTAneous BID    cetirizine  10 mg Oral Nightly     PRN Meds: sodium chloride flush, sodium chloride, potassium chloride **OR** potassium alternative oral replacement **OR** potassium chloride, magnesium sulfate, promethazine **OR** ondansetron, magnesium hydroxide, acetaminophen **OR** acetaminophen, glucose, dextrose bolus **OR** dextrose bolus, glucagon (rDNA), dextrose, HYDROcodone-acetaminophen, perflutren lipid microspheres      Intake/Output Summary (Last 24 hours) at 6/9/2022 1000  Last data filed at 6/9/2022 6701  Gross per 24 hour   Intake 127.6 ml   Output 700 ml   Net -572.4 ml       Physical Exam Performed:    BP (!) 173/80   Pulse 90   Temp 98.3 °F (36.8 °C) (Oral)   Resp 22   Ht 5' 3\" (1.6 m)   Wt (!) 321 lb 6.9 oz (145.8 kg)   SpO2 96%   BMI 56.94 kg/m²     General appearance:  No apparent distress, cooperative. HEENT:  Normal cephalic, atraumatic without obvious deformity. Conjunctivae/corneas clear. Neck: Supple, with full range of motion. No cervical lymphadenopathy  Respiratory: Decreased breathing sounds bilaterally + bibasal crackles. Cardiovascular:  Regular rate and rhythm with normal S1/S2 without murmurs, rubs or gallops. Abdomen: Soft, non-tender, non-distended, normal bowel sounds. Musculoskeletal: Right heel has wound, bilateral lower extremities has pitting edema, redness, tenderness to palpation, warmth  Skin: no rash visible  Neurologic:  Neurologically intact without any focal sensory/motor deficits. grossly non-focal.  Psychiatric:  Alert and oriented, normal mood  Peripheral Pulses: +2 palpable, equal bilaterally     Labs:   Recent Labs     06/08/22  0935 06/08/22 2015 06/09/22  0608   WBC 10.8 8.0 8.8   HGB 13.0 12.1 12.0   HCT 39.5 36.7 36.2    157 170     Recent Labs     06/08/22  0935 06/09/22  0608    138   K 3.5 3.2*   CL 91* 96*   CO2 33* 31   BUN 38* 31*   CREATININE 1.7* 1.5*   CALCIUM 7.8* 8.9     Recent Labs     06/08/22  0935   AST 19   ALT 19   BILITOT 0.8   ALKPHOS 100     No results for input(s): INR in the last 72 hours.   Recent Labs     06/08/22  0935 06/08/22  0935 06/08/22  1549 06/08/22  2015 06/09/22  0126   CKTOTAL 143  --   --   --   --    TROPONINI 0.04*   < > 0.05* 0.05* 0.05*    < > = values in this interval not displayed. Urinalysis:      Lab Results   Component Value Date    NITRU POSITIVE 06/08/2022    WBCUA 25 06/08/2022    BACTERIA 3+ 06/08/2022    RBCUA 5-10 06/08/2022    RBCUA NEGATIVE 04/18/2018    BLOODU MODERATE 06/08/2022    SPECGRAV 1.013 06/08/2022    GLUCOSEU Negative 06/08/2022       Radiology:  XR FOOT RIGHT (MIN 3 VIEWS)   Final Result      1. No plain radiographic evidence of osteomyelitis. 2.  Diffuse soft tissue swelling, especially within the dorsum of the foot. 3.  Otherwise no acute bony abnormality is noted. CT CHEST WO CONTRAST   Final Result   Mild degree of subsegmental atelectasis. Otherwise no acute cardiopulmonary disease. XR CHEST PORTABLE   Final Result   Pulmonary vascular congestion. Evaluation of the left base limited by soft   tissue attenuation artifact. Assessment/Plan:    Active Hospital Problems    Diagnosis     Cellulitis [L03.90]      Priority: Medium         Assessment  Acute hypoxic respiratory failure, < 90% on room air. Fluid retension sec to CKD III. No evidence of CHF. Chronic kidney disease, baseline creatinine around 1.5  Elevated troponin, shortness of breath, tachypnea, tachycardia-cannot rule out PE, NSTEMI  Bilateral lower extremity cellulitis  Right heel wound  UTI  Diabetes mellitus  Morbid obesity     Plan  Start 20 IV twice daily Lasix  Hold metolazone  Check echocardiogram  Started empirical IV heparin by admitting doc but pt refusing. Will check VQ scan to help confirm/ refute. Monitor on cardiac telemetry  ED was requested to order CTA chest or nuclear lung scan  Consulted cardiology-input noted.   Start IV vancomycin, cefepime  Consult podiatry, check ESR, CRP  Check x-ray right foot  Check blood cultures, urine culture  Insulin sliding scale  Consult PT/OT  DVT prophylaxis-on IV heparin  Diet: No diet orders on file  Code Status: Full Code     PT/OT Eval Status: ordered     Dispo - pending clinical improvement  Minh Pratt MD

## 2022-06-09 NOTE — PROGRESS NOTES
Admitted to PCU from the ED per stretcher at [facility]. Alert and oriented. Oriented to room and call light. Assist of 2 to stand and pivot to bed. O2 in place. Patient requested to sit in recliner chair. Waffle cushion applied to recliner chair and stedy used for transfer. Patient is SOB with activity but tolerated fairly well. Call light in reach.

## 2022-06-09 NOTE — PLAN OF CARE
Problem: Skin/Tissue Integrity  Goal: Absence of new skin breakdown  Description: 1. Monitor for areas of redness and/or skin breakdown  2. Assess vascular access sites hourly  3. Every 4-6 hours minimum:  Change oxygen saturation probe site  4. Every 4-6 hours:  If on nasal continuous positive airway pressure, respiratory therapy assess nares and determine need for appliance change or resting period. Outcome: Progressing   Patient's skin has been assessed per unit protocol and the patient is being repositioned or encouraged to turn every two hours to prevent skin breakdown and promote healing. Problem: Safety - Adult  Goal: Free from fall injury  Outcome: Progressing   Fall risk assessment completed per unit protocol. Patient's bed is in the lowest position, call light is within reach and the patient's room is free of clutter. The patient has been instructed to call for assistance before getting out of bed or the chair.

## 2022-06-09 NOTE — CONSULTS
Clinical Pharmacy Note  Vancomycin Consult    Gerson Rod is a 74 y.o. female ordered Vancomycin for cellulitis; consult received from Dr. Joseph Booth to manage therapy. Also receiving Cefepime. Patient Active Problem List   Diagnosis    Hypertension    HLD (hyperlipidemia)    [facility]    Volume overload    Malignant neoplasm of ascending colon (HCC)    Anemia    CKD (chronic kidney disease) stage 3, GFR 30-59 ml/min (HCC)    Uterine mass    Morbid obesity (HCC)    BRIANNA (obstructive sleep apnea)    PLMD (periodic limb movement disorder)    Pancreatic cyst    Chronic bilateral low back pain without sciatica    Cellulitis       Allergies:  Pcn [penicillins], Spironolactone, Coreg [carvedilol], Lisinopril, and Flexeril [cyclobenzaprine]     Temp max:  Temp (24hrs), Av.4 °F (36.9 °C), Min:98.4 °F (36.9 °C), Max:98.4 °F (36.9 °C)      Recent Labs     22  0935   WBC 10.8       Recent Labs     22  0935   BUN 38*   CREATININE 1.7*       No intake or output data in the 24 hours ending 22      Ht Readings from Last 1 Encounters:   22 5' 3\" (1.6 m)        Wt Readings from Last 1 Encounters:   22 (!) 321 lb 6.9 oz (145.8 kg)         Estimated Creatinine Clearance: 45 mL/min (A) (based on SCr of 1.7 mg/dL (H)). Assessment/Plan:  Vancomycin 1500 mg IV once ordered. Regimen projects a trough level of 10-15 mg/L. Level ordered for 22 0600. Thank you for the consult.    Jacinda Skelton [facility],2022,8:33 PM

## 2022-06-09 NOTE — CONSULTS
VISIT,PROCEDURE ONLY Right 11/8/2018    LAPAROSCOPIC RIGHT HEMICOLECTOMY and umbilical hernia repair performed by Mahsa Davis MD at [address] N/A 11/5/2018    EGD BIOPSY performed by Kathya Velasquez MD at [address] EXTRACTION        Family History   Problem Relation Age of Onset    Lung Cancer Mother     High Blood Pressure Father     Lung Cancer Father     Lupus Sister    No history of related heart disease  Social History   Is with her  in a condo: She can manage her activities in her condo quite well  Tobacco Use    Smoking status: Never Smoker    Smokeless tobacco: Never Used   Vaping Use    Vaping Use: Never used   Substance Use Topics    Alcohol use: Yes     Alcohol/week: 2.0 standard drinks     Types: 2 Glasses of wine per week     Comment: only on occasional    Drug use: No      Allergies   Allergen Reactions    Pcn [Penicillins] Shortness Of Breath    Spironolactone Rash    Coreg [Carvedilol]      Side effects:  SOB, palpitations    Lisinopril Other (See Comments)     cough    Flexeril [Cyclobenzaprine] Rash      fluticasone  1 spray Each Nostril Daily    sodium chloride flush  10 mL IntraVENous 2 times per day    heparin (porcine)  4,000 Units IntraVENous Once    cefepime  2,000 mg IntraVENous Q12H    insulin lispro  0-12 Units SubCUTAneous TID WC    insulin lispro  0-6 Units SubCUTAneous Nightly    allopurinol  200 mg Oral Daily    metOLazone  5 mg Oral Once per day on Mon Thu    metoprolol succinate  100 mg Oral Daily    furosemide  40 mg IntraVENous BID    vancomycin (VANCOCIN) intermittent dosing (placeholder)   Other RX Placeholder    insulin lispro  30 Units SubCUTAneous TID WC    insulin glargine  40 Units SubCUTAneous BID    cetirizine  10 mg Oral Nightly       Review of Systems -   Constitutional: At over 60 pounds weight gain.   She claims it is due to use of insulin  Respiratory: Negative for Asthma;  cough and hemoptysis  Cardiovascular: Negative for palpitations,dizziness   Gastrointestinal: Negative for abd.pain; constipation/diarrhea;    Genitourinary: Negative for stones; hematuria; frequency hesitancy  Integumentt: Negative for rash or pruritis  Hematologic/lymphatic: Negative for blood dyscrasia; leukemia/lymphoma  Musculoskeletal: Negative for Connective tissue disease  Neurological:  Negative for Seizure   Behavioral/Psych:Negative for Bipolar disorder, Schizophrenia; Dementia  Endocrine: negative for thyroid, parathyroid disease      Intake/Output Summary (Last 24 hours) at 6/9/2022 1356  Last data filed at 6/9/2022 0607  Gross per 24 hour   Intake 127.6 ml   Output 700 ml   Net -572.4 ml       Physical Examination: The obese lady in no distress  BP (!) 183/83   Pulse 81   Temp 98.8 °F (37.1 °C) (Oral)   Resp 20   Ht 5' 3\" (1.6 m)   Wt (!) 321 lb 6.9 oz (145.8 kg)   SpO2 96%   BMI 56.94 kg/m²    HEENT:  Face: Atraumatic, Conjunctiva: Pink; non icteric,  Mucous Memb:  Moist, No thyromegaly or Lymphadenopathy  Respiratory:  Resp Assessment: normal, Resp Auscultation: clear   Cardiovascular: Auscultation: nl S1 & S2, Palpation:  Nl PMI;  No heaves or thrills, JVP:  normal  Abdomen: Soft, non-tender, Normal bowel sounds,  No organomegaly  Extremities: No Cyanosis or Clubbing; Edema both lower extremities are wrapped abdomen is wrapped  Neurological: Oriented to time, place, and person, Non-anxious  Psychiatric: Normal mood and affect  Skin: Warm and dry,  No rash seen      Current Facility-Administered Medications: fluticasone (FLONASE) 50 MCG/ACT nasal spray 1 spray, 1 spray, Each Nostril, Daily  sodium chloride flush 0.9 % injection 10 mL, 10 mL, IntraVENous, 2 times per day  sodium chloride flush 0.9 % injection 10 mL, 10 mL, IntraVENous, PRN  0.9 % sodium chloride infusion, , IntraVENous, PRN  potassium chloride (KLOR-CON M) extended release tablet 40 mEq, 40 mEq, Oral, PRN **OR** potassium bicarb-citric acid (EFFER-K) effervescent tablet 40 mEq, 40 mEq, Oral, PRN **OR** potassium chloride 10 mEq/100 mL IVPB (Peripheral Line), 10 mEq, IntraVENous, PRN  magnesium sulfate 2000 mg in 50 mL IVPB premix, 2,000 mg, IntraVENous, PRN  promethazine (PHENERGAN) tablet 12.5 mg, 12.5 mg, Oral, Q6H PRN **OR** ondansetron (ZOFRAN) injection 4 mg, 4 mg, IntraVENous, Q6H PRN  magnesium hydroxide (MILK OF MAGNESIA) 400 MG/5ML suspension 30 mL, 30 mL, Oral, Daily PRN  acetaminophen (TYLENOL) tablet 650 mg, 650 mg, Oral, Q6H PRN **OR** acetaminophen (TYLENOL) suppository 650 mg, 650 mg, Rectal, Q6H PRN  heparin 25,000 unit in sodium chloride 0.45% 250 mL (premix) infusion, 0-3,000 Units/hr, IntraVENous, Continuous  heparin (porcine) injection 4,000 Units, 4,000 Units, IntraVENous, Once  cefepime (MAXIPIME) 2000 mg IVPB minibag, 2,000 mg, IntraVENous, Q12H  glucose chewable tablet 16 g, 4 tablet, Oral, PRN  dextrose bolus 10% 125 mL, 125 mL, IntraVENous, PRN **OR** dextrose bolus 10% 250 mL, 250 mL, IntraVENous, PRN  glucagon (rDNA) injection 1 mg, 1 mg, IntraMUSCular, PRN  dextrose 5 % solution, 100 mL/hr, IntraVENous, PRN  insulin lispro (HUMALOG) injection vial 0-12 Units, 0-12 Units, SubCUTAneous, TID WC  insulin lispro (HUMALOG) injection vial 0-6 Units, 0-6 Units, SubCUTAneous, Nightly  allopurinol (ZYLOPRIM) tablet 200 mg, 200 mg, Oral, Daily  HYDROcodone-acetaminophen (NORCO) 5-325 MG per tablet 1 tablet, 1 tablet, Oral, Q8H PRN  metOLazone (ZAROXOLYN) tablet 5 mg, 5 mg, Oral, Once per day on Mon Thu  metoprolol succinate (TOPROL XL) extended release tablet 100 mg, 100 mg, Oral, Daily  furosemide (LASIX) injection 40 mg, 40 mg, IntraVENous, BID  vancomycin (VANCOCIN) intermittent dosing (placeholder), , Other, RX Placeholder  insulin lispro (HUMALOG) injection vial 30 Units, 30 Units, SubCUTAneous, TID WC  insulin glargine (LANTUS) injection vial 40 Units, 40 Units, SubCUTAneous, BID  cetirizine (ZYRTEC) tablet 10 mg, 10 mg, Oral, Nightly      Labs:   Recent Labs     06/08/22 2015 06/09/22  0608   WBC 8.0 8.8   HGB 12.1 12.0   HCT 36.7 36.2    170     Recent Labs     06/08/22  0935 06/08/22  0935 06/09/22  0608     --  138   K 3.5  --  3.2*   CO2 33*  --  31   BUN 38*  --  31*   CREATININE 1.7*  --  1.5*   GLUCOSE 211*   < > 199*    < > = values in this interval not displayed. No results for input(s): BNP in the last 72 hours. No results for input(s): PROTIME, INR in the last 72 hours. Recent Labs     06/08/22 2328 06/09/22  0608   APTT 26.4 21.9*     Recent Labs     06/08/22  0935 06/08/22  0935 06/08/22  1549 06/08/22 2015 06/09/22  0126   CKTOTAL 143  --   --   --   --    TROPONINI 0.04*   < > 0.05* 0.05* 0.05*    < > = values in this interval not displayed. Lab Results   Component Value Date    HDL 32 03/08/2022    LDLDIRECT 72 03/08/2022    LDLCALC see below 03/08/2022    TRIG 307 03/08/2022     Recent Labs     06/08/22  0935   AST 19   ALT 19   LABALBU 4.0         EKG:   Sinus tachycardia  Left axis deviation  Voltage criteria for left ventricular hypertrophy  Prolonged QT  abnormal R wave    Chest X-Ray:      ECHO:   Normal LV size and wall motion. EF is  60%. Normal diastolic function. Left atrium is of normal size. The right ventricle is not well visualized. Aortic sclerosis   There is a trivial pericardial effusion noted      ASSESSMENT AND PLAN:        Troponin elevation  Troponins are mildly elevated. They are flat with no peaks and troughs  Patient has no chest pain  She has chronic kidney disease  Troponin elevation are nonspecific and are part of that  No work-up is recommended    CHF  I do not think the patient has CHF  Her problem is fluid retention and lower extremity edema  Nephrology is managing that  I would defer to their expertise for this      Will sign off:  Thank you for the consult    Bertis Duverney M.D  6/9/2022

## 2022-06-09 NOTE — PROGRESS NOTES
Nutrition Assessment     Type and Reason for Visit: Initial,Wound    Nutrition Recommendations/Plan:   1. Continue on 4 carb diet  2. Add chocolate Glucerna bid  3. Monitor meal and supplement intake  4. Monitor pertinent labs     Malnutrition Assessment:  Malnutrition Status: No malnutrition    Nutrition Assessment:  Pt admitted with cellulitis,hypoxia, JOB and R heel wound. PMH includes DM, CKD 3 on PD. Pt states that she eats well at home. She does not count carbs, but monitors BS. Pt is agreeable to Glucerna while here. Estimated Daily Nutrient Needs:  Energy (kcal):          Protein (g):             Fluid (ml/day):         Nutrition Related Findings:   no BM recorded, Generalized noon pitting edema, RLE +3 pitting, weepeing and LLE +3 weeping edema Wound Type: Diabetic Ulcer    Current Nutrition Therapies:    ADULT DIET;  Regular; 4 carb choices (60 gm/meal)    Anthropometric Measures:  · Height: 5' 3\" (160 cm)  · Current Body Wt: 321 lb 6.9 oz (145.8 kg)   · BMI: 57    Nutrition Diagnosis:   · Increased nutrient needs related to inadequate protein-energy intake as evidenced by wounds      Nutrition Interventions:   Food and/or Nutrient Delivery: Continue Current Diet,Start Oral Nutrition Supplement  Nutrition Education/Counseling: No recommendation at this time  Coordination of Nutrition Care: Continue to monitor while inpatient       Goals:             Nutrition Monitoring and Evaluation:   Behavioral-Environmental Outcomes: None Identified  Food/Nutrient Intake Outcomes: Food and Nutrient Intake,Supplement Intake  Physical Signs/Symptoms Outcomes: Biochemical Data,Fluid Status or Edema,Nutrition Focused Physical Findings,Skin,Weight    Discharge Planning:    No discharge needs at this time     Tori Syed, [address] [place] [address],   Contact: [phone]

## 2022-06-09 NOTE — CARE COORDINATION
06/09/22 1115   Service Assessment   Patient Orientation Alert and Oriented   Cognition Alert   History Provided By Patient   Primary Caregiver Self   Patient's Healthcare Decision Maker is: Legal Next of Kin   PCP Verified by CM Yes   Prior Functional Level Independent in ADLs/IADLs;Assistance with the following:;Dressing;Cooking;Housework; Shopping;Mobility   Current Functional Level Assistance with the following:;Bathing;Dressing; Toileting;Cooking;Housework; Shopping;Mobility   Can patient return to prior living arrangement Yes   Ability to make needs known: Good   Family able to assist with home care needs: Yes   Financial Resources Medicare   Social/Functional History   Receives Help From Family   Mode of Transportation Car   Occupation Retired   Services At/After Discharge   [facility] Provided? No   Mode of Transport at Discharge Other (see comment)  (Patient's  to transport home.)   Confirm Follow Up Transport Family   Condition of Participation: Discharge Planning   The Plan for Transition of Care is related to the following treatment goals: home with spouse; [facility] recommended, unsure if patient will agree   The Patient and/or Patient Representative was provided with a Choice of Provider? Patient   The Patient and/Or Patient Representative agree with the Discharge Plan? Yes   Freedom of Choice list was provided with basic dialogue that supports the patient's individualized plan of care/goals, treatment preferences, and shares the quality data associated with the providers? Yes  (Patient states, \"I was crippled by PT\" after pursuing outpatient PT post hospitalization for colon cancer. Patient aware that PT is being recommended, but unsure if she wants to pursue. List provided; will follow-up at a later time. sg)   Patient to discharge home with spouse.  Patient unsure at this time if she would like to pursue home healthcare for PT. List provided, will follow-up after giving patient time to review. Patient also expressed interest in visiting podiatrist; community resources provided. Patient states she does not wear oxygen at home, only CPAP at night. At time of bedside assessment, patient wearing nasal cannula; will follow for possible home O2 needs.      Electronically signed by Chema Espana RN on 6/9/2022 at 11:29 AM

## 2022-06-09 NOTE — CONSULTS
Department of Podiatric Surgery  History and Physical        CHIEF COMPLAINT:    Chief Complaint   Patient presents with    Leg Pain     right lower extremity, red hot and swollen     Shortness of Breath        Reason for Consultation:  Right heel ulceration and left leg wound    History Obtained From:  patient, family member - , electronic medical record    HISTORY OF PRESENT ILLNESS:      The patient is a 74 y.o. female who presents with right heel wound and infection, cellulitis, and left leg wounds. The patient sleeps with c-pap normally and is not that active at home. She had to sleep in a wingback chair for the last several days and her legs were down. She is diabetic and has neuropathy. She has trouble with mobility at baseline and wears slippers at home 90% of the time. She has not had a problem with the heel before, but has had wounds venous nature recently.     Past Medical History:        Diagnosis Date    Anemia     Chronic bilateral low back pain without sciatica     CKD (chronic kidney disease) stage 3, GFR 30-59 ml/min (HCC)     Colon cancer (HCC)     Diabetes ([address]     Diabetes ([address]     Diabetes with retinopathy ([address]     DM eye exam 12/18/17    Hypertension     Pancreatic cyst     Positive FIT (fecal immunochemical test)     Pulmonary hypertension ([address]      Past Surgical History:        Procedure Laterality Date    COLONOSCOPY N/A 11/5/2018    COLONOSCOPY WITH BIOPSY performed by Vlad Bhat MD at [address]  05/14/2019    Colonoscopy with polypectomy (cold snare)    COLONOSCOPY N/A 5/14/2019    COLONOSCOPY POLYPECTOMY SNARE/COLD BIOPSY performed by Vlad Bhat MD at [id number] [place] [address] Right 11/08/2018    [address] [place] [address] and umbilical hernia repair    GA OFFICE/OUTPT VISIT,PROCEDURE ONLY Right 11/8/2018    LAPAROSCOPIC RIGHT HEMICOLECTOMY and umbilical hernia repair performed by Lesvia Peterson MD at [address]  UPPER GASTROINTESTINAL ENDOSCOPY N/A 11/5/2018    EGD BIOPSY performed by Souleymane Steinberg MD at [address] [facility] EXTRACTION                   FAMILY HISTORY    Family History   Problem Relation Age of Onset    Lung Cancer Mother     High Blood Pressure Father     Lung Cancer Father     Lupus Sister        SOCIAL HISTORY    Social History     Tobacco Use    Smoking status: Never Smoker    Smokeless tobacco: Never Used   Vaping Use    Vaping Use: Never used   Substance Use Topics    Alcohol use: Yes     Alcohol/week: 2.0 standard drinks     Types: 2 Glasses of wine per week     Comment: only on occasional    Drug use: No       ALLERGIES    Allergies   Allergen Reactions    Pcn [Penicillins] Shortness Of Breath    Spironolactone Rash    Coreg [Carvedilol]      Side effects:  SOB, palpitations    Lisinopril Other (See Comments)     cough    Flexeril [Cyclobenzaprine] Rash       MEDICATIONS    No current facility-administered medications on file prior to encounter.      Current Outpatient Medications on File Prior to Encounter   Medication Sig Dispense Refill    insulin aspart (NOVOLOG) 100 UNIT/ML injection vial Inject 40-50 Units into the skin 3 times daily (before meals)      insulin detemir (LEVEMIR) 100 UNIT/ML injection vial Inject 40 Units into the skin 2 times daily      loratadine (CLARITIN) 10 MG tablet Take 10 mg by mouth every evening      Cholecalciferol (VITAMIN D3) 50 MCG (2000 UT) CAPS Take 2,000 Units by mouth daily      Semaglutide,0.25 or 0.5MG/DOS, (OZEMPIC, 0.25 OR 0.5 MG/DOSE,) 2 MG/1.5ML SOPN Inject 0.5 mg into the skin once a week 1 pen 0    metoprolol succinate (TOPROL XL) 100 MG extended release tablet TAKE 1 TABLET BY MOUTH DAILY 90 tablet 0    torsemide (DEMADEX) 100 MG tablet TAKE 1 TABLET BY MOUTH TWICE DAILY 180 tablet 0    allopurinol (ZYLOPRIM) 100 MG tablet Take 2 tablets by mouth daily 180 tablet 0    HYDROcodone-acetaminophen (NORCO) 5-325 MG per tablet Take 1 tablet by mouth every 8 hours as needed for Pain.  Magnesium Oxide 400 MG CAPS Take 1 tablet by mouth daily      potassium chloride (KLOR-CON M) 20 MEQ extended release tablet Take 40 mEq by mouth 3 times daily       tetrahydrozoline-zinc (VISINE-AC) 0.05-0.25 % ophthalmic solution Place 2 drops into both eyes 3 times daily as needed (itchiness)       metOLazone (ZAROXOLYN) 5 MG tablet Take 1 tablet by mouth Twice a Week 24 tablet 0    Insulin Pen Needle 32G X 4 MM MISC 1 each by Does not apply route daily 300 each 3    Lancets MISC 1 each by Does not apply route 5 times daily Please fill for 33g one touch lancets 150 each 3    blood glucose test strips (ASCENSIA AUTODISC VI;ONE TOUCH ULTRA TEST VI) strip Check blood sugars TID 90 each 3    blood glucose monitor kit and supplies Test blood sugars qa and qhs 1 kit 0    linagliptin (TRADJENTA) 5 MG tablet Take 1 tablet by mouth daily 90 tablet 3    Multiple Vitamins-Minerals (ADULT ONE DAILY GUMMIES) CHEW Take 1 tablet by mouth daily         Allergies:  Pcn [penicillins], Spironolactone, Coreg [carvedilol], Lisinopril, and Flexeril [cyclobenzaprine]    REVIEW OF SYSTEMS:  CONSTITUTIONAL:  positive for  malaise  EYES:  negative  HEENT:  negative  RESPIRATORY:  positive for  dyspnea  CARDIOVASCULAR:  positive for  orthopnea, edema  ENDOCRINE:  positive for diabetic symptoms including neuropathy  MUSCULOSKELETAL:  positive for  pain and swelling of the right heel  NEUROLOGICAL:  negative    PHYSICAL EXAM:  VITALS:  BP (!) 183/83   Pulse 81   Temp 98.8 °F (37.1 °C) (Oral)   Resp 20   Ht 5' 3\" (1.6 m)   Wt (!) 321 lb 6.9 oz (145.8 kg)   SpO2 96%   BMI 56.94 kg/m²   CONSTITUTIONAL:  awake, alert, cooperative, no apparent distress, and appears stated age  EYES:  pupils equal, round and reactive to light, extra ocular muscles intact, sclera clear, conjunctiva normal      LOWER EXTREMITY:  VASCULAR: Pedal Pulses present.   negative signs of ischemia. MUSCULOSKELETAL:  negative gross deformity. NEUROLOGIC:  Epicritic sensation, light touch, joint position sensediminished  SKIN:  The right heel has evidence of necrotic unstageable heel wound right heel. There is malodor and slough noted. There is an area of erythema to the anterior lateral right leg that is chronic according to patient and her . There is no open wound in this area today, but there is scar tissue present to this area. The left leg has superficial granular wound on the posterior calf with evidence of mild erythema surrounding that has appearance of chronic venous stasis. Procedure Note  Indications:  Based on my examination of this patient's wound(s)/ulcer(s) today, debridement is required to promote healing and evaluate the wound base. Performed by: Vasile Mott DPM    Consent obtained:  Yes    Time out taken:  Yes    Pain Control:         Debridement:Excisional Debridement    Using scissors and forceps the wound(s)/ulcer(s) was/were sharply debrided down through and included excision of subcutaneous tissue. Devitalized Tissue Debrided:  fibrin, biofilm, slough, necrotic/eschar and exudate    Pre Debridement Measurements:    Incision 11/08/18 Abdomen (Active)   Number of days: 1310       Wound 06/08/22 Heel Right 2cm by 2cm open area. Dark purple dti appearing area surrounding area on heel. (Active)   Wound Image   06/09/22 1142   Wound Etiology Diabetic 06/09/22 2134   Dressing Status New dressing applied;Clean;Dry; Intact 06/09/22 1142   Wound Cleansed Cleansed with saline 06/09/22 1142   Dressing/Treatment Moist to moist;Roll gauze; Ace wrap 06/09/22 1142   Dressing Change Due 06/10/22 06/09/22 1142   Wound Length (cm) 2 cm 06/09/22 1142   Wound Width (cm) 3 cm 06/09/22 1142   Wound Surface Area (cm^2) 6 cm^2 06/09/22 1142   Wound Assessment Eschar dry;Pink/red 06/09/22 1142   Drainage Amount Small 06/09/22 1142   Drainage Description Serosanguinous 06/09/22 1142   Odor None 06/09/22 1142   Katie-wound Assessment Maceration 06/09/22 1142   Margins Defined edges 06/09/22 1142   Number of days: 1       Wound 06/08/22 Tibial Distal;Left;Posterior (Active)   Wound Image   06/09/22 1142   Wound Etiology Venous 06/09/22 1142   Dressing Status New dressing applied;Clean;Dry; Intact 06/09/22 1142   Wound Cleansed Cleansed with saline 06/09/22 1142   Dressing/Treatment Non adherent; Roll gauze; Ace wrap 06/09/22 1142   Dressing Change Due 06/10/22 06/09/22 1142   Wound Length (cm) 3 cm 06/09/22 1142   Wound Width (cm) 4 cm 06/09/22 1142   Wound Depth (cm) 0.1 cm 06/09/22 1142   Wound Surface Area (cm^2) 12 cm^2 06/09/22 1142   Change in Wound Size % (l*w) 55.56 06/09/22 1142   Wound Volume (cm^3) 1.2 cm^3 06/09/22 1142   Wound Assessment Pink/red 06/09/22 1142   Drainage Amount None 06/09/22 1142   Odor None 06/09/22 1142   Katie-wound Assessment Fragile 06/09/22 1142   Margins Undefined edges 06/09/22 1142   Wound Thickness Description not for Pressure Injury Partial thickness 06/09/22 1142   Number of days: 2            Wound/Ulcer #: 1    Post Debridement Measurements:    Right heel wound measures 6 x 4.5 x 1.2cm and the central area remains fully necrotic to an undetermined depth    Percent of Wound(s)/Ulcer(s) Debrided: 100%    Total Surface Area Debrided:  27 sq cm       Diabetic/Pressure/Non Pressure Ulcers only:  Ulcer: Pressure ulcer, unstageable      Estimated Blood Loss:  None    Hemostasis Achieved:  not needed    Procedural Pain:  0  / 10     Post Procedural Pain:  0 / 10     Response to treatment:  Well tolerated by patient.               I/O:    Intake/Output Summary (Last 24 hours) at 6/9/2022 1616  Last data filed at 6/9/2022 1442  Gross per 24 hour   Intake 367.6 ml   Output 700 ml   Net -332.4 ml              Wt Readings from Last 3 Encounters:   06/09/22 (!) 321 lb 6.9 oz (145.8 kg)   03/08/22 (!) 315 lb 12.8 oz (143.2 kg)   01/07/21 294 lb (133.4 kg) LABS:    Recent Labs     06/08/22 2015 06/09/22  0608   WBC 8.0 8.8   HGB 12.1 12.0   HCT 36.7 36.2    170        Recent Labs     06/09/22  0608      K 3.2*   CL 96*   CO2 31   BUN 31*   CREATININE 1.5*        Recent Labs     06/08/22  0935 06/08/22 2015 06/08/22  2328 06/09/22  0608   PROT 7.9  --   --   --    APTT  --    < > 26.4 21.9*    < > = values in this interval not displayed. IMAGING:  X-rays:       MICRO:   mpression       1.  No plain radiographic evidence of osteomyelitis.       2.  Diffuse soft tissue swelling, especially within the dorsum of the foot.       3.  Otherwise no acute bony abnormality is noted. ASSESSMENT   Unstageable decubitus heel ulceration, right foot  Right foot diabetic foot infection  Venous wound, left posterior calf  Diabetes with peripheral neuropathy    PLAN:  Evaluation and Management x 30 minutes with greater than 50% of the time spent with the patient discussing the etiology and treatment options of the chief complaint. 1. Right heel  A timeout was completed verifying the correct patient, correct site, and correct procedure. Then sharp excisional debridement of the ulcerations down to, through, and including the layers of the subcutaneous tissues was performed. Santyl and Gentamicin ordered for combined treatment of the necrotic tissues and suspected bacterial infection  Betadine wet to dry dressing applied this evening  Multi-layer compression dressing applied from sulcus of toes to the knee. Educated patient and family on the importance of off loading    2. Venous stasis wound, left   Multi-layer compression dressing to the left leg. Non-adherent layer applied over the open wound    DISPO: Plan for wound care and antibiotics. May need further debridement if there is no clinical improvement. Thanks for the opportunity to participate in this patient's care.      Siria Castelan DPM [facility]  [facility]  Pager: [phone]  Office: [phone]  Fax: [phone]

## 2022-06-09 NOTE — CARE COORDINATION
Advance Care Planning     Advance Care Planning Activator (Inpatient)  Conversation Note      Date of ACP Conversation: 6/9/2022     Conversation Conducted with: Patient with Decision Making Capacity    ACP Activator: Chema Espana RN    Health Care Decision Maker:     Current Designated Health Care Decision Maker:     Primary Decision MakeDanielledomitila Headley [facility] - [phone]    Care Preferences    Ventilation: \"If you were in your present state of health and suddenly became very ill and were unable to breathe on your own, what would your preference be about the use of a ventilator (breathing machine) if it were available to you? \"      Would the patient desire the use of ventilator (breathing machine)?: no    \"If your health worsens and it becomes clear that your chance of recovery is unlikely, what would your preference be about the use of a ventilator (breathing machine) if it were available to you? \"     Would the patient desire the use of ventilator (breathing machine)?: No      Resuscitation  \"CPR works best to restart the heart when there is a sudden event, like a heart attack, in someone who is otherwise healthy. Unfortunately, CPR does not typically restart the heart for people who have serious health conditions or who are very sick. \"    \"In the event your heart stopped as a result of an underlying serious health condition, would you want attempts to be made to restart your heart (answer \"yes\" for attempt to resuscitate) or would you prefer a natural death (answer \"no\" for do not attempt to resuscitate)? \" no       [] Yes   [] No   Educated Patient / Joshua Santoro regarding differences between Advance Directives and portable DNR orders.     Length of ACP Conversation in minutes:  5    Conversation Outcomes:  [x] ACP discussion completed  [] Existing advance directive reviewed with patient; no changes to patient's previously recorded wishes  [] New Advance Directive completed  [] Portable Do Not Rescitate prepared for Provider review and signature  [] POLST/POST/MOLST/MOST prepared for Provider review and signature      Follow-up plan:    [] Schedule follow-up conversation to continue planning  [x] Referred individual to Provider for additional questions/concerns   [] Advised patient/agent/surrogate to review completed ACP document and update if needed with changes in condition, patient preferences or care setting    [x] This note routed to one or more involved healthcare providers    Electronically signed by Clayton Gonzalez RN on 6/9/2022 at 11:08 AM

## 2022-06-09 NOTE — PROGRESS NOTES
[facility]  Diabetes Education   Progress Note       NAME:  Dianna Montanez RECORD NUMBER:  [id number]  AGE: 74 y.o. GENDER: female  : 1955  TODAY'S DATE:  2022    Subjective   Reason for Diabetic Education Evaluation and Assessment: general diabetes education     Zackery Sparks agrees to meet with me for diabetes support. Her  is present for the education session. Zackery Sparks is interested in reducing her insulin requirements.   She was recently prescribed Ozembic but has not started it yest.       Visit Type: evaluation      Elizabeth Huang is a 74 y.o. female referred by:     [x] Physician  [] Nursing  [] Chart Review   [] Other:     PAST MEDICAL HISTORY        Diagnosis Date    Anemia     Chronic bilateral low back pain without sciatica     CKD (chronic kidney disease) stage 3, GFR 30-59 ml/min ([facility])     Colon cancer ([address]     Diabetes ([address]     Diabetes ([address]     Diabetes with retinopathy ([address]     DM eye exam 17    Hypertension     Pancreatic cyst     Positive FIT (fecal immunochemical test)     Pulmonary hypertension ([address]        PAST SURGICAL HISTORY    Past Surgical History:   Procedure Laterality Date    COLONOSCOPY N/A 2018    COLONOSCOPY WITH BIOPSY performed by Lucho Spivey MD at [address] [place] [address]  2019    Colonoscopy with polypectomy (cold snare)    COLONOSCOPY N/A 2019    COLONOSCOPY POLYPECTOMY SNARE/COLD BIOPSY performed by Lucho Spivey MD at [address] [place] [address] Right 2018    [address] [place] [address] and umbilical hernia repair    [place] OFFICE/OUTPT VISIT,PROCEDURE ONLY Right 2018    LAPAROSCOPIC RIGHT HEMICOLECTOMY and umbilical hernia repair performed by Marissa Guillaume MD at [address] [place] [address] 2018    EGD BIOPSY performed by Lucho Spivey MD at [place] [address]    Family History   Problem Relation Age of Onset    Lung Cancer Mother     High Blood Pressure Father     Lung Cancer Father     Lupus Sister        SOCIAL HISTORY    Social History     Tobacco Use    Smoking status: Never Smoker    Smokeless tobacco: Never Used   Vaping Use    Vaping Use: Never used   Substance Use Topics    Alcohol use:  Yes     Alcohol/week: 2.0 standard drinks     Types: 2 Glasses of wine per week     Comment: only on occasional    Drug use: No       ALLERGIES    Allergies   Allergen Reactions    Pcn [Penicillins] Shortness Of Breath    Spironolactone Rash    Coreg [Carvedilol]      Side effects:  SOB, palpitations    Lisinopril Other (See Comments)     cough    Flexeril [Cyclobenzaprine] Rash       MEDICATIONS     fluticasone  1 spray Each Nostril Daily    sodium chloride flush  10 mL IntraVENous 2 times per day    heparin (porcine)  4,000 Units IntraVENous Once    cefepime  2,000 mg IntraVENous Q12H    insulin lispro  0-12 Units SubCUTAneous TID WC    insulin lispro  0-6 Units SubCUTAneous Nightly    allopurinol  200 mg Oral Daily    metOLazone  5 mg Oral Once per day on Mon Thu    metoprolol succinate  100 mg Oral Daily    furosemide  40 mg IntraVENous BID    vancomycin (VANCOCIN) intermittent dosing (placeholder)   Other RX Placeholder    insulin lispro  30 Units SubCUTAneous TID WC    insulin glargine  40 Units SubCUTAneous BID    cetirizine  10 mg Oral Nightly       Objective        Patient Active Problem List   Diagnosis Code    Hypertension I10    HLD (hyperlipidemia) E78.5    Diabetes with retinopathy ([facility] E11.319    Volume overload E87.70    Malignant neoplasm of ascending colon (HCC) C18.2    Anemia D64.9    CKD (chronic kidney disease) stage 3, GFR 30-59 ml/min (HCC) N18.30    Uterine mass N85.8    Morbid obesity (HCC) E66.01    BRIANNA (obstructive sleep apnea) G47.33    PLMD (periodic limb movement disorder) G47.61    Pancreatic cyst K86.2    Chronic bilateral low back pain without sciatica M54.50, G89.29    Cellulitis L03.90        BP (!) 183/83   Pulse 81   Temp 98.8 °F (37.1 °C) (Oral)   Resp 20   Ht 5' 3\" (1.6 m)   Wt (!) 321 lb 6.9 oz (145.8 kg)   SpO2 96%   BMI 56.94 kg/m²     HgBA1c:    Lab Results   Component Value Date    LABA1C 7.3 06/08/2022       Recent Labs     06/08/22 2053 06/09/22  0759 06/09/22  1149   POCGLU 206* 203* 206*       BUN/Creatinine:    Lab Results   Component Value Date    BUN 31 06/09/2022    CREATININE 1.5 06/09/2022       Assessment        Diabetes Management and Education    Does the patient have a Primary Care Physician? Yes, Yasmin Rivas MD  Just established with the [facility] Endocrinology team.       Does the patient require new medication instruction? Yes. She is interested in weight loss. She attributes her weight gain and muscular pain issues with insulin. Discussed benefits of Ozempic with potential for insulin reduction and weight loss. Reviewed current insulin strategy. Person responsible for administration of Insulin/Medication:        [x] Self     [] Caregiver       [] Spouse       [] Other Family Member   []  Other    Level of patient/caregiver understanding able to:       [x] Verbalized Understanding   [] Demonstrated Understanding       [] Teach Back       [] Needs Reinforcement     []  Other:        Does the patient/caregiver monitor Blood Glucoses? Yes  Reviewed glycemic control targets, testing frequency and when to call PCP. Recommend contacting endocrinology team when BG are < 80 and discuss lowering insulin. Level of patient/caregiver understanding able to:        [x] Verbalized Understanding   [] Demonstrated Understanding       [] Teach Back       [] Needs Reinforcement     []  Other:      Does the patient/caregiver follow a Meal Plan? Yes - Frustrated with various low carb diet approaches. Food recall is low in carbs and reports consistnet application. Eating less than at home. Does the patient/caregiver understand S/S of Hypoglycemia? Yes  Reviewed symptoms, prevention and treatment. Level of patient/caregiver understanding able to:       [x] Verbalized Understanding   [] Demonstrated Understanding       [] Teach Back       [] Needs Reinforcement     []  Other:                    Does the patient/caregiver understand S/S of Hyperglycemia? Yes    Reviewed symptoms, prevention and treatment. Level of patient/caregiver understanding able to:        [x] Verbalized Understanding   [] Demonstrated Understanding       [] Teach Back       [] Needs Reinforcement     []  Other:           Plan        Ongoing diabetes education and blood glucose monitoring. Recommend  BG targets 140 - 180. Recommend increasing correction scale to high.                                            Discharge Plan:  Discharge needs: no prescription needs identified       Teaching Time Diabetes Education:  30 minutes     Electronically signed by Freida Freeman on 6/9/2022 at 3:04 PM

## 2022-06-09 NOTE — CARE COORDINATION
[place] [place] [address] Continence Nurse  Consult Note       NAME:  Bebo Lake  MEDICAL RECORD NUMBER:  [id number]  AGE: 74 y.o. GENDER: female  : 1955  TODAY'S DATE:  2022    Subjective   Reason for WOCN Evaluation and Assessment: right heel and left leg wounds. Pt has diabetic ulcer to right heel, POA.       Bebo Lake is a 74 y.o. female referred by:   [] Physician  [x] Nursing  [] Other:     Wound Identification:  Wound Type: venous and diabetic  Contributing Factors: edema, diabetes and poor glucose control    Wound History: pt reports heel wound for 3 days, uncertain of LLE wound  Current Wound Care Treatment:  Dry dressings    Patient Goal of Care:  [x] Wound Healing  [] Odor Control  [] Palliative Care  [] Pain Control   [] Other:         PAST MEDICAL HISTORY        Diagnosis Date    Anemia     Chronic bilateral low back pain without sciatica     CKD (chronic kidney disease) stage 3, GFR 30-59 ml/min ([facility])     [facility]     Diabetes ([facility]     Diabetes ([facility]     Diabetes with retinopathy ([facility]     DM eye exam 17    Hypertension     Pancreatic cyst     Positive FIT (fecal immunochemical test)     Pulmonary hypertension ([facility]        PAST SURGICAL HISTORY    Past Surgical History:   Procedure Laterality Date    COLONOSCOPY N/A 2018    COLONOSCOPY WITH BIOPSY performed by Marguerite Chapman MD at [address]  2019    Colonoscopy with polypectomy (cold snare)    COLONOSCOPY N/A 2019    COLONOSCOPY POLYPECTOMY SNARE/COLD BIOPSY performed by Marguerite Chapman MD at [address] [address] Right 2018    [address] [place] [address] and umbilical hernia repair    [place] OFFICE/OUTPT VISIT,PROCEDURE ONLY Right 2018    LAPAROSCOPIC RIGHT HEMICOLECTOMY and umbilical hernia repair performed by Barby Rubio MD at [address] 2018    EGD BIOPSY performed by Marguerite Chapman, PLMD (periodic limb movement disorder) G47.61    Pancreatic cyst K86.2    Chronic bilateral low back pain without sciatica M54.50, G89.29    Cellulitis L03.90       Measurements:  Incision 11/08/18 Abdomen (Active)   Number of days: 1309             Wound 06/08/22 Heel Right 2cm by 2cm open area. Dark purple dti appearing area surrounding area on heel. (Active)   Wound Image   06/09/22 1142   Wound Etiology Diabetic 06/09/22 1142   Dressing Status New dressing applied;Clean;Dry; Intact 06/09/22 1142   Wound Cleansed Cleansed with saline 06/09/22 1142   Dressing/Treatment Moist to moist;Roll gauze; Ace wrap 06/09/22 1142   Dressing Change Due 06/10/22 06/09/22 1142   Wound Length (cm) 2 cm 06/09/22 1142   Wound Width (cm) 3 cm 06/09/22 1142   Wound Surface Area (cm^2) 6 cm^2 06/09/22 1142   Wound Assessment Eschar dry;Pink/red 06/09/22 1142   Drainage Amount Small 06/09/22 1142   Drainage Description Serosanguinous 06/09/22 1142   Odor None 06/09/22 1142   Katie-wound Assessment Maceration 06/09/22 1142   Margins Defined edges 06/09/22 1142   Number of days: 0           Wound 06/08/22 Tibial Distal;Left;Posterior (Active)   Wound Image   06/09/22 1142   Wound Etiology Venous 06/09/22 1142   Dressing Status New dressing applied;Clean;Dry; Intact 06/09/22 1142   Wound Cleansed Cleansed with saline 06/09/22 1142   Dressing/Treatment Non adherent; Roll gauze; Ace wrap 06/09/22 1142   Dressing Change Due 06/10/22 06/09/22 1142   Wound Length (cm) 3 cm 06/09/22 1142   Wound Width (cm) 4 cm 06/09/22 1142   Wound Depth (cm) 0.1 cm 06/09/22 1142   Wound Surface Area (cm^2) 12 cm^2 06/09/22 1142   Change in Wound Size % (l*w) 55.56 06/09/22 1142   Wound Volume (cm^3) 1.2 cm^3 06/09/22 1142   Wound Assessment Pink/red 06/09/22 1142   Drainage Amount None 06/09/22 1142   Odor None 06/09/22 1142   Katie-wound Assessment Fragile 06/09/22 1142   Margins Undefined edges 06/09/22 1142   Wound Thickness Description not for Pressure Injury Partial thickness 06/09/22 1142   Number of days: 1      Pt seen. Sitting up in chair. Has diabetic ulcer to right heel. Wound bed is blck and red. Periwound macerated. Does have erythema to leg, slight. Has edema to BLE. LLE with suspected venous ulcer to posterior aspect of leg. Pedal pulses palpable, but weak. Wounds cleansed with saline and blue silicone lotion applied to legs. LLE-curad oil emulsion dressing placed followed by roll gauze an ace wrap. Right heel-moist saline dressing followed by roll gauze and ace wraps. Podiatry consult is pending. Response to treatment:  Well tolerated by patient. Plan   Plan of Care: Wound 06/08/22 Heel Right 2cm by 2cm open area. Dark purple dti appearing area surrounding area on heel.-Dressing/Treatment: Moist to moist,Roll gauze,Ace wrap  Wound 06/08/22 Tibial Distal;Left;Posterior-Dressing/Treatment: Non adherent,Roll gauze,Ace wrap  Cleanse wounds with saline and apply blue silicone lotion to legs. LLE-curad oil emulsion dressing placed followed by roll gauze an ace wrap. Right heel-moist saline dressing followed by roll gauze and ace wraps. Specialty Bed Required : Yes   [x] Low Air Loss   [] Pressure Redistribution  [] Fluid Immersion  [] Bariatric  [] Total Pressure Relief  [] Other:     Current Diet: ADULT DIET;  Regular; 4 carb choices (60 gm/meal)  Dietician consult:  Yes    Discharge Plan:  Placement for patient upon discharge: home with support    Patient appropriate for Outpatient [address]: Yes    Referrals:  [x]   [] [facility]  [] Supplies  [] Other    Patient/Caregiver Teaching:  Level of patient/caregiver understanding able to:   [] Indicates understanding       [x] Needs reinforcement  [] Unsuccessful      [] Verbal Understanding  [] Demonstrated understanding       [] No evidence of learning  [] Refused teaching         [] N/A       Electronically signed by Ricardo Carver on 6/9/2022 at 11:47 AM

## 2022-06-09 NOTE — PROGRESS NOTES
Physical Therapy  Facility/Department: [facility] PROGRESSIVE CARE  Physical Therapy Initial Assessment    Name: Karsten Omer  : 1955  MRN: [id number]  Date of Service: 2022    Discharge Recommendations:  Home with assist PRN,Home with Home health PT (if pt needs to be limited WB due to heel wound then may need an alternate discharge disposition)   PT Equipment Recommendations  Equipment Needed: No    Karsten Omer scored a 18/24 on the AM-PAC short mobility form. At this time, no further PT is recommended upon discharge however if pt has to be limited WB due to heel wound then may need alternate discharge plans. Recommend patient returns to prior setting with prior services. Patient Diagnosis(es): The primary encounter diagnosis was Acute respiratory failure with hypoxia ([address] Diagnoses of Cellulitis of right lower extremity, JOB (acute kidney injury) ([address] Elevated troponin, Pulmonary vascular congestion, and Urinary tract infection without hematuria, site unspecified were also pertinent to this visit. Past Medical History:  has a past medical history of Anemia, Chronic bilateral low back pain without sciatica, CKD (chronic kidney disease) stage 3, GFR 30-59 ml/min ([facility]), Colon cancer ([address] Diabetes ([address] Diabetes ([address] Diabetes with retinopathy ([address] Hypertension, Pancreatic cyst, Positive FIT (fecal immunochemical test), and Pulmonary hypertension ([address] Past Surgical History:  has a past surgical history that includes Upper gastrointestinal endoscopy (N/A, 2018); hemicolectomy (Right, 2018); pr office/outpt visit,procedure only (Right, 2018); [place] tooth extraction; Colonoscopy (N/A, 2018); Colonoscopy (2019); and Colonoscopy (N/A, 2019).     Assessment   Body Structures, Functions, Activity Limitations Requiring Skilled Therapeutic Intervention: Decreased functional mobility   Assessment: pt is a 73 yo female who was adm to hosp with LE swelling, weakness and a heel wound; pt at baseline is Ind with a RW; awaiting podiatry eval. Anticipate pt will be safe to return home with PRN assist and home PT  Therapy Prognosis: Fair;Good  Decision Making: Medium Complexity  Requires PT Follow-Up: Yes  Activity Tolerance  Activity Tolerance: Patient tolerated evaluation without incident;Patient limited by endurance     Plan   Plan  Plan: 3-5 times per week  Current Treatment Recommendations: Functional mobility training  Safety Devices  Type of Devices: Call light within reach,Nurse notified,Gait belt,Left in chair,Chair alarm in place  Restraints  Restraints Initially in Place: No     Restrictions  Restrictions/Precautions  Restrictions/Precautions: Fall Risk     Subjective   General  Chart Reviewed: Yes  Patient assessed for rehabilitation services?: Yes  Additional Pertinent Hx: per H&P note: \"Patient is a 69-year-old female with past medical history of diabetes mellitus, CKD, essential hypertension, pulmonary hypertension who presents to the hospital due to multiple complaints, according to the patient she has been having bilateral lower extremity swelling, redness, she is not able to walk, she also has noticed wound on her right heel and she is concerned it may be infected. Patient also mentions she has pain in her legs due to the swelling and redness. Patient also mentions she feels shortness of breath as well, mentions it feels heavy for her to take a deep breathing. She denies chest pain nausea vomiting diarrhea constipation, mentions her urine appears cloudy. \"  Response To Previous Treatment: Not applicable  Family / Caregiver Present: No  Referring Practitioner: Dr Brock Coleman  Referral Date : 06/08/22  Follows Commands: Within Functional Limits  Subjective  Subjective: pt very pleasant and agreeable to working with therapy         Social/Functional History  Social/Functional History  Lives With: Spouse  Type of Home:  (loft)  Home Layout: One level  Home Access: Elevator  Bathroom Shower/Tub: Tub/Shower unit,Shower chair without back  Bathroom Toilet: Standard  Bathroom Equipment: Grab bars in [facility] chair  Home Equipment: Walker, rolling  Has the patient had two or more falls in the past year or any fall with injury in the past year?: No  ADL Assistance: Needs assistance ( assists with LB dressing)  Homemaking Assistance: Needs assistance ( completes)  Homemaking Responsibilities: No  Ambulation Assistance: Independent (RW)  Transfer Assistance: Independent  Active : Yes (not recently)  IADL Comments: sleeps in flat bed  Vision/Hearing  Vision  Vision: Impaired  Vision Exceptions: Wears glasses for reading  Hearing  Hearing: Within functional limits    Cognition   Orientation  Orientation Level: Oriented X4  Cognition  Overall Cognitive Status: WFL     Objective   Heart Rate: (!) 105  Heart Rate Source: Monitor  BP: (!) 173/80  BP Location: Right upper arm  BP Method: Automatic  Patient Position: Semi fowlers  MAP (Calculated): 111  Resp: 25  SpO2: 93 %  O2 Device: Nasal cannula              AROM RLE (degrees)  RLE AROM: WFL  AROM LLE (degrees)  LLE AROM : WFL  Strength RLE  Comment: functionally poor  Strength LLE  Comment: functionally poor        Bed Mobility Training  Bed Mobility Training: Yes  Overall Level of Assistance: Stand-by assistance  Supine to Sit: Stand-by assistance (HOB elevated; bedrail)  Balance  Sitting: Intact  Standing: Impaired (RW)  Standing - Static: Good;Fair  Standing - Dynamic: Fair  Transfer Training  Transfer Training: Yes  Overall Level of Assistance: Contact-guard assistance (RW)  Sit to Stand: Contact-guard assistance (RW)  Stand to Sit: Contact-guard assistance  Bed to Chair: Contact-guard assistance (RW)  Toilet Transfer: Contact-guard assistance (RW; grab bar)  Gait  Overall Level of Assistance: Contact-guard assistance (RW; no overt LOB; minimal noted SOB with SpO2 briefly decreasing into 80s and returns >90% with seated rest break)  Distance (ft): 15 Feet (15ft, 25ft)  Assistive Device: Walker, rolling  Bed mobility  Supine to Sit: Stand by assistance (HOB elevated and pulled on rails)  Transfers  Sit to Stand: Stand by assistance  Stand to sit: Stand by assistance  Ambulation  Surface: level tile  Device: Rolling Walker (bariatric walker)  Assistance: Stand by assistance  Quality of Gait: increased lateral sway due to body habitus; slow pace with labored breathing; pt's spO2 decreased to 85% on 4 liters O2  Distance: 10' and 25'  Comments: pt demonstrated being able to get up from commode; positioned in chair for comfort with pillows placed for support and LEs elevated; all needs in reach     Balance  Sitting - Static: Good  Sitting - Dynamic: Good  Standing - Static: Fair;+ (with walker)  Standing - Dynamic: Fair;+ (with walker)           OutComes Score                                                  AM-PAC Score  AM-PAC Inpatient Mobility Raw Score : 18 (06/09/22 0744)  AM-PAC Inpatient T-Scale Score : 43.63 (06/09/22 0744)  Mobility Inpatient CMS 0-100% Score: 46.58 (06/09/22 0744)  Mobility Inpatient CMS G-Code Modifier : CK (06/09/22 0744)          Goals  Short Term Goals  Time Frame for Short term goals: by discharge  Short term goal 1: bed mob MI from flat bed  Short term goal 2: transfers MI  Short term goal 3: amb 100' with RW sup while maintaining O2 sats >90%  Patient Goals   Patient goals : to return home soon       Education  Patient Education  Education Given To: Patient  Education Provided: Role of Therapy  Education Method: Verbal  Education Outcome: Verbalized understanding      Therapy Time   Individual Concurrent Group Co-treatment   Time In 0717         Time Out 0744         Minutes 27                 DELFINO FELIZ PT    Electronically signed by DELFINO FELIZ PT on 6/9/2022 at 7:45 AM

## 2022-06-09 NOTE — PROGRESS NOTES
Occupational Therapy  Facility/Department: [address] PROGRESSIVE CARE  Occupational Therapy Initial Assessment and Tentative D/C      Name: Michael Rene  : 1955  MRN: [id number]  Date of Service: 2022    Discharge Recommendations: Michael Rene scored a 19/24 on the AM-PAC ADL Inpatient form. Current research shows that an AM-PAC score of 18 or greater is typically associated with a discharge to the patient's home setting. If patient discharges prior to next session this note will serve as a discharge summary. Please see below for the latest assessment towards goals. Continue to assess pending progress,Home with assist PRN  OT Equipment Recommendations  Equipment Needed: No       Patient Diagnosis(es): The primary encounter diagnosis was Acute respiratory failure with hypoxia ([address] Diagnoses of Cellulitis of right lower extremity, JOB (acute kidney injury) ([address] Elevated troponin, Pulmonary vascular congestion, and Urinary tract infection without hematuria, site unspecified were also pertinent to this visit. Past Medical History:  has a past medical history of Anemia, Chronic bilateral low back pain without sciatica, CKD (chronic kidney disease) stage 3, GFR 30-59 ml/min ([facility]), Colon cancer ([address] Diabetes ([address] Diabetes ([address] Diabetes with retinopathy ([address] Hypertension, Pancreatic cyst, Positive FIT (fecal immunochemical test), and Pulmonary hypertension ([address] Past Surgical History:  has a past surgical history that includes Upper gastrointestinal endoscopy (N/A, 2018); hemicolectomy (Right, 2018); pr office/outpt visit,procedure only (Right, 2018); [place] tooth extraction; Colonoscopy (N/A, 2018); Colonoscopy (2019); and Colonoscopy (N/A, 2019). Assessment   Performance deficits / Impairments: Decreased functional mobility ; Decreased balance;Decreased ADL status; Decreased endurance;Decreased high-level IADLs;Decreased strength  Assessment: Mere Ma is a 74 y.o. female who presents emergency department via EMS with complaints of leg pain and shortness of breath. Patient is a poor historian and gives a different timeline of events but ultimately she is a diabetic. She has had shortness of breath. She does not wear oxygen. She does not have a history of congestive heart failure or COPD. She has chronic leg swelling. She states her legs have gotten more swollen, painful and tight. She is reporting the right leg being red and hot for at least the last couple of days. She is unable to state whether or not she feels like she has been having any fevers. She denies chest pain. Lives at home with her . She uses a walker for ambulation. She states over the last few days she is unable to lay in bed so she has been sleeping upright in a nonreclining chair with her legs dangling. She denies wounds but she does have wounds to her lower extremities. She has never tested positive for Covid. She had a recent negative study. PTA pt from home where pt was ind with mobility with RW and needing PRN assist for ADLs. Pt currently requires CGA and use of RW for mobility and transfers. Pt on 4L O2 throughout with SpO2 decreasing into 80s and returns >90% with seated rest break. Pt with no LOB. Anticipate pt needing up to [address] for ADLs. Pt will benefit from skilled OT services at this time.  Anticipate pt safe to return home with PRN assist.  Prognosis: Good  Decision Making: Low Complexity  Exam: see above  Assistance / Modification: RW  REQUIRES OT FOLLOW-UP: Yes  Activity Tolerance  Activity Tolerance: Patient Tolerated treatment well        Plan   Plan  Times per Week: 3-5x  Current Treatment Recommendations: Strengthening,Balance training,Functional mobility training,Endurance training,Patient/Caregiver education & training,Safety education & training,Self-Care / ADL Restrictions  Restrictions/Precautions  Restrictions/Precautions: Fall Risk    Subjective   General  Chart Reviewed: Yes  Patient assessed for rehabilitation services?: Yes  Additional Pertinent Hx: per ED note, Jessica Tilley is a 74 y.o. female who presents emergency department via EMS with complaints of leg pain and shortness of breath. Patient is a poor historian and gives a different timeline of events but ultimately she is a diabetic. She has had shortness of breath. She does not wear oxygen. She does not have a history of congestive heart failure or COPD. She has chronic leg swelling. She states her legs have gotten more swollen, painful and tight. She is reporting the right leg being red and hot for at least the last couple of days. She is unable to state whether or not she feels like she has been having any fevers. She denies chest pain. Lives at home with her . She uses a walker for ambulation. She states over the last few days she is unable to lay in bed so she has been sleeping upright in a nonreclining chair with her legs dangling. She denies wounds but she does have wounds to her lower extremities. She has never tested positive for Covid. She had a recent negative study. Family / Caregiver Present: No  Referring Practitioner: Joe Handley MD  Subjective  Subjective: Pt agreeable to OT evaluation. Pt reports no pain at rest. Pt on 4L O2 throughout.      Social/Functional History  Social/Functional History  Lives With: Spouse  Type of Home:  (loft)  Home Layout: One level  Home Access: Elevator  Bathroom Shower/Tub: Tub/Shower unit,Shower chair without back  Bathroom Toilet: Standard  Bathroom Equipment: Grab bars in [place] [facility]  Home Equipment: Walker, rolling  Has the patient had two or more falls in the past year or any fall with injury in the past year?: No  ADL Assistance: Needs assistance ( assists with LB dressing)  Homemaking Assistance: Needs assistance ( completes)  Homemaking Responsibilities: No  Ambulation Assistance: Independent (RW)  Transfer Assistance: Independent  Active : Yes (not recently)  IADL Comments: sleeps in flat bed       Objective   Heart Rate: (!) 105  Heart Rate Source: Monitor  BP: (!) 173/80  BP Location: Right upper arm  BP Method: Automatic  Patient Position: Semi fowlers  MAP (Calculated): 111  Resp: 25  SpO2: 93 %  O2 Device: Nasal cannula  Vision Exceptions: Wears glasses for reading  Hearing: Within functional limits          Safety Devices  Type of Devices: Call light within reach;Nurse notified;Gait belt;Left in chair;Chair alarm in place  Restraints  Restraints Initially in Place: No  Bed Mobility Training  Bed Mobility Training: Yes  Overall Level of Assistance: Stand-by assistance  Supine to Sit: Stand-by assistance (HOB elevated; bedrail)  Balance  Sitting: Intact  Standing: Impaired (RW)  Standing - Static: Good;Fair  Standing - Dynamic: Fair  Transfer Training  Transfer Training: Yes  Overall Level of Assistance: Contact-guard assistance (RW)  Sit to Stand: Contact-guard assistance (RW)  Stand to Sit: Contact-guard assistance  Bed to Chair: Contact-guard assistance (RW)  Toilet Transfer: Contact-guard assistance (RW; grab bar)  Gait  Overall Level of Assistance: Contact-guard assistance (RW; no overt LOB; minimal noted SOB with SpO2 briefly decreasing into 80s and returns >90% with seated rest break)  Distance (ft): 15 Feet (15ft, 25ft)  Assistive Device: Walker, rolling     AROM: Within functional limits  PROM: Within functional limits  Strength:  Within functional limits  Coordination: Within functional limits  Tone: Normal  ADL  Additional Comments: Anticipate pt needing up to Min A for ADLs based on ROM, strength, and balance                 Cognition  Overall Cognitive Status: [facility]                  Education Given To: Patient  Education Provided: Plan of Care;Role of Therapy;Transfer Training  Education Method: Demonstration;Verbal  Barriers to Learning: None  Education Outcome: Verbalized understanding;Demonstrated understanding       AM-PAC Score        AM-PAC Inpatient Daily Activity Raw Score: 19 (06/09/22 0739)  AM-PAC Inpatient ADL T-Scale Score : 40.22 (06/09/22 0739)  ADL Inpatient CMS 0-100% Score: 42.8 (06/09/22 0739)  ADL Inpatient CMS G-Code Modifier : CK (06/09/22 0739)    Goals  Short Term Goals  Time Frame for Short term goals: prior to D/C  Short Term Goal 1: complete functional mobility and transfers Mod Ind  Short Term Goal 2: complete toileting Mod Ind  Short Term Goal 3: complete grooming in stance at sink Mod Ind  Short Term Goal 4: complete bed mobility Ind  Long Term Goals  Time Frame for Long term goals : STG=LTG  Patient Goals   Patient goals : return home       Therapy Time   Individual Concurrent Group Co-treatment   Time In 0700         Time Out 0738         Minutes 38         Timed Code Treatment Minutes: 23 Minutes (15 minute eval)       ARINA Bowling/L

## 2022-06-09 NOTE — PROGRESS NOTES
Patient has order for heparin drip. Noted in report that patient had refused medication prior to admission to PCU. Spoke with patient upon arrival to unit and she continues to refuse medication. MD was made aware.

## 2022-06-10 ENCOUNTER — TELEPHONE (OUTPATIENT)
Dept: PRIMARY CARE CLINIC | Age: 67
End: 2022-06-10

## 2022-06-10 LAB
ANION GAP SERPL CALCULATED.3IONS-SCNC: 13 MMOL/L (ref 3–16)
APTT: 25.6 SEC (ref 23–34.3)
APTT: 27.6 SEC (ref 23–34.3)
BUN BLDV-MCNC: 34 MG/DL (ref 7–20)
CALCIUM SERPL-MCNC: 8.6 MG/DL (ref 8.3–10.6)
CHLORIDE BLD-SCNC: 93 MMOL/L (ref 99–110)
CO2: 35 MMOL/L (ref 21–32)
CREAT SERPL-MCNC: 2 MG/DL (ref 0.6–1.2)
GFR AFRICAN AMERICAN: 30
GFR NON-AFRICAN AMERICAN: 25
GLUCOSE BLD-MCNC: 118 MG/DL (ref 70–99)
GLUCOSE BLD-MCNC: 132 MG/DL (ref 70–99)
GLUCOSE BLD-MCNC: 165 MG/DL (ref 70–99)
GLUCOSE BLD-MCNC: 190 MG/DL (ref 70–99)
GLUCOSE BLD-MCNC: 79 MG/DL (ref 70–99)
HCT VFR BLD CALC: 35.2 % (ref 36–48)
HEMOGLOBIN: 11.5 G/DL (ref 12–16)
MAGNESIUM: 2.5 MG/DL (ref 1.8–2.4)
MCH RBC QN AUTO: 30.1 PG (ref 26–34)
MCHC RBC AUTO-ENTMCNC: 32.7 G/DL (ref 31–36)
MCV RBC AUTO: 92.1 FL (ref 80–100)
ORGANISM: ABNORMAL
PDW BLD-RTO: 15.9 % (ref 12.4–15.4)
PERFORMED ON: ABNORMAL
PERFORMED ON: NORMAL
PLATELET # BLD: 158 K/UL (ref 135–450)
PMV BLD AUTO: 7.2 FL (ref 5–10.5)
POTASSIUM REFLEX MAGNESIUM: 3.2 MMOL/L (ref 3.5–5.1)
RBC # BLD: 3.83 M/UL (ref 4–5.2)
SODIUM BLD-SCNC: 141 MMOL/L (ref 136–145)
URINE CULTURE, ROUTINE: ABNORMAL
VANCOMYCIN RANDOM: 12.7 UG/ML
WBC # BLD: 10 K/UL (ref 4–11)

## 2022-06-10 PROCEDURE — 85027 COMPLETE CBC AUTOMATED: CPT

## 2022-06-10 PROCEDURE — 6360000002 HC RX W HCPCS: Performed by: INTERNAL MEDICINE

## 2022-06-10 PROCEDURE — 99223 1ST HOSP IP/OBS HIGH 75: CPT | Performed by: INTERNAL MEDICINE

## 2022-06-10 PROCEDURE — 83735 ASSAY OF MAGNESIUM: CPT

## 2022-06-10 PROCEDURE — 6370000000 HC RX 637 (ALT 250 FOR IP): Performed by: INTERNAL MEDICINE

## 2022-06-10 PROCEDURE — 1200000000 HC SEMI PRIVATE

## 2022-06-10 PROCEDURE — 2580000003 HC RX 258: Performed by: INTERNAL MEDICINE

## 2022-06-10 PROCEDURE — 36415 COLL VENOUS BLD VENIPUNCTURE: CPT

## 2022-06-10 PROCEDURE — 87040 BLOOD CULTURE FOR BACTERIA: CPT

## 2022-06-10 PROCEDURE — 85730 THROMBOPLASTIN TIME PARTIAL: CPT

## 2022-06-10 PROCEDURE — 6370000000 HC RX 637 (ALT 250 FOR IP): Performed by: PODIATRIST

## 2022-06-10 PROCEDURE — 80048 BASIC METABOLIC PNL TOTAL CA: CPT

## 2022-06-10 PROCEDURE — 80202 ASSAY OF VANCOMYCIN: CPT

## 2022-06-10 RX ORDER — HYDROCODONE BITARTRATE AND ACETAMINOPHEN 5; 325 MG/1; MG/1
1 TABLET ORAL EVERY 4 HOURS PRN
Status: DISCONTINUED | OUTPATIENT
Start: 2022-06-10 | End: 2022-06-17 | Stop reason: HOSPADM

## 2022-06-10 RX ORDER — METHOCARBAMOL 750 MG/1
750 TABLET, FILM COATED ORAL 4 TIMES DAILY
Status: DISCONTINUED | OUTPATIENT
Start: 2022-06-10 | End: 2022-06-12

## 2022-06-10 RX ORDER — TORSEMIDE 100 MG/1
1 TABLET ORAL 2 TIMES DAILY
Status: CANCELLED | OUTPATIENT
Start: 2022-06-11

## 2022-06-10 RX ORDER — ENOXAPARIN SODIUM 100 MG/ML
30 INJECTION SUBCUTANEOUS 2 TIMES DAILY
Status: DISCONTINUED | OUTPATIENT
Start: 2022-06-10 | End: 2022-06-17 | Stop reason: HOSPADM

## 2022-06-10 RX ADMIN — SODIUM CHLORIDE, PRESERVATIVE FREE 10 ML: 5 INJECTION INTRAVENOUS at 22:21

## 2022-06-10 RX ADMIN — GENTAMICIN SULFATE: 1 CREAM TOPICAL at 12:30

## 2022-06-10 RX ADMIN — SODIUM CHLORIDE, PRESERVATIVE FREE 10 ML: 5 INJECTION INTRAVENOUS at 09:01

## 2022-06-10 RX ADMIN — INSULIN GLARGINE 40 UNITS: 100 INJECTION, SOLUTION SUBCUTANEOUS at 09:12

## 2022-06-10 RX ADMIN — METHOCARBAMOL TABLETS 750 MG: 750 TABLET, COATED ORAL at 22:17

## 2022-06-10 RX ADMIN — INSULIN LISPRO 3 UNITS: 100 INJECTION, SOLUTION INTRAVENOUS; SUBCUTANEOUS at 09:11

## 2022-06-10 RX ADMIN — INSULIN LISPRO 30 UNITS: 100 INJECTION, SOLUTION INTRAVENOUS; SUBCUTANEOUS at 17:40

## 2022-06-10 RX ADMIN — HYDROCODONE BITARTRATE AND ACETAMINOPHEN 1 TABLET: 5; 325 TABLET ORAL at 15:01

## 2022-06-10 RX ADMIN — METHOCARBAMOL TABLETS 750 MG: 750 TABLET, COATED ORAL at 17:38

## 2022-06-10 RX ADMIN — FLUTICASONE PROPIONATE 1 SPRAY: 50 SPRAY, METERED NASAL at 09:01

## 2022-06-10 RX ADMIN — ALLOPURINOL 200 MG: 100 TABLET ORAL at 08:51

## 2022-06-10 RX ADMIN — FUROSEMIDE 40 MG: 10 INJECTION, SOLUTION INTRAMUSCULAR; INTRAVENOUS at 17:38

## 2022-06-10 RX ADMIN — INSULIN LISPRO 3 UNITS: 100 INJECTION, SOLUTION INTRAVENOUS; SUBCUTANEOUS at 13:24

## 2022-06-10 RX ADMIN — Medication: at 12:30

## 2022-06-10 RX ADMIN — INSULIN LISPRO 30 UNITS: 100 INJECTION, SOLUTION INTRAVENOUS; SUBCUTANEOUS at 09:11

## 2022-06-10 RX ADMIN — CEFEPIME HYDROCHLORIDE 2000 MG: 2 INJECTION, POWDER, FOR SOLUTION INTRAVENOUS at 08:58

## 2022-06-10 RX ADMIN — FUROSEMIDE 40 MG: 10 INJECTION, SOLUTION INTRAMUSCULAR; INTRAVENOUS at 08:52

## 2022-06-10 RX ADMIN — HYDROCODONE BITARTRATE AND ACETAMINOPHEN 1 TABLET: 5; 325 TABLET ORAL at 08:51

## 2022-06-10 RX ADMIN — VANCOMYCIN HYDROCHLORIDE 1250 MG: 5 INJECTION, POWDER, LYOPHILIZED, FOR SOLUTION INTRAVENOUS at 10:56

## 2022-06-10 RX ADMIN — INSULIN LISPRO 30 UNITS: 100 INJECTION, SOLUTION INTRAVENOUS; SUBCUTANEOUS at 13:24

## 2022-06-10 RX ADMIN — METOPROLOL SUCCINATE 100 MG: 50 TABLET, EXTENDED RELEASE ORAL at 08:51

## 2022-06-10 RX ADMIN — HYDROCODONE BITARTRATE AND ACETAMINOPHEN 1 TABLET: 5; 325 TABLET ORAL at 18:34

## 2022-06-10 RX ADMIN — CEFEPIME HYDROCHLORIDE 2000 MG: 2 INJECTION, POWDER, FOR SOLUTION INTRAVENOUS at 15:33

## 2022-06-10 RX ADMIN — INSULIN GLARGINE 40 UNITS: 100 INJECTION, SOLUTION SUBCUTANEOUS at 22:17

## 2022-06-10 RX ADMIN — CETIRIZINE HYDROCHLORIDE 10 MG: 10 TABLET, FILM COATED ORAL at 22:17

## 2022-06-10 ASSESSMENT — ENCOUNTER SYMPTOMS
CHOKING: 0
APNEA: 0
CHEST TIGHTNESS: 0
PHOTOPHOBIA: 0
EYE REDNESS: 0
STRIDOR: 0
COLOR CHANGE: 0
NAUSEA: 0
RHINORRHEA: 0
BLOOD IN STOOL: 0
FACIAL SWELLING: 0
SHORTNESS OF BREATH: 0
COUGH: 0
DIARRHEA: 0
TROUBLE SWALLOWING: 0
ABDOMINAL PAIN: 0
EYE DISCHARGE: 0

## 2022-06-10 ASSESSMENT — PAIN SCALES - GENERAL
PAINLEVEL_OUTOF10: 10
PAINLEVEL_OUTOF10: 6
PAINLEVEL_OUTOF10: 8

## 2022-06-10 NOTE — TELEPHONE ENCOUNTER
Pt has 3 bags in back fridge her name is on each bag.   Please try to call pt back after she is released from hospital to  her patient assistance

## 2022-06-10 NOTE — PROGRESS NOTES
Physician Progress Note      PATIENTWaldo Chirinos  CSN #:                  [id number]  :                       1955  ADMIT DATE:       2022 8:52 AM  [address] [place] DATE:  RESPONDING  PROVIDER #:        Paola Boyd MD          QUERY TEXT:    Patient admitted with SOB. Documentation reflects JOB in ED dictation on   22. If possible, please document in the progress notes and discharge   summary if JOB was: The medical record reflects the following:  Risk Factors: HX- CKD 3, DM, HTN. Clinical Indicators: CRT - 1.7, 1.5, 2.0  Treatment: Monitor    Thank Moe France RN BSN CDS CRCR  [email or web address]  Options provided:  -- JOB confirmed after study  -- JOB ruled out after study  -- Other - I will add my own diagnosis  -- Disagree - Not applicable / Not valid  -- Disagree - Clinically unable to determine / Unknown  -- Refer to Clinical Documentation Reviewer    PROVIDER RESPONSE TEXT:    JOB ruled out after study.     Query created by: Virginia Sage on 6/10/2022 11:25 AM      Electronically signed by:  Paola Boyd MD 6/10/2022 2:36 PM

## 2022-06-10 NOTE — PROGRESS NOTES
FAMILY HISTORY    Family History   Problem Relation Age of Onset    Lung Cancer Mother     High Blood Pressure Father     Lung Cancer Father     Lupus Sister        SOCIAL HISTORY    Social History     Tobacco Use    Smoking status: Never Smoker    Smokeless tobacco: Never Used   Vaping Use    Vaping Use: Never used   Substance Use Topics    Alcohol use: Yes     Alcohol/week: 2.0 standard drinks     Types: 2 Glasses of wine per week     Comment: only on occasional    Drug use: No       ALLERGIES    Allergies   Allergen Reactions    Pcn [Penicillins] Shortness Of Breath    Spironolactone Rash    Coreg [Carvedilol]      Side effects:  SOB, palpitations    Lisinopril Other (See Comments)     cough    Flexeril [Cyclobenzaprine] Rash       MEDICATIONS    No current facility-administered medications on file prior to encounter. Current Outpatient Medications on File Prior to Encounter   Medication Sig Dispense Refill    insulin aspart (NOVOLOG) 100 UNIT/ML injection vial Inject 40-50 Units into the skin 3 times daily (before meals)      insulin detemir (LEVEMIR) 100 UNIT/ML injection vial Inject 40 Units into the skin 2 times daily      loratadine (CLARITIN) 10 MG tablet Take 10 mg by mouth every evening      Cholecalciferol (VITAMIN D3) 50 MCG (2000 UT) CAPS Take 2,000 Units by mouth daily      Semaglutide,0.25 or 0.5MG/DOS, (OZEMPIC, 0.25 OR 0.5 MG/DOSE,) 2 MG/1.5ML SOPN Inject 0.5 mg into the skin once a week 1 pen 0    metoprolol succinate (TOPROL XL) 100 MG extended release tablet TAKE 1 TABLET BY MOUTH DAILY 90 tablet 0    torsemide (DEMADEX) 100 MG tablet TAKE 1 TABLET BY MOUTH TWICE DAILY 180 tablet 0    allopurinol (ZYLOPRIM) 100 MG tablet Take 2 tablets by mouth daily 180 tablet 0    HYDROcodone-acetaminophen (NORCO) 5-325 MG per tablet Take 1 tablet by mouth every 8 hours as needed for Pain.        Magnesium Oxide 400 MG CAPS Take 1 tablet by mouth daily      potassium chloride (KLOR-CON M) 20 MEQ extended release tablet Take 40 mEq by mouth 3 times daily       tetrahydrozoline-zinc (VISINE-AC) 0.05-0.25 % ophthalmic solution Place 2 drops into both eyes 3 times daily as needed (itchiness)       metOLazone (ZAROXOLYN) 5 MG tablet Take 1 tablet by mouth Twice a Week 24 tablet 0    Insulin Pen Needle 32G X 4 MM MISC 1 each by Does not apply route daily 300 each 3    Lancets MISC 1 each by Does not apply route 5 times daily Please fill for 33g one touch lancets 150 each 3    blood glucose test strips (ASCENSIA AUTODISC VI;ONE TOUCH ULTRA TEST VI) strip Check blood sugars TID 90 each 3    blood glucose monitor kit and supplies Test blood sugars qa and qhs 1 kit 0    linagliptin (TRADJENTA) 5 MG tablet Take 1 tablet by mouth daily 90 tablet 3    Multiple Vitamins-Minerals (ADULT ONE DAILY GUMMIES) CHEW Take 1 tablet by mouth daily         Allergies:  Pcn [penicillins], Spironolactone, Coreg [carvedilol], Lisinopril, and Flexeril [cyclobenzaprine]    REVIEW OF SYSTEMS:  CONSTITUTIONAL:  positive for  malaise  EYES:  negative  HEENT:  negative  RESPIRATORY:  positive for  dyspnea  CARDIOVASCULAR:  positive for  orthopnea, edema  ENDOCRINE:  positive for diabetic symptoms including neuropathy  MUSCULOSKELETAL:  positive for  pain and swelling of the right heel  NEUROLOGICAL:  negative    PHYSICAL EXAM:  VITALS:  /68   Pulse 78   Temp 98 °F (36.7 °C) (Axillary)   Resp 24   Ht 5' 3\" (1.6 m)   Wt (!) 307 lb 15.7 oz (139.7 kg)   SpO2 96%   BMI 54.56 kg/m²   CONSTITUTIONAL:  awake, alert, cooperative, no apparent distress, and appears stated age  EYES:  pupils equal, round and reactive to light, extra ocular muscles intact, sclera clear, conjunctiva normal      LOWER EXTREMITY:  VASCULAR: Pedal Pulses present. negative signs of ischemia. MUSCULOSKELETAL:  negative gross deformity.    NEUROLOGIC:  Epicritic sensation, light touch, joint position sensediminished  SKIN:  The rk-wound erythema is resolved. The central aspect remains necrotic, but the surrounding tissues are granular. The left leg has superficial granular wound on the posterior calf with evidence of mild erythema surrounding that has appearance of chronic venous stasis. I/O:    Intake/Output Summary (Last 24 hours) at 6/10/2022 1225  Last data filed at 6/10/2022 1044  Gross per 24 hour   Intake 720 ml   Output --   Net 720 ml              Wt Readings from Last 3 Encounters:   06/10/22 (!) 307 lb 15.7 oz (139.7 kg)   03/08/22 (!) 315 lb 12.8 oz (143.2 kg)   01/07/21 294 lb (133.4 kg)       LABS:    Recent Labs     06/09/22  0608 06/10/22  0635   WBC 8.8 10.0   HGB 12.0 11.5*   HCT 36.2 35.2*    158        Recent Labs     06/10/22  0635      K 3.2*   CL 93*   CO2 35*   BUN 34*   CREATININE 2.0*        Recent Labs     06/08/22  0935 06/08/22  2015 06/10/22  0013 06/10/22  0635   PROT 7.9  --   --   --    APTT  --    < > 25.6 27.6    < > = values in this interval not displayed. IMAGING:  X-rays:     mpression       1.  No plain radiographic evidence of osteomyelitis.       2.  Diffuse soft tissue swelling, especially within the dorsum of the foot.       3.  Otherwise no acute bony abnormality is noted.      MICRO:   Culture, Wound [[id number]] (Abnormal) Collected: 06/08/22 0935   Order Status: Completed Specimen: Leg Updated: 06/09/22 1356    Gram Stain Result No Epithelial Cells seen   1+ WBC's (Polymorphonuclear)   2+ Gram negative rods   3+ Gram positive cocci  in clusters-resembling Staph    Abnormal     Organism Serratia marcescens Abnormal     WOUND/ABSCESS --    Light growth   Sensitivity to follow     Organism Pseudomonas aeruginosa Abnormal     WOUND/ABSCESS --    Light growth   Sensitivity to follow     Organism Proteus species Abnormal     WOUND/ABSCESS --    Rare growth   No further workup    Narrative:     ORDER#: [id number]                          ORDERED BY: Robby Reyes SOURCE: Leg                                COLLECTED:  06/08/22 09:35          ASSESSMENT   Unstageable decubitus heel ulceration, right foot  Right foot diabetic foot infection  Venous wound, left posterior calf  Diabetes with peripheral neuropathy    PLAN:  Evaluation and Management x 30 minutes with greater than 50% of the time spent with the patient discussing the etiology and treatment options of the chief complaint. 1. Right heel  No worsening of the wound today. Santyl and Gentamicin ordered for combined treatment of the necrotic tissues and suspected bacterial infection  Betadine wet to dry dressing applied this evening  Multi-layer compression dressing applied from sulcus of toes to the knee. Educated patient and family on the importance of off loading    2. Venous stasis wound, left   Multi-layer compression dressing to the left leg. Non-adherent layer applied over the open wound    DISPO: Plan for wound care and antibiotics. May need further debridement if there is no clinical improvement. Thanks for the opportunity to participate in this patient's care.      Dalia Arango DPM DPM  [facility]  Pager: [phone]  Office: [phone]  Fax: [phone]

## 2022-06-10 NOTE — TELEPHONE ENCOUNTER
We received the Pt's medications through her Pt assistance program. They are in the refrigerator. Tried to call Pt to inform her.     She is currently hospitalized at [facility].

## 2022-06-10 NOTE — PROGRESS NOTES
Clinical Pharmacy Note  Vancomycin Consult    Zuleyma Sullivan is a 74 y.o. female ordered Vancomycin for cellulitis; consult received from Dr. Juliocesar Camargo to manage therapy. Also receiving Cefepime. Patient Active Problem List   Diagnosis    Hypertension    HLD (hyperlipidemia)    Diabetes with retinopathy ([address]    Volume overload    Malignant neoplasm of ascending colon (HCC)    Anemia    CKD (chronic kidney disease) stage 3, GFR 30-59 ml/min (HCC)    Uterine mass    Morbid obesity (HCC)    BRIANNA (obstructive sleep apnea)    PLMD (periodic limb movement disorder)    Pancreatic cyst    Chronic bilateral low back pain without sciatica    Cellulitis       Allergies:  Pcn [penicillins], Spironolactone, Coreg [carvedilol], Lisinopril, and Flexeril [cyclobenzaprine]     Temp max:  Temp (24hrs), Av.7 °F (37.1 °C), Min:98 °F (36.7 °C), Max:100.2 °F (37.9 °C)      Recent Labs     22  0608 06/10/22  0635   WBC 8.0 8.8 10.0       Recent Labs     22  0935 22  0608 06/10/22  0635   BUN 38* 31* 34*   CREATININE 1.7* 1.5* 2.0*         Intake/Output Summary (Last 24 hours) at 6/10/2022 0845  Last data filed at 2022 1915  Gross per 24 hour   Intake 480 ml   Output --   Net 480 ml         Ht Readings from Last 1 Encounters:   22 5' 3\" (1.6 m)        Wt Readings from Last 1 Encounters:   06/10/22 (!) 307 lb 15.7 oz (139.7 kg)         Estimated Creatinine Clearance: 37 mL/min (A) (based on SCr of 2 mg/dL (H)). Assessment/Plan:  Vancomycin day 3 of 5 day course  12.7mg/L 19  hours post 1500mg dose  Vancomycin 1250 mg IV once ordered for today at 0930. Continue 1x doses due to rise in creatinine today. Level ordered for 22 0600. Thank you for the consult.    Carlos Singh RPH,6/10/2022,8:45 AM

## 2022-06-10 NOTE — PROGRESS NOTES
Hospitalist Progress Note      PCP: Odette De Leon MD    Date of Admission: 6/8/2022    Chief Complaint: shortness of breath    Hospital Course: Patient is a 78-year-old female with past medical history of diabetes mellitus, CKD, essential hypertension, pulmonary hypertension who presents to the hospital due to multiple complaints, according to the patient she has been having bilateral lower extremity swelling, redness, she is not able to walk, she also has noticed wound on her right heel and she is concerned it may be infected.  Patient also mentions she has pain in her legs due to the swelling and redness.  Patient also mentions she feels shortness of breath as well, mentions it feels heavy for her to take a deep breathing.  She denies chest pain nausea vomiting diarrhea constipation, mentions her urine appears cloudy. Subjective: Pt seen and examined. Her back is bothering from the bed. Otherwise, feels ok.         Medications:  Reviewed    Infusion Medications    sodium chloride 100 mL/hr at 06/09/22 5267    dextrose       Scheduled Medications    enoxaparin  30 mg SubCUTAneous BID    cefepime  2,000 mg IntraVENous Q12H    methocarbamol  750 mg Oral 4x Daily    fluticasone  1 spray Each Nostril Daily    insulin lispro  0-18 Units SubCUTAneous TID WC    insulin lispro  0-9 Units SubCUTAneous Nightly    collagenase   Topical Daily    gentamicin   Topical Daily    sodium chloride flush  10 mL IntraVENous 2 times per day    allopurinol  200 mg Oral Daily    metoprolol succinate  100 mg Oral Daily    furosemide  40 mg IntraVENous BID    vancomycin (VANCOCIN) intermittent dosing (placeholder)   Other RX Placeholder    insulin lispro  30 Units SubCUTAneous TID WC    insulin glargine  40 Units SubCUTAneous BID    cetirizine  10 mg Oral Nightly     PRN Meds: HYDROcodone-acetaminophen, sodium chloride flush, sodium chloride, potassium chloride **OR** potassium alternative oral replacement **OR** potassium chloride, magnesium sulfate, promethazine **OR** ondansetron, magnesium hydroxide, acetaminophen **OR** acetaminophen, glucose, dextrose bolus **OR** dextrose bolus, glucagon (rDNA), dextrose      Intake/Output Summary (Last 24 hours) at 6/10/2022 1752  Last data filed at 6/10/2022 1044  Gross per 24 hour   Intake 480 ml   Output --   Net 480 ml       Exam:    /68   Pulse 78   Temp 98 °F (36.7 °C) (Axillary)   Resp 24   Ht 5' 3\" (1.6 m)   Wt (!) 307 lb 15.7 oz (139.7 kg)   SpO2 96%   BMI 54.56 kg/m²     General appearance: No apparent distress, appears stated age and cooperative. HEENT: Pupils equal, round, and reactive to light. Conjunctivae/corneas clear. Neck: Supple, with full range of motion. No jugular venous distention. Trachea midline. Respiratory:  Normal respiratory effort. Clear to auscultation, bilaterally without Rales/Wheezes/Rhonchi. Cardiovascular: Regular rate and rhythm with normal S1/S2 without murmurs, rubs or gallops. Abdomen: Soft, non-tender, non-distended with normal bowel sounds. Musculoskeletal: No clubbing, cyanosis or edema bilaterally. Full range of motion without deformity. Skin: Skin color, texture, turgor normal.  No rashes or lesions. Neurologic:  Neurovascularly intact without any focal sensory/motor deficits.  Cranial nerves: II-XII intact, grossly non-focal.  Psychiatric: Alert and oriented, thought content appropriate, normal insight  Capillary Refill: Brisk,< 3 seconds   Peripheral Pulses: +2 palpable, equal bilaterally       Labs:   Recent Labs     06/08/22 2015 06/09/22  0608 06/10/22  0635   WBC 8.0 8.8 10.0   HGB 12.1 12.0 11.5*   HCT 36.7 36.2 35.2*    170 158     Recent Labs     06/08/22  0935 06/09/22  0608 06/10/22  0635    138 141   K 3.5 3.2* 3.2*   CL 91* 96* 93*   CO2 33* 31 35*   BUN 38* 31* 34*   CREATININE 1.7* 1.5* 2.0*   CALCIUM 7.8* 8.9 8.6     Recent Labs     06/08/22  0935   AST 19   ALT 19   BILITOT 0.8 ALKPHOS 100     No results for input(s): INR in the last 72 hours. Recent Labs     06/08/22  0935 06/08/22  0935 06/08/22  1549 06/08/22 2015 06/09/22  0126   CKTOTAL 143  --   --   --   --    TROPONINI 0.04*   < > 0.05* 0.05* 0.05*    < > = values in this interval not displayed. Urinalysis:      Lab Results   Component Value Date    NITRU POSITIVE 06/08/2022    WBCUA 25 06/08/2022    BACTERIA 3+ 06/08/2022    RBCUA 5-10 06/08/2022    RBCUA NEGATIVE 04/18/2018    BLOODU MODERATE 06/08/2022    SPECGRAV 1.013 06/08/2022    GLUCOSEU Negative 06/08/2022       Radiology:  XR FOOT RIGHT (MIN 3 VIEWS)   Final Result      1. No plain radiographic evidence of osteomyelitis. 2.  Diffuse soft tissue swelling, especially within the dorsum of the foot. 3.  Otherwise no acute bony abnormality is noted. CT CHEST WO CONTRAST   Final Result   Mild degree of subsegmental atelectasis. Otherwise no acute cardiopulmonary disease. XR CHEST PORTABLE   Final Result   Pulmonary vascular congestion. Evaluation of the left base limited by soft   tissue attenuation artifact.                  Assessment/Plan:    Active Hospital Problems    Diagnosis Date Noted    Acute respiratory failure with hypoxia (HCC) [J96.01]      Priority: Medium    Gram-negative bacteremia [R78.81]      Priority: Medium    Elevated C-reactive protein (CRP) [R79.82]      Priority: Medium    E. coli UTI (urinary tract infection) [N39.0, B96.20]      Priority: Medium    Diabetic polyneuropathy associated with type 2 diabetes mellitus ([facility] [address] [E11.42]      Priority: Medium    Diabetes mellitus type 2 in obese ([facility] [address] [E11.69, E66.9]      Priority: Medium    Elevated sed rate [R70.0]      Priority: Medium    Serratia marcescens infection [A48.8]      Priority: Medium    Pseudomonas infection [A49.8]      Priority: Medium    Proteus infection [A49.8]      Priority: Medium    Cellulitis [L03.90] 06/08/2022     Priority: Medium  Morbid obesity due to excess calories ([facility] [E66.01] 01/14/2019    Type 2 diabetes mellitus with left diabetic foot infection ([facility] [G47.302, L08.9]     Essential hypertension [I10]        Acute respiratory failure with hypoxia  -diuresis  -wean oxygen to maintain SpO2 > 89%    CKD stage III  -avoid nephrotoxic meds  -trend and monitor    Elevated troponin  -cardiology consulted, doesn't believe this is consistent with ACS  -tele montioring    Chronic diastolic CHF - does not appear to be in exacerbation  -cardiology consulted  -continue home meds    Bilateral lower extremity cellulitis  -on empiric IV Abx  -ID consulted, recs appreciated    Diabetic foot infection  -podiatry consulted, recs appreciated  -ID consulted, recs appreciated  -on empiric Vanc and cefepime; abx per ID; should cover isolated organisms but await sensitivities    Proteus bacteremia  -repeat blood cultures today  -on empiric cefepime which should cover  -ID consulted, recs appreciated    Type 2 DM  -Lantus, Humalog, and SSI  -hypoglycemia protocol  -POCT glucose checks  -carb control diet    Morbid obesity due to excess calories  -nutritional counseling provided  -weight loss encouraged  -complicating medical management      DVT Prophylaxis: Lovenox  Diet: ADULT DIET;  Regular; 4 carb choices (60 gm/meal)  ADULT ORAL NUTRITION SUPPLEMENT; Breakfast, Dinner; Diabetic Oral Supplement  Code Status: Full Code    PT/OT Eval Status: ordered    Dispo - continue care    Nhi Langley MD

## 2022-06-10 NOTE — CONSULTS
Infectious Diseases   Consult Note        Admission Date: 6/8/2022  Hospital Day: Hospital Day: 3   Attending: Samuel Sy MD  Date of service: 6/10/22     Reason for admission: Cellulitis [L03.90]  Elevated troponin [R77.8]  JOB (acute kidney injury) ([facility] [N17.9]  Pulmonary vascular congestion [R09.89]  Acute respiratory failure with hypoxia ([facility] [J96.01]  Cellulitis of right lower extremity [L03.115]  Urinary tract infection without hematuria, site unspecified [N39.0]    Chief complaint/ Reason for consult: Gram-negative bacteremia    Microbiology:        I have reviewed allavailable micro lab data and cultures    · Blood culture (1/2) - collected on 6/8/2022: Proteus mirabilis  · Left foot wound culture - collectedon 6/8/2022: E. coli, Proteus, Pseudomonas, Serratia  · Urine culture  - collected on 6/8/2022: Greater than 100,000 CFU per mL of E. coli    Susceptibility      Escherichia coli (1)    Antibiotic Interpretation Microscan  Method Status    ampicillin Sensitive <=2 mcg/mL BACTERIAL SUSCEPTIBILITY PANEL BY BISMARK     ampicillin-sulbactam Sensitive <=2 mcg/mL BACTERIAL SUSCEPTIBILITY PANEL BY BISMARK     ceFAZolin Sensitive <=4 mcg/mL BACTERIAL SUSCEPTIBILITY PANEL BY BISMARK      NOTE: Cefazolin should only be used for uncomplicated UTI         for E.coli or Klebsiella pneumoniae.        cefepime Sensitive <=0.12 mcg/mL BACTERIAL SUSCEPTIBILITY PANEL BY BISMARK     cefTRIAXone Sensitive <=0.25 mcg/mL BACTERIAL SUSCEPTIBILITY PANEL BY BISMARK     ciprofloxacin Sensitive <=0.25 mcg/mL BACTERIAL SUSCEPTIBILITY PANEL BY BISMARK     ertapenem Sensitive <=0.12 mcg/mL BACTERIAL SUSCEPTIBILITY PANEL BY BISMARK     gentamicin Sensitive <=1 mcg/mL BACTERIAL SUSCEPTIBILITY PANEL BY BISMARK     levofloxacin Sensitive <=0.12 mcg/mL BACTERIAL SUSCEPTIBILITY PANEL BY BISMARK     nitrofurantoin Sensitive <=16 mcg/mL BACTERIAL SUSCEPTIBILITY PANEL BY BISMARK     piperacillin-tazobactam Sensitive <=4 mcg/mL BACTERIAL SUSCEPTIBILITY PANEL BY BISMARK trimethoprim-sulfamethoxazole Sensitive <=20 mcg/mL BACTERIAL SUSCEPTIBILITY PANEL BY BISMARK         Antibiotics and immunizations:       Current antibiotics: All antibiotics and their doses were reviewed by me    Recent Abx Admin                   cefepime (MAXIPIME) 2000 mg IVPB minibag (mg) 2,000 mg New Bag 06/10/22 1533    vancomycin (VANCOCIN) 1250 mg in dextrose 5 % 250 mL IVPB (mg) 1,250 mg New Bag 06/10/22 1056    cefepime (MAXIPIME) 2000 mg IVPB minibag (mg) 2,000 mg New Bag 06/10/22 0858     2,000 mg New Bag 06/09/22 1805                  Immunization History: All immunization history was reviewed by me today. Immunization History   Administered Date(s) Administered    COVID-19, Moderna, Primary or Immunocompromised, PF, 100mcg/0.5mL 02/15/2021, 03/19/2021, 12/02/2021    Influenza Vaccine, unspecified formulation 12/31/2016, 12/12/2017    Influenza Virus Vaccine 12/12/2017    Influenza, Pandey Veronica, 6 mo and older, IM, PF (Flulaval, Fluarix) 11/02/2018    Influenza, Quadv, IM, PF (6 mo and older Fluzone, Flulaval, Fluarix, and 3 yrs and older Afluria) 12/31/2016    Influenza, Quadv, adjuvanted, 65 yrs +, IM, PF (Fluad) 10/28/2020    Influenza, Triv, inactivated, subunit, adjuvanted, IM (Fluad 65 yrs and older) 10/21/2019    Pneumococcal Polysaccharide ([id number]) 02/03/2020       Known drug allergies: All allergies were reviewed and updated    Allergies   Allergen Reactions    Pcn [Penicillins] Shortness Of Breath    Spironolactone Rash    Coreg [Carvedilol]      Side effects:  SOB, palpitations    Lisinopril Other (See Comments)     cough    Flexeril [Cyclobenzaprine] Rash       Social history:     Social History:  All social andepidemiologic history was reviewed and updated by me today as needed. · Tobacco use:   reports that she has never smoked.  She has never used smokeless tobacco.  · Alcohol use:   reports current alcohol use of about 2.0 standard drinks of alcohol per week.  · Currently lives in: [address] [place] [address]   ·  reports no history of drug use. COVID VACCINATION AND LAB RESULT RECORDS:     Internal Administration   First Dose COVID-19, Moderna, Primary or Immunocompromised, PF, 100mcg/0.5mL  02/15/2021   Second Dose COVID-19, Koko Roque, Primary or Immunocompromised, PF, 100mcg/0.5mL   03/19/2021       Last COVID Lab SARS-CoV-2, NAAT (no units)   Date Value   06/08/2022 Not Detected            Assessment:     The patient is a 74 y.o. old female who  has a past medical history of Anemia, Chronic bilateral low back pain without sciatica, CKD (chronic kidney disease) stage 3, GFR 30-59 ml/min ([facility]), Colon cancer ([facility] Diabetes ([facility] [address] Diabetes ([address] Diabetes with retinopathy ([facility] [address] [facility] Hypertension, Pancreatic cyst, Positive FIT (fecal immunochemical test), and Pulmonary hypertension ([facility] with following problems:    · Gram-negative bacteremia with Proteus  · Complicated E. coli UTI  · Complicated left diabetic foot infection with E. coli, Proteus, Pseudomonas, Serratia marcescens  · Essential hypertension  · History of pulmonary hypertension  · Chronic kidney disease stage III  · Elevated CRP of 118.9  · Elevated sed rate of greater than 130  · Type 2 diabetes mellitus  · Diabetic polyneuropathy type II  · Obesity Class 3 due to excess calorie intake : Body mass index is 54.56 kg/m². ·       Discussion:      The patient was admitted with the worsening shortness of breath and right lower extremity infection. He has a complicated right diabetic foot infection, with wound cultures growing multiple different bacteria as noted above. Her blood cultures were Proteus and urine culture is growing E. coli. E. coli sensitivities  Reviewed. Was pan susceptible. Rest of the sensitivities are pending.       Plan:     Diagnostic Workup:    · Repeat 2 sets of blood cultures today  · Follow-up on pending sensitivities  · Continue to follow fever curve, WBC count and blood cultures  · Follow up on liverand renal functions closely  · Will order MRI of the right heel to rule out underlying abscess/osteomyelitis, as x-ray is not very sensitive for osteomyelitis    Antimicrobials:    · No evidence of MRSA or any other cultures. We will discontinue IV vancomycin today  · Will leave the patient empiric IV cefepime 2 g every 12 hours  · If any of the gram-negative organisms has resistance to cefepime, will consider escalation of antibiotics to IV meropenem. IV ertapenem will not be possible as it does not have Pseudomonas coverage  Recommend holding off on PICC line until blood cultures clear for at least 48 and preferably 72 hours. · Midline or regular central venous line okay for now, if needed for IV access. · We will follow up on the culture results and clinical progress and will make further recommendations accordingly. · Continue close vitals monitoring. · Maintain good glycemic control. · Fall precautions. Aspiration precautions. · Continue to watch for new fever or diarrhea. · DVT prophylaxis. · Discussed all above with patient and RN. · Discussed with patient's daughter at bedside      Drug Monitoring:    · Continue serial monitoring for antibiotic toxicity as follows: CBC, CMP  · Continue to watch for following: new or worsening fever, hypotension, hives, lip swelling and redness or purulence at vascular access sites. I/v access Management:    · Continue to monitor i.v access sites for erythema, induration, discharge or tenderness. · As always, continue efforts to minimizetubes/lines/drains as clinically appropriate to reduce chances of line associated infections. Current isolation precautions: There are no current isolations documented for this patient. Level of complexity of consult: High     Risk of Complications/Morbidity: High     · Illness(es)/ Infection present that pose threat to life/bodily function.    · There is potential for severe exacerbation of infection/side effects of treatment. · Therapy requires intensive monitoring for antimicrobial agent toxicity. Thank you for involving me in the care of your patient. I will continue to follow. If you have any additional questions, please do not hesitate to contact me. Subjective:     Presenting complaint in ER:     Chief Complaint   Patient presents with    Leg Pain     right lower extremity, red hot and swollen     Shortness of Breath        HPI: Franca Calixto is a 74 y.o. female patient, who was seen at the request of Dr. Anjana Kan MD.    History was obtained from chart review and the patient. The patient was admitted on 6/8/2022. I have been consulted to see the patient for above mentioned reason(s). The patient has multiple medical comorbidities, and presented to the ER for Shortness of breath. The patient was hospitalized. Blood cultures were sent on admission. She was started on IV vancomycin and IV cefepime. The patient had a low-grade fever 100.2 yesterday. Her urine culture has grown E. coli, blood culture is growing Proteus and leg wound culture is growing multiple gram-negative bacteria including E. coli, Proteus, Pseudomonas and Serratia marcescens. I have been asked for my opinion for management for this patient. Past Medical History: All past medical history reviewed today. Past Medical History:   Diagnosis Date    Anemia     Chronic bilateral low back pain without sciatica     CKD (chronic kidney disease) stage 3, GFR 30-59 ml/min (HCC)     Colon cancer (HCC)     Diabetes ([address]     Diabetes ([address]     Diabetes with retinopathy ([address]     DM eye exam 12/18/17    Hypertension     Pancreatic cyst     Positive FIT (fecal immunochemical test)     Pulmonary hypertension (HCC)          Past Surgical History: All pastsurgical history was reviewed today.     Past Surgical History:   Procedure Laterality Date    COLONOSCOPY N/A 11/5/2018    COLONOSCOPY WITH BIOPSY performed by Della Jerez MD at [address]  05/14/2019    Colonoscopy with polypectomy (cold snare)    COLONOSCOPY N/A 5/14/2019    COLONOSCOPY POLYPECTOMY SNARE/COLD BIOPSY performed by Della Jerez MD at [address] [place] [address] Right 11/08/2018    [address] [place] [address] Stephens and umbilical hernia repair    UT OFFICE/OUTPT VISIT,PROCEDURE ONLY Right 11/8/2018    LAPAROSCOPIC RIGHT HEMICOLECTOMY and umbilical hernia repair performed by Sid Mcmahon MD at [facility] 11/5/2018    EGD BIOPSY performed by Della Jerez MD at [address] [facility]           Family History: All family history was reviewed today. Problem Relation Age of Onset    Lung Cancer Mother     High Blood Pressure Father     Lung Cancer Father     Lupus Sister          Medications: All current and past medications were reviewed. Medications Prior to Admission: insulin aspart (NOVOLOG) 100 UNIT/ML injection vial, Inject 40-50 Units into the skin 3 times daily (before meals)  insulin detemir (LEVEMIR) 100 UNIT/ML injection vial, Inject 40 Units into the skin 2 times daily  loratadine (CLARITIN) 10 MG tablet, Take 10 mg by mouth every evening  Cholecalciferol (VITAMIN D3) 50 MCG (2000 UT) CAPS, Take 2,000 Units by mouth daily  Semaglutide,0.25 or 0.5MG/DOS, (OZEMPIC, 0.25 OR 0.5 MG/DOSE,) 2 MG/1.5ML SOPN, Inject 0.5 mg into the skin once a week  metoprolol succinate (TOPROL XL) 100 MG extended release tablet, TAKE 1 TABLET BY MOUTH DAILY  torsemide (DEMADEX) 100 MG tablet, TAKE 1 TABLET BY MOUTH TWICE DAILY  allopurinol (ZYLOPRIM) 100 MG tablet, Take 2 tablets by mouth daily  HYDROcodone-acetaminophen (NORCO) 5-325 MG per tablet, Take 1 tablet by mouth every 8 hours as needed for Pain.    Magnesium Oxide 400 MG CAPS, Take 1 tablet by mouth daily  potassium chloride (KLOR-CON M) 20 MEQ extended release tablet, Take 40 mEq by mouth 3 times daily   tetrahydrozoline-zinc (VISINE-AC) 0.05-0.25 % ophthalmic solution, Place 2 drops into both eyes 3 times daily as needed (itchiness)   metOLazone (ZAROXOLYN) 5 MG tablet, Take 1 tablet by mouth Twice a Week  Insulin Pen Needle 32G X 4 MM MISC, 1 each by Does not apply route daily  Lancets MISC, 1 each by Does not apply route 5 times daily Please fill for 33g one touch lancets  blood glucose test strips (ASCENSIA AUTODISC VI;ONE TOUCH ULTRA TEST VI) strip, Check blood sugars TID  blood glucose monitor kit and supplies, Test blood sugars qac and qhs  linagliptin (TRADJENTA) 5 MG tablet, Take 1 tablet by mouth daily  Multiple Vitamins-Minerals (ADULT ONE DAILY GUMMIES) CHEW, Take 1 tablet by mouth daily     enoxaparin  30 mg SubCUTAneous BID    cefepime  2,000 mg IntraVENous Q12H    methocarbamol  750 mg Oral 4x Daily    fluticasone  1 spray Each Nostril Daily    insulin lispro  0-18 Units SubCUTAneous TID WC    insulin lispro  0-9 Units SubCUTAneous Nightly    collagenase   Topical Daily    gentamicin   Topical Daily    sodium chloride flush  10 mL IntraVENous 2 times per day    allopurinol  200 mg Oral Daily    metoprolol succinate  100 mg Oral Daily    furosemide  40 mg IntraVENous BID    vancomycin (VANCOCIN) intermittent dosing (placeholder)   Other RX Placeholder    insulin lispro  30 Units SubCUTAneous TID WC    insulin glargine  40 Units SubCUTAneous BID    cetirizine  10 mg Oral Nightly          REVIEW OF SYSTEMS:       Review of Systems   Constitutional: Positive for fatigue. Negative for chills, diaphoresis and unexpected weight change. HENT: Negative for congestion, ear discharge, ear pain, facial swelling, hearing loss, rhinorrhea and trouble swallowing. Eyes: Negative for photophobia, discharge, redness and visual disturbance.    Respiratory: Negative for apnea, cough, choking, chest tightness, shortness of breath and stridor. Cardiovascular: Negative for chest pain and palpitations. Gastrointestinal: Negative for abdominal pain, blood in stool, diarrhea and nausea. Endocrine: Negative for polydipsia, polyphagia and polyuria. Genitourinary: Negative for difficulty urinating, dysuria, frequency, hematuria, menstrual problem and vaginal discharge. Musculoskeletal: Negative for arthralgias, joint swelling, myalgias and neck stiffness. Skin: Negative for color change and rash. Allergic/Immunologic: Negative for immunocompromised state. Neurological: Negative for dizziness, seizures, speech difficulty, light-headedness and headaches. Hematological: Negative for adenopathy. Psychiatric/Behavioral: Negative for agitation, hallucinations and suicidal ideas. Objective:       PHYSICAL EXAM:      Vitals:   Vitals:    06/10/22 0039 06/10/22 0041 06/10/22 0523 06/10/22 0544   BP: 134/68   136/68   Pulse: 77 77  78   Resp: 18 19  24   Temp: 98.3 °F (36.8 °C)   98 °F (36.7 °C)   TempSrc:    Axillary   SpO2: (!) 89% 91%  96%   Weight:   (!) 307 lb 15.7 oz (139.7 kg)    Height:           Physical Exam  Vitals and nursing note reviewed. Constitutional:       General: She is not in acute distress. Appearance: She is well-developed. She is not diaphoretic. HENT:      Head: Normocephalic. Right Ear: External ear normal.      Left Ear: External ear normal.      Nose: Nose normal.   Eyes:      General: No scleral icterus. Right eye: No discharge. Left eye: No discharge. Conjunctiva/sclera: Conjunctivae normal.      Pupils: Pupils are equal, round, and reactive to light. Cardiovascular:      Rate and Rhythm: Normal rate and regular rhythm. Heart sounds: No murmur heard. No friction rub. Pulmonary:      Effort: Pulmonary effort is normal.      Breath sounds: No stridor. No wheezing or rales. Chest:      Chest wall: No tenderness. Abdominal:      Palpations: Abdomen is soft. There is no mass. Tenderness: There is no abdominal tenderness. There is no guarding or rebound. Musculoskeletal:         General: No tenderness. Cervical back: Normal range of motion and neck supple. Lymphadenopathy:      Cervical: No cervical adenopathy. Skin:     General: Skin is warm and dry. Findings: No erythema or rash. Comments: Right heel ulceration noted   Neurological:      Mental Status: She is alert and oriented to person, place, and time. Motor: No abnormal muscle tone. Psychiatric:         Judgment: Judgment normal.           Lines and drains: All vascular access sites are healthy with no local erythema, discharge or tenderness. Intake and output:     I/O last 3 completed shifts: In: 607.6 [P.O.:540; I.V.:17.6; IV Piggyback:50]  Out: 700 [Urine:700]    Lab Data:   All available labs were reviewed by me today. CBC:   Recent Labs     06/08/22 2015 06/09/22  0608 06/10/22  0635   WBC 8.0 8.8 10.0   RBC 4.03 3.95* 3.83*   HGB 12.1 12.0 11.5*   HCT 36.7 36.2 35.2*    170 158   MCV 91.1 91.7 92.1   MCH 30.1 30.3 30.1   MCHC 33.0 33.0 32.7   RDW 16.4* 16.3* 15.9*        BMP:  Recent Labs     06/08/22  0935 06/09/22  0608 06/10/22  0635    138 141   K 3.5 3.2* 3.2*   CL 91* 96* 93*   CO2 33* 31 35*   BUN 38* 31* 34*   CREATININE 1.7* 1.5* 2.0*   CALCIUM 7.8* 8.9 8.6   GLUCOSE 211* 199* 132*        Hepatic FunctionPanel:   Lab Results   Component Value Date    ALKPHOS 100 06/08/2022    ALT 19 06/08/2022    AST 19 06/08/2022    PROT 7.9 06/08/2022    BILITOT 0.8 06/08/2022    BILIDIR 0.1 06/07/2021    IBILI see below 07/22/2020    LABALBU 4.0 06/08/2022       CPK:   Lab Results   Component Value Date    CKTOTAL 143 06/08/2022     ESR:   Lab Results   Component Value Date    SEDRATE >130 (H) 06/08/2022     CRP:   Lab Results   Component Value Date    .9 (H) 06/08/2022         Imaging:     All pertinent images and reports for the current visit were reviewed by me during this visit. I reviewed the chest x-ray/CT scan/MRI images and independently interpreted the findings and results today. XR FOOT RIGHT (MIN 3 VIEWS)   Final Result      1. No plain radiographic evidence of osteomyelitis. 2.  Diffuse soft tissue swelling, especially within the dorsum of the foot. 3.  Otherwise no acute bony abnormality is noted. CT CHEST WO CONTRAST   Final Result   Mild degree of subsegmental atelectasis. Otherwise no acute cardiopulmonary disease. XR CHEST PORTABLE   Final Result   Pulmonary vascular congestion. Evaluation of the left base limited by soft   tissue attenuation artifact. Outside records:    Labs, Microbiology, Radiology and pertinent results from Care everywhere, if available, were reviewed as a part ofthe consultation. Problem list:       Patient Active Problem List   Diagnosis Code    Hypertension I10    HLD (hyperlipidemia) E78.5    Diabetes with retinopathy ([facility] E11.319    Volume overload E87.70    Malignant neoplasm of ascending colon (HCC) C18.2    Anemia D64.9    CKD (chronic kidney disease) stage 3, GFR 30-59 ml/min (HCC) N18.30    Uterine mass N85.8    Morbid obesity (HCC) E66.01    BRIANNA (obstructive sleep apnea) G47.33    PLMD (periodic limb movement disorder) G47.61    Pancreatic cyst K86.2    Chronic bilateral low back pain without sciatica M54.50, G89.29    Cellulitis L03.90         Please note that this chart was generated using Dragon dictation software. Although every effort was made to ensure the accuracy of this automated transcription, some errors in transcription may have occurred inadvertently. If you may need any clarification, please do not hesitate to contact me through EPIC or at the phone number provided below with my electronic signature.   Any pictures or media included in this note were obtained after taking informed verbal consent from the patient and with their approval to include those in the patient's medical record.         Gunnar Dotson MD, MPH, [address]  6/10/2022, 4:29 PM  [facility] Infectious Disease   [address] [place] [address], [address] [place] [address]  Office: [phone]  Fax: [phone]  Clinic days:  Tuesday & Thursday

## 2022-06-11 ENCOUNTER — APPOINTMENT (OUTPATIENT)
Dept: MRI IMAGING | Age: 67
DRG: 623 | End: 2022-06-11
Payer: MEDICARE

## 2022-06-11 LAB
ANION GAP SERPL CALCULATED.3IONS-SCNC: 12 MMOL/L (ref 3–16)
BUN BLDV-MCNC: 39 MG/DL (ref 7–20)
CALCIUM SERPL-MCNC: 8.8 MG/DL (ref 8.3–10.6)
CHLORIDE BLD-SCNC: 94 MMOL/L (ref 99–110)
CO2: 32 MMOL/L (ref 21–32)
CREAT SERPL-MCNC: 2.3 MG/DL (ref 0.6–1.2)
CULTURE, BLOOD 2: ABNORMAL
CULTURE, BLOOD 2: ABNORMAL
GFR AFRICAN AMERICAN: 25
GFR NON-AFRICAN AMERICAN: 21
GLUCOSE BLD-MCNC: 124 MG/DL (ref 70–99)
GLUCOSE BLD-MCNC: 155 MG/DL (ref 70–99)
GLUCOSE BLD-MCNC: 166 MG/DL (ref 70–99)
GLUCOSE BLD-MCNC: 192 MG/DL (ref 70–99)
GLUCOSE BLD-MCNC: 205 MG/DL (ref 70–99)
GLUCOSE BLD-MCNC: 77 MG/DL (ref 70–99)
GRAM STAIN RESULT: ABNORMAL
MAGNESIUM: 2.6 MG/DL (ref 1.8–2.4)
ORGANISM: ABNORMAL
PERFORMED ON: ABNORMAL
PERFORMED ON: NORMAL
POTASSIUM REFLEX MAGNESIUM: 2.9 MMOL/L (ref 3.5–5.1)
SODIUM BLD-SCNC: 138 MMOL/L (ref 136–145)
VANCOMYCIN RANDOM: 17.6 UG/ML
WOUND/ABSCESS: ABNORMAL

## 2022-06-11 PROCEDURE — 6370000000 HC RX 637 (ALT 250 FOR IP): Performed by: INTERNAL MEDICINE

## 2022-06-11 PROCEDURE — 73718 MRI LOWER EXTREMITY W/O DYE: CPT

## 2022-06-11 PROCEDURE — 2580000003 HC RX 258: Performed by: INTERNAL MEDICINE

## 2022-06-11 PROCEDURE — 97530 THERAPEUTIC ACTIVITIES: CPT | Performed by: PHYSICAL THERAPIST

## 2022-06-11 PROCEDURE — 82570 ASSAY OF URINE CREATININE: CPT

## 2022-06-11 PROCEDURE — 2700000000 HC OXYGEN THERAPY PER DAY

## 2022-06-11 PROCEDURE — 36415 COLL VENOUS BLD VENIPUNCTURE: CPT

## 2022-06-11 PROCEDURE — 80048 BASIC METABOLIC PNL TOTAL CA: CPT

## 2022-06-11 PROCEDURE — 97535 SELF CARE MNGMENT TRAINING: CPT

## 2022-06-11 PROCEDURE — 84300 ASSAY OF URINE SODIUM: CPT

## 2022-06-11 PROCEDURE — 84540 ASSAY OF URINE/UREA-N: CPT

## 2022-06-11 PROCEDURE — 6360000002 HC RX W HCPCS: Performed by: INTERNAL MEDICINE

## 2022-06-11 PROCEDURE — 83735 ASSAY OF MAGNESIUM: CPT

## 2022-06-11 PROCEDURE — 80202 ASSAY OF VANCOMYCIN: CPT

## 2022-06-11 PROCEDURE — 97530 THERAPEUTIC ACTIVITIES: CPT

## 2022-06-11 PROCEDURE — 1200000000 HC SEMI PRIVATE

## 2022-06-11 PROCEDURE — 94761 N-INVAS EAR/PLS OXIMETRY MLT: CPT

## 2022-06-11 RX ORDER — FUROSEMIDE 10 MG/ML
80 INJECTION INTRAMUSCULAR; INTRAVENOUS 2 TIMES DAILY
Status: DISCONTINUED | OUTPATIENT
Start: 2022-06-11 | End: 2022-06-13

## 2022-06-11 RX ORDER — INSULIN LISPRO 100 [IU]/ML
25 INJECTION, SOLUTION INTRAVENOUS; SUBCUTANEOUS
Status: DISCONTINUED | OUTPATIENT
Start: 2022-06-11 | End: 2022-06-17 | Stop reason: HOSPADM

## 2022-06-11 RX ORDER — POTASSIUM CHLORIDE 20 MEQ/1
40 TABLET, EXTENDED RELEASE ORAL ONCE
Status: COMPLETED | OUTPATIENT
Start: 2022-06-11 | End: 2022-06-11

## 2022-06-11 RX ORDER — INSULIN LISPRO 100 [IU]/ML
25 INJECTION, SOLUTION INTRAVENOUS; SUBCUTANEOUS ONCE
Status: COMPLETED | OUTPATIENT
Start: 2022-06-11 | End: 2022-06-11

## 2022-06-11 RX ADMIN — HYDROCODONE BITARTRATE AND ACETAMINOPHEN 1 TABLET: 5; 325 TABLET ORAL at 19:44

## 2022-06-11 RX ADMIN — POTASSIUM CHLORIDE 40 MEQ: 20 TABLET, EXTENDED RELEASE ORAL at 08:51

## 2022-06-11 RX ADMIN — METHOCARBAMOL TABLETS 750 MG: 750 TABLET, COATED ORAL at 12:33

## 2022-06-11 RX ADMIN — SODIUM CHLORIDE, PRESERVATIVE FREE 10 ML: 5 INJECTION INTRAVENOUS at 08:52

## 2022-06-11 RX ADMIN — FUROSEMIDE 80 MG: 10 INJECTION, SOLUTION INTRAMUSCULAR; INTRAVENOUS at 18:54

## 2022-06-11 RX ADMIN — CEFEPIME HYDROCHLORIDE 2000 MG: 2 INJECTION, POWDER, FOR SOLUTION INTRAVENOUS at 15:45

## 2022-06-11 RX ADMIN — INSULIN LISPRO 3 UNITS: 100 INJECTION, SOLUTION INTRAVENOUS; SUBCUTANEOUS at 09:02

## 2022-06-11 RX ADMIN — HYDROCODONE BITARTRATE AND ACETAMINOPHEN 1 TABLET: 5; 325 TABLET ORAL at 09:21

## 2022-06-11 RX ADMIN — INSULIN GLARGINE 40 UNITS: 100 INJECTION, SOLUTION SUBCUTANEOUS at 21:43

## 2022-06-11 RX ADMIN — METHOCARBAMOL TABLETS 750 MG: 750 TABLET, COATED ORAL at 17:19

## 2022-06-11 RX ADMIN — CETIRIZINE HYDROCHLORIDE 10 MG: 10 TABLET, FILM COATED ORAL at 21:42

## 2022-06-11 RX ADMIN — METHOCARBAMOL TABLETS 750 MG: 750 TABLET, COATED ORAL at 08:51

## 2022-06-11 RX ADMIN — HYDROCODONE BITARTRATE AND ACETAMINOPHEN 1 TABLET: 5; 325 TABLET ORAL at 15:41

## 2022-06-11 RX ADMIN — INSULIN LISPRO 25 UNITS: 100 INJECTION, SOLUTION INTRAVENOUS; SUBCUTANEOUS at 12:34

## 2022-06-11 RX ADMIN — METHOCARBAMOL TABLETS 750 MG: 750 TABLET, COATED ORAL at 21:42

## 2022-06-11 RX ADMIN — INSULIN LISPRO 25 UNITS: 100 INJECTION, SOLUTION INTRAVENOUS; SUBCUTANEOUS at 09:12

## 2022-06-11 RX ADMIN — METOPROLOL SUCCINATE 100 MG: 50 TABLET, EXTENDED RELEASE ORAL at 08:51

## 2022-06-11 RX ADMIN — CEFEPIME HYDROCHLORIDE 2000 MG: 2 INJECTION, POWDER, FOR SOLUTION INTRAVENOUS at 02:51

## 2022-06-11 RX ADMIN — INSULIN LISPRO 6 UNITS: 100 INJECTION, SOLUTION INTRAVENOUS; SUBCUTANEOUS at 12:33

## 2022-06-11 RX ADMIN — FLUTICASONE PROPIONATE 1 SPRAY: 50 SPRAY, METERED NASAL at 08:56

## 2022-06-11 RX ADMIN — ALLOPURINOL 200 MG: 100 TABLET ORAL at 08:51

## 2022-06-11 RX ADMIN — INSULIN GLARGINE 40 UNITS: 100 INJECTION, SOLUTION SUBCUTANEOUS at 09:02

## 2022-06-11 RX ADMIN — INSULIN LISPRO 2 UNITS: 100 INJECTION, SOLUTION INTRAVENOUS; SUBCUTANEOUS at 21:43

## 2022-06-11 RX ADMIN — HYDROCODONE BITARTRATE AND ACETAMINOPHEN 1 TABLET: 5; 325 TABLET ORAL at 02:57

## 2022-06-11 ASSESSMENT — PAIN DESCRIPTION - LOCATION
LOCATION: BACK

## 2022-06-11 ASSESSMENT — PAIN - FUNCTIONAL ASSESSMENT: PAIN_FUNCTIONAL_ASSESSMENT: PREVENTS OR INTERFERES SOME ACTIVE ACTIVITIES AND ADLS

## 2022-06-11 ASSESSMENT — PAIN SCALES - GENERAL
PAINLEVEL_OUTOF10: 7
PAINLEVEL_OUTOF10: 8
PAINLEVEL_OUTOF10: 7

## 2022-06-11 ASSESSMENT — PAIN DESCRIPTION - ORIENTATION
ORIENTATION: MID
ORIENTATION: RIGHT;LEFT

## 2022-06-11 ASSESSMENT — PAIN DESCRIPTION - DESCRIPTORS: DESCRIPTORS: ACHING

## 2022-06-11 NOTE — PROGRESS NOTES
Department of Podiatric Surgery  Progress Note  6/11/2022  Joey Ortiz      HISTORY OF PRESENT ILLNESS:      The patient is a 74 y.o. female who presents with right heel wound and infection, cellulitis The patient sleeps with c-pap normally and is not that active at home. She had to sleep in a wingback chair for the last several days and her legs were down. She is diabetic and has neuropathy. She has trouble with mobility at baseline and wears slippers at home 90% of the time. She has not had a problem with the heel before, but has had wounds venous nature recently. 6/11/22:  The patient feels good, easily awoken from nap,  in room    Past Medical History:        Diagnosis Date    Anemia     Chronic bilateral low back pain without sciatica     CKD (chronic kidney disease) stage 3, GFR 30-59 ml/min ([facility])     Colon cancer ([facility])     Diabetes ([address]     Diabetes ([address]     Diabetes with retinopathy ([address]     DM eye exam 12/18/17    Hypertension     Pancreatic cyst     Positive FIT (fecal immunochemical test)     Pulmonary hypertension ([facility])          ALLERGIES    Allergies   Allergen Reactions    Pcn [Penicillins] Shortness Of Breath    Spironolactone Rash    Coreg [Carvedilol]      Side effects:  SOB, palpitations    Lisinopril Other (See Comments)     cough    Flexeril [Cyclobenzaprine] Rash       REVIEW OF SYSTEMS:  CONSTITUTIONAL:  negative  EYES:  negative  HEENT:  negative  RESPIRATORY:  positive for  dyspnea  CARDIOVASCULAR:  positive for  orthopnea, edema  ENDOCRINE:  positive for diabetic symptoms including neuropathy  MUSCULOSKELETAL:  positive for  pain and swelling of the right heel  NEUROLOGICAL:  negative    PHYSICAL EXAM:  VITALS:  BP (!) 150/77   Pulse 69   Temp 98.4 °F (36.9 °C) (Oral)   Resp 23   Ht 5' 3\" (1.6 m)   Wt (!) 308 lb 13.8 oz (140.1 kg)   SpO2 92%   BMI 54.71 kg/m²   CONSTITUTIONAL:  awake, alert, cooperative, no apparent distress, and appears stated age  EYES:  pupils equal, round and reactive to light, extra ocular muscles intact, sclera clear, conjunctiva normal      LOWER EXTREMITY:  VASCULAR: Pedal Pulses present. negative signs of ischemia. MUSCULOSKELETAL:  negative gross deformity. NEUROLOGIC:  Epicritic sensation, light touch, joint position sensediminished  SKIN:  The rk-wound erythema is resolved. The central aspect remains necrotic, but the surrounding tissues are granular. The left leg has superficial granular wound on the posterior calf with evidence of mild erythema surrounding that has appearance of chronic venous stasis. I/O:    Intake/Output Summary (Last 24 hours) at 6/11/2022 1255  Last data filed at 6/11/2022 1120  Gross per 24 hour   Intake 480 ml   Output 700 ml   Net -220 ml              Wt Readings from Last 3 Encounters:   06/11/22 (!) 308 lb 13.8 oz (140.1 kg)   03/08/22 (!) 315 lb 12.8 oz (143.2 kg)   01/07/21 294 lb (133.4 kg)       LABS:    Recent Labs     06/09/22  0608 06/10/22  0635   WBC 8.8 10.0   HGB 12.0 11.5*   HCT 36.2 35.2*    158        Recent Labs     06/11/22  0645      K 2.9*   CL 94*   CO2 32   BUN 39*   CREATININE 2.3*        Recent Labs     06/10/22  0013 06/10/22  0635   APTT 25.6 27.6       IMAGING:  X-rays:     mpression       1.  No plain radiographic evidence of osteomyelitis.       2.  Diffuse soft tissue swelling, especially within the dorsum of the foot.       3.  Otherwise no acute bony abnormality is noted.      MICRO:   Culture, Wound [[id number]] (Abnormal) Collected: 06/08/22 0935   Order Status: Completed Specimen: Leg Updated: 06/09/22 1356    Gram Stain Result No Epithelial Cells seen   1+ WBC's (Polymorphonuclear)   2+ Gram negative rods   3+ Gram positive cocci  in clusters-resembling Staph    Abnormal     Organism Serratia marcescens Abnormal     WOUND/ABSCESS --    Light growth   Sensitivity to follow     Organism Pseudomonas aeruginosa Abnormal     WOUND/ABSCESS -- Light growth   Sensitivity to follow     Organism Proteus species Abnormal     WOUND/ABSCESS --    Rare growth   No further workup    Narrative:     ORDER#: [id number]                          ORDERED BY: Tiera Burgess   SOURCE: Leg                                COLLECTED:  06/08/22 09:35          ASSESSMENT   Unstageable decubitus heel ulceration, right foot  Right foot diabetic foot infection  Venous wound, left posterior calf  Diabetes with peripheral neuropathy    PLAN:  Evaluation and Management x 30 minutes with greater than 50% of the time spent with the patient discussing the etiology and treatment options of the chief complaint. 1. Right heel    Santyl and Gentamicin applied for combined treatment of the necrotic tissues and suspected bacterial infection    Multi-layer compression dressing applied from sulcus of toes to the knee. Educated patient and family on the importance of off loading    2. Venous stasis wound, left   Multi-layer compression dressing to the left leg. Non-adherent layer applied over the open wound    DISPO: Plan for wound care and antibiotics. May need further debridement if there is no clinical improvement. Thanks for the opportunity to participate in this patient's care.      aCta Washington DPM   [facility]  Cell [phone]  Office: [phone]  Fax: [phone]

## 2022-06-11 NOTE — PLAN OF CARE
Problem: Discharge Planning  Goal: Discharge to home or other facility with appropriate resources  Outcome: Progressing  Flowsheets (Taken 6/11/2022 0047)  Discharge to home or other facility with appropriate resources:   Identify barriers to discharge with patient and caregiver   Arrange for needed discharge resources and transportation as appropriate   Identify discharge learning needs (meds, wound care, etc)     Problem: Skin/Tissue Integrity  Goal: Absence of new skin breakdown  Outcome: Progressing  Note: Skin assessment completed every shift. Pt assessed for incontinence, appropriate barrier cream applied prn. Pt encouraged to turn/rotate every 2 hours. Assistance provided if pt unable to do so themselves.         Problem: Safety - Adult  Goal: Free from fall injury  Outcome: Progressing  Flowsheets (Taken 6/11/2022 0047)  Free From Fall Injury: Instruct family/caregiver on patient safety     Problem: ABCDS Injury Assessment  Goal: Absence of physical injury  Outcome: Progressing  Flowsheets  Taken 6/11/2022 0047  Absence of Physical Injury: Implement safety measures based on patient assessment  Taken 6/11/2022 0044  Absence of Physical Injury: Implement safety measures based on patient assessment     Problem: Chronic Conditions and Co-morbidities  Goal: Patient's chronic conditions and co-morbidity symptoms are monitored and maintained or improved  Outcome: Progressing  Flowsheets (Taken 6/11/2022 0047)  Care Plan - Patient's Chronic Conditions and Co-Morbidity Symptoms are Monitored and Maintained or Improved:   Monitor and assess patient's chronic conditions and comorbid symptoms for stability, deterioration, or improvement   Collaborate with multidisciplinary team to address chronic and comorbid conditions and prevent exacerbation or deterioration

## 2022-06-11 NOTE — PROGRESS NOTES
Hospitalist Progress Note      PCP: rPatibha Helton MD    Date of Admission: 6/8/2022    Chief Complaint: shortness of breath    Hospital Course: Patient is a 78-year-old female with past medical history of diabetes mellitus, CKD, essential hypertension, pulmonary hypertension who presents to the hospital due to multiple complaints, according to the patient she has been having bilateral lower extremity swelling, redness, she is not able to walk, she also has noticed wound on her right heel and she is concerned it may be infected.  Patient also mentions she has pain in her legs due to the swelling and redness.  Patient also mentions she feels shortness of breath as well, mentions it feels heavy for her to take a deep breathing.  She denies chest pain nausea vomiting diarrhea constipation, mentions her urine appears cloudy. Subjective: Pt seen and examined. Feels slightly better today.       Medications:  Reviewed    Infusion Medications    sodium chloride 100 mL/hr at 06/09/22 0643    dextrose       Scheduled Medications    insulin lispro  25 Units SubCUTAneous TID WC    furosemide  80 mg IntraVENous BID    enoxaparin  30 mg SubCUTAneous BID    cefepime  2,000 mg IntraVENous Q12H    methocarbamol  750 mg Oral 4x Daily    fluticasone  1 spray Each Nostril Daily    insulin lispro  0-18 Units SubCUTAneous TID WC    insulin lispro  0-9 Units SubCUTAneous Nightly    collagenase   Topical Daily    gentamicin   Topical Daily    sodium chloride flush  10 mL IntraVENous 2 times per day    allopurinol  200 mg Oral Daily    metoprolol succinate  100 mg Oral Daily    insulin glargine  40 Units SubCUTAneous BID    cetirizine  10 mg Oral Nightly     PRN Meds: HYDROcodone-acetaminophen, sodium chloride flush, sodium chloride, potassium chloride **OR** potassium alternative oral replacement **OR** potassium chloride, magnesium sulfate, promethazine **OR** ondansetron, magnesium hydroxide, acetaminophen **OR** acetaminophen, glucose, dextrose bolus **OR** dextrose bolus, glucagon (rDNA), dextrose      Intake/Output Summary (Last 24 hours) at 6/11/2022 1705  Last data filed at 6/11/2022 1120  Gross per 24 hour   Intake 480 ml   Output 700 ml   Net -220 ml       Exam:    BP (!) 162/87   Pulse 75   Temp 99.3 °F (37.4 °C) (Oral)   Resp 17   Ht 5' 3\" (1.6 m)   Wt (!) 308 lb 13.8 oz (140.1 kg)   SpO2 91%   BMI 54.71 kg/m²     General appearance: Chronically ill appearing. No apparent distress, appears stated age and cooperative. HEENT: Pupils equal, round, and reactive to light. Conjunctivae/corneas clear. Neck: Supple, with full range of motion. No jugular venous distention. Trachea midline. Respiratory:  Normal respiratory effort. Clear to auscultation, bilaterally without Rales/Wheezes/Rhonchi. Cardiovascular: Regular rate and rhythm with normal S1/S2 without murmurs, rubs or gallops. Abdomen: Soft, non-tender, non-distended with normal bowel sounds. Musculoskeletal: No clubbing, cyanosis or edema bilaterally. Full range of motion without deformity. Skin: Right heel unstageable decubitus heel ulceration. Neurologic:  Neurovascularly intact without any focal sensory/motor deficits. Cranial nerves: II-XII intact, grossly non-focal.  Psychiatric: Alert and oriented, thought content appropriate, normal insight  Capillary Refill: Brisk,< 3 seconds   Peripheral Pulses: +2 palpable, equal bilaterally       Labs:   Recent Labs     06/08/22  2015 06/09/22  0608 06/10/22  0635   WBC 8.0 8.8 10.0   HGB 12.1 12.0 11.5*   HCT 36.7 36.2 35.2*    170 158     Recent Labs     06/09/22  0608 06/10/22  0635 06/11/22  0645    141 138   K 3.2* 3.2* 2.9*   CL 96* 93* 94*   CO2 31 35* 32   BUN 31* 34* 39*   CREATININE 1.5* 2.0* 2.3*   CALCIUM 8.9 8.6 8.8     No results for input(s): AST, ALT, BILIDIR, BILITOT, ALKPHOS in the last 72 hours. No results for input(s): INR in the last 72 hours.   Recent Labs     06/08/22 2015 06/09/22  0126   TROPONINI 0.05* 0.05*       Urinalysis:      Lab Results   Component Value Date    NITRU POSITIVE 06/08/2022    WBCUA 25 06/08/2022    BACTERIA 3+ 06/08/2022    RBCUA 5-10 06/08/2022    RBCUA NEGATIVE 04/18/2018    BLOODU MODERATE 06/08/2022    SPECGRAV 1.013 06/08/2022    GLUCOSEU Negative 06/08/2022       Radiology:  MRI FOOT RIGHT WO CONTRAST   Final Result   1. Plantar heel ulcer measuring 1.5 x 2 cm with mild adjacent cellulitis. No   soft tissue abscess. 2. Trace edema in the plantar calcaneus adjacent to the ulcer concerning for   mild acute osteomyelitis. XR FOOT RIGHT (MIN 3 VIEWS)   Final Result      1. No plain radiographic evidence of osteomyelitis. 2.  Diffuse soft tissue swelling, especially within the dorsum of the foot. 3.  Otherwise no acute bony abnormality is noted. CT CHEST WO CONTRAST   Final Result   Mild degree of subsegmental atelectasis. Otherwise no acute cardiopulmonary disease. XR CHEST PORTABLE   Final Result   Pulmonary vascular congestion. Evaluation of the left base limited by soft   tissue attenuation artifact.                  Assessment/Plan:    Active Hospital Problems    Diagnosis Date Noted    Acute respiratory failure with hypoxia (HCC) [J96.01]      Priority: Medium    Gram-negative bacteremia [R78.81]      Priority: Medium    Elevated C-reactive protein (CRP) [R79.82]      Priority: Medium    E. coli UTI (urinary tract infection) [N39.0, B96.20]      Priority: Medium    Diabetic polyneuropathy associated with type 2 diabetes mellitus ([facility] [E11.42]      Priority: Medium    Diabetes mellitus type 2 in obese (HCC) [E11.69, E66.9]      Priority: Medium    Elevated sed rate [R70.0]      Priority: Medium    Serratia marcescens infection [A48.8]      Priority: Medium    Pseudomonas infection [A49.8]      Priority: Medium    Proteus infection [A49.8]      Priority: Medium    Cellulitis [L03.90] 06/08/2022     Priority: Medium    Morbid obesity due to excess calories ([facility] [E66.01] 01/14/2019    Type 2 diabetes mellitus with left diabetic foot infection ([facility] [I67.734, L08.9]     Essential hypertension [I10]        Acute respiratory failure with hypoxia  -diuresis  -wean oxygen to maintain SpO2 > 89%    CKD stage III  -avoid nephrotoxic meds  -trend and monitor    Elevated troponin  -cardiology consulted, doesn't believe this is consistent with ACS  -tele montioring    Chronic diastolic CHF - does not appear to be in exacerbation  -cardiology consulted  -continue home meds    Bilateral lower extremity cellulitis  -on empiric IV Abx  -ID consulted, recs appreciated    Diabetic foot infection  -podiatry consulted, recs appreciated  -ID consulted, recs appreciated  -on empiric cefepime; abx per ID; should cover isolated organisms but await sensitivities  -MRI right foot pending to r/o osteomyelitis    Proteus bacteremia  -repeat blood cultures 6/10  -on empiric cefepime which should cover  -ID consulted, recs appreciated    Type 2 DM  -Lantus, Humalog, and SSI  -hypoglycemia protocol  -POCT glucose checks  -carb control diet    Morbid obesity due to excess calories  -nutritional counseling provided  -weight loss encouraged  -complicating medical management      DVT Prophylaxis: Lovenox  Diet: ADULT DIET;  Regular; 4 carb choices (60 gm/meal); 1200 ml  Code Status: Full Code    PT/OT Eval Status: ordered    Dispo - continue care    Shoaib Zhou MD

## 2022-06-11 NOTE — PROGRESS NOTES
Physical Therapy  Facility/Department: [address] PROGRESSIVE CARE  Physical Therapy Treatment Note    Name: Bebo Lake  : 1955  MRN: [id number]  Date of Service: 2022    Discharge Recommendations:  24 hour supervision or assist,Home with Home health PT (pt refuses SNF)   PT Equipment Recommendations  Equipment Needed: Yes  Mobility Devices: Wheelchair  Wheelchair: Wheelchair Cushion Pressure Relieving (bariatric w/c with elevating leg rests and drop arm)  Other: will need bariatric BSC with drop arm as unsure if pt would be able to get a bariatric w/c into her bathroom    Bebo Lake scored a 15/24 on the AM-PAC short mobility form. Current research shows that an AM-PAC score of 18 or greater is typically associated with a discharge to the patient's home setting. Despite the patient's AM-PAC score and their current functional mobility deficits, pt refusing SNF therefore it is recommended that the patient have 2-3 sessions per week of Physical Therapy at d/c to increase the patient's independence. At this time, this patient demonstrates the endurance and safety to discharge home with home PT and a follow up treatment frequency of 2-3x/wk. Please see assessment section for further patient specific details.   HOME HEALTH CARE: LEVEL 3 SAFETY        -Initial home health evaluation to occur within 24-48 hours, in patient home    -Home health agency to establish plan of care for patient over 60 day period    -Medication Reconciliation    -PT/OT/Speech evaluations in home within 24-48 hours of discharge; including  -DME and home safety    -Frontload therapy 5 days, then 3x a week    -OT to evaluate if patient has [address] needs for personal care    - evaluation within 24-48 hours, includes evaluation of resources   and insurance to determine [place], [place], LTC, and Medicaid options    -PCP Visit scheduled within three to seven days of discharge     If patient discharges prior to next session this note will serve as a discharge summary. Please see below for the latest assessment towards goals. Patient Diagnosis(es): The primary encounter diagnosis was Acute respiratory failure with hypoxia ([facility] Diagnoses of Cellulitis of right lower extremity, JOB (acute kidney injury) ([address] Elevated troponin, Pulmonary vascular congestion, and Urinary tract infection without hematuria, site unspecified were also pertinent to this visit. Past Medical History:  has a past medical history of Anemia, Chronic bilateral low back pain without sciatica, CKD (chronic kidney disease) stage 3, GFR 30-59 ml/min ([facility]), Colon cancer ([address] Diabetes ([address] Diabetes ([address] Diabetes with retinopathy ([facility] Hypertension, Pancreatic cyst, Positive FIT (fecal immunochemical test), and Pulmonary hypertension ([facility] Past Surgical History:  has a past surgical history that includes Upper gastrointestinal endoscopy (N/A, 11/5/2018); hemicolectomy (Right, 11/08/2018); pr office/outpt visit,procedure only (Right, 11/8/2018); [place] tooth extraction; Colonoscopy (N/A, 11/5/2018); Colonoscopy (05/14/2019); and Colonoscopy (N/A, 5/14/2019). Assessment   Assessment: pt is a 75 yo female who was adm to hosp with LE swelling, weakness and a heel wound; pt at baseline is Ind with a RW; Pt now is TTWB for balance only with R LE and to off load heel.  Pt is not able to maintain TTWB but attempts to only WB through R toes; recommend pt use w/c at discharge and have a BSC to off load heel as much as possible to limit need to WB with functional tasks; pt refuses a SNF and adamant she will return home therefore recommend 24/7 assist and home PT  Therapy Prognosis: Fair;Guarded  Requires PT Follow-Up: Yes  Activity Tolerance  Activity Tolerance: Patient limited by endurance     Plan   Plan  Plan: 3-5 times per week  Current Treatment Recommendations: Functional mobility training,Wheelchair mobility training  Safety Devices  Type of bars in shower,Shower chair  Home Equipment: Walker, rolling  Has the patient had two or more falls in the past year or any fall with injury in the past year?: No  Receives Help From: Family  ADL Assistance: Needs assistance ( assists with LB dressing)  Homemaking Assistance: Needs assistance ( completes)  Homemaking Responsibilities: No  Ambulation Assistance: Independent (RW)  Transfer Assistance: Independent  Active : Yes (not recently)  Mode of Transportation: Car  Occupation: Retired  IADL Comments: sleeps in flat bed  Additional Comments: Pt reports moving on Tuesday to multi-level house with , daughter and son-in-law. 2 PERCY from garage with grab bar. Reports has chair lift.   Vision/Hearing  Vision  Vision: Impaired  Vision Exceptions: Wears glasses for reading  Hearing  Hearing: Within functional limits    Cognition   Orientation  Orientation Level: Oriented X4  Cognition  Overall Cognitive Status: WFL  Cognition Comment: decreased insight into need to maintain TTWB and to practice using commode as she reports she did not have incontinence prior to this hospitalization however with using the purewick for last few days she says now she is experiencing incontinence; encouraged pt to try and hold her urine and call nursing as they can use the stedy to get her to bathroom or use a BSC     Objective   Heart Rate: 82  Heart Rate Source: Monitor  BP: 139/65  BP Location: Right upper arm  BP Method: Automatic  Patient Position: Sitting;Up in chair  MAP (Calculated): 89.67  Resp: 21  SpO2: 92 %  O2 Device: Nasal cannula                          Bed mobility  Bed Mobility Comments: up in chair at beginning and end of session  Transfers  Sit to Stand: Contact guard assistance (with walker in front and cues for hand placement; pt not able to maintain TTWB; pt trying to stay on toes of R foot but not able to offload with transfers)  Stand to sit: Contact guard assistance  Stand Pivot Transfers: Contact guard assistance (with walker in front but unable to off load wt on R foot)        Balance  Sitting - Static: Good  Sitting - Dynamic: Good  Standing - Static: Fair;+ (with walker)  Standing - Dynamic: Fair;+ (with walker)           OutComes Score                                                  AM-PAC Score  AM-PAC Inpatient Mobility Raw Score : 15 (06/11/22 0848)  AM-PAC Inpatient T-Scale Score : 39.45 (06/11/22 0848)  Mobility Inpatient CMS 0-100% Score: 57.7 (06/11/22 0848)  Mobility Inpatient CMS G-Code Modifier : CK (06/11/22 0848)          Goals  Short Term Goals  Time Frame for Short term goals: by discharge  Short term goal 1: bed mob MI from flat bed  Short term goal 2: transfers SBA from bed<-> w/c with using RW and maintaining TTWB R  Short term goal 3: d/c amb goal due to pt not able to maintain TTWB R;  Patient Goals   Patient goals : to return home soon       Education  Patient Education  Education Given To: Patient  Education Provided: Precautions  Education Provided Comments: discussed in depth need to maintain TTWB for healing of her heel ulcer; discussed home situation and will need to get W/C and likely BSC to be more Ind at home and be able to off load R heel as much as possible  Education Method: Verbal;Demonstration  Education Outcome: Continued education needed      Therapy Time   Individual Concurrent Group Co-treatment   Time In 0728         Time Out 0840         Minutes 72                 DELFINO FELIZ, PT    Electronically signed by DELFINO FELIZ PT on 6/11/2022 at 8:50 AM

## 2022-06-11 NOTE — PROGRESS NOTES
Occupational Therapy  Facility/Department: [address] PROGRESSIVE CARE  Occupational Therapy Re-evaluation    Name: Kat Wright  : 1955  MRN: [id number]  Date of Service: 2022    Discharge Recommendations:  Continue to assess pending progress,Home with assist PRN  OT Equipment Recommendations  Equipment Needed: Yes  Mobility Devices: [employer] Assistive Devices  Wheelchair: Standard (Recommend functional mobility from w/c level for increased adherance to WB status)  ADL Assistive Devices:  [facility])       Patient Diagnosis(es): The primary encounter diagnosis was Acute respiratory failure with hypoxia ([facility] Diagnoses of Cellulitis of right lower extremity, JOB (acute kidney injury) ([address] Elevated troponin, Pulmonary vascular congestion, and Urinary tract infection without hematuria, site unspecified were also pertinent to this visit. Past Medical History:  has a past medical history of Anemia, Chronic bilateral low back pain without sciatica, CKD (chronic kidney disease) stage 3, GFR 30-59 ml/min ([facility]), Colon cancer ([address] Diabetes ([address] Diabetes ([address] Diabetes with retinopathy ([address] Hypertension, Pancreatic cyst, Positive FIT (fecal immunochemical test), and Pulmonary hypertension ([address] Past Surgical History:  has a past surgical history that includes Upper gastrointestinal endoscopy (N/A, 2018); hemicolectomy (Right, 2018); pr office/outpt visit,procedure only (Right, 2018); Ronceverte tooth extraction; Colonoscopy (N/A, 2018); Colonoscopy (2019); and Colonoscopy (N/A, 2019). Assessment   Performance deficits / Impairments: Decreased functional mobility ; Decreased balance;Decreased ADL status; Decreased endurance;Decreased high-level IADLs;Decreased strength  Assessment: Kat Wright is a 74 y.o. female who presents emergency department via EMS with complaints of leg pain and shortness of breath on .  Pt seen by Podiatry on 6/10 with new orders for TTWB on RLE d/t heal wound. PTA pt from home where pt was ind with mobility with RW and needing PRN assist for ADLs. Pt reports plan is to move into new house on Tuesday with , daughter and son-in-law with 2 PERCY from garage (with grab bar) and chair lift. TODAY- pt required extensive education for OT/PTrecommendations and importance of maintaining WB status for healing process. Pt performed stand pivot transfer from recliner ><armed chair with RW CGA + verbal cues for technique for maintaining WB status. Pt poor adherance to WB status despite verbal cues. Pt appears to have decreased insight of situation. Anticipate pt needing up to Mod A for  LB ADLs. Anticipate pt to be safe to return home with family assist and [address] [place] [address]. Will continue to follow during acute hospitalization. Recommend BSC and w/c for fxl mob/transfers at home to maximize independence and maintain WB status. Prognosis: Good  Decision Making: Low Complexity  Exam: see above  Assistance / Modification: RW  REQUIRES OT FOLLOW-UP: Yes  Activity Tolerance  Activity Tolerance: Patient Tolerated treatment well        Plan   Plan  Times per Week: 3-5x  Current Treatment Recommendations: Strengthening,Balance training,Functional mobility training,Endurance training,Patient/Caregiver education & training,Safety education & training,Self-Care / ADL     Restrictions  Restrictions/Precautions  Restrictions/Precautions: Fall Risk,Weight Bearing  Lower Extremity Weight Bearing Restrictions  Right Lower Extremity Weight Bearing: Toe Touch Weight Bearing  Position Activity Restriction  Other position/activity restrictions: TTWB RLE due to heel wound    Subjective   General  Chart Reviewed: Yes  Patient assessed for rehabilitation services?: Yes  Additional Pertinent Hx: per ED note, Boaz Holland is a 74 y.o. female who presents emergency department via EMS with complaints of leg pain and shortness of breath.   Patient is a poor historian and gives a different timeline of events but ultimately she is a diabetic. She has had shortness of breath. She does not wear oxygen. She does not have a history of congestive heart failure or COPD. She has chronic leg swelling. She states her legs have gotten more swollen, painful and tight. She is reporting the right leg being red and hot for at least the last couple of days. She is unable to state whether or not she feels like she has been having any fevers. She denies chest pain. Lives at home with her . She uses a walker for ambulation. She states over the last few days she is unable to lay in bed so she has been sleeping upright in a nonreclining chair with her legs dangling. She denies wounds but she does have wounds to her lower extremities. She has never tested positive for Covid. She had a recent negative study. Family / Caregiver Present: No  Referring Practitioner: Verl Klinefelter, MD ; Citlali Holley MD  Subjective  Subjective: Pt agreeable to OT re-evaluation with PT. Pt is now TTWB on RLE d/t wound. Pt on 3L O2 throughout. Pt frustrating with current medical situation and required extensive time for education.      Social/Functional History  Social/Functional History  Lives With: Spouse  Type of Home:  (loft)  Home Layout: One level  Home Access: Elevator  Bathroom Shower/Tub: Tub/Shower unit,Shower chair without back  Bathroom Toilet: Standard  Bathroom Equipment: Grab bars in [place] [facility] chair  Home Equipment: Walker, rolling  Has the patient had two or more falls in the past year or any fall with injury in the past year?: No  Receives Help From: Family  ADL Assistance: Needs assistance ( assists with LB dressing)  Homemaking Assistance: Needs assistance ( completes)  Homemaking Responsibilities: No  Ambulation Assistance: Independent (RW)  Transfer Assistance: Independent  Active : Yes (not recently)  Mode of Transportation: Car  Occupation: Retired  IADL Comments: sleeps in flat bed  Additional Comments: Pt reports moving on Tuesday to multi-level house with , daughter and son-in-law. 2 PERCY from garage with grab bar. Reports has chair lift. Objective     Vision Exceptions: Wears glasses for reading  Hearing: Within functional limits          Safety Devices  Type of Devices: Call light within reach;Nurse notified;Gait belt;Left in chair;Chair alarm in place  Bed Mobility Training  Bed Mobility Training: No  Supine to Sit:  (Pt in recliner at beginning and end of session)  Sit to Supine:  (Pt in recliner at beginning and end of session)  Balance  Sitting: Intact  Standing: Impaired (RW)  Standing - Static: Good;Fair  Standing - Dynamic: Fair  Transfer Training  Transfer Training: Yes  Overall Level of Assistance: Contact-guard assistance (RW)  Sit to Stand: Contact-guard assistance (with RW- cues for maintaining TTWB on RLE- poor maintenance of WB- not able to off load)  Stand to Sit: Contact-guard assistance (with RW- cues for maintaining TTWB on RLE- poor maintenance of WB-not able to off load)  Stand Pivot Transfers: Contact-guard assistance (with RW- cues for maintaining TTWB on RLE- poor maintenance of WB)  Gait  Overall Level of Assistance: Contact-guard assistance (RW)  Assistive Device: Walker, rolling     AROM: Within functional limits  PROM: Within functional limits  Strength:  Within functional limits  Coordination: Within functional limits  Tone: Normal  ADL  LE Dressing: Maximum assistance  LE Dressing Skilled Clinical Factors: donning/doffing socks  Toileting: Dependent/Total  Toileting Skilled Clinical Factors: Purewick- educated to call staff to use bathroom in order to reduce dependence on purewick  Additional Comments: Anticipate pt needing up to Mod A for LB ADLs based on ROM, strength, and balance     Activity Tolerance  Activity Tolerance: Patient limited by endurance     Transfers  Sit to stand: Contact guard assistance  Stand to sit: Contact guard assistance  Transfer Comments: with RW- cues for technique for maintaining TTWB on RLE- poor maintenance of WB     Cognition  Overall Cognitive Status: WFL  Cognition Comment: decreased insight into need to maintain TTWB and to practice using commode as she reports she did not have incontinence prior to this hospitalization however with using the purewick for last few days she says now she is experiencing incontinence; encouraged pt to try and hold her urine and call nursing as they can use the stedy to get her to bathroom or use a BSC     Education Given To: Patient  Education Provided: Plan of Care;Role of Therapy;Transfer Training  Education Method: Demonstration;Verbal  Barriers to Learning: None  Education Outcome: Verbalized understanding;Demonstrated understanding     AM-PAC Score        AM-PAC Inpatient Daily Activity Raw Score: 17 (06/11/22 0838)  AM-PAC Inpatient ADL T-Scale Score : 37.26 (06/11/22 3005)  ADL Inpatient CMS 0-100% Score: 50.11 (06/11/22 0335)  ADL Inpatient CMS G-Code Modifier : CK (06/11/22 4253)    Goals  Short Term Goals  Time Frame for Short term goals: prior to D/C  Short Term Goal 1: complete functional mobility and transfers with maintaining TTWB status SUP  Short Term Goal 2: complete toileting SUP  Short Term Goal 3: complete grooming in stance at sink SUP  Short Term Goal 4: complete bed mobility SUP  Long Term Goals  Time Frame for Long term goals : STG=LTG  Patient Goals   Patient goals : return home       Therapy Time   Individual Concurrent Group Co-treatment   Time In 0728         Time Out 0830         Minutes 62         Timed Code Treatment Minutes: 62 Minutes (20 min eval + 42 min treatment)       Navya Gallagher OT   Electronically signed by Navya Gallagher OTR/L  License # [id number]

## 2022-06-11 NOTE — CONSULTS
Nephrology ([facility]) Consult Note    Lena Romero is a 74 y.o. female whom we were asked to see for JOB on CKD. She presents with worsened dyspnea , edema, and right heel wound. 06/08/22 Cr 1.7  06/09/22 Cr 1.5  06/10/22 Cr 2.0  06/11/22 Cr 2.3    She doesn't really know how much she is drinking at home. She is very fixated on the muscles \"anabel and not releasing\" from years ago. She reports that she was stable and did have a recent appointment with Dr. Gildardo Goss. Apparently, starting two weeks ago, her back started to flare, so she started to sleep in a chair with her feet dangling, including here. Past Medical History:  CKD3b   - sees Dr. Gildardo Goss   - baseline Cr 1.4-1.7  DM2  pulmonary HTN  HTN  h/o colon cancer    Review of System:  Otherwise unremarkable    Allergies:  Pcn [penicillins], Spironolactone, Coreg [carvedilol], Lisinopril, and Flexeril [cyclobenzaprine]    Medications:  Current medications reviewed. Social History:  no tobacco  Family Medical History:  Negative for kidney disase    Physical Exam:  Blood pressure (!) 150/77, pulse 69, temperature 98.4 °F (36.9 °C), temperature source Oral, resp. rate 23, height 5' 3\" (1.6 m), weight (!) 308 lb 13.8 oz (140.1 kg), SpO2 92 %, not currently breastfeeding.       Intake/Output Summary (Last 24 hours) at 6/11/2022 1528  Last data filed at 6/11/2022 1120  Gross per 24 hour   Intake 480 ml   Output 700 ml   Net -220 ml       General:  NAD, A+Ox3, ill-appearing, morbidly  obese body habitus  HEENT:  PERRL, EOMI  Neck:  Supple, normal range of movement, thyroid unremarkable  Chest:  CTAB, good respiratory effort, good air movement  CV:  RRR, no murmurs or rubs, no carotid bruit, no abdominal bruits  Abdomen:  NTND, soft, +BS, no hepatosplenomegaly  Extremities:  2+ edema  Neurological:  Moving all four extremities, CN II-XII grossly intact  Lymphatics:  No palpable lymph nodes  Skin:  No rash, no jaundice  Psychiatric:  poor insight and judgement, moderate recall    Laboratory Studies:  Lab Results   Component Value Date/Time     06/11/2022 06:45 AM    K 2.9 (LL) 06/11/2022 06:45 AM    CL 94 (L) 06/11/2022 06:45 AM    CO2 32 06/11/2022 06:45 AM    BUN 39 (H) 06/11/2022 06:45 AM    CREATININE 2.3 (H) 06/11/2022 06:45 AM    CALCIUM 8.8 06/11/2022 06:45 AM    PHOS 4.3 10/21/2019 09:49 AM    MG 2.60 (H) 06/11/2022 06:45 AM     Lab Results   Component Value Date/Time    WBC 10.0 06/10/2022 06:35 AM    HGB 11.5 (L) 06/10/2022 06:35 AM    HCT 35.2 (L) 06/10/2022 06:35 AM     06/10/2022 06:35 AM       Assessment/Plan:  Reviewed old records and labs.     1) JOB on CKD   - baseline Cr 1.4-1.7, sees Dr. Madelin Perales   - ddx:  pre-renal vs. ATN   - check urine studies   - will diurese the patient    2) edema   - cardiology does not think she has CHF   - not sure that the patient has diuresed   - increase lasix dose as it is nowhere close to her outpatient regimen   - she needs to keep in a bed with legs elevated to the level of her heart   - d/c nutritional supplement and start fluid restriction 1.2 liters/day   - needs cowan catheter    3) right heel wound and cellulitis   - wound care   - podiatry and ID following   - on cefepime    4) FEN   - hypokalemia    - supplemented    - magnesium level okay    5) AMS   - unclear if patient is confused or just a poor historian

## 2022-06-12 ENCOUNTER — APPOINTMENT (OUTPATIENT)
Dept: GENERAL RADIOLOGY | Age: 67
DRG: 623 | End: 2022-06-12
Payer: MEDICARE

## 2022-06-12 LAB
ANION GAP SERPL CALCULATED.3IONS-SCNC: 14 MMOL/L (ref 3–16)
BLOOD CULTURE, ROUTINE: NORMAL
BUN BLDV-MCNC: 47 MG/DL (ref 7–20)
CALCIUM SERPL-MCNC: 8.9 MG/DL (ref 8.3–10.6)
CHLORIDE BLD-SCNC: 95 MMOL/L (ref 99–110)
CO2: 33 MMOL/L (ref 21–32)
CREAT SERPL-MCNC: 2.1 MG/DL (ref 0.6–1.2)
CREATININE URINE: 92.6 MG/DL (ref 28–259)
GFR AFRICAN AMERICAN: 28
GFR NON-AFRICAN AMERICAN: 23
GLUCOSE BLD-MCNC: 125 MG/DL (ref 70–99)
GLUCOSE BLD-MCNC: 133 MG/DL (ref 70–99)
GLUCOSE BLD-MCNC: 180 MG/DL (ref 70–99)
GLUCOSE BLD-MCNC: 211 MG/DL (ref 70–99)
GLUCOSE BLD-MCNC: 271 MG/DL (ref 70–99)
GRAM STAIN RESULT: ABNORMAL
HCT VFR BLD CALC: 35.6 % (ref 36–48)
HEMOGLOBIN: 11.5 G/DL (ref 12–16)
MAGNESIUM: 2.5 MG/DL (ref 1.8–2.4)
MCH RBC QN AUTO: 29.6 PG (ref 26–34)
MCHC RBC AUTO-ENTMCNC: 32.4 G/DL (ref 31–36)
MCV RBC AUTO: 91.5 FL (ref 80–100)
ORGANISM: ABNORMAL
PDW BLD-RTO: 16.4 % (ref 12.4–15.4)
PERFORMED ON: ABNORMAL
PLATELET # BLD: 171 K/UL (ref 135–450)
PMV BLD AUTO: 7.3 FL (ref 5–10.5)
POTASSIUM REFLEX MAGNESIUM: 2.9 MMOL/L (ref 3.5–5.1)
POTASSIUM SERPL-SCNC: 3.6 MMOL/L (ref 3.5–5.1)
RBC # BLD: 3.89 M/UL (ref 4–5.2)
SODIUM BLD-SCNC: 142 MMOL/L (ref 136–145)
SODIUM URINE: 22 MMOL/L
UREA NITROGEN, UR: 474.9 MG/DL (ref 800–1666)
WBC # BLD: 8.1 K/UL (ref 4–11)
WOUND/ABSCESS: ABNORMAL
WOUND/ABSCESS: ABNORMAL

## 2022-06-12 PROCEDURE — 84132 ASSAY OF SERUM POTASSIUM: CPT

## 2022-06-12 PROCEDURE — 6360000002 HC RX W HCPCS: Performed by: INTERNAL MEDICINE

## 2022-06-12 PROCEDURE — 94761 N-INVAS EAR/PLS OXIMETRY MLT: CPT

## 2022-06-12 PROCEDURE — 2580000003 HC RX 258: Performed by: INTERNAL MEDICINE

## 2022-06-12 PROCEDURE — 80048 BASIC METABOLIC PNL TOTAL CA: CPT

## 2022-06-12 PROCEDURE — 6370000000 HC RX 637 (ALT 250 FOR IP): Performed by: INTERNAL MEDICINE

## 2022-06-12 PROCEDURE — 85027 COMPLETE CBC AUTOMATED: CPT

## 2022-06-12 PROCEDURE — 36415 COLL VENOUS BLD VENIPUNCTURE: CPT

## 2022-06-12 PROCEDURE — 1200000000 HC SEMI PRIVATE

## 2022-06-12 PROCEDURE — 71045 X-RAY EXAM CHEST 1 VIEW: CPT

## 2022-06-12 PROCEDURE — 83735 ASSAY OF MAGNESIUM: CPT

## 2022-06-12 RX ORDER — METHOCARBAMOL 750 MG/1
750 TABLET, FILM COATED ORAL 4 TIMES DAILY PRN
Status: DISCONTINUED | OUTPATIENT
Start: 2022-06-12 | End: 2022-06-17 | Stop reason: HOSPADM

## 2022-06-12 RX ORDER — POTASSIUM CHLORIDE 20 MEQ/1
60 TABLET, EXTENDED RELEASE ORAL ONCE
Status: COMPLETED | OUTPATIENT
Start: 2022-06-12 | End: 2022-06-12

## 2022-06-12 RX ADMIN — CEFEPIME HYDROCHLORIDE 2000 MG: 2 INJECTION, POWDER, FOR SOLUTION INTRAVENOUS at 15:46

## 2022-06-12 RX ADMIN — CETIRIZINE HYDROCHLORIDE 10 MG: 10 TABLET, FILM COATED ORAL at 20:54

## 2022-06-12 RX ADMIN — HYDROCODONE BITARTRATE AND ACETAMINOPHEN 1 TABLET: 5; 325 TABLET ORAL at 17:21

## 2022-06-12 RX ADMIN — FUROSEMIDE 80 MG: 10 INJECTION, SOLUTION INTRAMUSCULAR; INTRAVENOUS at 17:21

## 2022-06-12 RX ADMIN — INSULIN LISPRO 3 UNITS: 100 INJECTION, SOLUTION INTRAVENOUS; SUBCUTANEOUS at 12:47

## 2022-06-12 RX ADMIN — HYDROCODONE BITARTRATE AND ACETAMINOPHEN 1 TABLET: 5; 325 TABLET ORAL at 02:53

## 2022-06-12 RX ADMIN — ALLOPURINOL 200 MG: 100 TABLET ORAL at 08:49

## 2022-06-12 RX ADMIN — FUROSEMIDE 80 MG: 10 INJECTION, SOLUTION INTRAMUSCULAR; INTRAVENOUS at 08:49

## 2022-06-12 RX ADMIN — INSULIN LISPRO 5 UNITS: 100 INJECTION, SOLUTION INTRAVENOUS; SUBCUTANEOUS at 20:54

## 2022-06-12 RX ADMIN — SODIUM CHLORIDE, PRESERVATIVE FREE 10 ML: 5 INJECTION INTRAVENOUS at 21:00

## 2022-06-12 RX ADMIN — INSULIN GLARGINE 40 UNITS: 100 INJECTION, SOLUTION SUBCUTANEOUS at 08:58

## 2022-06-12 RX ADMIN — INSULIN GLARGINE 40 UNITS: 100 INJECTION, SOLUTION SUBCUTANEOUS at 20:55

## 2022-06-12 RX ADMIN — SODIUM CHLORIDE, PRESERVATIVE FREE 10 ML: 5 INJECTION INTRAVENOUS at 08:49

## 2022-06-12 RX ADMIN — POTASSIUM CHLORIDE 60 MEQ: 20 TABLET, EXTENDED RELEASE ORAL at 11:42

## 2022-06-12 RX ADMIN — INSULIN LISPRO 6 UNITS: 100 INJECTION, SOLUTION INTRAVENOUS; SUBCUTANEOUS at 17:40

## 2022-06-12 RX ADMIN — HYDROCODONE BITARTRATE AND ACETAMINOPHEN 1 TABLET: 5; 325 TABLET ORAL at 21:47

## 2022-06-12 RX ADMIN — HYDROCODONE BITARTRATE AND ACETAMINOPHEN 1 TABLET: 5; 325 TABLET ORAL at 08:51

## 2022-06-12 RX ADMIN — FLUTICASONE PROPIONATE 1 SPRAY: 50 SPRAY, METERED NASAL at 08:58

## 2022-06-12 RX ADMIN — POTASSIUM CHLORIDE 10 MEQ: 7.46 INJECTION, SOLUTION INTRAVENOUS at 09:02

## 2022-06-12 RX ADMIN — HYDROCODONE BITARTRATE AND ACETAMINOPHEN 1 TABLET: 5; 325 TABLET ORAL at 12:47

## 2022-06-12 RX ADMIN — SODIUM CHLORIDE, PRESERVATIVE FREE 10 ML: 5 INJECTION INTRAVENOUS at 03:28

## 2022-06-12 RX ADMIN — METOPROLOL SUCCINATE 100 MG: 50 TABLET, EXTENDED RELEASE ORAL at 08:48

## 2022-06-12 RX ADMIN — CEFEPIME HYDROCHLORIDE 2000 MG: 2 INJECTION, POWDER, FOR SOLUTION INTRAVENOUS at 03:02

## 2022-06-12 ASSESSMENT — PAIN DESCRIPTION - ONSET
ONSET: ON-GOING
ONSET: ON-GOING

## 2022-06-12 ASSESSMENT — PAIN DESCRIPTION - ORIENTATION
ORIENTATION: LOWER
ORIENTATION: LOWER

## 2022-06-12 ASSESSMENT — PAIN SCALES - WONG BAKER: WONGBAKER_NUMERICALRESPONSE: 0

## 2022-06-12 ASSESSMENT — PAIN DESCRIPTION - FREQUENCY
FREQUENCY: INTERMITTENT
FREQUENCY: INTERMITTENT

## 2022-06-12 ASSESSMENT — PAIN SCALES - GENERAL
PAINLEVEL_OUTOF10: 7
PAINLEVEL_OUTOF10: 7
PAINLEVEL_OUTOF10: 6
PAINLEVEL_OUTOF10: 8
PAINLEVEL_OUTOF10: 7

## 2022-06-12 ASSESSMENT — PAIN DESCRIPTION - PAIN TYPE
TYPE: CHRONIC PAIN
TYPE: CHRONIC PAIN

## 2022-06-12 ASSESSMENT — PAIN - FUNCTIONAL ASSESSMENT
PAIN_FUNCTIONAL_ASSESSMENT: ACTIVITIES ARE NOT PREVENTED
PAIN_FUNCTIONAL_ASSESSMENT: ACTIVITIES ARE NOT PREVENTED

## 2022-06-12 ASSESSMENT — PAIN DESCRIPTION - LOCATION
LOCATION: BACK

## 2022-06-12 ASSESSMENT — PAIN DESCRIPTION - DESCRIPTORS
DESCRIPTORS: ACHING
DESCRIPTORS: ACHING

## 2022-06-12 NOTE — PROGRESS NOTES
Nephrology ([facility]) Progress Note    CC: JOB on CKD    Subjective:    HPI:  Breathing unchanged. No CP.  O > I. Renal function slightly better. ROS:  In bed. No fever. [address]:  medications reviewed. Objective:  Blood pressure (!) 149/77, pulse 73, temperature 98.4 °F (36.9 °C), temperature source Axillary, resp. rate 25, height 5' 3\" (1.6 m), weight (!) 316 lb 12.8 oz (143.7 kg), SpO2 98 %, not currently breastfeeding. Intake/Output Summary (Last 24 hours) at 6/12/2022 1627  Last data filed at 6/12/2022 1252  Gross per 24 hour   Intake 780 ml   Output 2750 ml   Net -1970 ml     General:  NAD, A+OX3  Chest:  CTAB  CVS:  RRR  Abdominal:  NTND, soft, +BS  Extremities:  2+ edema  Skin:  no rash    Labs:  Renal panel:  Lab Results   Component Value Date/Time     06/12/2022 05:59 AM    K 3.6 06/12/2022 03:54 PM    K 2.9 (LL) 06/12/2022 05:59 AM    CO2 33 (H) 06/12/2022 05:59 AM    BUN 47 (H) 06/12/2022 05:59 AM    CREATININE 2.1 (H) 06/12/2022 05:59 AM    CALCIUM 8.9 06/12/2022 05:59 AM    PHOS 4.3 10/21/2019 09:49 AM    MG 2.50 (H) 06/12/2022 05:59 AM     CBC:  Lab Results   Component Value Date/Time    WBC 8.1 06/12/2022 05:59 AM    HGB 11.5 (L) 06/12/2022 05:59 AM    HCT 35.6 (L) 06/12/2022 05:59 AM     06/12/2022 05:59 AM       Assessment/Plan:  Reviewed old records and labs.     1) JOB on CKD              - baseline Cr 1.4-1.7, sees Dr. Madelin Perales              - ddx:  pre-renal vs. ATN   - renal function slightly better              - will diurese the patient      2) edema              - cardiology does not think she has CHF              - not sure that the patient has diuresed              - increase lasix dose as it is nowhere close to her outpatient regimen              - she needs to keep in a bed with legs elevated to the level of her heart              - d/c nutritional supplement and start fluid restriction 1.2 liters/day              - cowan catheter     3) right heel wound and cellulitis              - wound care              - podiatry and ID following              - on cefepime     4) FEN              - hypokalemia                          - supplemented                          - magnesium level okay      5) AMS              - unclear if patient is confused or just a poor historian

## 2022-06-12 NOTE — PROGRESS NOTES
Hospitalist Progress Note      PCP: Rafael Marie MD    Date of Admission: 6/8/2022    Chief Complaint: shortness of breath    Hospital Course: Patient is a 15-year-old female with past medical history of diabetes mellitus, CKD, essential hypertension, pulmonary hypertension who presents to the hospital due to multiple complaints, according to the patient she has been having bilateral lower extremity swelling, redness, she is not able to walk, she also has noticed wound on her right heel and she is concerned it may be infected.  Patient also mentions she has pain in her legs due to the swelling and redness.  Patient also mentions she feels shortness of breath as well, mentions it feels heavy for her to take a deep breathing.  She denies chest pain nausea vomiting diarrhea constipation, mentions her urine appears cloudy. Subjective: Pt seen and examined. Feels ok, a little drowsy today.       Medications:  Reviewed    Infusion Medications    sodium chloride 100 mL/hr at 06/09/22 0643    dextrose       Scheduled Medications    [Held by provider] insulin lispro  25 Units SubCUTAneous TID WC    furosemide  80 mg IntraVENous BID    enoxaparin  30 mg SubCUTAneous BID    cefepime  2,000 mg IntraVENous Q12H    fluticasone  1 spray Each Nostril Daily    insulin lispro  0-18 Units SubCUTAneous TID WC    insulin lispro  0-9 Units SubCUTAneous Nightly    collagenase   Topical Daily    gentamicin   Topical Daily    sodium chloride flush  10 mL IntraVENous 2 times per day    allopurinol  200 mg Oral Daily    metoprolol succinate  100 mg Oral Daily    insulin glargine  40 Units SubCUTAneous BID    cetirizine  10 mg Oral Nightly     PRN Meds: methocarbamol, HYDROcodone-acetaminophen, sodium chloride flush, sodium chloride, potassium chloride **OR** potassium alternative oral replacement **OR** potassium chloride, magnesium sulfate, promethazine **OR** ondansetron, magnesium hydroxide, acetaminophen **OR** acetaminophen, glucose, dextrose bolus **OR** dextrose bolus, glucagon (rDNA), dextrose      Intake/Output Summary (Last 24 hours) at 6/12/2022 1625  Last data filed at 6/12/2022 1252  Gross per 24 hour   Intake 780 ml   Output 2750 ml   Net -1970 ml       Exam:    BP (!) 149/77   Pulse 73   Temp 98.4 °F (36.9 °C) (Axillary)   Resp 25   Ht 5' 3\" (1.6 m)   Wt (!) 316 lb 12.8 oz (143.7 kg)   SpO2 98%   BMI 56.12 kg/m²     General appearance: Chronically ill appearing. No apparent distress, appears stated age and cooperative. HEENT: Pupils equal, round, and reactive to light. Conjunctivae/corneas clear. Neck: Supple, with full range of motion. No jugular venous distention. Trachea midline. Respiratory:  Normal respiratory effort. Clear to auscultation, bilaterally without Rales/Wheezes/Rhonchi. Cardiovascular: Regular rate and rhythm with normal S1/S2 without murmurs, rubs or gallops. Abdomen: Soft, non-tender, non-distended with normal bowel sounds. Musculoskeletal: No clubbing, cyanosis or edema bilaterally. Full range of motion without deformity. Skin: Right heel unstageable decubitus heel ulceration. Neurologic:  Neurovascularly intact without any focal sensory/motor deficits. Cranial nerves: II-XII intact, grossly non-focal.  Psychiatric: Alert and oriented, thought content appropriate, normal insight  Capillary Refill: Brisk,< 3 seconds   Peripheral Pulses: +2 palpable, equal bilaterally       Labs:   Recent Labs     06/10/22  0635 06/12/22  0559   WBC 10.0 8.1   HGB 11.5* 11.5*   HCT 35.2* 35.6*    171     Recent Labs     06/10/22  0635 06/11/22  0645 06/12/22  0559    138 142   K 3.2* 2.9* 2.9*   CL 93* 94* 95*   CO2 35* 32 33*   BUN 34* 39* 47*   CREATININE 2.0* 2.3* 2.1*   CALCIUM 8.6 8.8 8.9     No results for input(s): AST, ALT, BILIDIR, BILITOT, ALKPHOS in the last 72 hours. No results for input(s): INR in the last 72 hours.   No results for input(s): Willis Rodriguez in the last 72 hours. Urinalysis:      Lab Results   Component Value Date    NITRU POSITIVE 06/08/2022    WBCUA 25 06/08/2022    BACTERIA 3+ 06/08/2022    RBCUA 5-10 06/08/2022    RBCUA NEGATIVE 04/18/2018    BLOODU MODERATE 06/08/2022    SPECGRAV 1.013 06/08/2022    GLUCOSEU Negative 06/08/2022       Radiology:  XR CHEST PORTABLE   Final Result   1. No significant change. MRI FOOT RIGHT WO CONTRAST   Final Result   1. Plantar heel ulcer measuring 1.5 x 2 cm with mild adjacent cellulitis. No   soft tissue abscess. 2. Trace edema in the plantar calcaneus adjacent to the ulcer concerning for   mild acute osteomyelitis. XR FOOT RIGHT (MIN 3 VIEWS)   Final Result      1. No plain radiographic evidence of osteomyelitis. 2.  Diffuse soft tissue swelling, especially within the dorsum of the foot. 3.  Otherwise no acute bony abnormality is noted. CT CHEST WO CONTRAST   Final Result   Mild degree of subsegmental atelectasis. Otherwise no acute cardiopulmonary disease. XR CHEST PORTABLE   Final Result   Pulmonary vascular congestion. Evaluation of the left base limited by soft   tissue attenuation artifact.                  Assessment/Plan:    Active Hospital Problems    Diagnosis Date Noted    Acute respiratory failure with hypoxia ([facility]) [J96.01]      Priority: Medium    Gram-negative bacteremia [R78.81]      Priority: Medium    Elevated C-reactive protein (CRP) [R79.82]      Priority: Medium    E. coli UTI (urinary tract infection) [N39.0, B96.20]      Priority: Medium    Diabetic polyneuropathy associated with type 2 diabetes mellitus ([facility] [E11.42]      Priority: Medium    Diabetes mellitus type 2 in obese ([facility]) [E11.69, E66.9]      Priority: Medium    Elevated sed rate [R70.0]      Priority: Medium    Serratia marcescens infection [A48.8]      Priority: Medium    Pseudomonas infection [A49.8]      Priority: Medium    Proteus infection [A49.8]      Priority: Medium    Cellulitis [L03.90] 06/08/2022     Priority: Medium    Morbid obesity due to excess calories ([facility] [E66.01] 01/14/2019    Type 2 diabetes mellitus with left diabetic foot infection ([facility] [B30.685, L08.9]     Essential hypertension [I10]        Acute respiratory failure with hypoxia  -diuresis  -wean oxygen to maintain SpO2 > 89%  -incentive spirometer    CKD stage III  -avoid nephrotoxic meds  -trend and monitor    Elevated troponin  -cardiology consulted, doesn't believe this is consistent with ACS  -tele montioring    Chronic diastolic CHF - does not appear to be in exacerbation  -cardiology consulted  -continue home meds    Bilateral lower extremity cellulitis  -on empiric IV Abx  -ID consulted, recs appreciated    Diabetic foot infection  -podiatry consulted, recs appreciated  -ID consulted, recs appreciated  -on empiric cefepime; abx per ID; should cover isolated organisms but await sensitivities  -MRI right foot pending to r/o osteomyelitis    Proteus bacteremia  -repeat blood cultures 6/10  -on empiric cefepime which should cover  -ID consulted, recs appreciated    Type 2 DM  -Lantus, Humalog, and SSI  -hypoglycemia protocol  -POCT glucose checks  -carb control diet    Morbid obesity due to excess calories  -nutritional counseling provided  -weight loss encouraged  -complicating medical management      DVT Prophylaxis: Lovenox  Diet: ADULT DIET;  Regular; 4 carb choices (60 gm/meal); 1200 ml  Code Status: Full Code    PT/OT Eval Status: ordered    Dispo - continue care    Rosalind Zamora MD

## 2022-06-12 NOTE — PROGRESS NOTES
On arrival to room patients oxygen saturation in 60's placed on 10L increased to 100% lowered back to 4L    2040 patient took off O2 dropped to 55% O2 increased to 7L    Continuing to monitor will titrate as able.

## 2022-06-12 NOTE — PROGRESS NOTES
Department of Podiatric Surgery  Progress Note  6/12/2022  Shilpi Ortiz      HISTORY OF PRESENT ILLNESS:      The patient is a 74 y.o. female who presents with right heel wound and infection, cellulitis The patient sleeps with c-pap normally and is not that active at home. She had to sleep in a wingback chair for the last several days and her legs were down. She is diabetic and has neuropathy. She has trouble with mobility at baseline and wears slippers at home 90% of the time. She has not had a problem with the heel before, but has had wounds venous nature recently. 6/12/22:  The patient feels good, slept 6 hours in bed, now up to chair with feet elevated  in room, documents wound progress with pictures at dressing changes    Past Medical History:        Diagnosis Date    Anemia     Chronic bilateral low back pain without sciatica     CKD (chronic kidney disease) stage 3, GFR 30-59 ml/min ([facility])     Colon cancer ([facility])     Diabetes ([address]     [facility]     Diabetes with retinopathy ([facility]     DM eye exam 12/18/17    Hypertension     Pancreatic cyst     Positive FIT (fecal immunochemical test)     Pulmonary hypertension ([facility])          ALLERGIES    Allergies   Allergen Reactions    Pcn [Penicillins] Shortness Of Breath    Spironolactone Rash    Coreg [Carvedilol]      Side effects:  SOB, palpitations    Lisinopril Other (See Comments)     cough    Flexeril [Cyclobenzaprine] Rash       REVIEW OF SYSTEMS:  CONSTITUTIONAL:  negative  EYES:  negative  HEENT:  negative  RESPIRATORY:  Dyspnea resolved  CARDIOVASCULAR:  positive for  orthopnea, edema  ENDOCRINE:  positive for diabetic symptoms including neuropathy  MUSCULOSKELETAL:  positive for  pain and swelling of the right heel  NEUROLOGICAL:  negative    PHYSICAL EXAM:  VITALS:  BP (!) 146/78   Pulse 70   Temp 98.4 °F (36.9 °C) (Axillary)   Resp 18   Ht 5' 3\" (1.6 m)   Wt (!) 316 lb 12.8 oz (143.7 kg)   SpO2 97%   BMI 56.12 kg/m² CONSTITUTIONAL:  awake, alert, cooperative, no apparent distress, and appears stated age  EYES:  pupils equal, round and reactive to light, extra ocular muscles intact, sclera clear, conjunctiva normal      LOWER EXTREMITY:  VASCULAR: Pedal Pulses present. negative signs of ischemia. MUSCULOSKELETAL:  negative gross deformity. NEUROLOGIC:  Epicritic sensation, light touch, joint position sensediminished  SKIN:  The rk-wound erythema is resolved. The central aspect remains necrotic, but the surrounding tissues are granular. RIGHT plantar heel with 2.2 x 1.9 cn deeply penetrating 0.45 cm wound to MUSCLE, with improvement to dark necrosis smaller and less necrosis noted. No calor, no odor with continued use of Santyl and Gentamycin    The left leg has superficial granular wound on the posterior calf with evidence of mild erythema surrounding that has appearance of chronic venous stasis. I/O:    Intake/Output Summary (Last 24 hours) at 6/12/2022 1150  Last data filed at 6/12/2022 1144  Gross per 24 hour   Intake 780 ml   Output 1850 ml   Net -1070 ml              Wt Readings from Last 3 Encounters:   06/12/22 (!) 316 lb 12.8 oz (143.7 kg)   03/08/22 (!) 315 lb 12.8 oz (143.2 kg)   01/07/21 294 lb (133.4 kg)       LABS:    Recent Labs     06/10/22  0635 06/12/22  0559   WBC 10.0 8.1   HGB 11.5* 11.5*   HCT 35.2* 35.6*    171        Recent Labs     06/12/22  0559      K 2.9*   CL 95*   CO2 33*   BUN 47*   CREATININE 2.1*        Recent Labs     06/10/22  0013 06/10/22  0635   APTT 25.6 27.6       IMAGING:  X-rays:     mpression       1.  No plain radiographic evidence of osteomyelitis.       2.  Diffuse soft tissue swelling, especially within the dorsum of the foot.       3.  Otherwise no acute bony abnormality is noted.      MICRO:   Culture, Wound [[id number]] (Abnormal) Collected: 06/08/22 0935   Order Status: Completed Specimen: Leg Updated: 06/09/22 1356    Gram Stain Result No Epithelial Cells seen   1+ WBC's (Polymorphonuclear)   2+ Gram negative rods   3+ Gram positive cocci  in clusters-resembling Staph    Abnormal     Organism Serratia marcescens Abnormal     WOUND/ABSCESS --    Light growth   Sensitivity to follow     Organism Pseudomonas aeruginosa Abnormal     WOUND/ABSCESS --    Light growth   Sensitivity to follow     Organism Proteus species Abnormal     WOUND/ABSCESS --    Rare growth   No further workup    Narrative:     ORDER#: [id number]                          ORDERED BY: Carmela Gipson   SOURCE: Leg                                COLLECTED:  06/08/22 09:35          ASSESSMENT   MUGS 3 heel ulceration, right heel  Right foot diabetic foot infection  Venous wound, left posterior calf  Diabetes with peripheral neuropathy    PLAN:  Evaluation and Management x 30 minutes with greater than 50% of the time spent with the patient discussing the etiology and treatment options of the chief complaint. 1. Right heel    Santyl and Gentamicin applied for combined treatment of the necrotic tissues and suspected bacterial infection    Multi-layer compression dressing applied from sulcus of toes to the knee. Educated patient and family on the importance of off loading    2. Venous stasis wound, left   Multi-layer compression dressing to the left leg. Non-adherent layer applied over the open wound    DISPO: Plan for wound care and antibiotics. May need further debridement if there is no clinical improvement. Thanks for the opportunity to participate in this patient's care.      Elsy Reyez DPM   [facility]  Cell [phone]  Office: [phone]  Fax: [phone]

## 2022-06-12 NOTE — PROGRESS NOTES
Pt states that she cannot tolerate laying in bed due to back pain. Pt also desats when laying down per night shift RN. Pt up in chair and said she is willing to keep her legs elevated in the chair, however will not lay back to keep them at level of her heart per nephrology's instruction. Pt was unable to tolerate IV potassium replacement protocol, MD notified. See new orders.

## 2022-06-13 LAB
ANION GAP SERPL CALCULATED.3IONS-SCNC: 13 MMOL/L (ref 3–16)
BLOOD CULTURE, ROUTINE: NORMAL
BUN BLDV-MCNC: 51 MG/DL (ref 7–20)
CALCIUM SERPL-MCNC: 8.7 MG/DL (ref 8.3–10.6)
CHLORIDE BLD-SCNC: 92 MMOL/L (ref 99–110)
CO2: 34 MMOL/L (ref 21–32)
CREAT SERPL-MCNC: 2.2 MG/DL (ref 0.6–1.2)
GFR AFRICAN AMERICAN: 27
GFR NON-AFRICAN AMERICAN: 22
GLUCOSE BLD-MCNC: 147 MG/DL (ref 70–99)
GLUCOSE BLD-MCNC: 149 MG/DL (ref 70–99)
GLUCOSE BLD-MCNC: 190 MG/DL (ref 70–99)
GLUCOSE BLD-MCNC: 198 MG/DL (ref 70–99)
GLUCOSE BLD-MCNC: 224 MG/DL (ref 70–99)
MAGNESIUM: 2.4 MG/DL (ref 1.8–2.4)
PERFORMED ON: ABNORMAL
POTASSIUM REFLEX MAGNESIUM: 3.3 MMOL/L (ref 3.5–5.1)
SODIUM BLD-SCNC: 139 MMOL/L (ref 136–145)

## 2022-06-13 PROCEDURE — 6370000000 HC RX 637 (ALT 250 FOR IP): Performed by: INTERNAL MEDICINE

## 2022-06-13 PROCEDURE — 2580000003 HC RX 258: Performed by: INTERNAL MEDICINE

## 2022-06-13 PROCEDURE — 97110 THERAPEUTIC EXERCISES: CPT

## 2022-06-13 PROCEDURE — 2700000000 HC OXYGEN THERAPY PER DAY

## 2022-06-13 PROCEDURE — 6360000002 HC RX W HCPCS: Performed by: INTERNAL MEDICINE

## 2022-06-13 PROCEDURE — 80048 BASIC METABOLIC PNL TOTAL CA: CPT

## 2022-06-13 PROCEDURE — 36415 COLL VENOUS BLD VENIPUNCTURE: CPT

## 2022-06-13 PROCEDURE — 83735 ASSAY OF MAGNESIUM: CPT

## 2022-06-13 PROCEDURE — 1200000000 HC SEMI PRIVATE

## 2022-06-13 PROCEDURE — 99233 SBSQ HOSP IP/OBS HIGH 50: CPT | Performed by: INTERNAL MEDICINE

## 2022-06-13 PROCEDURE — 94761 N-INVAS EAR/PLS OXIMETRY MLT: CPT

## 2022-06-13 RX ORDER — CIPROFLOXACIN 2 MG/ML
400 INJECTION, SOLUTION INTRAVENOUS EVERY 12 HOURS
Status: DISCONTINUED | OUTPATIENT
Start: 2022-06-13 | End: 2022-06-15

## 2022-06-13 RX ADMIN — INSULIN LISPRO 3 UNITS: 100 INJECTION, SOLUTION INTRAVENOUS; SUBCUTANEOUS at 13:41

## 2022-06-13 RX ADMIN — FUROSEMIDE 80 MG: 10 INJECTION, SOLUTION INTRAMUSCULAR; INTRAVENOUS at 09:17

## 2022-06-13 RX ADMIN — CEFEPIME HYDROCHLORIDE 2000 MG: 2 INJECTION, POWDER, FOR SOLUTION INTRAVENOUS at 02:13

## 2022-06-13 RX ADMIN — FLUTICASONE PROPIONATE 1 SPRAY: 50 SPRAY, METERED NASAL at 09:17

## 2022-06-13 RX ADMIN — ALLOPURINOL 200 MG: 100 TABLET ORAL at 09:17

## 2022-06-13 RX ADMIN — POTASSIUM CHLORIDE 40 MEQ: 20 TABLET, EXTENDED RELEASE ORAL at 12:26

## 2022-06-13 RX ADMIN — SODIUM CHLORIDE, PRESERVATIVE FREE 10 ML: 5 INJECTION INTRAVENOUS at 21:59

## 2022-06-13 RX ADMIN — METOPROLOL SUCCINATE 100 MG: 50 TABLET, EXTENDED RELEASE ORAL at 09:17

## 2022-06-13 RX ADMIN — INSULIN GLARGINE 40 UNITS: 100 INJECTION, SOLUTION SUBCUTANEOUS at 21:34

## 2022-06-13 RX ADMIN — HYDROCODONE BITARTRATE AND ACETAMINOPHEN 1 TABLET: 5; 325 TABLET ORAL at 09:16

## 2022-06-13 RX ADMIN — HYDROCODONE BITARTRATE AND ACETAMINOPHEN 1 TABLET: 5; 325 TABLET ORAL at 18:33

## 2022-06-13 RX ADMIN — FUROSEMIDE 5 MG/HR: 10 INJECTION, SOLUTION INTRAMUSCULAR; INTRAVENOUS at 14:21

## 2022-06-13 RX ADMIN — CIPROFLOXACIN 400 MG: 2 INJECTION, SOLUTION INTRAVENOUS at 21:36

## 2022-06-13 RX ADMIN — INSULIN LISPRO 6 UNITS: 100 INJECTION, SOLUTION INTRAVENOUS; SUBCUTANEOUS at 18:00

## 2022-06-13 RX ADMIN — SODIUM CHLORIDE, PRESERVATIVE FREE 10 ML: 5 INJECTION INTRAVENOUS at 09:17

## 2022-06-13 RX ADMIN — HYDROCODONE BITARTRATE AND ACETAMINOPHEN 1 TABLET: 5; 325 TABLET ORAL at 02:08

## 2022-06-13 RX ADMIN — CIPROFLOXACIN 400 MG: 2 INJECTION, SOLUTION INTRAVENOUS at 10:06

## 2022-06-13 RX ADMIN — HYDROCODONE BITARTRATE AND ACETAMINOPHEN 1 TABLET: 5; 325 TABLET ORAL at 22:32

## 2022-06-13 RX ADMIN — INSULIN GLARGINE 40 UNITS: 100 INJECTION, SOLUTION SUBCUTANEOUS at 08:30

## 2022-06-13 RX ADMIN — HYDROCODONE BITARTRATE AND ACETAMINOPHEN 1 TABLET: 5; 325 TABLET ORAL at 14:35

## 2022-06-13 RX ADMIN — INSULIN LISPRO 2 UNITS: 100 INJECTION, SOLUTION INTRAVENOUS; SUBCUTANEOUS at 21:34

## 2022-06-13 RX ADMIN — CETIRIZINE HYDROCHLORIDE 10 MG: 10 TABLET, FILM COATED ORAL at 21:34

## 2022-06-13 ASSESSMENT — PAIN DESCRIPTION - LOCATION
LOCATION: BACK

## 2022-06-13 ASSESSMENT — PAIN - FUNCTIONAL ASSESSMENT
PAIN_FUNCTIONAL_ASSESSMENT: ACTIVITIES ARE NOT PREVENTED

## 2022-06-13 ASSESSMENT — PAIN DESCRIPTION - PAIN TYPE
TYPE: CHRONIC PAIN

## 2022-06-13 ASSESSMENT — PAIN SCALES - GENERAL
PAINLEVEL_OUTOF10: 7
PAINLEVEL_OUTOF10: 8
PAINLEVEL_OUTOF10: 4
PAINLEVEL_OUTOF10: 1
PAINLEVEL_OUTOF10: 4
PAINLEVEL_OUTOF10: 7
PAINLEVEL_OUTOF10: 0

## 2022-06-13 ASSESSMENT — PAIN DESCRIPTION - FREQUENCY
FREQUENCY: INTERMITTENT

## 2022-06-13 ASSESSMENT — PAIN DESCRIPTION - ORIENTATION
ORIENTATION: LOWER
ORIENTATION: MID;LOWER

## 2022-06-13 ASSESSMENT — PAIN DESCRIPTION - DESCRIPTORS
DESCRIPTORS: DISCOMFORT
DESCRIPTORS: ACHING

## 2022-06-13 ASSESSMENT — PAIN DESCRIPTION - ONSET
ONSET: ON-GOING

## 2022-06-13 NOTE — PROGRESS NOTES
Physical Therapy  Facility/Department: [address] PROGRESSIVE CARE  Physical Therapy Daily Progress Note    Name: Bernadine Lyon  : 1955  MRN: [id number]  Date of Service: 2022    Discharge Recommendations:  24 hour supervision or assist,Home with Home health PT (pt refuses SNF)   PT Equipment Recommendations  Equipment Needed: Yes  Mobility Devices: Wheelchair  Wheelchair: Wheelchair Cushion Pressure Relieving      Patient Diagnosis(es): The primary encounter diagnosis was Acute respiratory failure with hypoxia ([address] Diagnoses of Cellulitis of right lower extremity, JOB (acute kidney injury) ([address] Elevated troponin, Pulmonary vascular congestion, and Urinary tract infection without hematuria, site unspecified were also pertinent to this visit. Past Medical History:  has a past medical history of Anemia, Chronic bilateral low back pain without sciatica, CKD (chronic kidney disease) stage 3, GFR 30-59 ml/min ([facility]), Colon cancer ([address] Diabetes ([address] Diabetes ([address] Diabetes with retinopathy ([address] Hypertension, Pancreatic cyst, Positive FIT (fecal immunochemical test), and Pulmonary hypertension ([address] Past Surgical History:  has a past surgical history that includes Upper gastrointestinal endoscopy (N/A, 2018); hemicolectomy (Right, 2018); pr office/outpt visit,procedure only (Right, 2018); [place] tooth extraction; Colonoscopy (N/A, 2018); Colonoscopy (2019); and Colonoscopy (N/A, 2019). Assessment   Body Structures, Functions, Activity Limitations Requiring Skilled Therapeutic Intervention: Decreased functional mobility   Assessment: pt is a 75 yo female who was adm to hosp with LE swelling, weakness and a heel wound; pt at baseline is Ind with a RW; Pt now is TTWB for balance only with R LE and to off load heel.  Pt is not able to maintain TTWB but attempts to only WB through R toes; recommend pt use w/c at discharge and have a BSC to off load heel as much as possible to limit need to WB with functional tasks; pt refuses a SNF and adamant she will return home therefore recommend 24/7 assist and home PT. Today pt participted in bed ex for UE and LE but would not get out of bed until lunch. Therapy Prognosis: Fair;Guarded  Activity Tolerance  Activity Tolerance Comments: pt only willing to participate in bed ex a this time     Plan   Plan  Plan: 3-5 times per week  Current Treatment Recommendations: Functional mobility training,Wheelchair mobility training  Safety Devices  Type of Devices: Call light within reach,Nurse notified,Gait belt,Left in bed  Restraints  Restraints Initially in Place: No     Restrictions  Restrictions/Precautions  Restrictions/Precautions: Fall Risk,Weight Bearing  Lower Extremity Weight Bearing Restrictions  Right Lower Extremity Weight Bearing: Toe Touch Weight Bearing  Position Activity Restriction  Other position/activity restrictions: TTWB RLE due to heel wound     Subjective   General  Chart Reviewed: Yes  Additional Pertinent Hx: per H&P note: \"Patient is a 49-year-old female with past medical history of diabetes mellitus, CKD, essential hypertension, pulmonary hypertension who presents to the hospital due to multiple complaints, according to the patient she has been having bilateral lower extremity swelling, redness, she is not able to walk, she also has noticed wound on her right heel and she is concerned it may be infected. Patient also mentions she has pain in her legs due to the swelling and redness. Patient also mentions she feels shortness of breath as well, mentions it feels heavy for her to take a deep breathing. She denies chest pain nausea vomiting diarrhea constipation, mentions her urine appears cloudy. \"  Response To Previous Treatment: Patient reporting fatigue but able to participate.   Family / Caregiver Present: No  Referring Practitioner: Dr Deal Certain  Referral Date : 06/08/22  Follows Commands: Within Functional Limits  Subjective  Subjective: pt irritated by current medical tx/condition; finds all the beds and recliners to be uncomfortable         Social/Functional History  Social/Functional History  Lives With: Spouse  Type of Home:  (loft)  Home Layout: One level  Home Access: Elevator  Bathroom Shower/Tub: Tub/Shower unit,Shower chair without back  Bathroom Toilet: Standard  Bathroom Equipment: Grab bars in [facility] chair  Home Equipment: Walker, rolling  Has the patient had two or more falls in the past year or any fall with injury in the past year?: No  Receives Help From: Family  ADL Assistance: Needs assistance ( assists with LB dressing)  Homemaking Assistance: Needs assistance ( completes)  Homemaking Responsibilities: No  Ambulation Assistance: Independent (RW)  Transfer Assistance: Independent  Active : Yes (not recently)  Mode of Transportation: Car  Occupation: Retired  IADL Comments: sleeps in flat bed  Additional Comments: Pt reports moving on Tuesday to multi-level house with , daughter and son-in-law. 2 PERCY from garage with grab bar. Reports has chair lift.   Vision/Hearing       Cognition         Objective   Heart Rate: 78  Heart Rate Source: Monitor  BP: 130/74  BP Location: Right upper arm  BP Method: Automatic  Patient Position: Lying left side  MAP (Calculated): 92.67  Resp: 22  SpO2: 93 %  O2 Device: Nasal cannula      A/AROM Exercises: B LE AP, ankle circles x 5 each, supine add sets, GS, QS, bicep curls, shoulder flexion, heel slides with sliding sheet and R heel unweighted, shoulder IR/ER x 10, hip abduction with sliding sheet and R heel unweighted      AM-PAC Score  AM-PAC Inpatient Mobility Raw Score : 15 (06/13/22 1215)  AM-PAC Inpatient T-Scale Score : 39.45 (06/13/22 1215)  Mobility Inpatient CMS 0-100% Score: 57.7 (06/13/22 1215)  Mobility Inpatient CMS G-Code Modifier : CK (06/13/22 1215)     Goals  Short Term Goals  Time Frame for Short term goals: by discharge  Short term goal 1: bed mob MI from flat bed  Short term goal 2: transfers SBA from bed<-> w/c with using RW and maintaining TTWB R  Short term goal 3: d/c amb goal due to pt not able to maintain TTWB R;  Patient Goals   Patient goals : to return home soon     Therapy Time   Individual Concurrent Group Co-treatment   Time In 1135         Time Out 1205         Minutes 30         Timed Code Treatment Minutes: 30 Minutes   charges = 30 min jennifer Hayden, [id number]

## 2022-06-13 NOTE — PROGRESS NOTES
Patient refusing weight and to take oral medications at this time. Patient feels there is \" something wrong\" with her medications. Upon morning assessment patient verbalized how uncomfortable the furniture is. Unhappy with the bed and chair. Both bed and chair have been replaced for patient satisfaction. This nurse has asked pharmacy to review medications and to review them with patient at bedside as well. Hospitalist updated and this nurse asked to come review plan of care with patient as well.  is bedside. VS and assessment completed. Patient repositioned in chair with feet elevated. Call light and belongings within reach. Will continue to monitor.

## 2022-06-13 NOTE — PROGRESS NOTES
Infectious Diseases   Progress Note      Admission Date: 6/8/2022  Hospital Day: Hospital Day: 6   Attending: Shabana Simmons MD  Date of service: 6/13/2022     Chief complaint/ Reason for consult:     · Gram-negative bacteremia with Proteus  · Complicated E. coli UTI  · Complicated left diabetic foot infection with E. coli, Proteus, Pseudomonas, Serratia marcescens  · Essential hypertension  · History of pulmonary hypertension  · Chronic kidney disease stage III    Microbiology:        I have reviewed allavailable micro lab data and cultures    · Blood culture (2/2) - collected on 6/8/2022 and 6/10/2022: Negative  · Left foot wound culture  - collected on 6/9/2022: Proteus mirabilis    Susceptibility      Proteus mirabilis (1)    Antibiotic Interpretation Microscan  Method Status    ampicillin Sensitive <=8 mcg/mL BACTERIAL SUSCEPTIBILITY PANEL BY BISMARK     ampicillin-sulbactam Sensitive <=8/4 mcg/mL BACTERIAL SUSCEPTIBILITY PANEL BY BISMARK     ceFAZolin Sensitive <=2 mcg/mL BACTERIAL SUSCEPTIBILITY PANEL BY BISMARK     cefepime Sensitive <=2 mcg/mL BACTERIAL SUSCEPTIBILITY PANEL BY BISMARK     cefTRIAXone Sensitive <=1 mcg/mL BACTERIAL SUSCEPTIBILITY PANEL BY BISMARK     cefuroxime Sensitive <=4 mcg/mL BACTERIAL SUSCEPTIBILITY PANEL BY BISMARK     ciprofloxacin Sensitive <=1 mcg/mL BACTERIAL SUSCEPTIBILITY PANEL BY BISMARK     ertapenem Sensitive <=0.5 mcg/mL BACTERIAL SUSCEPTIBILITY PANEL BY BISMARK     gentamicin Sensitive <=4 mcg/mL BACTERIAL SUSCEPTIBILITY PANEL BY BISMARK     meropenem Sensitive <=1 mcg/mL BACTERIAL SUSCEPTIBILITY PANEL BY BISMARK     piperacillin-tazobactam Sensitive <=16 mcg/mL BACTERIAL SUSCEPTIBILITY PANEL BY BISMARK     trimethoprim-sulfamethoxazole Sensitive <=2/38 mcg/mL BACTERIAL SUSCEPTIBILITY PANEL BY BISMARK         Antibiotics and immunizations:       Current antibiotics: All antibiotics and their doses were reviewed by me    Recent Abx Admin                   ciprofloxacin (CIPRO) IVPB 400 mg (mg) 400 mg New Bag 06/13/22 1006    cefepime (MAXIPIME) 2000 mg IVPB minibag (mg) 2,000 mg New Bag 06/13/22 0213                  Immunization History: All immunization history was reviewed by me today. Immunization History   Administered Date(s) Administered    COVID-19, Moderna, Primary or Immunocompromised, PF, 100mcg/0.5mL 02/15/2021, 03/19/2021, 12/02/2021    Influenza Vaccine, unspecified formulation 12/31/2016, 12/12/2017    Influenza Virus Vaccine 12/12/2017    Influenza, Bouchra Childes, 6 mo and older, IM, PF (Flulaval, Fluarix) 11/02/2018    Influenza, Quadv, IM, PF (6 mo and older Fluzone, Flulaval, Fluarix, and 3 yrs and older Afluria) 12/31/2016    Influenza, Quadv, adjuvanted, 65 yrs +, IM, PF (Fluad) 10/28/2020    Influenza, Triv, inactivated, subunit, adjuvanted, IM (Fluad 65 yrs and older) 10/21/2019    Pneumococcal Polysaccharide ([id number]) 02/03/2020     · Leg wound culture: Collected on 6/8/2022: E. coli, Proteus, Pseudomonas, Serratia    Susceptibility     Serratia marcescens Pseudomonas aeruginosa     BACTERIAL SUSCEPTIBILITY PANEL BY BISMARK BACTERIAL SUSCEPTIBILITY PANEL BY BISMARK BACTERIAL SUSCEPTIBILITY PANEL BY E-TEST      amoxicillin-clavulanate >16/8 mcg/mL Resistant         ampicillin >16 mcg/mL Resistant         ampicillin-sulbactam >16/8 mcg/mL Resistant         ceFAZolin >16 mcg/mL Resistant         cefepime <=2 mcg/mL Sensitive <=2 mcg/mL Sensitive       cefTRIAXone <=1 mcg/mL Sensitive         cefuroxime >16 mcg/mL Resistant         ciprofloxacin <=1 mcg/mL Sensitive <=1 mcg/mL Sensitive       ertapenem <=0.5 mcg/mL Sensitive         gentamicin <=4 mcg/mL Sensitive <=4 mcg/mL Sensitive       levofloxacin     1.0 ug/ml Sensitive     meropenem <=1 mcg/mL Sensitive <=1 mcg/mL Sensitive       piperacillin-tazobactam <=16 mcg/mL Sensitive <=16 mcg/mL Sensitive       tobramycin   <=4 mcg/mL Sensitive       trimethoprim-sulfamethoxazole <=2/38 mcg/mL Sensitive              Known drug allergies:      All allergies were reviewed and updated    Allergies   Allergen Reactions    Pcn [Penicillins] Shortness Of Breath    Spironolactone Rash    Coreg [Carvedilol]      Side effects:  SOB, palpitations    Lisinopril Other (See Comments)     cough    Flexeril [Cyclobenzaprine] Rash       Social history:     Social History:  All social andepidemiologic history was reviewed and updated by me today as needed. · Tobacco use:   reports that she has never smoked. She has never used smokeless tobacco.  · Alcohol use:   reports current alcohol use of about 2.0 standard drinks of alcohol per week. · Currently lives in: [address] [place] [address]   ·  reports no history of drug use. COVID VACCINATION AND LAB RESULT RECORDS:     Internal Administration   First Dose COVID-19, Moderna, Primary or Immunocompromised, PF, 100mcg/0.5mL  02/15/2021   Second Dose COVID-19, Koko Roque, Primary or Immunocompromised, PF, 100mcg/0.5mL   03/19/2021       Last COVID Lab SARS-CoV-2, NAAT (no units)   Date Value   06/08/2022 Not Detected            Assessment:     The patient is a 74 y.o. old female who  has a past medical history of Anemia, Chronic bilateral low back pain without sciatica, CKD (chronic kidney disease) stage 3, GFR 30-59 ml/min ([facility]), Colon cancer ([facility] [address] Diabetes ([facility] [address] Diabetes ([address] Diabetes with retinopathy ([facility] [address] Hypertension, Pancreatic cyst, Positive FIT (fecal immunochemical test), and Pulmonary hypertension ([facility] [address]  with following problems:    · Gram-negative bacteremia with Proteus - covered with IV ciprofloxacin  · Complicated E. coli UTI - covered with rocephin  · Complicated left diabetic foot infection with E. coli, Proteus, Pseudomonas, Serratia marcescens  · Essential hypertension - BP ok  · History of pulmonary hypertension  · Chronic kidney disease stage III - S Cr is 2.2  · Elevated CRP of 118.9  · Elevated sed rate of greater than 130  · Type 2 diabetes mellitus  · Diabetic polyneuropathy type II  · Obesity Class 3 due to excess calorie intake : Body mass index is 54.56 kg/m². Discussion:      The patient is afebrile. Blood cultures from 6/22 remain negative. Serum creatinine is 2.2 today. White cell count is 8100. Blood cultures from 6/20/2022 remain negative. MRI of the right foot without contrast from 6/11/2022 reviewed. Showed 1.5 x 2 cm ulcer on the plantar side of the right heel with concerns for osteomyelitis. Right heel wound culture has grown pan-sensitive Proteus mirabilis    Plan:     Diagnostic Workup:      · Continue to follow  fever curve, WBC count and blood cultures. · Continue to monitor blood counts, liver and renal function. Antimicrobials:    · Will continue IV Cipro 400 mg every 12 hours  · Continue to monitor the vitals closely  · MRI of the right foot wound is concerning for osteomyelitis  · We will follow up on the culture results and clinical progress and will make further recommendations accordingly. · Continue close vitals monitoring. · Maintain good glycemic control. · Fall precautions. Aspiration precautions. · Continue to watch for new fever or diarrhea. · DVT prophylaxis. · Discussed all above with patient and RN. Drug Monitoring:    · Continue monitoring for antibiotic toxicity as follows: CBC, CMP. QTC interval  · Continue to watch for following: new or worsening fever, new hypotension, hives, lip swelling and redness or purulence at vascular access sites. I/v access Management:    · Continue to monitor i.v access sites for erythema, induration, discharge or tenderness. · As always, continue efforts to minimize tubes/lines/drains as clinically appropriate to reduce chances of line associated infections.     Patient education and counseling:        · The patient was educated in detail about the side-effects of various antibiotics and things to watch for like new rashes, lip swelling, severe reaction, worsening diarrhea, break through fever etc.  · Discussed patient's condition and what to expect. All of the patient's questions were addressed in a satisfactory manner and patient verbalized understanding all instructions. Level of complexity of visit: High     Risk of Complications/Morbidity: High     · Illness(es)/ Infection present that pose threat to life/bodily function. · There is potential for severe exacerbation of infection/side effects of treatment. · Therapy requires intensive monitoring for antimicrobial agent toxicity. Thank you for involving me in the care of your patient. I will continue to follow. If you have anyadditional questions, please do not hesitate to contact me. Subjective: Interval history: Interval history was obtained from chart review and patient/ RN. The patient is afebrile. She is on IV ciprofloxacin. She is tolerating antibiotics okay     REVIEW OF SYSTEMS:      Review of Systems   Constitutional: Positive for fatigue. Negative for chills, diaphoresis and unexpected weight change. HENT: Negative for congestion, ear discharge, ear pain, facial swelling, hearing loss, rhinorrhea and trouble swallowing. Eyes: Negative for photophobia, discharge, redness and visual disturbance. Respiratory: Negative for apnea, cough, choking, chest tightness, shortness of breath and stridor. Cardiovascular: Negative for chest pain and palpitations. Gastrointestinal: Negative for abdominal pain, blood in stool, diarrhea and nausea. Endocrine: Negative for polydipsia, polyphagia and polyuria. Genitourinary: Negative for difficulty urinating, dysuria, frequency, hematuria, menstrual problem and vaginal discharge. Musculoskeletal: Negative for arthralgias, joint swelling, myalgias and neck stiffness. Skin: Negative for color change and rash. Allergic/Immunologic: Negative for immunocompromised state. Neurological: Negative for dizziness, seizures, speech difficulty, light-headedness and headaches. Hematological: Negative for adenopathy. Psychiatric/Behavioral: Negative for agitation, hallucinations and suicidal ideas. Past Medical History: All past medical history reviewed today. Past Medical History:   Diagnosis Date    Anemia     Chronic bilateral low back pain without sciatica     CKD (chronic kidney disease) stage 3, GFR 30-59 ml/min (HCC)     Colon cancer (HCC)     Diabetes ([address]     Diabetes ([address]     Diabetes with retinopathy ([facility] [address]     DM eye exam 12/18/17    Hypertension     Pancreatic cyst     Positive FIT (fecal immunochemical test)     Pulmonary hypertension (HCC)        Past Surgical History: All past surgical history was reviewed today. Past Surgical History:   Procedure Laterality Date    COLONOSCOPY N/A 11/5/2018    COLONOSCOPY WITH BIOPSY performed by Danitza Wade MD at [address]  05/14/2019    Colonoscopy with polypectomy (cold snare)    COLONOSCOPY N/A 5/14/2019    COLONOSCOPY POLYPECTOMY SNARE/COLD BIOPSY performed by Danitza Wade MD at [address] [place] [address] Right 11/08/2018    [address] [place] [address] and umbilical hernia repair    [place] OFFICE/OUTPT VISIT,PROCEDURE ONLY Right 11/8/2018    LAPAROSCOPIC RIGHT HEMICOLECTOMY and umbilical hernia repair performed by Yusra Maurer MD at [facility] 11/5/2018    EGD BIOPSY performed by Danitza Wade MD at [address] [facility] EXTRACTION         Family History: All family history was reviewed today.         Problem Relation Age of Onset    Lung Cancer Mother     High Blood Pressure Father     Lung Cancer Father     Lupus Sister        Objective:       PHYSICAL EXAM:      Vitals:   Vitals:    06/13/22 1245 06/13/22 1435 06/13/22 1505 06/13/22 1652   BP:       Pulse: 72   70   Resp: 18 20 20 23   Temp:       TempSrc:       SpO2: 99%   95%   Weight:       Height:           Physical Exam  Vitals and nursing note reviewed. Constitutional:       General: She is not in acute distress. Appearance: She is well-developed. She is not diaphoretic. HENT:      Head: Normocephalic. Right Ear: External ear normal.      Left Ear: External ear normal.      Nose: Nose normal.   Eyes:      General: No scleral icterus. Right eye: No discharge. Left eye: No discharge. Conjunctiva/sclera: Conjunctivae normal.      Pupils: Pupils are equal, round, and reactive to light. Cardiovascular:      Rate and Rhythm: Normal rate and regular rhythm. Heart sounds: No murmur heard. No friction rub. Pulmonary:      Effort: Pulmonary effort is normal.      Breath sounds: No stridor. No wheezing or rales. Chest:      Chest wall: No tenderness. Abdominal:      Palpations: Abdomen is soft. There is no mass. Tenderness: There is no abdominal tenderness. There is no guarding or rebound. Musculoskeletal:         General: No tenderness. Cervical back: Normal range of motion and neck supple. Lymphadenopathy:      Cervical: No cervical adenopathy. Skin:     General: Skin is warm and dry. Findings: No erythema or rash. Comments: Ongoing right heel wound   Neurological:      Mental Status: She is alert and oriented to person, place, and time. Motor: No abnormal muscle tone. Psychiatric:         Judgment: Judgment normal.            Lines and drains: All vascular access sites are healthy with no local erythema, discharge or tenderness. Intake and output:    I/O last 3 completed shifts: In: 1156 [P.O.:1740; IV Piggyback:50]  Out: 4056 [Urine:3775]    Lab Data:   All available labs and old records have been reviewed by me.     CBC:  Recent Labs     06/12/22  0559   WBC 8.1   RBC 3.89*   HGB 11.5*   HCT 35.6*      MCV 91.5   MCH 29.6   MCHC 32.4   RDW 16.4*        BMP:  Recent Labs     06/11/22  0645 06/11/22  0645 06/12/22  0559 06/12/22  1554 06/13/22  0848     --  142  -- 139   K 2.9*   < > 2.9* 3.6 3.3*   CL 94*  --  95*  --  92*   CO2 32  --  33*  --  34*   BUN 39*  --  47*  --  51*   CREATININE 2.3*  --  2.1*  --  2.2*   CALCIUM 8.8  --  8.9  --  8.7   GLUCOSE 192*  --  125*  --  149*    < > = values in this interval not displayed. Hepatic Function Panel:   Lab Results   Component Value Date    ALKPHOS 100 06/08/2022    ALT 19 06/08/2022    AST 19 06/08/2022    PROT 7.9 06/08/2022    BILITOT 0.8 06/08/2022    BILIDIR 0.1 06/07/2021    IBILI see below 07/22/2020    LABALBU 4.0 06/08/2022       CPK:   Lab Results   Component Value Date    CKTOTAL 143 06/08/2022     ESR:   Lab Results   Component Value Date    SEDRATE >130 (H) 06/08/2022     CRP:   Lab Results   Component Value Date    .9 (H) 06/08/2022           Imaging: All pertinent images and reports for the current visit were reviewed by me during this visit. I reviewed the chest x-ray/CT scan/MRI images and independently interpreted the findings and results today. XR CHEST PORTABLE   Final Result   1. No significant change. MRI FOOT RIGHT WO CONTRAST   Final Result   1. Plantar heel ulcer measuring 1.5 x 2 cm with mild adjacent cellulitis. No   soft tissue abscess. 2. Trace edema in the plantar calcaneus adjacent to the ulcer concerning for   mild acute osteomyelitis. XR FOOT RIGHT (MIN 3 VIEWS)   Final Result      1. No plain radiographic evidence of osteomyelitis. 2.  Diffuse soft tissue swelling, especially within the dorsum of the foot. 3.  Otherwise no acute bony abnormality is noted. CT CHEST WO CONTRAST   Final Result   Mild degree of subsegmental atelectasis. Otherwise no acute cardiopulmonary disease. XR CHEST PORTABLE   Final Result   Pulmonary vascular congestion. Evaluation of the left base limited by soft   tissue attenuation artifact. Medications: All current and past medications were reviewed.      ciprofloxacin  400 mg IntraVENous Q12H    [Held by provider] insulin lispro  25 Units SubCUTAneous TID WC    enoxaparin  30 mg SubCUTAneous BID    fluticasone  1 spray Each Nostril Daily    insulin lispro  0-18 Units SubCUTAneous TID WC    insulin lispro  0-9 Units SubCUTAneous Nightly    collagenase   Topical Daily    gentamicin   Topical Daily    sodium chloride flush  10 mL IntraVENous 2 times per day    allopurinol  200 mg Oral Daily    metoprolol succinate  100 mg Oral Daily    insulin glargine  40 Units SubCUTAneous BID    cetirizine  10 mg Oral Nightly        furosemide (LASIX) 1mg/ml infusion 5 mg/hr (06/13/22 1421)    sodium chloride 100 mL/hr at 06/09/22 0156    dextrose         methocarbamol, HYDROcodone-acetaminophen, sodium chloride flush, sodium chloride, potassium chloride **OR** potassium alternative oral replacement **OR** potassium chloride, magnesium sulfate, promethazine **OR** ondansetron, magnesium hydroxide, acetaminophen **OR** acetaminophen, glucose, dextrose bolus **OR** dextrose bolus, glucagon (rDNA), dextrose      Problem list:       Patient Active Problem List   Diagnosis Code    Essential hypertension I10    HLD (hyperlipidemia) E78.5    Type 2 diabetes mellitus with left diabetic foot infection ([facility] [address] E11.628, L08.9    Volume overload E87.70    Malignant neoplasm of ascending colon (HCC) C18.2    Anemia D64.9    CKD (chronic kidney disease) stage 3, GFR 30-59 ml/min ([facility]) N18.30    Uterine mass N85.8    Morbid obesity due to excess calories ([facility]) E66.01    BRIANNA (obstructive sleep apnea) G47.33    PLMD (periodic limb movement disorder) G47.61    Pancreatic cyst K86.2    Chronic bilateral low back pain without sciatica M54.50, G89.29    Cellulitis L03.90    Acute respiratory failure with hypoxia ([facility]) J96.01    Gram-negative bacteremia R78.81    Elevated C-reactive protein (CRP) R79.82    E. coli UTI (urinary tract infection) N39.0, B96.20    Diabetic polyneuropathy associated with type 2 diabetes mellitus ([facility] [address] E11.42    Diabetes mellitus type 2 in obese (HCC) E11.69, E66.9    Elevated sed rate R70.0    Serratia marcescens infection A48.8    Pseudomonas infection A49.8    Proteus infection A49.8       Please note that this chart was generated using Dragon dictation software. Although every effort was made to ensure the accuracy of this automated transcription, some errors in transcription may have occurred inadvertently. If you may need any clarification, please do not hesitate to contact me through EPIC or at the phone number provided below with my electronic signature. Any pictures or media included in this note were obtained after taking informed verbal consent from the patient and with their approval to include those in the patient's medical record.       Hussein Og MD, MPH, [address] [place] [address]  6/13/2022, 5:58 PM  [facility] Infectious Disease   [address] [facility], [address] [place] [address]  Office: [phone]  Fax: [phone]  Clinic days:  Tuesday & Thursday

## 2022-06-13 NOTE — PROGRESS NOTES
Department of Podiatric Surgery  Progress Note  6/13/2022  Linsey Ortiz      HISTORY OF PRESENT ILLNESS:      The patient is a 74 y.o. female who presents with right heel wound and infection, cellulitis The patient sleeps with c-pap normally and is not that active at home. She had to sleep in a wingback chair for the last several days and her legs were down. She is diabetic and has neuropathy. She has trouble with mobility at baseline and wears slippers at home 90% of the time. She has not had a problem with the heel before, but has had wounds venous nature recently. 6/13/22:  The patient up performing hygiene, nursing to change dressing for RIGHT heel    Past Medical History:        Diagnosis Date    Anemia     Chronic bilateral low back pain without sciatica     CKD (chronic kidney disease) stage 3, GFR 30-59 ml/min ([facility])     Colon cancer ([facility])     Diabetes ([address]     Diabetes ([address]     Diabetes with retinopathy ([address]     DM eye exam 12/18/17    Hypertension     Pancreatic cyst     Positive FIT (fecal immunochemical test)     Pulmonary hypertension ([facility])          ALLERGIES    Allergies   Allergen Reactions    Pcn [Penicillins] Shortness Of Breath    Spironolactone Rash    Coreg [Carvedilol]      Side effects:  SOB, palpitations    Lisinopril Other (See Comments)     cough    Flexeril [Cyclobenzaprine] Rash       REVIEW OF SYSTEMS:  CONSTITUTIONAL:  negative  EYES:  negative  HEENT:  negative  RESPIRATORY:  Dyspnea resolved  CARDIOVASCULAR:  positive for  orthopnea, edema  ENDOCRINE:  positive for diabetic symptoms including neuropathy  MUSCULOSKELETAL:  positive for  pain and swelling of the right heel  NEUROLOGICAL:  negative    PHYSICAL EXAM:  VITALS:  /74   Pulse 72   Temp 98.6 °F (37 °C) (Oral)   Resp 20   Ht 5' 3\" (1.6 m)   Wt (!) 322 lb 5 oz (146.2 kg)   SpO2 99%   BMI 57.10 kg/m²   CONSTITUTIONAL:  awake, alert, cooperative, no apparent distress, and appears stated age  EYES:  pupils equal, round and reactive to light, extra ocular muscles intact, sclera clear, conjunctiva normal      LOWER EXTREMITY:  VASCULAR: Pedal Pulses present. negative signs of ischemia. MUSCULOSKELETAL:  negative gross deformity. NEUROLOGIC:  Epicritic sensation, light touch, joint position sensediminished  SKIN:  The rk-wound erythema is resolved. The central aspect remains necrotic, but the surrounding tissues are granular. RIGHT plantar heel with 2.2 x 1.9 cn deeply penetrating 0.45 cm wound to MUSCLE, with improvement to dark necrosis smaller and less necrosis noted. No calor, no odor with continued use of Santyl and Gentamycin    The left leg has superficial granular wound on the posterior calf with evidence of mild erythema surrounding that has appearance of chronic venous stasis. I/O:    Intake/Output Summary (Last 24 hours) at 6/13/2022 1505  Last data filed at 6/13/2022 1008  Gross per 24 hour   Intake 1250 ml   Output 2375 ml   Net -1125 ml              Wt Readings from Last 3 Encounters:   06/13/22 (!) 322 lb 5 oz (146.2 kg)   03/08/22 (!) 315 lb 12.8 oz (143.2 kg)   01/07/21 294 lb (133.4 kg)       LABS:    Recent Labs     06/12/22  0559   WBC 8.1   HGB 11.5*   HCT 35.6*           Recent Labs     06/13/22  0848      K 3.3*   CL 92*   CO2 34*   BUN 51*   CREATININE 2.2*        No results for input(s): PROT, INR, APTT in the last 72 hours. IMAGING:  X-rays:     mpression       1.  No plain radiographic evidence of osteomyelitis.       2.  Diffuse soft tissue swelling, especially within the dorsum of the foot.       3.  Otherwise no acute bony abnormality is noted.      MICRO:   Culture, Wound [[id number]] (Abnormal) Collected: 06/08/22 0968   Order Status: Completed Specimen: Leg Updated: 06/09/22 1356    Gram Stain Result No Epithelial Cells seen   1+ WBC's (Polymorphonuclear)   2+ Gram negative rods   3+ Gram positive cocci  in clusters-resembling Staph  Abnormal     Organism Serratia marcescens Abnormal     WOUND/ABSCESS --    Light growth   Sensitivity to follow     Organism Pseudomonas aeruginosa Abnormal     WOUND/ABSCESS --    Light growth   Sensitivity to follow     Organism Proteus species Abnormal     WOUND/ABSCESS --    Rare growth   No further workup    Narrative:     ORDER#: [id number]                          ORDERED BY: Alejandro Redding   SOURCE: Leg                                COLLECTED:  06/08/22 09:35          ASSESSMENT   MUGS 3 heel ulceration, right heel  Right foot diabetic foot infection  Venous wound, left posterior calf  Diabetes with peripheral neuropathy    PLAN:  Evaluation and Management x 20 minutes, nursing to assist with dressing change today, as patient was performing hygiene at time of visit    1. Right heel    Santyl and Gentamicin applied for combined treatment of the necrotic tissues and suspected bacterial infection    Multi-layer compression dressing applied from sulcus of toes to the knee. Educated patient and family on the importance of off loading    2. Venous stasis wound, left   Multi-layer compression dressing to the left leg. Non-adherent layer applied over the open wound    DISPO: Plan for wound care and antibiotics. May need further debridement if there is no clinical improvement. Thanks for the opportunity to participate in this patient's care.      Allen Dumont DPM   [facility]  Cell [phone]  Office: [phone]  Fax: [phone]

## 2022-06-13 NOTE — PROGRESS NOTES
Nephrology Progress Note   [employer]      HPI:  No CP.  O > I. Renal function stable. ROS:  No fever. [address]:  medications reviewed. Subjective:    Resting in recliner; NAD; no shortness of breath    Scheduled Meds:   ciprofloxacin  400 mg IntraVENous Q12H    [Held by provider] insulin lispro  25 Units SubCUTAneous TID WC    enoxaparin  30 mg SubCUTAneous BID    fluticasone  1 spray Each Nostril Daily    insulin lispro  0-18 Units SubCUTAneous TID WC    insulin lispro  0-9 Units SubCUTAneous Nightly    collagenase   Topical Daily    gentamicin   Topical Daily    sodium chloride flush  10 mL IntraVENous 2 times per day    allopurinol  200 mg Oral Daily    metoprolol succinate  100 mg Oral Daily    insulin glargine  40 Units SubCUTAneous BID    cetirizine  10 mg Oral Nightly        furosemide (LASIX) 1mg/ml infusion      sodium chloride 100 mL/hr at 22 0643    dextrose         PRN Meds:.methocarbamol, HYDROcodone-acetaminophen, sodium chloride flush, sodium chloride, potassium chloride **OR** potassium alternative oral replacement **OR** potassium chloride, magnesium sulfate, promethazine **OR** ondansetron, magnesium hydroxide, acetaminophen **OR** acetaminophen, glucose, dextrose bolus **OR** dextrose bolus, glucagon (rDNA), dextrose    Physical Exam:    TEMPERATURE:  Current - Temp: 98.6 °F (37 °C);  Max - Temp  Av.5 °F (36.9 °C)  Min: 98.5 °F (36.9 °C)  Max: 98.6 °F (37 °C)  RESPIRATIONS RANGE: Resp  Av.8  Min: 15  Max: 25  PULSE RANGE: Pulse  Av.7  Min: 72  Max: 78  BLOOD PRESSURE RANGE:  Systolic (25DJW), TFP:276 , Min:130 , DONNA:575   ; Diastolic (77VDI), APT:69, Min:70, Max:77    24HR INTAKE/OUTPUT:      Intake/Output Summary (Last 24 hours) at 2022 1252  Last data filed at 2022 1008  Gross per 24 hour   Intake 1250 ml   Output 2375 ml   Net -1125 ml         Patient Vitals for the past 96 hrs (Last 3 readings):   Weight   22 0915 (!) 322 lb 5 oz (146.2 kg)   06/12/22 0718 (!) 316 lb 12.8 oz (143.7 kg)   06/11/22 0658 (!) 308 lb 13.8 oz (140.1 kg)       General:  NAD, A+OX3, obese  Chest:  CTAB  CVS:  RRR  Abdominal:  NTND, soft, +BS  Extremities:  1/2+ edema  Skin:  no rash    Allergies:   Allergies   Allergen Reactions    Pcn [Penicillins] Shortness Of Breath    Spironolactone Rash    Coreg [Carvedilol]      Side effects:  SOB, palpitations    Lisinopril Other (See Comments)     cough    Flexeril [Cyclobenzaprine] Rash          LAB DATA:    CBC:   Lab Results   Component Value Date    WBC 8.1 06/12/2022    RBC 3.89 06/12/2022    HGB 11.5 06/12/2022    HCT 35.6 06/12/2022    MCV 91.5 06/12/2022    MCH 29.6 06/12/2022    MCHC 32.4 06/12/2022    RDW 16.4 06/12/2022     06/12/2022    MPV 7.3 06/12/2022     BMP:    Lab Results   Component Value Date     06/13/2022    K 3.3 06/13/2022    CL 92 06/13/2022    CO2 34 06/13/2022    BUN 51 06/13/2022    CREATININE 2.2 06/13/2022    CALCIUM 8.7 06/13/2022    GFRAA 27 06/13/2022    LABGLOM 22 06/13/2022    GLUCOSE 149 06/13/2022     Ionized Calcium:  No results found for: IONCA  Magnesium:    Lab Results   Component Value Date    MG 2.40 06/13/2022     Phosphorus:    Lab Results   Component Value Date    PHOS 4.3 10/21/2019     U/A:    Lab Results   Component Value Date    COLORU Yellow 06/08/2022    PHUR 6.0 06/08/2022    WBCUA 25 06/08/2022    RBCUA 5-10 06/08/2022    RBCUA NEGATIVE 04/18/2018    MUCUS PRESENT 04/18/2018    BACTERIA 3+ 06/08/2022    CLARITYU Clear 06/08/2022    SPECGRAV 1.013 06/08/2022    LEUKOCYTESUR LARGE 06/08/2022    UROBILINOGEN 0.2 06/08/2022    BILIRUBINUR Negative 06/08/2022    BILIRUBINUR NEGATIVE 04/18/2018    BLOODU MODERATE 06/08/2022    GLUCOSEU Negative 06/08/2022         IMPRESSION/RECOMMENDATIONS:      Principal Problem:    Cellulitis  Active Problems:    Acute respiratory failure with hypoxia (HCC)    Gram-negative bacteremia    Elevated C-reactive protein (CRP) E. coli UTI (urinary tract infection)    Diabetic polyneuropathy associated with type 2 diabetes mellitus (HCC)    Diabetes mellitus type 2 in obese (HCC)    Elevated sed rate    Serratia marcescens infection    Pseudomonas infection    Proteus infection    Essential hypertension    Type 2 diabetes mellitus with left diabetic foot infection ([address]    Morbid obesity due to excess calories ([facility]  Resolved Problems:    * No resolved hospital problems.  *    1) JOB on CKD              - baseline Cr 1.4-1.7, sees me              - ddx:  pre-renal vs. ATN              - renal function stable but creatinine above previous baseline                  2) edema              - Patient has gained true weight - edema is not significant              - switched to lasix gtt 6/13/22              - d/c nutritional supplement and continue fluid restriction 1.2 liters/day              - cowan catheter     3) right heel wound and cellulitis              - wound care              - podiatry and ID following              - on Cipro     4) Hypokalemia                          - Replaced      5) Anemia CKD - Hgb > 11  - THADDEUS not indicated at this point in time

## 2022-06-13 NOTE — PROGRESS NOTES
Patient wanting the ID doctor or the hospitalist doctor called right now, because she fells something is not right with her. She has jerking movements at times & her pain medication needs to be changed. It is the same pain medication she takes at home, except at home she takes it every 8 hours, & here it is ordered every 4 hours prn. She is refusing to get weighed at this time.

## 2022-06-13 NOTE — PROGRESS NOTES
Hospitalist Progress Note      PCP: Rosalia Forman MD    Date of Admission: 6/8/2022    Chief Complaint: shortness of breath    Hospital Course: Patient is a 53-year-old female with past medical history of diabetes mellitus, CKD, essential hypertension, pulmonary hypertension who presents to the hospital due to multiple complaints, according to the patient she has been having bilateral lower extremity swelling, redness, she is not able to walk, she also has noticed wound on her right heel and she is concerned it may be infected.  Patient also mentions she has pain in her legs due to the swelling and redness.  Patient also mentions she feels shortness of breath as well, mentions it feels heavy for her to take a deep breathing.  She denies chest pain nausea vomiting diarrhea constipation, mentions her urine appears cloudy. Subjective: Pt seen and examined. States that she feels twitchy and has worsening mental status. Reviewed medications with pharmacy, changed cefepime to ciprofloxacin.        Medications:  Reviewed    Infusion Medications    sodium chloride 100 mL/hr at 06/09/22 0643    dextrose       Scheduled Medications    ciprofloxacin  400 mg IntraVENous Q12H    [Held by provider] insulin lispro  25 Units SubCUTAneous TID WC    furosemide  80 mg IntraVENous BID    enoxaparin  30 mg SubCUTAneous BID    fluticasone  1 spray Each Nostril Daily    insulin lispro  0-18 Units SubCUTAneous TID WC    insulin lispro  0-9 Units SubCUTAneous Nightly    collagenase   Topical Daily    gentamicin   Topical Daily    sodium chloride flush  10 mL IntraVENous 2 times per day    allopurinol  200 mg Oral Daily    metoprolol succinate  100 mg Oral Daily    insulin glargine  40 Units SubCUTAneous BID    cetirizine  10 mg Oral Nightly     PRN Meds: methocarbamol, HYDROcodone-acetaminophen, sodium chloride flush, sodium chloride, potassium chloride **OR** potassium alternative oral replacement **OR** potassium chloride, magnesium sulfate, promethazine **OR** ondansetron, magnesium hydroxide, acetaminophen **OR** acetaminophen, glucose, dextrose bolus **OR** dextrose bolus, glucagon (rDNA), dextrose      Intake/Output Summary (Last 24 hours) at 6/13/2022 0932  Last data filed at 6/13/2022 0600  Gross per 24 hour   Intake 1790 ml   Output 2825 ml   Net -1035 ml       Exam:    /74   Pulse 78   Temp 98.5 °F (36.9 °C) (Oral)   Resp 21   Ht 5' 3\" (1.6 m)   Wt (!) 322 lb 5 oz (146.2 kg)   SpO2 93%   BMI 57.10 kg/m²     General appearance: Chronically ill appearing. No apparent distress, appears stated age and cooperative. HEENT: Pupils equal, round, and reactive to light. Conjunctivae/corneas clear. Neck: Supple, with full range of motion. No jugular venous distention. Trachea midline. Respiratory:  Normal respiratory effort. Clear to auscultation, bilaterally without Rales/Wheezes/Rhonchi. Cardiovascular: Regular rate and rhythm with normal S1/S2 without murmurs, rubs or gallops. Abdomen: Soft, non-tender, non-distended with normal bowel sounds. Musculoskeletal: No clubbing, cyanosis or edema bilaterally. Full range of motion without deformity. Skin: Right heel unstageable decubitus heel ulceration. Neurologic:  Neurovascularly intact without any focal sensory/motor deficits.  Cranial nerves: II-XII intact, grossly non-focal.  Psychiatric: Alert and oriented, thought content appropriate, normal insight  Capillary Refill: Brisk,< 3 seconds   Peripheral Pulses: +2 palpable, equal bilaterally       Labs:   Recent Labs     06/12/22  0559   WBC 8.1   HGB 11.5*   HCT 35.6*        Recent Labs     06/11/22  0645 06/11/22  0645 06/12/22  0559 06/12/22  1554 06/13/22  0848     --  142  --  139   K 2.9*   < > 2.9* 3.6 3.3*   CL 94*  --  95*  --  92*   CO2 32  --  33*  --  34*   BUN 39*  --  47*  --  51*   CREATININE 2.3*  --  2.1*  --  2.2*   CALCIUM 8.8  --  8.9  --  8.7    < > = values in this interval not displayed. No results for input(s): AST, ALT, BILIDIR, BILITOT, ALKPHOS in the last 72 hours. No results for input(s): INR in the last 72 hours. No results for input(s): Marisa Juan in the last 72 hours. Urinalysis:      Lab Results   Component Value Date    NITRU POSITIVE 06/08/2022    WBCUA 25 06/08/2022    BACTERIA 3+ 06/08/2022    RBCUA 5-10 06/08/2022    RBCUA NEGATIVE 04/18/2018    BLOODU MODERATE 06/08/2022    SPECGRAV 1.013 06/08/2022    GLUCOSEU Negative 06/08/2022       Radiology:  XR CHEST PORTABLE   Final Result   1. No significant change. MRI FOOT RIGHT WO CONTRAST   Final Result   1. Plantar heel ulcer measuring 1.5 x 2 cm with mild adjacent cellulitis. No   soft tissue abscess. 2. Trace edema in the plantar calcaneus adjacent to the ulcer concerning for   mild acute osteomyelitis. XR FOOT RIGHT (MIN 3 VIEWS)   Final Result      1. No plain radiographic evidence of osteomyelitis. 2.  Diffuse soft tissue swelling, especially within the dorsum of the foot. 3.  Otherwise no acute bony abnormality is noted. CT CHEST WO CONTRAST   Final Result   Mild degree of subsegmental atelectasis. Otherwise no acute cardiopulmonary disease. XR CHEST PORTABLE   Final Result   Pulmonary vascular congestion. Evaluation of the left base limited by soft   tissue attenuation artifact.                  Assessment/Plan:    Active Hospital Problems    Diagnosis Date Noted    Acute respiratory failure with hypoxia (HCC) [J96.01]      Priority: Medium    Gram-negative bacteremia [R78.81]      Priority: Medium    Elevated C-reactive protein (CRP) [R79.82]      Priority: Medium    E. coli UTI (urinary tract infection) [N39.0, B96.20]      Priority: Medium    Diabetic polyneuropathy associated with type 2 diabetes mellitus ([facility] [E11.42]      Priority: Medium    Diabetes mellitus type 2 in obese ([facility] [E11.69, E66.9]      Priority: Medium    Elevated sed rate [R70.0]      Priority: Medium    Serratia marcescens infection [A48.8]      Priority: Medium    Pseudomonas infection [A49.8]      Priority: Medium    Proteus infection [A49.8]      Priority: Medium    Cellulitis [L03.90] 06/08/2022     Priority: Medium    Morbid obesity due to excess calories ([facility] [E66.01] 01/14/2019    Type 2 diabetes mellitus with left diabetic foot infection ([facility] [M16.252, L08.9]     Essential hypertension [I10]        Acute respiratory failure with hypoxia  -diuresis  -wean oxygen to maintain SpO2 > 89%  -incentive spirometer    CKD stage III  -avoid nephrotoxic meds  -trend and monitor  -nephrology consulted, recs appreciated    Elevated troponin  -cardiology consulted, doesn't believe this is consistent with ACS  -tele montioring    Chronic diastolic CHF - does not appear to be in exacerbation  -cardiology consulted  -continue home meds    Bilateral lower extremity cellulitis  -cefepime -> ciprofloxacin given possible side effect  -ID consulted, recs appreciated    Diabetic foot infection  -podiatry consulted, recs appreciated  -ID consulted, recs appreciated  -on empiric cefepime; abx per ID; should cover isolated organisms but await sensitivities  -MRI right foot pending to r/o osteomyelitis    Proteus bacteremia  -repeat blood cultures 6/10  -on ciprofloxacin which should cover  -ID consulted, recs appreciated  -repeat blood cultures NGTD    Type 2 DM  -Lantus, Humalog, and SSI  -hypoglycemia protocol  -POCT glucose checks  -carb control diet    Morbid obesity due to excess calories  -nutritional counseling provided  -weight loss encouraged  -complicating medical management      DVT Prophylaxis: Lovenox  Diet: ADULT DIET;  Regular; 4 carb choices (60 gm/meal); 1200 ml  Code Status: Full Code    PT/OT Eval Status: ordered    Dispo - continue care    Charmaine Arevalo MD

## 2022-06-13 NOTE — PROGRESS NOTES
Physician Progress Note      Justin Gibson  CSN #:                  [id number]  :                       1955  ADMIT DATE:       2022 8:52 AM  [address] [place] DATE:  RESPONDING  PROVIDER #:        Collin Arriaga MD          QUERY TEXT:    Patient admitted with dyspnea. Noted documentation of acute respiratory   failure in H&P on 22. In order to support the diagnosis of acute   respiratory failure, please include additional clinical indicators in your   documentation. Or please document if the diagnosis of acute respiratory   failure has been ruled out after further study. The medical record reflects the following:  Risk Factors: HX- CKD, pulmonary hypertension  Clinical Indicators: P 112, RR 34. .. 94% RA  95% 4L. .. VBG- PH 7.464, PCO2   53.6, HCO3 39. ...EF 60%. [place] [address] [place] Chest-Mild degree of  subsegmental atelectasis. Otherwise no acute cardiopulmonary disease. Per   ED dictation on 22-Respiratory:  Breath  sounds diminished throughout. SpO2 87% on RA on arrival, breathing 28 times   per minute initially. O2 at 4 L per nasal cannula  was applied. Speaking in uninterrupted sentences. Treatment: Oxygen, VBG, Lasix IV, ECHO    Acute Respiratory Failure Clinical Indicators per [employer] Training Guide and   Quick Reference Guide:  pO2 < 60 mmHg or SpO2 (pulse oximetry) < 91% breathing room air  pCO2 > 50 and pH < 7.35  P/F ratio (pO2 / FIO2) < 300  pO2 decrease or pCO2 increase by 10 mmHg from baseline (if known)  Supplemental oxygen of 40% or more  Presence of respiratory distress, tachypnea, dyspnea, shortness of breath,   wheezing  Unable to speak in complete sentences  Use of accessory muscles to breathe  Extreme anxiety and feeling of impending doom  Tripod position  Confusion/altered mental status/obtunded    Thank Raman Price RN BSN CDS CRCR  [email or web address]  Options provided:  -- Acute Respiratory Failure as evidenced by, Please document evidence. -- Acute Respiratory Failure ruled out after study  -- Acute Respiratory Failure ruled out after study and Chronic Respiratory   Failure confirmed  -- Other - I will add my own diagnosis  -- Disagree - Not applicable / Not valid  -- Disagree - Clinically unable to determine / Unknown  -- Refer to Clinical Documentation Reviewer    PROVIDER RESPONSE TEXT:    Acute Respiratory Failure has been ruled out after study.     Query created by: Jamal Braga on 6/13/2022 11:36 AM      Electronically signed by:  Collin Arriaga MD 6/13/2022 3:14 PM

## 2022-06-13 NOTE — PROGRESS NOTES
Pharmacist called to go over patient's medications to see if there could be some kind of drug interaction that is causing her twitching & feeling so lousy.

## 2022-06-14 LAB
ALBUMIN SERPL-MCNC: 3.8 G/DL (ref 3.4–5)
ANION GAP SERPL CALCULATED.3IONS-SCNC: 16 MMOL/L (ref 3–16)
BLOOD CULTURE, ROUTINE: NORMAL
BUN BLDV-MCNC: 49 MG/DL (ref 7–20)
CALCIUM SERPL-MCNC: 9.1 MG/DL (ref 8.3–10.6)
CHLORIDE BLD-SCNC: 95 MMOL/L (ref 99–110)
CO2: 33 MMOL/L (ref 21–32)
CREAT SERPL-MCNC: 2.2 MG/DL (ref 0.6–1.2)
CULTURE, BLOOD 2: NORMAL
GFR AFRICAN AMERICAN: 27
GFR NON-AFRICAN AMERICAN: 22
GLUCOSE BLD-MCNC: 160 MG/DL (ref 70–99)
GLUCOSE BLD-MCNC: 168 MG/DL (ref 70–99)
GLUCOSE BLD-MCNC: 213 MG/DL (ref 70–99)
GLUCOSE BLD-MCNC: 221 MG/DL (ref 70–99)
GLUCOSE BLD-MCNC: 232 MG/DL (ref 70–99)
HCT VFR BLD CALC: 37.7 % (ref 36–48)
HEMOGLOBIN: 12.3 G/DL (ref 12–16)
MAGNESIUM: 2.4 MG/DL (ref 1.8–2.4)
MCH RBC QN AUTO: 30 PG (ref 26–34)
MCHC RBC AUTO-ENTMCNC: 32.6 G/DL (ref 31–36)
MCV RBC AUTO: 92.1 FL (ref 80–100)
PDW BLD-RTO: 16.1 % (ref 12.4–15.4)
PERFORMED ON: ABNORMAL
PHOSPHORUS: 2.8 MG/DL (ref 2.5–4.9)
PLATELET # BLD: 221 K/UL (ref 135–450)
PMV BLD AUTO: 7.5 FL (ref 5–10.5)
POTASSIUM SERPL-SCNC: 3.4 MMOL/L (ref 3.5–5.1)
RBC # BLD: 4.1 M/UL (ref 4–5.2)
SODIUM BLD-SCNC: 144 MMOL/L (ref 136–145)
URIC ACID, SERUM: 9.3 MG/DL (ref 2.6–6)
WBC # BLD: 9.8 K/UL (ref 4–11)

## 2022-06-14 PROCEDURE — 6370000000 HC RX 637 (ALT 250 FOR IP): Performed by: INTERNAL MEDICINE

## 2022-06-14 PROCEDURE — 99232 SBSQ HOSP IP/OBS MODERATE 35: CPT | Performed by: INTERNAL MEDICINE

## 2022-06-14 PROCEDURE — 36415 COLL VENOUS BLD VENIPUNCTURE: CPT

## 2022-06-14 PROCEDURE — 85027 COMPLETE CBC AUTOMATED: CPT

## 2022-06-14 PROCEDURE — 80069 RENAL FUNCTION PANEL: CPT

## 2022-06-14 PROCEDURE — 2580000003 HC RX 258: Performed by: INTERNAL MEDICINE

## 2022-06-14 PROCEDURE — 84550 ASSAY OF BLOOD/URIC ACID: CPT

## 2022-06-14 PROCEDURE — 94761 N-INVAS EAR/PLS OXIMETRY MLT: CPT

## 2022-06-14 PROCEDURE — 1200000000 HC SEMI PRIVATE

## 2022-06-14 PROCEDURE — 83735 ASSAY OF MAGNESIUM: CPT

## 2022-06-14 PROCEDURE — 6360000002 HC RX W HCPCS: Performed by: INTERNAL MEDICINE

## 2022-06-14 PROCEDURE — 2700000000 HC OXYGEN THERAPY PER DAY

## 2022-06-14 PROCEDURE — 9990000010 HC NO CHARGE VISIT

## 2022-06-14 RX ORDER — GABAPENTIN 100 MG/1
100 CAPSULE ORAL NIGHTLY
Status: DISCONTINUED | OUTPATIENT
Start: 2022-06-14 | End: 2022-06-15

## 2022-06-14 RX ORDER — POTASSIUM CHLORIDE 20 MEQ/1
20 TABLET, EXTENDED RELEASE ORAL 2 TIMES DAILY WITH MEALS
Status: DISCONTINUED | OUTPATIENT
Start: 2022-06-14 | End: 2022-06-17 | Stop reason: HOSPADM

## 2022-06-14 RX ADMIN — METOPROLOL SUCCINATE 100 MG: 50 TABLET, EXTENDED RELEASE ORAL at 08:00

## 2022-06-14 RX ADMIN — INSULIN LISPRO 6 UNITS: 100 INJECTION, SOLUTION INTRAVENOUS; SUBCUTANEOUS at 17:25

## 2022-06-14 RX ADMIN — POTASSIUM CHLORIDE 20 MEQ: 20 TABLET, EXTENDED RELEASE ORAL at 17:24

## 2022-06-14 RX ADMIN — CIPROFLOXACIN 400 MG: 2 INJECTION, SOLUTION INTRAVENOUS at 12:50

## 2022-06-14 RX ADMIN — INSULIN LISPRO 3 UNITS: 100 INJECTION, SOLUTION INTRAVENOUS; SUBCUTANEOUS at 08:05

## 2022-06-14 RX ADMIN — INSULIN GLARGINE 40 UNITS: 100 INJECTION, SOLUTION SUBCUTANEOUS at 21:23

## 2022-06-14 RX ADMIN — POTASSIUM CHLORIDE 40 MEQ: 20 TABLET, EXTENDED RELEASE ORAL at 12:45

## 2022-06-14 RX ADMIN — CETIRIZINE HYDROCHLORIDE 10 MG: 10 TABLET, FILM COATED ORAL at 21:23

## 2022-06-14 RX ADMIN — HYDROCODONE BITARTRATE AND ACETAMINOPHEN 1 TABLET: 5; 325 TABLET ORAL at 07:44

## 2022-06-14 RX ADMIN — HYDROCODONE BITARTRATE AND ACETAMINOPHEN 1 TABLET: 5; 325 TABLET ORAL at 16:34

## 2022-06-14 RX ADMIN — INSULIN LISPRO 6 UNITS: 100 INJECTION, SOLUTION INTRAVENOUS; SUBCUTANEOUS at 12:55

## 2022-06-14 RX ADMIN — GABAPENTIN 100 MG: 100 CAPSULE ORAL at 21:22

## 2022-06-14 RX ADMIN — HYDROCODONE BITARTRATE AND ACETAMINOPHEN 1 TABLET: 5; 325 TABLET ORAL at 11:41

## 2022-06-14 RX ADMIN — HYDROCODONE BITARTRATE AND ACETAMINOPHEN 1 TABLET: 5; 325 TABLET ORAL at 21:22

## 2022-06-14 RX ADMIN — INSULIN GLARGINE 40 UNITS: 100 INJECTION, SOLUTION SUBCUTANEOUS at 08:05

## 2022-06-14 RX ADMIN — ALLOPURINOL 200 MG: 100 TABLET ORAL at 08:00

## 2022-06-14 RX ADMIN — SODIUM CHLORIDE, PRESERVATIVE FREE 10 ML: 5 INJECTION INTRAVENOUS at 21:25

## 2022-06-14 RX ADMIN — INSULIN LISPRO 3 UNITS: 100 INJECTION, SOLUTION INTRAVENOUS; SUBCUTANEOUS at 21:23

## 2022-06-14 RX ADMIN — HYDROCODONE BITARTRATE AND ACETAMINOPHEN 1 TABLET: 5; 325 TABLET ORAL at 03:37

## 2022-06-14 RX ADMIN — CIPROFLOXACIN 400 MG: 2 INJECTION, SOLUTION INTRAVENOUS at 21:56

## 2022-06-14 ASSESSMENT — PAIN DESCRIPTION - DESCRIPTORS
DESCRIPTORS: DISCOMFORT
DESCRIPTORS: ACHING
DESCRIPTORS: ACHING

## 2022-06-14 ASSESSMENT — ENCOUNTER SYMPTOMS
STRIDOR: 0
FACIAL SWELLING: 0
ABDOMINAL PAIN: 0
TROUBLE SWALLOWING: 0
CHEST TIGHTNESS: 0
EYE REDNESS: 0
EYE DISCHARGE: 0
CHOKING: 0
APNEA: 0
PHOTOPHOBIA: 0
NAUSEA: 0
BLOOD IN STOOL: 0
COUGH: 0
RHINORRHEA: 0
COLOR CHANGE: 0
SHORTNESS OF BREATH: 0
DIARRHEA: 0

## 2022-06-14 ASSESSMENT — PAIN - FUNCTIONAL ASSESSMENT
PAIN_FUNCTIONAL_ASSESSMENT: PREVENTS OR INTERFERES SOME ACTIVE ACTIVITIES AND ADLS
PAIN_FUNCTIONAL_ASSESSMENT: PREVENTS OR INTERFERES SOME ACTIVE ACTIVITIES AND ADLS

## 2022-06-14 ASSESSMENT — PAIN SCALES - GENERAL
PAINLEVEL_OUTOF10: 7
PAINLEVEL_OUTOF10: 8
PAINLEVEL_OUTOF10: 7
PAINLEVEL_OUTOF10: 8

## 2022-06-14 ASSESSMENT — PAIN DESCRIPTION - LOCATION
LOCATION: BACK

## 2022-06-14 ASSESSMENT — PAIN DESCRIPTION - ONSET: ONSET: ON-GOING

## 2022-06-14 ASSESSMENT — PAIN DESCRIPTION - ORIENTATION
ORIENTATION: LOWER
ORIENTATION: LOWER

## 2022-06-14 ASSESSMENT — PAIN DESCRIPTION - FREQUENCY: FREQUENCY: INTERMITTENT

## 2022-06-14 ASSESSMENT — PAIN DESCRIPTION - PAIN TYPE: TYPE: CHRONIC PAIN

## 2022-06-14 NOTE — PROGRESS NOTES
Department of Podiatric Surgery  Progress Note  6/14/2022  Emily Ortiz      HISTORY OF PRESENT ILLNESS:      The patient is a 74 y.o. female who presents with right heel wound and infection, cellulitis The patient sleeps with c-pap normally and is not that active at home. She had to sleep in a wingback chair for the last several days and her legs were down. She is diabetic and has neuropathy. She has trouble with mobility at baseline and wears slippers at home 90% of the time. She has not had a problem with the heel before, but has had wounds venous nature recently. 6/14/22: The patient has no pain in the heel. She is off loading as instructed.      Past Medical History:        Diagnosis Date    Anemia     Chronic bilateral low back pain without sciatica     CKD (chronic kidney disease) stage 3, GFR 30-59 ml/min ([facility])     Colon cancer ([facility])     Diabetes ([address]     Diabetes ([address]     Diabetes with retinopathy ([address]     DM eye exam 12/18/17    Hypertension     Pancreatic cyst     Positive FIT (fecal immunochemical test)     Pulmonary hypertension ([facility])          ALLERGIES    Allergies   Allergen Reactions    Pcn [Penicillins] Shortness Of Breath    Spironolactone Rash    Coreg [Carvedilol]      Side effects:  SOB, palpitations    Lisinopril Other (See Comments)     cough    Flexeril [Cyclobenzaprine] Rash       REVIEW OF SYSTEMS:  CONSTITUTIONAL:  negative  EYES:  negative  HEENT:  negative  RESPIRATORY:  Dyspnea resolved  CARDIOVASCULAR:  positive for  orthopnea, edema  ENDOCRINE:  positive for diabetic symptoms including neuropathy  MUSCULOSKELETAL:  positive for  pain and swelling of the right heel  NEUROLOGICAL:  negative    PHYSICAL EXAM:  VITALS:  BP (!) 158/72   Pulse 72   Temp 98.3 °F (36.8 °C) (Oral)   Resp 18   Ht 5' 3\" (1.6 m)   Wt (!) 313 lb 6.4 oz (142.2 kg)   SpO2 95%   BMI 55.52 kg/m²   CONSTITUTIONAL:  awake, alert, cooperative, no apparent distress, and appears stated age  EYES:  pupils equal, round and reactive to light, extra ocular muscles intact, sclera clear, conjunctiva normal      LOWER EXTREMITY:  VASCULAR: Pedal Pulses present. negative signs of ischemia. MUSCULOSKELETAL:  negative gross deformity. NEUROLOGIC:  Epicritic sensation, light touch, joint position sensediminished  SKIN:  The rk-wound erythema is resolved. The central aspect remains necrotic, but the surrounding tissues are epithelialized again. There is only the remaining central wound. RIGHT plantar heel with 2.2 x 1.9 cm deeply penetrating 0.45 cm wound to deep subcutaneous tissue depth as most of the fat pad in this area has been debrided destroyed by the infection, with improvement to dark necrosis smaller and less necrosis noted. No calor, no odor with continued use of Santyl and Gentamycin    The left leg has superficial granular wound on the posterior calf with evidence of mild erythema surrounding that has appearance of chronic venous stasis. I/O:    Intake/Output Summary (Last 24 hours) at 6/14/2022 1942  Last data filed at 6/14/2022 1637  Gross per 24 hour   Intake 660.3 ml   Output 1185 ml   Net -524.7 ml              Wt Readings from Last 3 Encounters:   06/14/22 (!) 313 lb 6.4 oz (142.2 kg)   03/08/22 (!) 315 lb 12.8 oz (143.2 kg)   01/07/21 294 lb (133.4 kg)       LABS:    Recent Labs     06/12/22  0559 06/14/22  0637   WBC 8.1 9.8   HGB 11.5* 12.3   HCT 35.6* 37.7    221        Recent Labs     06/14/22  0637      K 3.4*   CL 95*   CO2 33*   PHOS 2.8   BUN 49*   CREATININE 2.2*        No results for input(s): PROT, INR, APTT in the last 72 hours. IMAGING:  X-rays:     mpression       1.  No plain radiographic evidence of osteomyelitis.       2.  Diffuse soft tissue swelling, especially within the dorsum of the foot.       3.  Otherwise no acute bony abnormality is noted.      MICRO:   Culture, Wound [[id number]] (Abnormal) Collected: 06/08/22 0935   Order Status: Completed Specimen: Leg Updated: 06/09/22 9370    Gram Stain Result No Epithelial Cells seen   1+ WBC's (Polymorphonuclear)   2+ Gram negative rods   3+ Gram positive cocci  in clusters-resembling Staph    Abnormal     Organism Serratia marcescens Abnormal     WOUND/ABSCESS --    Light growth   Sensitivity to follow     Organism Pseudomonas aeruginosa Abnormal     WOUND/ABSCESS --    Light growth   Sensitivity to follow     Organism Proteus species Abnormal     WOUND/ABSCESS --    Rare growth   No further workup    Narrative:     ORDER#: [id number]                          ORDERED BY: Carmela Gipson   SOURCE: Leg                                COLLECTED:  06/08/22 09:35          ASSESSMENT   MUGS 3 heel ulceration, right heel  Right foot diabetic foot infection  Venous wound, left posterior calf  Diabetes with peripheral neuropathy    PLAN:  Evaluation and Management x 20 minutes, nursing to assist with dressing change today, as patient was performing hygiene at time of visit    1. Right heel  MRI reviewed Osteitis is most likely given the short duration of infection and inflammation in this location. Osteomyelitis is still a concern and patient will likely need antibiotics until there is improvement in the appearance and depth of the wound to avoid conversion to osteomyelitis. Santyl and Gentamicin applied for combined treatment of the necrotic tissues and suspected bacterial infection      2. Venous stasis wound, left   Multi-layer compression dressing to the left leg. ABD and Gentamicin cream applied here as well. DISPO: Plan for wound care and antibiotics. Should be able to avoid OR given the improvement seen since admission. Thanks for the opportunity to participate in this patient's care.      Myrna Garcia DPM   [facility]    Office: [phone]  Fax: [phone]

## 2022-06-14 NOTE — PROGRESS NOTES
Occupational Therapy  Pt approached bedside for therapy attempt. Pt in recliner, declining OT at this time, stating she was due to get cowan taken out and stating \"I don't want to do OT at this time, too much going on\". Pt agreeable for therapy to attempt tomorrow. Refer to the last note for status if pt is discharged prior to the next session.([facility] score 17. Note, since last session, pt is now agreeable to skilled nursing stay).   Demetra BENAVIDES/CASEY,262

## 2022-06-14 NOTE — CARE COORDINATION
Discharge Planning:   PT/OT: 24 hour supervision or assist,Home with Home health PT (pt refuses SNF)  -- discussed with patient and sister. Patient reports they would be agreeable to a skilled nursing facility due to decrease in mobility, and \"feeling not well overall. \" SNF list provided to patient. Await choices. The Plan for Transition of Care is related to the following treatment goals: SNF     The Patient and sister were provided with a choice of provider and agrees   with the discharge plan. [x] Yes [] No    Freedom of choice list was provided with basic dialogue that supports the patient's individualized plan of care/goals, treatment preferences and shares the quality data associated with the providers. [x] Yes [] No     IMM letter provided. Patient inquired about MRI per general physician request at previousl appointment. Notified attending physician of patient's concern. Requested MD discuss with patient. NEED: Follow up with patient regarding SNF choices, no precert needed, will need HENS and medical transport.    JUAN DAVID Menard, JAYY, Social Work/Case Management   [phone]  Electronically signed by JUAN DAVID Menard LSW on 6/14/2022 at 1:57 PM

## 2022-06-14 NOTE — PLAN OF CARE
Problem: Discharge Planning  Goal: Discharge to home or other facility with appropriate resources  Outcome: Progressing     Problem: Skin/Tissue Integrity  Goal: Absence of new skin breakdown  Description: 1. Monitor for areas of redness and/or skin breakdown  2. Assess vascular access sites hourly  3. Every 4-6 hours minimum:  Change oxygen saturation probe site  4. Every 4-6 hours:  If on nasal continuous positive airway pressure, respiratory therapy assess nares and determine need for appliance change or resting period. Outcome: Progressing     Problem: Safety - Adult  Goal: Free from fall injury  Outcome: Progressing   Patient assessed for fall risk; fall precautions initiated. Patient and family instructed about safety devices. Environment kept free of clutter and adequate lighting provided. Bed locked and in lowest position. Call light within reach. Will continue to monitor. Problem: ABCDS Injury Assessment  Goal: Absence of physical injury  Outcome: Progressing   Skin assessment completed every shift. Pt assessed for incontinence, appropriate barrier cream applied prn. Pt encouraged to turn/rotate every 2 hours. Assistance provided if pt unable to do so themselves. Problem: Chronic Conditions and Co-morbidities  Goal: Patient's chronic conditions and co-morbidity symptoms are monitored and maintained or improved  Outcome: Progressing     Problem: Pain  Goal: Verbalizes/displays adequate comfort level or baseline comfort level  Outcome: Progressing  Flowsheets (Taken 6/13/2022 1115 by Jojo Garcia RN)  Verbalizes/displays adequate comfort level or baseline comfort level: Encourage patient to monitor pain and request assistance   Pain/discomfort being managed with PRN analgesics per MD orders. Pt able to express presence and absence of pain and rate pain appropriately using numerical scale.

## 2022-06-14 NOTE — PROGRESS NOTES
Hospitalist Progress Note      PCP: Radha Mckinney MD    Date of Admission: 6/8/2022    Subjective:     Medications:  Reviewed    Infusion Medications    furosemide (LASIX) 1mg/ml infusion 5 mg/hr (06/14/22 1609)    sodium chloride 100 mL/hr at 06/09/22 0643    dextrose       Scheduled Medications    gabapentin  100 mg Oral Nightly    potassium chloride  20 mEq Oral BID WC    ciprofloxacin  400 mg IntraVENous Q12H    [Held by provider] insulin lispro  25 Units SubCUTAneous TID WC    [Held by provider] enoxaparin  30 mg SubCUTAneous BID    fluticasone  1 spray Each Nostril Daily    insulin lispro  0-18 Units SubCUTAneous TID WC    insulin lispro  0-9 Units SubCUTAneous Nightly    collagenase   Topical Daily    gentamicin   Topical Daily    sodium chloride flush  10 mL IntraVENous 2 times per day    allopurinol  200 mg Oral Daily    metoprolol succinate  100 mg Oral Daily    insulin glargine  40 Units SubCUTAneous BID    cetirizine  10 mg Oral Nightly     PRN Meds: methocarbamol, HYDROcodone-acetaminophen, sodium chloride flush, sodium chloride, potassium chloride **OR** potassium alternative oral replacement **OR** potassium chloride, magnesium sulfate, promethazine **OR** ondansetron, magnesium hydroxide, acetaminophen **OR** acetaminophen, glucose, dextrose bolus **OR** dextrose bolus, glucagon (rDNA), dextrose      Intake/Output Summary (Last 24 hours) at 6/14/2022 1913  Last data filed at 6/14/2022 1637  Gross per 24 hour   Intake 660.3 ml   Output 1785 ml   Net -1124.7 ml       Physical Exam Performed:    BP (!) 158/72   Pulse 72   Temp 98.3 °F (36.8 °C) (Oral)   Resp 18   Ht 5' 3\" (1.6 m)   Wt (!) 313 lb 6.4 oz (142.2 kg)   SpO2 95%   BMI 55.52 kg/m²       General appearance: Chronically ill appearing. No apparent distress, appears stated age and cooperative. HEENT: Pupils equal, round, and reactive to light. Conjunctivae/corneas clear.   Neck: Supple, with full range of motion. No jugular venous distention. Trachea midline. Respiratory:  Normal respiratory effort. Clear to auscultation, bilaterally without Rales/Wheezes/Rhonchi. Cardiovascular: Regular rate and rhythm with normal S1/S2 without murmurs, rubs or gallops. Abdomen: Soft, non-tender, non-distended with normal bowel sounds. Musculoskeletal: No clubbing, cyanosis or edema bilaterally. Full range of motion without deformity. Skin: Right heel unstageable decubitus heel ulceration. Neurologic:  Neurovascularly intact without any focal sensory/motor deficits. Cranial nerves: II-XII intact, grossly non-focal.  Psychiatric: Alert and oriented, thought content appropriate, normal insight  Capillary Refill: Brisk,< 3 seconds   Peripheral Pulses: +2 palpable, equal bilaterally     Labs:   Recent Labs     06/12/22  0559 06/14/22  0637   WBC 8.1 9.8   HGB 11.5* 12.3   HCT 35.6* 37.7    221     Recent Labs     06/12/22  0559 06/12/22  1554 06/13/22  0848 06/14/22  0637     --  139 144   K 2.9* 3.6 3.3* 3.4*   CL 95*  --  92* 95*   CO2 33*  --  34* 33*   BUN 47*  --  51* 49*   CREATININE 2.1*  --  2.2* 2.2*   CALCIUM 8.9  --  8.7 9.1   PHOS  --   --   --  2.8     No results for input(s): AST, ALT, BILIDIR, BILITOT, ALKPHOS in the last 72 hours. No results for input(s): INR in the last 72 hours. No results for input(s): [place] in the last 72 hours. Urinalysis:      Lab Results   Component Value Date    NITRU POSITIVE 06/08/2022    WBCUA 25 06/08/2022    BACTERIA 3+ 06/08/2022    RBCUA 5-10 06/08/2022    RBCUA NEGATIVE 04/18/2018    BLOODU MODERATE 06/08/2022    SPECGRAV 1.013 06/08/2022    GLUCOSEU Negative 06/08/2022       Radiology:  XR CHEST PORTABLE   Final Result   1. No significant change. MRI FOOT RIGHT WO CONTRAST   Final Result   1. Plantar heel ulcer measuring 1.5 x 2 cm with mild adjacent cellulitis. No   soft tissue abscess.    2. Trace edema in the plantar calcaneus adjacent to the ulcer concerning for   mild acute osteomyelitis. XR FOOT RIGHT (MIN 3 VIEWS)   Final Result      1. No plain radiographic evidence of osteomyelitis. 2.  Diffuse soft tissue swelling, especially within the dorsum of the foot. 3.  Otherwise no acute bony abnormality is noted. CT CHEST WO CONTRAST   Final Result   Mild degree of subsegmental atelectasis. Otherwise no acute cardiopulmonary disease. XR CHEST PORTABLE   Final Result   Pulmonary vascular congestion. Evaluation of the left base limited by soft   tissue attenuation artifact.                  Assessment/Plan:    Active Hospital Problems    Diagnosis     Acute respiratory failure with hypoxia ([facility]) [J96.01]      Priority: Medium    Gram-negative bacteremia [R78.81]      Priority: Medium    Elevated C-reactive protein (CRP) [R79.82]      Priority: Medium    E. coli UTI (urinary tract infection) [N39.0, B96.20]      Priority: Medium    Diabetic polyneuropathy associated with type 2 diabetes mellitus ([facility] [E11.42]      Priority: Medium    Diabetes mellitus type 2 in obese ([facility]) [E11.69, E66.9]      Priority: Medium    Elevated sed rate [R70.0]      Priority: Medium    Serratia marcescens infection [A48.8]      Priority: Medium    Pseudomonas infection [A49.8]      Priority: Medium    Proteus infection [A49.8]      Priority: Medium    Cellulitis [L03.90]      Priority: Medium    Morbid obesity due to excess calories ([facility] [E66.01]     Type 2 diabetes mellitus with left diabetic foot infection ([facility] [Q96.003, L08.9]     Essential hypertension [I10]          Acute respiratory failure with hypoxia  -diuresis  -wean oxygen to maintain SpO2 > 89%  -incentive spirometer     CKD stage III  -avoid nephrotoxic meds  -trend and monitor  -nephrology consulted, recs appreciated     Elevated troponin  -cardiology consulted, doesn't believe this is consistent with ACS  -tele montioring     Chronic diastolic CHF - does not appear to be in exacerbation  -cardiology consulted  -continue home meds     Bilateral lower extremity cellulitis  -cefepime -> ciprofloxacin given possible side effect  -ID consulted, recs appreciated     Diabetic foot infection  -podiatry consulted, recs appreciated  -ID consulted, recs appreciated  -on empiric cefepime; abx per ID; should cover isolated organisms but await sensitivities  -MRI right foot pending to r/o osteomyelitis     Proteus bacteremia  -repeat blood cultures 6/10  -on ciprofloxacin which should cover  -ID consulted, recs appreciated  -repeat blood cultures NGTD     Type 2 DM  -Lantus, Humalog, and SSI  -hypoglycemia protocol  -POCT glucose checks  -carb control diet     Morbid obesity due to excess calories  -nutritional counseling provided  -weight loss encouraged  -complicating medical management        DVT Prophylaxis: Lovenox  Diet: ADULT DIET;  Regular; 4 carb choices (60 gm/meal); 1200 ml  Code Status: Full Code    PT/OT Eval Status: ordered    Nimisha Ruiz MD

## 2022-06-14 NOTE — PROGRESS NOTES
Infectious Diseases   Progress Note      Admission Date: 6/8/2022  Hospital Day: Hospital Day: 7   Attending: Letty Hayes MD  Date of service: 6/14/2022     Chief complaint/ Reason for consult:     · Gram-negative bacteremia with Proteus  · Complicated E. coli UTI  · Complicated left diabetic foot infection with E. coli, Proteus, Pseudomonas, Serratia marcescens  · Essential hypertension  · History of pulmonary hypertension  · Chronic kidney disease stage III    Microbiology:        I have reviewed allavailable micro lab data and cultures    · Blood culture (2/2) - collected on 6/8/2022 and 6/10/2022: Negative  · Left foot wound culture  - collected on 6/9/2022: Proteus mirabilis    Susceptibility      Proteus mirabilis (1)    Antibiotic Interpretation Microscan  Method Status    ampicillin Sensitive <=8 mcg/mL BACTERIAL SUSCEPTIBILITY PANEL BY BISMARK     ampicillin-sulbactam Sensitive <=8/4 mcg/mL BACTERIAL SUSCEPTIBILITY PANEL BY BISMARK     ceFAZolin Sensitive <=2 mcg/mL BACTERIAL SUSCEPTIBILITY PANEL BY BISMARK     cefepime Sensitive <=2 mcg/mL BACTERIAL SUSCEPTIBILITY PANEL BY BISMARK     cefTRIAXone Sensitive <=1 mcg/mL BACTERIAL SUSCEPTIBILITY PANEL BY BISMARK     cefuroxime Sensitive <=4 mcg/mL BACTERIAL SUSCEPTIBILITY PANEL BY BISMARK     ciprofloxacin Sensitive <=1 mcg/mL BACTERIAL SUSCEPTIBILITY PANEL BY BISMARK     ertapenem Sensitive <=0.5 mcg/mL BACTERIAL SUSCEPTIBILITY PANEL BY BISMARK     gentamicin Sensitive <=4 mcg/mL BACTERIAL SUSCEPTIBILITY PANEL BY BISMARK     meropenem Sensitive <=1 mcg/mL BACTERIAL SUSCEPTIBILITY PANEL BY BISMARK     piperacillin-tazobactam Sensitive <=16 mcg/mL BACTERIAL SUSCEPTIBILITY PANEL BY BISMARK     trimethoprim-sulfamethoxazole Sensitive <=2/38 mcg/mL BACTERIAL SUSCEPTIBILITY PANEL BY BISMARK         Antibiotics and immunizations:       Current antibiotics: All antibiotics and their doses were reviewed by me    Recent Abx Admin                   ciprofloxacin (CIPRO) IVPB 400 mg (mg) 400 mg New Bag 06/14/22 1250     400 mg New Bag 06/13/22 2776                  Immunization History: All immunization history was reviewed by me today. Immunization History   Administered Date(s) Administered    COVID-19, Moderna, Primary or Immunocompromised, PF, 100mcg/0.5mL 02/15/2021, 03/19/2021, 12/02/2021    Influenza Vaccine, unspecified formulation 12/31/2016, 12/12/2017    Influenza Virus Vaccine 12/12/2017    Influenza, Doralee Age, 6 mo and older, IM, PF (Flulaval, Fluarix) 11/02/2018    Influenza, Quadv, IM, PF (6 mo and older Fluzone, Flulaval, Fluarix, and 3 yrs and older Afluria) 12/31/2016    Influenza, Quadv, adjuvanted, 65 yrs +, IM, PF (Fluad) 10/28/2020    Influenza, Triv, inactivated, subunit, adjuvanted, IM (Fluad 65 yrs and older) 10/21/2019    Pneumococcal Polysaccharide ([id number]) 02/03/2020     · Leg wound culture: Collected on 6/8/2022: E. coli, Proteus, Pseudomonas, Serratia    Susceptibility     Serratia marcescens Pseudomonas aeruginosa     BACTERIAL SUSCEPTIBILITY PANEL BY BISMARK BACTERIAL SUSCEPTIBILITY PANEL BY BISMARK BACTERIAL SUSCEPTIBILITY PANEL BY E-TEST      amoxicillin-clavulanate >16/8 mcg/mL Resistant         ampicillin >16 mcg/mL Resistant         ampicillin-sulbactam >16/8 mcg/mL Resistant         ceFAZolin >16 mcg/mL Resistant         cefepime <=2 mcg/mL Sensitive <=2 mcg/mL Sensitive       cefTRIAXone <=1 mcg/mL Sensitive         cefuroxime >16 mcg/mL Resistant         ciprofloxacin <=1 mcg/mL Sensitive <=1 mcg/mL Sensitive       ertapenem <=0.5 mcg/mL Sensitive         gentamicin <=4 mcg/mL Sensitive <=4 mcg/mL Sensitive       levofloxacin     1.0 ug/ml Sensitive     meropenem <=1 mcg/mL Sensitive <=1 mcg/mL Sensitive       piperacillin-tazobactam <=16 mcg/mL Sensitive <=16 mcg/mL Sensitive       tobramycin   <=4 mcg/mL Sensitive       trimethoprim-sulfamethoxazole <=2/38 mcg/mL Sensitive              Known drug allergies:      All allergies were reviewed and updated    Allergies due to excess calorie intake : Body mass index is 54.56 kg/m². Discussion:      The patient is afebrile. She is on empiric IV ciprofloxacin. She is tolerating antibiotics okay. Serum creatinine is 2.2 today. White cell count is 9800. Blood cultures from 6/10/2022 remain negative    Plan:     Diagnostic Workup:      · Continue to follow  fever curve, WBC count and blood cultures. · Continue to monitor blood counts, liver and renal function. Antimicrobials:    · Will continue IV Cipro 400 mg every 12 hour  · Plan for podiatry surgical plans  · Plan to switch IV Cipro to p.o. Cipro 500 mg every 12 hour at discharge  · Continue close vitals monitoring. · Maintain good glycemic control. · Fall precautions. · Aspiration precautions. · Continue to watch for new fever or diarrhea. · DVT prophylaxis. · Discussed all above with patient and RN. Drug Monitoring:    · Continue monitoring for antibiotic toxicity as follows: CBC, CMP   · Continue to watch for following: new or worsening fever, new hypotension, hives, lip swelling and redness or purulence at vascular access sites. I/v access Management:    · Continue to monitor i.v access sites for erythema, induration, discharge or tenderness. · As always, continue efforts to minimize tubes/lines/drains as clinically appropriate to reduce chances of line associated infections. Patient education and counseling:        · The patient was educated in detail about the side-effects of various antibiotics and things to watch for like new rashes, lip swelling, severe reaction, worsening diarrhea, break through fever etc.  · Discussed patient's condition and what to expect. All of the patient's questions were addressed in a satisfactory manner and patient verbalized understanding all instructions. Level of complexity of visit: High       Thank you for involving me in the care of your patient. I will continue to follow.  If you have History: All past surgical history was reviewed today. Past Surgical History:   Procedure Laterality Date    COLONOSCOPY N/A 11/5/2018    COLONOSCOPY WITH BIOPSY performed by Kira Lynn MD at [address]  05/14/2019    Colonoscopy with polypectomy (cold snare)    COLONOSCOPY N/A 5/14/2019    COLONOSCOPY POLYPECTOMY SNARE/COLD BIOPSY performed by Kira Lynn MD at [id number] [place] [address] [place] Right 11/08/2018    [address] [place] and umbilical hernia repair    GA OFFICE/OUTPT VISIT,PROCEDURE ONLY Right 11/8/2018    LAPAROSCOPIC RIGHT HEMICOLECTOMY and umbilical hernia repair performed by Shasha Garrett MD at [address] [facility] 11/5/2018    EGD BIOPSY performed by Kira Lynn MD at [address] EXTRACTION         Family History: All family history was reviewed today. Problem Relation Age of Onset    Lung Cancer Mother     High Blood Pressure Father     Lung Cancer Father     Lupus Sister        Objective:       PHYSICAL EXAM:      Vitals:   Vitals:    06/14/22 1149 06/14/22 1211 06/14/22 1634 06/14/22 1637   BP: (!) 175/72   (!) 158/72   Pulse: 68   72   Resp: 18 18 18    Temp: 97.5 °F (36.4 °C)   98.3 °F (36.8 °C)   TempSrc: Oral   Oral   SpO2: 93%   95%   Weight:       Height:           Physical Exam  Vitals and nursing note reviewed. Constitutional:       General: She is not in acute distress. Appearance: She is well-developed. She is not diaphoretic. HENT:      Head: Normocephalic. Right Ear: External ear normal.      Left Ear: External ear normal.      Nose: Nose normal.   Eyes:      General: No scleral icterus. Right eye: No discharge. Left eye: No discharge. Conjunctiva/sclera: Conjunctivae normal.      Pupils: Pupils are equal, round, and reactive to light. Cardiovascular:      Rate and Rhythm: Normal rate and regular rhythm.       Heart sounds: No murmur heard.  No friction rub. Pulmonary:      Effort: Pulmonary effort is normal.      Breath sounds: No stridor. No wheezing or rales. Chest:      Chest wall: No tenderness. Abdominal:      Palpations: Abdomen is soft. There is no mass. Tenderness: There is no abdominal tenderness. There is no guarding or rebound. Musculoskeletal:         General: No tenderness. Cervical back: Normal range of motion and neck supple. Lymphadenopathy:      Cervical: No cervical adenopathy. Skin:     General: Skin is warm and dry. Findings: No erythema or rash. Neurological:      Mental Status: She is alert and oriented to person, place, and time. Motor: No abnormal muscle tone. Psychiatric:         Judgment: Judgment normal.                Lines and drains: All vascular access sites are healthy with no local erythema, discharge or tenderness. Intake and output:    I/O last 3 completed shifts: In: 1250 [P.O.:1200; IV Piggyback:50]  Out: 3600 [Urine:3600]    Lab Data:   All available labs and old records have been reviewed by me.     CBC:  Recent Labs     06/12/22  0559 06/14/22  0637   WBC 8.1 9.8   RBC 3.89* 4.10   HGB 11.5* 12.3   HCT 35.6* 37.7    221   MCV 91.5 92.1   MCH 29.6 30.0   MCHC 32.4 32.6   RDW 16.4* 16.1*        BMP:  Recent Labs     06/12/22  0559 06/12/22  1554 06/13/22  0848 06/14/22  0637     --  139 144   K 2.9* 3.6 3.3* 3.4*   CL 95*  --  92* 95*   CO2 33*  --  34* 33*   BUN 47*  --  51* 49*   CREATININE 2.1*  --  2.2* 2.2*   CALCIUM 8.9  --  8.7 9.1   GLUCOSE 125*  --  149* 160*        Hepatic Function Panel:   Lab Results   Component Value Date    ALKPHOS 100 06/08/2022    ALT 19 06/08/2022    AST 19 06/08/2022    PROT 7.9 06/08/2022    BILITOT 0.8 06/08/2022    BILIDIR 0.1 06/07/2021    IBILI see below 07/22/2020    LABALBU 3.8 06/14/2022       CPK:   Lab Results   Component Value Date    CKTOTAL 143 06/08/2022     ESR:   Lab Results   Component Value Date SEDRATE >130 (H) 06/08/2022     CRP:   Lab Results   Component Value Date    .9 (H) 06/08/2022           Imaging: All pertinent images and reports for the current visit were reviewed by me during this visit. I reviewed the chest x-ray/CT scan/MRI images and independently interpreted the findings and results today. XR CHEST PORTABLE   Final Result   1. No significant change. MRI FOOT RIGHT WO CONTRAST   Final Result   1. Plantar heel ulcer measuring 1.5 x 2 cm with mild adjacent cellulitis. No   soft tissue abscess. 2. Trace edema in the plantar calcaneus adjacent to the ulcer concerning for   mild acute osteomyelitis. XR FOOT RIGHT (MIN 3 VIEWS)   Final Result      1. No plain radiographic evidence of osteomyelitis. 2.  Diffuse soft tissue swelling, especially within the dorsum of the foot. 3.  Otherwise no acute bony abnormality is noted. CT CHEST WO CONTRAST   Final Result   Mild degree of subsegmental atelectasis. Otherwise no acute cardiopulmonary disease. XR CHEST PORTABLE   Final Result   Pulmonary vascular congestion. Evaluation of the left base limited by soft   tissue attenuation artifact. Medications: All current and past medications were reviewed.      gabapentin  100 mg Oral Nightly    potassium chloride  20 mEq Oral BID WC    ciprofloxacin  400 mg IntraVENous Q12H    [Held by provider] insulin lispro  25 Units SubCUTAneous TID WC    [Held by provider] enoxaparin  30 mg SubCUTAneous BID    fluticasone  1 spray Each Nostril Daily    insulin lispro  0-18 Units SubCUTAneous TID WC    insulin lispro  0-9 Units SubCUTAneous Nightly    collagenase   Topical Daily    gentamicin   Topical Daily    sodium chloride flush  10 mL IntraVENous 2 times per day    allopurinol  200 mg Oral Daily    metoprolol succinate  100 mg Oral Daily    insulin glargine  40 Units SubCUTAneous BID    cetirizine  10 mg Oral Nightly        furosemide (LASIX) 1mg/ml infusion 5 mg/hr (06/14/22 1609)    sodium chloride 100 mL/hr at 06/09/22 7268    dextrose         methocarbamol, HYDROcodone-acetaminophen, sodium chloride flush, sodium chloride, potassium chloride **OR** potassium alternative oral replacement **OR** potassium chloride, magnesium sulfate, promethazine **OR** ondansetron, magnesium hydroxide, acetaminophen **OR** acetaminophen, glucose, dextrose bolus **OR** dextrose bolus, glucagon (rDNA), dextrose      Problem list:       Patient Active Problem List   Diagnosis Code    Essential hypertension I10    HLD (hyperlipidemia) E78.5    Type 2 diabetes mellitus with left diabetic foot infection ([facility] [address] E11.628, L08.9    Volume overload E87.70    Malignant neoplasm of ascending colon (HCC) C18.2    Anemia D64.9    CKD (chronic kidney disease) stage 3, GFR 30-59 ml/min ([facility]) N18.30    Uterine mass N85.8    Morbid obesity due to excess calories ([facility]) E66.01    BRIANNA (obstructive sleep apnea) G47.33    PLMD (periodic limb movement disorder) G47.61    Pancreatic cyst K86.2    Chronic bilateral low back pain without sciatica M54.50, G89.29    Cellulitis L03.90    Acute respiratory failure with hypoxia ([facility]) J96.01    Gram-negative bacteremia R78.81    Elevated C-reactive protein (CRP) R79.82    E. coli UTI (urinary tract infection) N39.0, B96.20    Diabetic polyneuropathy associated with type 2 diabetes mellitus (HCC) E11.42    Diabetes mellitus type 2 in obese (HCC) E11.69, E66.9    Elevated sed rate R70.0    Serratia marcescens infection A48.8    Pseudomonas infection A49.8    Proteus infection A49.8       Please note that this chart was generated using Dragon dictation software. Although every effort was made to ensure the accuracy of this automated transcription, some errors in transcription may have occurred inadvertently.  If you may need any clarification, please do not hesitate to contact me through [facility] or at the phone number provided below with my electronic signature. Any pictures or media included in this note were obtained after taking informed verbal consent from the patient and with their approval to include those in the patient's medical record.       Kevin Carpenter MD, MPH, [address] [place] [address]  6/14/2022, 5:02 PM  [facility] Infectious Disease   [facility] [address] [facility], [address] [place] [address]  Office: [phone]  Fax: [phone]  Clinic days:  Tuesday & Thursday

## 2022-06-14 NOTE — PROGRESS NOTES
Nephrology Progress Note   [employer]      HPI:  No CP.  O > I. Renal function stable. ROS:  No fever. [address]:  medications reviewed. Subjective:    Resting in recliner; NAD; no shortness of breath    Scheduled Meds:   gabapentin  100 mg Oral Nightly    potassium chloride  20 mEq Oral BID WC    ciprofloxacin  400 mg IntraVENous Q12H    [Held by provider] insulin lispro  25 Units SubCUTAneous TID WC    [Held by provider] enoxaparin  30 mg SubCUTAneous BID    fluticasone  1 spray Each Nostril Daily    insulin lispro  0-18 Units SubCUTAneous TID WC    insulin lispro  0-9 Units SubCUTAneous Nightly    collagenase   Topical Daily    gentamicin   Topical Daily    sodium chloride flush  10 mL IntraVENous 2 times per day    allopurinol  200 mg Oral Daily    metoprolol succinate  100 mg Oral Daily    insulin glargine  40 Units SubCUTAneous BID    cetirizine  10 mg Oral Nightly        furosemide (LASIX) 1mg/ml infusion 5 mg/hr (22 1609)    sodium chloride 100 mL/hr at 22 0643    dextrose         PRN Meds:.methocarbamol, HYDROcodone-acetaminophen, sodium chloride flush, sodium chloride, potassium chloride **OR** potassium alternative oral replacement **OR** potassium chloride, magnesium sulfate, promethazine **OR** ondansetron, magnesium hydroxide, acetaminophen **OR** acetaminophen, glucose, dextrose bolus **OR** dextrose bolus, glucagon (rDNA), dextrose    Physical Exam:    TEMPERATURE:  Current - Temp: 97.5 °F (36.4 °C);  Max - Temp  Av.4 °F (36.9 °C)  Min: 97.1 °F (36.2 °C)  Max: 99.7 °F (37.6 °C)  RESPIRATIONS RANGE: Resp  Av.5  Min: 14  Max: 27  PULSE RANGE: Pulse  Av.9  Min: 68  Max: 74  BLOOD PRESSURE RANGE:  Systolic (48HUI), MEZ:898 , Min:140 , LEF:411   ; Diastolic (78LKP), EHR:78, Min:66, Max:77    24HR INTAKE/OUTPUT:      Intake/Output Summary (Last 24 hours) at 2022 1619  Last data filed at 2022 1350  Gross per 24 hour   Intake 540.3 ml   Output 1785 ml   Net -1244.7 ml         Patient Vitals for the past 96 hrs (Last 3 readings):   Weight   06/14/22 0634 (!) 313 lb 6.4 oz (142.2 kg)   06/13/22 0915 (!) 322 lb 5 oz (146.2 kg)   06/12/22 0718 (!) 316 lb 12.8 oz (143.7 kg)       General:  NAD, A+OX3, obese  Chest:  CTAB  CVS:  RRR  Abdominal:  NTND, soft, +BS  Extremities:  1/2+ edema  Skin:  no rash    Allergies:   Allergies   Allergen Reactions    Pcn [Penicillins] Shortness Of Breath    Spironolactone Rash    Coreg [Carvedilol]      Side effects:  SOB, palpitations    Lisinopril Other (See Comments)     cough    Flexeril [Cyclobenzaprine] Rash          LAB DATA:    CBC:   Lab Results   Component Value Date    WBC 9.8 06/14/2022    RBC 4.10 06/14/2022    HGB 12.3 06/14/2022    HCT 37.7 06/14/2022    MCV 92.1 06/14/2022    MCH 30.0 06/14/2022    MCHC 32.6 06/14/2022    RDW 16.1 06/14/2022     06/14/2022    MPV 7.5 06/14/2022     BMP:    Lab Results   Component Value Date     06/14/2022    K 3.4 06/14/2022    K 3.3 06/13/2022    CL 95 06/14/2022    CO2 33 06/14/2022    BUN 49 06/14/2022    CREATININE 2.2 06/14/2022    CALCIUM 9.1 06/14/2022    GFRAA 27 06/14/2022    LABGLOM 22 06/14/2022    GLUCOSE 160 06/14/2022     Ionized Calcium:  No results found for: IONCA  Magnesium:    Lab Results   Component Value Date    MG 2.40 06/14/2022     Phosphorus:    Lab Results   Component Value Date    PHOS 2.8 06/14/2022     U/A:    Lab Results   Component Value Date    COLORU Yellow 06/08/2022    PHUR 6.0 06/08/2022    WBCUA 25 06/08/2022    RBCUA 5-10 06/08/2022    RBCUA NEGATIVE 04/18/2018    MUCUS PRESENT 04/18/2018    BACTERIA 3+ 06/08/2022    CLARITYU Clear 06/08/2022    SPECGRAV 1.013 06/08/2022    LEUKOCYTESUR LARGE 06/08/2022    UROBILINOGEN 0.2 06/08/2022    BILIRUBINUR Negative 06/08/2022    BILIRUBINUR NEGATIVE 04/18/2018    BLOODU MODERATE 06/08/2022    GLUCOSEU Negative 06/08/2022         IMPRESSION/RECOMMENDATIONS:      Principal Problem: Cellulitis  Active Problems:    Acute respiratory failure with hypoxia (HCC)    Gram-negative bacteremia    Elevated C-reactive protein (CRP)    E. coli UTI (urinary tract infection)    Diabetic polyneuropathy associated with type 2 diabetes mellitus (HCC)    Diabetes mellitus type 2 in obese (HCC)    Elevated sed rate    Serratia marcescens infection    Pseudomonas infection    Proteus infection    Essential hypertension    Type 2 diabetes mellitus with left diabetic foot infection ([address]    Morbid obesity due to excess calories ([address]  Resolved Problems:    * No resolved hospital problems.  *    1) JOB on CKD              - baseline Cr 1.4-1.7, sees me              - ddx:  pre-renal vs. ATN              - renal function stable but creatinine above previous baseline                  2) edema              - Patient has gained true weight - edema is not significant              - switched to lasix gtt 6/13/22 - continue              - d/c nutritional supplement and continue fluid restriction 1.2 liters/day              - cowan catheter     3) right heel wound and cellulitis              - wound care              - podiatry and ID following              - on Cipro     4) Hypokalemia                          - Replaced      5) Anemia CKD - Hgb > 11  - THADDEUS not indicated at this point in time

## 2022-06-14 NOTE — PROGRESS NOTES
Patient arrived on the floor, alert and oriented, VSS, BP elevated. Patient c/o back pain, prn pain medication given, see MAR. Patient denies n/v, diarrhea, pain, states she does have SOB with ambulation and exertion. BLE elevated, non-slip socks on. Telemetry box #65 applied and confirmed with CMU. Patient in chair with safety alarm in place, friend at bedside. Oriented patient to the room and call light, answered all questions. Call light in reach. Will continue to monitor pt needs.

## 2022-06-15 ENCOUNTER — APPOINTMENT (OUTPATIENT)
Dept: GENERAL RADIOLOGY | Age: 67
DRG: 623 | End: 2022-06-15
Payer: MEDICARE

## 2022-06-15 LAB
ALBUMIN SERPL-MCNC: 3 G/DL (ref 3.4–5)
ANION GAP SERPL CALCULATED.3IONS-SCNC: 11 MMOL/L (ref 3–16)
BUN BLDV-MCNC: 51 MG/DL (ref 7–20)
CALCIUM SERPL-MCNC: 9.3 MG/DL (ref 8.3–10.6)
CHLORIDE BLD-SCNC: 96 MMOL/L (ref 99–110)
CO2: 34 MMOL/L (ref 21–32)
CREAT SERPL-MCNC: 2.3 MG/DL (ref 0.6–1.2)
GFR AFRICAN AMERICAN: 25
GFR NON-AFRICAN AMERICAN: 21
GLUCOSE BLD-MCNC: 170 MG/DL (ref 70–99)
GLUCOSE BLD-MCNC: 182 MG/DL (ref 70–99)
GLUCOSE BLD-MCNC: 184 MG/DL (ref 70–99)
GLUCOSE BLD-MCNC: 189 MG/DL (ref 70–99)
GLUCOSE BLD-MCNC: 225 MG/DL (ref 70–99)
MAGNESIUM: 2.2 MG/DL (ref 1.8–2.4)
PERFORMED ON: ABNORMAL
PHOSPHORUS: 3.4 MG/DL (ref 2.5–4.9)
POTASSIUM SERPL-SCNC: 3.3 MMOL/L (ref 3.5–5.1)
SODIUM BLD-SCNC: 141 MMOL/L (ref 136–145)
URIC ACID, SERUM: 9.3 MG/DL (ref 2.6–6)

## 2022-06-15 PROCEDURE — 2700000000 HC OXYGEN THERAPY PER DAY

## 2022-06-15 PROCEDURE — 6370000000 HC RX 637 (ALT 250 FOR IP): Performed by: INTERNAL MEDICINE

## 2022-06-15 PROCEDURE — 99232 SBSQ HOSP IP/OBS MODERATE 35: CPT | Performed by: INTERNAL MEDICINE

## 2022-06-15 PROCEDURE — 72100 X-RAY EXAM L-S SPINE 2/3 VWS: CPT

## 2022-06-15 PROCEDURE — 36415 COLL VENOUS BLD VENIPUNCTURE: CPT

## 2022-06-15 PROCEDURE — 6360000002 HC RX W HCPCS: Performed by: INTERNAL MEDICINE

## 2022-06-15 PROCEDURE — 94760 N-INVAS EAR/PLS OXIMETRY 1: CPT

## 2022-06-15 PROCEDURE — 80069 RENAL FUNCTION PANEL: CPT

## 2022-06-15 PROCEDURE — 97110 THERAPEUTIC EXERCISES: CPT

## 2022-06-15 PROCEDURE — 97530 THERAPEUTIC ACTIVITIES: CPT

## 2022-06-15 PROCEDURE — 1200000000 HC SEMI PRIVATE

## 2022-06-15 PROCEDURE — 83735 ASSAY OF MAGNESIUM: CPT

## 2022-06-15 PROCEDURE — 6370000000 HC RX 637 (ALT 250 FOR IP): Performed by: PODIATRIST

## 2022-06-15 PROCEDURE — 84550 ASSAY OF BLOOD/URIC ACID: CPT

## 2022-06-15 RX ORDER — CIPROFLOXACIN 500 MG/1
500 TABLET, FILM COATED ORAL EVERY 12 HOURS SCHEDULED
Status: DISCONTINUED | OUTPATIENT
Start: 2022-06-15 | End: 2022-06-17 | Stop reason: HOSPADM

## 2022-06-15 RX ORDER — LABETALOL 200 MG/1
200 TABLET, FILM COATED ORAL EVERY 12 HOURS SCHEDULED
Status: DISCONTINUED | OUTPATIENT
Start: 2022-06-15 | End: 2022-06-17 | Stop reason: HOSPADM

## 2022-06-15 RX ORDER — TORSEMIDE 20 MG/1
50 TABLET ORAL DAILY
Status: DISCONTINUED | OUTPATIENT
Start: 2022-06-16 | End: 2022-06-16

## 2022-06-15 RX ORDER — HYDRALAZINE HYDROCHLORIDE 50 MG/1
50 TABLET, FILM COATED ORAL EVERY 8 HOURS SCHEDULED
Status: DISCONTINUED | OUTPATIENT
Start: 2022-06-15 | End: 2022-06-15

## 2022-06-15 RX ADMIN — INSULIN LISPRO 3 UNITS: 100 INJECTION, SOLUTION INTRAVENOUS; SUBCUTANEOUS at 08:31

## 2022-06-15 RX ADMIN — HYDROCODONE BITARTRATE AND ACETAMINOPHEN 1 TABLET: 5; 325 TABLET ORAL at 15:43

## 2022-06-15 RX ADMIN — HYDROCODONE BITARTRATE AND ACETAMINOPHEN 1 TABLET: 5; 325 TABLET ORAL at 20:47

## 2022-06-15 RX ADMIN — INSULIN LISPRO 6 UNITS: 100 INJECTION, SOLUTION INTRAVENOUS; SUBCUTANEOUS at 12:18

## 2022-06-15 RX ADMIN — ALLOPURINOL 200 MG: 100 TABLET ORAL at 08:29

## 2022-06-15 RX ADMIN — METHOCARBAMOL TABLETS 750 MG: 750 TABLET, COATED ORAL at 23:35

## 2022-06-15 RX ADMIN — METOPROLOL SUCCINATE 100 MG: 50 TABLET, EXTENDED RELEASE ORAL at 08:30

## 2022-06-15 RX ADMIN — INSULIN GLARGINE 40 UNITS: 100 INJECTION, SOLUTION SUBCUTANEOUS at 08:31

## 2022-06-15 RX ADMIN — CIPROFLOXACIN 400 MG: 2 INJECTION, SOLUTION INTRAVENOUS at 10:18

## 2022-06-15 RX ADMIN — INSULIN LISPRO 2 UNITS: 100 INJECTION, SOLUTION INTRAVENOUS; SUBCUTANEOUS at 17:11

## 2022-06-15 RX ADMIN — CETIRIZINE HYDROCHLORIDE 10 MG: 10 TABLET, FILM COATED ORAL at 20:47

## 2022-06-15 RX ADMIN — INSULIN LISPRO 2 UNITS: 100 INJECTION, SOLUTION INTRAVENOUS; SUBCUTANEOUS at 20:46

## 2022-06-15 RX ADMIN — HYDROCODONE BITARTRATE AND ACETAMINOPHEN 1 TABLET: 5; 325 TABLET ORAL at 10:44

## 2022-06-15 RX ADMIN — POTASSIUM CHLORIDE 20 MEQ: 20 TABLET, EXTENDED RELEASE ORAL at 17:11

## 2022-06-15 RX ADMIN — POTASSIUM CHLORIDE 20 MEQ: 20 TABLET, EXTENDED RELEASE ORAL at 08:30

## 2022-06-15 RX ADMIN — INSULIN GLARGINE 40 UNITS: 100 INJECTION, SOLUTION SUBCUTANEOUS at 20:46

## 2022-06-15 RX ADMIN — HYDROCODONE BITARTRATE AND ACETAMINOPHEN 1 TABLET: 5; 325 TABLET ORAL at 06:34

## 2022-06-15 RX ADMIN — Medication: at 08:35

## 2022-06-15 RX ADMIN — FLUTICASONE PROPIONATE 1 SPRAY: 50 SPRAY, METERED NASAL at 08:31

## 2022-06-15 RX ADMIN — CIPROFLOXACIN 500 MG: 500 TABLET, FILM COATED ORAL at 20:46

## 2022-06-15 RX ADMIN — GENTAMICIN SULFATE: 1 CREAM TOPICAL at 08:35

## 2022-06-15 ASSESSMENT — ENCOUNTER SYMPTOMS
EYE REDNESS: 0
DIARRHEA: 0
RHINORRHEA: 0
EYE DISCHARGE: 0
COLOR CHANGE: 0
APNEA: 0
FACIAL SWELLING: 0
COUGH: 0
NAUSEA: 0
PHOTOPHOBIA: 0
BLOOD IN STOOL: 0
CHEST TIGHTNESS: 0
TROUBLE SWALLOWING: 0
ABDOMINAL PAIN: 0
SHORTNESS OF BREATH: 0
CHOKING: 0
STRIDOR: 0

## 2022-06-15 ASSESSMENT — PAIN SCALES - GENERAL
PAINLEVEL_OUTOF10: 7
PAINLEVEL_OUTOF10: 8
PAINLEVEL_OUTOF10: 8
PAINLEVEL_OUTOF10: 7
PAINLEVEL_OUTOF10: 6
PAINLEVEL_OUTOF10: 0
PAINLEVEL_OUTOF10: 6
PAINLEVEL_OUTOF10: 5

## 2022-06-15 ASSESSMENT — PAIN DESCRIPTION - LOCATION
LOCATION: BACK

## 2022-06-15 ASSESSMENT — PAIN DESCRIPTION - DESCRIPTORS
DESCRIPTORS: ACHING

## 2022-06-15 ASSESSMENT — PAIN - FUNCTIONAL ASSESSMENT
PAIN_FUNCTIONAL_ASSESSMENT: PREVENTS OR INTERFERES SOME ACTIVE ACTIVITIES AND ADLS

## 2022-06-15 ASSESSMENT — PAIN DESCRIPTION - ORIENTATION
ORIENTATION: LOWER

## 2022-06-15 ASSESSMENT — PAIN DESCRIPTION - ONSET: ONSET: ON-GOING

## 2022-06-15 ASSESSMENT — PAIN DESCRIPTION - PAIN TYPE: TYPE: CHRONIC PAIN

## 2022-06-15 ASSESSMENT — PAIN DESCRIPTION - FREQUENCY: FREQUENCY: INTERMITTENT

## 2022-06-15 NOTE — PROGRESS NOTES
Patient alert and oriented, VSS, sitting up in chair,  at bedside. Patient denies n/v, diarrhea, SOB, states states she is having pain, but it is improving since prn pain medication given this morning. Patient denies needs at this time. Chair in locked position with safety alarm in place. BLE elevated, non-slip socks on, call light in reach. Will continue to monitor pt needs.

## 2022-06-15 NOTE — PROGRESS NOTES
Infectious Diseases   Progress Note      Admission Date: 6/8/2022  Hospital Day: Hospital Day: 8   Attending: Mary Romo MD  Date of service: 6/15/2022     Chief complaint/ Reason for consult:     · Gram-negative bacteremia with Proteus  · Complicated E. coli UTI  · Complicated left diabetic foot infection with E. coli, Proteus, Pseudomonas, Serratia marcescens  · Essential hypertension  · History of pulmonary hypertension  · Chronic kidney disease stage III    Microbiology:        I have reviewed allavailable micro lab data and cultures    · Blood culture (2/2) - collected on 6/8/2022 and 6/10/2022: Negative  · Left foot wound culture  - collected on 6/9/2022: Proteus mirabilis    Susceptibility      Proteus mirabilis (1)    Antibiotic Interpretation Microscan  Method Status    ampicillin Sensitive <=8 mcg/mL BACTERIAL SUSCEPTIBILITY PANEL BY BISMARK     ampicillin-sulbactam Sensitive <=8/4 mcg/mL BACTERIAL SUSCEPTIBILITY PANEL BY BISMARK     ceFAZolin Sensitive <=2 mcg/mL BACTERIAL SUSCEPTIBILITY PANEL BY BISMARK     cefepime Sensitive <=2 mcg/mL BACTERIAL SUSCEPTIBILITY PANEL BY BISMARK     cefTRIAXone Sensitive <=1 mcg/mL BACTERIAL SUSCEPTIBILITY PANEL BY BISMARK     cefuroxime Sensitive <=4 mcg/mL BACTERIAL SUSCEPTIBILITY PANEL BY BISMARK     ciprofloxacin Sensitive <=1 mcg/mL BACTERIAL SUSCEPTIBILITY PANEL BY BISMARK     ertapenem Sensitive <=0.5 mcg/mL BACTERIAL SUSCEPTIBILITY PANEL BY BISMARK     gentamicin Sensitive <=4 mcg/mL BACTERIAL SUSCEPTIBILITY PANEL BY BISMARK     meropenem Sensitive <=1 mcg/mL BACTERIAL SUSCEPTIBILITY PANEL BY BISMARK     piperacillin-tazobactam Sensitive <=16 mcg/mL BACTERIAL SUSCEPTIBILITY PANEL BY BISMARK     trimethoprim-sulfamethoxazole Sensitive <=2/38 mcg/mL BACTERIAL SUSCEPTIBILITY PANEL BY BISMARK         Antibiotics and immunizations:       Current antibiotics: All antibiotics and their doses were reviewed by me    Recent Abx Admin                   ciprofloxacin (CIPRO) IVPB 400 mg (mg) 400 mg New Bag 06/15/22 1018     400 mg New Bag 06/14/22 1252                  Immunization History: All immunization history was reviewed by me today. Immunization History   Administered Date(s) Administered    COVID-19, Moderna, Primary or Immunocompromised, PF, 100mcg/0.5mL 02/15/2021, 03/19/2021, 12/02/2021    Influenza Vaccine, unspecified formulation 12/31/2016, 12/12/2017    Influenza Virus Vaccine 12/12/2017    Influenza, Inez Guise, 6 mo and older, IM, PF (Flulaval, Fluarix) 11/02/2018    Influenza, Quadv, IM, PF (6 mo and older Fluzone, Flulaval, Fluarix, and 3 yrs and older Afluria) 12/31/2016    Influenza, Quadv, adjuvanted, 65 yrs +, IM, PF (Fluad) 10/28/2020    Influenza, Triv, inactivated, subunit, adjuvanted, IM (Fluad 65 yrs and older) 10/21/2019    Pneumococcal Polysaccharide ([id number]) 02/03/2020     · Leg wound culture: Collected on 6/8/2022: E. coli, Proteus, Pseudomonas, Serratia    Susceptibility     Serratia marcescens Pseudomonas aeruginosa     BACTERIAL SUSCEPTIBILITY PANEL BY BISMARK BACTERIAL SUSCEPTIBILITY PANEL BY BISMARK BACTERIAL SUSCEPTIBILITY PANEL BY E-TEST      amoxicillin-clavulanate >16/8 mcg/mL Resistant         ampicillin >16 mcg/mL Resistant         ampicillin-sulbactam >16/8 mcg/mL Resistant         ceFAZolin >16 mcg/mL Resistant         cefepime <=2 mcg/mL Sensitive <=2 mcg/mL Sensitive       cefTRIAXone <=1 mcg/mL Sensitive         cefuroxime >16 mcg/mL Resistant         ciprofloxacin <=1 mcg/mL Sensitive <=1 mcg/mL Sensitive       ertapenem <=0.5 mcg/mL Sensitive         gentamicin <=4 mcg/mL Sensitive <=4 mcg/mL Sensitive       levofloxacin     1.0 ug/ml Sensitive     meropenem <=1 mcg/mL Sensitive <=1 mcg/mL Sensitive       piperacillin-tazobactam <=16 mcg/mL Sensitive <=16 mcg/mL Sensitive       tobramycin   <=4 mcg/mL Sensitive       trimethoprim-sulfamethoxazole <=2/38 mcg/mL Sensitive              Known drug allergies:      All allergies were reviewed and updated    Allergies Allergen Reactions    Pcn [Penicillins] Shortness Of Breath    Spironolactone Rash    Coreg [Carvedilol]      Side effects:  SOB, palpitations    Lisinopril Other (See Comments)     cough    Flexeril [Cyclobenzaprine] Rash       Social history:     Social History:  All social andepidemiologic history was reviewed and updated by me today as needed. · Tobacco use:   reports that she has never smoked. She has never used smokeless tobacco.  · Alcohol use:   reports current alcohol use of about 2.0 standard drinks of alcohol per week. · Currently lives in: [address] [place] [address]   ·  reports no history of drug use. COVID VACCINATION AND LAB RESULT RECORDS:     Internal Administration   First Dose COVID-19, Moderna, Primary or Immunocompromised, PF, 100mcg/0.5mL  02/15/2021   Second Dose COVID-19, Vertitusa Antonio, Primary or Immunocompromised, PF, 100mcg/0.5mL   03/19/2021       Last COVID Lab SARS-CoV-2, NAAT (no units)   Date Value   06/08/2022 Not Detected            Assessment:     The patient is a 74 y.o. old female who  has a past medical history of Anemia, Chronic bilateral low back pain without sciatica, CKD (chronic kidney disease) stage 3, GFR 30-59 ml/min ([facility]), Colon cancer ([address] Diabetes ([address] Diabetes ([address] Diabetes with retinopathy ([address] Hypertension, Pancreatic cyst, Positive FIT (fecal immunochemical test), and Pulmonary hypertension ([facility]  with following problems:    · Gram-negative bacteremia with Proteus-currently Cipro  · Complicated E. coli UTI -covered with ciprofloxacin  · Complicated left diabetic foot infection with E. coli, Proteus, Pseudomonas, Serratia marcescens  · Essential hypertension -blood pressure okay  · History of pulmonary hypertension  · Chronic kidney disease stage III --serum creatinine is 2.3  · Elevated CRP of 118.9  · Elevated sed rate of greater than 130  · Type 2 diabetes mellitus-maintain good glucose control  · Diabetic polyneuropathy type II  · Obesity Class 3 due to excess calorie intake : Body mass index is 54.56 kg/m². Discussion:      The patient is afebrile. She is on IV ciprofloxacin. She is tolerating antibiotics okay. Serum creatinine is 2.3 today. Plan:     Diagnostic Workup:    · Continue to follow  fever curve, WBC count and blood cultures. · Continue to monitor blood counts, liver and renal function. Antimicrobials:    · I will switch IV Cipro to p.o. Cipro 500 mg every 12 hours  · Podiatry note reviewed. Osteomyelitis could not be ruled out. The patient is afebrile. · Plan for 3 more weeks of oral ciprofloxacin at discharge and follow-up with Dr. Merced Castro as an outpatient after 3 weeks  · Continue close vitals monitoring. · Maintain good glycemic control. · Fall precautions. · Aspiration precautions. · Continue to watch for new fever or diarrhea. · DVT prophylaxis. · Discussed all above with patient and RN. Drug Monitoring:    · Continue monitoring for antibiotic toxicity as follows: CBC, CMP   · Continue to watch for following: new or worsening fever, new hypotension, hives, lip swelling and redness or purulence at vascular access sites. I/v access Management:    · Continue to monitor i.v access sites for erythema, induration, discharge or tenderness. · As always, continue efforts to minimize tubes/lines/drains as clinically appropriate to reduce chances of line associated infections. Patient education and counseling:        · The patient was educated in detail about the side-effects of various antibiotics and things to watch for like new rashes, lip swelling, severe reaction, worsening diarrhea, break through fever etc.  · Discussed patient's condition and what to expect. All of the patient's questions were addressed in a satisfactory manner and patient verbalized understanding all instructions.       Level of complexity of visit: High     Fluoroquinolone related instructions:     Patient instructed to watch for low or high blood sugars, muscle pains and ankle tendon pain while on Ciprofloxacin or Levofloxacin. Patient was advised to keep a sugar candy at all times as all fluoroquinolones have the potential of causing hypoglycemia. If these symptoms develops, patient was instructed to stop the antibiotic and call my office at [phone]. Use sunscreen when going in bright sun while on Ciprofloxacin or Levofloxacin as these antibiotics can cause photosensitivity. Take 1 hour before or 2 hour after dairy, calcium, iron, magnesium, aluminum or zinc.      Thanks for allowing me to participate in your patient's care. I will sign off today, but will be available to answer any further questions or concerns that may arise during patient's stay in the hospital.      Subjective: Interval history: Interval history was obtained from chart review and patient/ RN. She is afebrile. She is tolerating antibiotics okay. No diarrhea     REVIEW OF SYSTEMS:      Review of Systems   Constitutional: Negative for chills, diaphoresis and unexpected weight change. HENT: Negative for congestion, ear discharge, ear pain, facial swelling, hearing loss, rhinorrhea and trouble swallowing. Eyes: Negative for photophobia, discharge, redness and visual disturbance. Respiratory: Negative for apnea, cough, choking, chest tightness, shortness of breath and stridor. Cardiovascular: Negative for chest pain and palpitations. Gastrointestinal: Negative for abdominal pain, blood in stool, diarrhea and nausea. Endocrine: Negative for polydipsia, polyphagia and polyuria. Genitourinary: Negative for difficulty urinating, dysuria, frequency, hematuria, menstrual problem and vaginal discharge. Musculoskeletal: Negative for arthralgias, joint swelling, myalgias and neck stiffness. Skin: Negative for color change and rash. Allergic/Immunologic: Negative for immunocompromised state.    Neurological: Negative for dizziness, seizures, speech difficulty, light-headedness and headaches. Hematological: Negative for adenopathy. Psychiatric/Behavioral: Negative for agitation, hallucinations and suicidal ideas. Past Medical History: All past medical history reviewed today. Past Medical History:   Diagnosis Date    Anemia     Chronic bilateral low back pain without sciatica     CKD (chronic kidney disease) stage 3, GFR 30-59 ml/min (HCC)     Colon cancer (HCC)     Diabetes ([address]     Diabetes ([facility]     Diabetes with retinopathy ([facility]     DM eye exam 12/18/17    Hypertension     Pancreatic cyst     Positive FIT (fecal immunochemical test)     Pulmonary hypertension (HCC)        Past Surgical History: All past surgical history was reviewed today. Past Surgical History:   Procedure Laterality Date    COLONOSCOPY N/A 11/5/2018    COLONOSCOPY WITH BIOPSY performed by Lilia Guy MD at [address] [place] [address]  05/14/2019    Colonoscopy with polypectomy (cold snare)    COLONOSCOPY N/A 5/14/2019    COLONOSCOPY POLYPECTOMY SNARE/COLD BIOPSY performed by Lilia Guy MD at [address] [place] [address] Right 11/08/2018    [address] [place] [address] and umbilical hernia repair    [place] OFFICE/OUTPT VISIT,PROCEDURE ONLY Right 11/8/2018    LAPAROSCOPIC RIGHT HEMICOLECTOMY and umbilical hernia repair performed by Tabatha Hernandez MD at [facility] 11/5/2018    EGD BIOPSY performed by Lilia Guy MD at [address] [place] [address]         Family History: All family history was reviewed today.         Problem Relation Age of Onset    Lung Cancer Mother     High Blood Pressure Father     Lung Cancer Father     Lupus Sister        Objective:       PHYSICAL EXAM:      Vitals:   Vitals:    06/15/22 0704 06/15/22 0802 06/15/22 1044 06/15/22 1543   BP:  (!) 193/90     Pulse:  73     Resp: 17 17 17 18   Temp:  97.5 °F (36.4 °C)     TempSrc:  Axillary     SpO2:  95%     Weight: Height:           Physical Exam  Vitals and nursing note reviewed. Constitutional:       General: She is not in acute distress. Appearance: She is well-developed. She is not diaphoretic. HENT:      Head: Normocephalic. Right Ear: External ear normal.      Left Ear: External ear normal.      Nose: Nose normal.   Eyes:      General: No scleral icterus. Right eye: No discharge. Left eye: No discharge. Conjunctiva/sclera: Conjunctivae normal.      Pupils: Pupils are equal, round, and reactive to light. Cardiovascular:      Rate and Rhythm: Normal rate and regular rhythm. Heart sounds: No murmur heard. No friction rub. Pulmonary:      Effort: Pulmonary effort is normal.      Breath sounds: No stridor. No wheezing or rales. Chest:      Chest wall: No tenderness. Abdominal:      Palpations: Abdomen is soft. There is no mass. Tenderness: There is no abdominal tenderness. There is no guarding or rebound. Musculoskeletal:         General: No tenderness. Cervical back: Normal range of motion and neck supple. Lymphadenopathy:      Cervical: No cervical adenopathy. Skin:     General: Skin is warm and dry. Findings: No erythema or rash. Comments: Left foot wound is slowly improving   Neurological:      Mental Status: She is alert and oriented to person, place, and time. Motor: No abnormal muscle tone. Psychiatric:         Judgment: Judgment normal.              Lines and drains: All vascular access sites are healthy with no local erythema, discharge or tenderness. Intake and output:    I/O last 3 completed shifts: In: 7357 [P.O.:420; I.V.:173.6; IV Piggyback:858.4]  Out: 9507 [Urine:1785]    Lab Data:   All available labs and old records have been reviewed by me.     CBC:  Recent Labs     06/14/22  0637   WBC 9.8   RBC 4.10   HGB 12.3   HCT 37.7      MCV 92.1   MCH 30.0   MCHC 32.6   RDW 16.1*        BMP:  Recent Labs     06/13/22  0848 06/14/22  0637 06/15/22  0737    144 141   K 3.3* 3.4* 3.3*   CL 92* 95* 96*   CO2 34* 33* 34*   BUN 51* 49* 51*   CREATININE 2.2* 2.2* 2.3*   CALCIUM 8.7 9.1 9.3   GLUCOSE 149* 160* 182*        Hepatic Function Panel:   Lab Results   Component Value Date    ALKPHOS 100 06/08/2022    ALT 19 06/08/2022    AST 19 06/08/2022    PROT 7.9 06/08/2022    BILITOT 0.8 06/08/2022    BILIDIR 0.1 06/07/2021    IBILI see below 07/22/2020    LABALBU 3.0 06/15/2022       CPK:   Lab Results   Component Value Date    CKTOTAL 143 06/08/2022     ESR:   Lab Results   Component Value Date    SEDRATE >130 (H) 06/08/2022     CRP:   Lab Results   Component Value Date    .9 (H) 06/08/2022           Imaging: All pertinent images and reports for the current visit were reviewed by me during this visit. I reviewed the chest x-ray/CT scan/MRI images and independently interpreted the findings and results today. XR CHEST PORTABLE   Final Result   1. No significant change. MRI FOOT RIGHT WO CONTRAST   Final Result   1. Plantar heel ulcer measuring 1.5 x 2 cm with mild adjacent cellulitis. No   soft tissue abscess. 2. Trace edema in the plantar calcaneus adjacent to the ulcer concerning for   mild acute osteomyelitis. XR FOOT RIGHT (MIN 3 VIEWS)   Final Result      1. No plain radiographic evidence of osteomyelitis. 2.  Diffuse soft tissue swelling, especially within the dorsum of the foot. 3.  Otherwise no acute bony abnormality is noted. CT CHEST WO CONTRAST   Final Result   Mild degree of subsegmental atelectasis. Otherwise no acute cardiopulmonary disease. XR CHEST PORTABLE   Final Result   Pulmonary vascular congestion. Evaluation of the left base limited by soft   tissue attenuation artifact. XR LUMBAR SPINE (2-3 VIEWS)    (Results Pending)       Medications: All current and past medications were reviewed.      [START ON 6/16/2022] torsemide  50 mg Oral Daily    labetalol  200 mg Oral 2 times per day    potassium chloride  20 mEq Oral BID WC    ciprofloxacin  400 mg IntraVENous Q12H    [Held by provider] insulin lispro  25 Units SubCUTAneous TID WC    [Held by provider] enoxaparin  30 mg SubCUTAneous BID    fluticasone  1 spray Each Nostril Daily    insulin lispro  0-18 Units SubCUTAneous TID WC    insulin lispro  0-9 Units SubCUTAneous Nightly    collagenase   Topical Daily    gentamicin   Topical Daily    sodium chloride flush  10 mL IntraVENous 2 times per day    allopurinol  200 mg Oral Daily    insulin glargine  40 Units SubCUTAneous BID    cetirizine  10 mg Oral Nightly        sodium chloride 100 mL/hr at 06/09/22 6276    dextrose         methocarbamol, HYDROcodone-acetaminophen, sodium chloride flush, sodium chloride, potassium chloride **OR** potassium alternative oral replacement **OR** potassium chloride, magnesium sulfate, promethazine **OR** ondansetron, magnesium hydroxide, acetaminophen **OR** acetaminophen, glucose, dextrose bolus **OR** dextrose bolus, glucagon (rDNA), dextrose      Problem list:       Patient Active Problem List   Diagnosis Code    Essential hypertension I10    HLD (hyperlipidemia) E78.5    Type 2 diabetes mellitus with left diabetic foot infection ([facility] E11.628, L08.9    Volume overload E87.70    Malignant neoplasm of ascending colon (HCC) C18.2    Anemia D64.9    CKD (chronic kidney disease) stage 3, GFR 30-59 ml/min ([facility]) N18.30    Uterine mass N85.8    Morbid obesity due to excess calories ([facility]) E66.01    BRIANNA (obstructive sleep apnea) G47.33    PLMD (periodic limb movement disorder) G47.61    Pancreatic cyst K86.2    Chronic bilateral low back pain without sciatica M54.50, G89.29    Cellulitis L03.90    Acute respiratory failure with hypoxia ([facility]) J96.01    Gram-negative bacteremia R78.81    Elevated C-reactive protein (CRP) R79.82    E. coli UTI (urinary tract infection) N39.0, B96.20    Diabetic polyneuropathy associated with type 2 diabetes mellitus (HCC) E11.42    Diabetes mellitus type 2 in obese (HCC) E11.69, E66.9    Elevated sed rate R70.0    Serratia marcescens infection A48.8    Pseudomonas infection A49.8    Proteus infection A49.8       Please note that this chart was generated using Dragon dictation software. Although every effort was made to ensure the accuracy of this automated transcription, some errors in transcription may have occurred inadvertently. If you may need any clarification, please do not hesitate to contact me through EPIC or at the phone number provided below with my electronic signature. Any pictures or media included in this note were obtained after taking informed verbal consent from the patient and with their approval to include those in the patient's medical record.       Chloé Ortiz MD, MPH, [address] [place] [address]  6/15/2022, 4:50 PM  [facility] Infectious Disease   [address] [place] [address], [address] [place] [address]  Office: [phone]  Fax: [phone]  Clinic days:  Tuesday & Thursday

## 2022-06-15 NOTE — PROGRESS NOTES
Occupational Therapy  Facility/Department: [address] MED SURG  Occupational Therapy Daily Treatment    Name: Steve Bentley  : 1955  MRN: [id number]  Date of Service: 6/15/2022    Discharge Recommendations:  3-5 sessions per week,Patient would benefit from continued therapy after discharge  OT Equipment Recommendations  Other: defer to next LOC. IF RETURNING HOME - needs w/c and BSC  Steve Bentley scored a 17/24 on the AM-PAC ADL Inpatient form. Current research shows that an AM-PAC score of 17 or less is typically not associated with a discharge to the patient's home setting. Based on the patient's AM-PAC score and their current ADL deficits, it is recommended that the patient have 3-5 sessions per week of Occupational Therapy at d/c to increase the patient's independence. Please see assessment section for further patient specific details. Assessment: Steve Bentley is a 74 y.o. female who presents emergency department via EMS with complaints of leg pain and shortness of breath on . Pt seen by Podiatry on 6/10 with new orders for TTWB on E d/t heal wound. PTA pt from home where pt was ind with mobility with RW and needing PRN assist for ADLs. Pt reports plan is to move into new house on Tuesday with , daughter and son-in-law with 2 PERCY from garage (with grab bar) and chair lift. TODAY: pt declined getting out of her recliner d/t c/o back pain (chronic, several years). She completed seated grooming w/ setup and UE therex. Had long discussion w/ pt regarding d/c plans - pt has not worked w/ therapy since Saturday and has been using [facility] w/ nursing. Pt now receptive to SNF, aware she will not be able to maintain her TTWB status at home. Recommend ongoing skilled therapy 3-5 times/week at d/c to increase pt's safety, independence, and decrease burden of care. If patient discharges prior to next session this note will serve as a discharge summary.   Please see below for the latest assessment towards goals. Patient Diagnosis(es): The primary encounter diagnosis was Acute respiratory failure with hypoxia ([facility] Diagnoses of Cellulitis of right lower extremity, JOB (acute kidney injury) ([facility] [facility] Elevated troponin, Pulmonary vascular congestion, and Urinary tract infection without hematuria, site unspecified were also pertinent to this visit. Past Medical History:  has a past medical history of Anemia, Chronic bilateral low back pain without sciatica, CKD (chronic kidney disease) stage 3, GFR 30-59 ml/min ([facility]), Colon cancer ([facility] [facility] Diabetes ([facility] [address] [facility] Diabetes ([address] Diabetes with retinopathy ([facility] [address] Hypertension, Pancreatic cyst, Positive FIT (fecal immunochemical test), and Pulmonary hypertension ([facility] [address] Past Surgical History:  has a past surgical history that includes Upper gastrointestinal endoscopy (N/A, 11/5/2018); hemicolectomy (Right, 11/08/2018); pr office/outpt visit,procedure only (Right, 11/8/2018); Dante tooth extraction; Colonoscopy (N/A, 11/5/2018); Colonoscopy (05/14/2019); and Colonoscopy (N/A, 5/14/2019). Assessment   Performance deficits / Impairments: Decreased functional mobility ; Decreased balance;Decreased ADL status; Decreased endurance;Decreased high-level IADLs;Decreased strength  Assessment: Michael Rene is a 74 y.o. female who presents emergency department via EMS with complaints of leg pain and shortness of breath on 6/8. Pt seen by Podiatry on 6/10 with new orders for TTWB on RLE d/t heal wound. PTA pt from home where pt was ind with mobility with RW and needing PRN assist for ADLs. Pt reports plan is to move into new house on Tuesday with , daughter and son-in-law with 2 PERCY from garage (with grab bar) and chair lift. TODAY: pt declined getting out of her recliner d/t c/o back pain (chronic, several years). She completed seated grooming w/ setup and UE therex.  Had long discussion w/ pt regarding d/c plans - pt has not worked w/ therapy since Saturday and has been using [facility] w/ nursing. Pt now receptive to SNF, aware she will not be able to maintain her TTWB status at home. Recommend ongoing skilled therapy 3-5 times/week at d/c to increase pt's safety, independence, and decrease burden of care. Prognosis: Good  REQUIRES OT FOLLOW-UP: Yes  Activity Tolerance  Activity Tolerance: Patient Tolerated treatment well        Plan   Plan  Times per Week: 3-5x  Current Treatment Recommendations: Strengthening,Balance training,Functional mobility training,Endurance training,Patient/Caregiver education & training,Safety education & training,Self-Care / ADL     Restrictions  Restrictions/Precautions  Restrictions/Precautions: Fall Risk,Weight Bearing  Lower Extremity Weight Bearing Restrictions  Right Lower Extremity Weight Bearing: Toe Touch Weight Bearing  Position Activity Restriction  Other position/activity restrictions: TTWB RLE due to heel wound. 2L O2    Subjective   General  Chart Reviewed: Yes  Patient assessed for rehabilitation services?: Yes  Additional Pertinent Hx: per ED note, Naveen Mack is a 74 y.o. female who presents emergency department via EMS with complaints of leg pain and shortness of breath. Patient is a poor historian and gives a different timeline of events but ultimately she is a diabetic. She has had shortness of breath. She does not wear oxygen. She does not have a history of congestive heart failure or COPD. She has chronic leg swelling. She states her legs have gotten more swollen, painful and tight. She is reporting the right leg being red and hot for at least the last couple of days. She is unable to state whether or not she feels like she has been having any fevers. She denies chest pain. Lives at home with her . She uses a walker for ambulation. She states over the last few days she is unable to lay in bed so she has been sleeping upright in a nonreclining chair with her legs dangling.   She denies wounds but she does have wounds to her lower extremities. She has never tested positive for Covid. She had a recent negative study. Family / Caregiver Present: Yes ( present at start of session)  Referring Practitioner: Geovanna Barron MD ; Sheng Sandhu MD  Subjective  Subjective: Pt met b/s for OT. Pt in recliner, she declined getting up at this time d/t back pain - OT called RN for gabapentin per pt's request, but was agreeable to seated activity     Objective   Safety Devices  Type of Devices: Call light within reach;Nurse notified;Gait belt; Chair alarm in place; Left in chair           ADL  Grooming: Setup  Grooming Skilled Clinical Factors: Pt washed face and performed oral care seated in recliner  Toileting: Dependent/Total  Toileting Skilled Clinical Factors: Purewick        Bed mobility  Bed Mobility Comments: up in chair at beginning and end of session  Transfers  Transfer Comments: Pt refused     Cognition  Overall Cognitive Status: [facility]  Cognition Comment: Decreased insight, but receptive to SNF recommendation this date               A/AROM Exercises: x10 shoulder flexion/extension, x5 partial chair push-ups, x10 arm circles, x10 elbow flexion/extension  Education Given To: Patient  Education Provided: Plan of Care;Role of Therapy; ADL Adaptive Strategies; Home Exercise Program  Education Provided Comments: long discussion regarding d/c plans, pt now receptive to SNF  Education Method: Demonstration;Verbal  Barriers to Learning: None  Education Outcome: Verbalized understanding                 AM-PAC Score  AM-[facility] Inpatient Daily Activity Raw Score: 17 (06/15/22 1020)  AM-PAC Inpatient ADL T-Scale Score : 37.26 (06/15/22 1020)  ADL Inpatient CMS 0-100% Score: 50.11 (06/15/22 1020)  ADL Inpatient CMS G-Code Modifier : CK (06/15/22 1020)    Goals  Short Term Goals  Time Frame for Short term goals: prior to D/C; goals ongoing  Short Term Goal 1: complete functional mobility and transfers with maintaining TTWB status SUP  Short Term Goal 2: complete toileting SUP  Short Term Goal 3: complete grooming in stance at sink SUP  Short Term Goal 4: complete bed mobility SUP  Long Term Goals  Time Frame for Long term goals : STG=LTG  Patient Goals   Patient goals : return home       Therapy Time   Individual Concurrent Group Co-treatment   Time In 0940         Time Out 1015         Minutes [address] [place], [place] [address]

## 2022-06-15 NOTE — PROGRESS NOTES
Nephrology Progress Note   [facility]      HPI:  No CP.  O > I. Renal function stable. ROS:  No fever. [address]:  medications reviewed. Subjective:    Resting in recliner; NAD; no shortness of breath    Scheduled Meds:   [START ON 2022] torsemide  50 mg Oral Daily    labetalol  200 mg Oral 2 times per day    gabapentin  100 mg Oral Nightly    potassium chloride  20 mEq Oral BID WC    ciprofloxacin  400 mg IntraVENous Q12H    [Held by provider] insulin lispro  25 Units SubCUTAneous TID WC    [Held by provider] enoxaparin  30 mg SubCUTAneous BID    fluticasone  1 spray Each Nostril Daily    insulin lispro  0-18 Units SubCUTAneous TID WC    insulin lispro  0-9 Units SubCUTAneous Nightly    collagenase   Topical Daily    gentamicin   Topical Daily    sodium chloride flush  10 mL IntraVENous 2 times per day    allopurinol  200 mg Oral Daily    insulin glargine  40 Units SubCUTAneous BID    cetirizine  10 mg Oral Nightly        sodium chloride 100 mL/hr at 22 0643    dextrose         PRN Meds:.methocarbamol, HYDROcodone-acetaminophen, sodium chloride flush, sodium chloride, potassium chloride **OR** potassium alternative oral replacement **OR** potassium chloride, magnesium sulfate, promethazine **OR** ondansetron, magnesium hydroxide, acetaminophen **OR** acetaminophen, glucose, dextrose bolus **OR** dextrose bolus, glucagon (rDNA), dextrose    Physical Exam:    TEMPERATURE:  Current - Temp: 97.5 °F (36.4 °C);  Max - Temp  Av °F (36.7 °C)  Min: 97.5 °F (36.4 °C)  Max: 98.4 °F (36.9 °C)  RESPIRATIONS RANGE: Resp  Av.1  Min: 16  Max: 18  PULSE RANGE: Pulse  Av.6  Min: 71  Max: 78  BLOOD PRESSURE RANGE:  Systolic (95FHP), JD , Min:148 , CDB:163   ; Diastolic (15PSD), HPQ:10, Min:72, Max:90    24HR INTAKE/OUTPUT:      Intake/Output Summary (Last 24 hours) at 6/15/2022 1318  Last data filed at 6/15/2022 4110  Gross per 24 hour   Intake 911.66 ml   Output 300 ml   Net 611.66 ml         Patient Vitals for the past 96 hrs (Last 3 readings):   Weight   06/15/22 0621 (!) 312 lb 13.3 oz (141.9 kg)   06/14/22 0634 (!) 313 lb 6.4 oz (142.2 kg)   06/13/22 0915 (!) 322 lb 5 oz (146.2 kg)       General:  NAD, A+OX3, obese  Chest:  CTAB  CVS:  RRR  Abdominal:  NTND, soft, +BS  Extremities:  1/2+ edema  Skin:  no rash    Allergies:   Allergies   Allergen Reactions    Pcn [Penicillins] Shortness Of Breath    Spironolactone Rash    Coreg [Carvedilol]      Side effects:  SOB, palpitations    Lisinopril Other (See Comments)     cough    Flexeril [Cyclobenzaprine] Rash          LAB DATA:    CBC:   Lab Results   Component Value Date    WBC 9.8 06/14/2022    RBC 4.10 06/14/2022    HGB 12.3 06/14/2022    HCT 37.7 06/14/2022    MCV 92.1 06/14/2022    MCH 30.0 06/14/2022    MCHC 32.6 06/14/2022    RDW 16.1 06/14/2022     06/14/2022    MPV 7.5 06/14/2022     BMP:    Lab Results   Component Value Date     06/15/2022    K 3.3 06/15/2022    K 3.3 06/13/2022    CL 96 06/15/2022    CO2 34 06/15/2022    BUN 51 06/15/2022    CREATININE 2.3 06/15/2022    CALCIUM 9.3 06/15/2022    GFRAA 25 06/15/2022    LABGLOM 21 06/15/2022    GLUCOSE 182 06/15/2022     Ionized Calcium:  No results found for: IONCA  Magnesium:    Lab Results   Component Value Date    MG 2.20 06/15/2022     Phosphorus:    Lab Results   Component Value Date    PHOS 3.4 06/15/2022     U/A:    Lab Results   Component Value Date    COLORU Yellow 06/08/2022    PHUR 6.0 06/08/2022    WBCUA 25 06/08/2022    RBCUA 5-10 06/08/2022    RBCUA NEGATIVE 04/18/2018    MUCUS PRESENT 04/18/2018    BACTERIA 3+ 06/08/2022    CLARITYU Clear 06/08/2022    SPECGRAV 1.013 06/08/2022    LEUKOCYTESUR LARGE 06/08/2022    UROBILINOGEN 0.2 06/08/2022    BILIRUBINUR Negative 06/08/2022    BILIRUBINUR NEGATIVE 04/18/2018    BLOODU MODERATE 06/08/2022    GLUCOSEU Negative 06/08/2022         IMPRESSION/RECOMMENDATIONS:      Principal Problem: Cellulitis  Active Problems:    Acute respiratory failure with hypoxia (HCC)    Gram-negative bacteremia    Elevated C-reactive protein (CRP)    E. coli UTI (urinary tract infection)    Diabetic polyneuropathy associated with type 2 diabetes mellitus (HCC)    Diabetes mellitus type 2 in obese (HCC)    Elevated sed rate    Serratia marcescens infection    Pseudomonas infection    Proteus infection    Essential hypertension    Type 2 diabetes mellitus with left diabetic foot infection ([address]    Morbid obesity due to excess calories ([address]  Resolved Problems:    * No resolved hospital problems.  *    1) JOB on CKD              - baseline Cr 1.4-1.7, sees me              - ddx:  pre-renal vs. ATN              - renal function stable but creatinine above previous baseline                  2) edema              - Patient has gained true weight - edema is not significant              - switched to lasix gtt 6/13/22 - stopped 6/15/22 - started oral torsemide 6/16/22              - d/c nutritional supplement and continue fluid restriction 1.2 liters/day              - ocwan catheter     3) right heel wound and cellulitis              - wound care              - podiatry and ID following              - on Cipro     4) Hypokalemia                          - Replaced      5) Anemia CKD - Hgb > 11  - THADDEUS not indicated at this point in time    6) HTN - started labetalol  - stopped Metoprolol

## 2022-06-15 NOTE — PROGRESS NOTES
Hospitalist Progress Note      PCP: Aliyah Lang MD    Date of Admission: 6/8/2022    Subjective: states she feels sleepy today, still c/o back pain - slightly worse    Medications:  Reviewed    Infusion Medications    sodium chloride 100 mL/hr at 06/09/22 0643    dextrose       Scheduled Medications    [START ON 6/16/2022] torsemide  50 mg Oral Daily    labetalol  200 mg Oral 2 times per day    ciprofloxacin  500 mg Oral 2 times per day    potassium chloride  20 mEq Oral BID WC    [Held by provider] insulin lispro  25 Units SubCUTAneous TID WC    [Held by provider] enoxaparin  30 mg SubCUTAneous BID    fluticasone  1 spray Each Nostril Daily    insulin lispro  0-18 Units SubCUTAneous TID WC    insulin lispro  0-9 Units SubCUTAneous Nightly    collagenase   Topical Daily    gentamicin   Topical Daily    sodium chloride flush  10 mL IntraVENous 2 times per day    allopurinol  200 mg Oral Daily    insulin glargine  40 Units SubCUTAneous BID    cetirizine  10 mg Oral Nightly     PRN Meds: methocarbamol, HYDROcodone-acetaminophen, sodium chloride flush, sodium chloride, potassium chloride **OR** potassium alternative oral replacement **OR** potassium chloride, magnesium sulfate, promethazine **OR** ondansetron, magnesium hydroxide, acetaminophen **OR** acetaminophen, glucose, dextrose bolus **OR** dextrose bolus, glucagon (rDNA), dextrose      Intake/Output Summary (Last 24 hours) at 6/15/2022 1745  Last data filed at 6/15/2022 6214  Gross per 24 hour   Intake 791.66 ml   Output --   Net 791.66 ml       Physical Exam Performed:    BP (!) 153/64   Pulse 75   Temp 97.9 °F (36.6 °C) (Oral)   Resp 18   Ht 5' 3\" (1.6 m)   Wt (!) 312 lb 13.3 oz (141.9 kg)   SpO2 95%   BMI 55.42 kg/m²     General appearance: Chronically ill appearing. No apparent distress, appears stated age and cooperative. HEENT: Pupils equal, round, and reactive to light. Conjunctivae/corneas clear.   Neck: Supple, with full range of motion. No jugular venous distention. Trachea midline. Respiratory:  Normal respiratory effort. Clear to auscultation, bilaterally without Rales/Wheezes/Rhonchi. Cardiovascular: Regular rate and rhythm with normal S1/S2 without murmurs, rubs or gallops. Abdomen: Soft, non-tender, non-distended with normal bowel sounds. Musculoskeletal: No clubbing, cyanosis or edema bilaterally.  Full range of motion without deformity. Skin: Right heel unstageable decubitus heel ulceration. Neurologic:  Neurovascularly intact without any focal sensory/motor deficits. Cranial nerves: II-XII intact, grossly non-focal.  Psychiatric: Alert and oriented, thought content appropriate, normal insight  Capillary Refill: Brisk,< 3 seconds   Peripheral Pulses: +2 palpable, equal bilaterally        Labs:   Recent Labs     06/14/22  0637   WBC 9.8   HGB 12.3   HCT 37.7        Recent Labs     06/13/22  0848 06/14/22  0637 06/15/22  0737    144 141   K 3.3* 3.4* 3.3*   CL 92* 95* 96*   CO2 34* 33* 34*   BUN 51* 49* 51*   CREATININE 2.2* 2.2* 2.3*   CALCIUM 8.7 9.1 9.3   PHOS  --  2.8 3.4     No results for input(s): AST, ALT, BILIDIR, BILITOT, ALKPHOS in the last 72 hours. No results for input(s): INR in the last 72 hours. No results for input(s): Shea Pamela in the last 72 hours. Urinalysis:      Lab Results   Component Value Date    NITRU POSITIVE 06/08/2022    WBCUA 25 06/08/2022    BACTERIA 3+ 06/08/2022    RBCUA 5-10 06/08/2022    RBCUA NEGATIVE 04/18/2018    BLOODU MODERATE 06/08/2022    SPECGRAV 1.013 06/08/2022    GLUCOSEU Negative 06/08/2022       Radiology:  XR CHEST PORTABLE   Final Result   1. No significant change. MRI FOOT RIGHT WO CONTRAST   Final Result   1. Plantar heel ulcer measuring 1.5 x 2 cm with mild adjacent cellulitis. No   soft tissue abscess. 2. Trace edema in the plantar calcaneus adjacent to the ulcer concerning for   mild acute osteomyelitis.          XR FOOT RIGHT (MIN 3 VIEWS)   Final Result      1. No plain radiographic evidence of osteomyelitis. 2.  Diffuse soft tissue swelling, especially within the dorsum of the foot. 3.  Otherwise no acute bony abnormality is noted. CT CHEST WO CONTRAST   Final Result   Mild degree of subsegmental atelectasis. Otherwise no acute cardiopulmonary disease. XR CHEST PORTABLE   Final Result   Pulmonary vascular congestion. Evaluation of the left base limited by soft   tissue attenuation artifact.          XR LUMBAR SPINE (2-3 VIEWS)    (Results Pending)           Assessment/Plan:    Active Hospital Problems    Diagnosis     Acute respiratory failure with hypoxia (HCC) [J96.01]      Priority: Medium    Gram-negative bacteremia [R78.81]      Priority: Medium    Elevated C-reactive protein (CRP) [R79.82]      Priority: Medium    E. coli UTI (urinary tract infection) [N39.0, B96.20]      Priority: Medium    Diabetic polyneuropathy associated with type 2 diabetes mellitus ([facility] [E11.42]      Priority: Medium    Diabetes mellitus type 2 in obese (HCC) [E11.69, E66.9]      Priority: Medium    Elevated sed rate [R70.0]      Priority: Medium    Serratia marcescens infection [A48.8]      Priority: Medium    Pseudomonas infection [A49.8]      Priority: Medium    Proteus infection [A49.8]      Priority: Medium    Cellulitis [L03.90]      Priority: Medium    Morbid obesity due to excess calories ([facility] [E66.01]     Type 2 diabetes mellitus with left diabetic foot infection ([facility] [A30.805, L08.9]     Essential hypertension [I10]          Acute respiratory failure with hypoxia  -diuresis - lasix gtt dced-->resume torsemide in am  -wean oxygen to maintain SpO2 > 89%  -incentive spirometer     CKD stage III  -avoid nephrotoxic meds  -trend and monitor  -nephrology consulted, recs appreciated     Elevated troponin  -cardiology consulted, doesn't believe this is consistent with ACS  -tele montioring     Chronic diastolic CHF - does not appear to be in exacerbation  -cardiology consulted  -continue home meds    Chr low back pain - gabapentin made her sleepy - will dc. On prn robaxin. Check xray in am     Bilateral lower extremity cellulitis  -cefepime -> ciprofloxacin given possible side effect  -ID consulted, recs appreciated     Diabetic foot infection  -podiatry consulted, recs appreciated  -ID consulted, recs appreciated  -on empiric cefepime; abx per ID; should cover isolated organisms but await sensitivities  -MRI right foot reviewed     Proteus bacteremia  -repeat blood cultures 6/10  -on ciprofloxacin which should cover  -ID consulted, recs appreciated  -repeat blood cultures NGTD     Type 2 DM  -Lantus, Humalog, and SSI  -hypoglycemia protocol  -POCT glucose checks  -carb control diet     Morbid obesity due to excess calories  -nutritional counseling provided  -weight loss encouraged  -complicating medical management        DVT Prophylaxis: Lovenox  Diet: ADULT DIET;  Regular; 4 carb choices (60 gm/meal); 1200 ml  Code Status: Full Code    PT/OT Eval Status: ordered    Ashley Ellsworth MD

## 2022-06-16 LAB
ALBUMIN SERPL-MCNC: 3.4 G/DL (ref 3.4–5)
ANION GAP SERPL CALCULATED.3IONS-SCNC: 13 MMOL/L (ref 3–16)
BUN BLDV-MCNC: 52 MG/DL (ref 7–20)
CALCIUM SERPL-MCNC: 9.8 MG/DL (ref 8.3–10.6)
CHLORIDE BLD-SCNC: 97 MMOL/L (ref 99–110)
CO2: 33 MMOL/L (ref 21–32)
CREAT SERPL-MCNC: 2.1 MG/DL (ref 0.6–1.2)
GFR AFRICAN AMERICAN: 28
GFR NON-AFRICAN AMERICAN: 23
GLUCOSE BLD-MCNC: 155 MG/DL (ref 70–99)
GLUCOSE BLD-MCNC: 173 MG/DL (ref 70–99)
GLUCOSE BLD-MCNC: 175 MG/DL (ref 70–99)
GLUCOSE BLD-MCNC: 189 MG/DL (ref 70–99)
GLUCOSE BLD-MCNC: 215 MG/DL (ref 70–99)
HCT VFR BLD CALC: 38.4 % (ref 36–48)
HEMOGLOBIN: 12.3 G/DL (ref 12–16)
MAGNESIUM: 2.4 MG/DL (ref 1.8–2.4)
MCH RBC QN AUTO: 29.4 PG (ref 26–34)
MCHC RBC AUTO-ENTMCNC: 32 G/DL (ref 31–36)
MCV RBC AUTO: 92 FL (ref 80–100)
PDW BLD-RTO: 16.5 % (ref 12.4–15.4)
PERFORMED ON: ABNORMAL
PHOSPHORUS: 3.8 MG/DL (ref 2.5–4.9)
PLATELET # BLD: 238 K/UL (ref 135–450)
PMV BLD AUTO: 7.3 FL (ref 5–10.5)
POTASSIUM SERPL-SCNC: 3.8 MMOL/L (ref 3.5–5.1)
RBC # BLD: 4.17 M/UL (ref 4–5.2)
SODIUM BLD-SCNC: 143 MMOL/L (ref 136–145)
URIC ACID, SERUM: 8.3 MG/DL (ref 2.6–6)
WBC # BLD: 8.5 K/UL (ref 4–11)

## 2022-06-16 PROCEDURE — 85027 COMPLETE CBC AUTOMATED: CPT

## 2022-06-16 PROCEDURE — 6370000000 HC RX 637 (ALT 250 FOR IP): Performed by: INTERNAL MEDICINE

## 2022-06-16 PROCEDURE — 80069 RENAL FUNCTION PANEL: CPT

## 2022-06-16 PROCEDURE — 83735 ASSAY OF MAGNESIUM: CPT

## 2022-06-16 PROCEDURE — 2580000003 HC RX 258: Performed by: INTERNAL MEDICINE

## 2022-06-16 PROCEDURE — 84550 ASSAY OF BLOOD/URIC ACID: CPT

## 2022-06-16 PROCEDURE — 36415 COLL VENOUS BLD VENIPUNCTURE: CPT

## 2022-06-16 PROCEDURE — 1200000000 HC SEMI PRIVATE

## 2022-06-16 PROCEDURE — 97530 THERAPEUTIC ACTIVITIES: CPT

## 2022-06-16 RX ORDER — FLUTICASONE PROPIONATE 50 MCG
1 SPRAY, SUSPENSION (ML) NASAL DAILY
Qty: 16 G | Refills: 3 | Status: SHIPPED | OUTPATIENT
Start: 2022-06-17

## 2022-06-16 RX ORDER — HYDROCODONE BITARTRATE AND ACETAMINOPHEN 5; 325 MG/1; MG/1
1 TABLET ORAL EVERY 8 HOURS PRN
Qty: 9 TABLET | Refills: 0 | Status: SHIPPED | OUTPATIENT
Start: 2022-06-16 | End: 2022-06-19

## 2022-06-16 RX ORDER — TORSEMIDE 20 MG/1
50 TABLET ORAL 2 TIMES DAILY
Status: DISCONTINUED | OUTPATIENT
Start: 2022-06-16 | End: 2022-06-17 | Stop reason: HOSPADM

## 2022-06-16 RX ORDER — CIPROFLOXACIN 500 MG/1
500 TABLET, FILM COATED ORAL EVERY 12 HOURS SCHEDULED
Qty: 42 TABLET | Refills: 0 | Status: SHIPPED | OUTPATIENT
Start: 2022-06-16 | End: 2022-07-07

## 2022-06-16 RX ORDER — TORSEMIDE 10 MG/1
50 TABLET ORAL DAILY
Qty: 30 TABLET | Refills: 3 | Status: SHIPPED | OUTPATIENT
Start: 2022-06-17 | End: 2022-06-17

## 2022-06-16 RX ORDER — GENTAMICIN SULFATE 1 MG/G
CREAM TOPICAL
Qty: 15 G | Refills: 0 | Status: SHIPPED | OUTPATIENT
Start: 2022-06-17

## 2022-06-16 RX ORDER — LABETALOL 200 MG/1
200 TABLET, FILM COATED ORAL EVERY 12 HOURS SCHEDULED
Qty: 60 TABLET | Refills: 3 | Status: SHIPPED | OUTPATIENT
Start: 2022-06-16

## 2022-06-16 RX ADMIN — ALLOPURINOL 200 MG: 100 TABLET ORAL at 08:45

## 2022-06-16 RX ADMIN — INSULIN LISPRO 6 UNITS: 100 INJECTION, SOLUTION INTRAVENOUS; SUBCUTANEOUS at 12:22

## 2022-06-16 RX ADMIN — SODIUM CHLORIDE, PRESERVATIVE FREE 10 ML: 5 INJECTION INTRAVENOUS at 08:56

## 2022-06-16 RX ADMIN — INSULIN GLARGINE 40 UNITS: 100 INJECTION, SOLUTION SUBCUTANEOUS at 19:59

## 2022-06-16 RX ADMIN — HYDROCODONE BITARTRATE AND ACETAMINOPHEN 1 TABLET: 5; 325 TABLET ORAL at 09:52

## 2022-06-16 RX ADMIN — INSULIN GLARGINE 40 UNITS: 100 INJECTION, SOLUTION SUBCUTANEOUS at 09:02

## 2022-06-16 RX ADMIN — CIPROFLOXACIN 500 MG: 500 TABLET, FILM COATED ORAL at 08:45

## 2022-06-16 RX ADMIN — INSULIN LISPRO 3 UNITS: 100 INJECTION, SOLUTION INTRAVENOUS; SUBCUTANEOUS at 09:01

## 2022-06-16 RX ADMIN — POTASSIUM CHLORIDE 20 MEQ: 20 TABLET, EXTENDED RELEASE ORAL at 17:46

## 2022-06-16 RX ADMIN — CIPROFLOXACIN 500 MG: 500 TABLET, FILM COATED ORAL at 19:54

## 2022-06-16 RX ADMIN — LABETALOL HYDROCHLORIDE 200 MG: 200 TABLET, FILM COATED ORAL at 19:54

## 2022-06-16 RX ADMIN — HYDROCODONE BITARTRATE AND ACETAMINOPHEN 1 TABLET: 5; 325 TABLET ORAL at 13:50

## 2022-06-16 RX ADMIN — HYDROCODONE BITARTRATE AND ACETAMINOPHEN 1 TABLET: 5; 325 TABLET ORAL at 19:54

## 2022-06-16 RX ADMIN — POTASSIUM CHLORIDE 20 MEQ: 20 TABLET, EXTENDED RELEASE ORAL at 08:45

## 2022-06-16 RX ADMIN — FLUTICASONE PROPIONATE 1 SPRAY: 50 SPRAY, METERED NASAL at 08:45

## 2022-06-16 RX ADMIN — HYDROCODONE BITARTRATE AND ACETAMINOPHEN 1 TABLET: 5; 325 TABLET ORAL at 01:27

## 2022-06-16 RX ADMIN — TORSEMIDE 50 MG: 20 TABLET ORAL at 19:54

## 2022-06-16 RX ADMIN — CETIRIZINE HYDROCHLORIDE 10 MG: 10 TABLET, FILM COATED ORAL at 19:54

## 2022-06-16 RX ADMIN — LABETALOL HYDROCHLORIDE 200 MG: 200 TABLET, FILM COATED ORAL at 12:57

## 2022-06-16 RX ADMIN — INSULIN LISPRO 2 UNITS: 100 INJECTION, SOLUTION INTRAVENOUS; SUBCUTANEOUS at 19:58

## 2022-06-16 RX ADMIN — INSULIN LISPRO 3 UNITS: 100 INJECTION, SOLUTION INTRAVENOUS; SUBCUTANEOUS at 18:31

## 2022-06-16 RX ADMIN — SODIUM CHLORIDE, PRESERVATIVE FREE 10 ML: 5 INJECTION INTRAVENOUS at 19:55

## 2022-06-16 RX ADMIN — TORSEMIDE 50 MG: 20 TABLET ORAL at 08:45

## 2022-06-16 RX ADMIN — HYDROCODONE BITARTRATE AND ACETAMINOPHEN 1 TABLET: 5; 325 TABLET ORAL at 05:25

## 2022-06-16 ASSESSMENT — PAIN DESCRIPTION - LOCATION
LOCATION: BACK

## 2022-06-16 ASSESSMENT — PAIN SCALES - GENERAL
PAINLEVEL_OUTOF10: 8
PAINLEVEL_OUTOF10: 7

## 2022-06-16 ASSESSMENT — PAIN DESCRIPTION - ORIENTATION
ORIENTATION: LOWER
ORIENTATION: LOWER
ORIENTATION: MID

## 2022-06-16 ASSESSMENT — PAIN DESCRIPTION - DESCRIPTORS: DESCRIPTORS: ACHING

## 2022-06-16 NOTE — PROGRESS NOTES
Nutrition Note    RD received phone call from pt wanting to speak with dietitian. RD visited pt in room to discuss what to order at meal times. Pt on 4 carb diet with 1200ml fluid restriction. Provided pt with carb control menu and went over options at meal times. Pt concerned about her kidney function and wanting to get enough protein for wound healing. Reviewed protein options, low sodium choices, and flavoring food with Ms. Eladio Johnson. Pt with no further questions at this time.      Electronically signed by Rochelle Hayden RD, LD on 6/16/22 at 9:44 AM EDT    Contact: [phone]

## 2022-06-16 NOTE — PROGRESS NOTES
Physical Therapy  Facility/Department: [address] MED SURG  Physical Therapy Treatment Note    Name: Oren Reyes  : 1955  MRN: [id number]  Date of Service: 2022    Discharge Recommendations:  Therapy recommended at discharge   PT Equipment Recommendations  Equipment Needed: No    Oren Reyes scored a 15/24 on the AM-PAC short mobility form. Current research shows that an AM-PAC score of 17 or less is typically not associated with a discharge to the patient's home setting. Based on the patient's AM-PAC score and their current functional mobility deficits, it is recommended that the patient have 3-5 sessions per week of Physical Therapy at d/c to increase the patient's independence. Please see assessment section for further patient specific details. If patient discharges prior to next session this note will serve as a discharge summary. Please see below for the latest assessment towards goals. Patient Diagnosis(es): The primary encounter diagnosis was Acute respiratory failure with hypoxia ([address] Diagnoses of Cellulitis of right lower extremity, JOB (acute kidney injury) ([address] Elevated troponin, Pulmonary vascular congestion, and Urinary tract infection without hematuria, site unspecified were also pertinent to this visit. Past Medical History:  has a past medical history of Anemia, Chronic bilateral low back pain without sciatica, CKD (chronic kidney disease) stage 3, GFR 30-59 ml/min ([facility]), Colon cancer ([address] Diabetes ([address] Diabetes ([address] Diabetes with retinopathy ([address] Hypertension, Pancreatic cyst, Positive FIT (fecal immunochemical test), and Pulmonary hypertension ([address] Past Surgical History:  has a past surgical history that includes Upper gastrointestinal endoscopy (N/A, 2018); hemicolectomy (Right, 2018); pr office/outpt visit,procedure only (Right, 2018); [place] tooth extraction; Colonoscopy (N/A, 2018);  Colonoscopy (2019); and Colonoscopy (N/A, 5/14/2019). Assessment   Body Structures, Functions, Activity Limitations Requiring Skilled Therapeutic Intervention: Decreased functional mobility   Assessment: pt is a 73 yo female who was adm to hosp with LE swelling, weakness and a heel wound; pt at baseline is Ind with a RW; Pt now is TTWB for balance only with R LE and to off load heel. Pt is not able to fully maintain TTWB but attempts to only WB through R toes. Recommend continued skilled therapy 3-5x/week to maximize independence and safety with functional mobility  Treatment Diagnosis: impaired mobility  Therapy Prognosis: Fair;Guarded  Decision Making: Medium Complexity  Activity Tolerance  Activity Tolerance: Patient limited by endurance     Plan   Plan  Plan: 3-5 times per week  Current Treatment Recommendations: Functional mobility training,Wheelchair mobility training  Safety Devices  Type of Devices: All fall risk precautions in place,Call light within reach,Gait belt,Patient at risk for falls,Left in chair,Chair alarm in place,Nurse notified  Restraints  Restraints Initially in Place: No     Restrictions  Restrictions/Precautions  Restrictions/Precautions: Fall Risk,Weight Bearing  Lower Extremity Weight Bearing Restrictions  Right Lower Extremity Weight Bearing: Toe Touch Weight Bearing  Position Activity Restriction  Other position/activity restrictions: TTWB RLE due to heel wound. 2L O2     Subjective   General  Chart Reviewed: Yes  Patient assessed for rehabilitation services?: Yes  Additional Pertinent Hx: per H&P note: \"Patient is a 26-year-old female with past medical history of diabetes mellitus, CKD, essential hypertension, pulmonary hypertension who presents to the hospital due to multiple complaints, according to the patient she has been having bilateral lower extremity swelling, redness, she is not able to walk, she also has noticed wound on her right heel and she is concerned it may be infected.   Patient also mentions she has pain in her legs due to the swelling and redness. Patient also mentions she feels shortness of breath as well, mentions it feels heavy for her to take a deep breathing. She denies chest pain nausea vomiting diarrhea constipation, mentions her urine appears cloudy. \"  Response To Previous Treatment: Patient reporting fatigue but able to participate. Family / Caregiver Present: Yes (spouse)  Referring Practitioner: Dr Kyara De Leon  Referral Date : 06/08/22  Follows Commands: Within Functional Limits  Subjective  Subjective: Pt is agreeable to PT. Social/Functional History  Social/Functional History  Lives With: Spouse  Type of Home:  (loft)  Home Layout: One level  Home Access: Elevator  Bathroom Shower/Tub: Tub/Shower unit,Shower chair without back  Bathroom Toilet: Standard  Bathroom Equipment: Grab bars in [facility]  Home Equipment: Walker, rolling  Has the patient had two or more falls in the past year or any fall with injury in the past year?: No  Receives Help From: Family  ADL Assistance: Needs assistance ( assists with LB dressing)  Homemaking Assistance: Needs assistance ( completes)  Homemaking Responsibilities: No  Ambulation Assistance: Independent (RW)  Transfer Assistance: Independent  Active : Yes (not recently)  Mode of Transportation: Car  Occupation: Retired  IADL Comments: sleeps in flat bed  Additional Comments: Pt reports moving on Tuesday to multi-level house with , daughter and son-in-law. 2 PERCY from garage with grab bar. Reports has chair lift.      Vision/Hearing  Vision  Vision: Impaired  Vision Exceptions: Wears glasses for reading  Hearing  Hearing: Within functional limits      Cognition   Orientation  Orientation Level: Oriented X4  Cognition  Overall Cognitive Status: WFL  Cognition Comment: Decreased insight, but receptive to SNF recommendation this date     Objective   Gross Assessment  Strength: Generally decreased, functional     Bed mobility  Supine to Sit: Unable to assess  Sit to Supine: Unable to assess  Bed Mobility Comments: up in chair at beginning and end of session  Transfers  Sit to Stand: Contact guard assistance  Stand to sit: Contact guard assistance  Stand Pivot Transfers: Contact guard assistance (at std walker)  Comment: Pt appears to have improved ability to obey WB restriction        Balance  Posture: Good  Sitting - Static: Good  Sitting - Dynamic: Good  Standing - Static: Fair  Standing - Dynamic: Fair           AM-PAC Score  AM-PAC Inpatient Mobility Raw Score : 15 (06/16/22 1041)  AM-PAC Inpatient T-Scale Score : 39.45 (06/16/22 1041)  Mobility Inpatient CMS 0-100% Score: 57.7 (06/16/22 1041)  Mobility Inpatient CMS G-Code Modifier : CK (06/16/22 1041)          Goals  Short Term Goals  Time Frame for Short term goals: by discharge - all goals ongoing as of 6/16/22  Short term goal 1: bed mob MI from flat bed  Short term goal 2: transfers SBA from bed<-> w/c with using RW and maintaining TTWB R  Short term goal 3: d/c amb goal due to pt not able to maintain TTWB R;  Patient Goals   Patient goals : to return home soon       Education  Patient Education  Education Given To: Patient  Education Provided: Role of Therapy  Education Method: Verbal  Barriers to Learning: None  Education Outcome: Verbalized understanding;Continued education needed      Therapy Time   Individual Concurrent Group Co-treatment   Time In 1000         Time Out 1040         Minutes 40         Timed Code Treatment Minutes: 40 Minutes       Roderick Felder PT

## 2022-06-16 NOTE — PROGRESS NOTES
Nephrology Progress Note   [employer]      HPI:  No CP.  O > I. Renal function stable. ROS:  No fever. [address]:  medications reviewed. Subjective:    Resting in recliner; NAD; no shortness of breath    Scheduled Meds:   torsemide  50 mg Oral BID    labetalol  200 mg Oral 2 times per day    ciprofloxacin  500 mg Oral 2 times per day    potassium chloride  20 mEq Oral BID WC    [Held by provider] insulin lispro  25 Units SubCUTAneous TID WC    [Held by provider] enoxaparin  30 mg SubCUTAneous BID    fluticasone  1 spray Each Nostril Daily    insulin lispro  0-18 Units SubCUTAneous TID WC    insulin lispro  0-9 Units SubCUTAneous Nightly    collagenase   Topical Daily    gentamicin   Topical Daily    sodium chloride flush  10 mL IntraVENous 2 times per day    allopurinol  200 mg Oral Daily    insulin glargine  40 Units SubCUTAneous BID    cetirizine  10 mg Oral Nightly        sodium chloride 100 mL/hr at 22 0643    dextrose         PRN Meds:.methocarbamol, HYDROcodone-acetaminophen, sodium chloride flush, sodium chloride, potassium chloride **OR** potassium alternative oral replacement **OR** potassium chloride, magnesium sulfate, promethazine **OR** ondansetron, magnesium hydroxide, acetaminophen **OR** acetaminophen, glucose, dextrose bolus **OR** dextrose bolus, glucagon (rDNA), dextrose    Physical Exam:    TEMPERATURE:  Current - Temp: 98.1 °F (36.7 °C);  Max - Temp  Av.2 °F (36.8 °C)  Min: 97.9 °F (36.6 °C)  Max: 98.5 °F (36.9 °C)  RESPIRATIONS RANGE: Resp  Av.7  Min: 16  Max: 24  PULSE RANGE: Pulse  Av.8  Min: 68  Max: 107  BLOOD PRESSURE RANGE:  Systolic (77DGM), BLM:345 , Min:153 , UKW:130   ; Diastolic (71ECO), EPZ:59, Min:64, Max:80    24HR INTAKE/OUTPUT:      Intake/Output Summary (Last 24 hours) at 2022 1304  Last data filed at 2022 1258  Gross per 24 hour   Intake 1320 ml   Output 800 ml   Net 520 ml         Patient Vitals for the past 96 hrs (Last 3 readings):   Weight   06/16/22 0456 (!) 317 lb 3.9 oz (143.9 kg)   06/15/22 0621 (!) 312 lb 13.3 oz (141.9 kg)   06/14/22 0634 (!) 313 lb 6.4 oz (142.2 kg)       General:  NAD, A+OX3, obese  Chest:  CTAB  CVS:  RRR  Abdominal:  NTND, soft, +BS  Extremities:  1+ edema  Skin:  no rash    Allergies:   Allergies   Allergen Reactions    Pcn [Penicillins] Shortness Of Breath    Spironolactone Rash    Coreg [Carvedilol]      Side effects:  SOB, palpitations    Lisinopril Other (See Comments)     cough    Flexeril [Cyclobenzaprine] Rash          LAB DATA:    CBC:   Lab Results   Component Value Date    WBC 8.5 06/16/2022    RBC 4.17 06/16/2022    HGB 12.3 06/16/2022    HCT 38.4 06/16/2022    MCV 92.0 06/16/2022    MCH 29.4 06/16/2022    MCHC 32.0 06/16/2022    RDW 16.5 06/16/2022     06/16/2022    MPV 7.3 06/16/2022     BMP:    Lab Results   Component Value Date     06/16/2022    K 3.8 06/16/2022    K 3.3 06/13/2022    CL 97 06/16/2022    CO2 33 06/16/2022    BUN 52 06/16/2022    CREATININE 2.1 06/16/2022    CALCIUM 9.8 06/16/2022    GFRAA 28 06/16/2022    LABGLOM 23 06/16/2022    GLUCOSE 173 06/16/2022     Ionized Calcium:  No results found for: IONCA  Magnesium:    Lab Results   Component Value Date    MG 2.40 06/16/2022     Phosphorus:    Lab Results   Component Value Date    PHOS 3.8 06/16/2022     U/A:    Lab Results   Component Value Date    COLORU Yellow 06/08/2022    PHUR 6.0 06/08/2022    WBCUA 25 06/08/2022    RBCUA 5-10 06/08/2022    RBCUA NEGATIVE 04/18/2018    MUCUS PRESENT 04/18/2018    BACTERIA 3+ 06/08/2022    CLARITYU Clear 06/08/2022    SPECGRAV 1.013 06/08/2022    LEUKOCYTESUR LARGE 06/08/2022    UROBILINOGEN 0.2 06/08/2022    BILIRUBINUR Negative 06/08/2022    BILIRUBINUR NEGATIVE 04/18/2018    BLOODU MODERATE 06/08/2022    GLUCOSEU Negative 06/08/2022         IMPRESSION/RECOMMENDATIONS:      Principal Problem:    Cellulitis  Active Problems:    Acute respiratory failure with hypoxia

## 2022-06-16 NOTE — PROGRESS NOTES
Department of Podiatric Surgery  Progress Note  6/16/2022  Linsey Ortiz      HISTORY OF PRESENT ILLNESS:      The patient is a 74 y.o. female who presents with right heel wound and infection, cellulitis The patient sleeps with c-pap normally and is not that active at home. She had to sleep in a wingback chair for the last several days and her legs were down. She is diabetic and has neuropathy. She has trouble with mobility at baseline and wears slippers at home 90% of the time. She has not had a problem with the heel before, but has had wounds venous nature recently. 6/15/22: No new complaints regarding the feet. She and her  are concerned about going home and she will need SNF. She asked about bracing and shoes that would help in this situation.     Past Medical History:        Diagnosis Date    Anemia     Chronic bilateral low back pain without sciatica     CKD (chronic kidney disease) stage 3, GFR 30-59 ml/min [facility]     Colon cancer ([facility]     [facility]     [facility]     Diabetes with retinopathy ([facility]     DM eye exam 12/18/17    Hypertension     Pancreatic cyst     Positive FIT (fecal immunochemical test)     Pulmonary hypertension ([facility])          ALLERGIES    Allergies   Allergen Reactions    Pcn [Penicillins] Shortness Of Breath    Spironolactone Rash    Coreg [Carvedilol]      Side effects:  SOB, palpitations    Lisinopril Other (See Comments)     cough    Flexeril [Cyclobenzaprine] Rash       REVIEW OF SYSTEMS:  CONSTITUTIONAL:  negative  EYES:  negative  HEENT:  negative  RESPIRATORY:  Dyspnea resolved  CARDIOVASCULAR:  positive for  orthopnea, edema  ENDOCRINE:  positive for diabetic symptoms including neuropathy  MUSCULOSKELETAL:  positive for  pain and swelling of the right heel  NEUROLOGICAL:  negative    PHYSICAL EXAM:  VITALS:  BP (!) 184/80   Pulse (!) 107   Temp 98.5 °F (36.9 °C) (Oral)   Resp 20   Ht 5' 3\" (1.6 m)   Wt (!) 317 lb 3.9 oz (143.9 kg)   SpO2 91%   BMI 56.20 kg/m²   CONSTITUTIONAL:  awake, alert, cooperative, no apparent distress, and appears stated age  EYES:  pupils equal, round and reactive to light, extra ocular muscles intact, sclera clear, conjunctiva normal      LOWER EXTREMITY:  VASCULAR: Pedal Pulses present. negative signs of ischemia. MUSCULOSKELETAL:  negative gross deformity. NEUROLOGIC:  Epicritic sensation, light touch, joint position sensediminished  SKIN:  The rk-wound erythema is resolved. The central aspect remains necrotic, but the surrounding tissues are epithelialized again. There is only the remaining central wound. RIGHT plantar heel with 2.2 x 1.9 cm deeply penetrating 0.45 cm wound to deep subcutaneous tissue depth as most of the fat pad in this area has been debrided destroyed by the infection, with improvement to dark necrosis smaller and less necrosis noted. No calor, no odor with continued use of Santyl and Gentamycin    The left leg has superficial granular wound on the posterior calf with evidence of mild erythema surrounding that has appearance of chronic venous stasis. I/O:    Intake/Output Summary (Last 24 hours) at 6/16/2022 0722  Last data filed at 6/16/2022 0119  Gross per 24 hour   Intake 840 ml   Output 800 ml   Net 40 ml              Wt Readings from Last 3 Encounters:   06/16/22 (!) 317 lb 3.9 oz (143.9 kg)   03/08/22 (!) 315 lb 12.8 oz (143.2 kg)   01/07/21 294 lb (133.4 kg)       LABS:    Recent Labs     06/14/22  0637   WBC 9.8   HGB 12.3   HCT 37.7           Recent Labs     06/15/22  0737      K 3.3*   CL 96*   CO2 34*   PHOS 3.4   BUN 51*   CREATININE 2.3*        No results for input(s): PROT, INR, APTT in the last 72 hours. IMAGING:  X-rays:     mpression       1.  No plain radiographic evidence of osteomyelitis.       2.  Diffuse soft tissue swelling, especially within the dorsum of the foot.       3.  Otherwise no acute bony abnormality is noted.      MICRO:   Culture, Wound [[id number]] (Abnormal) Collected: 06/08/22 0935   Order Status: Completed Specimen: Leg Updated: 06/09/22 1356    Gram Stain Result No Epithelial Cells seen   1+ WBC's (Polymorphonuclear)   2+ Gram negative rods   3+ Gram positive cocci  in clusters-resembling Staph    Abnormal     Organism Serratia marcescens Abnormal     WOUND/ABSCESS --    Light growth   Sensitivity to follow     Organism Pseudomonas aeruginosa Abnormal     WOUND/ABSCESS --    Light growth   Sensitivity to follow     Organism Proteus species Abnormal     WOUND/ABSCESS --    Rare growth   No further workup    Narrative:     ORDER#: [id number]                          ORDERED BY: Noble Hopper   SOURCE: Leg                                COLLECTED:  06/08/22 09:35          ASSESSMENT   MUGS 3 heel ulceration, right heel  Right foot diabetic foot infection  Venous wound, left posterior calf  Diabetes with peripheral neuropathy    PLAN:  Evaluation and Management x 20 minutes, nursing to assist with dressing change today, as patient was performing hygiene at time of visit    1. Right heel  MRI reviewed Osteitis is most likely given the short duration of infection and inflammation in this location. Osteomyelitis is still a concern and patient will likely need antibiotics until there is improvement in the appearance and depth of the wound to avoid conversion to osteomyelitis. Santyl and Gentamicin applied for combined treatment of the necrotic tissues and suspected bacterial infection      2. Venous stasis wound, left   Multi-layer compression dressing to the left leg. ABD and Gentamicin cream applied here as well. DISPO: Plan for wound care and antibiotics. Should be able to avoid OR given the improvement seen since admission. Thanks for the opportunity to participate in this patient's care.      José Miguel Obrien DPM   [facility]    Office: [phone]  Fax: [phone]

## 2022-06-16 NOTE — PROGRESS NOTES
Department of Podiatric Surgery  Progress Note  6/16/2022  eDborah Hakeem Ortiz      HISTORY OF PRESENT ILLNESS:      The patient is a 74 y.o. female who presents with right heel wound and infection, cellulitis The patient sleeps with c-pap normally and is not that active at home. She had to sleep in a wingback chair for the last several days and her legs were down. She is diabetic and has neuropathy. She has trouble with mobility at baseline and wears slippers at home 90% of the time. She has not had a problem with the heel before, but has had wounds venous nature recently. 6/16/22: No new complaints regarding the feet. She is concerned about shoes that she can wear for rehab. No pain in the foot at present. She is happy that spinal imaging as negative for fracture or severe pathology and looks forward to rehab.     Past Medical History:        Diagnosis Date    Anemia     Chronic bilateral low back pain without sciatica     CKD (chronic kidney disease) stage 3, GFR 30-59 ml/min [facility])     Colon cancer ([facility]     [facility]     Diabetes [facility]     Diabetes with retinopathy ([facility]     DM eye exam 12/18/17    Hypertension     Pancreatic cyst     Positive FIT (fecal immunochemical test)     Pulmonary hypertension ([facility])          ALLERGIES    Allergies   Allergen Reactions    Pcn [Penicillins] Shortness Of Breath    Spironolactone Rash    Coreg [Carvedilol]      Side effects:  SOB, palpitations    Lisinopril Other (See Comments)     cough    Flexeril [Cyclobenzaprine] Rash       REVIEW OF SYSTEMS:  CONSTITUTIONAL:  negative  EYES:  negative  HEENT:  negative  RESPIRATORY:  Dyspnea resolved  CARDIOVASCULAR:  positive for  orthopnea, edema  ENDOCRINE:  positive for diabetic symptoms including neuropathy  MUSCULOSKELETAL:  positive for  pain and swelling of the right heel  NEUROLOGICAL:  negative    PHYSICAL EXAM:  VITALS:  /85   Pulse 69   Temp 98.3 °F (36.8 °C) (Oral)   Resp 20   Ht 5' 3\" (1.6 m)     3.  Otherwise no acute bony abnormality is noted. MICRO:   Culture, Wound [[id number]] (Abnormal) Collected: 06/08/22 0935   Order Status: Completed Specimen: Leg Updated: 06/09/22 1356    Gram Stain Result No Epithelial Cells seen   1+ WBC's (Polymorphonuclear)   2+ Gram negative rods   3+ Gram positive cocci  in clusters-resembling Staph    Abnormal     Organism Serratia marcescens Abnormal     WOUND/ABSCESS --    Light growth   Sensitivity to follow     Organism Pseudomonas aeruginosa Abnormal     WOUND/ABSCESS --    Light growth   Sensitivity to follow     Organism Proteus species Abnormal     WOUND/ABSCESS --    Rare growth   No further workup    Narrative:     ORDER#: [id number]                          ORDERED BY: Vanessa Aaron   SOURCE: Leg                                COLLECTED:  06/08/22 09:35          ASSESSMENT   MUGS 3 heel ulceration, right heel  Right foot diabetic foot infection  Venous wound, left posterior calf  Diabetes with peripheral neuropathy    PLAN:  Evaluation and Management x 20 minutes, nursing to assist with dressing change today, as patient was performing hygiene at time of visit    1. Right heel  MRI reviewed Osteitis is most likely given the short duration of infection and inflammation in this location. Osteomyelitis is still a concern and patient will likely need antibiotics until there is improvement in the appearance and depth of the wound to avoid conversion to osteomyelitis. Santyl and Gentamicin applied for combined treatment of the necrotic tissues and suspected bacterial infection      2. Venous stasis wound, left   Multi-layer compression dressing to the left leg. ABD and Gentamicin cream applied here as well. DISPO: Plan for wound care and antibiotics. Should be able to avoid OR given the improvement seen since admission. Thanks for the opportunity to participate in this patient's care.      Farhan Solo DPM   [facility]    Office: [phone]  Fax: [phone]

## 2022-06-16 NOTE — PLAN OF CARE
Problem: Discharge Planning  Goal: Discharge to home or other facility with appropriate resources  Outcome: Progressing     Problem: Skin/Tissue Integrity  Goal: Absence of new skin breakdown  Description: 1. Monitor for areas of redness and/or skin breakdown  2. Assess vascular access sites hourly  3. Every 4-6 hours minimum:  Change oxygen saturation probe site  4. Every 4-6 hours:  If on nasal continuous positive airway pressure, respiratory therapy assess nares and determine need for appliance change or resting period.   Outcome: Progressing     Problem: Safety - Adult  Goal: Free from fall injury  Outcome: Progressing     Problem: ABCDS Injury Assessment  Goal: Absence of physical injury  Outcome: Progressing     Problem: ABCDS Injury Assessment  Goal: Absence of physical injury  Outcome: Progressing     Problem: Chronic Conditions and Co-morbidities  Goal: Patient's chronic conditions and co-morbidity symptoms are monitored and maintained or improved  Outcome: Progressing     Problem: Pain  Goal: Verbalizes/displays adequate comfort level or baseline comfort level  Outcome: Progressing

## 2022-06-16 NOTE — CARE COORDINATION
Talked to pt re: dc order placed while Dr. Leonard Encarnacion was at bedside; [facility] are the only two facilities who have called me back so far & they also have private rooms. Pt declining to make a decision today,  wants to check out the facilities first. Pt aware dc order is in. Dr. Leonard Encarnacion stayed at the bedside to talk with pt.     Axel Madden RN, BSN,   [phone]  Electronically signed by Axel Madden RN on 6/16/2022 at 3:19 PM

## 2022-06-16 NOTE — DISCHARGE INSTR - COC
Continuity of Care Form    Patient Name: Saritha Fermin   :  1955  MRN:  [id number]    Admit date:  2022  Discharge date:  2022    Code Status Order: Full Code   Advance Directives:      Admitting Physician:  Delphine Avelar MD  PCP: Seble Kohli MD    Discharging Nurse: [address] Unit/Room#: N0A-6792/5647-64  Discharging Unit Phone Number: [phone]    Emergency Contact:   Extended Emergency Contact Information  Primary Emergency Contact: Laith Ortiz  Address: [address] [place] [address]  [address] [place] [address] [place]  Phone: [phone]  Mobile Phone: [phone]  Relation: Spouse    Past Surgical History:  Past Surgical History:   Procedure Laterality Date    COLONOSCOPY N/A 2018    COLONOSCOPY WITH BIOPSY performed by Kwasi Resendiz MD at [address] [place] [address]  2019    Colonoscopy with polypectomy (cold snare)    COLONOSCOPY N/A 2019    COLONOSCOPY POLYPECTOMY SNARE/COLD BIOPSY performed by Kwasi Resendiz MD at [facility] Right 2018    [address] and umbilical hernia repair    MO OFFICE/OUTPT VISIT,PROCEDURE ONLY Right 2018    LAPAROSCOPIC RIGHT HEMICOLECTOMY and umbilical hernia repair performed by Dalton Carter MD at [address] [place] [address] 2018    EGD BIOPSY performed by Kwasi Resendiz MD at [place] [address]         Immunization History:   Immunization History   Administered Date(s) Administered    COVID-19, Ayo Files, Primary or Immunocompromised, PF, 100mcg/0.5mL 02/15/2021, 2021, 2021    Influenza Vaccine, unspecified formulation 2016, 2017    Influenza Virus Vaccine 2017    Influenza, [place], 6 mo and older, IM, PF (Flulaval, Fluarix) 2018    Influenza, Quadv, IM, PF (6 mo and older Fluzone, Flulaval, Fluarix, and 3 yrs and older Afluria) 2016 Influenza, Quadv, adjuvanted, 65 yrs +, IM, PF (Fluad) 10/28/2020    Influenza, Triv, inactivated, subunit, adjuvanted, IM (Fluad 65 yrs and older) 10/21/2019    Pneumococcal Polysaccharide (Hyloitnmg34) 02/03/2020       Active Problems:  Patient Active Problem List   Diagnosis Code    Essential hypertension I10    HLD (hyperlipidemia) E78.5    Type 2 diabetes mellitus with left diabetic foot infection ([facility] [address] E11.628, L08.9    Volume overload E87.70    Malignant neoplasm of ascending colon (HCC) C18.2    Anemia D64.9    CKD (chronic kidney disease) stage 3, GFR 30-59 ml/min ([facility]) N18.30    Uterine mass N85.8    Morbid obesity due to excess calories ([facility]) E66.01    BRIANNA (obstructive sleep apnea) G47.33    PLMD (periodic limb movement disorder) G47.61    Pancreatic cyst K86.2    Chronic bilateral low back pain without sciatica M54.50, G89.29    Cellulitis L03.90    Acute respiratory failure with hypoxia ([facility]) J96.01    Gram-negative bacteremia R78.81    Elevated C-reactive protein (CRP) R79.82    E. coli UTI (urinary tract infection) N39.0, B96.20    Diabetic polyneuropathy associated with type 2 diabetes mellitus ([facility]) E11.42    Diabetes mellitus type 2 in obese ([facility]) E11.69, E66.9    Elevated sed rate R70.0    Serratia marcescens infection A48.8    Pseudomonas infection A49.8    Proteus infection A49.8       Isolation/Infection:   Isolation            No Isolation          Patient Infection Status       Infection Onset Added Last Indicated Last Indicated By Review Planned Expiration Resolved Resolved By    None active    Resolved    COVID-19 (Rule Out) 06/08/22 06/08/22 06/08/22 COVID-19, Rapid (Ordered)   06/08/22 Rule-Out Test Resulted            Nurse Assessment:  Last Vital Signs: BP (!) 162/72   Pulse 68   Temp 98.1 °F (36.7 °C) (Oral)   Resp 16   Ht 5' 3\" (1.6 m)   Wt (!) 317 lb 3.9 oz (143.9 kg)   SpO2 99%   BMI 56.20 kg/m²     Last documented pain score (0-10 scale): Pain Level: 8  Last Weight:    Wt Readings from Last 1 Encounters:   06/16/22 (!) 317 lb 3.9 oz (143.9 kg)     Mental Status:  oriented and alert    IV Access:  - None    Nursing Mobility/ADLs:  Walking   Assisted  Transfer  Assisted  Bathing  Assisted  Dressing  Assisted  Toileting  [address]  [facility] Delivery   whole    Wound Care Documentation and Therapy:  Incision 11/08/18 Abdomen (Active)   Number of days: 3464       Wound 06/08/22 Heel Right 2cm by 2cm open area. Dark purple dti appearing area surrounding area on heel. (Active)   Wound Image   06/09/22 1142   Wound Etiology Diabetic 06/15/22 0825   Dressing Status Clean;Dry; Intact 06/15/22 2053   Wound Cleansed Cleansed with saline 06/14/22 0757   Dressing/Treatment Moist to dry 06/14/22 0757   Dressing Change Due 06/10/22 06/09/22 1142   Wound Length (cm) 2 cm 06/09/22 1142   Wound Width (cm) 3 cm 06/09/22 1142   Wound Surface Area (cm^2) 6 cm^2 06/09/22 1142   Wound Assessment Eschar dry;Pink/red 06/14/22 0757   Drainage Amount None 06/15/22 0825   Drainage Description Serosanguinous 06/14/22 0757   Odor None 06/15/22 0825   Katie-wound Assessment Maceration 06/13/22 1600   Margins Defined edges 06/13/22 1600   Number of days: 7       Wound 06/08/22 Tibial Distal;Left;Posterior (Active)   Wound Image   06/09/22 1142   Wound Etiology Venous 06/14/22 0757   Dressing Status Clean;Dry; Intact 06/14/22 0757   Wound Cleansed Not Cleansed 06/14/22 0757   Dressing/Treatment Non adherent; Roll gauze; Ace wrap 06/14/22 0757   Dressing Change Due 06/10/22 06/09/22 1142   Wound Length (cm) 3 cm 06/09/22 1142   Wound Width (cm) 4 cm 06/09/22 1142   Wound Depth (cm) 0.1 cm 06/09/22 1142   Wound Surface Area (cm^2) 12 cm^2 06/09/22 1142   Change in Wound Size % (l*w) 55.56 06/09/22 1142   Wound Volume (cm^3) 1.2 cm^3 06/09/22 1142   Wound Assessment Pink/red 06/13/22 1600   Drainage Amount Small 06/13/22 1600   Drainage Description Yellow;Serous 06/13/22 1600   Odor None 06/13/22 1600   Katie-wound Assessment Dry/flaky;Fragile 06/13/22 1600   Margins Undefined edges 06/13/22 1600   Wound Thickness Description not for Pressure Injury Partial thickness 06/13/22 1600   Number of days: 8        Elimination:  Continence: Bowel: Yes  Bladder: Yes  Urinary Catheter: None   Colostomy/Ileostomy/Ileal Conduit: No       Date of Last BM: 06/17/2022    Intake/Output Summary (Last 24 hours) at 6/16/2022 1156  Last data filed at 6/16/2022 0850  Gross per 24 hour   Intake 1080 ml   Output 800 ml   Net 280 ml     I/O last 3 completed shifts: In: 1631.7 [P.O.:1140; I.V.:92.9; IV Piggyback:398.8]  Out: 800 [Urine:800]    Safety Concerns:     None    Impairments/Disabilities:      Vision    Nutrition Therapy:  Current Nutrition Therapy:   - Oral Diet:  General    Routes of Feeding: Oral  Liquids: No Restrictions  Daily Fluid Restriction: no  Last Modified Barium Swallow with Video (Video Swallowing Test): not done    Treatments at the Time of Hospital Discharge:   Respiratory Treatments:   Oxygen Therapy:  is on oxygen at 2 L/min per nasal cannula.   Ventilator:    - No ventilator support    Rehab Therapies: Physical Therapy and Occupational Therapy  Weight Bearing Status/Restrictions: Touchdown weight bearing (10-25 lbs) only on right leg  Other Medical Equipment (for information only, NOT a DME order):  walker  Other Treatments: none    Patient's personal belongings (please select all that are sent with patient):  Nathan    RN SIGNATURE:  Electronically signed by Kelly Stanley RN on 6/17/22 at 2:01 PM EDT    CASE MANAGEMENT/SOCIAL WORK SECTION    Inpatient Status Date: 6/17/2022    Readmission Risk Assessment Score:  Readmission Risk              Risk of Unplanned Readmission:  27         Discharging to Facility/ Agency   [facility], [address] [place] [address] ANDREA/Terry Klein Final  Report Number: [phone] (room 312)  Fax Number: [phone]    Case Manager/ signature: Electronically signed by TAI Dunn on 6/17/2022 at 9:06 AM      PHYSICIAN SECTION    Prognosis: Fair    Condition at Discharge: Stable    Rehab Potential (if transferring to Rehab): Fair    Recommended Labs or Other Treatments After Discharge: NA    Physician Certification: I certify the above information and transfer of Carol Ovalles  is necessary for the continuing treatment of the diagnosis listed and that she requires [place] for less 30 days.      Update Admission H&P: No change in H&P    PHYSICIAN SIGNATURE:  Electronically signed by Pablo Chiang MD on 6/16/22 at 11:56 AM EDT

## 2022-06-16 NOTE — PROGRESS NOTES
Nutrition Assessment     Type and Reason for Visit: Reassess    Nutrition Recommendations/Plan:   1. Continue current diet     Malnutrition Assessment:  Malnutrition Status: No malnutrition    Nutrition Assessment:  Follow-up. Hx CKD, colon CA, and diabetes. On 4 carb diet with 1200ml fluid restriction. Gave pt education - see prev note for details. Recorded intakes %. Previously receiving Glucerna supplement although was d/c'd by nephrology d/t 1200ml fluid restriction. No nutrition concerns at this time. Nutrition Related Findings:   -3 liters. Glucose 173. +BM 6/16. Nonpitting generalized edema. +3 nonpitting BLE edema. Wound Type: Diabetic Ulcer,Venous Stasis    Current Nutrition Therapies:    ADULT DIET;  Regular; 4 carb choices (60 gm/meal); 1200 ml    Anthropometric Measures:  · Height: 5' 3\" (160 cm)  · Current Body Wt: 317 lb (143.8 kg)   · BMI: 56.2    Nutrition Diagnosis:   · Increased nutrient needs related to inadequate protein-energy intake as evidenced by wounds    Nutrition Interventions:   Food and/or Nutrient Delivery: Continue Current Diet  Nutrition Education/Counseling: Education completed  Coordination of Nutrition Care: Continue to monitor while inpatient     Goals:  Goals: PO intake 50% or greater,prior to discharge     Nutrition Monitoring and Evaluation:   Behavioral-Environmental Outcomes: None Identified  Food/Nutrient Intake Outcomes: Food and Nutrient Intake  Physical Signs/Symptoms Outcomes: Biochemical Data,Fluid Status or Edema,Nutrition Focused Physical Findings,Skin,Weight    Discharge Planning:    Continue current diet     Radha Brantley RD, LD  Contact: [phone]

## 2022-06-17 VITALS
DIASTOLIC BLOOD PRESSURE: 63 MMHG | BODY MASS INDEX: 51.91 KG/M2 | RESPIRATION RATE: 16 BRPM | WEIGHT: 293 LBS | HEIGHT: 63 IN | SYSTOLIC BLOOD PRESSURE: 130 MMHG | OXYGEN SATURATION: 97 % | HEART RATE: 67 BPM | TEMPERATURE: 97.5 F

## 2022-06-17 LAB
ALBUMIN SERPL-MCNC: 3.4 G/DL (ref 3.4–5)
ANION GAP SERPL CALCULATED.3IONS-SCNC: 12 MMOL/L (ref 3–16)
BUN BLDV-MCNC: 50 MG/DL (ref 7–20)
CALCIUM SERPL-MCNC: 9.3 MG/DL (ref 8.3–10.6)
CHLORIDE BLD-SCNC: 95 MMOL/L (ref 99–110)
CO2: 33 MMOL/L (ref 21–32)
CREAT SERPL-MCNC: 2.2 MG/DL (ref 0.6–1.2)
GFR AFRICAN AMERICAN: 27
GFR NON-AFRICAN AMERICAN: 22
GLUCOSE BLD-MCNC: 139 MG/DL (ref 70–99)
GLUCOSE BLD-MCNC: 144 MG/DL (ref 70–99)
GLUCOSE BLD-MCNC: 170 MG/DL (ref 70–99)
MAGNESIUM: 2.1 MG/DL (ref 1.8–2.4)
PERFORMED ON: ABNORMAL
PERFORMED ON: ABNORMAL
PHOSPHORUS: 4.2 MG/DL (ref 2.5–4.9)
POTASSIUM SERPL-SCNC: 3.5 MMOL/L (ref 3.5–5.1)
SARS-COV-2, NAAT: NOT DETECTED
SODIUM BLD-SCNC: 140 MMOL/L (ref 136–145)
URIC ACID, SERUM: 7.8 MG/DL (ref 2.6–6)

## 2022-06-17 PROCEDURE — 2580000003 HC RX 258: Performed by: INTERNAL MEDICINE

## 2022-06-17 PROCEDURE — 87635 SARS-COV-2 COVID-19 AMP PRB: CPT

## 2022-06-17 PROCEDURE — 97530 THERAPEUTIC ACTIVITIES: CPT

## 2022-06-17 PROCEDURE — 6370000000 HC RX 637 (ALT 250 FOR IP): Performed by: INTERNAL MEDICINE

## 2022-06-17 PROCEDURE — 94761 N-INVAS EAR/PLS OXIMETRY MLT: CPT

## 2022-06-17 PROCEDURE — 80069 RENAL FUNCTION PANEL: CPT

## 2022-06-17 PROCEDURE — 2700000000 HC OXYGEN THERAPY PER DAY

## 2022-06-17 PROCEDURE — 97535 SELF CARE MNGMENT TRAINING: CPT

## 2022-06-17 PROCEDURE — 84550 ASSAY OF BLOOD/URIC ACID: CPT

## 2022-06-17 PROCEDURE — 83735 ASSAY OF MAGNESIUM: CPT

## 2022-06-17 PROCEDURE — 36415 COLL VENOUS BLD VENIPUNCTURE: CPT

## 2022-06-17 RX ORDER — TORSEMIDE 10 MG/1
50 TABLET ORAL 2 TIMES DAILY
Qty: 300 TABLET | Refills: 3 | Status: SHIPPED | OUTPATIENT
Start: 2022-06-17

## 2022-06-17 RX ADMIN — HYDROCODONE BITARTRATE AND ACETAMINOPHEN 1 TABLET: 5; 325 TABLET ORAL at 01:56

## 2022-06-17 RX ADMIN — HYDROCODONE BITARTRATE AND ACETAMINOPHEN 1 TABLET: 5; 325 TABLET ORAL at 11:52

## 2022-06-17 RX ADMIN — ALLOPURINOL 200 MG: 100 TABLET ORAL at 09:18

## 2022-06-17 RX ADMIN — POTASSIUM CHLORIDE 20 MEQ: 20 TABLET, EXTENDED RELEASE ORAL at 09:45

## 2022-06-17 RX ADMIN — CIPROFLOXACIN 500 MG: 500 TABLET, FILM COATED ORAL at 09:18

## 2022-06-17 RX ADMIN — INSULIN GLARGINE 40 UNITS: 100 INJECTION, SOLUTION SUBCUTANEOUS at 09:40

## 2022-06-17 RX ADMIN — HYDROCODONE BITARTRATE AND ACETAMINOPHEN 1 TABLET: 5; 325 TABLET ORAL at 06:34

## 2022-06-17 RX ADMIN — TORSEMIDE 50 MG: 20 TABLET ORAL at 09:18

## 2022-06-17 RX ADMIN — LABETALOL HYDROCHLORIDE 200 MG: 200 TABLET, FILM COATED ORAL at 09:18

## 2022-06-17 RX ADMIN — SODIUM CHLORIDE, PRESERVATIVE FREE 10 ML: 5 INJECTION INTRAVENOUS at 09:47

## 2022-06-17 RX ADMIN — FLUTICASONE PROPIONATE 1 SPRAY: 50 SPRAY, METERED NASAL at 09:18

## 2022-06-17 ASSESSMENT — PAIN SCALES - GENERAL
PAINLEVEL_OUTOF10: 7
PAINLEVEL_OUTOF10: 8
PAINLEVEL_OUTOF10: 7

## 2022-06-17 ASSESSMENT — PAIN DESCRIPTION - ORIENTATION
ORIENTATION: MID
ORIENTATION: MID

## 2022-06-17 ASSESSMENT — PAIN DESCRIPTION - LOCATION
LOCATION: BACK
LOCATION: BACK

## 2022-06-17 ASSESSMENT — PAIN DESCRIPTION - DESCRIPTORS
DESCRIPTORS: ACHING
DESCRIPTORS: ACHING

## 2022-06-17 NOTE — CARE COORDINATION
Patients first choice for SNF is [facility], I have not heard back from them yet so called & left another vm.    Jeanne Callejas RN, BSN,   [phone]  Electronically signed by Jeanne Callejas RN on 6/17/2022 at 7:38 AM

## 2022-06-17 NOTE — PLAN OF CARE
Problem: Discharge Planning  Goal: Discharge to home or other facility with appropriate resources  6/17/2022 1103 by Christine Alva RN  Outcome: Completed  6/17/2022 0100 by Khoa Mayo RN  Outcome: Progressing     Problem: Skin/Tissue Integrity  Goal: Absence of new skin breakdown  Description: 1. Monitor for areas of redness and/or skin breakdown  2. Assess vascular access sites hourly  3. Every 4-6 hours minimum:  Change oxygen saturation probe site  4. Every 4-6 hours:  If on nasal continuous positive airway pressure, respiratory therapy assess nares and determine need for appliance change or resting period.   6/17/2022 1103 by Christine Alva RN  Outcome: Completed  6/17/2022 0100 by Khoa Mayo RN  Outcome: Progressing     Problem: Safety - Adult  Goal: Free from fall injury  6/17/2022 1103 by Shannan Rocha RN  Outcome: Completed  6/17/2022 0100 by Khoa Mayo RN  Outcome: Progressing     Problem: ABCDS Injury Assessment  Goal: Absence of physical injury  6/17/2022 1103 by Shannan Rocha RN  Outcome: Completed  6/17/2022 0100 by Khoa Mayo RN  Outcome: Progressing     Problem: Chronic Conditions and Co-morbidities  Goal: Patient's chronic conditions and co-morbidity symptoms are monitored and maintained or improved  6/17/2022 1103 by Shannan Rocha RN  Outcome: Completed  6/17/2022 0100 by Khoa Mayo RN  Outcome: Progressing     Problem: Pain  Goal: Verbalizes/displays adequate comfort level or baseline comfort level  6/17/2022 1103 by Shannan Rocha RN  Outcome: Completed  6/17/2022 0100 by Khoa Mayo RN  Outcome: Progressing

## 2022-06-17 NOTE — PLAN OF CARE
Problem: Discharge Planning  Goal: Discharge to home or other facility with appropriate resources  6/17/2022 0100 by Shira Michael RN  Outcome: Progressing     Problem: Skin/Tissue Integrity  Goal: Absence of new skin breakdown  Description: 1. Monitor for areas of redness and/or skin breakdown  2. Assess vascular access sites hourly  3. Every 4-6 hours minimum:  Change oxygen saturation probe site  4. Every 4-6 hours:  If on nasal continuous positive airway pressure, respiratory therapy assess nares and determine need for appliance change or resting period.   6/17/2022 0100 by Shira Michael RN  Outcome: Progressing     Problem: Safety - Adult  Goal: Free from fall injury  6/17/2022 0100 by Shira Michael RN  Outcome: Progressing     Problem: ABCDS Injury Assessment  Goal: Absence of physical injury  6/17/2022 0100 by Shira Michael RN  Outcome: Progressing     Problem: Chronic Conditions and Co-morbidities  Goal: Patient's chronic conditions and co-morbidity symptoms are monitored and maintained or improved  6/17/2022 0100 by Shira Michael RN  Outcome: Progressing     Problem: Pain  Goal: Verbalizes/displays adequate comfort level or baseline comfort level  6/17/2022 0100 by Shira Michael RN  Outcome: Progressing

## 2022-06-17 NOTE — CARE COORDINATION
DISCHARGE SUMMARY     DATE OF DISCHARGE: 6/17/2022    DISCHARGE DESTINATION:     FACILITY  [facility], [address] [place] [address] Room 312  [place] [address] Devaughn English  Report Number: [phone] (room 312)  Fax Number: [phone]  HENS completed    TRANSPORTATION: [address] [place] [address] Name: [address] [place] [address] up Time: 1:45pm  Phone Number: [phone]    COMMENTS: SW spoke to  and he is in agreement with discharge to this SNF. Respectfully submitted,    TAI Hadley  [facility]   [phone]    Electronically signed by TAI Grey on 6/17/2022 at 8:50 AM

## 2022-06-17 NOTE — PROGRESS NOTES
Department of Podiatric Surgery  Progress Note  6/17/2022  Jossie Ortiz      HISTORY OF PRESENT ILLNESS:      The patient is a 74 y.o. female who presents with right heel wound and infection, cellulitis The patient sleeps with c-pap normally and is not that active at home. She had to sleep in a wingback chair for the last several days and her legs were down. She is diabetic and has neuropathy. She has trouble with mobility at baseline and wears slippers at home 90% of the time. She has not had a problem with the heel before, but has had wounds venous nature recently.     6/17/22:  Patient of good mood about discharge planning    Past Medical History:        Diagnosis Date    Anemia     Chronic bilateral low back pain without sciatica     CKD (chronic kidney disease) stage 3, GFR 30-59 ml/min ([facility])     Colon cancer ([facility])     Diabetes ([address]     Diabetes ([address]     Diabetes with retinopathy ([address]     DM eye exam 12/18/17    Hypertension     Pancreatic cyst     Positive FIT (fecal immunochemical test)     Pulmonary hypertension (HCC)          ALLERGIES    Allergies   Allergen Reactions    Pcn [Penicillins] Shortness Of Breath    Spironolactone Rash    Coreg [Carvedilol]      Side effects:  SOB, palpitations    Lisinopril Other (See Comments)     cough    Flexeril [Cyclobenzaprine] Rash       REVIEW OF SYSTEMS:  CONSTITUTIONAL:  negative  EYES:  negative  HEENT:  negative  RESPIRATORY:  Dyspnea resolved  CARDIOVASCULAR:  positive for  orthopnea, edema  ENDOCRINE:  positive for diabetic symptoms including neuropathy  MUSCULOSKELETAL:  positive for  pain and swelling of the right heel  NEUROLOGICAL:  negative    PHYSICAL EXAM:  VITALS:  /63   Pulse 67   Temp 97.5 °F (36.4 °C)   Resp 16   Ht 5' 3\" (1.6 m)   Wt (!) 326 lb 15.1 oz (148.3 kg)   SpO2 97%   BMI 57.92 kg/m²   CONSTITUTIONAL:  awake, alert, cooperative, no apparent distress, and appears stated age  EYES:  pupils equal, round and reactive to light, extra ocular muscles intact, sclera clear, conjunctiva normal      LOWER EXTREMITY:  VASCULAR: Pedal Pulses present. negative signs of ischemia. MUSCULOSKELETAL:  negative gross deformity. NEUROLOGIC:  Epicritic sensation, light touch, joint position sensediminished  SKIN:  The rk-wound erythema is resolved. The central aspect remains necrotic, but the surrounding tissues are epithelialized again. There is only the remaining central wound. RIGHT plantar heel with 2.2 x 1.9 cm deeply penetrating 0.45 cm wound to deep subcutaneous tissue depth as most of the fat pad in this area has been debrided destroyed by the infection, with improvement to dark necrosis smaller and less necrosis noted. No calor, no odor with continued use of Santyl and Gentamycin    The left leg has superficial granular wound on the posterior calf with evidence of mild erythema surrounding that has appearance of chronic venous stasis. I/O:    Intake/Output Summary (Last 24 hours) at 6/17/2022 1113  Last data filed at 6/17/2022 0927  Gross per 24 hour   Intake 600 ml   Output 1600 ml   Net -1000 ml              Wt Readings from Last 3 Encounters:   06/17/22 (!) 326 lb 15.1 oz (148.3 kg)   03/08/22 (!) 315 lb 12.8 oz (143.2 kg)   01/07/21 294 lb (133.4 kg)       LABS:    Recent Labs     06/16/22  0825   WBC 8.5   HGB 12.3   HCT 38.4           Recent Labs     06/17/22  0725      K 3.5   CL 95*   CO2 33*   PHOS 4.2   BUN 50*   CREATININE 2.2*        No results for input(s): PROT, INR, APTT in the last 72 hours. IMAGING:  X-rays:     mpression       1.  No plain radiographic evidence of osteomyelitis.       2.  Diffuse soft tissue swelling, especially within the dorsum of the foot.       3.  Otherwise no acute bony abnormality is noted.      MICRO:   Culture, Wound [[id number]] (Abnormal) Collected: 06/08/22 0918   Order Status: Completed Specimen: Leg Updated: 06/09/22 7147    Gram Stain Result No Epithelial Cells seen   1+ WBC's (Polymorphonuclear)   2+ Gram negative rods   3+ Gram positive cocci  in clusters-resembling Staph    Abnormal     Organism Serratia marcescens Abnormal     WOUND/ABSCESS --    Light growth   Sensitivity to follow     Organism Pseudomonas aeruginosa Abnormal     WOUND/ABSCESS --    Light growth   Sensitivity to follow     Organism Proteus species Abnormal     WOUND/ABSCESS --    Rare growth   No further workup    Narrative:     ORDER#: [id number]                          ORDERED BY: Caro Stevens   SOURCE: Leg                                COLLECTED:  06/08/22 09:35          ASSESSMENT   MUGS 3 heel ulceration, right heel  Right foot diabetic foot infection  Venous wound, left posterior calf  Diabetes with peripheral neuropathy    PLAN:  Evaluation and Management x 20 minutes, nursing to assist with dressing change today, as patient was performing hygiene at time of visit    1. Right heel  MRI reviewed Osteitis is most likely given the short duration of infection and inflammation in this location. Osteomyelitis is still a concern and patient will likely need antibiotics until there is improvement in the appearance and depth of the wound to avoid conversion to osteomyelitis. Santyl and Gentamicin applied for combined treatment of the necrotic tissues and suspected bacterial infection      2. Venous stasis wound, left   Multi-layer compression dressing to the left leg. ABD and Gentamicin cream applied here as well. DISPO: Plan for wound care and antibiotics. Concur with plans for discharge. Thanks for the opportunity to participate in this patient's care.      Terra Tan DPM   [facility]  Cell [phone]  Office: [phone]  Fax: [phone]

## 2022-06-17 NOTE — CARE COORDINATION
SW received a phone call from Veda Rivera at the [facility] regarding patient's medication for diabetes. Patient informs that the Ozempic 0.5% injection is a new medication given to her by her endocrinologist and while she has the script she hasn't stated it yet. She stated that she won't be starting it until she's finished with rehab. SW is waiting on updated COVID results and a signed JIMMY by the nurse. Family will need a copy of the discharge paperwork so they can follow up as outpatient.  is present and currently at bedside providing emotional support to the patient. Respectfully submitted,    Emani Fishman3TAI  [facility]   [phone]    Electronically signed by TAI Lowe on 6/17/2022 at 11:57 AM

## 2022-06-17 NOTE — PROGRESS NOTES
Hospitalist Progress Note      PCP: Silverio Yanez MD    Date of Admission: 6/8/2022    Subjective: low back pain better, started back on torsemide    Medications:  Reviewed    Infusion Medications    sodium chloride 100 mL/hr at 06/09/22 0643    dextrose       Scheduled Medications    torsemide  50 mg Oral BID    labetalol  200 mg Oral 2 times per day    ciprofloxacin  500 mg Oral 2 times per day    potassium chloride  20 mEq Oral BID WC    [Held by provider] insulin lispro  25 Units SubCUTAneous TID WC    [Held by provider] enoxaparin  30 mg SubCUTAneous BID    fluticasone  1 spray Each Nostril Daily    insulin lispro  0-18 Units SubCUTAneous TID WC    insulin lispro  0-9 Units SubCUTAneous Nightly    collagenase   Topical Daily    gentamicin   Topical Daily    sodium chloride flush  10 mL IntraVENous 2 times per day    allopurinol  200 mg Oral Daily    insulin glargine  40 Units SubCUTAneous BID    cetirizine  10 mg Oral Nightly     PRN Meds: methocarbamol, HYDROcodone-acetaminophen, sodium chloride flush, sodium chloride, potassium chloride **OR** potassium alternative oral replacement **OR** potassium chloride, magnesium sulfate, promethazine **OR** ondansetron, magnesium hydroxide, acetaminophen **OR** acetaminophen, glucose, dextrose bolus **OR** dextrose bolus, glucagon (rDNA), dextrose      Intake/Output Summary (Last 24 hours) at 6/17/2022 0147  Last data filed at 6/16/2022 1456  Gross per 24 hour   Intake 480 ml   Output 1600 ml   Net -1120 ml       Physical Exam Performed:    /66   Pulse 77   Temp 98.2 °F (36.8 °C) (Oral)   Resp 18   Ht 5' 3\" (1.6 m)   Wt (!) 317 lb 3.9 oz (143.9 kg)   SpO2 93%   BMI 56.20 kg/m²     General appearance: Chronically ill appearing. No apparent distress, appears stated age and cooperative. HEENT: Pupils equal, round, and reactive to light. Conjunctivae/corneas clear. Neck: Supple, with full range of motion.  No jugular venous distention. Trachea midline. Respiratory:  Normal respiratory effort. Clear to auscultation, bilaterally without Rales/Wheezes/Rhonchi. Cardiovascular: Regular rate and rhythm with normal S1/S2 without murmurs, rubs or gallops. Abdomen: Soft, non-tender, non-distended with normal bowel sounds. Musculoskeletal: No clubbing, cyanosis or edema bilaterally.  Full range of motion without deformity. Skin: Right heel unstageable decubitus heel ulceration. Neurologic:  Neurovascularly intact without any focal sensory/motor deficits. Cranial nerves: II-XII intact, grossly non-focal.  Psychiatric: Alert and oriented, thought content appropriate, normal insight  Capillary Refill: Brisk,< 3 seconds   Peripheral Pulses: +2 palpable, equal bilaterally        Labs:   Recent Labs     06/14/22  0637 06/16/22  0825   WBC 9.8 8.5   HGB 12.3 12.3   HCT 37.7 38.4    238     Recent Labs     06/14/22  0637 06/15/22  0737 06/16/22  0825    141 143   K 3.4* 3.3* 3.8   CL 95* 96* 97*   CO2 33* 34* 33*   BUN 49* 51* 52*   CREATININE 2.2* 2.3* 2.1*   CALCIUM 9.1 9.3 9.8   PHOS 2.8 3.4 3.8     No results for input(s): AST, ALT, BILIDIR, BILITOT, ALKPHOS in the last 72 hours. No results for input(s): INR in the last 72 hours. No results for input(s): Glenna Beat in the last 72 hours. Urinalysis:      Lab Results   Component Value Date    NITRU POSITIVE 06/08/2022    WBCUA 25 06/08/2022    BACTERIA 3+ 06/08/2022    RBCUA 5-10 06/08/2022    RBCUA NEGATIVE 04/18/2018    BLOODU MODERATE 06/08/2022    SPECGRAV 1.013 06/08/2022    GLUCOSEU Negative 06/08/2022       Radiology:  XR LUMBAR SPINE (2-3 VIEWS)   Final Result   No acute osseous abnormality of the lumbar spine. Mild multilevel   degenerative disc disease and lower lumbar facet arthropathy. XR CHEST PORTABLE   Final Result   1. No significant change. MRI FOOT RIGHT WO CONTRAST   Final Result   1.  Plantar heel ulcer measuring 1.5 x 2 cm with mild adjacent cellulitis. No   soft tissue abscess. 2. Trace edema in the plantar calcaneus adjacent to the ulcer concerning for   mild acute osteomyelitis. XR FOOT RIGHT (MIN 3 VIEWS)   Final Result      1. No plain radiographic evidence of osteomyelitis. 2.  Diffuse soft tissue swelling, especially within the dorsum of the foot. 3.  Otherwise no acute bony abnormality is noted. CT CHEST WO CONTRAST   Final Result   Mild degree of subsegmental atelectasis. Otherwise no acute cardiopulmonary disease. XR CHEST PORTABLE   Final Result   Pulmonary vascular congestion. Evaluation of the left base limited by soft   tissue attenuation artifact.                  Assessment/Plan:    Active Hospital Problems    Diagnosis     Acute respiratory failure with hypoxia ([facility]) [J96.01]      Priority: Medium    Gram-negative bacteremia [R78.81]      Priority: Medium    Elevated C-reactive protein (CRP) [R79.82]      Priority: Medium    E. coli UTI (urinary tract infection) [N39.0, B96.20]      Priority: Medium    Diabetic polyneuropathy associated with type 2 diabetes mellitus ([facility] [E11.42]      Priority: Medium    Diabetes mellitus type 2 in obese ([facility]) [E11.69, E66.9]      Priority: Medium    Elevated sed rate [R70.0]      Priority: Medium    Serratia marcescens infection [A48.8]      Priority: Medium    Pseudomonas infection [A49.8]      Priority: Medium    Proteus infection [A49.8]      Priority: Medium    Cellulitis [L03.90]      Priority: Medium    Morbid obesity due to excess calories ([address] [E66.01]     Type 2 diabetes mellitus with left diabetic foot infection ([facility] [E04.929, L08.9]     Essential hypertension [I10]          Acute respiratory failure with hypoxia  -diuresis - lasix gtt dced-->resume torsemide in am  -wean oxygen to maintain SpO2 > 89%  -incentive spirometer     CKD stage III  -avoid nephrotoxic meds  -trend and monitor  -nephrology consulted, recs appreciated     Elevated troponin  -cardiology consulted, doesn't believe this is consistent with ACS  -tele montioring     Chronic diastolic CHF - does not appear to be in exacerbation  -cardiology consulted  -continue home meds  - resumed torsemide     Chr low back pain - gabapentin made her sleepy - will dc. On prn robaxin. Check xray with degen changes     Bilateral lower extremity cellulitis  -cefepime -> ciprofloxacin given possible side effect  -ID consulted, recs appreciated  Plan dc on PO cipro for 3 weeks and outpt f/u with podiatry and ID     Diabetic foot infection  -podiatry consulted, recs appreciated  -ID consulted, recs appreciated  -on empiric cefepime; abx per ID; should cover isolated organisms but await sensitivities  -MRI right foot reviewed     Proteus bacteremia  -repeat blood cultures 6/10  -on ciprofloxacin which should cover  -ID consulted, recs appreciated  -repeat blood cultures NGTD     Type 2 DM  -Lantus, Humalog, and SSI  -hypoglycemia protocol  -POCT glucose checks  -carb control diet     Morbid obesity due to excess calories  -nutritional counseling provided  -weight loss encouraged  -complicating medical management        DVT Prophylaxis: Lovenox  Diet: ADULT DIET;  Regular; 4 carb choices (60 gm/meal); 1200 ml  Code Status: Full Code    PT/OT Eval Status: ordered, needs SNF on dc    Dispo - dc to SNF in am if ok with nephrology    Stone Harrington MD

## 2022-06-17 NOTE — PROGRESS NOTES
Hospitalist Progress Note  6/17/2022 10:21 AM    PCP: Guero James MD    [id number]     Date of Admission: 6/8/2022                                                                                                                     HOSPITAL COURSE    Patient demographics:  The patient  Talia Miller is a 74 y.o. female     Significant past medical history:   Patient Active Problem List   Diagnosis    Essential hypertension    HLD (hyperlipidemia)    Type 2 diabetes mellitus with left diabetic foot infection [facility]    Volume overload    Malignant neoplasm of ascending colon ([facility]    Anemia    CKD (chronic kidney disease) stage 3, GFR 30-59 ml/min ([facility])    Uterine mass    Morbid obesity due to excess calories ([facility])    BRIANNA (obstructive sleep apnea)    PLMD (periodic limb movement disorder)    Pancreatic cyst    Chronic bilateral low back pain without sciatica    Cellulitis    Acute respiratory failure with hypoxia ([facility])    Gram-negative bacteremia    Elevated C-reactive protein (CRP)    E. coli UTI (urinary tract infection)    Diabetic polyneuropathy associated with type 2 diabetes mellitus ([facility]    Diabetes mellitus type 2 in obese ([facility])    Elevated sed rate    Serratia marcescens infection    Pseudomonas infection    Proteus infection         Presenting symptoms:  sob    Diagnostic workup:      CONSULTS DURING ADMISSION :   IP CONSULT TO PHARMACY  IP CONSULT TO HOSPITALIST  IP CONSULT TO CARDIOLOGY  IP CONSULT TO PODIATRY  IP CONSULT TO DIABETES EDUCATOR  IP CONSULT TO DIETITIAN  IP CONSULT TO INFECTIOUS DISEASES  IP CONSULT TO NEPHROLOGY      Patient was diagnosed with:  Acute respiratory failure with hypoxia  CKD stage III  Elevated troponin  Chronic diastolic CHF  Chr low back pain   Bilateral lower extremity cellulitis  Diabetic foot infection  Proteus bacteremia    Treatment while inpatient:  74years old female with medical history significant for hypertension diabetes infection    Proteus infection       Allergies  Pcn [penicillins], Spironolactone, Coreg [carvedilol], Lisinopril, and Flexeril [cyclobenzaprine]    Medications    Scheduled Meds:   torsemide  50 mg Oral BID    labetalol  200 mg Oral 2 times per day    ciprofloxacin  500 mg Oral 2 times per day    potassium chloride  20 mEq Oral BID WC    [Held by provider] insulin lispro  25 Units SubCUTAneous TID WC    [Held by provider] enoxaparin  30 mg SubCUTAneous BID    fluticasone  1 spray Each Nostril Daily    insulin lispro  0-18 Units SubCUTAneous TID WC    insulin lispro  0-9 Units SubCUTAneous Nightly    collagenase   Topical Daily    gentamicin   Topical Daily    sodium chloride flush  10 mL IntraVENous 2 times per day    allopurinol  200 mg Oral Daily    insulin glargine  40 Units SubCUTAneous BID    cetirizine  10 mg Oral Nightly     Continuous Infusions:   sodium chloride 100 mL/hr at 06/09/22 0643    dextrose       PRN Meds:  phenol, methocarbamol, HYDROcodone-acetaminophen, sodium chloride flush, sodium chloride, potassium chloride **OR** potassium alternative oral replacement **OR** potassium chloride, magnesium sulfate, promethazine **OR** ondansetron, magnesium hydroxide, acetaminophen **OR** acetaminophen, glucose, dextrose bolus **OR** dextrose bolus, glucagon (rDNA), dextrose    Vitals   Vitals /wt   Patient Vitals for the past 8 hrs:   BP Temp Temp src Pulse Resp SpO2 Weight   06/17/22 0937 130/63 97.5 °F (36.4 °C) -- 67 16 97 % --   06/17/22 0859 -- -- -- -- -- 95 % --   06/17/22 0643 -- -- -- -- -- -- (!) 326 lb 15.1 oz (148.3 kg)   06/17/22 0456 (!) 151/75 98.2 °F (36.8 °C) Oral 71 18 97 % --        72HR INTAKE/OUTPUT:      Intake/Output Summary (Last 24 hours) at 6/17/2022 1021  Last data filed at 6/17/2022 0927  Gross per 24 hour   Intake 600 ml   Output 1600 ml   Net -1000 ml       Exam:    Gen:   Alert and oriented ×3   Eyes: PERRL. No sclera icterus. No conjunctival injection. ENT: No discharge. Pharynx clear. External appearance of ears and nose normal.  Neck: Trachea midline. No obvious mass. Resp: No accessory muscle use. No crackles. No wheezes. No rhonchi. CV: Regular rate. Regular rhythm. No murmur or rub. No edema. GI: Non-tender. Non-distended. No hernia. Skin: Warm, dry, normal texture and turgor. Lymph: No cervical LAD. No supraclavicular LAD. M/S: / Ext. No cyanosis. No clubbing. No joint deformity. Neuro: CN 2-12 are intact,  no neurologic deficits noted. PT/INR: No results for input(s): PROTIME, INR in the last 72 hours. APTT: No results for input(s): APTT in the last 72 hours. CBC:   Recent Labs     06/16/22  0825   WBC 8.5   HGB 12.3   HCT 38.4   MCV 92.0          BMP:   Recent Labs     06/15/22  0737 06/16/22  0825 06/17/22  0725    143 140   K 3.3* 3.8 3.5   CL 96* 97* 95*   CO2 34* 33* 33*   PHOS 3.4 3.8 4.2   BUN 51* 52* 50*   CREATININE 2.3* 2.1* 2.2*       LIVER PROFILE: No results for input(s): ALKPHOS, AST, ALT, ALB, BILIDIR, BILITOT, ALKPHOS in the last 72 hours. No results for input(s): AMYLASE in the last 72 hours. No results for input(s): LIPASE in the last 72 hours. UA:No results for input(s): NITRITE, LABCAST, WBCUA, RBCUA, MUCUS in the last 72 hours. TROPONIN: No results for input(s): Shea Pamela in the last 72 hours. Lab Results   Component Value Date/Time    URRFLXCULT Yes 06/08/2022 03:06 PM       No results for input(s): TSHREFLEX in the last 72 hours. No components found for: [id number]  POC GLUCOSE:    Recent Labs     06/16/22  0757 06/16/22  1129 06/16/22  1606 06/16/22  1936 06/17/22  0806   POCGLU 155* 215* 175* 189* 139*     No results for input(s): LABA1C in the last 72 hours. Lab Results   Component Value Date    LABA1C 7.3 06/08/2022      Normal LV size and wall motion.  EF is    60%(6/8/2022)    ASSESSMENT AND PLAN  Acute respiratory failure with hypoxia  Continue with torsemide  -wean oxygen to maintain SpO2 > 89%  -incentive spirometer     JOB on CKD stage III  Nephrology is following     Elevated troponin  Ruled out for acute coronary syndrome    Chronic diastolic CHF -   does not appear to be in exacerbation  -cardiology consulted  -continue home meds  - resumed torsemide     Chr low back pain -   gabapentin made her sleepy -   will dc. On prn robaxin. Check xray with degen changes     Bilateral lower extremity cellulitis  -cefepime -> ciprofloxacin given possible side effect  -ID consulted, recs appreciated  Plan dc on PO cipro for 3 weeks and   outpt f/u with podiatry and ID     Diabetic foot infection  -podiatry consulted, recs appreciated  -ID consulted, recs appreciated  -on empiric cefepime; abx per ID; should cover isolated organisms but await sensitivities  -MRI right foot reviewed     Proteus bacteremia  -repeat blood cultures 6/10  -on ciprofloxacin which should cover  -ID consulted, recs appreciated  -repeat blood cultures NGTD     Type 2 DM  -Lantus, Humalog, and SSI  -hypoglycemia protocol  -POCT glucose checks  -carb control diet     Morbid obesity due to excess calories  -nutritional counseling provided  -weight loss encouraged  -complicating medical management         Code Status: Full Code        Dispo - cc        The patient and / or the family were informed of the results of any tests, a time was given to answer questions, a plan was proposed and they agreed with plan. Samuel Guevara MD    This note was transcribed using [id number] [employer]. Please disregard any translational errors.

## 2022-06-17 NOTE — PROGRESS NOTES
Occupational Therapy  Facility/Department: [address] MED SURG  Occupational Therapy Daily Treatment    Name: Kathie Thornton  : 1955  MRN: [id number]  Date of Service: 2022    Discharge Recommendations:  3-5 sessions per week,Patient would benefit from continued therapy after discharge     Kathie Thornton scored a 17/24 on the AM-PAC ADL Inpatient form. Current research shows that an AM-PAC score of 17 or less is typically not associated with a discharge to the patient's home setting. Based on the patient's AM-PAC score and their current ADL deficits, it is recommended that the patient have 3-5 sessions per week of Occupational Therapy at d/c to increase the patient's independence. Please see assessment section for further patient specific details. If patient discharges prior to next session this note will serve as a discharge summary. Please see below for the latest assessment towards goals. Patient Diagnosis(es): The primary encounter diagnosis was Acute respiratory failure with hypoxia ([address] Diagnoses of Cellulitis of right lower extremity, JOB (acute kidney injury) ([address] Elevated troponin, Pulmonary vascular congestion, and Urinary tract infection without hematuria, site unspecified were also pertinent to this visit. Past Medical History:  has a past medical history of Anemia, Chronic bilateral low back pain without sciatica, CKD (chronic kidney disease) stage 3, GFR 30-59 ml/min ([facility]), Colon cancer ([address] Diabetes ([address] Diabetes ([address] Diabetes with retinopathy ([address] Hypertension, Pancreatic cyst, Positive FIT (fecal immunochemical test), and Pulmonary hypertension ([address] Past Surgical History:  has a past surgical history that includes Upper gastrointestinal endoscopy (N/A, 2018); hemicolectomy (Right, 2018); pr office/outpt visit,procedure only (Right, 2018); [place] tooth extraction; Colonoscopy (N/A, 2018);  Colonoscopy (2019); and Colonoscopy (N/A, 5/14/2019). Assessment   Performance deficits / Impairments: Decreased functional mobility ; Decreased balance;Decreased ADL status; Decreased endurance;Decreased high-level IADLs;Decreased strength  Assessment: Jessica Tilley is a 74 y.o. female who presents emergency department via EMS with complaints of leg pain and shortness of breath on 6/8. Pt seen by Podiatry on 6/10 with new orders for TTWB on RLE d/t heal wound. PTA pt from home where pt was ind with mobility with RW and needing PRN assist for ADLs. Pt reports plan is to move into new house on Tuesday with , daughter and son-in-law with 2 PERCY from garage (with grab bar) and chair lift. TODAY: Pt completed sit<>stand w/ CGA. She required chuck stedy LIFT for fxl mobility so that pt could wash her hair in the bathroom. She completed seated UB bathing and grooming w/ setup. Pt is not yet safe to return home - needs continued transfer practice to ensure proper heeling of L foot wound. Recommend ongoing skilled therapy 3-5 times/week at d/c to increase pt's safety, independence, and decrease burden of care. Prognosis: Good  REQUIRES OT FOLLOW-UP: Yes  Activity Tolerance  Activity Tolerance: Patient Tolerated treatment well        Plan   Plan  Times per Week: 3-5x  Current Treatment Recommendations: Strengthening,Balance training,Functional mobility training,Endurance training,Patient/Caregiver education & training,Safety education & training,Self-Care / ADL     Restrictions  Restrictions/Precautions  Restrictions/Precautions: Fall Risk,Weight Bearing  Lower Extremity Weight Bearing Restrictions  Right Lower Extremity Weight Bearing: Toe Touch Weight Bearing  Position Activity Restriction  Other position/activity restrictions: TTWB RLE due to heel wound.  2L O2    Subjective   General  Chart Reviewed: Yes  Patient assessed for rehabilitation services?: Yes  Additional Pertinent Hx: per ED note, Jessica Tilley is a 74 y.o. female who presents emergency department via EMS with complaints of leg pain and shortness of breath. Patient is a poor historian and gives a different timeline of events but ultimately she is a diabetic. She has had shortness of breath. She does not wear oxygen. She does not have a history of congestive heart failure or COPD. She has chronic leg swelling. She states her legs have gotten more swollen, painful and tight. She is reporting the right leg being red and hot for at least the last couple of days. She is unable to state whether or not she feels like she has been having any fevers. She denies chest pain. Lives at home with her . She uses a walker for ambulation. She states over the last few days she is unable to lay in bed so she has been sleeping upright in a nonreclining chair with her legs dangling. She denies wounds but she does have wounds to her lower extremities. She has never tested positive for Covid. She had a recent negative study. Family / Caregiver Present: No  Referring Practitioner: Abhijit Hernadez MD ; Ariana Ceballos MD  Subjective  Subjective: Pt met b/s for OT. Pt in recliner, agreeable to therapy and wanting to wash up. No cmoplanits of pain     Objective   Safety Devices  Type of Devices: All fall risk precautions in place;Call light within reach;Gait belt;Patient at risk for falls; Left in chair;Chair alarm in place;Nurse notified  Gait  Overall Level of Assistance: Total assistance (Kermit Jada stedy LIFT used to/from BR to wash pt's hair)        ADL  Grooming: Setup  Grooming Skilled Clinical Factors: Pt washed face and combed hair seated at the sink  UE Bathing: Setup;Stand by assistance  UE Bathing Skilled Clinical Factors: Pt completed UB bath seated at sink.  She stood in the chuck stedy while OT washed and rinsed her hair  UE Dressing Skilled Clinical Factors: Assist to change hospital gown  Toileting: Dependent/Total  Toileting Skilled Clinical Factors: [place]        Bed mobility  Supine

## 2022-06-17 NOTE — PROGRESS NOTES
Nephrology Progress Note   [employer]      HPI:  No CP.  O > I. Renal function stable. ROS:  No fever. [address]:  medications reviewed. Subjective:    Resting in recliner; NAD; no shortness of breath    Scheduled Meds:   torsemide  50 mg Oral BID    labetalol  200 mg Oral 2 times per day    ciprofloxacin  500 mg Oral 2 times per day    potassium chloride  20 mEq Oral BID WC    [Held by provider] insulin lispro  25 Units SubCUTAneous TID WC    [Held by provider] enoxaparin  30 mg SubCUTAneous BID    fluticasone  1 spray Each Nostril Daily    insulin lispro  0-18 Units SubCUTAneous TID WC    insulin lispro  0-9 Units SubCUTAneous Nightly    collagenase   Topical Daily    gentamicin   Topical Daily    sodium chloride flush  10 mL IntraVENous 2 times per day    allopurinol  200 mg Oral Daily    insulin glargine  40 Units SubCUTAneous BID    cetirizine  10 mg Oral Nightly        sodium chloride 100 mL/hr at 22 0643    dextrose         PRN Meds:.phenol, methocarbamol, HYDROcodone-acetaminophen, sodium chloride flush, sodium chloride, potassium chloride **OR** potassium alternative oral replacement **OR** potassium chloride, magnesium sulfate, promethazine **OR** ondansetron, magnesium hydroxide, acetaminophen **OR** acetaminophen, glucose, dextrose bolus **OR** dextrose bolus, glucagon (rDNA), dextrose    Physical Exam:    TEMPERATURE:  Current - Temp: 97.5 °F (36.4 °C);  Max - Temp  Av.1 °F (36.7 °C)  Min: 97.5 °F (36.4 °C)  Max: 98.3 °F (36.8 °C)  RESPIRATIONS RANGE: Resp  Av  Min: 16  Max: 20  PULSE RANGE: Pulse  Av.5  Min: 67  Max: 77  BLOOD PRESSURE RANGE:  Systolic (79CCU), UGL:238 , Min:112 , HRB:301   ; Diastolic (94LFJ), NYQ:52, Min:63, Max:85    24HR INTAKE/OUTPUT:      Intake/Output Summary (Last 24 hours) at 2022 1401  Last data filed at 2022 0927  Gross per 24 hour   Intake 360 ml   Output 1600 ml   Net -1240 ml         Patient Vitals for the past 96 hrs (Last 3 readings):   Weight   06/17/22 0643 (!) 326 lb 15.1 oz (148.3 kg)   06/16/22 0456 (!) 317 lb 3.9 oz (143.9 kg)   06/15/22 0621 (!) 312 lb 13.3 oz (141.9 kg)       General:  NAD, A+OX3, obese  Chest:  CTAB  CVS:  RRR  Abdominal:  NTND, soft, +BS  Extremities:  1+ edema  Skin:  no rash    Allergies:   Allergies   Allergen Reactions    Cefepime Anxiety    Pcn [Penicillins] Shortness Of Breath    Spironolactone Rash    Coreg [Carvedilol]      Side effects:  SOB, palpitations    Lisinopril Other (See Comments)     cough    Flexeril [Cyclobenzaprine] Rash          LAB DATA:    CBC:   Lab Results   Component Value Date    WBC 8.5 06/16/2022    RBC 4.17 06/16/2022    HGB 12.3 06/16/2022    HCT 38.4 06/16/2022    MCV 92.0 06/16/2022    MCH 29.4 06/16/2022    MCHC 32.0 06/16/2022    RDW 16.5 06/16/2022     06/16/2022    MPV 7.3 06/16/2022     BMP:    Lab Results   Component Value Date     06/17/2022    K 3.5 06/17/2022    K 3.3 06/13/2022    CL 95 06/17/2022    CO2 33 06/17/2022    BUN 50 06/17/2022    CREATININE 2.2 06/17/2022    CALCIUM 9.3 06/17/2022    GFRAA 27 06/17/2022    LABGLOM 22 06/17/2022    GLUCOSE 144 06/17/2022     Ionized Calcium:  No results found for: IONCA  Magnesium:    Lab Results   Component Value Date    MG 2.10 06/17/2022     Phosphorus:    Lab Results   Component Value Date    PHOS 4.2 06/17/2022     U/A:    Lab Results   Component Value Date    COLORU Yellow 06/08/2022    PHUR 6.0 06/08/2022    WBCUA 25 06/08/2022    RBCUA 5-10 06/08/2022    RBCUA NEGATIVE 04/18/2018    MUCUS PRESENT 04/18/2018    BACTERIA 3+ 06/08/2022    CLARITYU Clear 06/08/2022    SPECGRAV 1.013 06/08/2022    LEUKOCYTESUR LARGE 06/08/2022    UROBILINOGEN 0.2 06/08/2022    BILIRUBINUR Negative 06/08/2022    BILIRUBINUR NEGATIVE 04/18/2018    BLOODU MODERATE 06/08/2022    GLUCOSEU Negative 06/08/2022         IMPRESSION/RECOMMENDATIONS:      Principal Problem:    Cellulitis  Active Problems:    Acute respiratory failure with hypoxia (HCC)    Gram-negative bacteremia    Elevated C-reactive protein (CRP)    E. coli UTI (urinary tract infection)    Diabetic polyneuropathy associated with type 2 diabetes mellitus (HCC)    Diabetes mellitus type 2 in obese (HCC)    Elevated sed rate    Serratia marcescens infection    Pseudomonas infection    Proteus infection    Essential hypertension    Type 2 diabetes mellitus with left diabetic foot infection ([address]    Morbid obesity due to excess calories ([address]  Resolved Problems:    * No resolved hospital problems.  *    1) JOB on CKD              - baseline Cr 1.4-1.7, sees me              - ddx:  pre-renal vs. ATN              - renal function stable but creatinine above previous baseline              - May need to accept a higher creatinine value to maintain volume status - current value may be new baseline                  2) edema              - Patient has gained true weight - edema is not significant              - switched to lasix gtt 6/13/22 - stopped 6/15/22 - started oral torsemide 6/16/22              - d/c nutritional supplement and continue fluid restriction 1.2 liters/day              - cowan catheter     3) right heel wound and cellulitis              - wound care              - podiatry and ID following              - on Cipro     4) Hypokalemia                          - Replaced - corrected      5) Anemia CKD - Hgb > 11  - THADDEUS not indicated at this point in time    6) HTN - started labetalol - controlled

## 2022-06-17 NOTE — PROGRESS NOTES
Pt discharged to [facility]. Paperwork/prescriptions reviewed with patient/spouse. All questions answered. Report called to Nam Rausch at facility. All questions answered. All belongings with patient and spouse. PIV removed per protocol without complication. Pt left unit via EMS transport.

## 2022-06-20 NOTE — PROGRESS NOTES
Physician Progress Note      Neda Amaral  CSN #:                  [id number]  :                       1955  ADMIT DATE:       2022 8:52 AM  DISCH DATE:        2022 2:55 PM  RESPONDING  PROVIDER #:        Radha Sanchez MD          QUERY TEXT:    Pt admitted with BLE cellulitis. Pt noted to have DM 2. If possible, please   document in progress notes and discharge summary the relationship, if any,   between cellulitis and DM. Risk Factors: HX- DM2, CKD, Morbid Obesity, BMI 57  Clinical Indications: Gluc 211  HGA1C  7.3. ... Per H&P- Bilateral lower   extremity cellulitis     Diabetes mellitus  Treatment: IVF, Vanco IV, Cefipime IV, IV Rocephin, Zosyn IV, Podiatry   consult. OTBS QID and prn w/ SS, 4 carb choice diet. Thank Chirag Choudhary RN BSN CDS CRCR  [email or web address]  Options provided:  -- BLE cellulitis associated with Diabetes  -- BLE cellulitis unrelated to Diabetes  -- Other - I will add my own diagnosis  -- Disagree - Not applicable / Not valid  -- Disagree - Clinically unable to determine / Unknown  -- Refer to Clinical Documentation Reviewer    PROVIDER RESPONSE TEXT:    BLE cellulitis associated with Diabetes.     Query created by: Mick Sotelo on 2022 11:22 AM      Electronically signed by:  Radha Sanchez MD 2022 1:44 PM

## 2022-06-26 NOTE — TELEPHONE ENCOUNTER
,4.26.19, no future
Fatou    Make sure you are checking dosages as well    Patient not on this dose
room air

## 2022-06-28 ENCOUNTER — HOSPITAL ENCOUNTER (OUTPATIENT)
Dept: WOUND CARE | Age: 67
Discharge: HOME | End: 2022-06-28

## 2022-07-12 ENCOUNTER — CARE COORDINATION (OUTPATIENT)
Dept: CASE MANAGEMENT | Age: 67
End: 2022-07-12

## 2022-07-12 ENCOUNTER — CARE COORDINATION (OUTPATIENT)
Dept: CARE COORDINATION | Age: 67
End: 2022-07-12

## 2022-07-12 ENCOUNTER — TELEPHONE (OUTPATIENT)
Dept: PRIMARY CARE CLINIC | Age: 67
End: 2022-07-12

## 2022-07-12 DIAGNOSIS — R78.81 GRAM-NEGATIVE BACTEREMIA: Primary | ICD-10-CM

## 2022-07-12 PROCEDURE — 1111F DSCHRG MED/CURRENT MED MERGE: CPT | Performed by: FAMILY MEDICINE

## 2022-07-12 SDOH — HEALTH STABILITY: PHYSICAL HEALTH: ON AVERAGE, HOW MANY DAYS PER WEEK DO YOU ENGAGE IN MODERATE TO STRENUOUS EXERCISE (LIKE A BRISK WALK)?: 0 DAYS

## 2022-07-12 SDOH — ECONOMIC STABILITY: INCOME INSECURITY: IN THE LAST 12 MONTHS, WAS THERE A TIME WHEN YOU WERE NOT ABLE TO PAY THE MORTGAGE OR RENT ON TIME?: NO

## 2022-07-12 SDOH — ECONOMIC STABILITY: TRANSPORTATION INSECURITY
IN THE PAST 12 MONTHS, HAS THE LACK OF TRANSPORTATION KEPT YOU FROM MEDICAL APPOINTMENTS OR FROM GETTING MEDICATIONS?: NO

## 2022-07-12 SDOH — ECONOMIC STABILITY: HOUSING INSECURITY
IN THE LAST 12 MONTHS, WAS THERE A TIME WHEN YOU DID NOT HAVE A STEADY PLACE TO SLEEP OR SLEPT IN A SHELTER (INCLUDING NOW)?: NO

## 2022-07-12 SDOH — ECONOMIC STABILITY: FOOD INSECURITY: WITHIN THE PAST 12 MONTHS, YOU WORRIED THAT YOUR FOOD WOULD RUN OUT BEFORE YOU GOT MONEY TO BUY MORE.: NEVER TRUE

## 2022-07-12 SDOH — HEALTH STABILITY: PHYSICAL HEALTH: ON AVERAGE, HOW MANY MINUTES DO YOU ENGAGE IN EXERCISE AT THIS LEVEL?: 0 MIN

## 2022-07-12 SDOH — ECONOMIC STABILITY: FOOD INSECURITY: WITHIN THE PAST 12 MONTHS, THE FOOD YOU BOUGHT JUST DIDN'T LAST AND YOU DIDN'T HAVE MONEY TO GET MORE.: NEVER TRUE

## 2022-07-12 SDOH — ECONOMIC STABILITY: HOUSING INSECURITY: IN THE LAST 12 MONTHS, HOW MANY PLACES HAVE YOU LIVED?: 2

## 2022-07-12 SDOH — ECONOMIC STABILITY: TRANSPORTATION INSECURITY
IN THE PAST 12 MONTHS, HAS LACK OF TRANSPORTATION KEPT YOU FROM MEETINGS, WORK, OR FROM GETTING THINGS NEEDED FOR DAILY LIVING?: NO

## 2022-07-12 ASSESSMENT — SOCIAL DETERMINANTS OF HEALTH (SDOH)
DO YOU BELONG TO ANY CLUBS OR ORGANIZATIONS SUCH AS CHURCH GROUPS UNIONS, FRATERNAL OR ATHLETIC GROUPS, OR SCHOOL GROUPS?: NO
HOW OFTEN DO YOU GET TOGETHER WITH FRIENDS OR RELATIVES?: THREE TIMES A WEEK
HOW HARD IS IT FOR YOU TO PAY FOR THE VERY BASICS LIKE FOOD, HOUSING, MEDICAL CARE, AND HEATING?: NOT HARD AT ALL
HOW OFTEN DO YOU ATTENT MEETINGS OF THE CLUB OR ORGANIZATION YOU BELONG TO?: NEVER
IN A TYPICAL WEEK, HOW MANY TIMES DO YOU TALK ON THE PHONE WITH FAMILY, FRIENDS, OR NEIGHBORS?: THREE TIMES A WEEK
HOW OFTEN DO YOU ATTEND CHURCH OR RELIGIOUS SERVICES?: NEVER

## 2022-07-12 ASSESSMENT — LIFESTYLE VARIABLES: HOW OFTEN DO YOU HAVE A DRINK CONTAINING ALCOHOL: NEVER

## 2022-07-12 NOTE — CARE COORDINATION
Ambulatory Care Coordination Note  7/12/2022  CM Risk Score: 3  Charlson 10 Year Mortality Risk Score: 100%     ACC: Carie Conti, RN    Summary Note: Received ctn referral JENNIFER Orona. Called patient for cc initial screening. Reviewed SDOH, CC protocol, FR and DM screenings. Meds reviewed. Jeri Lagos still have yet to contact patient. Patient will outreach to this RN if they do not call. Patient reports testing her bs every meal and her values are between 135-145. The last a1c in June was 7.3% Patient said she knows about hger condition but would like this RN to [facility]. Patient states she uses a walker to help her move around and has help around the house from  and kids. Patient is trying to rest off of foot and  is helping change the dressing wrap for the wound. Patient was a little upset she could not do a vv due to having a hard time getting around on it. No other patient questions or concerns at this time. Plan:  POC to pcp  FU DM A1C reference sheet  FU home care [facility] in place? FU DM screening, fbs, FU foot wound  Questions, concerns, disease specific needs? Ambulatory Care Coordination Assessment    Care Coordination Protocol  Referral from Primary Care Provider: No  Week 1 - Initial Assessment     Do you have all of your prescriptions and are they filled?: Yes  Barriers to medication adherence: None  Are you able to afford your medications?: With Assistance  Medication Assistance Program: Other  Other Assistance Program: cari jessicatis  How often do you have trouble taking your medications the way you have been told to take them?: I always take them as prescribed. Do you have Home O2 Therapy?: No      Ability to seek help/take action for Emergent Urgent situations i.e. fire, crime, inclement weather or health crisis. : Independent  Ability to ambulate to restroom: Independent  Ability handle personal hygeine needs (bathing/dressing/grooming): Independent  Ability to manage Medications: Independent  Ability to prepare Food Preparation: Independent  Ability to maintain home (clean home, laundry): Independent  Ability to drive and/or has transportation: Independent  Ability to do shopping: Independent  Ability to manage finances: Independent  Is patient able to live independently?: Yes     Current Housing: Private Residence        Per the Fall Risk Screening, did the patient have 2 or more falls or 1 fall with injury in the past year?: No     Frequent urination at night?: No  Do you use rails/bars?: Yes  Do you have a non-slip tub mat?: Yes     Are you experiencing loss of meaning?: No  Are you experiencing loss of hope and peace?: No     Thinking about your patient's physical health needs, are there any symptoms or problems (risk indicators) you are unsure about that require further investigation?: No identified areas of uncertainly or problems already being investigated   Are the patients physical health problems impacting on their mental well-being?: No identified areas of concern   Are there any problems with your patients lifestyle behaviors (alcohol, drugs, diet, exercise) that are impacting on physical or mental well-being?: No identified areas of concern   Do you have any other concerns about your patients mental well-being?  How would you rate their severity and impact on the patient?: No identified areas of concern   How would you rate their home environment in terms of safety and stability (including domestic violence, insecure housing, neighbor harassment)?: Consistently safe, supportive, stable, no identified problems   How do daily activities impact on the patient's well-being? (include current or anticipated unemployment, work, caregiving, access to transportation or other): No identified problems or perceived positive benefits   How would you rate their social network (family, work, friends)?: Adequate participation with social networks How would you rate their financial resources (including ability to afford all required medical care)?: Financially secure, resources adequate, no identified problems   How wells does the patient now understand their health and well-being (symptoms, signs or risk factors) and what they need to do to manage their health?: Reasonable to good understanding and already engages in managing health or is willing to undertake better management   How well do you think your patient can engage in healthcare discussions? (Barriers include language, deafness, aphasia, alcohol or drug problems, learning difficulties, concentration): Clear and open communication, no identified barriers   Do other services need to be involved to help this patient?: Other care/services in place and adequate   Are current services involved with this patient well-coordinated? (Include coordination with other services you are now recommendation): Required care/services in place and adequately coordinated   Suggested Interventions and Community Resources  Diabetes Education: In Process (Comment: a1c chart education)    Home Health Services: In Process                  Prior to Admission medications    Medication Sig Start Date End Date Taking? Authorizing Provider   torsemide (DEMADEX) 10 MG tablet Take 5 tablets by mouth in the morning and at bedtime  Patient taking differently: Take 50 mg by mouth daily  6/17/22   Shivani Corral MD   labetalol (NORMODYNE) 200 MG tablet Take 1 tablet by mouth every 12 hours  Patient taking differently: Take 300 mg by mouth every 12 hours  6/16/22   Shama Duran MD   fluticasone (FLONASE) 50 MCG/ACT nasal spray 1 spray by Each Nostril route daily 6/17/22   Shama Duran MD   gentamicin (GARAMYCIN) 0.1 % cream Apply topically 3 times daily.   Patient not taking: Reported on 7/12/2022 6/17/22   Shama Duran MD   insulin aspart (NOVOLOG) 100 UNIT/ML injection vial Inject 40-50 Units into the skin 3 times daily (before meals)    Historical Provider, MD   insulin detemir (LEVEMIR) 100 UNIT/ML injection vial Inject 40 Units into the skin 2 times daily    Historical Provider, MD   loratadine (CLARITIN) 10 MG tablet Take 10 mg by mouth every evening    Historical Provider, MD   Cholecalciferol (VITAMIN D3) 50 MCG (2000 UT) CAPS Take 2,000 Units by mouth daily    Historical Provider, MD   Semaglutide,0.25 or 0.5MG/DOS, (OZEMPIC, 0.25 OR 0.5 MG/DOSE,) 2 MG/1.5ML SOPN Inject 0.5 mg into the skin once a week 6/7/22   Vale Newman MD   allopurinol (ZYLOPRIM) 100 MG tablet Take 2 tablets by mouth daily 4/25/22   Vale Newman MD   HYDROcodone-acetaminophen (NORCO) 5-325 MG per tablet Take 1 tablet by mouth every 8 hours as needed for Pain.  PRN 4 hours 2/17/22   Historical Provider, MD   Magnesium Oxide 400 MG CAPS Take 1 tablet by mouth daily    Historical Provider, MD   potassium chloride (KLOR-CON M) 20 MEQ extended release tablet Take 40 mEq by mouth 3 times daily  1/29/22   Historical Provider, MD   tetrahydrozoline-zinc (VISINE-AC) 0.05-0.25 % ophthalmic solution Place 2 drops into both eyes 3 times daily as needed (itchiness)     Historical Provider, MD   metOLazone (ZAROXOLYN) 5 MG tablet Take 1 tablet by mouth Twice a Week  Patient taking differently: Take 5 mg by mouth Twice a Week 3 x/week MWF 10/14/21   Vale Newman MD   Insulin Pen Needle 32G X 4 MM MISC 1 each by Does not apply route daily 1/11/21   Todd Do MD   Lancets MISC 1 each by Does not apply route 5 times daily Please fill for 33g one touch lancets 12/30/20   Todd Do MD   blood glucose test strips (ASCENSIA AUTODISC VI;ONE TOUCH ULTRA TEST VI) strip Check blood sugars TID 11/2/20   Todd Do MD   blood glucose monitor kit and supplies Test blood sugars qa and qhs 7/23/20   Vale Newman MD   linagliptin (TRADJENTA) 5 MG tablet Take 1 tablet by mouth daily 7/24/19   James Vasquez Neida Sacks, MD   Multiple Vitamins-Minerals (ADULT ONE DAILY GUMMIES) CHEW Take 1 tablet by mouth daily    Historical Provider, MD       No future appointments. and   Diabetes Assessment    Medic Alert ID: No  Meal Planning: Avoidance of concentrated sweets   How often do you test your blood sugar?: Meals   Do you have barriers with adherence to non-pharmacologic self-management interventions?  (Nutrition/Exercise/Self-Monitoring): No   Have you ever had to go to the ED for symptoms of low blood sugar?: No       No patient-reported symptoms

## 2022-07-12 NOTE — CARE COORDINATION
[address] Transitions Initial Follow Up Call    Call within 2 business days of discharge: Yes    Patient: Linda Dubois Patient : 1955   MRN: [id number]  Reason for Admission: bacteremia and cellulitis  Discharge Date: 22 RARS: Readmission Risk Score: 17.6 ( )      Last Discharge [facility]       Complaint Diagnosis Description Type Department Provider    22 Leg Pain; Shortness of Breath Acute respiratory failure with hypoxia ([address] . .. ED to Hosp-Admission (Discharged) (ADMITTED) Rafal Manning MD; Kesha Deras... Acute Care Course:   Pt to [facility]  to  with Cellulitis and bacteremia with multiple gram negative bacteria. UTI with E.coli. Pt [facility] to [facility]. With d/c home with [facility] from [facility]. On  after 24 days. HFU made:   with PCP - trying to make vv  Saw podiatrist on  - Dr Compa Morrissey Hx:   HTN, DMII with aretinopathy, acute resp failure, BRIANNA with CPAP    DME: w/c and walker    Conversation:   Spoke with Jerald Pederson after 2 IDs. Pt is doing well but is still NWB on wound on heel. She is trying to get a vv with PCP. She is in process of moving and will live downstairs and there is a stair lift. W/c does nt fit everywhere but is using a walker for a very short distance. She left [facility] in a hurry d/t [employer].  is [facility] and they have not called yet. Gave her my contact info and she will call me if they do not contact her. She spent last night in the recliner and will be able to be downstairs tonight. She will use CPAP tonight and educated on importance of using cpap. She will share house with spouse and daughter and son in law. She manages her own meds and I reviewed with her and encouraged her to review with PCP also. She also has Fe and B12. Her appetite is good and she tries to eat healthy and watches carb. Her spouse and daughter cook and she helps with prep. She just saw Dr Clarence Araya yesterday.  She is out of Labetalol and has requested a refill from Dr Alvina Norman. Her FBS has been controlled. She is no longer on IV Atbx. Her insulin scale has been reduced. Her leg causes her pain and she takes Norco for this. Educated that Fe causes dark stools and increases potential for constipation. Educated to monitor and call PCP if needed. Educated to monitor edema and SOB and to take weights daily when capable. Report as needed to PCP. Agreeable to calls. Follow up plan: Will hand off to [address] with: [address]: [employer] provided:  Education of patient/family/caregiver/guardian to support self-management-CHF     Transitions of Care Initial Call    Was this an external facility discharge? No Discharge Facility: [facility]    Challenges to be reviewed by the provider   Additional needs identified to be addressed with provider: No  none    Pt has contacted PCP office          Method of communication with provider : none    Advance Care Planning:   Does patient have an Advance Directive: not on file. Care Transition Nurse contacted the patient by telephone to perform post hospital discharge assessment. Verified name and  with patient as identifiers. Provided introduction to self, and explanation of the CTN role. CTN reviewed discharge instructions, medical action plan and red flags with patient who verbalized understanding. Patient given an opportunity to ask questions and does not have any further questions or concerns at this time. Were discharge instructions available to patient? Yes. Reviewed appropriate site of care based on symptoms and resources available to patient including: PCP. The patient agrees to contact the PCP office for questions related to their healthcare. Medication reconciliation was performed with patient, who verbalizes understanding of administration of home medications. Advised obtaining a 90-day supply of all daily and as-needed medications.      Was patient discharged with a pulse oximeter? no    CTN provided contact information. Plan for follow-up call in 5-7 days based on severity of symptoms and risk factors.   Plan for next call: referral to ambulatory care manager-Claudia Jensen          Care Transitions 24 Hour Call    Do you have any ongoing symptoms?: No  Do you have all of your prescriptions and are they filled?: Yes  Have you scheduled your follow up appointment?: Yes  How are you going to get to your appointment?: Car - family or friend to transport  Were you discharged with any Home Care or Post Acute Services: Yes  Post Acute Services: [address] (Comment: [facility])  Do you feel like you have everything you need to keep you well at home?: Yes  Care Transitions Interventions         Follow Up  Future Appointments   Date Time Provider Arun Gonzalez   7/12/2022  1:30 PM Star Mcdaniel MD [phone] Carson, BSN, RN   [address] [facility] [address] Transition Nurse  [phone]

## 2022-07-14 NOTE — TELEPHONE ENCOUNTER
I will call patient later today to let her know that I will need to discontinue care due to breakdown of the patient/physician relationship if she is only willing to do virtual care in the future. Due to her complex medical history, I believe this would be malpractice and not safe. I took an oath to \"do no harm\" and this would have possibility of future harm. Unfortunately, patient isn't a safe candidate for a virtual visit for hospital follow up. She called our office 2 hours prior to the visit to ask to be changed to virtual.  It was discussed with her that the doctor needs to see her to be able to perform safe effective medical care, and she told our  that \"Dr Eric Lee isn't going to do anything for her\", and wanted to be virtual.  She has said this directly to me at a prior appointment as well, when she wanted to be virtual.    She is a very complex patient with PMH of DM2 with CKD3b, insulin dep and retinopathy, HTN, HLD, BRIANNA, h/o colon cancer who was hospitalized with acute resp failiure due to hypervolemia, infected heal wound with possible osteomyelitis and left venous stasis wound, and acute on Chronic kidney failure. I need an accurate weight, pulse ox, BP on patient, with rest of vitals, and need to be able to do an accurate physical exam to assess fluid status and wound assessment. Additionally, She needs follow up blood work, that can not be done from her home. Patient was upset in March of 2022 when told she needed to have an in office visit, after only doing VV since July 2020. At this time she also told \"you don't really do anything for me\" prior to finally agreeing to come in for evaluation. Her main complaint at that time was Pain in calves, which she thought was possibly due to \"stiffman syndrome\" (she has not been diagnosed with this) or possibly due to other musculoskeletal issues.   On exam she had bilateral 3+ pitting edema in bilateral lower extremities with left leg venous ulcer. I would not have known the edema was the cause of her lower leg discomfort had I not examined her in person. Unna boot was recommended for management of venous ulcer, and fluid/salt restriction/leg elevation was recommended, along with continued meds for her edema. Pt declined unna boot. Pt does have some underlying back pathology, but didn't feel comfortable with the anethesiologist I sent her to for spinal injections, nor did she like the PM&R doctor for evaluation. She has declined to see either again. She also has declined to see a different PM&R doctor for a second opinion, to try to get her more mobile. Unfortunately, this isn't the first time a lack of follow up care lead to significant morbidity for this patient. When pt established in 2016, a FIT test was ordered and was positive. Pt didn't have insurance at the time and chose to not pay for colonoscopy despite positive colon cancer screen. Risk of morbidity and mortality were reviewed many times the following 2 years along with referral to  GI for financial aid for colonoscopy. Pt was hospitalized in 2018 after showing up to her kidney doctor with fatigue and extreme palor. Hemoglobin was 4 at that time, and a 6cm colon cancer was found. Patient had not followed up with me in the 9 proceeding months. Had patient not had an inperson evaluation with her kidney doctor at that time, it is possible she could have . Virtual care is not appropriate for every patient, and in this situation, her hospital follow up and chronic medical condition follow up appointments are not appropriate for virtual care.

## 2022-07-14 NOTE — TELEPHONE ENCOUNTER
Unfortunately, patient isn't a safe candidate for a virtual visit for hospital follow up. She called our office 2 hours prior to the visit to ask to be changed to virtual.  It was discussed with her that the doctor needs to see her to be able to perform safe effective medical care, and she told our  that \"Dr Lizzette Addison isn't going to do anything for her\", and wanted to be virtual.  She has said this directly to me at a prior appointment as well, when she wanted to be virtual.     She is a very complex patient with PMH of DM2 with CKD3b, insulin dep and retinopathy, HTN, HLD, BRIANNA, h/o colon cancer who was hospitalized with acute resp failiure due to hypervolemia, infected heal wound with possible osteomyelitis and left venous stasis wound, and acute on Chronic kidney failure. I need an accurate weight, pulse ox, BP on patient, with rest of vitals, and need to be able to do an accurate physical exam to assess fluid status and wound assessment. Additionally, She needs follow up blood work, that can not be done from her home.

## 2022-07-14 NOTE — TELEPHONE ENCOUNTER
Spoke to pt about reasons I need to see her in person    Pt also seemed confused why I didn't see her in the hospital or the rehab facility like her other specialists. I explained how those specialists do inpatient and outpatient care, where the large majority of primary care only does outpatient care and uses hospitalist when pt's are admitted. She thanked me for this explanation. I suggested pt look into other possible doctors closer to her knew home that would be easier to visit when needed. I explained as her care becomes more complex she is no longer safe to do virtual care for complexed care management. Pt is going to think about things and let me know how she wants to proceed.

## 2022-07-15 NOTE — DISCHARGE SUMMARY
Hospital Medicine Discharge Summary      Patient ID: Yeni Camargo , [id number]     Patient's PCP: Rosalia Forman MD    Admit Date: 6/8/2022     Discharge Date: 6/17/2022      Admitting Physician: Aura Sesay MD    Discharge Physician: Von Rangel MD     Discharge Diagnoses: Active Hospital Problems    Diagnosis Date Noted    Acute respiratory failure with hypoxia (HCC) [J96.01]      Priority: Medium    Gram-negative bacteremia [R78.81]      Priority: Medium    Elevated C-reactive protein (CRP) [R79.82]      Priority: Medium    E. coli UTI (urinary tract infection) [N39.0, B96.20]      Priority: Medium    Diabetic polyneuropathy associated with type 2 diabetes mellitus ([address] [E11.42]      Priority: Medium    Diabetes mellitus type 2 in obese (HCC) [E11.69, E66.9]      Priority: Medium    Elevated sed rate [R70.0]      Priority: Medium    Serratia marcescens infection [A48.8]      Priority: Medium    Pseudomonas infection [A49.8]      Priority: Medium    Proteus infection [A49.8]      Priority: Medium    Cellulitis [L03.90] 06/08/2022     Priority: Medium    Morbid obesity due to excess calories ([address] [E66.01] 01/14/2019    Type 2 diabetes mellitus with left diabetic foot infection ([address] [V39.947, L08.9]     Essential hypertension [I10]          The patient was seen and examined on the day of discharge and this discharge summary is in conjunction with any daily progress note from day of discharge.     HOSPITAL COURSE    Patient demographics:  The patient  Yeni Camargo is a 74 y.o. female      Significant past medical history:       Patient Active Problem List   Diagnosis    Essential hypertension    HLD (hyperlipidemia)    Type 2 diabetes mellitus with left diabetic foot infection ([address]    Volume overload    Malignant neoplasm of ascending colon (HCC)    Anemia    CKD (chronic kidney disease) stage 3, GFR 30-59 ml/min (HCC)    Uterine mass    Morbid obesity due to excess calories (HCC)    BRIANNA (obstructive sleep apnea)    PLMD (periodic limb movement disorder)    Pancreatic cyst    Chronic bilateral low back pain without sciatica    Cellulitis    Acute respiratory failure with hypoxia (HCC)    Gram-negative bacteremia    Elevated C-reactive protein (CRP)    E. coli UTI (urinary tract infection)    Diabetic polyneuropathy associated with type 2 diabetes mellitus ([facility]    Diabetes mellitus type 2 in obese (HCC)    Elevated sed rate    Serratia marcescens infection    Pseudomonas infection    Proteus infection            Presenting symptoms:  sob     Diagnostic workup:   XR LUMBAR SPINE          CONSULTS DURING ADMISSION :   IP CONSULT TO PHARMACY  IP CONSULT TO HOSPITALIST  IP CONSULT TO CARDIOLOGY  IP CONSULT TO PODIATRY  IP CONSULT TO DIABETES EDUCATOR  IP CONSULT TO DIETITIAN  IP CONSULT TO INFECTIOUS DISEASES  IP CONSULT TO NEPHROLOGY        Patient was diagnosed with:  Acute respiratory failure with hypoxia  CKD stage III  Elevated troponin  Chronic diastolic CHF  Chr low back pain   Bilateral lower extremity cellulitis  Diabetic foot infection  Proteus bacteremia     Treatment while inpatient:  74years old female with medical history significant for hypertension diabetes mellitus malignant neoplasm of ascending colon CKD     Patient presented to the emergency room with bilateral lower extremity swelling and erythema. Patient was diagnosed with bilateral lower extremity cellulitis. Patient was diagnosed with a right foot diabetic infection. Patient was also diagnosed with venous wound on the left posterior calf. Patient's right heel MRI also showed osteitis for which osteomyelitis remains a concern.                       Patient was treated with antibiotics and patient was followed by podiatrist and infectious disease in addition to the hospitalist.  Patient is being discharged to an extended care facility        Discharge Condition:  stable:     Discharged to:  skilled nursing  facility     Activity:   as tolerated:     Follow Up: Follow-up with the nursing home [facility]: For convenience and continuity at follow-up the following most recent labs are provided:      CBC:   Lab Results   Component Value Date/Time    WBC 8.5 06/16/2022 08:25 AM    HGB 12.3 06/16/2022 08:25 AM    HCT 38.4 06/16/2022 08:25 AM     06/16/2022 08:25 AM       RENAL:   Lab Results   Component Value Date/Time     06/17/2022 07:25 AM    K 3.5 06/17/2022 07:25 AM    K 3.3 06/13/2022 08:48 AM    CL 95 06/17/2022 07:25 AM    CO2 33 06/17/2022 07:25 AM    BUN 50 06/17/2022 07:25 AM    CREATININE 2.2 06/17/2022 07:25 AM           Discharge Medications:      Medication List      START taking these medications    fluticasone 50 MCG/ACT nasal spray  Commonly known as: FLONASE  1 spray by Each Nostril route daily     gentamicin 0.1 % cream  Commonly known as: GARAMYCIN  Apply topically 3 times daily. labetalol 200 MG tablet  Commonly known as: NORMODYNE  Take 1 tablet by mouth every 12 hours        CHANGE how you take these medications    metOLazone 5 MG tablet  Commonly known as: ZAROXOLYN  Take 1 tablet by mouth Twice a Week  What changed: additional instructions     torsemide 10 MG tablet  Commonly known as: DEMADEX  Take 5 tablets by mouth in the morning and at bedtime  What changed:   · medication strength  · See the new instructions.         CONTINUE taking these medications    Adult One Daily Gummies Chew     allopurinol 100 MG tablet  Commonly known as: ZYLOPRIM  Take 2 tablets by mouth daily     blood glucose monitor kit and supplies  Test blood sugars [facility] and qhs     blood glucose test strips strip  Commonly known as: ASCENSIA AUTODISC VI;ONE TOUCH ULTRA TEST VI  Check blood sugars TID     Claritin 10 MG tablet  Generic drug: loratadine     * HYDROcodone-acetaminophen 5-325 MG per tablet  Commonly known as: NORCO     Insulin Pen Needle 32G X 4 MM Misc  1 each by Does not apply route daily     Lancets Misc  1 each by Does not apply route 5 times daily Please fill for 33g one touch lancets     Levemir 100 UNIT/ML injection vial  Generic drug: insulin detemir     linagliptin 5 MG tablet  Commonly known as: TRADJENTA  Take 1 tablet by mouth daily     Magnesium Oxide 400 MG Caps     NovoLOG 100 UNIT/ML injection vial  Generic drug: insulin aspart     Ozempic (0.25 or 0.5 MG/DOSE) 2 MG/1.5ML Sopn  Generic drug: Semaglutide(0.25 or 0.5MG/DOS)  Inject 0.5 mg into the skin once a week     potassium chloride 20 MEQ extended release tablet  Commonly known as: KLOR-CON M     tetrahydrozoline-zinc 0.05-0.25 % ophthalmic solution  Commonly known as: VISINE-AC     Vitamin D3 50 MCG (2000 UT) Caps         * This list has 1 medication(s) that are the same as other medications prescribed for you. Read the directions carefully, and ask your doctor or other care provider to review them with you. STOP taking these medications    metoprolol succinate 100 MG extended release tablet  Commonly known as: TOPROL XL        ASK your doctor about these medications    ciprofloxacin 500 MG tablet  Commonly known as: CIPRO  Take 1 tablet by mouth every 12 hours for 21 days  Ask about: Should I take this medication?     collagenase 250 UNIT/GM ointment  Apply topically daily. Ask about: Should I take this medication? * HYDROcodone-acetaminophen 5-325 MG per tablet  Commonly known as: NORCO  Take 1 tablet by mouth every 8 hours as needed for Pain for up to 3 days. Ask about: Should I take this medication? * This list has 1 medication(s) that are the same as other medications prescribed for you. Read the directions carefully, and ask your doctor or other care provider to review them with you.                Where to Get Your Medications      These medications were sent to [address] -  [address] [place] [address]    Phone: [phone]   · ciprofloxacin 500 MG tablet  · collagenase 250 UNIT/GM ointment  · fluticasone 50 MCG/ACT nasal spray  · gentamicin 0.1 % cream  · labetalol 200 MG tablet  · torsemide 10 MG tablet     You can get these medications from any pharmacy    Bring a paper prescription for each of these medications  · HYDROcodone-acetaminophen 5-325 MG per tablet            Time Spent on discharge is more than 30 min in the examination, evaluation, counseling and review of medications and discharge plan. Signed:  Samuel Guevara MD   7/14/2022      Thank you Lisset Castillo MD for the opportunity to be involved in this patient's care. If you have any questions or concerns please feel free to contact me at [phone]. This note was transcribed using [id number]. Please disregard any translational errors.

## 2022-07-27 ENCOUNTER — CARE COORDINATION (OUTPATIENT)
Dept: CARE COORDINATION | Age: 67
End: 2022-07-27

## 2022-08-02 ENCOUNTER — TELEPHONE (OUTPATIENT)
Dept: PRIMARY CARE CLINIC | Age: 67
End: 2022-08-02

## 2022-08-02 NOTE — TELEPHONE ENCOUNTER
Patient is calling stating that her insurance company is going to be sending over a form for pcp to sign so patient can receive her cpap equipment for free.       Please advise  Viktor Stuart [phone] (home)

## 2022-08-17 ENCOUNTER — CARE COORDINATION (OUTPATIENT)
Dept: CARE COORDINATION | Age: 67
End: 2022-08-17

## 2022-08-17 NOTE — CARE COORDINATION
Ambulatory Care Coordination Note  8/17/2022    ACC: Gorge Cole, MARGIE    Summary Note: Spoke to patient for cc follow up. Patient states her foot is healing but it is slow. Patient sees podiatrist every 2 weeks. Patient has [facility] coming 1/week and has brought supplies to patient. Patient still not putting weight on it. Patient tests her bs and last reading today was 160. Encouraged patient to eat protein to help with the healing process. Patient reports eating baked chicken and eggs and has increased her protein. Patient will schedule exam exam. No other questions or concerns. Patient is following up with her endocrinologist.    Plan:    FU Foot healing  FU EYE EXAM?  FU endo fu, med changes? A1c? Lab Results       None            Care Coordination Interventions    Referral from Primary Care Provider: No  Suggested Interventions and Community Resources  Diabetes Education: In Process (Comment: a1c chart education mc completed 8/17/22)  Home Health Services: Completed (Comment: 8/17/22 currently with moris)          Goals Addressed                      This Visit's Progress      HA1C <7%   On track      Start date-7/12/22    End date- 8/31/2022        Patient Stated (pt-stated)   No change      I will see my podiatrist, schedule my eye exam and follow up with my endocrinologist/ a1c check. Barriers: time constraints,  lack of certain education criteria in relation to disease management. Plan for overcoming my barriers: ACM RN support, resources, education, handouts  Confidence: 7/10  Anticipated Goal Completion Date: 9/15/2022              Prior to Admission medications    Medication Sig Start Date End Date Taking?  Authorizing Provider   torsemide (DEMADEX) 10 MG tablet Take 5 tablets by mouth in the morning and at bedtime  Patient taking differently: Take 50 mg by mouth daily  6/17/22   Nora Collins MD   labetalol (NORMODYNE) 200 MG tablet Take 1 tablet by mouth every 12 hours  Patient taking differently: Take 300 mg by mouth every 12 hours  6/16/22   Judge Dylan MD   fluticasone (FLONASE) 50 MCG/ACT nasal spray 1 spray by Each Nostril route daily 6/17/22   Judge Dylan MD   gentamicin (GARAMYCIN) 0.1 % cream Apply topically 3 times daily. Patient not taking: Reported on 7/12/2022 6/17/22   Judge Dylan MD   insulin aspart (NOVOLOG) 100 UNIT/ML injection vial Inject 40-50 Units into the skin 3 times daily (before meals)    Historical Provider, MD   insulin detemir (LEVEMIR) 100 UNIT/ML injection vial Inject 40 Units into the skin 2 times daily    Historical Provider, MD   loratadine (CLARITIN) 10 MG tablet Take 10 mg by mouth every evening    Historical Provider, MD   Cholecalciferol (VITAMIN D3) 50 MCG (2000 UT) CAPS Take 2,000 Units by mouth daily    Historical Provider, MD   Semaglutide,0.25 or 0.5MG/DOS, (OZEMPIC, 0.25 OR 0.5 MG/DOSE,) 2 MG/1.5ML SOPN Inject 0.5 mg into the skin once a week 6/7/22   Vale Dunbar MD   allopurinol (ZYLOPRIM) 100 MG tablet Take 2 tablets by mouth daily 4/25/22   Vale Dunbar MD   HYDROcodone-acetaminophen (NORCO) 5-325 MG per tablet Take 1 tablet by mouth every 8 hours as needed for Pain.  PRN 4 hours 2/17/22   Historical Provider, MD   Magnesium Oxide 400 MG CAPS Take 1 tablet by mouth daily    Historical Provider, MD   potassium chloride (KLOR-CON M) 20 MEQ extended release tablet Take 40 mEq by mouth 3 times daily  1/29/22   Historical Provider, MD   tetrahydrozoline-zinc (VISINE-AC) 0.05-0.25 % ophthalmic solution Place 2 drops into both eyes 3 times daily as needed (itchiness)     Historical Provider, MD   metOLazone (ZAROXOLYN) 5 MG tablet Take 1 tablet by mouth Twice a Week  Patient taking differently: Take 5 mg by mouth Twice a Week 3 x/week MWF 10/14/21   Vale Dunbar MD   Insulin Pen Needle 32G X 4 MM MISC 1 each by Does not apply route daily 1/11/21   Gaston Lowry MD   Lancets MISC 1 each by Does not apply route 5 times daily Please fill for 33g one touch lancets 12/30/20   Chinedu Lobo MD   blood glucose test strips (ASCENSIA AUTODISC VI;ONE TOUCH ULTRA TEST VI) strip Check blood sugars TID 11/2/20   Vale Hill MD   blood glucose monitor kit and supplies Test blood sugars qa and qhs 7/23/20   Vale Hill MD   linagliptin (TRADJENTA) 5 MG tablet Take 1 tablet by mouth daily 7/24/19   Vale Hill MD   Multiple Vitamins-Minerals (ADULT ONE DAILY GUMMIES) CHEW Take 1 tablet by mouth daily    Historical Provider, MD       No future appointments. and   Diabetes Assessment    Medic Alert ID: No  Meal Planning: Avoidance of concentrated sweets   How often do you test your blood sugar?: Meals   Do you have barriers with adherence to non-pharmacologic self-management interventions?  (Nutrition/Exercise/Self-Monitoring): No   Have you ever had to go to the ED for symptoms of low blood sugar?: No       No patient-reported symptoms

## 2022-08-19 ENCOUNTER — CARE COORDINATION (OUTPATIENT)
Dept: CARE COORDINATION | Age: 67
End: 2022-08-19

## 2022-08-19 NOTE — CARE COORDINATION
ACM attempted to f/u with patient today on behalf of Primary ACM, Lynette Huggins, to discuss the [facility]  CHILDREN. Pt was unable to reach today and ACM left VM message with both primary ACM and writer's contact information.      ACM sent message to primary ACM to please make outreach attempt again to patient prior to transferring to the [facility].

## 2022-08-23 ENCOUNTER — CARE COORDINATION (OUTPATIENT)
Dept: CARE COORDINATION | Age: 67
End: 2022-08-23

## 2022-09-08 ENCOUNTER — TELEPHONE (OUTPATIENT)
Dept: PRIMARY CARE CLINIC | Age: 67
End: 2022-09-08

## 2022-09-08 NOTE — TELEPHONE ENCOUNTER
Pt calling in regards to the Novolog and Levemir patient assistance. She picked up another shipment today in office and stated she has enough to last for a long time and only the office is able to cancel this with the company. Pt would like any further shipments canceled please.

## 2022-09-12 NOTE — TELEPHONE ENCOUNTER
Per Enoch Gordon, she is not cancelling the PAP, d/t the fact that if she cancels she won't be able to reenroll at a later date. Danella Duverney is cancelling the next shipment only.

## 2022-09-12 NOTE — TELEPHONE ENCOUNTER
Yes please. Please call jairon and cancel her meds     Please document call and then close encounter.   thanks

## 2022-09-19 ENCOUNTER — TELEPHONE (OUTPATIENT)
Dept: PRIMARY CARE CLINIC | Age: 67
End: 2022-09-19

## 2022-09-19 NOTE — TELEPHONE ENCOUNTER
Called patient to find out who patients new PCP  Our office has received paperwork for patients renewal for tradjenta 5mg  Patient stated as fare as she is concerned Dr Jannet Min is still her PCP at this time  I explained in detail as discussed previously since she cancelled her appointments as discussed patient is to be finding a new PCP at this time   Patient then stated she no longer takes [place]  I informed patient I would scan the paperwork in her chart and when she finds a new PCP then we can fax it if she starts the medication in the future  Patient understood in detail

## 2022-09-19 NOTE — TELEPHONE ENCOUNTER
Per telephone encounter on 7/12/22 in chart, I discussed with patient that I would need to discontinue care due to breakdown of the patient/physician relationship if she is only willing to do virtual care in the future. Due to her complex medical history, I believe this would be malpractice and not safe. I took an oath to \"do no harm\" and this would have possibility of future harm. I additionally listed all the serious medical reasons that virtual only care was not appropriate for her. Pt concluded that she was going to think about it and consider switching to a doctor that was closer to home (as she stated driving all the way to my office was a barrier for her). Michael Gillilandl you mind calling back tomorrow, and letting her know I haven't seen her in 6 months. If she wishes to continue care with me, I will need to see her at my next available IN OFFICE  appointment? If she doesn't want to do this, we will need to officially dismiss on record so she can find a new PCP.

## 2022-09-20 NOTE — TELEPHONE ENCOUNTER
Spoke with patient this morning and she declined making an appointment  I explained in great detail that our office can schedule her if she wanted to return to keep the care with our office  Patient was calm and stated no she would find a doctor near her home  I again offered an appointment and she stated no  Dr. Nicolasa Valdez reneged on the agreement of virtual visits and that she would find another PCP  I then explained  dismissal will be noted on record   Patient was agreeable and understood

## 2022-09-26 NOTE — TELEPHONE ENCOUNTER
Bina,    Patient is choosing to find a provider closer to home for her care. How do we update in chart without \"dismissing\"? I don't want it to effect her ability to see another physician, but I do want a date that I'm no longer her doctor per agreement in case future refills continue to be requested. It isn't safe to continue to prescribe meds without seeing this complicated patient in person.   She isn't agreeable to being seen in person and only wants virtual care

## 2022-10-06 NOTE — TELEPHONE ENCOUNTER
NEFTALYI--Pt calling to speak with Tierra. I rang back to Tierra, but no answer.  When I went to pick call back up, unsure if I accidentally transferred back, or if call dropped all other ROS negative except as per HPI

## 2022-10-18 ENCOUNTER — TELEPHONE (OUTPATIENT)
Dept: PRIMARY CARE CLINIC | Age: 67
End: 2022-10-18

## 2022-10-18 NOTE — TELEPHONE ENCOUNTER
Left message for patient to call back. Patient has patient assistance that was delivered to the office and we are needing to know what she would like us to do.  Per patients last message she will no longer be being seen in our office

## 2022-10-31 ENCOUNTER — CARE COORDINATION (OUTPATIENT)
Dept: CARE COORDINATION | Age: 67
End: 2022-10-31

## 2022-10-31 NOTE — CARE COORDINATION
cc re-engage list 1 (offer rpm). Attempted to reach patient by phone. No answer. Left brief message with name, contact number and asked for return call back. Waiting for patient response at this time. Will update notes when callback is received. Will follow up at another date and time.

## 2022-11-02 ENCOUNTER — CARE COORDINATION (OUTPATIENT)
Dept: CARE COORDINATION | Age: 67
End: 2022-11-02

## 2023-02-27 ENCOUNTER — OFFICE VISIT (OUTPATIENT)
Dept: PRIMARY CARE CLINIC | Age: 68
End: 2023-02-27

## 2023-02-27 VITALS
BODY MASS INDEX: 53.92 KG/M2 | TEMPERATURE: 97.6 F | HEART RATE: 82 BPM | SYSTOLIC BLOOD PRESSURE: 110 MMHG | HEIGHT: 62 IN | RESPIRATION RATE: 14 BRPM | OXYGEN SATURATION: 99 % | WEIGHT: 293 LBS | DIASTOLIC BLOOD PRESSURE: 80 MMHG

## 2023-02-27 DIAGNOSIS — G47.33 OSA (OBSTRUCTIVE SLEEP APNEA): ICD-10-CM

## 2023-02-27 DIAGNOSIS — E79.0 HYPERURICEMIA: Chronic | ICD-10-CM

## 2023-02-27 DIAGNOSIS — E66.01 MORBID OBESITY DUE TO EXCESS CALORIES (HCC): ICD-10-CM

## 2023-02-27 DIAGNOSIS — Z12.31 ENCOUNTER FOR SCREENING MAMMOGRAM FOR MALIGNANT NEOPLASM OF BREAST: ICD-10-CM

## 2023-02-27 DIAGNOSIS — C18.2 MALIGNANT NEOPLASM OF ASCENDING COLON (HCC): ICD-10-CM

## 2023-02-27 DIAGNOSIS — E78.2 MIXED HYPERLIPIDEMIA: Chronic | ICD-10-CM

## 2023-02-27 DIAGNOSIS — I10 ESSENTIAL HYPERTENSION: ICD-10-CM

## 2023-02-27 DIAGNOSIS — R60.0 LOCALIZED EDEMA: Chronic | ICD-10-CM

## 2023-02-27 DIAGNOSIS — E11.42 DIABETIC POLYNEUROPATHY ASSOCIATED WITH TYPE 2 DIABETES MELLITUS (HCC): Primary | ICD-10-CM

## 2023-02-27 DIAGNOSIS — N18.32 STAGE 3B CHRONIC KIDNEY DISEASE (HCC): ICD-10-CM

## 2023-02-27 DIAGNOSIS — M1A.09X0 IDIOPATHIC CHRONIC GOUT OF MULTIPLE SITES WITHOUT TOPHUS: Chronic | ICD-10-CM

## 2023-02-27 PROBLEM — L03.90 CELLULITIS: Status: RESOLVED | Noted: 2022-06-08 | Resolved: 2023-02-27

## 2023-02-27 PROBLEM — E87.70 VOLUME OVERLOAD: Status: RESOLVED | Noted: 2018-11-01 | Resolved: 2023-02-27

## 2023-02-27 RX ORDER — SEMAGLUTIDE 1.34 MG/ML
INJECTION, SOLUTION SUBCUTANEOUS
COMMUNITY
Start: 2023-01-10

## 2023-02-27 RX ORDER — ALLOPURINOL 100 MG/1
200 TABLET ORAL DAILY
Qty: 180 TABLET | Refills: 3 | Status: SHIPPED | OUTPATIENT
Start: 2023-02-27

## 2023-02-27 ASSESSMENT — PATIENT HEALTH QUESTIONNAIRE - PHQ9
1. LITTLE INTEREST OR PLEASURE IN DOING THINGS: 0
SUM OF ALL RESPONSES TO PHQ QUESTIONS 1-9: 0
SUM OF ALL RESPONSES TO PHQ QUESTIONS 1-9: 0
SUM OF ALL RESPONSES TO PHQ9 QUESTIONS 1 & 2: 0
SUM OF ALL RESPONSES TO PHQ QUESTIONS 1-9: 0
2. FEELING DOWN, DEPRESSED OR HOPELESS: 0
SUM OF ALL RESPONSES TO PHQ QUESTIONS 1-9: 0

## 2023-02-27 NOTE — PROGRESS NOTES
Subjective:      Patient ID: Torrie Reilly is a 69 y.o. female. HPI  Here for a NP establishment,   Diabetic polyneuropathy associated with type 2 diabetes mellitus ([facility] [address]  Diabetes Mellitus Type II:  Home blood sugar records reviewed: fasting range: 120-130. No significant episodes of hypoglycemia. Compliant with medications. No polyuria, polydipsia, visual changes, foot problems, GI upset. Diabetic diet compliance:  compliant most of the time. Current exercise: none. Will check labs, and refill medications as appropriate. Hypertension:  Denies CP, SOB, visual changes, dizziness, palpitations or HA. She is adherent to a low sodium diet. Blood pressure typically runs ok  outside of the office. Continue current medications. Hyperlipidemia:  No new myalgias or GI upset on current medications. Lab Results   Component Value Date    LABA1C 7.3 06/08/2022    LABA1C 7.7 03/08/2022    LABA1C 7.6 (H) 06/07/2021     Lab Results   Component Value Date    LABMICR YES 06/08/2022    CREATININE 2.2 (H) 06/17/2022     Lab Results   Component Value Date    ALT 19 06/08/2022    AST 19 06/08/2022     No components found for: CHLPL  Lab Results   Component Value Date    TRIG 307 (H) 03/08/2022     Lab Results   Component Value Date    HDL 32 (L) 03/08/2022     Lab Results   Component Value Date/Time    LDLCALC see below 03/08/2022 10:31 AM    LDLDIRECT 72 03/08/2022 10:31 AM      This problem was reviewed in detail. It is under control and stable. Will check blood work. Essential hypertension  This is a chronic problem. The problem is well controlled. Patient monitors readings regularly. Pertinent negatives include no chest pain, focal sensory loss, focal weakness, leg pain, myalgias or shortness of breath. No headaches or chest pain. Takes medications regularly. Blood pressure has been stable, blood work was reviewed, and advised patient to continue the current instructions or medications.        Morbid obesity due to excess calories [facility])  Patient counseled,   Encouraged to lose weight, watch diet and exercise consistently. Malignant neoplasm of ascending colon [facility])  Following oncology and GI.     CKD (chronic kidney disease) stage 3, GFR 30-59 ml/min ([facility])  Will closely monitor w BW,      BRIANNA (obstructive sleep apnea)  On cpap,  Stable. Mixed hyperlipidemia  This has been a long standing problem, takes no meds,       Monitors diet and tries to follow a low fat diet. Has  been reasonably  compliant w exercise. Lipids have been stable, The problem is controlled. Recent lipid tests were reviewed and are normal. Pertinent negatives include no chest pain, focal sensory loss, focal weakness, leg pain, myalgias or shortness of breath. Advised patient to continue the current instructions or medications. Localized edema  On torsemide,  Patient is compliant w medications, no side effects, effective, provides adequate symptom relief. No new symptoms or problems as noted by patient. The problem is stable, no changes noted by patient. Will consider monitoring labs and refill medications as appropriate. Patient counseled and will continue current plan. Hyperuricemia  On allopurinol,  Patient is compliant w medications, no side effects, effective, provides adequate symptom relief. No new symptoms or problems as noted by patient. The problem is stable, no changes noted by patient. Will consider monitoring labs and refill medications as appropriate. Patient counseled and will continue current plan.      Past Medical History:   Diagnosis Date    Anemia     Chronic bilateral low back pain without sciatica     CKD (chronic kidney disease) stage 3, GFR 30-59 ml/min ([facility])     Colon cancer (HCC)     Diabetes ([address]     Diabetes ([address]     Diabetes with retinopathy ([address]     DM eye exam 12/18/17    Hypertension     Pancreatic cyst     Positive FIT (fecal immunochemical test)     Pulmonary hypertension ([address] Past Surgical History:   Procedure Laterality Date    COLONOSCOPY N/A 11/5/2018    COLONOSCOPY WITH BIOPSY performed by Lucian Church MD at [address] [place] [address]  05/14/2019    Colonoscopy with polypectomy (cold snare)    COLONOSCOPY N/A 5/14/2019    COLONOSCOPY POLYPECTOMY SNARE/COLD BIOPSY performed by Lucian Church MD at [facility] Right 11/08/2018    LAPAROSCOPIC RIGHT HEMICOLECTOMY and umbilical hernia repair    UT OFFICE/OUTPT VISIT,PROCEDURE ONLY Right 11/8/2018    LAPAROSCOPIC RIGHT HEMICOLECTOMY and umbilical hernia repair performed by Radha Ramírez MD at [facility] 11/5/2018    EGD BIOPSY performed by Lucian Church MD at [address] [place] [address]         Current Outpatient Medications   Medication Sig Dispense Refill    loratadine (CLARITIN) 10 MG tablet Take 10 mg by mouth every evening      allopurinol (ZYLOPRIM) 100 MG tablet Take 2 tablets by mouth daily 180 tablet 0    HYDROcodone-acetaminophen (NORCO) 5-325 MG per tablet Take 1 tablet by mouth every 8 hours as needed for Pain.  PRN 4 hours      Magnesium Oxide 400 MG CAPS Take 1 tablet by mouth daily      potassium chloride (KLOR-CON M) 20 MEQ extended release tablet Take 40 mEq by mouth 3 times daily       metOLazone (ZAROXOLYN) 5 MG tablet Take 1 tablet by mouth Twice a Week (Patient taking differently: Take 5 mg by mouth Twice a Week 3 x/week MWF) 24 tablet 0    Multiple Vitamins-Minerals (ADULT ONE DAILY GUMMIES) CHEW Take 1 tablet by mouth daily      OZEMPIC, 1 MG/DOSE, 4 MG/3ML SOPN INJECT 1 MG UNDER THE SKIN ONCE WEEKLY      torsemide (DEMADEX) 10 MG tablet Take 5 tablets by mouth in the morning and at bedtime (Patient taking differently: Take 50 mg by mouth daily ) 300 tablet 3    labetalol (NORMODYNE) 200 MG tablet Take 1 tablet by mouth every 12 hours (Patient taking differently: Take 300 mg by mouth every 12 hours ) 60 tablet 3    fluticasone (FLONASE) 50 MCG/ACT nasal spray 1 spray by Each Nostril route daily 16 g 3    gentamicin (GARAMYCIN) 0.1 % cream Apply topically 3 times daily. (Patient not taking: Reported on 7/12/2022) 15 g 0    insulin aspart (NOVOLOG) 100 UNIT/ML injection vial Inject 40-50 Units into the skin 3 times daily (before meals)      insulin detemir (LEVEMIR) 100 UNIT/ML injection vial Inject 40 Units into the skin 2 times daily      Cholecalciferol (VITAMIN D3) 50 MCG (2000 UT) CAPS Take 2,000 Units by mouth daily      Semaglutide,0.25 or 0.5MG/DOS, (OZEMPIC, 0.25 OR 0.5 MG/DOSE,) 2 MG/1.5ML SOPN Inject 0.5 mg into the skin once a week 1 pen 0    tetrahydrozoline-zinc (VISINE-AC) 0.05-0.25 % ophthalmic solution Place 2 drops into both eyes 3 times daily as needed (itchiness)       Insulin Pen Needle 32G X 4 MM MISC 1 each by Does not apply route daily 300 each 3    Lancets MISC 1 each by Does not apply route 5 times daily Please fill for 33g one touch lancets 150 each 3    blood glucose test strips (ASCENSIA AUTODISC VI;ONE TOUCH ULTRA TEST VI) strip Check blood sugars TID 90 each 3    blood glucose monitor kit and supplies Test blood sugars qac and qhs 1 kit 0    linagliptin (TRADJENTA) 5 MG tablet Take 1 tablet by mouth daily 90 tablet 3     No current facility-administered medications for this visit.        Allergies   Allergen Reactions    Cefepime Anxiety    Pcn [Penicillins] Shortness Of Breath    Spironolactone Rash    Coreg [Carvedilol]      Side effects:  SOB, palpitations    Lisinopril Other (See Comments)     cough    Flexeril [Cyclobenzaprine] Rash       Social History     Socioeconomic History    Marital status:      Spouse name: Not on file    Number of children: Not on file    Years of education: Not on file    Highest education level: Not on file   Occupational History    Not on file   Tobacco Use    Smoking status: Never     Passive exposure: Never    Smokeless tobacco: Never   Vaping Use    Vaping Use: Never used   Substance and Sexual Activity    Alcohol use: Yes     Alcohol/week: 2.0 standard drinks     Types: 2 Glasses of wine per week     Comment: only on occasional    Drug use: No    Sexual activity: Yes     Partners: Female   Other Topics Concern    Not on file   Social History Narrative    Not on file     Social Determinants of Health     Financial Resource Strain: Low Risk     Difficulty of Paying Living Expenses: Not hard at all   Food Insecurity: No Food Insecurity    Worried About [address] in the Last Year: Never true    [address] in the Last Year: Never true   Transportation Needs: No Transportation Needs    Lack of Transportation (Medical): No    Lack of Transportation (Non-Medical): No   Physical Activity: Inactive    Days of Exercise per Week: 0 days    Minutes of Exercise per Session: 0 min   Stress: No Stress Concern Present    Feeling of Stress : Not at all   Social Connections: Moderately Isolated    Frequency of Communication with Friends and Family: Three times a week    Frequency of Social Gatherings with Friends and Family:  Three times a week    Attends [employer] Services: Never    Active Member of Clubs or Organizations: No    Attends Club or Organization Meetings: Never    Marital Status:    Intimate Partner Violence: Not on file   Housing Stability: Low Risk     Unable to Pay for Housing in the Last Year: No    Number of Places Lived in the Last Year: 2    Unstable Housing in the Last Year: No       Family History   Problem Relation Age of Onset    Lung Cancer Mother     High Blood Pressure Father     Lung Cancer Father     Lupus Sister        Review of Systems  All the review of systems negative except above   Objective:   Physical Exam  /80 (Site: Left Upper Arm, Position: Sitting, Cuff Size: Medium Adult)   Pulse 82   Temp 97.6 °F (36.4 °C)   Resp 14   Ht 5' 1.5\" (1.562 m)   Wt (!) 519 lb (235.4 kg)   SpO2 99%   BMI 96.48 kg/m²    The physical exam reveals a patient who appears well, alert and oriented x 3, pleasant, cooperative. Vitals are as noted. Head is atraumatic and normocephalic. Eyes reveal normal conjunctiva, cornea normal, pupils are equal and rective to light. Nasal mucosa is normal. Throat is normal without exudates. Ears reveal normal tympanic membranes. Neck is supple and free of adenopathy, or masses. No thyromegaly. No jugular venous distension. Lungs are clear to auscultation, no rales or rhonchi noted. Heart sounds are regular , no murmurs, clicks, gallops or rubs. Abdomen is soft, no tenderness, masses or organomegaly. Bowel sounds are normally heard. Pelvis: normal. Extremities are normal. Peripheral pulses are normal. Screening neurological exam is normal without focal findings. Cranial nerves are intact, reflexes are symmetrical and muscle strength eaqual. Skin is normal without suspicious lesions noted. Assessment:      Diabetic polyneuropathy associated with type 2 diabetes mellitus ([facility] [address]  Diabetes Mellitus Type II:  Home blood sugar records reviewed: fasting range: 120-130. No significant episodes of hypoglycemia. Compliant with medications. No polyuria, polydipsia, visual changes, foot problems, GI upset. Diabetic diet compliance:  compliant most of the time. Current exercise: none. Will check labs, and refill medications as appropriate. Hypertension:  Denies CP, SOB, visual changes, dizziness, palpitations or HA. She is adherent to a low sodium diet. Blood pressure typically runs ok  outside of the office. Continue current medications. Hyperlipidemia:  No new myalgias or GI upset on current medications.        Lab Results   Component Value Date    LABA1C 7.3 06/08/2022    LABA1C 7.7 03/08/2022    LABA1C 7.6 (H) 06/07/2021     Lab Results   Component Value Date    LABMICR YES 06/08/2022    CREATININE 2.2 (H) 06/17/2022     Lab Results   Component Value Date    ALT 19 06/08/2022    AST 19 06/08/2022     No components found for: CHLPL  Lab Results   Component Value Date    TRIG 307 (H) 03/08/2022     Lab Results   Component Value Date    HDL 32 (L) 03/08/2022     Lab Results   Component Value Date/Time    LDLCALC see below 03/08/2022 10:31 AM    LDLDIRECT 72 03/08/2022 10:31 AM      This problem was reviewed in detail. It is under control and stable. Will check blood work. Essential hypertension  This is a chronic problem. The problem is well controlled. Patient monitors readings regularly. Pertinent negatives include no chest pain, focal sensory loss, focal weakness, leg pain, myalgias or shortness of breath. No headaches or chest pain. Takes medications regularly. Blood pressure has been stable, blood work was reviewed, and advised patient to continue the current instructions or medications. Morbid obesity due to excess calories [facility])  Patient counseled,   Encouraged to lose weight, watch diet and exercise consistently. Malignant neoplasm of ascending colon [facility])  Following oncology and GI.     CKD (chronic kidney disease) stage 3, GFR 30-59 ml/min ([facility])  Will closely monitor w BW,      BRIANNA (obstructive sleep apnea)  On cpap,  Stable. Mixed hyperlipidemia  This has been a long standing problem, takes no meds,       Monitors diet and tries to follow a low fat diet. Has  been reasonably  compliant w exercise. Lipids have been stable, The problem is controlled. Recent lipid tests were reviewed and are normal. Pertinent negatives include no chest pain, focal sensory loss, focal weakness, leg pain, myalgias or shortness of breath. Advised patient to continue the current instructions or medications. Localized edema  On torsemide,  Patient is compliant w medications, no side effects, effective, provides adequate symptom relief. No new symptoms or problems as noted by patient. The problem is stable, no changes noted by patient. Will consider monitoring labs and refill medications as appropriate.  Patient counseled and will continue current plan. Hyperuricemia  On allopurinol,  Patient is compliant w medications, no side effects, effective, provides adequate symptom relief. No new symptoms or problems as noted by patient. The problem is stable, no changes noted by patient. Will consider monitoring labs and refill medications as appropriate. Patient counseled and will continue current plan. Plan:      Labs ordered, reviewed. Medications refilled. All Health maintenance needs reviewed and the needful ordered.            Theresa Lin MD

## 2023-02-27 NOTE — ASSESSMENT & PLAN NOTE
Diabetes Mellitus Type II:  Home blood sugar records reviewed: fasting range: 120-130. No significant episodes of hypoglycemia. Compliant with medications. No polyuria, polydipsia, visual changes, foot problems, GI upset. Diabetic diet compliance:  compliant most of the time. Current exercise: none. Will check labs, and refill medications as appropriate. Hypertension:  Denies CP, SOB, visual changes, dizziness, palpitations or HA. She is adherent to a low sodium diet. Blood pressure typically runs ok  outside of the office. Continue current medications. Hyperlipidemia:  No new myalgias or GI upset on current medications. Lab Results   Component Value Date    LABA1C 7.3 06/08/2022    LABA1C 7.7 03/08/2022    LABA1C 7.6 (H) 06/07/2021     Lab Results   Component Value Date    LABMICR YES 06/08/2022    CREATININE 2.2 (H) 06/17/2022     Lab Results   Component Value Date    ALT 19 06/08/2022    AST 19 06/08/2022     No components found for: CHLPL  Lab Results   Component Value Date    TRIG 307 (H) 03/08/2022     Lab Results   Component Value Date    HDL 32 (L) 03/08/2022     Lab Results   Component Value Date/Time    LDLCALC see below 03/08/2022 10:31 AM    LDLDIRECT 72 03/08/2022 10:31 AM      This problem was reviewed in detail. It is under control and stable. Will check blood work.

## 2023-02-27 NOTE — ASSESSMENT & PLAN NOTE
On torsemide,  Patient is compliant w medications, no side effects, effective, provides adequate symptom relief. No new symptoms or problems as noted by patient. The problem is stable, no changes noted by patient. Will consider monitoring labs and refill medications as appropriate. Patient counseled and will continue current plan.

## 2023-06-05 DIAGNOSIS — M62.838 MUSCLE SPASM: ICD-10-CM

## 2023-06-05 LAB
CK SERPL-CCNC: 220 U/L (ref 26–192)
CRP SERPL-MCNC: 7.7 MG/L (ref 0–5.1)
ERYTHROCYTE [SEDIMENTATION RATE] IN BLOOD BY WESTERGREN METHOD: 82 MM/HR (ref 0–30)
TSH SERPL DL<=0.005 MIU/L-ACNC: 1.97 UIU/ML (ref 0.27–4.2)

## 2023-06-06 LAB — ALDOLASE SERPL-CCNC: 4.7 U/L (ref 1.2–7.6)

## 2023-06-07 DIAGNOSIS — N18.30 STAGE 3 CHRONIC KIDNEY DISEASE, UNSPECIFIED WHETHER STAGE 3A OR 3B CKD (HCC): ICD-10-CM

## 2023-06-07 DIAGNOSIS — E55.9 VITAMIN D DEFICIENCY: ICD-10-CM

## 2023-06-07 DIAGNOSIS — N18.30 ANEMIA DUE TO STAGE 3 CHRONIC KIDNEY DISEASE, UNSPECIFIED WHETHER STAGE 3A OR 3B CKD (HCC): ICD-10-CM

## 2023-06-07 DIAGNOSIS — N25.0 RENAL OSTEODYSTROPHY: ICD-10-CM

## 2023-06-07 DIAGNOSIS — D63.1 ANEMIA DUE TO STAGE 3 CHRONIC KIDNEY DISEASE, UNSPECIFIED WHETHER STAGE 3A OR 3B CKD (HCC): ICD-10-CM

## 2023-06-07 LAB
25(OH)D3 SERPL-MCNC: 51.1 NG/ML
ALBUMIN SERPL-MCNC: 4.3 G/DL (ref 3.4–5)
ANION GAP SERPL CALCULATED.3IONS-SCNC: 19 MMOL/L (ref 3–16)
BUN SERPL-MCNC: 36 MG/DL (ref 7–20)
CALCIUM SERPL-MCNC: 9.6 MG/DL (ref 8.3–10.6)
CHLORIDE SERPL-SCNC: 99 MMOL/L (ref 99–110)
CO2 SERPL-SCNC: 25 MMOL/L (ref 21–32)
CREAT SERPL-MCNC: 1.9 MG/DL (ref 0.6–1.2)
DEPRECATED RDW RBC AUTO: 16.3 % (ref 12.4–15.4)
FERRITIN SERPL IA-MCNC: 148.8 NG/ML (ref 15–150)
GAD65 AB SER IA-ACNC: <5 IU/ML (ref 0–5)
GFR SERPLBLD CREATININE-BSD FMLA CKD-EPI: 28 ML/MIN/{1.73_M2}
GLUCOSE SERPL-MCNC: 138 MG/DL (ref 70–99)
HCT VFR BLD AUTO: 38.7 % (ref 36–48)
HGB BLD-MCNC: 12.5 G/DL (ref 12–16)
IRON SATN MFR SERPL: 14 % (ref 15–50)
IRON SERPL-MCNC: 48 UG/DL (ref 37–145)
MAGNESIUM SERPL-MCNC: 1.9 MG/DL (ref 1.8–2.4)
MCH RBC QN AUTO: 29.5 PG (ref 26–34)
MCHC RBC AUTO-ENTMCNC: 32.2 G/DL (ref 31–36)
MCV RBC AUTO: 91.7 FL (ref 80–100)
PHOSPHATE SERPL-MCNC: 4.7 MG/DL (ref 2.5–4.9)
PLATELET # BLD AUTO: 170 K/UL (ref 135–450)
PMV BLD AUTO: 9.3 FL (ref 5–10.5)
POTASSIUM SERPL-SCNC: 4.4 MMOL/L (ref 3.5–5.1)
PTH-INTACT SERPL-MCNC: 44.4 PG/ML (ref 14–72)
RBC # BLD AUTO: 4.22 M/UL (ref 4–5.2)
SODIUM SERPL-SCNC: 143 MMOL/L (ref 136–145)
TIBC SERPL-MCNC: 339 UG/DL (ref 260–445)
TRANSFERRIN SERPL-MCNC: 272 MG/DL (ref 200–360)
URATE SERPL-MCNC: 7.1 MG/DL (ref 2.6–6)
WBC # BLD AUTO: 8.8 K/UL (ref 4–11)

## 2023-07-06 ENCOUNTER — TELEPHONE (OUTPATIENT)
Dept: PRIMARY CARE CLINIC | Age: 68
End: 2023-07-06

## 2023-10-27 RX ORDER — MONTELUKAST SODIUM 10 MG/1
10 TABLET ORAL DAILY
Qty: 30 TABLET | Refills: 3 | Status: SHIPPED | OUTPATIENT
Start: 2023-10-27

## 2023-10-30 RX ORDER — MONTELUKAST SODIUM 10 MG/1
10 TABLET ORAL DAILY
Qty: 90 TABLET | OUTPATIENT
Start: 2023-10-30

## 2023-12-14 RX ORDER — ALLOPURINOL 100 MG/1
200 TABLET ORAL DAILY
Qty: 30 TABLET | Refills: 0 | Status: SHIPPED | OUTPATIENT
Start: 2023-12-14

## 2023-12-14 NOTE — TELEPHONE ENCOUNTER
Medication:   Requested Prescriptions     Pending Prescriptions Disp Refills    allopurinol (ZYLOPRIM) 100 MG tablet [Pharmacy Med Name: ALLOPURINOL 100MG TABLETS] 180 tablet 3     Sig: TAKE 2 TABLETS BY MOUTH DAILY     Last Filled:  02/27/2023    Last appt: 2/27/2023   Next appt: Visit date not found    Last OARRS:       11/20/2020    10:31 AM   RX Monitoring   Periodic Controlled Substance Monitoring No signs of potential drug abuse or diversion identified.

## 2024-01-19 RX ORDER — METHOCARBAMOL 500 MG/1
500 TABLET, FILM COATED ORAL 3 TIMES DAILY PRN
Qty: 90 TABLET | OUTPATIENT
Start: 2024-01-19

## 2024-01-31 ENCOUNTER — TELEMEDICINE (OUTPATIENT)
Dept: PRIMARY CARE CLINIC | Age: 69
End: 2024-01-31
Payer: MEDICARE

## 2024-01-31 DIAGNOSIS — C18.2 MALIGNANT NEOPLASM OF ASCENDING COLON (HCC): ICD-10-CM

## 2024-01-31 DIAGNOSIS — A09 DIARRHEA OF INFECTIOUS ORIGIN: Primary | ICD-10-CM

## 2024-01-31 DIAGNOSIS — N18.32 STAGE 3B CHRONIC KIDNEY DISEASE (HCC): ICD-10-CM

## 2024-01-31 DIAGNOSIS — E66.01 MORBID OBESITY DUE TO EXCESS CALORIES (HCC): ICD-10-CM

## 2024-01-31 DIAGNOSIS — E11.42 DIABETIC POLYNEUROPATHY ASSOCIATED WITH TYPE 2 DIABETES MELLITUS (HCC): ICD-10-CM

## 2024-01-31 PROCEDURE — G8417 CALC BMI ABV UP PARAM F/U: HCPCS | Performed by: INTERNAL MEDICINE

## 2024-01-31 PROCEDURE — 2022F DILAT RTA XM EVC RTNOPTHY: CPT | Performed by: INTERNAL MEDICINE

## 2024-01-31 PROCEDURE — 1036F TOBACCO NON-USER: CPT | Performed by: INTERNAL MEDICINE

## 2024-01-31 PROCEDURE — G8428 CUR MEDS NOT DOCUMENT: HCPCS | Performed by: INTERNAL MEDICINE

## 2024-01-31 PROCEDURE — 1090F PRES/ABSN URINE INCON ASSESS: CPT | Performed by: INTERNAL MEDICINE

## 2024-01-31 PROCEDURE — 3046F HEMOGLOBIN A1C LEVEL >9.0%: CPT | Performed by: INTERNAL MEDICINE

## 2024-01-31 PROCEDURE — G8484 FLU IMMUNIZE NO ADMIN: HCPCS | Performed by: INTERNAL MEDICINE

## 2024-01-31 PROCEDURE — G8400 PT W/DXA NO RESULTS DOC: HCPCS | Performed by: INTERNAL MEDICINE

## 2024-01-31 PROCEDURE — 1123F ACP DISCUSS/DSCN MKR DOCD: CPT | Performed by: INTERNAL MEDICINE

## 2024-01-31 PROCEDURE — 99214 OFFICE O/P EST MOD 30 MIN: CPT | Performed by: INTERNAL MEDICINE

## 2024-01-31 PROCEDURE — 3017F COLORECTAL CA SCREEN DOC REV: CPT | Performed by: INTERNAL MEDICINE

## 2024-01-31 RX ORDER — DIPHENOXYLATE HYDROCHLORIDE AND ATROPINE SULFATE 2.5; .025 MG/1; MG/1
1 TABLET ORAL 4 TIMES DAILY PRN
Qty: 30 TABLET | Refills: 0 | Status: SHIPPED | OUTPATIENT
Start: 2024-01-31 | End: 2024-02-10

## 2024-01-31 SDOH — ECONOMIC STABILITY: INCOME INSECURITY: HOW HARD IS IT FOR YOU TO PAY FOR THE VERY BASICS LIKE FOOD, HOUSING, MEDICAL CARE, AND HEATING?: NOT HARD AT ALL

## 2024-01-31 SDOH — ECONOMIC STABILITY: FOOD INSECURITY: WITHIN THE PAST 12 MONTHS, THE FOOD YOU BOUGHT JUST DIDN'T LAST AND YOU DIDN'T HAVE MONEY TO GET MORE.: NEVER TRUE

## 2024-01-31 SDOH — ECONOMIC STABILITY: FOOD INSECURITY: WITHIN THE PAST 12 MONTHS, YOU WORRIED THAT YOUR FOOD WOULD RUN OUT BEFORE YOU GOT MONEY TO BUY MORE.: NEVER TRUE

## 2024-01-31 NOTE — PROGRESS NOTES
2024    TELEHEALTH EVALUATION -- Audio/Visual    HPI:    Cindy Ortiz (:  1955) has requested an audio/video evaluation for the following concern(s):    Subjective.  Patient complains of diarrhea and nausea for the last 3-5 days. Does not feel good. Complains of some emesis and body aches. No rash. Low grade fever and chills. Feels very weak and fatigued. Decreased appetite. Diarrhea is watery, has 5-8 bowel movements per day. No blood in the stool. Some abdominal cramps. Has not been able to eat anything. Diarrhea happens right after eating anything. Also has some sore throat and congestion. Tried some over the counter medications without any help. No joint pains, sores on the body. No new foods. No new medications.Diabetic polyneuropathy associated with type 2 diabetes mellitus (HCC)  Has not been seen over a year . Needs ov.      CKD (chronic kidney disease) stage 3, GFR 30-59 ml/min ([facility])  Will monitor w BW.      Morbid obesity due to excess calories (HCC)  Patient counseled,   Encouraged to lose weight, watch diet and exercise consistently.       Malignant neoplasm of ascending colon (HCC)  Following oncology.     Review of Systems  ROS: No unusual headaches or allergy symptoms or blurred vision. No prolonged cough. No flushing or facial pain or chest pain,dizziness, dyspnea, palpitations, or chest pain on exertion. No syncope. No nausea or vommitting or diarrhea.  No jaundice or abdominal pain, change in bowel habits, black or bloody stools.  No dysuria or hematuria or frequency of urination.   No myalgias or muscle pain.  No numbness, weakness, or tingling. No falls, or loss of consciousness. No weight loss or back pain. No falls.  No paresthesias. No joint swelling or redness. No joint pain. No recent weight loss. No focal weakness or sensory deficits or paresthesias, No confusion or altered sensorium. No hematemesis. No hearing loss. No siezures. All other systems were reviewed, and review

## 2024-02-26 RX ORDER — MONTELUKAST SODIUM 10 MG/1
10 TABLET ORAL DAILY
Qty: 30 TABLET | Refills: 3 | Status: SHIPPED | OUTPATIENT
Start: 2024-02-26

## 2024-02-26 NOTE — TELEPHONE ENCOUNTER
Medication:   Requested Prescriptions     Pending Prescriptions Disp Refills    montelukast (SINGULAIR) 10 MG tablet [Pharmacy Med Name: MONTELUKAST 10MG TABLETS] 30 tablet 3     Sig: TAKE 1 TABLET BY MOUTH DAILY     Last Filled:  10/27/2023    Last appt: 1/31/2024   Next appt: Visit date not found    Last OARRS:       11/20/2020    10:31 AM   RX Monitoring   Periodic Controlled Substance Monitoring No signs of potential drug abuse or diversion identified.

## 2024-04-05 ENCOUNTER — HOSPITAL ENCOUNTER (INPATIENT)
Age: 69
LOS: 3 days | Discharge: HOME HEALTH | DRG: 871 | End: 2024-04-08
Attending: EMERGENCY MEDICINE | Admitting: STUDENT IN AN ORGANIZED HEALTH CARE EDUCATION/TRAINING PROGRAM
Payer: MEDICARE

## 2024-04-05 ENCOUNTER — APPOINTMENT (OUTPATIENT)
Age: 69
DRG: 871 | End: 2024-04-05
Payer: MEDICARE

## 2024-04-05 DIAGNOSIS — J18.9 PNEUMONIA DUE TO INFECTIOUS ORGANISM, UNSPECIFIED LATERALITY, UNSPECIFIED PART OF LUNG: ICD-10-CM

## 2024-04-05 DIAGNOSIS — A41.9 SEPSIS, DUE TO UNSPECIFIED ORGANISM, UNSPECIFIED WHETHER ACUTE ORGAN DYSFUNCTION PRESENT (HCC): ICD-10-CM

## 2024-04-05 DIAGNOSIS — J96.01 ACUTE RESPIRATORY FAILURE WITH HYPOXIA AND HYPERCAPNIA (HCC): Primary | ICD-10-CM

## 2024-04-05 DIAGNOSIS — J96.02 ACUTE RESPIRATORY FAILURE WITH HYPOXIA AND HYPERCAPNIA (HCC): Primary | ICD-10-CM

## 2024-04-05 PROBLEM — J96.90 RESPIRATORY FAILURE (HCC): Status: ACTIVE | Noted: 2024-04-05

## 2024-04-05 LAB
ALBUMIN SERPL-MCNC: 4.4 G/DL (ref 3.4–5)
ALBUMIN/GLOB SERPL: 1.2 {RATIO}
ALP SERPL-CCNC: 104 U/L (ref 40–129)
ALT SERPL-CCNC: 15 U/L (ref 10–40)
ANION GAP SERPL CALCULATED.3IONS-SCNC: 13 MMOL/L (ref 3–16)
AST SERPL-CCNC: 22 U/L (ref 15–37)
BASOPHILS # BLD: 0.03 K/UL (ref 0–0.2)
BASOPHILS NFR BLD: 0 %
BILIRUB SERPL-MCNC: 0.5 MG/DL (ref 0–1)
BNP SERPL-MCNC: 345 PG/ML (ref 0–124)
BUN SERPL-MCNC: 24 MG/DL (ref 7–20)
CALCIUM SERPL-MCNC: 9.4 MG/DL (ref 8.3–10.6)
CHLORIDE SERPL-SCNC: 101 MMOL/L (ref 99–110)
CO2 SERPL-SCNC: 27 MMOL/L (ref 21–32)
COHGB MFR BLD: 2.5 % (ref 0–1.5)
CREAT SERPL-MCNC: 1.5 MG/DL (ref 0.6–1.2)
EOSINOPHIL # BLD: 0.05 K/UL (ref 0–0.6)
EOSINOPHILS RELATIVE PERCENT: 0 %
ERYTHROCYTE [DISTWIDTH] IN BLOOD BY AUTOMATED COUNT: 16 % (ref 12.4–15.4)
FLUAV AG SPEC QL: NEGATIVE
FLUBV AG SPEC QL: NEGATIVE
GFR SERPL CREATININE-BSD FRML MDRD: 36 ML/MIN/1.73M2
GLUCOSE SERPL-MCNC: 157 MG/DL (ref 70–99)
HCO3 VENOUS: 23.6 MMOL/L (ref 23–29)
HCT VFR BLD AUTO: 43.7 % (ref 36–48)
HGB BLD-MCNC: 13.3 G/DL (ref 12–16)
IMM GRANULOCYTES # BLD AUTO: 0.02 K/UL (ref 0–0.5)
IMM GRANULOCYTES NFR BLD: 0 %
LACTATE BLDV-SCNC: 1.5 MMOL/L (ref 0.4–2)
LYMPHOCYTES NFR BLD: 0.51 K/UL (ref 1–5.1)
LYMPHOCYTES RELATIVE PERCENT: 3 %
MCH RBC QN AUTO: 27.7 PG (ref 26–34)
MCHC RBC AUTO-ENTMCNC: 30.4 G/DL (ref 31–36)
MCV RBC AUTO: 90.9 FL (ref 80–100)
METHEMOGLOBIN: 0 % (ref 0–1.4)
MONOCYTES NFR BLD: 0.74 K/UL (ref 0–1.3)
MONOCYTES NFR BLD: 5 %
NEGATIVE BASE EXCESS, VEN: 0.4 MMOL/L
NEUTROPHILS NFR BLD: 91 %
NEUTS SEG NFR BLD: 13.99 K/UL (ref 1.7–7.7)
O2 SAT, VEN: 96 %
PCO2, VEN: 37 MM HG (ref 40–50)
PH VENOUS: 7.42 (ref 7.35–7.45)
PLATELET # BLD AUTO: 173 K/UL (ref 135–450)
PMV BLD AUTO: 9.4 FL
PO2, VEN: 78 MM HG
POTASSIUM SERPL-SCNC: 4.6 MMOL/L (ref 3.5–5.1)
PROCALCITONIN SERPL-MCNC: 0.16 NG/ML (ref 0–0.15)
PROT SERPL-MCNC: 8.1 G/DL (ref 6.4–8.2)
RBC # BLD AUTO: 4.81 M/UL (ref 4–5.2)
SARS-COV-2 RDRP RESP QL NAA+PROBE: NOT DETECTED
SODIUM SERPL-SCNC: 141 MMOL/L (ref 136–145)
SPECIMEN DESCRIPTION: NORMAL
TROPONIN I SERPL HS-MCNC: 48 NG/L (ref 0–14)
WBC OTHER # BLD: 15.3 K/UL (ref 4–11)

## 2024-04-05 PROCEDURE — 94761 N-INVAS EAR/PLS OXIMETRY MLT: CPT

## 2024-04-05 PROCEDURE — 94640 AIRWAY INHALATION TREATMENT: CPT

## 2024-04-05 PROCEDURE — 82805 BLOOD GASES W/O2 SATURATION: CPT

## 2024-04-05 PROCEDURE — 6360000002 HC RX W HCPCS: Performed by: EMERGENCY MEDICINE

## 2024-04-05 PROCEDURE — 2580000003 HC RX 258: Performed by: EMERGENCY MEDICINE

## 2024-04-05 PROCEDURE — 6370000000 HC RX 637 (ALT 250 FOR IP): Performed by: EMERGENCY MEDICINE

## 2024-04-05 PROCEDURE — G0378 HOSPITAL OBSERVATION PER HR: HCPCS

## 2024-04-05 PROCEDURE — 87635 SARS-COV-2 COVID-19 AMP PRB: CPT

## 2024-04-05 PROCEDURE — 71045 X-RAY EXAM CHEST 1 VIEW: CPT

## 2024-04-05 PROCEDURE — 99285 EMERGENCY DEPT VISIT HI MDM: CPT

## 2024-04-05 PROCEDURE — 96365 THER/PROPH/DIAG IV INF INIT: CPT

## 2024-04-05 PROCEDURE — 36415 COLL VENOUS BLD VENIPUNCTURE: CPT

## 2024-04-05 PROCEDURE — 1200000000 HC SEMI PRIVATE

## 2024-04-05 PROCEDURE — 80053 COMPREHEN METABOLIC PANEL: CPT

## 2024-04-05 PROCEDURE — 83605 ASSAY OF LACTIC ACID: CPT

## 2024-04-05 PROCEDURE — 84145 PROCALCITONIN (PCT): CPT

## 2024-04-05 PROCEDURE — 83880 ASSAY OF NATRIURETIC PEPTIDE: CPT

## 2024-04-05 PROCEDURE — 87040 BLOOD CULTURE FOR BACTERIA: CPT

## 2024-04-05 PROCEDURE — 85025 COMPLETE CBC W/AUTO DIFF WBC: CPT

## 2024-04-05 PROCEDURE — 87804 INFLUENZA ASSAY W/OPTIC: CPT

## 2024-04-05 PROCEDURE — 93005 ELECTROCARDIOGRAM TRACING: CPT | Performed by: STUDENT IN AN ORGANIZED HEALTH CARE EDUCATION/TRAINING PROGRAM

## 2024-04-05 PROCEDURE — 96366 THER/PROPH/DIAG IV INF ADDON: CPT

## 2024-04-05 PROCEDURE — 84484 ASSAY OF TROPONIN QUANT: CPT

## 2024-04-05 PROCEDURE — 2700000000 HC OXYGEN THERAPY PER DAY

## 2024-04-05 RX ORDER — FUROSEMIDE 10 MG/ML
80 INJECTION INTRAMUSCULAR; INTRAVENOUS 2 TIMES DAILY
Status: DISCONTINUED | OUTPATIENT
Start: 2024-04-06 | End: 2024-04-06

## 2024-04-05 RX ORDER — ACETAMINOPHEN 325 MG/1
650 TABLET ORAL EVERY 6 HOURS PRN
Status: DISCONTINUED | OUTPATIENT
Start: 2024-04-05 | End: 2024-04-06

## 2024-04-05 RX ORDER — ALLOPURINOL 100 MG/1
200 TABLET ORAL DAILY
Status: DISCONTINUED | OUTPATIENT
Start: 2024-04-06 | End: 2024-04-08 | Stop reason: HOSPADM

## 2024-04-05 RX ORDER — 0.9 % SODIUM CHLORIDE 0.9 %
500 INTRAVENOUS SOLUTION INTRAVENOUS ONCE
Status: COMPLETED | OUTPATIENT
Start: 2024-04-05 | End: 2024-04-05

## 2024-04-05 RX ORDER — INSULIN LISPRO 100 [IU]/ML
10 INJECTION, SOLUTION INTRAVENOUS; SUBCUTANEOUS
Status: DISCONTINUED | OUTPATIENT
Start: 2024-04-06 | End: 2024-04-07

## 2024-04-05 RX ORDER — 0.9 % SODIUM CHLORIDE 0.9 %
1000 INTRAVENOUS SOLUTION INTRAVENOUS ONCE
Status: COMPLETED | OUTPATIENT
Start: 2024-04-05 | End: 2024-04-05

## 2024-04-05 RX ORDER — ACETAMINOPHEN 325 MG/1
650 TABLET ORAL
Status: COMPLETED | OUTPATIENT
Start: 2024-04-05 | End: 2024-04-05

## 2024-04-05 RX ORDER — MAGNESIUM SULFATE IN WATER 40 MG/ML
2000 INJECTION, SOLUTION INTRAVENOUS PRN
Status: DISCONTINUED | OUTPATIENT
Start: 2024-04-05 | End: 2024-04-08 | Stop reason: HOSPADM

## 2024-04-05 RX ORDER — SODIUM CHLORIDE 0.9 % (FLUSH) 0.9 %
5-40 SYRINGE (ML) INJECTION PRN
Status: DISCONTINUED | OUTPATIENT
Start: 2024-04-05 | End: 2024-04-08 | Stop reason: HOSPADM

## 2024-04-05 RX ORDER — INSULIN LISPRO 100 [IU]/ML
0-4 INJECTION, SOLUTION INTRAVENOUS; SUBCUTANEOUS
Status: DISCONTINUED | OUTPATIENT
Start: 2024-04-06 | End: 2024-04-08 | Stop reason: HOSPADM

## 2024-04-05 RX ORDER — ENOXAPARIN SODIUM 100 MG/ML
30 INJECTION SUBCUTANEOUS 2 TIMES DAILY
Status: DISCONTINUED | OUTPATIENT
Start: 2024-04-06 | End: 2024-04-08 | Stop reason: HOSPADM

## 2024-04-05 RX ORDER — INSULIN GLARGINE 100 [IU]/ML
25 INJECTION, SOLUTION SUBCUTANEOUS 2 TIMES DAILY
Status: DISCONTINUED | OUTPATIENT
Start: 2024-04-06 | End: 2024-04-07

## 2024-04-05 RX ORDER — HYDRALAZINE HYDROCHLORIDE 20 MG/ML
10 INJECTION INTRAMUSCULAR; INTRAVENOUS EVERY 6 HOURS PRN
Status: DISCONTINUED | OUTPATIENT
Start: 2024-04-05 | End: 2024-04-08 | Stop reason: HOSPADM

## 2024-04-05 RX ORDER — MONTELUKAST SODIUM 10 MG/1
10 TABLET ORAL NIGHTLY
Status: DISCONTINUED | OUTPATIENT
Start: 2024-04-06 | End: 2024-04-08 | Stop reason: HOSPADM

## 2024-04-05 RX ORDER — M-VIT,TX,IRON,MINS/CALC/FOLIC 27MG-0.4MG
1 TABLET ORAL DAILY
Status: DISCONTINUED | OUTPATIENT
Start: 2024-04-06 | End: 2024-04-08 | Stop reason: HOSPADM

## 2024-04-05 RX ORDER — LEVOFLOXACIN 5 MG/ML
750 INJECTION, SOLUTION INTRAVENOUS EVERY 24 HOURS
Status: DISCONTINUED | OUTPATIENT
Start: 2024-04-05 | End: 2024-04-05

## 2024-04-05 RX ORDER — POTASSIUM CHLORIDE 20 MEQ/1
40 TABLET, EXTENDED RELEASE ORAL 3 TIMES DAILY
Status: DISCONTINUED | OUTPATIENT
Start: 2024-04-06 | End: 2024-04-08 | Stop reason: HOSPADM

## 2024-04-05 RX ORDER — ACETAMINOPHEN 650 MG/1
650 SUPPOSITORY RECTAL EVERY 6 HOURS PRN
Status: DISCONTINUED | OUTPATIENT
Start: 2024-04-05 | End: 2024-04-08 | Stop reason: HOSPADM

## 2024-04-05 RX ORDER — SODIUM CHLORIDE 9 MG/ML
INJECTION, SOLUTION INTRAVENOUS PRN
Status: DISCONTINUED | OUTPATIENT
Start: 2024-04-05 | End: 2024-04-06

## 2024-04-05 RX ORDER — POTASSIUM CHLORIDE 20 MEQ/1
40 TABLET, EXTENDED RELEASE ORAL PRN
Status: DISCONTINUED | OUTPATIENT
Start: 2024-04-05 | End: 2024-04-08 | Stop reason: HOSPADM

## 2024-04-05 RX ORDER — LABETALOL 100 MG/1
300 TABLET, FILM COATED ORAL EVERY 12 HOURS SCHEDULED
Status: DISCONTINUED | OUTPATIENT
Start: 2024-04-06 | End: 2024-04-08 | Stop reason: HOSPADM

## 2024-04-05 RX ORDER — GLUCAGON 1 MG/ML
1 KIT INJECTION PRN
Status: DISCONTINUED | OUTPATIENT
Start: 2024-04-05 | End: 2024-04-08 | Stop reason: HOSPADM

## 2024-04-05 RX ORDER — METOLAZONE 2.5 MG/1
5 TABLET ORAL
Status: DISCONTINUED | OUTPATIENT
Start: 2024-04-08 | End: 2024-04-06

## 2024-04-05 RX ORDER — HYDROCODONE BITARTRATE AND ACETAMINOPHEN 5; 325 MG/1; MG/1
1 TABLET ORAL EVERY 6 HOURS PRN
Status: DISCONTINUED | OUTPATIENT
Start: 2024-04-05 | End: 2024-04-06

## 2024-04-05 RX ORDER — POLYETHYLENE GLYCOL 3350 17 G/17G
17 POWDER, FOR SOLUTION ORAL DAILY PRN
Status: DISCONTINUED | OUTPATIENT
Start: 2024-04-05 | End: 2024-04-08 | Stop reason: HOSPADM

## 2024-04-05 RX ORDER — POTASSIUM CHLORIDE 7.45 MG/ML
10 INJECTION INTRAVENOUS PRN
Status: DISCONTINUED | OUTPATIENT
Start: 2024-04-05 | End: 2024-04-08 | Stop reason: HOSPADM

## 2024-04-05 RX ORDER — DEXTROSE MONOHYDRATE 100 MG/ML
INJECTION, SOLUTION INTRAVENOUS CONTINUOUS PRN
Status: DISCONTINUED | OUTPATIENT
Start: 2024-04-05 | End: 2024-04-08 | Stop reason: HOSPADM

## 2024-04-05 RX ORDER — SODIUM CHLORIDE 0.9 % (FLUSH) 0.9 %
5-40 SYRINGE (ML) INJECTION EVERY 12 HOURS SCHEDULED
Status: DISCONTINUED | OUTPATIENT
Start: 2024-04-06 | End: 2024-04-08 | Stop reason: HOSPADM

## 2024-04-05 RX ORDER — ONDANSETRON 4 MG/1
4 TABLET, ORALLY DISINTEGRATING ORAL EVERY 8 HOURS PRN
Status: DISCONTINUED | OUTPATIENT
Start: 2024-04-05 | End: 2024-04-08 | Stop reason: HOSPADM

## 2024-04-05 RX ORDER — IPRATROPIUM BROMIDE AND ALBUTEROL SULFATE 2.5; .5 MG/3ML; MG/3ML
1 SOLUTION RESPIRATORY (INHALATION) ONCE
Status: COMPLETED | OUTPATIENT
Start: 2024-04-05 | End: 2024-04-05

## 2024-04-05 RX ORDER — INSULIN LISPRO 100 [IU]/ML
0-4 INJECTION, SOLUTION INTRAVENOUS; SUBCUTANEOUS NIGHTLY
Status: DISCONTINUED | OUTPATIENT
Start: 2024-04-06 | End: 2024-04-08 | Stop reason: HOSPADM

## 2024-04-05 RX ORDER — ONDANSETRON 2 MG/ML
4 INJECTION INTRAMUSCULAR; INTRAVENOUS EVERY 6 HOURS PRN
Status: DISCONTINUED | OUTPATIENT
Start: 2024-04-05 | End: 2024-04-08 | Stop reason: HOSPADM

## 2024-04-05 RX ADMIN — ACETAMINOPHEN 650 MG: 325 TABLET ORAL at 22:11

## 2024-04-05 RX ADMIN — SODIUM CHLORIDE 500 ML: 9 INJECTION, SOLUTION INTRAVENOUS at 22:08

## 2024-04-05 RX ADMIN — IPRATROPIUM BROMIDE AND ALBUTEROL SULFATE 1 DOSE: 2.5; .5 SOLUTION RESPIRATORY (INHALATION) at 22:18

## 2024-04-05 RX ADMIN — SODIUM CHLORIDE 1000 ML: 9 INJECTION, SOLUTION INTRAVENOUS at 22:27

## 2024-04-05 RX ADMIN — LEVOFLOXACIN 750 MG: 5 INJECTION, SOLUTION INTRAVENOUS at 22:23

## 2024-04-05 ASSESSMENT — PAIN - FUNCTIONAL ASSESSMENT: PAIN_FUNCTIONAL_ASSESSMENT: NONE - DENIES PAIN

## 2024-04-05 ASSESSMENT — LIFESTYLE VARIABLES
HOW MANY STANDARD DRINKS CONTAINING ALCOHOL DO YOU HAVE ON A TYPICAL DAY: PATIENT DOES NOT DRINK
HOW OFTEN DO YOU HAVE A DRINK CONTAINING ALCOHOL: NEVER

## 2024-04-06 LAB
ANION GAP SERPL CALCULATED.3IONS-SCNC: 10 MMOL/L (ref 3–16)
BASOPHILS # BLD: 0.02 K/UL (ref 0–0.2)
BASOPHILS NFR BLD: 0 %
BUN SERPL-MCNC: 23 MG/DL (ref 7–20)
CALCIUM SERPL-MCNC: 8.8 MG/DL (ref 8.3–10.6)
CHLORIDE SERPL-SCNC: 102 MMOL/L (ref 99–110)
CO2 SERPL-SCNC: 29 MMOL/L (ref 21–32)
CREAT SERPL-MCNC: 1.7 MG/DL (ref 0.6–1.2)
EOSINOPHIL # BLD: 0 K/UL (ref 0–0.6)
EOSINOPHILS RELATIVE PERCENT: 0 %
ERYTHROCYTE [DISTWIDTH] IN BLOOD BY AUTOMATED COUNT: 16.1 % (ref 12.4–15.4)
GFR SERPL CREATININE-BSD FRML MDRD: 33 ML/MIN/1.73M2
GLUCOSE BLD-MCNC: 112 MG/DL (ref 70–99)
GLUCOSE BLD-MCNC: 112 MG/DL (ref 70–99)
GLUCOSE BLD-MCNC: 119 MG/DL (ref 70–99)
GLUCOSE BLD-MCNC: 140 MG/DL (ref 70–99)
GLUCOSE BLD-MCNC: 144 MG/DL (ref 70–99)
GLUCOSE SERPL-MCNC: 155 MG/DL (ref 70–99)
HCT VFR BLD AUTO: 40 % (ref 36–48)
HGB BLD-MCNC: 12.2 G/DL (ref 12–16)
IMM GRANULOCYTES # BLD AUTO: 0.05 K/UL (ref 0–0.5)
IMM GRANULOCYTES NFR BLD: 0 %
LYMPHOCYTES NFR BLD: 0.47 K/UL (ref 1–5.1)
LYMPHOCYTES RELATIVE PERCENT: 3 %
MCH RBC QN AUTO: 27.7 PG (ref 26–34)
MCHC RBC AUTO-ENTMCNC: 30.5 G/DL (ref 31–36)
MCV RBC AUTO: 90.7 FL (ref 80–100)
MONOCYTES NFR BLD: 1.01 K/UL (ref 0–1.3)
MONOCYTES NFR BLD: 6 %
NEUTROPHILS NFR BLD: 91 %
NEUTS SEG NFR BLD: 15.96 K/UL (ref 1.7–7.7)
PLATELET # BLD AUTO: 155 K/UL (ref 135–450)
PMV BLD AUTO: 9.8 FL
POTASSIUM SERPL-SCNC: 4.5 MMOL/L (ref 3.5–5.1)
RBC # BLD AUTO: 4.41 M/UL (ref 4–5.2)
SODIUM SERPL-SCNC: 141 MMOL/L (ref 136–145)
TROPONIN I SERPL HS-MCNC: 55 NG/L (ref 0–14)
WBC OTHER # BLD: 17.5 K/UL (ref 4–11)

## 2024-04-06 PROCEDURE — 84484 ASSAY OF TROPONIN QUANT: CPT

## 2024-04-06 PROCEDURE — 6370000000 HC RX 637 (ALT 250 FOR IP): Performed by: STUDENT IN AN ORGANIZED HEALTH CARE EDUCATION/TRAINING PROGRAM

## 2024-04-06 PROCEDURE — 94760 N-INVAS EAR/PLS OXIMETRY 1: CPT

## 2024-04-06 PROCEDURE — 82962 GLUCOSE BLOOD TEST: CPT

## 2024-04-06 PROCEDURE — 2700000000 HC OXYGEN THERAPY PER DAY

## 2024-04-06 PROCEDURE — 85025 COMPLETE CBC W/AUTO DIFF WBC: CPT

## 2024-04-06 PROCEDURE — 1200000000 HC SEMI PRIVATE

## 2024-04-06 PROCEDURE — 6360000002 HC RX W HCPCS: Performed by: STUDENT IN AN ORGANIZED HEALTH CARE EDUCATION/TRAINING PROGRAM

## 2024-04-06 PROCEDURE — 2580000003 HC RX 258: Performed by: STUDENT IN AN ORGANIZED HEALTH CARE EDUCATION/TRAINING PROGRAM

## 2024-04-06 PROCEDURE — 36415 COLL VENOUS BLD VENIPUNCTURE: CPT

## 2024-04-06 PROCEDURE — 94761 N-INVAS EAR/PLS OXIMETRY MLT: CPT

## 2024-04-06 PROCEDURE — 6360000002 HC RX W HCPCS: Performed by: HOSPITALIST

## 2024-04-06 PROCEDURE — 83036 HEMOGLOBIN GLYCOSYLATED A1C: CPT

## 2024-04-06 PROCEDURE — 80048 BASIC METABOLIC PNL TOTAL CA: CPT

## 2024-04-06 RX ORDER — FUROSEMIDE 10 MG/ML
80 INJECTION INTRAMUSCULAR; INTRAVENOUS 2 TIMES DAILY
Status: DISCONTINUED | OUTPATIENT
Start: 2024-04-06 | End: 2024-04-06

## 2024-04-06 RX ORDER — FUROSEMIDE 10 MG/ML
40 INJECTION INTRAMUSCULAR; INTRAVENOUS 2 TIMES DAILY
Status: DISCONTINUED | OUTPATIENT
Start: 2024-04-06 | End: 2024-04-06

## 2024-04-06 RX ORDER — HYDROCODONE BITARTRATE AND ACETAMINOPHEN 5; 325 MG/1; MG/1
0.5 TABLET ORAL EVERY 4 HOURS PRN
Status: DISCONTINUED | OUTPATIENT
Start: 2024-04-06 | End: 2024-04-08 | Stop reason: HOSPADM

## 2024-04-06 RX ORDER — FUROSEMIDE 10 MG/ML
80 INJECTION INTRAMUSCULAR; INTRAVENOUS 2 TIMES DAILY
Status: DISCONTINUED | OUTPATIENT
Start: 2024-04-06 | End: 2024-04-08 | Stop reason: HOSPADM

## 2024-04-06 RX ADMIN — POTASSIUM CHLORIDE 40 MEQ: 1500 TABLET, EXTENDED RELEASE ORAL at 21:01

## 2024-04-06 RX ADMIN — ALLOPURINOL 200 MG: 100 TABLET ORAL at 09:34

## 2024-04-06 RX ADMIN — Medication 1 TABLET: at 10:11

## 2024-04-06 RX ADMIN — SODIUM CHLORIDE, PRESERVATIVE FREE 10 ML: 5 INJECTION INTRAVENOUS at 09:35

## 2024-04-06 RX ADMIN — SALINE NASAL SPRAY 1 SPRAY: 1.5 SOLUTION NASAL at 01:04

## 2024-04-06 RX ADMIN — SODIUM CHLORIDE, PRESERVATIVE FREE 10 ML: 5 INJECTION INTRAVENOUS at 21:02

## 2024-04-06 RX ADMIN — INSULIN GLARGINE 25 UNITS: 100 INJECTION, SOLUTION SUBCUTANEOUS at 21:00

## 2024-04-06 RX ADMIN — POTASSIUM CHLORIDE 40 MEQ: 1500 TABLET, EXTENDED RELEASE ORAL at 10:11

## 2024-04-06 RX ADMIN — HYDROCODONE BITARTRATE AND ACETAMINOPHEN 0.5 TABLET: 5; 325 TABLET ORAL at 06:54

## 2024-04-06 RX ADMIN — SALINE NASAL SPRAY 1 SPRAY: 1.5 SOLUTION NASAL at 16:10

## 2024-04-06 RX ADMIN — LABETALOL HYDROCHLORIDE 300 MG: 100 TABLET, FILM COATED ORAL at 10:11

## 2024-04-06 RX ADMIN — HYDROCODONE BITARTRATE AND ACETAMINOPHEN 0.5 TABLET: 5; 325 TABLET ORAL at 20:04

## 2024-04-06 RX ADMIN — SALINE NASAL SPRAY 1 SPRAY: 1.5 SOLUTION NASAL at 10:45

## 2024-04-06 RX ADMIN — HYDROCODONE BITARTRATE AND ACETAMINOPHEN 0.5 TABLET: 5; 325 TABLET ORAL at 11:54

## 2024-04-06 RX ADMIN — LABETALOL HYDROCHLORIDE 300 MG: 100 TABLET, FILM COATED ORAL at 21:01

## 2024-04-06 RX ADMIN — POTASSIUM CHLORIDE 40 MEQ: 1500 TABLET, EXTENDED RELEASE ORAL at 16:10

## 2024-04-06 RX ADMIN — INSULIN GLARGINE 25 UNITS: 100 INJECTION, SOLUTION SUBCUTANEOUS at 01:23

## 2024-04-06 RX ADMIN — HYDROCODONE BITARTRATE AND ACETAMINOPHEN 1 TABLET: 5; 325 TABLET ORAL at 01:28

## 2024-04-06 RX ADMIN — FUROSEMIDE 80 MG: 10 INJECTION, SOLUTION INTRAMUSCULAR; INTRAVENOUS at 02:02

## 2024-04-06 RX ADMIN — WATER 1000 MG: 1 INJECTION INTRAMUSCULAR; INTRAVENOUS; SUBCUTANEOUS at 09:42

## 2024-04-06 RX ADMIN — FUROSEMIDE 80 MG: 10 INJECTION, SOLUTION INTRAMUSCULAR; INTRAVENOUS at 18:10

## 2024-04-06 RX ADMIN — AZITHROMYCIN MONOHYDRATE 500 MG: 500 INJECTION, POWDER, LYOPHILIZED, FOR SOLUTION INTRAVENOUS at 10:10

## 2024-04-06 RX ADMIN — HYDRALAZINE HYDROCHLORIDE 10 MG: 20 INJECTION, SOLUTION INTRAMUSCULAR; INTRAVENOUS at 04:40

## 2024-04-06 RX ADMIN — HYDROCODONE BITARTRATE AND ACETAMINOPHEN 0.5 TABLET: 5; 325 TABLET ORAL at 16:08

## 2024-04-06 ASSESSMENT — PAIN - FUNCTIONAL ASSESSMENT
PAIN_FUNCTIONAL_ASSESSMENT: ACTIVITIES ARE NOT PREVENTED

## 2024-04-06 ASSESSMENT — PAIN DESCRIPTION - ORIENTATION
ORIENTATION: LOWER

## 2024-04-06 ASSESSMENT — PAIN DESCRIPTION - LOCATION
LOCATION: BACK

## 2024-04-06 ASSESSMENT — PAIN DESCRIPTION - FREQUENCY
FREQUENCY: CONTINUOUS
FREQUENCY: INTERMITTENT
FREQUENCY: INTERMITTENT
FREQUENCY: CONTINUOUS
FREQUENCY: INTERMITTENT

## 2024-04-06 ASSESSMENT — PAIN SCALES - GENERAL
PAINLEVEL_OUTOF10: 6
PAINLEVEL_OUTOF10: 0
PAINLEVEL_OUTOF10: 7
PAINLEVEL_OUTOF10: 7
PAINLEVEL_OUTOF10: 8
PAINLEVEL_OUTOF10: 5
PAINLEVEL_OUTOF10: 8
PAINLEVEL_OUTOF10: 2
PAINLEVEL_OUTOF10: 7
PAINLEVEL_OUTOF10: 0
PAINLEVEL_OUTOF10: 0

## 2024-04-06 ASSESSMENT — PAIN DESCRIPTION - PAIN TYPE
TYPE: CHRONIC PAIN

## 2024-04-06 ASSESSMENT — PAIN DESCRIPTION - DESCRIPTORS
DESCRIPTORS: ACHING

## 2024-04-06 ASSESSMENT — PAIN DESCRIPTION - ONSET
ONSET: ON-GOING

## 2024-04-06 NOTE — H&P
therapeutic multivitamin-minerals  1 tablet Oral Daily    sodium chloride flush  5-40 mL IntraVENous 2 times per day    enoxaparin  30 mg SubCUTAneous BID    cefTRIAXone (ROCEPHIN) IV  1,000 mg IntraVENous Q24H    azithromycin  500 mg IntraVENous Q24H    insulin glargine  25 Units SubCUTAneous BID    insulin lispro  0-4 Units SubCUTAneous TID WC    insulin lispro  0-4 Units SubCUTAneous Nightly    insulin lispro  10 Units SubCUTAneous TID WC    labetalol  300 mg Oral 2 times per day      Infusions:    dextrose       PRN Meds: HYDROcodone-acetaminophen, 0.5 tablet, Q4H PRN  sodium chloride flush, 5-40 mL, PRN  potassium chloride, 40 mEq, PRN   Or  potassium alternative oral replacement, 40 mEq, PRN   Or  potassium chloride, 10 mEq, PRN  magnesium sulfate, 2,000 mg, PRN  ondansetron, 4 mg, Q8H PRN   Or  ondansetron, 4 mg, Q6H PRN  polyethylene glycol, 17 g, Daily PRN  acetaminophen, 650 mg, Q6H PRN  glucose, 4 tablet, PRN  dextrose bolus, 125 mL, PRN   Or  dextrose bolus, 250 mL, PRN  glucagon (rDNA), 1 mg, PRN  dextrose, , Continuous PRN  hydrALAZINE, 10 mg, Q6H PRN        Labs      CBC:   Recent Labs     04/05/24 2205   WBC 15.3*   HGB 13.3        BMP:    Recent Labs     04/05/24 2205      K 4.6      CO2 27   BUN 24*   CREATININE 1.5*   GLUCOSE 157*     Hepatic:   Recent Labs     04/05/24 2205   AST 22   ALT 15   BILITOT 0.5   ALKPHOS 104     Lipids:   Lab Results   Component Value Date/Time    CHOL 151 03/08/2022 10:31 AM    HDL 31 07/24/2023 09:46 AM    TRIG 307 03/08/2022 10:31 AM     Hemoglobin A1C:   Lab Results   Component Value Date/Time    LABA1C 7.0 07/24/2023 09:46 AM     TSH: No results found for: \"TSH\"  Troponin: No results found for: \"TROPONINT\"  Lactic Acid:   Recent Labs     04/05/24 2205   LACTA 1.5     BNP:   Recent Labs     04/05/24 2205   PROBNP 345*     UA:  Lab Results   Component Value Date/Time    NITRU POSITIVE 06/08/2022 03:06 PM    COLORU Yellow 06/08/2022 03:06 PM

## 2024-04-06 NOTE — CONSULTS
[facility]   Nephrology progress Note  [phone]   [place]         Reason for Consult:  CKD/volume overload    HISTORY OF PRESENT ILLNESS:      The patient is a 68 y.o. female with significant past medical history of CKD IIIb follows with Dr. Davison, morbid obesity, diabetes, hypertension who presents with shortness of breath. Noted to have pneumonia on admission. Reports not gaining much water weight since being on torsemide. Renal indices have been stable. She gained weight due to insulin which has been an ongoing issue. Was hypertensive on arrival. Given lasix on admission. Nephrology is consulted for CKD and volume overload. Creatinine near baseline 1.4-1.7 mg/dL.     Past Medical History:        Diagnosis Date    Anemia     Chronic bilateral low back pain without sciatica     CKD (chronic kidney disease) stage 3, GFR 30-59 ml/min (HCC)     Colon cancer (HCC)     Diabetes (HCC)     Diabetes (HCC)     Diabetes with retinopathy (HCC)     DM eye exam 12/18/17    Hypertension     Pancreatic cyst     Positive FIT (fecal immunochemical test)     Pulmonary hypertension (HCC)        Past Surgical History:        Procedure Laterality Date    COLONOSCOPY N/A 11/5/2018    COLONOSCOPY WITH BIOPSY performed by Georgi Hanks MD at [facility] ENDOSCOPY    COLONOSCOPY  05/14/2019    Colonoscopy with polypectomy (cold snare)    COLONOSCOPY N/A 5/14/2019    COLONOSCOPY POLYPECTOMY SNARE/COLD BIOPSY performed by Georgi Hanks MD at [facility] ENDOSCOPY    HEMICOLECTOMY Right 11/08/2018    LAPAROSCOPIC RIGHT HEMICOLECTOMY and umbilical hernia repair    [place] OFFICE/OUTPT VISIT,PROCEDURE ONLY Right 11/8/2018    LAPAROSCOPIC RIGHT HEMICOLECTOMY and umbilical hernia repair performed by Channing Jaime MD at [facility] OR    UPPER GASTROINTESTINAL ENDOSCOPY N/A 11/5/2018    EGD BIOPSY performed by Georgi Hanks MD at [facility] ENDOSCOPY    WISDOM TOOTH EXTRACTION         Current Medications:    No current

## 2024-04-06 NOTE — ED NOTES
#20 G PIV placed into RT hand without difficulty  Blood culture and lactic drawn  PIV inadvertently pulled out, immediately after blood draw    
3221 @ [facility]9  
Lactic resulted 1.5  Sepsis bundle complete  
Patient is tachypneic, pale, and speaking in 3-4 word sentences.  SPO2 probe placed on finger with 62% RA  6L per NC placed and patient coached thru pursed lip breathing technique  Charge RN notified and respiratory therapy contacted.    
Unsuccessful PIV attempt x 2  Charge nurse notified   
    Pertinent or High Risk Medications/Drips: no   If Yes, please provide details: NA  Pending Blood Product Administration: no       You may also review the ED PT Care Timeline found under the Summary Nursing Index tab.    Recommendation    Pending orders NO  Plan for Discharge (if known):   Additional Comments: SEPSIS alert in ED - sepsis bundle complete   If any further questions, please call Sending RN at ED    Electronically signed by: Electronically signed by Aaliyah Hauser RN on 4/5/2024 at 10:59 PM    
L foot wound / red

## 2024-04-06 NOTE — ED PROVIDER NOTES
Lisinopril Other (See Comments)     cough       PHYSICAL EXAM  VITAL SIGNS:    ED Triage Vitals [04/05/24 2155]   Enc Vitals Group      BP (!) 163/108      Pulse (!) 120      Respirations 26      Temp (!) 101.1 °F (38.4 °C)      Temp Source Oral      SpO2 (!) 62 %      Weight       Height 1.6 m (5' 3\")      Head Circumference       Peak Flow       Pain Score       Pain Loc       Pain Edu?       Excl. in GC?       Physical Exam  Vitals and nursing note reviewed.   Constitutional:       General: She is in acute distress.      Appearance: She is ill-appearing. She is not toxic-appearing.   HENT:      Head: Normocephalic and atraumatic.   Cardiovascular:      Rate and Rhythm: Tachycardia present. Rhythm irregular.   Pulmonary:      Effort: Tachypnea, accessory muscle usage and respiratory distress present.      Breath sounds: Rhonchi present.   Musculoskeletal:         General: Normal range of motion.      Right lower leg: Edema present.      Left lower leg: Edema present.   Skin:     General: Skin is warm and dry.      Nails: There is no clubbing.   Neurological:      General: No focal deficit present.      Mental Status: She is alert and oriented to person, place, and time.   Psychiatric:         Mood and Affect: Mood is anxious.        BRIEF DIFFERENTIAL DIAGNOSIS  1.  Pneumonia  2.  PE  3.  Heart failure  4.  COPD  5.  Sepsis  6.  Viral illness    INDEPENDENT EKG INTERPRETATION:  The Ekg interpreted by me in the absence of a cardiologist shows.  Sinus tachycardia with PACs with a rate of 114  Axis is left axis deviation  QTc is normal  Left anterior fascicular block noted with LVH  No specific ST-T wave changes appreciated.  No evidence of acute ischemia.     LABS  I have personally reviewed all labs for this visit.   Results for orders placed or performed during the hospital encounter of 04/05/24   COVID-19, Rapid    Specimen: Nasopharyngeal Swab   Result Value Ref Range    Specimen Description .NASOPHARYNGEAL

## 2024-04-06 NOTE — ED TRIAGE NOTES
Patient presents today w/ c/o shaking and SOB beginning at 1630 similar to when she had PNA 1 year ago

## 2024-04-07 LAB
ANION GAP SERPL CALCULATED.3IONS-SCNC: 9 MMOL/L (ref 3–16)
BASOPHILS # BLD: 0.01 K/UL (ref 0–0.2)
BASOPHILS NFR BLD: 0 %
BUN SERPL-MCNC: 28 MG/DL (ref 7–20)
CALCIUM SERPL-MCNC: 8.6 MG/DL (ref 8.3–10.6)
CHLORIDE SERPL-SCNC: 104 MMOL/L (ref 99–110)
CO2 SERPL-SCNC: 29 MMOL/L (ref 21–32)
CREAT SERPL-MCNC: 1.7 MG/DL (ref 0.6–1.2)
EOSINOPHIL # BLD: 0.1 K/UL (ref 0–0.6)
EOSINOPHILS RELATIVE PERCENT: 1 %
ERYTHROCYTE [DISTWIDTH] IN BLOOD BY AUTOMATED COUNT: 16 % (ref 12.4–15.4)
EST. AVERAGE GLUCOSE BLD GHB EST-MCNC: 146 MG/DL
GFR SERPL CREATININE-BSD FRML MDRD: 32 ML/MIN/1.73M2
GLUCOSE BLD-MCNC: 107 MG/DL (ref 70–99)
GLUCOSE BLD-MCNC: 107 MG/DL (ref 70–99)
GLUCOSE BLD-MCNC: 127 MG/DL (ref 70–99)
GLUCOSE BLD-MCNC: 83 MG/DL (ref 70–99)
GLUCOSE BLD-MCNC: 97 MG/DL (ref 70–99)
GLUCOSE BLD-MCNC: 99 MG/DL (ref 70–99)
GLUCOSE SERPL-MCNC: 128 MG/DL (ref 70–99)
HBA1C MFR BLD: 6.7 %
HCT VFR BLD AUTO: 37 % (ref 36–48)
HGB BLD-MCNC: 11 G/DL (ref 12–16)
IMM GRANULOCYTES # BLD AUTO: 0.01 K/UL (ref 0–0.5)
IMM GRANULOCYTES NFR BLD: 0 %
LYMPHOCYTES NFR BLD: 1.13 K/UL (ref 1–5.1)
LYMPHOCYTES RELATIVE PERCENT: 15 %
MCH RBC QN AUTO: 27.4 PG (ref 26–34)
MCHC RBC AUTO-ENTMCNC: 29.7 G/DL (ref 31–36)
MCV RBC AUTO: 92.3 FL (ref 80–100)
MONOCYTES NFR BLD: 0.72 K/UL (ref 0–1.3)
MONOCYTES NFR BLD: 10 %
NEUTROPHILS NFR BLD: 74 %
NEUTS SEG NFR BLD: 5.49 K/UL (ref 1.7–7.7)
PLATELET # BLD AUTO: 136 K/UL (ref 135–450)
PMV BLD AUTO: 9.7 FL (ref 9.4–12.4)
POTASSIUM SERPL-SCNC: 4.2 MMOL/L (ref 3.5–5.1)
RBC # BLD AUTO: 4.01 M/UL (ref 4–5.2)
SODIUM SERPL-SCNC: 142 MMOL/L (ref 136–145)
WBC OTHER # BLD: 7.5 K/UL (ref 4–11)

## 2024-04-07 PROCEDURE — 80048 BASIC METABOLIC PNL TOTAL CA: CPT

## 2024-04-07 PROCEDURE — 82962 GLUCOSE BLOOD TEST: CPT

## 2024-04-07 PROCEDURE — 6360000002 HC RX W HCPCS: Performed by: HOSPITALIST

## 2024-04-07 PROCEDURE — 36415 COLL VENOUS BLD VENIPUNCTURE: CPT

## 2024-04-07 PROCEDURE — 2580000003 HC RX 258: Performed by: STUDENT IN AN ORGANIZED HEALTH CARE EDUCATION/TRAINING PROGRAM

## 2024-04-07 PROCEDURE — 6360000002 HC RX W HCPCS: Performed by: STUDENT IN AN ORGANIZED HEALTH CARE EDUCATION/TRAINING PROGRAM

## 2024-04-07 PROCEDURE — 2700000000 HC OXYGEN THERAPY PER DAY

## 2024-04-07 PROCEDURE — 94761 N-INVAS EAR/PLS OXIMETRY MLT: CPT

## 2024-04-07 PROCEDURE — 6370000000 HC RX 637 (ALT 250 FOR IP): Performed by: STUDENT IN AN ORGANIZED HEALTH CARE EDUCATION/TRAINING PROGRAM

## 2024-04-07 PROCEDURE — 85025 COMPLETE CBC W/AUTO DIFF WBC: CPT

## 2024-04-07 PROCEDURE — 6370000000 HC RX 637 (ALT 250 FOR IP): Performed by: HOSPITALIST

## 2024-04-07 PROCEDURE — 1200000000 HC SEMI PRIVATE

## 2024-04-07 RX ORDER — INSULIN GLARGINE 100 [IU]/ML
20 INJECTION, SOLUTION SUBCUTANEOUS 2 TIMES DAILY
Status: DISCONTINUED | OUTPATIENT
Start: 2024-04-07 | End: 2024-04-08

## 2024-04-07 RX ADMIN — HYDROCODONE BITARTRATE AND ACETAMINOPHEN 0.5 TABLET: 5; 325 TABLET ORAL at 00:09

## 2024-04-07 RX ADMIN — HYDROCODONE BITARTRATE AND ACETAMINOPHEN 0.5 TABLET: 5; 325 TABLET ORAL at 13:37

## 2024-04-07 RX ADMIN — LABETALOL HYDROCHLORIDE 300 MG: 100 TABLET, FILM COATED ORAL at 09:41

## 2024-04-07 RX ADMIN — SODIUM CHLORIDE, PRESERVATIVE FREE 10 ML: 5 INJECTION INTRAVENOUS at 20:43

## 2024-04-07 RX ADMIN — POTASSIUM CHLORIDE 40 MEQ: 1500 TABLET, EXTENDED RELEASE ORAL at 14:56

## 2024-04-07 RX ADMIN — POTASSIUM CHLORIDE 40 MEQ: 1500 TABLET, EXTENDED RELEASE ORAL at 20:43

## 2024-04-07 RX ADMIN — AZITHROMYCIN MONOHYDRATE 500 MG: 500 INJECTION, POWDER, LYOPHILIZED, FOR SOLUTION INTRAVENOUS at 10:17

## 2024-04-07 RX ADMIN — INSULIN GLARGINE 20 UNITS: 100 INJECTION, SOLUTION SUBCUTANEOUS at 20:43

## 2024-04-07 RX ADMIN — LABETALOL HYDROCHLORIDE 300 MG: 100 TABLET, FILM COATED ORAL at 20:42

## 2024-04-07 RX ADMIN — SALINE NASAL SPRAY 1 SPRAY: 1.5 SOLUTION NASAL at 10:19

## 2024-04-07 RX ADMIN — POTASSIUM CHLORIDE 40 MEQ: 1500 TABLET, EXTENDED RELEASE ORAL at 09:41

## 2024-04-07 RX ADMIN — HYDROCODONE BITARTRATE AND ACETAMINOPHEN 0.5 TABLET: 5; 325 TABLET ORAL at 17:44

## 2024-04-07 RX ADMIN — ALLOPURINOL 200 MG: 100 TABLET ORAL at 09:41

## 2024-04-07 RX ADMIN — HYDROCODONE BITARTRATE AND ACETAMINOPHEN 0.5 TABLET: 5; 325 TABLET ORAL at 04:17

## 2024-04-07 RX ADMIN — Medication 1 TABLET: at 09:41

## 2024-04-07 RX ADMIN — HYDROCODONE BITARTRATE AND ACETAMINOPHEN 0.5 TABLET: 5; 325 TABLET ORAL at 09:31

## 2024-04-07 RX ADMIN — SALINE NASAL SPRAY 1 SPRAY: 1.5 SOLUTION NASAL at 17:11

## 2024-04-07 RX ADMIN — HYDROCODONE BITARTRATE AND ACETAMINOPHEN 0.5 TABLET: 5; 325 TABLET ORAL at 21:45

## 2024-04-07 RX ADMIN — SALINE NASAL SPRAY 1 SPRAY: 1.5 SOLUTION NASAL at 00:09

## 2024-04-07 RX ADMIN — FUROSEMIDE 80 MG: 10 INJECTION, SOLUTION INTRAMUSCULAR; INTRAVENOUS at 18:45

## 2024-04-07 RX ADMIN — MONTELUKAST 10 MG: 10 TABLET, FILM COATED ORAL at 09:41

## 2024-04-07 RX ADMIN — FUROSEMIDE 80 MG: 10 INJECTION, SOLUTION INTRAMUSCULAR; INTRAVENOUS at 09:43

## 2024-04-07 RX ADMIN — SODIUM CHLORIDE, PRESERVATIVE FREE 10 ML: 5 INJECTION INTRAVENOUS at 10:12

## 2024-04-07 RX ADMIN — WATER 1000 MG: 1 INJECTION INTRAMUSCULAR; INTRAVENOUS; SUBCUTANEOUS at 09:59

## 2024-04-07 ASSESSMENT — PAIN DESCRIPTION - ONSET
ONSET: ON-GOING

## 2024-04-07 ASSESSMENT — PAIN DESCRIPTION - DESCRIPTORS
DESCRIPTORS: ACHING

## 2024-04-07 ASSESSMENT — PAIN DESCRIPTION - ORIENTATION
ORIENTATION: LOWER

## 2024-04-07 ASSESSMENT — PAIN DESCRIPTION - LOCATION
LOCATION: BACK

## 2024-04-07 ASSESSMENT — PAIN DESCRIPTION - FREQUENCY
FREQUENCY: CONTINUOUS

## 2024-04-07 ASSESSMENT — PAIN SCALES - GENERAL
PAINLEVEL_OUTOF10: 7
PAINLEVEL_OUTOF10: 7
PAINLEVEL_OUTOF10: 0
PAINLEVEL_OUTOF10: 2
PAINLEVEL_OUTOF10: 7
PAINLEVEL_OUTOF10: 0
PAINLEVEL_OUTOF10: 0
PAINLEVEL_OUTOF10: 2
PAINLEVEL_OUTOF10: 4
PAINLEVEL_OUTOF10: 6
PAINLEVEL_OUTOF10: 0
PAINLEVEL_OUTOF10: 2
PAINLEVEL_OUTOF10: 5
PAINLEVEL_OUTOF10: 4

## 2024-04-07 ASSESSMENT — PAIN DESCRIPTION - PAIN TYPE
TYPE: CHRONIC PAIN

## 2024-04-07 ASSESSMENT — PAIN - FUNCTIONAL ASSESSMENT
PAIN_FUNCTIONAL_ASSESSMENT: ACTIVITIES ARE NOT PREVENTED

## 2024-04-07 NOTE — ACP (ADVANCE CARE PLANNING)
Advanced Care Planning Note:     Purpose of Encounter: Advanced care planning in light of hospitalization    Parties In Attendance: Patient,       Decisional Capacity: Yes    Subjective: Patient understands that this conversation for any life threatening event and his future wishes to address long term care goal if situations arise that prevent the ability to personally give input    Objective: Patient is admitted to the hospital with Acute hypoxic respiratory failure, PNA and volume overload      Goals of Care Determination: Patient would like to pursue full care with all aggressive measures.      Code Status: Full code    Time spent on Advanced care Plannin minutes    Advanced Care Planning Documents: documented patient's wishes, would like her  - Laith Ortiz, to make medical decisions for her if she unable to make decisions.    Edmund Saez MD  2024 8:32 AM

## 2024-04-08 VITALS
DIASTOLIC BLOOD PRESSURE: 73 MMHG | TEMPERATURE: 98.1 F | OXYGEN SATURATION: 98 % | HEART RATE: 80 BPM | SYSTOLIC BLOOD PRESSURE: 152 MMHG | BODY MASS INDEX: 51.91 KG/M2 | WEIGHT: 293 LBS | RESPIRATION RATE: 18 BRPM | HEIGHT: 63 IN

## 2024-04-08 LAB
ANION GAP SERPL CALCULATED.3IONS-SCNC: 9 MMOL/L (ref 3–16)
BASOPHILS # BLD: 0.02 K/UL (ref 0–0.2)
BASOPHILS NFR BLD: 0 %
BUN SERPL-MCNC: 29 MG/DL (ref 7–20)
CALCIUM SERPL-MCNC: 8.6 MG/DL (ref 8.3–10.6)
CHLORIDE SERPL-SCNC: 101 MMOL/L (ref 99–110)
CO2 SERPL-SCNC: 31 MMOL/L (ref 21–32)
CREAT SERPL-MCNC: 1.8 MG/DL (ref 0.6–1.2)
EKG ATRIAL RATE: 114 BPM
EKG DIAGNOSIS: NORMAL
EKG P AXIS: 68 DEGREES
EKG P-R INTERVAL: 170 MS
EKG Q-T INTERVAL: 326 MS
EKG QRS DURATION: 92 MS
EKG QTC CALCULATION (BAZETT): 449 MS
EKG R AXIS: -68 DEGREES
EKG T AXIS: 78 DEGREES
EKG VENTRICULAR RATE: 114 BPM
EOSINOPHIL # BLD: 0.17 K/UL (ref 0–0.6)
EOSINOPHILS RELATIVE PERCENT: 2 %
ERYTHROCYTE [DISTWIDTH] IN BLOOD BY AUTOMATED COUNT: 15.9 % (ref 12.4–15.4)
GFR SERPL CREATININE-BSD FRML MDRD: 30 ML/MIN/1.73M2
GLUCOSE BLD-MCNC: 116 MG/DL (ref 70–99)
GLUCOSE BLD-MCNC: 79 MG/DL (ref 70–99)
GLUCOSE SERPL-MCNC: 94 MG/DL (ref 70–99)
HCT VFR BLD AUTO: 37.1 % (ref 36–48)
HGB BLD-MCNC: 11.2 G/DL (ref 12–16)
IMM GRANULOCYTES # BLD AUTO: 0.02 K/UL (ref 0–0.5)
IMM GRANULOCYTES NFR BLD: 0 %
LYMPHOCYTES NFR BLD: 1.08 K/UL (ref 1–5.1)
LYMPHOCYTES RELATIVE PERCENT: 13 %
MCH RBC QN AUTO: 27.7 PG (ref 26–34)
MCHC RBC AUTO-ENTMCNC: 30.2 G/DL (ref 31–36)
MCV RBC AUTO: 91.8 FL (ref 80–100)
MONOCYTES NFR BLD: 0.9 K/UL (ref 0–1.3)
MONOCYTES NFR BLD: 11 %
NEUTROPHILS NFR BLD: 73 %
NEUTS SEG NFR BLD: 5.94 K/UL (ref 1.7–7.7)
PLATELET # BLD AUTO: 142 K/UL (ref 135–450)
PMV BLD AUTO: 9.8 FL
POTASSIUM SERPL-SCNC: 4.2 MMOL/L (ref 3.5–5.1)
RBC # BLD AUTO: 4.04 M/UL (ref 4–5.2)
SODIUM SERPL-SCNC: 141 MMOL/L (ref 136–145)
WBC OTHER # BLD: 8.1 K/UL (ref 4–11)

## 2024-04-08 PROCEDURE — 6370000000 HC RX 637 (ALT 250 FOR IP): Performed by: STUDENT IN AN ORGANIZED HEALTH CARE EDUCATION/TRAINING PROGRAM

## 2024-04-08 PROCEDURE — 85025 COMPLETE CBC W/AUTO DIFF WBC: CPT

## 2024-04-08 PROCEDURE — 2700000000 HC OXYGEN THERAPY PER DAY

## 2024-04-08 PROCEDURE — 6360000002 HC RX W HCPCS: Performed by: STUDENT IN AN ORGANIZED HEALTH CARE EDUCATION/TRAINING PROGRAM

## 2024-04-08 PROCEDURE — 93306 TTE W/DOPPLER COMPLETE: CPT

## 2024-04-08 PROCEDURE — 36415 COLL VENOUS BLD VENIPUNCTURE: CPT

## 2024-04-08 PROCEDURE — 94618 PULMONARY STRESS TESTING: CPT

## 2024-04-08 PROCEDURE — 94761 N-INVAS EAR/PLS OXIMETRY MLT: CPT

## 2024-04-08 PROCEDURE — 2580000003 HC RX 258: Performed by: STUDENT IN AN ORGANIZED HEALTH CARE EDUCATION/TRAINING PROGRAM

## 2024-04-08 PROCEDURE — 80048 BASIC METABOLIC PNL TOTAL CA: CPT

## 2024-04-08 PROCEDURE — 82962 GLUCOSE BLOOD TEST: CPT

## 2024-04-08 RX ORDER — AZITHROMYCIN 500 MG/1
500 TABLET, FILM COATED ORAL DAILY
Qty: 3 TABLET | Refills: 0 | Status: SHIPPED | OUTPATIENT
Start: 2024-04-08 | End: 2024-04-13

## 2024-04-08 RX ORDER — INSULIN GLARGINE 100 [IU]/ML
20 INJECTION, SOLUTION SUBCUTANEOUS NIGHTLY
Status: DISCONTINUED | OUTPATIENT
Start: 2024-04-08 | End: 2024-04-08 | Stop reason: HOSPADM

## 2024-04-08 RX ORDER — INSULIN DETEMIR 100 [IU]/ML
10 INJECTION, SOLUTION SUBCUTANEOUS 2 TIMES DAILY
Refills: 0 | COMMUNITY
Start: 2024-04-08

## 2024-04-08 RX ORDER — AMLODIPINE BESYLATE 5 MG/1
5 TABLET ORAL DAILY
Status: DISCONTINUED | OUTPATIENT
Start: 2024-04-08 | End: 2024-04-08 | Stop reason: HOSPADM

## 2024-04-08 RX ORDER — AMLODIPINE BESYLATE 5 MG/1
5 TABLET ORAL DAILY
Qty: 30 TABLET | Refills: 3 | Status: SHIPPED | OUTPATIENT
Start: 2024-04-08

## 2024-04-08 RX ORDER — CEFUROXIME AXETIL 500 MG/1
500 TABLET ORAL 2 TIMES DAILY
Qty: 14 TABLET | Refills: 0 | Status: SHIPPED | OUTPATIENT
Start: 2024-04-08 | End: 2024-04-13

## 2024-04-08 RX ADMIN — SODIUM CHLORIDE, PRESERVATIVE FREE 10 ML: 5 INJECTION INTRAVENOUS at 08:34

## 2024-04-08 RX ADMIN — POTASSIUM CHLORIDE 40 MEQ: 1500 TABLET, EXTENDED RELEASE ORAL at 08:36

## 2024-04-08 RX ADMIN — SALINE NASAL SPRAY 1 SPRAY: 1.5 SOLUTION NASAL at 08:34

## 2024-04-08 RX ADMIN — WATER 1000 MG: 1 INJECTION INTRAMUSCULAR; INTRAVENOUS; SUBCUTANEOUS at 08:37

## 2024-04-08 RX ADMIN — Medication 1 TABLET: at 08:36

## 2024-04-08 RX ADMIN — HYDROCODONE BITARTRATE AND ACETAMINOPHEN 0.5 TABLET: 5; 325 TABLET ORAL at 06:52

## 2024-04-08 RX ADMIN — MONTELUKAST 10 MG: 10 TABLET, FILM COATED ORAL at 08:35

## 2024-04-08 RX ADMIN — LABETALOL HYDROCHLORIDE 300 MG: 100 TABLET, FILM COATED ORAL at 08:35

## 2024-04-08 RX ADMIN — AZITHROMYCIN MONOHYDRATE 500 MG: 500 INJECTION, POWDER, LYOPHILIZED, FOR SOLUTION INTRAVENOUS at 09:07

## 2024-04-08 RX ADMIN — ALLOPURINOL 200 MG: 100 TABLET ORAL at 08:35

## 2024-04-08 RX ADMIN — HYDROCODONE BITARTRATE AND ACETAMINOPHEN 0.5 TABLET: 5; 325 TABLET ORAL at 02:16

## 2024-04-08 ASSESSMENT — PAIN SCALES - GENERAL
PAINLEVEL_OUTOF10: 0
PAINLEVEL_OUTOF10: 6
PAINLEVEL_OUTOF10: 0
PAINLEVEL_OUTOF10: 0

## 2024-04-08 ASSESSMENT — PAIN DESCRIPTION - FREQUENCY: FREQUENCY: CONTINUOUS

## 2024-04-08 ASSESSMENT — PAIN - FUNCTIONAL ASSESSMENT: PAIN_FUNCTIONAL_ASSESSMENT: ACTIVITIES ARE NOT PREVENTED

## 2024-04-08 ASSESSMENT — PAIN DESCRIPTION - LOCATION: LOCATION: BACK

## 2024-04-08 ASSESSMENT — PAIN DESCRIPTION - PAIN TYPE: TYPE: CHRONIC PAIN

## 2024-04-08 ASSESSMENT — PAIN DESCRIPTION - ORIENTATION: ORIENTATION: LOWER

## 2024-04-08 ASSESSMENT — PAIN DESCRIPTION - DESCRIPTORS: DESCRIPTORS: ACHING

## 2024-04-08 ASSESSMENT — PAIN DESCRIPTION - ONSET: ONSET: ON-GOING

## 2024-04-08 NOTE — CARE COORDINATION
DISCHARGE ORDER  Date/Time 2024 11:20 AM  Completed by: JUAN DAVID Rodriguez, Case Management    Patient Name: Cindy Ortiz      : 1955  Admitting Diagnosis: Respiratory failure (HCC) [J96.90]  Acute respiratory failure with hypoxia and hypercapnia (HCC) [J96.01, J96.02]  Pneumonia due to infectious organism, unspecified laterality, unspecified part of lung [J18.9]  Sepsis, due to unspecified organism, unspecified whether acute organ dysfunction present (HCC) [A41.9]      Admit order Date and Status:2024  (verify MD's last order for status of admission)      Noted discharge order.   If applicable PT/OT recommendation at Discharge: no  Confirmed discharge plan: Yes  with whom pt.  If pt confirmed DC plan does family need to be contacted by CM No   Discharge Plan: Plan is home in care of family. Spouse to transport.    Date of Last IMM Given: 2024    Reviewed chart.  Role of discharge planner explained and patient verbalized understanding. Discharge order is noted.    Has Home O2 in place on admit:  No  Informed of need to bring portable home O2 tank on day of discharge for nursing to connect prior to leaving:   Not Indicated  Verbalized agreement/Understanding:   Not Indicated  Pt is being d/c'd to home in care of family. today. Pt's O2 sats are 87% on 2l.    Discharge timeout done with  family rn. All discharge needs and concerns addressed.

## 2024-04-08 NOTE — PLAN OF CARE
Problem: Safety - Adult  Goal: Free from fall injury  4/6/2024 2142 by Diana Boyd RN  Outcome: Progressing  4/6/2024 1405 by Jin Lopez RN  Outcome: Progressing  Flowsheets (Taken 4/6/2024 1405)  Free From Fall Injury:   Instruct family/caregiver on patient safety   Based on caregiver fall risk screen, instruct family/caregiver to ask for assistance with transferring infant if caregiver noted to have fall risk factors  4/6/2024 1405 by Jin Lopez RN  Outcome: Progressing  Flowsheets (Taken 4/6/2024 1405)  Free From Fall Injury:   Instruct family/caregiver on patient safety   Based on caregiver fall risk screen, instruct family/caregiver to ask for assistance with transferring infant if caregiver noted to have fall risk factors     Problem: Discharge Planning  Goal: Discharge to home or other facility with appropriate resources  4/6/2024 2142 by Diana Boyd RN  Outcome: Progressing  4/6/2024 1405 by Jin Lopez RN  Outcome: Progressing  Flowsheets (Taken 4/6/2024 1405)  Discharge to home or other facility with appropriate resources:   Identify barriers to discharge with patient and caregiver   Identify discharge learning needs (meds, wound care, etc)   Refer to discharge planning if patient needs post-hospital services based on physician order or complex needs related to functional status, cognitive ability or social support system   Arrange for needed discharge resources and transportation as appropriate   Arrange for interpreters to assist at discharge as needed  4/6/2024 1405 by Jin Lopez RN  Outcome: Progressing  Flowsheets (Taken 4/6/2024 1405)  Discharge to home or other facility with appropriate resources:   Identify barriers to discharge with patient and caregiver   Identify discharge learning needs (meds, wound care, etc)   Refer to discharge planning if patient needs post-hospital services based on physician order or complex needs related to functional status, cognitive ability or 
  Problem: Safety - Adult  Goal: Free from fall injury  4/8/2024 1127 by Ev Caballero RN  Outcome: Progressing  4/8/2024 0536 by Diana Boyd RN  Outcome: Progressing     Problem: Discharge Planning  Goal: Discharge to home or other facility with appropriate resources  4/8/2024 1127 by Ev Caballero RN  Outcome: Progressing  4/8/2024 0536 by Diana Boyd RN  Outcome: Progressing     Problem: Pain  Goal: Verbalizes/displays adequate comfort level or baseline comfort level  4/8/2024 1127 by Ev Caballero RN  Outcome: Progressing  4/8/2024 0536 by Diana Boyd RN  Outcome: Progressing     Problem: Chronic Conditions and Co-morbidities  Goal: Patient's chronic conditions and co-morbidity symptoms are monitored and maintained or improved  4/8/2024 1127 by Ev Caballero RN  Outcome: Progressing  4/8/2024 0536 by Diana Boyd RN  Outcome: Progressing     Problem: Skin/Tissue Integrity  Goal: Absence of new skin breakdown  Description: 1.  Monitor for areas of redness and/or skin breakdown  2.  Assess vascular access sites hourly  3.  Every 4-6 hours minimum:  Change oxygen saturation probe site  4.  Every 4-6 hours:  If on nasal continuous positive airway pressure, respiratory therapy assess nares and determine need for appliance change or resting period.  4/8/2024 1127 by Ev Caballero RN  Outcome: Progressing  4/8/2024 0536 by Diana Boyd RN  Outcome: Progressing     
  Problem: Safety - Adult  Goal: Free from fall injury  Outcome: Progressing     Problem: Discharge Planning  Goal: Discharge to home or other facility with appropriate resources  Outcome: Progressing     
  Problem: Safety - Adult  Goal: Free from fall injury  Outcome: Progressing     Problem: Discharge Planning  Goal: Discharge to home or other facility with appropriate resources  Outcome: Progressing     Problem: Pain  Goal: Verbalizes/displays adequate comfort level or baseline comfort level  Outcome: Progressing     Problem: Chronic Conditions and Co-morbidities  Goal: Patient's chronic conditions and co-morbidity symptoms are monitored and maintained or improved  Outcome: Progressing     Problem: Skin/Tissue Integrity  Goal: Absence of new skin breakdown  Description: 1.  Monitor for areas of redness and/or skin breakdown  2.  Assess vascular access sites hourly  3.  Every 4-6 hours minimum:  Change oxygen saturation probe site  4.  Every 4-6 hours:  If on nasal continuous positive airway pressure, respiratory therapy assess nares and determine need for appliance change or resting period.  Outcome: Progressing     
  Problem: Safety - Adult  Goal: Free from fall injury  Outcome: Progressing     Problem: Discharge Planning  Goal: Discharge to home or other facility with appropriate resources  Outcome: Progressing  Flowsheets (Taken 4/7/2024 1535)  Discharge to home or other facility with appropriate resources:   Identify barriers to discharge with patient and caregiver   Identify discharge learning needs (meds, wound care, etc)   Refer to discharge planning if patient needs post-hospital services based on physician order or complex needs related to functional status, cognitive ability or social support system   Arrange for needed discharge resources and transportation as appropriate   Arrange for interpreters to assist at discharge as needed     Problem: Pain  Goal: Verbalizes/displays adequate comfort level or baseline comfort level  Outcome: Progressing  Flowsheets (Taken 4/7/2024 1535)  Verbalizes/displays adequate comfort level or baseline comfort level:   Encourage patient to monitor pain and request assistance   Administer analgesics based on type and severity of pain and evaluate response   Consider cultural and social influences on pain and pain management   Assess pain using appropriate pain scale   Implement non-pharmacological measures as appropriate and evaluate response   Notify Licensed Independent Practitioner if interventions unsuccessful or patient reports new pain     Problem: Chronic Conditions and Co-morbidities  Goal: Patient's chronic conditions and co-morbidity symptoms are monitored and maintained or improved  Outcome: Progressing  Flowsheets (Taken 4/7/2024 1535)  Care Plan - Patient's Chronic Conditions and Co-Morbidity Symptoms are Monitored and Maintained or Improved:   Monitor and assess patient's chronic conditions and comorbid symptoms for stability, deterioration, or improvement   Collaborate with multidisciplinary team to address chronic and comorbid conditions and prevent exacerbation or 
period.  Outcome: Progressing

## 2024-04-08 NOTE — DISCHARGE INSTR - COC
Continuity of Care Form    Patient Name: Cindy rOtiz   :  1955  MRN:  [id number]    Admit date:  2024  Discharge date:  ***    Code Status Order: Full Code   Advance Directives:     Admitting Physician:  Kurtis Hawkins MD  PCP: Johann Crawford MD    Discharging Nurse: ***  Discharging Hospital Unit/Room#: 3221/3221-01  Discharging Unit Phone Number: ***    Emergency Contact:   Extended Emergency Contact Information  Primary Emergency Contact: Laith Ortiz  Address: [address]           [address] [facility]  Home Phone: [phone]  Mobile Phone: [phone]  Relation: Spouse    Past Surgical History:  Past Surgical History:   Procedure Laterality Date    COLONOSCOPY N/A 2018    COLONOSCOPY WITH BIOPSY performed by Georgi Hanks MD at [facility] ENDOSCOPY    COLONOSCOPY  2019    Colonoscopy with polypectomy (cold snare)    COLONOSCOPY N/A 2019    COLONOSCOPY POLYPECTOMY SNARE/COLD BIOPSY performed by Georgi Hanks MD at [facility] ENDOSCOPY    HEMICOLECTOMY Right 2018    LAPAROSCOPIC RIGHT HEMICOLECTOMY and umbilical hernia repair    FL OFFICE/OUTPT VISIT,PROCEDURE ONLY Right 2018    LAPAROSCOPIC RIGHT HEMICOLECTOMY and umbilical hernia repair performed by Channing Jaime MD at [facility] OR    UPPER GASTROINTESTINAL ENDOSCOPY N/A 2018    EGD BIOPSY performed by Georgi Hanks MD at [facility] ENDOSCOPY    WISDOM TOOTH EXTRACTION         Immunization History:   Immunization History   Administered Date(s) Administered    COVID-19, MODERNA BLUE border, Primary or Immunocompromised, (age 12y+), IM, 100 mcg/0.5mL 02/15/2021, 2021, 2021    COVID-19, MODERNA Bivalent, (age 12y+), IM, 50 mcg/0.5 mL 2022    Influenza Vaccine, unspecified formulation 2016, 2017    Influenza Virus Vaccine 2017    Influenza, FLUAD, (age 65 y+), Adjuvanted, 0.5mL 10/28/2020    Influenza, FLUARIX, FLULAVAL, FLUZONE (age 6

## 2024-04-08 NOTE — DISCHARGE INSTRUCTIONS
Your information:  Name: Cindy Ortiz  : 1955    Your Discharge Instructions    What to do after you leave the hospital:    Read, review and familiarize yourself with the information provided below and in a separate packet on   hypertension    Diet: Regular diet     Recommended activity:   As tolerated   Avoid strenuous activity until instructed by your physician to resume; balance rest with periods of light to normal activity.     If you experience any of the following: Unusual or inadequately controlled pain; unusual transient shortness of breath; recurrent or persistent nausea, heartburn, palpitations or lightheadedness; increased swelling; increased fatigue; fever >100; please follow up with @PCP@ [unfilled] or go to the Emergency Room.      Home Health/ Outpatient Services: Oxygen therapy       Information obtained by:  By signing below, I understand and acknowledge receipt of the instructions indicated above, and I understand that if any problems occur once I leave the hospital I am to contact @[email or web address]@.

## 2024-04-08 NOTE — PROGRESS NOTES
[facility]    Hospitalist Progress Note:      Name:  Cindy Ortiz /Age/Sex: 1955  (68 y.o. female)   MRN & CSN:  [id number] & [id number] Encounter Date: 2024    Location:  3221/3221-01 PCP: Johann Crawford MD     Attending:Kurtis Hawkins MD  Admission Date: 2024      Hospital Day: 2    Assessment/Plan:   Cindy Ortiz is a 68 y.o. female with pmh of colon cancer, BRIANNA on CPAP, morbid obesity, insulin-dependent type 2 diabetes, gout, HTN, dyslipidemia and CKD who presented with shortness of breath. Found to have bibasilar PNA and hypoxia.   Acute hypoxic respiratory failure: on 4 L oxygen via NC. Baseline on RA.   Sepsis 2/2 CAP  Bibasilar pneumonia likely CAP: cont IV ceftriaxone and IV azithromycin. F/u cultures and lab  Volume over on presentation.  Patient reports about 80 ibs within the past several months. Strict I/Os and daily weight. F/u Echo.  Cont IV diuresis. Mild worsening creat,hx of CKD 3b, will obtain Nephrology team input, requested  Elevated Troponin: patient denies chest pain, denies smoking history or hx of CAD. likely demand ischemia.  HTN:  Elevated on presentation, Resume home BP medications.   Insulin dependant type - 2 DM: continue home insulin regimen. On ozempic. F/u with endocrinologist. CSIC.  CKD-3b: Patient follows with [facility] nephrology outpatient.   Chronic pain: Continue home regimen   BRIANNA on CPAP:Patient has her home CPAP, continue  Obesity: BMI 58, recommend outpatient follow up with weight management. Reports does not want bariatric surgery    Current meds reviewed, adjusted.   See orders  Diet ADULT DIET; Regular; 4 carb choices (60 gm/meal); Low Fat/Low Chol/High Fiber/2 gm Na   DVT Prophylaxis [x] Lovenox, []  Heparin, [] SCDs, [] Ambulation,  [] Eliquis, [] Xarelto  [] Coumadin   Code Status Full Code   Disposition Expected Disposition: Home vs ECF vs Hm with [facility]  Estimated Date of Discharge:  1-2 days  Reason for continued admission:PNA 
  [facility]    Hospitalist Progress Note:      Name:  Cindy Ortiz /Age/Sex: 1955  (68 y.o. female)   MRN & CSN:  [id number] & [id number] Encounter Date: 2024    Location:  3221/3221-01 PCP: Johann Crawford MD     Attending:Kurtis Hawkins MD  Admission Date: 2024      Hospital Day: 3    Assessment/Plan:   Cindy Ortiz is a 68 y.o. female with pmh of colon cancer, BRIANNA on CPAP, morbid obesity, insulin-dependent type 2 diabetes, gout, HTN, dyslipidemia and CKD who presented with shortness of breath. Found to have bibasilar PNA and hypoxia.   Acute hypoxic respiratory failure: Improving status. Currently on 4L--> 3 L oxygen via NC. Baseline on RA.  Wean oxygen for O2 sat > 90%.   Sepsis 2/2 CAP  Bibasilar pneumonia likely CAP: Improving clinically. Subjectively feeling much in breathing. Bl cx with no growth so far. Covid-19 and Influenza neg. Cont IV ceftriaxone and IV azithromycin for 7 days. F/u cultures and lab  Volume over on presentation.  Patient reports about 80 ibs within the past several months. Strict I/Os and daily weight. F/u Echo.  Cont IV diuresis per Nephrology team, noted.   Elevated Troponin likely demand ischemia: patient denies chest pain, denies smoking history or hx of CAD.  HTN:  Elevated BP, adjust meds, monitor  Insulin dependant type - 2 DM: continue home insulin regimen. On ozempic as outpt. F/u with endocrinologist. CSIC.  CKD-3b: Patient follows with [facility] nephrology outpatient.   Chronic pain: Continue home regimen   BRIANNA on CPAP:Patient has her home CPAP, continue  Obesity: BMI 58, recommend outpatient follow up with weight management. Reports does not want bariatric surgery    Current meds reviewed, adjusted.   See orders  Diet ADULT DIET; Regular; 4 carb choices (60 gm/meal); Low Fat/Low Chol/High Fiber/2 gm Na   DVT Prophylaxis [x] Lovenox, []  Heparin, [] SCDs, [] Ambulation,  [] Eliquis, [] Xarelto  [] Coumadin   Code Status Full Code 
  [facility]   Nephrology progress Note  [phone]   [facility]         Reason for Consult:  CKD/volume overload    HISTORY OF PRESENT ILLNESS:      The patient is a 68 y.o. female with significant past medical history of CKD IIIb follows with Dr. Davison, morbid obesity, diabetes, hypertension  presented with shortness of breath. Noted to have pneumonia on admission. Was hypertensive on arrival.  Nephrology is consulted for CKD and volume overload. Creatinine near baseline 1.4-1.7 mg/dL.     Subjective/Interval history   Pt seen and examined  Creatinine stable  BPs not very well controlled  Has been afebrile  On minimal oxygen      Scheduled Meds:   insulin glargine  20 Units SubCUTAneous Nightly    sodium chloride  1 spray Each Nostril q8h    furosemide  80 mg IntraVENous BID    allopurinol  200 mg Oral Daily    montelukast  10 mg Oral Nightly    potassium chloride  40 mEq Oral TID    therapeutic multivitamin-minerals  1 tablet Oral Daily    sodium chloride flush  5-40 mL IntraVENous 2 times per day    enoxaparin  30 mg SubCUTAneous BID    cefTRIAXone (ROCEPHIN) IV  1,000 mg IntraVENous Q24H    azithromycin  500 mg IntraVENous Q24H    insulin lispro  0-4 Units SubCUTAneous TID WC    insulin lispro  0-4 Units SubCUTAneous Nightly    labetalol  300 mg Oral 2 times per day     Continuous Infusions:   dextrose       PRN Meds:.HYDROcodone-acetaminophen, sodium chloride flush, potassium chloride **OR** potassium alternative oral replacement **OR** potassium chloride, magnesium sulfate, ondansetron **OR** ondansetron, polyethylene glycol, [DISCONTINUED] acetaminophen **OR** acetaminophen, glucose, dextrose bolus **OR** dextrose bolus, glucagon (rDNA), dextrose, hydrALAZINE     PHYSICAL EXAM:    Vitals:    BP (!) 180/75   Pulse 85   Temp 97.9 °F (36.6 °C) (Oral)   Resp 18   Ht 1.6 m (5' 3\")   Wt (!) 139.8 kg (308 lb 5 oz)   SpO2 92%   BMI 54.62 kg/m²     Intake/Output 
4 Eyes Skin Assessment     NAME:  Cindy Ortiz  YOB: 1955  MEDICAL RECORD NUMBER:  [id number]    The patient is being assessed for  Admission    I agree that at least one RN has performed a thorough Head to Toe Skin Assessment on the patient. ALL assessment sites listed below have been assessed.      Areas assessed by both nurses:    Head, Face, Ears, Shoulders, Back, Chest, Arms, Elbows, Hands, Sacrum. Buttock, Coccyx, Ischium, Legs. Feet and Heels, and Under Medical Devices         Does the Patient have a Wound? No noted wound(s)       Constantin Prevention initiated by RN: No  Wound Care Orders initiated by RN: No    Pressure Injury (Stage 3,4, Unstageable, DTI, NWPT, and Complex wounds) if present, place Wound referral order by RN under : No    New Ostomies, if present place, Ostomy referral order under : No     Nurse 1 eSignature: Electronically signed by Nannette Chase RN on 4/6/24 at 1:39 AM EDT    **SHARE this note so that the co-signing nurse can place an eSignature**    Nurse 2 eSignature: Electronically signed by Ayesha Vance RN on 4/6/24 at 1:40 AM EDT   
Discharge instructions reviewed with patient. Verbalized understanding. IV dc'd without complications. Tele box returned to nurse's station. Patient to be taken to front entrance via wheelchair.    
Increased to 4 per sats   
MD into room to see pt. Reported to RN not to treat the BP at this time until after the lasix is given to see if BP comes down.   
MD notified to review 12 lead EKG, abnormal EKG.   
Nurse called about pt wearing cpap at home. Went to discuss with patient and she did not like any of our mask options. Pt  said he would bring her cpap in for her to use tomorrow (later today). Pt remains on 2L.  
Patient admitted from home to room 3221  Pt arrived to room accompanied by emergency room RN and patient's  Denis. Pt able to ambulate with steady gait from stretcher to bathroom with assist of ER nurse.   Family at bedside.  Placed patient on heart monitor in room. VSS. Initial admission assessment to be completed by a RN.   No s/s of distress.  Pt A/O x4. O2 at 2L NC at 93%. Oriented patient to call light, television controls, and room, verbalized understanding. Education initiated and all questions answered at this time. Non skid footwear placed on feet. Bed in lowest position with wheels locked. Patient instructed to call out for needs, denies needs at this time. Call light within reach.   
Patient and family requesting that no new medications are started. Patient states she will follow-up with her primary care provider if her blood pressure is elevated. MD is aware.   
Patient refused echocardiogram this morning. MD aware.   
Patient using nasal cannula while inpatient.  Patient does not want to use respiratory care's equipment.  Patient does not have her home unit here.   is using his home unit in the room.  
Patient walked in room total distance of 55'. Normal @ home is 40' max. Patient required Oxygen before walk began and continued walk on 2L. After walk and at rest pt. Was able to resume 1L oxygen use. Pt. Was instructed to continue being com[pliant with her nightly cpap @pressure of 7 for full therapeutic effect. Describe what home unit might look like to calm her fears.     04/08/24 0937   Resting (Room Air)   SpO2 87   HR 88   Resting (On O2)   SpO2 92   HR 88   O2 Device Nasal cannula   O2 Flow Rate (l/min) 2 l/min   FIO2 (%) 28   During Walk (Room Air)   HR 92   Walk/Assistance Device Walker   Rate of Dyspnea 0   Symptoms Fatigue   Comments First time out of bed in days   During Walk (On O2)   SpO2 92   HR 84   O2 Device Nasal cannula   O2 Flow Rate (l/min) 2 l/min   Need Additional O2 Flow Rate Rows No   Walk/Assistance Device Walker   Rate of Dyspnea 0   After Walk   SpO2 92   HR 88   O2 Device Nasal cannula   O2 Flow Rate (l/min) 1 l/min   FIO2 (%) 24   Rate of Dyspnea 0   Does the Patient Qualify for Home O2 Yes   Liter Flow at Rest 1   Liter Flow on Exertion 2   Does the Patient Need Portable Oxygen Tanks Yes       
Pt was educated that bed/chair alarm is to alert staff to ensure safety of the patient. Pt refuses bed/chair alarm and was informed of risk for fall, injury, and possible death. Pt verbalized understanding continues to refuse bed/chair alarm.  Pt reports bed and chair are too high up for her to get into, insists to sleep on couch and declines alarm.  
acquired - abx per primary.       Thank you for allowing me to participate in the care of this patient. Please contact via Im[employer] if any questions or concerns.       Zafar Camacho,   4/7/2024

## 2024-04-08 NOTE — CARE COORDINATION
Case Management Assessment  Initial Evaluation    Date/Time of Evaluation: 4/8/2024 11:16 AM  Assessment Completed by: JUAN DAVID Rodriguez    If patient is discharged prior to next notation, then this note serves as note for discharge by case management.    Patient Name: Cindy Ortiz                   YOB: 1955  Diagnosis: Respiratory failure (HCC) [J96.90]  Acute respiratory failure with hypoxia and hypercapnia (HCC) [J96.01, J96.02]  Pneumonia due to infectious organism, unspecified laterality, unspecified part of lung [J18.9]  Sepsis, due to unspecified organism, unspecified whether acute organ dysfunction present (HCC) [A41.9]                   Date / Time: 4/5/2024  9:51 PM    Patient Admission Status: Inpatient   Readmission Risk (Low < 19, Mod (19-27), High > 27): Readmission Risk Score: 14.7    Current PCP: Johann Crawford MD  PCP verified by CM? Yes    Chart Reviewed: Yes      History Provided by: Patient  Patient Orientation: Alert and Oriented    Patient Cognition: Alert    Hospitalization in the last 30 days (Readmission):  No    If yes, Readmission Assessment in CM Navigator will be completed.    Advance Directives:      Code Status: Full Code   Patient's Primary Decision Maker is: Legal Next of Kin    Primary Decision Maker: Laith Ortiz - Spouse - [phone]    Discharge Planning:    Patient lives with: Spouse/Significant Other Type of Home: House  Primary Care Giver: Spouse  Patient Support Systems include: Spouse/Significant Other   Current Financial resources: Medicare  Current community resources: None  Current services prior to admission: C-pap, Durable Medical Equipment            Current DME: Shower Chair, Cpap, Wheelchair, Walker            Type of Home Care services:  None    ADLS  Prior functional level: Assistance with the following:, Mobility  Current functional level: Assistance with the following:, Mobility    PT AM-PAC:   /24  OT AM-PAC:   /24    Family can

## 2024-04-08 NOTE — DISCHARGE SUMMARY
Awake, oriented x 3, cooperative. No distress. Morbidly obese  HEENT: Oral mucosa moist. No JVD   Respiratory: BS improved bilaterally. No rales/wheezes.   Cardiovascular: S1 S2, RRR  Gastrointestinal: BS+. Soft, NT, no rebound or guarding.   Musculoskeletal/ Extermities: trace edema feet. B/l LE with lymphedema and chronic vascular stasis changes  Neurologic: Face symmetrical, speech clear,  grossly nonfocal        Labs and Imaging   CBC:   Recent Labs     04/06/24 0442 04/07/24 0702 04/08/24 0437   WBC 17.5* 7.5 8.1   HGB 12.2 11.0* 11.2*    136 142     BMP:    Recent Labs     04/06/24 0442 04/07/24 0702 04/08/24 0437    142 141   K 4.5 4.2 4.2    104 101   CO2 29 29 31   BUN 23* 28* 29*   CREATININE 1.7* 1.7* 1.8*   GLUCOSE 155* 128* 94     Hepatic:   Recent Labs     04/05/24  2205   AST 22   ALT 15   BILITOT 0.5   ALKPHOS 104     Lipids:   Lab Results   Component Value Date/Time    CHOL 151 03/08/2022 10:31 AM    HDL 31 07/24/2023 09:46 AM    TRIG 307 03/08/2022 10:31 AM     Hemoglobin A1C:   Lab Results   Component Value Date/Time    LABA1C 6.7 04/06/2024 04:42 AM     TSH: No results found for: \"TSH\"  Troponin: No results found for: \"TROPONINT\"  Lactic Acid:   Recent Labs     04/05/24  2205   LACTA 1.5     BNP:   Recent Labs     04/05/24  2205   PROBNP 345*     UA:  Lab Results   Component Value Date/Time    NITRU POSITIVE 06/08/2022 03:06 PM    COLORU Yellow 06/08/2022 03:06 PM    PHUR 6.0 06/08/2022 03:06 PM    WBCUA 25 06/08/2022 03:06 PM    RBCUA 5-10 06/08/2022 03:06 PM    RBCUA NEGATIVE 04/18/2018 02:35 AM    MUCUS PRESENT 04/18/2018 02:35 AM    BACTERIA 3+ 06/08/2022 03:06 PM    CLARITYU Clear 06/08/2022 03:06 PM    SPECGRAV 1.013 06/08/2022 03:06 PM    LEUKOCYTESUR LARGE 06/08/2022 03:06 PM    UROBILINOGEN 0.2 06/08/2022 03:06 PM    BILIRUBINUR Negative 06/08/2022 03:06 PM    BILIRUBINUR NEGATIVE 04/18/2018 02:35 AM    BLOODU MODERATE 06/08/2022 03:06 PM    GLUCOSEU Negative

## 2024-04-09 ENCOUNTER — TELEPHONE (OUTPATIENT)
Dept: PRIMARY CARE CLINIC | Age: 69
End: 2024-04-09

## 2024-04-09 LAB
EKG ATRIAL RATE: 102 BPM
EKG DIAGNOSIS: NORMAL
EKG P AXIS: 70 DEGREES
EKG P-R INTERVAL: 180 MS
EKG Q-T INTERVAL: 382 MS
EKG QRS DURATION: 96 MS
EKG QTC CALCULATION (BAZETT): 497 MS
EKG R AXIS: -75 DEGREES
EKG T AXIS: 25 DEGREES
EKG VENTRICULAR RATE: 102 BPM

## 2024-04-09 NOTE — TELEPHONE ENCOUNTER
Care Transitions Initial Follow Up Call    Outreach made within 2 business days of discharge: Yes    Patient: Cindy Ortiz Patient : 1955   MRN: [id number]  Reason for Admission: There are no discharge diagnoses documented for the most recent discharge.  Discharge Date: 24       Spoke with: yes     Discharge department/facility: [facility]     TCM Interactive Patient Contact:  Was patient able to fill all prescriptions: Yes  Was patient instructed to bring all medications to the follow-up visit: Yes  Is patient taking all medications as directed in the discharge summary? Yes  Does patient understand their discharge instructions: Yes  Does patient have questions or concerns that need addressed prior to 7-14 day follow up office visit: no    Scheduled appointment with PCP within 7-14 days    Follow Up 2024  Future Appointments   Date Time Provider Department Center   2024  9:15 AM Johann Crawford MD [facility] [place] [facility]

## 2024-04-10 LAB
MICROORGANISM SPEC CULT: NORMAL
MICROORGANISM SPEC CULT: NORMAL
SERVICE CMNT-IMP: NORMAL
SERVICE CMNT-IMP: NORMAL
SPECIMEN DESCRIPTION: NORMAL
SPECIMEN DESCRIPTION: NORMAL

## 2024-05-07 ENCOUNTER — APPOINTMENT (OUTPATIENT)
Age: 69
End: 2024-05-07
Payer: MEDICARE

## 2024-05-07 ENCOUNTER — HOSPITAL ENCOUNTER (INPATIENT)
Age: 69
LOS: 3 days | Discharge: HOME | End: 2024-05-10
Attending: EMERGENCY MEDICINE
Payer: MEDICARE

## 2024-05-07 DIAGNOSIS — I50.33 ACUTE ON CHRONIC DIASTOLIC CONGESTIVE HEART FAILURE (HCC): ICD-10-CM

## 2024-05-07 DIAGNOSIS — N18.30 HYPERTENSIVE HEART AND KIDNEY DISEASE WITH HEART FAILURE AND WITH CHRONIC KIDNEY DISEASE STAGE III (HCC): ICD-10-CM

## 2024-05-07 DIAGNOSIS — I13.0 HYPERTENSIVE HEART AND KIDNEY DISEASE WITH HEART FAILURE AND WITH CHRONIC KIDNEY DISEASE STAGE III (HCC): ICD-10-CM

## 2024-05-07 DIAGNOSIS — N18.30 TYPE 2 DIABETES MELLITUS WITH STAGE 3 CHRONIC KIDNEY DISEASE, WITH LONG-TERM CURRENT USE OF INSULIN, UNSPECIFIED WHETHER STAGE 3A OR 3B CKD (HCC): ICD-10-CM

## 2024-05-07 DIAGNOSIS — L03.115 CELLULITIS OF RIGHT LOWER EXTREMITY: Primary | ICD-10-CM

## 2024-05-07 DIAGNOSIS — E11.22 TYPE 2 DIABETES MELLITUS WITH STAGE 3 CHRONIC KIDNEY DISEASE, WITH LONG-TERM CURRENT USE OF INSULIN, UNSPECIFIED WHETHER STAGE 3A OR 3B CKD (HCC): ICD-10-CM

## 2024-05-07 DIAGNOSIS — L97.419 HEEL ULCERATION, RIGHT, WITH UNSPECIFIED SEVERITY (HCC): ICD-10-CM

## 2024-05-07 DIAGNOSIS — Z79.4 TYPE 2 DIABETES MELLITUS WITH STAGE 3 CHRONIC KIDNEY DISEASE, WITH LONG-TERM CURRENT USE OF INSULIN, UNSPECIFIED WHETHER STAGE 3A OR 3B CKD (HCC): ICD-10-CM

## 2024-05-07 DIAGNOSIS — I50.9 ACUTE ON CHRONIC CONGESTIVE HEART FAILURE, UNSPECIFIED HEART FAILURE TYPE (HCC): ICD-10-CM

## 2024-05-07 LAB
ALBUMIN SERPL-MCNC: 4.3 G/DL (ref 3.4–5)
ALBUMIN/GLOB SERPL: 1.1 {RATIO}
ALP SERPL-CCNC: 88 U/L (ref 40–129)
ALT SERPL-CCNC: 15 U/L (ref 10–40)
ANION GAP SERPL CALCULATED.3IONS-SCNC: 12 MMOL/L (ref 3–16)
AST SERPL-CCNC: 18 U/L (ref 15–37)
BASOPHILS # BLD: 0.01 K/UL (ref 0–0.2)
BASOPHILS NFR BLD: 0 %
BILIRUB SERPL-MCNC: 0.7 MG/DL (ref 0–1)
BILIRUB UR QL STRIP: NEGATIVE
BNP SERPL-MCNC: 1212 PG/ML (ref 0–124)
BUN SERPL-MCNC: 32 MG/DL (ref 7–20)
CALCIUM SERPL-MCNC: 9.4 MG/DL (ref 8.3–10.6)
CHARACTER UR: ABNORMAL
CHLORIDE SERPL-SCNC: 98 MMOL/L (ref 99–110)
CLARITY UR: ABNORMAL
CO2 SERPL-SCNC: 27 MMOL/L (ref 21–32)
COHGB MFR BLD: 2 % (ref 0–1.5)
COLOR UR: YELLOW
CREAT SERPL-MCNC: 1.9 MG/DL (ref 0.6–1.2)
EOSINOPHIL # BLD: 0 K/UL (ref 0–0.6)
EOSINOPHILS RELATIVE PERCENT: 0 %
EPI CELLS #/AREA URNS HPF: ABNORMAL /HPF
ERYTHROCYTE [DISTWIDTH] IN BLOOD BY AUTOMATED COUNT: 15.2 % (ref 12.4–15.4)
GFR, ESTIMATED: 29 ML/MIN/1.73M2
GLUCOSE SERPL-MCNC: 167 MG/DL (ref 70–99)
GLUCOSE UR STRIP-MCNC: NEGATIVE MG/DL
HCO3 VENOUS: 29.9 MMOL/L (ref 23–29)
HCT VFR BLD AUTO: 41.4 % (ref 36–48)
HGB BLD-MCNC: 12.8 G/DL (ref 12–16)
HGB UR QL STRIP.AUTO: ABNORMAL
IMM GRANULOCYTES # BLD AUTO: 0.06 K/UL (ref 0–0.5)
IMM GRANULOCYTES NFR BLD: 0 %
KETONES UR STRIP-MCNC: NEGATIVE MG/DL
LACTATE BLDV-SCNC: 0.9 MMOL/L (ref 0.4–1.9)
LEUKOCYTE ESTERASE UR QL STRIP: NEGATIVE
LYMPHOCYTES NFR BLD: 0.53 K/UL (ref 1–5.1)
LYMPHOCYTES RELATIVE PERCENT: 3 %
MCH RBC QN AUTO: 27.3 PG (ref 26–34)
MCHC RBC AUTO-ENTMCNC: 30.9 G/DL (ref 31–36)
MCV RBC AUTO: 88.3 FL (ref 80–100)
METHEMOGLOBIN: 0.1 % (ref 0–1.4)
MONOCYTES NFR BLD: 0.81 K/UL (ref 0–1.3)
MONOCYTES NFR BLD: 4 %
NEUTROPHILS NFR BLD: 93 %
NEUTS SEG NFR BLD: 17.84 K/UL (ref 1.7–7.7)
NITRITE UR QL STRIP: NEGATIVE
O2 SAT, VEN: 75.7 %
PCO2, VEN: 54.1 MM HG (ref 40–50)
PH UR STRIP: 5.5 [PH] (ref 5–8)
PH VENOUS: 7.36 (ref 7.35–7.45)
PLATELET # BLD AUTO: 163 K/UL (ref 135–450)
PMV BLD AUTO: 10.5 FL
PO2, VEN: 42.5 MM HG
POSITIVE BASE EXCESS, VEN: 3.2 MMOL/L (ref 0–3)
POTASSIUM SERPL-SCNC: 4.5 MMOL/L (ref 3.5–5.1)
PROCALCITONIN SERPL-MCNC: 0.83 NG/ML (ref 0–0.15)
PROT SERPL-MCNC: 8.1 G/DL (ref 6.4–8.2)
PROT UR STRIP-MCNC: ABNORMAL MG/DL
RBC # BLD AUTO: 4.69 M/UL (ref 4–5.2)
RBC #/AREA URNS HPF: ABNORMAL /HPF
SODIUM SERPL-SCNC: 136 MMOL/L (ref 136–145)
SP GR UR STRIP: 1.01 (ref 1–1.03)
TROPONIN I SERPL HS-MCNC: 48 NG/L (ref 0–14)
TROPONIN I SERPL HS-MCNC: 57 NG/L (ref 0–14)
UROBILINOGEN UR STRIP-ACNC: 0.2 EU/DL (ref 0–1)
WBC #/AREA URNS HPF: ABNORMAL /HPF
WBC OTHER # BLD: 19.3 K/UL (ref 4–11)

## 2024-05-07 PROCEDURE — 81001 URINALYSIS AUTO W/SCOPE: CPT

## 2024-05-07 PROCEDURE — 83605 ASSAY OF LACTIC ACID: CPT

## 2024-05-07 PROCEDURE — 84484 ASSAY OF TROPONIN QUANT: CPT

## 2024-05-07 PROCEDURE — 6370000000 HC RX 637 (ALT 250 FOR IP): Performed by: EMERGENCY MEDICINE

## 2024-05-07 PROCEDURE — 80053 COMPREHEN METABOLIC PANEL: CPT

## 2024-05-07 PROCEDURE — 99285 EMERGENCY DEPT VISIT HI MDM: CPT

## 2024-05-07 PROCEDURE — 84145 PROCALCITONIN (PCT): CPT

## 2024-05-07 PROCEDURE — 82805 BLOOD GASES W/O2 SATURATION: CPT

## 2024-05-07 PROCEDURE — 85025 COMPLETE CBC W/AUTO DIFF WBC: CPT

## 2024-05-07 PROCEDURE — 36415 COLL VENOUS BLD VENIPUNCTURE: CPT

## 2024-05-07 PROCEDURE — 71045 X-RAY EXAM CHEST 1 VIEW: CPT

## 2024-05-07 PROCEDURE — 1200000000 HC SEMI PRIVATE

## 2024-05-07 PROCEDURE — 83880 ASSAY OF NATRIURETIC PEPTIDE: CPT

## 2024-05-07 PROCEDURE — 2580000003 HC RX 258: Performed by: EMERGENCY MEDICINE

## 2024-05-07 PROCEDURE — 87040 BLOOD CULTURE FOR BACTERIA: CPT

## 2024-05-07 PROCEDURE — 93005 ELECTROCARDIOGRAM TRACING: CPT

## 2024-05-07 PROCEDURE — 6360000002 HC RX W HCPCS: Performed by: INTERNAL MEDICINE

## 2024-05-07 RX ORDER — FUROSEMIDE 10 MG/ML
60 INJECTION INTRAMUSCULAR; INTRAVENOUS ONCE
Status: COMPLETED | OUTPATIENT
Start: 2024-05-07 | End: 2024-05-07

## 2024-05-07 RX ORDER — SODIUM CHLORIDE 0.9 % (FLUSH) 0.9 %
5-40 SYRINGE (ML) INJECTION PRN
Status: DISCONTINUED | OUTPATIENT
Start: 2024-05-07 | End: 2024-05-08

## 2024-05-07 RX ORDER — LABETALOL HYDROCHLORIDE 5 MG/ML
20 INJECTION, SOLUTION INTRAVENOUS ONCE
Status: COMPLETED | OUTPATIENT
Start: 2024-05-07 | End: 2024-05-08

## 2024-05-07 RX ORDER — ACETAMINOPHEN 500 MG
1000 TABLET ORAL ONCE
Status: COMPLETED | OUTPATIENT
Start: 2024-05-07 | End: 2024-05-07

## 2024-05-07 RX ORDER — TORSEMIDE 20 MG/1
50 TABLET ORAL 2 TIMES DAILY
Status: CANCELLED | OUTPATIENT
Start: 2024-05-07

## 2024-05-07 RX ORDER — SODIUM CHLORIDE 0.9 % (FLUSH) 0.9 %
10 SYRINGE (ML) INJECTION EVERY 12 HOURS SCHEDULED
Status: CANCELLED | OUTPATIENT
Start: 2024-05-07

## 2024-05-07 RX ORDER — SODIUM CHLORIDE, SODIUM LACTATE, POTASSIUM CHLORIDE, AND CALCIUM CHLORIDE .6; .31; .03; .02 G/100ML; G/100ML; G/100ML; G/100ML
30 INJECTION, SOLUTION INTRAVENOUS ONCE
Status: DISCONTINUED | OUTPATIENT
Start: 2024-05-07 | End: 2024-05-07

## 2024-05-07 RX ORDER — SODIUM CHLORIDE 9 MG/ML
INJECTION, SOLUTION INTRAVENOUS PRN
Status: CANCELLED | OUTPATIENT
Start: 2024-05-07

## 2024-05-07 RX ORDER — SODIUM CHLORIDE 9 MG/ML
INJECTION, SOLUTION INTRAVENOUS PRN
Status: DISCONTINUED | OUTPATIENT
Start: 2024-05-07 | End: 2024-05-10 | Stop reason: HOSPADM

## 2024-05-07 RX ORDER — 0.9 % SODIUM CHLORIDE 0.9 %
30 INTRAVENOUS SOLUTION INTRAVENOUS ONCE
Status: COMPLETED | OUTPATIENT
Start: 2024-05-07 | End: 2024-05-07

## 2024-05-07 RX ORDER — SODIUM CHLORIDE 0.9 % (FLUSH) 0.9 %
5-40 SYRINGE (ML) INJECTION EVERY 12 HOURS SCHEDULED
Status: DISCONTINUED | OUTPATIENT
Start: 2024-05-07 | End: 2024-05-10 | Stop reason: HOSPADM

## 2024-05-07 RX ADMIN — FUROSEMIDE 60 MG: 10 INJECTION, SOLUTION INTRAMUSCULAR; INTRAVENOUS at 23:57

## 2024-05-07 RX ADMIN — SODIUM CHLORIDE 1572 ML: 9 INJECTION, SOLUTION INTRAVENOUS at 21:54

## 2024-05-07 RX ADMIN — ACETAMINOPHEN 1000 MG: 500 TABLET ORAL at 21:42

## 2024-05-07 RX ADMIN — SODIUM CHLORIDE, PRESERVATIVE FREE 10 ML: 5 INJECTION INTRAVENOUS at 22:01

## 2024-05-07 ASSESSMENT — LIFESTYLE VARIABLES
HOW OFTEN DO YOU HAVE A DRINK CONTAINING ALCOHOL: NEVER
HOW MANY STANDARD DRINKS CONTAINING ALCOHOL DO YOU HAVE ON A TYPICAL DAY: PATIENT DOES NOT DRINK

## 2024-05-07 ASSESSMENT — PAIN - FUNCTIONAL ASSESSMENT
PAIN_FUNCTIONAL_ASSESSMENT: NONE - DENIES PAIN
PAIN_FUNCTIONAL_ASSESSMENT: NONE - DENIES PAIN

## 2024-05-08 ENCOUNTER — APPOINTMENT (OUTPATIENT)
Age: 69
End: 2024-05-08
Payer: MEDICARE

## 2024-05-08 PROBLEM — R50.9 FEVER AND CHILLS: Status: ACTIVE | Noted: 2024-05-08

## 2024-05-08 PROBLEM — L03.115 CELLULITIS OF RIGHT LOWER EXTREMITY: Status: ACTIVE | Noted: 2024-05-08

## 2024-05-08 PROBLEM — E66.01 MORBID OBESITY WITH BODY MASS INDEX (BMI) OF 50.0 TO 59.9 IN ADULT (HCC): Status: ACTIVE | Noted: 2024-05-08

## 2024-05-08 PROBLEM — I50.9 ACUTE ON CHRONIC CONGESTIVE HEART FAILURE (HCC): Status: ACTIVE | Noted: 2024-05-08

## 2024-05-08 PROBLEM — A41.9 SEPSIS WITHOUT ACUTE ORGAN DYSFUNCTION (HCC): Status: ACTIVE | Noted: 2024-05-08

## 2024-05-08 PROBLEM — L97.419 SKIN ULCER OF RIGHT HEEL (HCC): Status: ACTIVE | Noted: 2024-05-08

## 2024-05-08 LAB
ALBUMIN SERPL-MCNC: 3.8 G/DL (ref 3.4–5)
ALBUMIN/GLOB SERPL: 1.1 {RATIO}
ALP SERPL-CCNC: 78 U/L (ref 40–129)
ALT SERPL-CCNC: 12 U/L (ref 10–40)
ANION GAP SERPL CALCULATED.3IONS-SCNC: 12 MMOL/L (ref 3–16)
AST SERPL-CCNC: 19 U/L (ref 15–37)
BASOPHILS # BLD: 0.01 K/UL (ref 0–0.2)
BASOPHILS NFR BLD: 0 %
BILIRUB DIRECT SERPL-MCNC: 0.3 MG/DL (ref 0–0.3)
BILIRUB INDIRECT SERPL-MCNC: 0.3 MG/DL (ref 0–1)
BILIRUB SERPL-MCNC: 0.5 MG/DL (ref 0–1)
BUN SERPL-MCNC: 29 MG/DL (ref 7–20)
CALCIUM SERPL-MCNC: 8.8 MG/DL (ref 8.3–10.6)
CHLORIDE SERPL-SCNC: 101 MMOL/L (ref 99–110)
CO2 SERPL-SCNC: 26 MMOL/L (ref 21–32)
CREAT SERPL-MCNC: 1.7 MG/DL (ref 0.6–1.2)
CRP SERPL HS-MCNC: 108 MG/L (ref 0–5.1)
DATE LAST DOSE: NORMAL
ECHO AV MEAN GRADIENT: 7 MMHG
ECHO AV MEAN VELOCITY: 1.3 M/S
ECHO AV PEAK GRADIENT: 12 MMHG
ECHO AV PEAK VELOCITY: 1.8 M/S
ECHO AV VELOCITY RATIO: 0.72
ECHO AV VTI: 37.9 CM
ECHO BSA: 2.53 M2
ECHO LA AREA 2C: 24.2 CM2
ECHO LA AREA 4C: 25.8 CM2
ECHO LA MAJOR AXIS: 5.9 CM
ECHO LA MINOR AXIS: 6 CM
ECHO LA VOL BP: 85 ML (ref 22–52)
ECHO LA VOL MOD A2C: 80 ML (ref 22–52)
ECHO LA VOL MOD A4C: 91 ML (ref 22–52)
ECHO LA VOL/BSA BIPLANE: 36 ML/M2 (ref 16–34)
ECHO LA VOLUME INDEX MOD A2C: 34 ML/M2 (ref 16–34)
ECHO LA VOLUME INDEX MOD A4C: 39 ML/M2 (ref 16–34)
ECHO LV E' LATERAL VELOCITY: 10 CM/S
ECHO LV E' SEPTAL VELOCITY: 7 CM/S
ECHO LV FRACTIONAL SHORTENING: 36 % (ref 28–44)
ECHO LV GLOBAL LONGITUDINAL STRAIN (GLS): -9.8 %
ECHO LV INTERNAL DIMENSION DIASTOLE INDEX: 2.34 CM/M2
ECHO LV INTERNAL DIMENSION DIASTOLIC: 5.5 CM (ref 3.9–5.3)
ECHO LV INTERNAL DIMENSION SYSTOLIC INDEX: 1.49 CM/M2
ECHO LV INTERNAL DIMENSION SYSTOLIC: 3.5 CM
ECHO LV IVSD: 0.9 CM (ref 0.6–0.9)
ECHO LV MASS 2D: 172.7 G (ref 67–162)
ECHO LV MASS INDEX 2D: 73.5 G/M2 (ref 43–95)
ECHO LV POSTERIOR WALL DIASTOLIC: 0.8 CM (ref 0.6–0.9)
ECHO LV RELATIVE WALL THICKNESS RATIO: 0.29
ECHO LVOT AV VTI INDEX: 0.77
ECHO LVOT MEAN GRADIENT: 4 MMHG
ECHO LVOT PEAK GRADIENT: 7 MMHG
ECHO LVOT PEAK VELOCITY: 1.3 M/S
ECHO LVOT VTI: 29.2 CM
ECHO MV A VELOCITY: 0.89 M/S
ECHO MV E DECELERATION TIME (DT): 169 MS
ECHO MV E VELOCITY: 1.03 M/S
ECHO MV E/A RATIO: 1.16
ECHO MV E/E' LATERAL: 10.3
ECHO MV E/E' RATIO (AVERAGED): 12.51
ECHO MV LVOT VTI INDEX: 1.01
ECHO MV MAX VELOCITY: 1.1 M/S
ECHO MV MEAN GRADIENT: 3 MMHG
ECHO MV MEAN VELOCITY: 0.8 M/S
ECHO MV PEAK GRADIENT: 5 MMHG
ECHO MV VTI: 29.5 CM
ECHO PV MAX VELOCITY: 0.8 M/S
ECHO PV MEAN GRADIENT: 2 MMHG
ECHO PV MEAN VELOCITY: 0.6 M/S
ECHO PV PEAK GRADIENT: 3 MMHG
ECHO PV VTI: 15.3 CM
ECHO RV INTERNAL DIMENSION: 2.6 CM
ECHO RV TAPSE: 3.3 CM (ref 1.7–?)
EKG ATRIAL RATE: 101 BPM
EKG DIAGNOSIS: NORMAL
EKG P AXIS: 57 DEGREES
EKG P-R INTERVAL: 182 MS
EKG Q-T INTERVAL: 380 MS
EKG QRS DURATION: 150 MS
EKG QTC CALCULATION (BAZETT): 492 MS
EKG R AXIS: -85 DEGREES
EKG T AXIS: 33 DEGREES
EKG VENTRICULAR RATE: 101 BPM
EOSINOPHIL # BLD: 0 K/UL (ref 0–0.6)
EOSINOPHILS RELATIVE PERCENT: 0 %
ERYTHROCYTE [DISTWIDTH] IN BLOOD BY AUTOMATED COUNT: 15.4 % (ref 12.4–15.4)
ERYTHROCYTE [SEDIMENTATION RATE] IN BLOOD BY WESTERGREN METHOD: 71 MM/HR (ref 0–30)
FERRITIN SERPL-MCNC: 259 NG/ML (ref 15–150)
GFR, ESTIMATED: 33 ML/MIN/1.73M2
GLUCOSE BLD-MCNC: 118 MG/DL (ref 70–99)
GLUCOSE BLD-MCNC: 150 MG/DL (ref 70–99)
GLUCOSE BLD-MCNC: 157 MG/DL (ref 70–99)
GLUCOSE BLD-MCNC: 167 MG/DL (ref 70–99)
GLUCOSE BLD-MCNC: 174 MG/DL (ref 70–99)
GLUCOSE SERPL-MCNC: 166 MG/DL (ref 70–99)
HCT VFR BLD AUTO: 38 % (ref 36–48)
HGB BLD-MCNC: 11.7 G/DL (ref 12–16)
IMM GRANULOCYTES # BLD AUTO: 0.17 K/UL (ref 0–0.5)
IMM GRANULOCYTES NFR BLD: 1 %
INR PPP: 1.2 (ref 0.9–1.2)
IRON SATN MFR SERPL: 6 % (ref 15–50)
IRON SERPL-MCNC: 15 UG/DL (ref 37–145)
LACTATE BLDV-SCNC: 0.9 MMOL/L (ref 0.4–1.9)
LYMPHOCYTES NFR BLD: 0.79 K/UL (ref 1–5.1)
LYMPHOCYTES RELATIVE PERCENT: 4 %
MCH RBC QN AUTO: 27.3 PG (ref 26–34)
MCHC RBC AUTO-ENTMCNC: 30.8 G/DL (ref 31–36)
MCV RBC AUTO: 88.6 FL (ref 80–100)
MONOCYTES NFR BLD: 0.84 K/UL (ref 0–1.3)
MONOCYTES NFR BLD: 5 %
NEUTROPHILS NFR BLD: 90 %
NEUTS SEG NFR BLD: 16.08 K/UL (ref 1.7–7.7)
PLATELET # BLD AUTO: 130 K/UL (ref 135–450)
PMV BLD AUTO: 9.8 FL (ref 9.4–12.4)
POTASSIUM SERPL-SCNC: 4.1 MMOL/L (ref 3.5–5.1)
PROCALCITONIN SERPL-MCNC: 1.01 NG/ML (ref 0–0.15)
PROT SERPL-MCNC: 7.1 G/DL (ref 6.4–8.2)
PROTHROMBIN TIME: 15 SEC (ref 11.9–14.9)
RBC # BLD AUTO: 4.29 M/UL (ref 4–5.2)
SODIUM SERPL-SCNC: 139 MMOL/L (ref 136–145)
TIBC SERPL-MCNC: 243 UG/DL (ref 260–445)
TME LAST DOSE: NORMAL H
VANCOMYCIN DOSE: NORMAL MG
VANCOMYCIN SERPL-MCNC: 9.4 UG/ML
WBC OTHER # BLD: 17.9 K/UL (ref 4–11)

## 2024-05-08 PROCEDURE — 83550 IRON BINDING TEST: CPT

## 2024-05-08 PROCEDURE — 93306 TTE W/DOPPLER COMPLETE: CPT | Performed by: INTERNAL MEDICINE

## 2024-05-08 PROCEDURE — 83605 ASSAY OF LACTIC ACID: CPT

## 2024-05-08 PROCEDURE — 2580000003 HC RX 258: Performed by: NURSE PRACTITIONER

## 2024-05-08 PROCEDURE — 93306 TTE W/DOPPLER COMPLETE: CPT

## 2024-05-08 PROCEDURE — 85652 RBC SED RATE AUTOMATED: CPT

## 2024-05-08 PROCEDURE — 80053 COMPREHEN METABOLIC PANEL: CPT

## 2024-05-08 PROCEDURE — 82728 ASSAY OF FERRITIN: CPT

## 2024-05-08 PROCEDURE — 87075 CULTR BACTERIA EXCEPT BLOOD: CPT

## 2024-05-08 PROCEDURE — 2580000003 HC RX 258: Performed by: EMERGENCY MEDICINE

## 2024-05-08 PROCEDURE — 6360000002 HC RX W HCPCS: Performed by: INTERNAL MEDICINE

## 2024-05-08 PROCEDURE — 6370000000 HC RX 637 (ALT 250 FOR IP): Performed by: INTERNAL MEDICINE

## 2024-05-08 PROCEDURE — 87040 BLOOD CULTURE FOR BACTERIA: CPT

## 2024-05-08 PROCEDURE — 93356 MYOCRD STRAIN IMG SPCKL TRCK: CPT

## 2024-05-08 PROCEDURE — 83540 ASSAY OF IRON: CPT

## 2024-05-08 PROCEDURE — 94761 N-INVAS EAR/PLS OXIMETRY MLT: CPT

## 2024-05-08 PROCEDURE — 97110 THERAPEUTIC EXERCISES: CPT

## 2024-05-08 PROCEDURE — 87070 CULTURE OTHR SPECIMN AEROBIC: CPT

## 2024-05-08 PROCEDURE — 87205 SMEAR GRAM STAIN: CPT

## 2024-05-08 PROCEDURE — 82248 BILIRUBIN DIRECT: CPT

## 2024-05-08 PROCEDURE — 85025 COMPLETE CBC W/AUTO DIFF WBC: CPT

## 2024-05-08 PROCEDURE — 2580000003 HC RX 258: Performed by: HOSPITALIST

## 2024-05-08 PROCEDURE — 93356 MYOCRD STRAIN IMG SPCKL TRCK: CPT | Performed by: INTERNAL MEDICINE

## 2024-05-08 PROCEDURE — 82962 GLUCOSE BLOOD TEST: CPT

## 2024-05-08 PROCEDURE — 6360000002 HC RX W HCPCS: Performed by: EMERGENCY MEDICINE

## 2024-05-08 PROCEDURE — 1200000000 HC SEMI PRIVATE

## 2024-05-08 PROCEDURE — 99223 1ST HOSP IP/OBS HIGH 75: CPT | Performed by: INTERNAL MEDICINE

## 2024-05-08 PROCEDURE — 86140 C-REACTIVE PROTEIN: CPT

## 2024-05-08 PROCEDURE — 73630 X-RAY EXAM OF FOOT: CPT

## 2024-05-08 PROCEDURE — 80202 ASSAY OF VANCOMYCIN: CPT

## 2024-05-08 PROCEDURE — 36415 COLL VENOUS BLD VENIPUNCTURE: CPT

## 2024-05-08 PROCEDURE — 84145 PROCALCITONIN (PCT): CPT

## 2024-05-08 PROCEDURE — 85610 PROTHROMBIN TIME: CPT

## 2024-05-08 PROCEDURE — 2700000000 HC OXYGEN THERAPY PER DAY

## 2024-05-08 PROCEDURE — 97161 PT EVAL LOW COMPLEX 20 MIN: CPT

## 2024-05-08 PROCEDURE — 6360000002 HC RX W HCPCS: Performed by: HOSPITALIST

## 2024-05-08 PROCEDURE — 2580000003 HC RX 258: Performed by: INTERNAL MEDICINE

## 2024-05-08 PROCEDURE — 87641 MR-STAPH DNA AMP PROBE: CPT

## 2024-05-08 PROCEDURE — 6360000002 HC RX W HCPCS: Performed by: NURSE PRACTITIONER

## 2024-05-08 RX ORDER — ACETAMINOPHEN 325 MG/1
650 TABLET ORAL EVERY 6 HOURS PRN
Status: DISCONTINUED | OUTPATIENT
Start: 2024-05-08 | End: 2024-05-10 | Stop reason: HOSPADM

## 2024-05-08 RX ORDER — DEXTROSE MONOHYDRATE 100 MG/ML
INJECTION, SOLUTION INTRAVENOUS CONTINUOUS PRN
Status: DISCONTINUED | OUTPATIENT
Start: 2024-05-08 | End: 2024-05-10 | Stop reason: HOSPADM

## 2024-05-08 RX ORDER — HYDRALAZINE HYDROCHLORIDE 20 MG/ML
5 INJECTION INTRAMUSCULAR; INTRAVENOUS EVERY 6 HOURS PRN
Status: DISCONTINUED | OUTPATIENT
Start: 2024-05-08 | End: 2024-05-10 | Stop reason: HOSPADM

## 2024-05-08 RX ORDER — POTASSIUM CHLORIDE 7.45 MG/ML
10 INJECTION INTRAVENOUS PRN
Status: DISCONTINUED | OUTPATIENT
Start: 2024-05-08 | End: 2024-05-10 | Stop reason: HOSPADM

## 2024-05-08 RX ORDER — FUROSEMIDE 10 MG/ML
80 INJECTION INTRAMUSCULAR; INTRAVENOUS 2 TIMES DAILY
Status: DISCONTINUED | OUTPATIENT
Start: 2024-05-08 | End: 2024-05-10 | Stop reason: HOSPADM

## 2024-05-08 RX ORDER — GLUCAGON 1 MG/ML
1 KIT INJECTION PRN
Status: DISCONTINUED | OUTPATIENT
Start: 2024-05-08 | End: 2024-05-10 | Stop reason: HOSPADM

## 2024-05-08 RX ORDER — AMLODIPINE BESYLATE 5 MG/1
5 TABLET ORAL DAILY
Status: DISCONTINUED | OUTPATIENT
Start: 2024-05-08 | End: 2024-05-10 | Stop reason: HOSPADM

## 2024-05-08 RX ORDER — ALLOPURINOL 100 MG/1
200 TABLET ORAL DAILY
Status: DISCONTINUED | OUTPATIENT
Start: 2024-05-08 | End: 2024-05-10 | Stop reason: HOSPADM

## 2024-05-08 RX ORDER — ONDANSETRON 2 MG/ML
4 INJECTION INTRAMUSCULAR; INTRAVENOUS EVERY 6 HOURS PRN
Status: DISCONTINUED | OUTPATIENT
Start: 2024-05-08 | End: 2024-05-10 | Stop reason: HOSPADM

## 2024-05-08 RX ORDER — INSULIN LISPRO 100 [IU]/ML
0-4 INJECTION, SOLUTION INTRAVENOUS; SUBCUTANEOUS
Status: DISCONTINUED | OUTPATIENT
Start: 2024-05-08 | End: 2024-05-10 | Stop reason: HOSPADM

## 2024-05-08 RX ORDER — SENNOSIDES A AND B 8.6 MG/1
1 TABLET, FILM COATED ORAL DAILY PRN
Status: DISCONTINUED | OUTPATIENT
Start: 2024-05-08 | End: 2024-05-10 | Stop reason: HOSPADM

## 2024-05-08 RX ORDER — MAGNESIUM SULFATE IN WATER 40 MG/ML
2000 INJECTION, SOLUTION INTRAVENOUS PRN
Status: DISCONTINUED | OUTPATIENT
Start: 2024-05-08 | End: 2024-05-10 | Stop reason: HOSPADM

## 2024-05-08 RX ORDER — POTASSIUM CHLORIDE 20 MEQ/1
40 TABLET, EXTENDED RELEASE ORAL PRN
Status: DISCONTINUED | OUTPATIENT
Start: 2024-05-08 | End: 2024-05-10 | Stop reason: HOSPADM

## 2024-05-08 RX ORDER — INSULIN LISPRO 100 [IU]/ML
0-4 INJECTION, SOLUTION INTRAVENOUS; SUBCUTANEOUS NIGHTLY
Status: DISCONTINUED | OUTPATIENT
Start: 2024-05-08 | End: 2024-05-10 | Stop reason: HOSPADM

## 2024-05-08 RX ORDER — FUROSEMIDE 10 MG/ML
40 INJECTION INTRAMUSCULAR; INTRAVENOUS 2 TIMES DAILY
Status: DISCONTINUED | OUTPATIENT
Start: 2024-05-08 | End: 2024-05-08

## 2024-05-08 RX ORDER — ENOXAPARIN SODIUM 100 MG/ML
40 INJECTION SUBCUTANEOUS DAILY
Status: DISCONTINUED | OUTPATIENT
Start: 2024-05-08 | End: 2024-05-08

## 2024-05-08 RX ORDER — SODIUM CHLORIDE 0.9 % (FLUSH) 0.9 %
10 SYRINGE (ML) INJECTION PRN
Status: DISCONTINUED | OUTPATIENT
Start: 2024-05-08 | End: 2024-05-10 | Stop reason: HOSPADM

## 2024-05-08 RX ORDER — LABETALOL 100 MG/1
300 TABLET, FILM COATED ORAL EVERY 12 HOURS SCHEDULED
Status: DISCONTINUED | OUTPATIENT
Start: 2024-05-08 | End: 2024-05-10 | Stop reason: HOSPADM

## 2024-05-08 RX ORDER — HYDROCODONE BITARTRATE AND ACETAMINOPHEN 5; 325 MG/1; MG/1
0.5 TABLET ORAL EVERY 4 HOURS PRN
Status: DISCONTINUED | OUTPATIENT
Start: 2024-05-08 | End: 2024-05-10 | Stop reason: HOSPADM

## 2024-05-08 RX ORDER — HYDRALAZINE HYDROCHLORIDE 20 MG/ML
5 INJECTION INTRAMUSCULAR; INTRAVENOUS EVERY 6 HOURS PRN
Status: DISCONTINUED | OUTPATIENT
Start: 2024-05-08 | End: 2024-05-08

## 2024-05-08 RX ORDER — ACETAMINOPHEN 650 MG/1
650 SUPPOSITORY RECTAL EVERY 6 HOURS PRN
Status: DISCONTINUED | OUTPATIENT
Start: 2024-05-08 | End: 2024-05-10 | Stop reason: HOSPADM

## 2024-05-08 RX ORDER — INSULIN GLARGINE 100 [IU]/ML
10 INJECTION, SOLUTION SUBCUTANEOUS 2 TIMES DAILY
Status: DISCONTINUED | OUTPATIENT
Start: 2024-05-08 | End: 2024-05-10 | Stop reason: HOSPADM

## 2024-05-08 RX ORDER — MONTELUKAST SODIUM 10 MG/1
10 TABLET ORAL DAILY
Status: DISCONTINUED | OUTPATIENT
Start: 2024-05-08 | End: 2024-05-09

## 2024-05-08 RX ORDER — ENOXAPARIN SODIUM 100 MG/ML
30 INJECTION SUBCUTANEOUS 2 TIMES DAILY
Status: DISCONTINUED | OUTPATIENT
Start: 2024-05-09 | End: 2024-05-10 | Stop reason: HOSPADM

## 2024-05-08 RX ORDER — ONDANSETRON 4 MG/1
4 TABLET, ORALLY DISINTEGRATING ORAL EVERY 8 HOURS PRN
Status: DISCONTINUED | OUTPATIENT
Start: 2024-05-08 | End: 2024-05-10 | Stop reason: HOSPADM

## 2024-05-08 RX ADMIN — CEFTRIAXONE SODIUM 1000 MG: 1 INJECTION, POWDER, FOR SOLUTION INTRAMUSCULAR; INTRAVENOUS at 08:17

## 2024-05-08 RX ADMIN — HYDROCODONE BITARTRATE AND ACETAMINOPHEN 0.5 TABLET: 5; 325 TABLET ORAL at 01:38

## 2024-05-08 RX ADMIN — SODIUM CHLORIDE, PRESERVATIVE FREE 10 ML: 5 INJECTION INTRAVENOUS at 08:23

## 2024-05-08 RX ADMIN — HYDROCODONE BITARTRATE AND ACETAMINOPHEN 0.5 TABLET: 5; 325 TABLET ORAL at 10:24

## 2024-05-08 RX ADMIN — HYDROCODONE BITARTRATE AND ACETAMINOPHEN 0.5 TABLET: 5; 325 TABLET ORAL at 20:15

## 2024-05-08 RX ADMIN — LABETALOL HYDROCHLORIDE 300 MG: 100 TABLET, FILM COATED ORAL at 08:16

## 2024-05-08 RX ADMIN — VANCOMYCIN HYDROCHLORIDE 2000 MG: 1 INJECTION, POWDER, LYOPHILIZED, FOR SOLUTION INTRAVENOUS at 00:16

## 2024-05-08 RX ADMIN — FUROSEMIDE 40 MG: 10 INJECTION, SOLUTION INTRAMUSCULAR; INTRAVENOUS at 08:17

## 2024-05-08 RX ADMIN — INSULIN GLARGINE 10 UNITS: 100 INJECTION, SOLUTION SUBCUTANEOUS at 20:23

## 2024-05-08 RX ADMIN — LABETALOL HYDROCHLORIDE 20 MG: 5 INJECTION INTRAVENOUS at 00:06

## 2024-05-08 RX ADMIN — INSULIN GLARGINE 10 UNITS: 100 INJECTION, SOLUTION SUBCUTANEOUS at 08:16

## 2024-05-08 RX ADMIN — SODIUM CHLORIDE 1500 MG: 9 INJECTION, SOLUTION INTRAVENOUS at 08:28

## 2024-05-08 RX ADMIN — HYDROCODONE BITARTRATE AND ACETAMINOPHEN 0.5 TABLET: 5; 325 TABLET ORAL at 06:05

## 2024-05-08 RX ADMIN — FUROSEMIDE 80 MG: 10 INJECTION, SOLUTION INTRAMUSCULAR; INTRAVENOUS at 18:39

## 2024-05-08 RX ADMIN — ALLOPURINOL 200 MG: 100 TABLET ORAL at 08:16

## 2024-05-08 RX ADMIN — LABETALOL HYDROCHLORIDE 300 MG: 100 TABLET, FILM COATED ORAL at 20:17

## 2024-05-08 RX ADMIN — MONTELUKAST 10 MG: 10 TABLET, FILM COATED ORAL at 08:16

## 2024-05-08 RX ADMIN — SODIUM CHLORIDE, PRESERVATIVE FREE 10 ML: 5 INJECTION INTRAVENOUS at 20:37

## 2024-05-08 RX ADMIN — HYDROCODONE BITARTRATE AND ACETAMINOPHEN 0.5 TABLET: 5; 325 TABLET ORAL at 14:27

## 2024-05-08 RX ADMIN — AMLODIPINE BESYLATE 5 MG: 5 TABLET ORAL at 08:17

## 2024-05-08 ASSESSMENT — ENCOUNTER SYMPTOMS
SHORTNESS OF BREATH: 1
BACK PAIN: 0
GASTROINTESTINAL NEGATIVE: 1
DIARRHEA: 0
ABDOMINAL PAIN: 0
VOMITING: 0
NAUSEA: 0
CONSTIPATION: 0
WHEEZING: 0
COLOR CHANGE: 1
COUGH: 0

## 2024-05-08 ASSESSMENT — PAIN DESCRIPTION - DESCRIPTORS
DESCRIPTORS: THROBBING
DESCRIPTORS: ACHING
DESCRIPTORS: ACHING
DESCRIPTORS: THROBBING
DESCRIPTORS: ACHING
DESCRIPTORS: THROBBING
DESCRIPTORS: ACHING
DESCRIPTORS: ACHING

## 2024-05-08 ASSESSMENT — PAIN DESCRIPTION - ONSET
ONSET: ON-GOING

## 2024-05-08 ASSESSMENT — PAIN DESCRIPTION - LOCATION
LOCATION: BACK

## 2024-05-08 ASSESSMENT — PAIN DESCRIPTION - PAIN TYPE
TYPE: CHRONIC PAIN

## 2024-05-08 ASSESSMENT — PAIN DESCRIPTION - ORIENTATION
ORIENTATION: MID
ORIENTATION: MID;LOWER
ORIENTATION: MID
ORIENTATION: MID
ORIENTATION: LOWER
ORIENTATION: MID

## 2024-05-08 ASSESSMENT — PAIN DESCRIPTION - FREQUENCY
FREQUENCY: CONTINUOUS
FREQUENCY: INTERMITTENT

## 2024-05-08 ASSESSMENT — PAIN - FUNCTIONAL ASSESSMENT
PAIN_FUNCTIONAL_ASSESSMENT: ACTIVITIES ARE NOT PREVENTED

## 2024-05-08 ASSESSMENT — PAIN SCALES - GENERAL
PAINLEVEL_OUTOF10: 2
PAINLEVEL_OUTOF10: 7
PAINLEVEL_OUTOF10: 4
PAINLEVEL_OUTOF10: 7
PAINLEVEL_OUTOF10: 6
PAINLEVEL_OUTOF10: 3
PAINLEVEL_OUTOF10: 4
PAINLEVEL_OUTOF10: 7

## 2024-05-08 ASSESSMENT — PAIN SCALES - WONG BAKER: WONGBAKER_NUMERICALRESPONSE: NO HURT

## 2024-05-08 NOTE — CARE COORDINATION
Case Management Assessment  Initial Evaluation    Date/Time of Evaluation: 5/8/2024 11:37 AM  Assessment Completed by: JUAN DAVID Rodriguez    If patient is discharged prior to next notation, then this note serves as note for discharge by case management.    Patient Name: Cindy Ortiz                   YOB: 1955  Diagnosis: Cellulitis of right leg [L03.115]                   Date / Time: 5/7/2024  9:12 PM    Patient Admission Status: Inpatient   Readmission Risk (Low < 19, Mod (19-27), High > 27): Readmission Risk Score: 18.4    Current PCP: Johann Crawford MD  PCP verified by CM? Yes    Chart Reviewed: Yes      History Provided by: Patient, Significant Other  Patient Orientation: Alert and Oriented    Patient Cognition: Alert    Hospitalization in the last 30 days (Readmission):  Yes    If yes, Readmission Assessment in  Navigator will be completed.    Advance Directives:      Code Status: Full Code   Patient's Primary Decision Maker is: Legal Next of Kin    Primary Decision Maker: Laith Ortiz - Spouse - [phone]    Discharge Planning:    Patient lives with: Spouse/Significant Other Type of Home: House  Primary Care Giver: Spouse  Patient Support Systems include: Spouse/Significant Other   Current Financial resources: Medicare  Current community resources: None  Current services prior to admission: C-pap, Durable Medical Equipment, Oxygen Therapy            Current DME: Oxygen Therapy (Comment), Walker, Wheelchair, Cpap, Shower Chair            Type of Home Care services:  None    ADLS  Prior functional level: Assistance with the following:, Mobility  Current functional level: Assistance with the following:, Mobility    PT AM-PAC:   /24  OT AM-PAC:   /24    Family can provide assistance at DC: Yes  Would you like Case Management to discuss the discharge plan with any other family members/significant others, and if so, who? No  Plans to Return to Present Housing: Yes  Other  Identified Issues/Barriers to RETURNING to current housing: Yes  Potential Assistance needed at discharge: Home Care            Potential DME: Other (Comment) (unknown at this point)  Patient expects to discharge to: House  Plan for transportation at discharge:      Financial    Payor: MEDICARE / Plan: MEDICARE PART A AND B / Product Type: *No Product type* /     Does insurance require precert for SNF: Yes    Potential assistance Purchasing Medications: No  Meds-to-Beds request:        [id number] [place], [place] - [address] - P [phone] - F [phone]  [address]  [facility] [address]  Phone: [phone] Fax: [phone]      Notes:    Factors facilitating achievement of predicted outcomes: Family support and Cooperative    Barriers to discharge: Decreased endurance and Medical complications    Additional Case Management Notes:  Reviewed chart. Met with pt and spouse at bedside. Explained CM role. Pt from home. Plan is d/c home in care of family. Spouse to transport pt home.  Pt has O2 through [employer], cpap,sc, wc and walker. Cm to continue to follow pt d/c needs.    The Plan for Transition of Care is related to the following treatment goals of Cellulitis of right leg [L03.115]    IF APPLICABLE: The Patient and/or patient representative Cindy and her family were provided with a choice of provider and agrees with the discharge plan. Freedom of choice list with basic dialogue that supports the patient's individualized plan of care/goals and shares the quality data associated with the providers was provided to:     Patient Representative Name:       The Patient and/or Patient Representative Agree with the Discharge Plan?      JUAN DAVID Rodriguez  Case Management Department  Ph: [phone] Fax: [phone]

## 2024-05-08 NOTE — PROGRESS NOTES
4 Eyes Skin Assessment     NAME:  Cindy Ortiz  YOB: 1955  MEDICAL RECORD NUMBER:  [id number]    The patient is being assessed for  Shift Handoff    I agree that at least one RN has performed a thorough Head to Toe Skin Assessment on the patient. ALL assessment sites listed below have been assessed.      Areas assessed by both nurses:    Head, Face, Ears, Shoulders, Back, Chest, Arms, Elbows, Hands, Sacrum. Buttock, Coccyx, Ischium, Legs. Feet and Heels, and Under Medical Devices         Does the Patient have a Wound? Yes wound(s) were present on assessment. LDA wound assessment was Initiated and completed by RN       Constantin Prevention initiated by RN: Yes  Wound Care Orders initiated by RN: No, already placed    Pressure Injury (Stage 3,4, Unstageable, DTI, NWPT, and Complex wounds) if present, place Wound referral order by RN under : No    New Ostomies, if present place, Ostomy referral order under : No     Nurse 1 eSignature: Electronically signed by Abbey Du RN on 5/8/24 at 11:12 AM EDT    **SHARE this note so that the co-signing nurse can place an eSignature**    Nurse 2 eSignature: {Esignature:[id number]}

## 2024-05-08 NOTE — PROGRESS NOTES
This RN educated patient on the morning medications she was to receive especially her new ones that are norvasc, lantus, lovenox, rocephin, and vancomycin. Patient was educated on the reason along with the side effects of each medication. After, education patient decided to deny lovenox and it was not administered and returned to the [facility].  Patient voiced and demonstrated understanding before administration.

## 2024-05-08 NOTE — CONSULTS
Department of Podiatry Consult Note  Attending       Reason for Consult:  right heel ulcer, leg cellulitis     Requesting Physician:  MD    CHIEF COMPLAINT:  right heel ulcer, leg cellulitis     HISTORY OF PRESENT ILLNESS:                The patient is a 68 y.o. female with significant past medical history of right heel ulcer and admitted for fevers, and worsening SOB. Hx of DM2, CKD3, Colon cancer and acute presentation of leg redness.  Patient was recently seen by my partner Dr. Worthington for the right heel, with a stable ulcer treated with collagen powder and dressings daily.  At last visit the right leg did not present with cellulitis, but chronic edema and lymphedema is noted historically.  At visit,  present, no distress, nurse present with assisting dressing change.     Past Medical History:        Diagnosis Date    Anemia     Chronic bilateral low back pain without sciatica     CKD (chronic kidney disease) stage 3, GFR 30-59 ml/min (HCC)     Colon cancer (HCC)     Diabetes (HCC)     Diabetes (HCC)     Diabetes with retinopathy (HCC)     DM eye exam 12/18/17    Hypertension     BRIANNA (obstructive sleep apnea)     Pancreatic cyst     Positive FIT (fecal immunochemical test)     Pulmonary hypertension (HCC)        Past Surgical History:        Procedure Laterality Date    COLONOSCOPY N/A 11/5/2018    COLONOSCOPY WITH BIOPSY performed by Georgi Hanks MD at [facility] ENDOSCOPY    COLONOSCOPY  05/14/2019    Colonoscopy with polypectomy (cold snare)    COLONOSCOPY N/A 5/14/2019    COLONOSCOPY POLYPECTOMY SNARE/COLD BIOPSY performed by Georgi Hanks MD at [facility] ENDOSCOPY    HEMICOLECTOMY Right 11/08/2018    LAPAROSCOPIC RIGHT HEMICOLECTOMY and umbilical hernia repair    MO OFFICE/OUTPT VISIT,PROCEDURE ONLY Right 11/8/2018    LAPAROSCOPIC RIGHT HEMICOLECTOMY and umbilical hernia repair performed by Channing Jaime MD at [facility] OR    UPPER GASTROINTESTINAL ENDOSCOPY N/A 11/5/2018    EGD BIOPSY performed by Georgi ROMAN  5/5 for all pedal groups tested.   No pain with palpation of the foot or ankle b/l.   Ankle joint ROM is decreased in dorsiflexion with the knee extended.   No obvious biomechanical abnormalities. No amputation noted to bilateral LE.     IMAGING:    XR FOOT RIGHT (MIN 3 VIEWS)  Order: [id number]  Status: Final result       Visible to patient: Yes (not seen)       Next appt: None    0 Result Notes  Details    Reading Physician Reading Date Result Priority   Sky Keller MD  [phone]  [phone] 5/8/2024      Narrative & Impression  EXAM: XR FOOT RIGHT (MIN 3 VIEWS)      INDICATION: right foot wound, ?? osteomyelitis      COMPARISON: None available.     TECHNIQUE: 3 views right foot     FINDINGS:     Osseous demineralization. No acute fracture. Moderate midfoot and first metatarsophalangeal joint space loss and osteophyte formation. Plantar soft tissue ulceration of the heel. No cortical erosive changes.     IMPRESSION:     Plantar soft tissue ulceration of the heel. No radiographic evidence of osteomyelitis.         ASSESSMENT/PLAN:   Right leg cellultiis  Right heel ulcer  Diabetic Ulcer  Lower extremity edema and lyphedema  DM II   CKD  Obesity  HTN (POA)      -Patient seen and examined at in room, in bed this PM  -VSS, elevated leukocytosis noted (WBC 17.9) - but trending down from yesterday  -Blood cultures 1 & 2 neg  -xrays images reviewed, impression noted above  -Wound culture taken at todays visit  (not obtained at this time 2/2 no active drainage)-Continue IV antibiotics per orders - Vancomycin  -dressings ordered to patient's room, to be used at next dressing change  - changes - betadine zain on gauze (very little), sprinkle collagen powder on gauze, into the wound, then cover with kerlix and ace  - Pending cellulitis reversal will initiate light compression on leg  -post-op shoe ordered to patient room  -Prevalon boots ordered as well, has current but would like a new one . Patient is to wear at

## 2024-05-08 NOTE — PROGRESS NOTES
Wound Changes    Put betadine on gauze (very little), sprinkle collagen powder on gauze, into the wound, then cover with kerlix and ace

## 2024-05-08 NOTE — CONSULTS
[facility]    Pharmacy Progress Note    Vancomycin - Management by Pharmacy    Consult Date(s): 5/7  Consulting Provider(s): Dr. Somers    Assessment / Plan  Skin and soft tissue infection - Vancomycin  Concurrent Antimicrobials: Ceftriaxone 1g q24h  Day of Vanc Therapy / Ordered Duration: 1/7  Current Dosing Method: Intermittent Dosing by Levels due to CKD.   Therapeutic Goal: Trough ~ 15 mg/L  Current Dose / Plan:   Vancomycin 2000mg x 1 given in ED.   Vancomycin random level this am 9.4 mg/L  Will redose vancomycin 1500mg x 1 now. Repeat level tomorrow am 5/9 0600.   Will continue to monitor clinical condition and make adjustments to regimen as appropriate.    Thank you for consulting pharmacy,    Oracio BlountD, BCPS          Subjective/Objective:   Cindy Ortiz is a 68 y.o. female with a PMHx significant for HTN, DM2, anemia, CKD3, colon cancer, and chronic low back pain  who is admitted with SOB and fevers.     Pharmacy is consulted to Dose Vancomycin.    Ht Readings from Last 1 Encounters:   05/07/24 1.6 m (5' 3\")     Wt Readings from Last 1 Encounters:   05/08/24 (!) 143.8 kg (317 lb 1.6 oz)     Current & Prior Antimicrobial Regimen(s):  Ceftriaxone    Vancomycin Level(s) / Doses:    Date Time Dose Type of Level / Level Interpretation   5/8 0442 2000mg x 1 9.4 mg/L random Redose vancomycin 1500mg x 1  Next random 5/9 0600          Note: Serum levels collected for AUC-based dosing may be high if collected in close proximity to the dose administered. This is not necessarily indicative of toxicity.    Cultures & Sensitivities:    Date Site Micro Susceptibility / Result   5/8 Blood x 2  pending   5/8 MRSA Nares  pending     Recent Labs     05/07/24  2144 05/08/24  0442   CREATININE 1.9* 1.7*   BUN 32* 29*   WBC 19.3* 17.9*       Estimated Creatinine Clearance: 43 mL/min (A) (based on SCr of 1.7 mg/dL (H)).    Additional Lab Values / Findings of Note:    Recent Labs

## 2024-05-08 NOTE — CONSULTS
Nephrology Consult Note  [phone]   Secure-24           Reason for Consult: CKD, diuretic management    HISTORY OF PRESENT ILLNESS:                The patient is a 68 y.o.female with significant past medical history of CKD, DM 2, chronic right heel ulcer, hypertension, CHF, hyperuricemia, hyperlipidemia, colon cancer s/p right hemicolectomy, pancreatic cyst, BRIANNA, chronic low back pain came in with complaints of shortness of breath and fevers.  She also complained of increased redness of the right lower extremity.  She was noted to be hypotensive, tachycardic and febrile to temperature of 101.1 in the ER  X-ray of the right foot showed plantar soft tissue ulceration of the heel with no evidence of osteomyelitis  Labs on admission significant for a creatinine of 1.9, WBC of 19.3  We are consulted for CKD and diuretic management  Chest x-ray on admission with no acute changes, mild cardiomegaly  An echocardiogram in June 2022 showed LVEF of 60%, normal diastolic function  She follows with Dr. Davison, her baseline creatinine appears to be high 1s creatinine at this time appears to be at baseline  CKD dates back to at least 2018  Appears to be taking torsemide 50 mg p.o. daily at home  Patient seen and examined  Denies chest pain or shortness of breath at rest  On 2 L oxygen at home    Past Medical History:        Diagnosis Date    Anemia     Chronic bilateral low back pain without sciatica     CKD (chronic kidney disease) stage 3, GFR 30-59 ml/min (HCC)     Colon cancer (HCC)     Diabetes (HCC)     Diabetes (HCC)     Diabetes with retinopathy (HCC)     DM eye exam 12/18/17    Hypertension     BRIANNA (obstructive sleep apnea)     Pancreatic cyst     Positive FIT (fecal immunochemical test)     Pulmonary hypertension (HCC)        Past Surgical History:        Procedure Laterality Date    COLONOSCOPY N/A 11/5/2018    COLONOSCOPY WITH BIOPSY performed by Georgi Hanks MD at [facility] ENDOSCOPY    COLONOSCOPY   SpO2 99%   BMI 56.15 kg/m²   I/O last 3 completed shifts:  In: 2222 [P.O.:150; I.V.:1572; IV Piggyback:500]  Out: 1300 [Urine:1300]  I/O this shift:  In: 70 [P.O.:60; I.V.:10]  Out: 700 [Urine:700]    Physical Exam:  General : AAOx3, not in pain or respiratory distress, resting in bed  HEENT : normocephalic, atraumatic, mucosa moist, no palor or icterus  CVS: S1 S2 normal, regular rhythm, no murmurs or rubs.  Lungs: Clear, no wheezing or crackles.  Abd: Soft, bowel sounds normal, non-tender.  Ext: Bilateral lower extremity edema + right lower extremity> left lower extremity, erythema both legs +  : bladder non-distended, no tenderness over the bladder  Neuro: Alert and oriented x 3, nonfocal.    DATA:    CBC with Differential:    Lab Results   Component Value Date/Time    WBC 17.9 05/08/2024 04:42 AM    RBC 4.29 05/08/2024 04:42 AM    HGB 11.7 05/08/2024 04:42 AM    HCT 38.0 05/08/2024 04:42 AM     05/08/2024 04:42 AM    MCV 88.6 05/08/2024 04:42 AM    MCH 27.3 05/08/2024 04:42 AM    MCHC 30.8 05/08/2024 04:42 AM    RDW 15.4 05/08/2024 04:42 AM    LYMPHOPCT 4 05/08/2024 04:42 AM    MONOPCT 5 05/08/2024 04:42 AM    EOSPCT 0 05/08/2024 04:42 AM    BASOPCT 0 05/08/2024 04:42 AM    MONOSABS 0.84 05/08/2024 04:42 AM    EOSABS 0.00 05/08/2024 04:42 AM    BASOSABS 0.01 05/08/2024 04:42 AM     BMP:    Lab Results   Component Value Date/Time     05/08/2024 04:42 AM    K 4.1 05/08/2024 04:42 AM    K 3.3 06/13/2022 08:48 AM     05/08/2024 04:42 AM    CO2 26 05/08/2024 04:42 AM    BUN 29 05/08/2024 04:42 AM    CREATININE 1.7 05/08/2024 04:42 AM    CALCIUM 8.8 05/08/2024 04:42 AM    GFRAA 27 06/17/2022 07:25 AM    LABGLOM 33 05/08/2024 04:42 AM    LABGLOM 30 04/08/2024 04:37 AM    GLUCOSE 166 05/08/2024 04:42 AM     Ionized Calcium:  No results found for: \"IONCA\"  Magnesium:    Lab Results   Component Value Date/Time    MG 2.40 09/22/2023 11:07 AM     Phosphorus:    Lab Results   Component Value Date/Time

## 2024-05-08 NOTE — ED NOTES
ED TO INPATIENT SBAR HANDOFF    Patient Name: Cindy Ortiz   :  1955  68 y.o.   MRN:  [id number]  Preferred Name    ED Room #:    Family/Caregiver Present yes -  Robbe  Restraints no   Sitter no   Sepsis Risk Score Sepsis Risk Score: 37.04    Situation  Code Status: Prior No additional code details.    Allergies: Cefepime, Pcn [penicillins], Spironolactone, Coreg [carvedilol], and Lisinopril  Weight: Patient Vitals for the past 96 hrs (Last 3 readings):   Weight   24 2119 (!) 150.3 kg (331 lb 6.4 oz)     Arrived from: home  Chief Complaint:   Chief Complaint   Patient presents with    Shortness of Breath     Hospital Problem/Diagnosis:  Principal Problem:    Cellulitis of right leg  Resolved Problems:    * No resolved hospital problems. *    Imaging:   XR CHEST PORTABLE   Final Result      No acute pulmonary disease.   Mild cardiomegaly.   Moderate hiatal hernia.        Abnormal labs:   Abnormal Labs Reviewed   BLOOD GAS, VENOUS - Abnormal; Notable for the following components:       Result Value    pCO2, Danial 54.1 (*)     HCO3, Venous 29.9 (*)     Positive Base Excess, Danial 3.2 (*)     Carboxyhemoglobin 2.0 (*)     All other components within normal limits   CBC WITH AUTO DIFFERENTIAL - Abnormal; Notable for the following components:    WBC 19.3 (*)     MCHC 30.9 (*)     Neutrophils Absolute 17.84 (*)     Lymphocytes Absolute 0.53 (*)     All other components within normal limits   COMPREHENSIVE METABOLIC PANEL W/ REFLEX TO MG FOR LOW K - Abnormal; Notable for the following components:    Chloride 98 (*)     Glucose 167 (*)     BUN 32 (*)     Creatinine 1.9 (*)     Est, Glom Filt Rate 29 (*)     All other components within normal limits   PROCALCITONIN - Abnormal; Notable for the following components:    Procalcitonin 0.83 (*)     All other components within normal limits   URINALYSIS WITH REFLEX TO CULTURE - Abnormal; Notable for the following components:    Turbidity UA SLIGHTLY  CLOUDY (*)     Urine Hgb LARGE (*)     Protein, UA TRACE (*)     All other components within normal limits   TROPONIN - Abnormal; Notable for the following components:    Troponin, High Sensitivity 57 (*)     All other components within normal limits   TROPONIN - Abnormal; Notable for the following components:    Troponin, High Sensitivity 48 (*)     All other components within normal limits   MICROSCOPIC URINALYSIS - Abnormal; Notable for the following components:    RBC, UA 21 TO 50 (*)     Other Observations UA Culture not indicated. (*)     All other components within normal limits   BRAIN NATRIURETIC PEPTIDE - Abnormal; Notable for the following components:    Pro-BNP 1,212 (*)     All other components within normal limits     Critical values: no     Abnormal Assessment Findings: Wound on right heel (known); SOB    Background  History:   Past Medical History:   Diagnosis Date    Anemia     Chronic bilateral low back pain without sciatica     CKD (chronic kidney disease) stage 3, GFR 30-59 ml/min (HCC)     Colon cancer (HCC)     Diabetes (HCC)     Diabetes (HCC)     Diabetes with retinopathy (HCC)     DM eye exam 12/18/17    Hypertension     Pancreatic cyst     Positive FIT (fecal immunochemical test)     Pulmonary hypertension (HCC)        Assessment    Vitals/MEWS: MEWS Score: 3  Level of Consciousness: Alert (0)   Vitals:    05/07/24 2133 05/07/24 2203 05/07/24 2230 05/07/24 2254   BP: (!) 167/80 (!) 175/74 (!) 182/70 (!) 182/70   Pulse: (!) 103 (!) 105 94 (!) 104   Resp:  29 25 27   Temp:    99.9 °F (37.7 °C)   TempSrc:    Oral   SpO2: 100%   98%   Weight:       Height:         FiO2 (%):   O2 Flow Rate: O2 Device: Nasal cannula O2 Flow Rate (L/min): 2 L/min  Cardiac Rhythm:    Pain Assessment: 0 [x] Verbal [] Arteaga Rasmussen Scale  Pain Scale: Pain Assessment  Pain Assessment: None - Denies Pain  Last documented pain score (0-10 scale)    Last documented pain medication administered:   Mental Status: oriented,

## 2024-05-08 NOTE — PROGRESS NOTES
Called [facility] for orthopedics per podiatrist wanting a new prevalon heel offloader for patient. Office said to call back tomorrow at 0830 a.m.

## 2024-05-08 NOTE — THERAPY EVALUATION
[facility] - Rehabilitation Department       Occupational Therapy  3224/3224-01  [facility] 3 PROGRESSIVE CARE      Patient: Cindy Ortiz   : 1955   MRN: [id number]   Date of Service:  2024    Spoke with patient in room, educated on role of occupational therapy in the acute care setting. Pt reports she is mod IND with ADLs, family completes IADLs, and has all required AD/DME. Declines further need for skilled OT intervention. Please re-consult if functional status changes. Thank you.       Electronically Signed By: Makayla Cast OT

## 2024-05-08 NOTE — ED PROVIDER NOTES
[facility] 3 [facility] CARE     EMERGENCY DEPARTMENT ENCOUNTER            Pt Name: Cindy Ortiz   MRN: [id number]   Birthdate 1955   Date of evaluation: 5/7/2024   Provider: Josesito Ford DO   PCP: Johann Crawford MD   Note Started: 1:52 AM EDT 5/8/24          CHIEF COMPLAINT     Chief Complaint   Patient presents with    Shortness of Breath             HISTORY OF PRESENT ILLNESS:   History from : Patient   Limitations to history : None     Cindy Ortiz is a 68 y.o. female who presents to emergency department with complaints of generalized weakness shaking chills and elevated temperature onset last evening worse prior to admission.  Patient noted to have elevated temperature at 101.1 on arrival.  Patient reports associated difficulty breathing.  Patient was discharged from our facility on 4/8/2024 following admission for acute respiratory failure with hypoxia and hypercapnia secondary to pneumonia.  Patient was discharged on 2 L of home oxygen which she has been wearing as directed since discharge.  Patient complains of difficulty breathing on arrival and is concerned that the pneumonia has returned.  In addition patient under care of podiatrist for ulcer on her right heel.  Today she is noted to have marked erythema to the right lower leg extending to the distal third of the right thigh.    Patient with past medical history of hypertension chronic kidney disease type 2 diabetes CHF anemia.    Nursing Notes were all reviewed and agreed with, or any disagreements were addressed in the HPI.     REVIEW OF SYSTEMS :    Positives and Pertinent negatives as per HPI.      MEDICAL HISTORY   has a past medical history of Anemia, Chronic bilateral low back pain without sciatica, CKD (chronic kidney disease) stage 3, GFR 30-59 ml/min (HCC), Colon cancer (HCC), Diabetes (HCC), Diabetes (HCC), Diabetes with retinopathy (HCC), Hypertension, BRIANNA (obstructive sleep apnea), Pancreatic cyst, Positive FIT (fecal  cellulitis. Also considered but felt to be unlikely acute arterial occlusion, acute deep vein thrombosis necrotizing fasciitis compartment syndrome.    Patient has multiple medication allergies.  She did t see vancomycin intravenously while in the emergency department.           Patient was given the following medications:   Medications   sodium chloride flush 0.9 % injection 5-40 mL (10 mLs IntraVENous Given 5/7/24 2201)   0.9 % sodium chloride infusion (has no administration in time range)   allopurinol (ZYLOPRIM) tablet 200 mg (has no administration in time range)   amLODIPine (NORVASC) tablet 5 mg (has no administration in time range)   insulin glargine (LANTUS) injection vial 10 Units (has no administration in time range)   labetalol (NORMODYNE) tablet 300 mg (has no administration in time range)   montelukast (SINGULAIR) tablet 10 mg (has no administration in time range)   sodium chloride flush 0.9 % injection 10 mL (has no administration in time range)   potassium chloride (KLOR-CON M) extended release tablet 40 mEq (has no administration in time range)     Or   potassium bicarb-citric acid (EFFER-K) effervescent tablet 40 mEq (has no administration in time range)     Or   potassium chloride 10 mEq/100 mL IVPB (Peripheral Line) (has no administration in time range)   magnesium sulfate 2000 mg in 50 mL IVPB premix (has no administration in time range)   ondansetron (ZOFRAN-ODT) disintegrating tablet 4 mg (has no administration in time range)     Or   ondansetron (ZOFRAN) injection 4 mg (has no administration in time range)   senna (SENOKOT) tablet 8.6 mg (has no administration in time range)   acetaminophen (TYLENOL) tablet 650 mg (has no administration in time range)     Or   acetaminophen (TYLENOL) suppository 650 mg (has no administration in time range)   insulin lispro (HUMALOG) injection vial 0-4 Units (has no administration in time range)   insulin lispro (HUMALOG) injection vial 0-4 Units ( SubCUTAneous

## 2024-05-08 NOTE — CONSULTS
[facility]      CONSULTATION  [phone]      Chief Complaint   Patient presents with    Shortness of Breath     Reason for consult:  Heart Failure    ASSESSMENT / PLAN:     Acute on chronic diastolic heart failure  Morbid obesity with serious comorbidity, BMI 56  Cellulitis of right leg  SIRS vs low-grade sepsis  Anemia of chronic diseae  Type 2 DM with polyneuropathy  Mixed HLP  Elevated troponin, likely demand ischemia due to heart failure, sepsis, and low GFR  CKD stage 3b  History of colon cancer    For relief of acute syptoms, patient needs aggressive IV diuresis - lasix @ 40 mg IV q 12°  The only known disease-modifiying drugs for dCHF are SGLT2 inhibitors and semaglutide.  Spironolactone is often used, but she is allergic to this, and also her GFR is low enough that it would be best not to start this at this time.  Mainstays of treatment are controlling volume with diuretics and preventing/treating atrial fibrillation, as well as controlling BP.  Treating BRIANNA (if present) as well as obesity are very helpful.  Strict I/O, daily weights  Low sodium diet, limit to 2000 mg daily.  CHF education  Goal net negative  1-2 liters per day  Low threshold to convert to lasix drip  Closely monitor renal function, 'lytes, telemetry while aggressively IV diuresing.  Replace electrolytes as needed.  Maintain K+ close to 4.0 and Mg++ close to 2.0  Check iron studies.  If ok with ID to give IV iron in setting of this infection, recommend repleting with IV iron if ferritin < 100, or if FeSat is < 20% and ferritin < 300.  No need to repeat troponins since no chest pain or concerning EKG changes, and repeat troponin already was downtrending  Monitor telemetry  No plans at this time for any additional inpatient ischemia evaluation.  Given her active infection she is not a candidate for a cath at this time, and with her obesity, renal failure, and other comorbidities, she would be at much higher risk than average of    ----------------------------------------------------------------------  C. Data (any 2)  [x] Discussed management of the case with:  hospitalist  [x] Imaging personally reviewed and interpreted, includes:    [x] Data Review (any 3)  [] Collateral history obtained from:    [x] All available Consultant notes from yesterday/today were reviewed  [x] All current labs were reviewed and interpreted for clinical significance   [x] Appropriate follow-up labs were ordered    Tobacco use was discussed with the patient and educated on the negative effects. I have asked the patient to not utilize these agents.    Thank you for allowing to us to participate in the care or Cindy Ortiz. Further evaluation will be based upon the patient's clinical course and testing results.    All questions and concerns were addressed to the patient/family. Alternatives to my treatment were discussed.     Terry Hensley MD, MD 5/8/2024 12:53 PM

## 2024-05-08 NOTE — PLAN OF CARE
Problem: Discharge Planning  Goal: Discharge to home or other facility with appropriate resources  5/8/2024 1104 by Abbey Du RN  Outcome: Progressing  Flowsheets (Taken 5/8/2024 0800)  Discharge to home or other facility with appropriate resources:   Identify barriers to discharge with patient and caregiver   Arrange for needed discharge resources and transportation as appropriate   Refer to discharge planning if patient needs post-hospital services based on physician order or complex needs related to functional status, cognitive ability or social support system   Identify discharge learning needs (meds, wound care, etc)     Problem: Pain  Goal: Verbalizes/displays adequate comfort level or baseline comfort level  5/8/2024 1104 by Abbey Du RN  Outcome: Progressing     Problem: Safety - Adult  Goal: Free from fall injury  5/8/2024 1104 by Abbey Du RN  Outcome: Progressing     Problem: Skin/Tissue Integrity  Goal: Absence of new skin breakdown  Description: 1.  Monitor for areas of redness and/or skin breakdown  2.  Assess vascular access sites hourly  3.  Every 4-6 hours minimum:  Change oxygen saturation probe site  4.  Every 4-6 hours:  If on nasal continuous positive airway pressure, respiratory therapy assess nares and determine need for appliance change or resting period.  5/8/2024 1104 by Abbey Du RN  Outcome: Progressing     Problem: Chronic Conditions and Co-morbidities  Goal: Patient's chronic conditions and co-morbidity symptoms are monitored and maintained or improved  Outcome: Progressing  Flowsheets (Taken 5/8/2024 0800)  Care Plan - Patient's Chronic Conditions and Co-Morbidity Symptoms are Monitored and Maintained or Improved:   Monitor and assess patient's chronic conditions and comorbid symptoms for stability, deterioration, or improvement   Collaborate with multidisciplinary team to address chronic and comorbid conditions and prevent exacerbation or deterioration

## 2024-05-08 NOTE — PROGRESS NOTES
V2.0  [facility] [place]      Name:  Cindy Ortiz /Age/Sex: 1955  (68 y.o. female)   MRN & CSN:  [id number] & [id number] Encounter Date/Time: 2024 7:13 AM EDT    Location:  3224/3224-01 PCP: Johann Crawford MD       Hospital Day: 2    Assessment and Plan:   Cindy Ortiz is a 68 y.o. female with pmh of CKD, colon cancer, diabetes mellitus, hypertension, BRIANNA, pulmonary hypertension, chronic pain who presents with Cellulitis of right leg      Plan:  Sepsis secondary to right lower extremity cellulitis/ulceration - POA  Patient currently following podiatry for right foot wound  - sepsis evidenced by , RR 29, T max 101.1 °F, WBC 19.3, Lactic acid 0.9, /76  - UA-large blood, hematuria, CXR no acute pulmonary disease, mild cardiomegaly, moderate hiatal hernia,   - Hemodynamically stable   -Patient was given sepsis bolus in the emergency room 1572 mL last echo- (EF 60%-2022) during her last admission patient refused echo  - continue IVF, abx vancomycin-will add ceftriaxone for gram-negative coverage.  Antibiotics discussed with pharmacist patient has had cephalosporins in the past  -Lactic acid 0.9 repeat level 0.9 inflammatory makers, UCx, BCx pending  -Blood culture #2 ordered-blood culture #1 obtained on admission-follow blood cultures  -Consult to podiatry  -Right foot x-ray  -Infectious disease consult-appreciate assistance-PS sent  -MRSA PCR pending  -procalcitonin 0.83 trended up to 1.01 ()  -   Sed rate 71 ()    Acute on chronic heart failure with preserved EF-last echo on file 2022 EF of 60%.  During her last hospitalization patient declined to have echo completed.  Home regimen includes furosemide and labetalol.  Patient is not on ACE /ARB-she has listed allergies to lisinopril, spironolactone and Coreg  Appears to be volume up.  -Continue diuresis with Lasix  -Continue labetalol  -Daily weights  -Echocardiogram-ordered - patient declined

## 2024-05-08 NOTE — PLAN OF CARE
Problem: Discharge Planning  Goal: Discharge to home or other facility with appropriate resources  Outcome: Progressing  Flowsheets (Taken 5/8/2024 0226)  Discharge to home or other facility with appropriate resources:   Identify barriers to discharge with patient and caregiver   Identify discharge learning needs (meds, wound care, etc)   Refer to discharge planning if patient needs post-hospital services based on physician order or complex needs related to functional status, cognitive ability or social support system   Arrange for needed discharge resources and transportation as appropriate     Problem: Pain  Goal: Verbalizes/displays adequate comfort level or baseline comfort level  Outcome: Progressing  Flowsheets (Taken 5/8/2024 0226)  Verbalizes/displays adequate comfort level or baseline comfort level:   Encourage patient to monitor pain and request assistance   Administer analgesics based on type and severity of pain and evaluate response   Consider cultural and social influences on pain and pain management   Assess pain using appropriate pain scale   Implement non-pharmacological measures as appropriate and evaluate response   Notify Licensed Independent Practitioner if interventions unsuccessful or patient reports new pain     Problem: Safety - Adult  Goal: Free from fall injury  Outcome: Progressing  Flowsheets (Taken 5/8/2024 0226)  Free From Fall Injury:   Instruct family/caregiver on patient safety   Based on caregiver fall risk screen, instruct family/caregiver to ask for assistance with transferring infant if caregiver noted to have fall risk factors     Problem: Skin/Tissue Integrity  Goal: Absence of new skin breakdown  Description: 1.  Monitor for areas of redness and/or skin breakdown  2.  Assess vascular access sites hourly  3.  Every 4-6 hours minimum:  Change oxygen saturation probe site  4.  Every 4-6 hours:  If on nasal continuous positive airway pressure, respiratory therapy assess nares  and determine need for appliance change or resting period.  Outcome: Progressing

## 2024-05-08 NOTE — PROGRESS NOTES
[facility] - Rehabilitation Department      Physical Therapy  3224/3224-01  [facility] 3 PROGRESSIVE CARE    [x] Initial Evaluation            [x] Daily Treatment Note         [x] Discharge Summary      Patient: Cindy Ortiz   : 1955   MRN: [id number]   Date of Service:  2024   Admitting Diagnosis: Cellulitis of right leg  Ordering Physician: Dr. Edmund Saez   Current Admission Summary: 2024 H&P Dr. Arnoldo Somers'  \"  CC:        Chief Complaint   Patient presents with    Shortness of Breath         HISTORY OF PRESENT ILLNESS:      Cindy Ortiz is a 68 y.o. female with a PMHx of HTN, DM2, anemia, CKD3, colon cancer, and chronic low back pain who presented for evaluation of a 1-day hx of worsening shortness of breath with associated fevers.  She attributed her symptoms to having pneumonia again.  She does have a right heel ulceration that is being cared for by podiatry.  She did notice her right lower leg was more red when compared to her left.  She has remained on 2L O2 at home since leaving the hospital last month.  She otherwise denies any nausea, vomiting, chest pain, abdominal pain, diarrhea, or dysuria.       In the ER, pt hypertensive, tachycardic to 100s, and febrile to 101.1F.  O2 is 99% on 2L via NC.  Labs remarkable for leukocytosis of 19.3, Cr 1.9, procalcitonin 0.83.  CXR shows cardiomegaly, but otherwise negative.  Treated with IV vanco and fluid bolus.      Case discussed with ED attending for admission.  External medical records reviewed.  ASSESSMENT / PLAN:      Right lower extremity cellulitis  Right heel ulceration   Acute on chronic heart failure with preserved EF  Chronic respiratory failure requiring O2   Diabetes, type 2, controlled   Chronic kidney disease, stage 3  Obesity   HTN     Infection along R ant lower leg, heel appears ok w/ minimal slough tissue and drainage.  Dyspnea and continued O2 needs likely from hypervolemia.       - check BNP, MRSA screen  -  technique  [x]Patient verbalized understanding of or demonstrated understanding of instruction given in hep as noted above to be completed throughout the day when not up and walking as tolerated and to include     Functional Outcomes  AM-PAC Inpatient Mobility Raw Score : 18              Cognition  WFL  Orientation:    Oriented to person, place and situation.   Command Following:   WFL    Education  Barriers To Learning: none  Patient Education: patient educated on plan of care, discharge recommendations, goals, PT role and benefits, HEP  Learning Assessment:  patient verbalizes and demonstrates understanding    Patient Disposition at end of session:  patient left in bed, bed alarm in place, gait belt, nurse notified, and  present at onset of tx but went home during session.     Plan  Frequency: 1 tx then DC   Current Treatment Recommendations: home exercise program    Goals  Patient Goals: Patient notes she doesn't want to put much weight on RLE due to advised limited activity for RLE and notes she has been able to manage around her home with walker and WC and stair chair.      Goals :   To be met in by:5/8/2024 unless noted otherwise:  1). Independent with LE Ex x 10 reps and verbalize understanding of need to get up to chair for at least meals as tolerated to maintain/improve AROM/strength  met by 5/8/2024. GOAL Met patient demo Independent with LE Ex x 10 reps and verbalize understanding of need to get up to chair for at least meals as tolerated to maintain/improve AROM/strength         Above goals reviewed on 5/8/2024.  All goals are ongoing at this time unless indicated above.      Therapy Session Time      Individual Group Co-treatment   Time In 1410       Time Out 1450       Minutes 40         Timed Code Treatment Minutes:  10 Minutes (eval 30)  Total Treatment Minutes:      Electronically Signed By: Vaishali Brennan, PT

## 2024-05-08 NOTE — PROGRESS NOTES
4 Eyes Skin Assessment     NAME:  Cindy Ortiz  YOB: 1955  MEDICAL RECORD NUMBER:  [id number]    The patient is being assessed for  Admission    I agree that at least one RN has performed a thorough Head to Toe Skin Assessment on the patient. ALL assessment sites listed below have been assessed.      Areas assessed by both nurses:    Head, Face, Ears, Shoulders, Back, Chest, Arms, Elbows, Hands, Sacrum. Buttock, Coccyx, Ischium, Legs. Feet and Heels, and Under Medical Devices         Does the Patient have a Wound? Yes wound(s) were present on assessment. LDA wound assessment was Initiated and completed by RN       Constantin Prevention initiated by RN: Yes  Wound Care Orders initiated by RN: Yes    Pressure Injury (Stage 3,4, Unstageable, DTI, NWPT, and Complex wounds) if present, place Wound referral order by RN under : No    New Ostomies, if present place, Ostomy referral order under : No     Nurse 1 eSignature: Electronically signed by JANY MOSELEY RN on 5/8/24 at 2:26 AM EDT    **SHARE this note so that the co-signing nurse can place an eSignature**    Nurse 2 eSignature: Electronically signed by ALBA DOBBS RN on 5/8/24 at 3:19 AM EDT

## 2024-05-08 NOTE — PROGRESS NOTES
Podiatry doesn't use epic, they use their own system for their notes. Patient sees Reg Worthington.

## 2024-05-08 NOTE — PROGRESS NOTES
Patient offered hospital CPAP machine. Patient denies wanting hospital machine at this time. Patient states they would rather wear a nasal cannula. Bedside RN is aware and states they will place the patient on continuous pulse oximeter. Patient resting in bed comfortably at this time.

## 2024-05-08 NOTE — PROGRESS NOTES
This RN made a nursing communication order under Scooby Carson DPM for a new prevalon boot for patient after checking off the original one by accident. This RN accidentally put in the nursing communication under the wrong provider and discontinued it and changed it to the correct provider.

## 2024-05-08 NOTE — ED TRIAGE NOTES
Pt arrives to the ED with c/o SOB since yesterday. Pt also endorses low-grade fever, denies cough. Pt states \"I think I have pneumonia again.\" Denies CP, nausea/vomiting.     Pt is A&O x4, RR even but labored, chest expansions symmetrical.

## 2024-05-08 NOTE — CONSULTS
Infectious Diseases Inpatient Consult Note      Reason for Consult:  Sepsis, high fever, WBC elevation, right leg severe  cellulitis and  Rt heel ulcer    Requesting Physician:   Dr. Saez    Primary Care Physician:  Johann Crawford MD    History Obtained From:  [facility] and patient    CHIEF COMPLAINT:     Chief Complaint   Patient presents with    Shortness of Breath         HISTORY OF PRESENT ILLNESS:  68 y.o. woman with a significant history of hypertension, diabetes, retinopathy, chronic kidney disease stage IIIb history of colon cancer, low back pain morbid obesity BMI 56 admitted to hospital secondary to shortness of breath fever and right leg cellulitis.  Patient noted right leg was getting bright red associated with increasing pain and swelling.  She has a history of colon cancer, history of obstructive sleep apnea.  Labs indicate creatinine 1.9 procalcitonin 1.01  WBC elevated 19.3 with left shift hemoglobin 12.8 blood culture process, Tmax 101.1 since admission x-ray of the right foot plantar soft tissue ulceration no evidence of osteomyelitis chest x-ray negative, she is currently requiring 2 L nasal cannula, given the ongoing sepsis with right leg cellulitis we are consulted for recommendations      Past Medical History:    Past Medical History:   Diagnosis Date    Anemia     Chronic bilateral low back pain without sciatica     CKD (chronic kidney disease) stage 3, GFR 30-59 ml/min (HCC)     Colon cancer (HCC)     Diabetes (HCC)     Diabetes (HCC)     Diabetes with retinopathy (HCC)     DM eye exam 12/18/17    Hypertension     BRIANNA (obstructive sleep apnea)     Pancreatic cyst     Positive FIT (fecal immunochemical test)     Pulmonary hypertension (HCC)        Past Surgical History:    Past Surgical History:   Procedure Laterality Date    COLONOSCOPY N/A 11/5/2018    COLONOSCOPY WITH BIOPSY performed by Georgi Hanks MD at [facility]OSCOPY    COLONOSCOPY  05/14/2019    Colonoscopy with  mass N85.8    Morbid obesity due to excess calories ([facility]) E66.01    BRIANNA (obstructive sleep apnea) G47.33    PLMD (periodic limb movement disorder) G47.61    Pancreatic cyst K86.2    Elevated C-reactive protein (CRP) R79.82    Diabetic polyneuropathy associated with type 2 diabetes mellitus ([facility]) E11.42    Diabetes mellitus type 2 in obese E11.69, E66.9    Elevated sed rate R70.0    Localized edema R60.0    Hyperuricemia E79.0    Respiratory failure ([facility]) J96.90    Cellulitis of right leg L03.115        ICD-10-CM    1. Cellulitis of right lower extremity  L03.115       2. Heel ulceration, right, with unspecified severity ([facility])  L97.419       3. Acute on chronic congestive heart failure, unspecified heart failure type ([facility])  I50.9       4. Hypertensive heart and kidney disease with heart failure and with chronic kidney disease stage III ([facility])  I13.0     N18.30       5. Type 2 diabetes mellitus with stage 3 chronic kidney disease, with long-term current use of insulin, unspecified whether stage 3a or 3b CKD ([facility])  E11.22     N18.30     Z79.4       6. Acute on chronic diastolic congestive heart failure ([facility])  I50.33 Echo (TTE) complete (PRN contrast/bubble/strain/3D)     Echo (TTE) complete (PRN contrast/bubble/strain/3D)           Sepsis on admission  Admitted with high fever  WBC elevation  Complicated right diabetic foot infection  Right heel ulceration  X-ray right heel negative for osteomyelitis  Right leg cellulitis  Procalcitonin elevation  CRP elevation  Chronic kidney disease stage IIIb  Morbid obesity BMI 56  History of colon cancer  Congestive heart failure  Diabetic nephropathy  Diabetic neuropathy  Penicillin allergy    Right heel ulceration with ongoing right leg cellulitis, given the ongoing fever and sepsis will need IV antibiotics.  She does have multiple antibiotic allergies we will continue current coverage    Labs, Microbiology, Radiology and pertinent results from current hospitalization and care

## 2024-05-08 NOTE — PROGRESS NOTES
Enoxaparin Guidelines    Adjust enoxaparin prophylactic dosing to enoxaparin 30mg SQ BID per protocol.     Lashay Rasmussen PharmD, BCPS      Recent Labs     05/07/24  2144 05/08/24 0442   CREATININE 1.9* 1.7*     Recent Labs     05/08/24  0442   HGB 11.7*   HCT 38.0   *   INR 1.2     Estimated Creatinine Clearance: 43 mL/min (A) (based on SCr of 1.7 mg/dL (H)).    The guidance below is to provide initial recommendations for dosing. If recommended dose does not align well with patient's current clinical picture, communications with the care team will occur to determine most appropriate medication and dose.    TABLE 1.  ENOXAPARIN ROUTINE PROPHYLAXIS DOSING (Medically ill, routine surgery)   Patient Weight (kg)     50.9 and below 51 - 100.9 101 - 150.9 151 - 174.9 175 or greater         Estimated CrCl  (ml/min) 30 or greater   30 mg SUBQ daily   40 mg SUBQ daily 30 mg SUBQ BID  40 mg SUBQ BID 60mg SUBQ BID      15-29 UFH 5000 units SUBQ BID   30 mg SUBQ daily 30 mg SUBQ daily 40 mg SUBQ daily   60 mg SUBQ daily      Less than 15 or Dialysis UFH 5000 units SUBQ BID   UFH 5000 units SUBQ TID UFH 7500 units SUBQ TID         LASHAY RASMUSSEN, [facility] 5/8/2024 9:37 AM

## 2024-05-08 NOTE — H&P
HISTORY & PHYSICAL        Date of Admission:  05/07/24  MRN: [id number]  Date of Service: 05/07/24  Location:  [facility]       CC:    Chief Complaint   Patient presents with    Shortness of Breath        HISTORY OF PRESENT ILLNESS:     Cindy Ortiz is a 68 y.o. female with a PMHx of HTN, DM2, anemia, CKD3, colon cancer, and chronic low back pain who presented for evaluation of a 1-day hx of worsening shortness of breath with associated fevers.  She attributed her symptoms to having pneumonia again.  She does have a right heel ulceration that is being cared for by podiatry.  She did notice her right lower leg was more red when compared to her left.  She has remained on 2L O2 at home since leaving the hospital last month.  She otherwise denies any nausea, vomiting, chest pain, abdominal pain, diarrhea, or dysuria.      In the ER, pt hypertensive, tachycardic to 100s, and febrile to 101.1F.  O2 is 99% on 2L via NC.  Labs remarkable for leukocytosis of 19.3, Cr 1.9, procalcitonin 0.83.  CXR shows cardiomegaly, but otherwise negative.  Treated with IV vanco and fluid bolus.     Case discussed with ED attending for admission.  External medical records reviewed.    PAST MEDICAL HX:  Past Medical History:   Diagnosis Date    Anemia     Chronic bilateral low back pain without sciatica     CKD (chronic kidney disease) stage 3, GFR 30-59 ml/min (HCC)     Colon cancer (HCC)     Diabetes (HCC)     Diabetes (HCC)     Diabetes with retinopathy (HCC)     DM eye exam 12/18/17    Hypertension     Pancreatic cyst     Positive FIT (fecal immunochemical test)     Pulmonary hypertension (HCC)        SURGICAL HX:  Past Surgical History:   Procedure Laterality Date    COLONOSCOPY N/A 11/5/2018    COLONOSCOPY WITH BIOPSY performed by Georgi Hanks MD at [facility] ENDOSCOPY

## 2024-05-08 NOTE — PROGRESS NOTES
Hospitalist Progress note:  Addendum to CNP note from 5/8/24  S/E pt at bedside around 10.00 am.  at bedside.  States worsening SOB and fever prompted him to come to ER. Complains of fluid retention and weight gain. Denies any Chest pain/Abd pain/ N/V/Diarrhea. No HA/Dizziness or any tingling/numbness.  and pt reports seeing podiatrist  for R heel ulcer.    Physical Examination: General: Alert, Awake, oriented x 3, cooperative. Morbidly Obese. No acute distress. HEENT: Oral mucosa moist. Neck: no JVD. Respiratory: BS decreased B/l, b/l basal rales.No wheezes. Cardiovascular: S1 S2, RRR. Gastrointestinal: BS+. Soft, obese abdomen, no rebound or guarding. Musculoskeletal/ Extermities: RLE: R leg erythema, swelling and skin changes s/o vascular stasis. R heal with unstagable ulcer with surrounding erythema and minimal discharge. LLE: L leg with skin changes s/o vascular stasis. Good color of the foot bilaterally. Capillary refill: good. Neurologic: Face symmetrical, Speech clear, moves all four extremities.    Labs and Imaging reviewed - CXR, EKG, CBC, BMP, procalcitonin, ESR  A/P:   R heel diabetic foot ulcer with cellulitis  Sepsis likely d/t # 1, POA  Acute on chronic diastolic CHF  Elevated troponin, unclear etio, possible demand ischemia d/t chf  Volume overload  Ch respi failure  DM-2 with hyperglycemia  CKD 3b  HTN  BRIANNA on CPAP  Hx of noncompliance    Cont on empirical IV antibiotics, follow-up cultures. Obtain XR R foot. Podiatry team consulted. Obtain ID team input - requested.  Diuresis, Fluid restriction, monitor renal functions and lytes, Nephrology team input  Echocardiogram, cont po labetalol, Cardiology team input  Cont basal-bolus insulin, CSIC, check FSBS ACHS, monitor for hypoglycemia  D/w pt and  at bedside. Updated about current clinical condition with possible risks of complications including life long disability and even death if left untreated. Strongly advised for

## 2024-05-09 LAB
ANION GAP SERPL CALCULATED.3IONS-SCNC: 12 MMOL/L (ref 3–16)
BASOPHILS # BLD: 0.03 K/UL (ref 0–0.2)
BASOPHILS NFR BLD: 0 %
BUN SERPL-MCNC: 30 MG/DL (ref 7–20)
CALCIUM SERPL-MCNC: 8.9 MG/DL (ref 8.3–10.6)
CHLORIDE SERPL-SCNC: 100 MMOL/L (ref 99–110)
CO2 SERPL-SCNC: 27 MMOL/L (ref 21–32)
CREAT SERPL-MCNC: 1.6 MG/DL (ref 0.6–1.2)
DATE LAST DOSE: NORMAL
EOSINOPHIL # BLD: 0.21 K/UL (ref 0–0.6)
EOSINOPHILS RELATIVE PERCENT: 2 %
ERYTHROCYTE [DISTWIDTH] IN BLOOD BY AUTOMATED COUNT: 15.4 % (ref 12.4–15.4)
GFR, ESTIMATED: 33 ML/MIN/1.73M2
GLUCOSE BLD-MCNC: 119 MG/DL (ref 70–99)
GLUCOSE BLD-MCNC: 120 MG/DL (ref 70–99)
GLUCOSE BLD-MCNC: 125 MG/DL (ref 70–99)
GLUCOSE BLD-MCNC: 164 MG/DL (ref 70–99)
GLUCOSE SERPL-MCNC: 122 MG/DL (ref 70–99)
HCT VFR BLD AUTO: 34.8 % (ref 36–48)
HGB BLD-MCNC: 10.9 G/DL (ref 12–16)
IMM GRANULOCYTES # BLD AUTO: 0.02 K/UL (ref 0–0.5)
IMM GRANULOCYTES NFR BLD: 0 %
LYMPHOCYTES NFR BLD: 1.31 K/UL (ref 1–5.1)
LYMPHOCYTES RELATIVE PERCENT: 12 %
MCH RBC QN AUTO: 27.9 PG (ref 26–34)
MCHC RBC AUTO-ENTMCNC: 31.3 G/DL (ref 31–36)
MCV RBC AUTO: 89.2 FL (ref 80–100)
MONOCYTES NFR BLD: 0.89 K/UL (ref 0–1.3)
MONOCYTES NFR BLD: 8 %
MRSA, DNA, NASAL: NOT DETECTED
NEUTROPHILS NFR BLD: 78 %
NEUTS SEG NFR BLD: 8.53 K/UL (ref 1.7–7.7)
PLATELET # BLD AUTO: 142 K/UL (ref 135–450)
PMV BLD AUTO: 10.4 FL (ref 9.4–12.4)
POTASSIUM SERPL-SCNC: 3.8 MMOL/L (ref 3.5–5.1)
RBC # BLD AUTO: 3.9 M/UL (ref 4–5.2)
SODIUM SERPL-SCNC: 139 MMOL/L (ref 136–145)
SPECIMEN DESCRIPTION: NORMAL
TME LAST DOSE: NORMAL H
VANCOMYCIN DOSE: NORMAL MG
VANCOMYCIN SERPL-MCNC: 13.8 UG/ML
WBC OTHER # BLD: 11 K/UL (ref 4–11)

## 2024-05-09 PROCEDURE — 85025 COMPLETE CBC W/AUTO DIFF WBC: CPT

## 2024-05-09 PROCEDURE — 82962 GLUCOSE BLOOD TEST: CPT

## 2024-05-09 PROCEDURE — 6360000002 HC RX W HCPCS: Performed by: INTERNAL MEDICINE

## 2024-05-09 PROCEDURE — 2700000000 HC OXYGEN THERAPY PER DAY

## 2024-05-09 PROCEDURE — 80202 ASSAY OF VANCOMYCIN: CPT

## 2024-05-09 PROCEDURE — 2580000003 HC RX 258: Performed by: HOSPITALIST

## 2024-05-09 PROCEDURE — 80048 BASIC METABOLIC PNL TOTAL CA: CPT

## 2024-05-09 PROCEDURE — 6360000002 HC RX W HCPCS: Performed by: NURSE PRACTITIONER

## 2024-05-09 PROCEDURE — 6370000000 HC RX 637 (ALT 250 FOR IP): Performed by: INTERNAL MEDICINE

## 2024-05-09 PROCEDURE — 36415 COLL VENOUS BLD VENIPUNCTURE: CPT

## 2024-05-09 PROCEDURE — 94761 N-INVAS EAR/PLS OXIMETRY MLT: CPT

## 2024-05-09 PROCEDURE — 2580000003 HC RX 258: Performed by: INTERNAL MEDICINE

## 2024-05-09 PROCEDURE — 1200000000 HC SEMI PRIVATE

## 2024-05-09 PROCEDURE — 99232 SBSQ HOSP IP/OBS MODERATE 35: CPT | Performed by: STUDENT IN AN ORGANIZED HEALTH CARE EDUCATION/TRAINING PROGRAM

## 2024-05-09 PROCEDURE — 86063 ANTISTREPTOLYSIN O SCREEN: CPT

## 2024-05-09 PROCEDURE — 6370000000 HC RX 637 (ALT 250 FOR IP): Performed by: NURSE PRACTITIONER

## 2024-05-09 PROCEDURE — 6360000002 HC RX W HCPCS: Performed by: HOSPITALIST

## 2024-05-09 RX ORDER — FERROUS SULFATE 325(65) MG
325 TABLET ORAL
Status: DISCONTINUED | OUTPATIENT
Start: 2024-05-10 | End: 2024-05-10 | Stop reason: HOSPADM

## 2024-05-09 RX ORDER — MONTELUKAST SODIUM 10 MG/1
10 TABLET ORAL NIGHTLY
Status: DISCONTINUED | OUTPATIENT
Start: 2024-05-09 | End: 2024-05-10 | Stop reason: HOSPADM

## 2024-05-09 RX ORDER — CETIRIZINE HYDROCHLORIDE 10 MG/1
10 TABLET ORAL NIGHTLY
Status: DISCONTINUED | OUTPATIENT
Start: 2024-05-09 | End: 2024-05-10 | Stop reason: HOSPADM

## 2024-05-09 RX ADMIN — MONTELUKAST 10 MG: 10 TABLET, FILM COATED ORAL at 21:06

## 2024-05-09 RX ADMIN — LABETALOL HYDROCHLORIDE 300 MG: 100 TABLET, FILM COATED ORAL at 21:06

## 2024-05-09 RX ADMIN — LABETALOL HYDROCHLORIDE 300 MG: 100 TABLET, FILM COATED ORAL at 08:28

## 2024-05-09 RX ADMIN — SODIUM CHLORIDE 1500 MG: 9 INJECTION, SOLUTION INTRAVENOUS at 08:27

## 2024-05-09 RX ADMIN — INSULIN GLARGINE 10 UNITS: 100 INJECTION, SOLUTION SUBCUTANEOUS at 21:07

## 2024-05-09 RX ADMIN — ALLOPURINOL 200 MG: 100 TABLET ORAL at 08:28

## 2024-05-09 RX ADMIN — HYDROCODONE BITARTRATE AND ACETAMINOPHEN 0.5 TABLET: 5; 325 TABLET ORAL at 17:27

## 2024-05-09 RX ADMIN — HYDROCODONE BITARTRATE AND ACETAMINOPHEN 0.5 TABLET: 5; 325 TABLET ORAL at 05:12

## 2024-05-09 RX ADMIN — FUROSEMIDE 80 MG: 10 INJECTION, SOLUTION INTRAMUSCULAR; INTRAVENOUS at 08:28

## 2024-05-09 RX ADMIN — HYDROCODONE BITARTRATE AND ACETAMINOPHEN 0.5 TABLET: 5; 325 TABLET ORAL at 10:46

## 2024-05-09 RX ADMIN — CETIRIZINE HYDROCHLORIDE 10 MG: 10 TABLET, FILM COATED ORAL at 21:06

## 2024-05-09 RX ADMIN — SODIUM CHLORIDE, PRESERVATIVE FREE 10 ML: 5 INJECTION INTRAVENOUS at 08:28

## 2024-05-09 RX ADMIN — IRON SUCROSE 200 MG: 20 INJECTION, SOLUTION INTRAVENOUS at 10:45

## 2024-05-09 RX ADMIN — INSULIN GLARGINE 10 UNITS: 100 INJECTION, SOLUTION SUBCUTANEOUS at 08:28

## 2024-05-09 RX ADMIN — CEFTRIAXONE SODIUM 2000 MG: 2 INJECTION, POWDER, FOR SOLUTION INTRAMUSCULAR; INTRAVENOUS at 08:26

## 2024-05-09 RX ADMIN — HYDROCODONE BITARTRATE AND ACETAMINOPHEN 0.5 TABLET: 5; 325 TABLET ORAL at 21:38

## 2024-05-09 RX ADMIN — FUROSEMIDE 80 MG: 10 INJECTION, SOLUTION INTRAMUSCULAR; INTRAVENOUS at 17:27

## 2024-05-09 RX ADMIN — AMLODIPINE BESYLATE 5 MG: 5 TABLET ORAL at 08:28

## 2024-05-09 ASSESSMENT — PAIN SCALES - GENERAL
PAINLEVEL_OUTOF10: 0
PAINLEVEL_OUTOF10: 4
PAINLEVEL_OUTOF10: 6
PAINLEVEL_OUTOF10: 6
PAINLEVEL_OUTOF10: 4
PAINLEVEL_OUTOF10: 1
PAINLEVEL_OUTOF10: 7
PAINLEVEL_OUTOF10: 4
PAINLEVEL_OUTOF10: 7
PAINLEVEL_OUTOF10: 4
PAINLEVEL_OUTOF10: 4

## 2024-05-09 ASSESSMENT — PAIN DESCRIPTION - DESCRIPTORS
DESCRIPTORS: STABBING
DESCRIPTORS: ACHING

## 2024-05-09 ASSESSMENT — PAIN - FUNCTIONAL ASSESSMENT
PAIN_FUNCTIONAL_ASSESSMENT: PREVENTS OR INTERFERES SOME ACTIVE ACTIVITIES AND ADLS
PAIN_FUNCTIONAL_ASSESSMENT: PREVENTS OR INTERFERES SOME ACTIVE ACTIVITIES AND ADLS
PAIN_FUNCTIONAL_ASSESSMENT: ACTIVITIES ARE NOT PREVENTED
PAIN_FUNCTIONAL_ASSESSMENT: PREVENTS OR INTERFERES SOME ACTIVE ACTIVITIES AND ADLS

## 2024-05-09 ASSESSMENT — PAIN DESCRIPTION - FREQUENCY: FREQUENCY: INTERMITTENT

## 2024-05-09 ASSESSMENT — PAIN DESCRIPTION - LOCATION
LOCATION: BACK

## 2024-05-09 ASSESSMENT — PAIN DESCRIPTION - ONSET: ONSET: AWAKENED FROM SLEEP

## 2024-05-09 ASSESSMENT — PAIN DESCRIPTION - ORIENTATION
ORIENTATION: LOWER
ORIENTATION: MID
ORIENTATION: MID

## 2024-05-09 ASSESSMENT — PAIN DESCRIPTION - PAIN TYPE: TYPE: CHRONIC PAIN

## 2024-05-09 NOTE — DISCHARGE INSTRUCTIONS
Wound care - Light betadine spotted on gauze, sprinkle collagen over, apply to wound, cover with kerlix and ace     Follow-up with podiatry as directed     Continue Prevalon boot and postop shoe    Weightbearing right lower extremity    Follow up with pcp in 1 week  Follow up with nephrology in 1 week  Follow up with cardiology as directed     Take probiotic twice a day for 14 days    Iron supplement ordered due to low iron levels. Low iron can worsen congestive heart failure. It is recommended you take an iron supplement daily. You may take with vitamin c 250mg by mouth to improve iron absorption in the stomach. Iron can also cause constipation, you may take over the counter colace 100-200mg once or twice a day for constipation.

## 2024-05-09 NOTE — PROGRESS NOTES
Nephrology progress Note  [phone]  [phone]   [employer]       Patient:  Cindy Ortiz   : 1955    Brief HPI    The patient is a 68 y.o.female with significant past medical history of CKD, DM 2, chronic right heel ulcer, hypertension, CHF, hyperuricemia, hyperlipidemia, colon cancer s/p right hemicolectomy, pancreatic cyst, BRIANNA, chronic low back pain came in with complaints of shortness of breath and fevers.  She also complained of increased redness of the right lower extremity.  She was noted to be hypotensive, tachycardic and febrile to temperature of 101.1 in the ER  X-ray of the right foot showed plantar soft tissue ulceration of the heel with no evidence of osteomyelitis  Labs on admission significant for a creatinine of 1.9, WBC of 19.3  We are consulted for CKD and diuretic management  Chest x-ray on admission with no acute changes, mild cardiomegaly  An echocardiogram in 2022 showed LVEF of 60%, normal diastolic function  She follows with Dr. Davison, her baseline creatinine appears to be high 1s creatinine at this time appears to be at baseline  CKD dates back to at least 2018  Appears to be taking torsemide 50 mg p.o. daily at home    Subjective/interval history  Patient seen and examined  States she feels better today  Creatinine stable or better  Blood pressures better controlled  Has been afebrile  Oxygen requirement stable  No visitors at the bedside    Meds:  Scheduled Meds:   vancomycin  1,500 mg IntraVENous Once    montelukast  10 mg Oral Nightly    cetirizine  10 mg Oral Nightly    allopurinol  200 mg Oral Daily    amLODIPine  5 mg Oral Daily    insulin glargine  10 Units SubCUTAneous BID    labetalol  300 mg Oral 2 times per day    insulin lispro  0-4 Units SubCUTAneous TID     insulin lispro  0-4 Units SubCUTAneous Nightly    vancomycin (VANCOCIN) intermittent dosing (placeholder)   Other RX Placeholder    enoxaparin  30 mg SubCUTAneous BID    furosemide  80 mg

## 2024-05-09 NOTE — PROGRESS NOTES
Patient at high risk for fall. Patient refuses bed alarm. Reviewed risk of falling and injury with patient/family. Call light in reach . Side rails up x 2. Personal items within reach of patient.  Patient instructed to call for assistance with toileting/ambulation or any other needs. Pt  at bedside reports he will help pt but if she needs help he will call for assistance.

## 2024-05-09 NOTE — PROGRESS NOTES
Spoke with Michael- they state they dont stock prevalon boots, that each hospital system should have them in central supply. Spoke with Reg in central supply, we currently do not have them in stock at [facility] or ED department. Spoke with Ramonita RN nursing supervisor. She is going to call to [facility] to see if we can get a boot sent over from there.

## 2024-05-09 NOTE — PLAN OF CARE
Problem: Discharge Planning  Goal: Discharge to home or other facility with appropriate resources  5/8/2024 2030 by Nannette Chase RN  Outcome: Progressing     Problem: Pain  Goal: Verbalizes/displays adequate comfort level or baseline comfort level  5/8/2024 2030 by Nannette Chase RN  Outcome: Progressing     Problem: Safety - Adult  Goal: Free from fall injury  5/8/2024 2030 by Nannette Chase RN  Outcome: Progressing     Problem: Skin/Tissue Integrity  Goal: Absence of new skin breakdown  Description: 1.  Monitor for areas of redness and/or skin breakdown  2.  Assess vascular access sites hourly  3.  Every 4-6 hours minimum:  Change oxygen saturation probe site  4.  Every 4-6 hours:  If on nasal continuous positive airway pressure, respiratory therapy assess nares and determine need for appliance change or resting period.  5/8/2024 2030 by Nannette Chase RN  Outcome: Progressing     Problem: Chronic Conditions and Co-morbidities  Goal: Patient's chronic conditions and co-morbidity symptoms are monitored and maintained or improved  5/8/2024 2030 by Nannette Chase RN  Outcome: Progressing

## 2024-05-09 NOTE — PROGRESS NOTES
05/07/24 2144 05/08/24 0442 05/09/24 0448   WBC 19.3* 17.9* 11.0   HGB 12.8 11.7* 10.9*   HCT 41.4 38.0 34.8*   MCV 88.3 88.6 89.2    130* 142     BMP:   Recent Labs     05/07/24 2144 05/08/24 0442 05/09/24 0448    139 139   K 4.5 4.1 3.8   CL 98* 101 100   CO2 27 26 27   BUN 32* 29* 30*   CREATININE 1.9* 1.7* 1.6*     LIVER PROFILE:   Recent Labs     05/07/24 2144 05/08/24 0442   AST 18 19   ALT 15 12   BILIDIR  --  0.3   BILITOT 0.7 0.5   ALKPHOS 88 78     PT/INR:   Recent Labs     05/08/24 0442   PROTIME 15.0*   INR 1.2     APTT: No results for input(s): \"APTT\" in the last 72 hours.  BNP:  No results for input(s): \"BNP\" in the last 72 hours.    Imaging/Procedures:     Echo 5/8/2024    Left Ventricle: Normal left ventricular systolic function with a visually estimated EF of 60 - 65%. Left ventricle size is normal. Normal wall thickness. Normal wall motion. Global longitudinal strain is reduced with a value of -9.8%. Grade II diastolic dysfunction with increased LAP.    Mitral Valve: Mild regurgitation.    Left Atrium: Left atrium is mildly dilated.    Image quality is adequate.    Nuc MPI 1/9/2018   Summary   Small area of anterior wall breast artifact but no clear reversible   perfusion defect seen to suggest myocardial ischemia.   Hypertensive response to stress test   Overall findings represent a low risk study.   Normal LV size and systolic function.    Assessment/Plan:  Acute on chronic diastolic heart failure  Continue IV lasix 80mg BID  Repeat echo with EF60-65% and grade II diastolic dysfunction  Hx allergy to aldactone  Would likely benefit from SGLT2i in the future - will defer to Endo as she is on insulin and Ozempic already  Chronic respiratory failure  She has been on 2L O2 since last month when she had PNA  Right lower extremity cellulitis  IV ABX per ID  DM - management per primary   Troponin elevation 48, 57, 48  Likely demand ischemia due to CHF exacerbation, CKD and  infection . No CP or other sx consistent with ACS. Outpatient ischemic eval in the future  CKD 3b. Nephrology following. Cr today 1.6, near her baseline creatinine     Continue IV diuresis, likely has a long way to go. No further plans from cardiology standpoint while inpatient. Will arrange outpatient follow up here with Dr. Hensley in the next few weeks. Defer diuretic dosing to nephrology. Please do not hesitate to call back with questions or concerns.     Bright Miller MD, MD 5/9/2024 12:02 PM    Scribe's Attestation: This note was scribed in the presence of Dr. Bright Miller MD by Jenna Eckert RN

## 2024-05-09 NOTE — PLAN OF CARE
Problem: Discharge Planning  Goal: Discharge to home or other facility with appropriate resources  5/9/2024 0729 by Ángela Cabrales RN  Outcome: Progressing  5/8/2024 2030 by Nannette Chase RN  Outcome: Progressing     Problem: Pain  Goal: Verbalizes/displays adequate comfort level or baseline comfort level  5/9/2024 0729 by Ángela Cabrales RN  Outcome: Progressing  5/8/2024 2030 by Nannette Chase RN  Outcome: Progressing     Problem: Safety - Adult  Goal: Free from fall injury  5/9/2024 0729 by Ángela Cabrales RN  Outcome: Progressing  5/8/2024 2030 by Nannette Chase RN  Outcome: Progressing     Problem: Skin/Tissue Integrity  Goal: Absence of new skin breakdown  Description: 1.  Monitor for areas of redness and/or skin breakdown  2.  Assess vascular access sites hourly  3.  Every 4-6 hours minimum:  Change oxygen saturation probe site  4.  Every 4-6 hours:  If on nasal continuous positive airway pressure, respiratory therapy assess nares and determine need for appliance change or resting period.  5/9/2024 0729 by Ángela Cabrales RN  Outcome: Progressing  5/8/2024 2030 by Nannette Chase RN  Outcome: Progressing     Problem: Chronic Conditions and Co-morbidities  Goal: Patient's chronic conditions and co-morbidity symptoms are monitored and maintained or improved  5/9/2024 0729 by Ángela Cabrales RN  Outcome: Progressing  5/8/2024 2030 by Nannette Chase RN  Outcome: Progressing

## 2024-05-09 NOTE — CONSULTS
[facility]    Pharmacy Progress Note    Vancomycin - Management by Pharmacy    Consult Date(s): 5/7  Consulting Provider(s): Dr. Somers    Assessment / Plan  Skin and soft tissue infection - Vancomycin  Concurrent Antimicrobials: Ceftriaxone 2g q24h  Day of Vanc Therapy / Ordered Duration: 2/7  ID now consulted  Current Dosing Method: Intermittent Dosing by Levels due to CKD.   Therapeutic Goal: Trough ~ 15 mg/L  Current Dose / Plan:   Vancomycin 1500mg x 1 given yesterday 5/8 0828  Vancomycin random level this am 13.8 mg/L. Continue to dose by levels per ID.   Will redose vancomycin 1500mg x 1 now. Repeat level tomorrow am 5/10 0600.   Will continue to monitor clinical condition and make adjustments to regimen as appropriate.    Thank you for consulting pharmacy,    Oracio BlountD, BCPS          Subjective/Objective:   Cindy Ortiz is a 68 y.o. female with a PMHx significant for HTN, DM2, anemia, CKD3, colon cancer, and chronic low back pain  who is admitted with SOB and fevers.     Pharmacy is consulted to Dose Vancomycin.    Ht Readings from Last 1 Encounters:   05/08/24 1.6 m (5' 3\")     Wt Readings from Last 1 Encounters:   05/09/24 (!) 138.3 kg (305 lb)     Current & Prior Antimicrobial Regimen(s):  Ceftriaxone    Vancomycin Level(s) / Doses:    Date Time Dose Type of Level / Level Interpretation   5/8 0442 2000mg x 1 9.4 mg/L random Redose vancomycin 1500mg x 1  Next random 5/9 0600 5/9 0448 1500mg x 1 13.8 mg/L Redose vancomycin 1500mg x 1  Next random 5/10 0600   Note: Serum levels collected for AUC-based dosing may be high if collected in close proximity to the dose administered. This is not necessarily indicative of toxicity.    Cultures & Sensitivities:    Date Site Micro Susceptibility / Result   5/8 Blood x 2  pending   5/8 MRSA Nares  Not detected   5/8 wound  pending     Recent Labs     05/07/24  2144 05/08/24  0442 05/09/24  0448   CREATININE 1.9* 1.7* 1.6*    BUN 32* 29* 30*   WBC 19.3* 17.9* 11.0         Estimated Creatinine Clearance: 45 mL/min (A) (based on SCr of 1.6 mg/dL (H)).    Additional Lab Values / Findings of Note:    Recent Labs     05/07/24  2144 05/08/24  0730   PROCAL 0.83* 1.01*

## 2024-05-09 NOTE — PROGRESS NOTES
Patient refusing bed alarm. Educated on importance with continued refusal. Adequate oral intake and output.  at bedside. Pain controlled with PRN medication. Bed locked in lowest position, call light in reach. Patient able to verbalize needs.

## 2024-05-09 NOTE — PROGRESS NOTES
V2.0  [facility] Hospitalist Progress Note      Name:  Cindy Ortiz /Age/Sex: 1955  (68 y.o. female)   MRN & CSN:  [id number] & [id number] Encounter Date/Time: 2024 7:56 AM EDT    Location:  3224/3224-01 PCP: Johann Crawford MD       Hospital Day: 3    Assessment and Plan:   Cnidy Ortiz is a 68 y.o. female with pmh of CKD, colon cancer, diabetes mellitus, hypertension, BRIANNA, pulmonary hypertension, chronic pain who presents with Cellulitis of right leg         -patient evaluated by podiatry continue with vancomycin.  Dressing change Betadine on gauze-very little, sprinkle collagen powder on gauze, into wound then cover kerlix and Ace, pending cellulitis reversal plan to initiate light compression on leg, postop shoe ordered, Prevalon boots ordered as well recommends partial weightbearing to right lower extremity.  Infectious disease-increase the dose of IV ceftriaxone to 2 g every 24 hours anticipate 2 to 3 days on IV antibiotics.    Patient declined any further skilled OT intervention she reported she is moderately independent with ADLs family is there for assist.  She has all her require equipment.  PT evaluation patient has right lower extremity limited weightbearing restriction.  Uses brace on right leg orthotic shoe ordered by podiatry.  Current treatment recommendations-Home exercise program  Echocardiogram was completed-normal LV function, EF 60 to 65%.  Grade 2 DD.  Mitral valve-mild regurgitation.     -message sent to infectious disease regarding iron levels and if iron replacement will be beneficial during patient's current infection.  If okay with ID will initiate Venofer infusions.  Leukocytosis is improving on admission WBC-19.3> 17.9> 11 this a.m.  During labs creatinine is improving 1.6.  Patient has remained afebrile since day of admission.  Tachypnea and tachycardia has resolved.  Likely discharge in next 24 to 48 hours.  As ID recommends 1-2 more days of IV  LV Mass 2D Index 73.5 43 - 95 g/m2    MV E/A 1.16     E/E' Ratio (Averaged) 12.51     E/E' Lateral 10.30     LA Volume Index BP 36 (A) 16 - 34 ml/m2    LA Volume Index MOD A2C 34 16 - 34 ml/m2    LA Volume Index MOD A4C 39 (A) 16 - 34 ml/m2    AV Velocity Ratio 0.72     LVOT:AV VTI Index 0.77     MV:LVOT VTI Index 1.01     Global Longitudinal Strain -9.8 %   POCT Glucose    Collection Time: 05/08/24 12:08 PM   Result Value Ref Range    POC Glucose 167 (H) 70 - 99 mg/dL   Ferritin    Collection Time: 05/08/24  2:31 PM   Result Value Ref Range    Ferritin 259 (H) 15 - 150 ng/mL   Iron and TIBC    Collection Time: 05/08/24  2:31 PM   Result Value Ref Range    Iron 15 (L) 37 - 145 ug/dL    TIBC 243 (L) 260 - 445 ug/dL    Iron % Saturation 6 (L) 15 - 50 %   POCT Glucose    Collection Time: 05/08/24  4:55 PM   Result Value Ref Range    POC Glucose 118 (H) 70 - 99 mg/dL   Culture, Anaerobic and Aerobic    Collection Time: 05/08/24  5:45 PM    Specimen: Wound   Result Value Ref Range    Specimen Description .WOUND     Direct Exam NO EPITHELIAL CELLS     Direct Exam FEW NEUTROPHILS     Direct Exam NO ORGANISMS SEEN     Culture PENDING    POCT Glucose    Collection Time: 05/08/24  8:22 PM   Result Value Ref Range    POC Glucose 174 (H) 70 - 99 mg/dL   Basic Metabolic Panel w/ Reflex to MG    Collection Time: 05/09/24  4:48 AM   Result Value Ref Range    Sodium 139 136 - 145 mmol/L    Potassium 3.8 3.5 - 5.1 mmol/L    Chloride 100 99 - 110 mmol/L    CO2 27 21 - 32 mmol/L    Anion Gap 12 3 - 16 mmol/L    Glucose 122 (H) 70 - 99 mg/dL    BUN 30 (H) 7 - 20 mg/dL    Creatinine 1.6 (H) 0.6 - 1.2 mg/dL    Est, Glom Filt Rate 33 (L) >60 mL/min/1.73m2    Calcium 8.9 8.3 - 10.6 mg/dL   CBC auto differential    Collection Time: 05/09/24  4:48 AM   Result Value Ref Range    WBC 11.0 4.0 - 11.0 k/uL    RBC 3.90 (L) 4.00 - 5.20 m/uL    Hemoglobin 10.9 (L) 12.0 - 16.0 g/dL    Hematocrit 34.8 (L) 36.0 - 48.0 %    MCV 89.2 80.0 - 100.0 fL

## 2024-05-09 NOTE — PROGRESS NOTES
Patient is refusing our CPAP. However, her spouse has his home white colored CPAP here.  I educated patient and her spouse on home O2 tank, both verbally and hands on with spouse, verbally with patient. Questions answered.

## 2024-05-10 VITALS
OXYGEN SATURATION: 100 % | BODY MASS INDEX: 51.91 KG/M2 | RESPIRATION RATE: 16 BRPM | TEMPERATURE: 98.4 F | HEIGHT: 63 IN | WEIGHT: 293 LBS | SYSTOLIC BLOOD PRESSURE: 152 MMHG | HEART RATE: 71 BPM | DIASTOLIC BLOOD PRESSURE: 79 MMHG

## 2024-05-10 LAB
ANION GAP SERPL CALCULATED.3IONS-SCNC: 14 MMOL/L (ref 3–16)
ASO AB SERPL-ACNC: 110 IU/ML (ref 0–200)
BASOPHILS # BLD: 0.02 K/UL (ref 0–0.2)
BASOPHILS NFR BLD: 0 %
BUN SERPL-MCNC: 29 MG/DL (ref 7–20)
CALCIUM SERPL-MCNC: 8.9 MG/DL (ref 8.3–10.6)
CHLORIDE SERPL-SCNC: 102 MMOL/L (ref 99–110)
CO2 SERPL-SCNC: 26 MMOL/L (ref 21–32)
CREAT SERPL-MCNC: 1.7 MG/DL (ref 0.6–1.2)
DATE LAST DOSE: NORMAL
EOSINOPHIL # BLD: 0.39 K/UL (ref 0–0.6)
EOSINOPHILS RELATIVE PERCENT: 4 %
ERYTHROCYTE [DISTWIDTH] IN BLOOD BY AUTOMATED COUNT: 15.1 % (ref 12.4–15.4)
GFR, ESTIMATED: 33 ML/MIN/1.73M2
GLUCOSE BLD-MCNC: 114 MG/DL (ref 70–99)
GLUCOSE BLD-MCNC: 121 MG/DL (ref 70–99)
GLUCOSE SERPL-MCNC: 97 MG/DL (ref 70–99)
HCT VFR BLD AUTO: 36.6 % (ref 36–48)
HGB BLD-MCNC: 11.1 G/DL (ref 12–16)
IMM GRANULOCYTES # BLD AUTO: 0.04 K/UL (ref 0–0.5)
IMM GRANULOCYTES NFR BLD: 0 %
LYMPHOCYTES NFR BLD: 1.51 K/UL (ref 1–5.1)
LYMPHOCYTES RELATIVE PERCENT: 17 %
MAGNESIUM SERPL-MCNC: 2.3 MG/DL (ref 1.8–2.4)
MCH RBC QN AUTO: 27.2 PG (ref 26–34)
MCHC RBC AUTO-ENTMCNC: 30.3 G/DL (ref 31–36)
MCV RBC AUTO: 89.7 FL (ref 80–100)
MONOCYTES NFR BLD: 0.83 K/UL (ref 0–1.3)
MONOCYTES NFR BLD: 9 %
NEUTROPHILS NFR BLD: 69 %
NEUTS SEG NFR BLD: 6.13 K/UL (ref 1.7–7.7)
PLATELET # BLD AUTO: 163 K/UL (ref 135–450)
PMV BLD AUTO: 10.1 FL (ref 9.4–12.4)
POTASSIUM SERPL-SCNC: 3.5 MMOL/L (ref 3.5–5.1)
PROCALCITONIN SERPL-MCNC: 0.73 NG/ML (ref 0–0.15)
RBC # BLD AUTO: 4.08 M/UL (ref 4–5.2)
SODIUM SERPL-SCNC: 142 MMOL/L (ref 136–145)
TME LAST DOSE: NORMAL H
VANCOMYCIN DOSE: NORMAL MG
VANCOMYCIN SERPL-MCNC: 16.8 UG/ML
WBC OTHER # BLD: 8.9 K/UL (ref 4–11)

## 2024-05-10 PROCEDURE — 6360000002 HC RX W HCPCS: Performed by: INTERNAL MEDICINE

## 2024-05-10 PROCEDURE — 94761 N-INVAS EAR/PLS OXIMETRY MLT: CPT

## 2024-05-10 PROCEDURE — 2580000003 HC RX 258: Performed by: INTERNAL MEDICINE

## 2024-05-10 PROCEDURE — 83735 ASSAY OF MAGNESIUM: CPT

## 2024-05-10 PROCEDURE — 85025 COMPLETE CBC W/AUTO DIFF WBC: CPT

## 2024-05-10 PROCEDURE — 80202 ASSAY OF VANCOMYCIN: CPT

## 2024-05-10 PROCEDURE — 82962 GLUCOSE BLOOD TEST: CPT

## 2024-05-10 PROCEDURE — 6370000000 HC RX 637 (ALT 250 FOR IP): Performed by: INTERNAL MEDICINE

## 2024-05-10 PROCEDURE — 36415 COLL VENOUS BLD VENIPUNCTURE: CPT

## 2024-05-10 PROCEDURE — 84145 PROCALCITONIN (PCT): CPT

## 2024-05-10 PROCEDURE — 80048 BASIC METABOLIC PNL TOTAL CA: CPT

## 2024-05-10 PROCEDURE — 2700000000 HC OXYGEN THERAPY PER DAY

## 2024-05-10 RX ORDER — TORSEMIDE 20 MG/1
60 TABLET ORAL DAILY
Status: DISCONTINUED | OUTPATIENT
Start: 2024-05-11 | End: 2024-05-10 | Stop reason: HOSPADM

## 2024-05-10 RX ORDER — CEPHALEXIN 500 MG/1
500 CAPSULE ORAL 3 TIMES DAILY
Qty: 30 CAPSULE | Refills: 0 | Status: SHIPPED | OUTPATIENT
Start: 2024-05-10 | End: 2024-05-20

## 2024-05-10 RX ORDER — LABETALOL 300 MG/1
300 TABLET, FILM COATED ORAL EVERY 12 HOURS SCHEDULED
Qty: 60 TABLET | Refills: 0 | Status: SHIPPED | OUTPATIENT
Start: 2024-05-10

## 2024-05-10 RX ORDER — FERROUS SULFATE 325(65) MG
325 TABLET ORAL
Qty: 30 TABLET | Refills: 0 | Status: SHIPPED | OUTPATIENT
Start: 2024-05-11

## 2024-05-10 RX ADMIN — INSULIN GLARGINE 10 UNITS: 100 INJECTION, SOLUTION SUBCUTANEOUS at 09:02

## 2024-05-10 RX ADMIN — AMLODIPINE BESYLATE 5 MG: 5 TABLET ORAL at 09:03

## 2024-05-10 RX ADMIN — HYDROCODONE BITARTRATE AND ACETAMINOPHEN 0.5 TABLET: 5; 325 TABLET ORAL at 02:07

## 2024-05-10 RX ADMIN — LABETALOL HYDROCHLORIDE 300 MG: 100 TABLET, FILM COATED ORAL at 09:04

## 2024-05-10 RX ADMIN — CEFTRIAXONE SODIUM 2000 MG: 2 INJECTION, POWDER, FOR SOLUTION INTRAMUSCULAR; INTRAVENOUS at 09:17

## 2024-05-10 RX ADMIN — ALLOPURINOL 200 MG: 100 TABLET ORAL at 09:03

## 2024-05-10 RX ADMIN — SODIUM CHLORIDE, PRESERVATIVE FREE 10 ML: 5 INJECTION INTRAVENOUS at 09:17

## 2024-05-10 ASSESSMENT — PAIN SCALES - WONG BAKER: WONGBAKER_NUMERICALRESPONSE: HURTS LITTLE MORE

## 2024-05-10 ASSESSMENT — PAIN DESCRIPTION - ONSET: ONSET: AWAKENED FROM SLEEP

## 2024-05-10 ASSESSMENT — PAIN SCALES - GENERAL
PAINLEVEL_OUTOF10: 7
PAINLEVEL_OUTOF10: 0
PAINLEVEL_OUTOF10: 3

## 2024-05-10 ASSESSMENT — PAIN - FUNCTIONAL ASSESSMENT: PAIN_FUNCTIONAL_ASSESSMENT: ACTIVITIES ARE NOT PREVENTED

## 2024-05-10 ASSESSMENT — PAIN DESCRIPTION - PAIN TYPE: TYPE: CHRONIC PAIN

## 2024-05-10 ASSESSMENT — PAIN DESCRIPTION - FREQUENCY: FREQUENCY: CONTINUOUS

## 2024-05-10 ASSESSMENT — PAIN DESCRIPTION - DESCRIPTORS: DESCRIPTORS: STABBING

## 2024-05-10 ASSESSMENT — PAIN DESCRIPTION - ORIENTATION: ORIENTATION: MID

## 2024-05-10 ASSESSMENT — PAIN DESCRIPTION - LOCATION: LOCATION: BACK

## 2024-05-10 NOTE — PROGRESS NOTES
Patient AOX4  Vitals stable   O2 nasal canula at 2 L baseline  Pain controled with PRN pain meds.   Patient up stand by  Voiding clear, yellow urine, no BM this shift. /  No continuous fluids.   Bed locked and in low position, alarm on, wearing gripper socks when ambulating, side table and call light within reach.

## 2024-05-10 NOTE — DISCHARGE SUMMARY
Hospital Medicine Discharge Summary    Patient ID: Cindy Ortiz      Patient's PCP: Johann Crawford MD    Admit Date: 5/7/2024     Discharge Date:   5/10/24    Admitting Physician: No admitting provider for patient encounter.     Discharge Physician: SANDY Sprague - CNP     Discharge Diagnoses        Hospital Course: Cindy Ortiz is a 68 y.o. female with a PMHx of HTN, DM2, anemia, CKD3, colon cancer, and chronic low back pain who presented for evaluation of a 1-day hx of worsening shortness of breath with associated fevers.  She attributed her symptoms to having pneumonia again.  She does have a right heel ulceration that is being cared for by podiatry.  She did notice her right lower leg was more red when compared to her left.  She has remained on 2L O2 at home since leaving the hospital last month. Pt admitted for treatment of RLE cellulitis.     Sepsis POA  Right leg cellulitis with right heel diabetic foot ulcer  - sepsis secondary to right lower extremity cellulitis/ulceration - POA  - sepsis criteria met  , RR 29, T max 101.1 °F, WBC 19.3, Lactic acid 0.9,  -blood cultures x 2 NGTD  -Wound cx no growth  -MRSA PCR negative  -procalcitonin down trending  -sepsis resolved  -Podiatry consulted and recommended dressing changes to right heel, post-op to right foot, prevalon boots, partial weight bearing to RLE   -pt refused to allow me to remove dressing to look at right foot and heel ulcer, stating \"podiatry just put the dressing on.\"  -treated with rocephin and vancomycin IV  -ID consulted and recommended keflex 500mg po tid x 10 days at discharge. Discussed with Dr. Mena today 5/10/24  -added probiotic- pt has supply at home     Acute on chronic heart failure with preserved EF  -5/8/24 ECHO with EF of 60-65%,. Grade 2 Diastolic dysfunction  -treated with IV lasix  -cardiology consulted, recommended IV lasix. \"Would likely benefit from SGLT2i in the future - will defer to

## 2024-05-10 NOTE — PROGRESS NOTES
[facility]    Pharmacy Progress Note    Vancomycin - Management by Pharmacy    Consult Date(s): 5/7  Consulting Provider(s): Dr. Somers    Assessment / Plan  Skin and soft tissue infection - Vancomycin  Concurrent Antimicrobials: Ceftriaxone 2g q24h  Day of Vanc Therapy / Ordered Duration: 3/7  ID consulted  Current Dosing Method: Intermittent Dosing by Levels due to CKD.   Therapeutic Goal: Trough ~ 15 mg/L  Current Dose / Plan:   Vancomycin 1500mg x 1 given yesterday 5/9 0827  Vancomycin random level this am 16.8 mg/L. Continue to dose by levels per ID.   Will dose vancomycin 1250 mg x 1 today. If patient remains inpatient, will repeat level tomorrow AM.   Will continue to monitor clinical condition and make adjustments to regimen as appropriate.    Thank you for consulting pharmacy,    Shakira Casarez, PharmD  Main Pharmacy = x [phone]    Interval Updates: WBC has improved, afebrile.    Subjective/Objective:   Cindy Ortiz is a 68 y.o. female with a PMHx significant for HTN, DM2, anemia, CKD3, colon cancer, and chronic low back pain  who is admitted with SOB and fevers.     Pharmacy is consulted to Dose Vancomycin.    Ht Readings from Last 1 Encounters:   05/08/24 1.6 m (5' 3\")     Wt Readings from Last 1 Encounters:   05/10/24 (!) 137 kg (302 lb)     Current & Prior Antimicrobial Regimen(s):  Ceftriaxone    Vancomycin Level(s) / Doses:    Date Time Dose Type of Level / Level Interpretation   5/8 0442 2000mg x 1 9.4 mg/L random Redose vancomycin 1500mg x 1  Next random 5/9 0600 5/9 0448 1500mg x 1 13.8 mg/L Redose vancomycin 1500mg x 1  Next random 5/10 0600   5/10 0529 1500mg x 1 Random = 16.8 mg/L Will dose vancomycin 1250 mg x 1 today   Note: Serum levels collected for AUC-based dosing may be high if collected in close proximity to the dose administered. This is not necessarily indicative of toxicity.    Cultures & Sensitivities:    Date Site Micro Susceptibility / Result   5/7 and

## 2024-05-10 NOTE — FLOWSHEET NOTE
05/10/24 0841   Vital Signs   Temp 98.4 °F (36.9 °C)   Temp Source Oral   Pulse 71   Heart Rate Source Monitor   Respirations 16   BP (!) 152/79  (notified nurse)   MAP (Calculated) 103   BP Location Left lower arm   BP Method Automatic   Oxygen Therapy   SpO2 100 %     Patient assessment completed.  Meds given per MAR. No s/s of distress. No additional needs at this time.  Patient waiting to hear if she can be discharged.

## 2024-05-10 NOTE — CARE COORDINATION
DISCHARGE ORDER  Date/Time 5/10/2024 12:44 PM  Completed by: Dulce Woods, Case Management    Patient Name: Cindy Ortiz      : 1955  Admitting Diagnosis: Cellulitis of right leg [L03.115]      Admit order Date and Status:24  (verify MD's last order for status of admission)      Noted discharge order.   If applicable PT/OT recommendation at Discharge: 24 home assist from    DME recommendation by PT/OT: rollator which pt currently has   Confirmed discharge plan  (): Yes  with whom patient and pt  _______________  If pt confirmed DC plan does family need to be contacted by CM No if yes who______  Discharge Plan: Pt to dc home with .   will transport. No additional CM needs at dc.     Date of Last IMM Given: 24    Reviewed chart.  Role of discharge planner explained and patient verbalized understanding. Discharge order is noted.    Has Home O2 in place on admit:  Yes  Informed of need to bring portable home O2 tank on day of discharge for nursing to connect prior to leaving:   Yes  Verbalized agreement/Understanding:   Yes  Pt is being d/c'd to home today. Pt's O2 sats are 100% on 2L.    Discharge timeout done with CM/PT/RN. All discharge needs and concerns addressed.

## 2024-05-10 NOTE — PROGRESS NOTES
Nephrology progress Note  [phone]  [phone]   [facility] [email or web address]       Patient:  Cindy Ortiz   : 1955    Brief HPI    The patient is a 68 y.o.female with significant past medical history of CKD, DM 2, chronic right heel ulcer, hypertension, CHF, hyperuricemia, hyperlipidemia, colon cancer s/p right hemicolectomy, pancreatic cyst, BRIANNA, chronic low back pain came in with complaints of shortness of breath and fevers.  She also complained of increased redness of the right lower extremity.  She was noted to be hypotensive, tachycardic and febrile to temperature of 101.1 in the ER  X-ray of the right foot showed plantar soft tissue ulceration of the heel with no evidence of osteomyelitis  Labs on admission significant for a creatinine of 1.9, WBC of 19.3  We are consulted for CKD and diuretic management  Chest x-ray on admission with no acute changes, mild cardiomegaly  An echocardiogram in 2022 showed LVEF of 60%, normal diastolic function  She follows with Dr. Davison, her baseline creatinine appears to be high 1s creatinine at this time appears to be at baseline  CKD dates back to at least 2018  Appears to be taking torsemide 50 mg p.o. daily at home    Subjective/interval history  Patient seen and examined  Creatinine stable  Visitors at the bedside  Possible discharge today  No new complaints  Leg edema improving  Denies chest pain or shortness of breath  Has been afebrile  Blood pressure is controlled  Oxygen requirement stable    Meds:  Scheduled Meds:   montelukast  10 mg Oral Nightly    cetirizine  10 mg Oral Nightly    ferrous sulfate  325 mg Oral Daily with breakfast    allopurinol  200 mg Oral Daily    amLODIPine  5 mg Oral Daily    insulin glargine  10 Units SubCUTAneous BID    labetalol  300 mg Oral 2 times per day    insulin lispro  0-4 Units SubCUTAneous TID WC    insulin lispro  0-4 Units SubCUTAneous Nightly    vancomycin (VANCOCIN) intermittent dosing (placeholder)   Other RX

## 2024-05-10 NOTE — PLAN OF CARE
Problem: Discharge Planning  Goal: Discharge to home or other facility with appropriate resources  Outcome: Progressing     Problem: Pain  Goal: Verbalizes/displays adequate comfort level or baseline comfort level  5/10/2024 1150 by Arianna Disla RN  Outcome: Progressing  5/9/2024 2341 by Ayesha Vance RN  Outcome: Progressing     Problem: Safety - Adult  Goal: Free from fall injury  5/10/2024 1150 by Arianna Disla RN  Outcome: Progressing  5/9/2024 2341 by Ayesha Vance RN  Outcome: Progressing     Problem: Skin/Tissue Integrity  Goal: Absence of new skin breakdown  Description: 1.  Monitor for areas of redness and/or skin breakdown  2.  Assess vascular access sites hourly  3.  Every 4-6 hours minimum:  Change oxygen saturation probe site  4.  Every 4-6 hours:  If on nasal continuous positive airway pressure, respiratory therapy assess nares and determine need for appliance change or resting period.  Outcome: Progressing     Problem: Chronic Conditions and Co-morbidities  Goal: Patient's chronic conditions and co-morbidity symptoms are monitored and maintained or improved  Outcome: Progressing

## 2024-05-10 NOTE — PROGRESS NOTES
Department of Podiatry Note  Attending       Reason for Consult:  right heel ulcer, leg cellulitis     Requesting Physician:  MD    CHIEF COMPLAINT:  right heel ulcer, leg cellulitis     HISTORY OF PRESENT ILLNESS:                The patient is a 68 y.o. female with significant past medical history of right heel ulcer and admitted for fevers, and worsening SOB. Hx of DM2, CKD3, Colon cancer and acute presentation of leg redness.  Patient was recently seen by my partner Dr. Worthington for the right heel, with a stable ulcer treated with collagen powder and dressings daily.  At last visit the right leg did not present with cellulitis, but chronic edema and lymphedema is noted historically.     Today - no distress, dressings intact on right heel, doing well, \"I feel good\"     Past Medical History:        Diagnosis Date    Anemia     Chronic bilateral low back pain without sciatica     CKD (chronic kidney disease) stage 3, GFR 30-59 ml/min (HCC)     Colon cancer (HCC)     Diabetes (HCC)     Diabetes (HCC)     Diabetes with retinopathy (HCC)     DM eye exam 12/18/17    Hypertension     BRIANNA (obstructive sleep apnea)     Pancreatic cyst     Positive FIT (fecal immunochemical test)     Pulmonary hypertension (HCC)        Past Surgical History:        Procedure Laterality Date    COLONOSCOPY N/A 11/5/2018    COLONOSCOPY WITH BIOPSY performed by Georgi Hanks MD at [facility] ENDOSCOPY    COLONOSCOPY  05/14/2019    Colonoscopy with polypectomy (cold snare)    COLONOSCOPY N/A 5/14/2019    COLONOSCOPY POLYPECTOMY SNARE/COLD BIOPSY performed by Georgi Hanks MD at [facility] ENDOSCOPY    HEMICOLECTOMY Right 11/08/2018    LAPAROSCOPIC RIGHT HEMICOLECTOMY and umbilical hernia repair    [place] OFFICE/OUTPT VISIT,PROCEDURE ONLY Right 11/8/2018    LAPAROSCOPIC RIGHT HEMICOLECTOMY and umbilical hernia repair performed by Channing Jaime MD at [facility] OR    UPPER GASTROINTESTINAL ENDOSCOPY N/A 11/5/2018    EGD BIOPSY performed by Georgi Hanks MD at

## 2024-05-11 LAB
MICROORGANISM SPEC CULT: NORMAL
MICROORGANISM SPEC CULT: NORMAL
MICROORGANISM/AGENT SPEC: NORMAL
SPECIMEN DESCRIPTION: NORMAL

## 2024-05-13 ENCOUNTER — TELEPHONE (OUTPATIENT)
Dept: PRIMARY CARE CLINIC | Age: 69
End: 2024-05-13

## 2024-05-13 NOTE — TELEPHONE ENCOUNTER
Care Transitions Initial Follow Up Call    Outreach made within 2 business days of discharge: Yes    Patient: Cindy Ortiz Patient : 1955   MRN: [id number]  Reason for Admission: There are no discharge diagnoses documented for the most recent discharge.  Discharge Date: 5/10/24       Spoke with: yes    Discharge department/facility: [facility]     TCM Interactive Patient Contact:  Was patient able to fill all prescriptions: Yes  Was patient instructed to bring all medications to the follow-up visit: Yes  Is patient taking all medications as directed in the discharge summary? Yes  Does patient understand their discharge instructions: Yes  Does patient have questions or concerns that need addressed prior to 7-14 day follow up office visit: no    Scheduled appointment with PCP within 7-14 days    Follow Up patient will call back to schedule appointment within one month   Future Appointments   Date Time Provider Department Center   2024  2:15 PM Terry Hensley MD  C[facility]       Lyndon Kelley

## 2024-05-15 PROBLEM — Z85.038 HISTORY OF COLON CANCER: Status: ACTIVE | Noted: 2024-05-15

## 2024-05-15 PROBLEM — I50.32 CHRONIC DIASTOLIC HF (HEART FAILURE) (HCC): Status: ACTIVE | Noted: 2024-05-15

## 2024-06-19 RX ORDER — MONTELUKAST SODIUM 10 MG/1
10 TABLET ORAL DAILY
Qty: 90 TABLET | Refills: 0 | Status: SHIPPED | OUTPATIENT
Start: 2024-06-19

## 2024-06-19 NOTE — TELEPHONE ENCOUNTER
Call to schedule AWV/ F/U. Patient refused AWV,   I went a head and schedule her for follow up appointment

## 2024-06-19 NOTE — TELEPHONE ENCOUNTER
Medication:   Requested Prescriptions     Pending Prescriptions Disp Refills    montelukast (SINGULAIR) 10 MG tablet 30 tablet 3     Sig: Take 1 tablet by mouth daily     Last Filled:  02/26/2024    Last appt: 1/31/2024   Next appt: 7/11/2024    Last OARRS:       11/20/2020    10:31 AM   RX Monitoring   Periodic Controlled Substance Monitoring No signs of potential drug abuse or diversion identified.      90 Day supply Requested

## 2024-07-10 ASSESSMENT — PATIENT HEALTH QUESTIONNAIRE - PHQ9
1. LITTLE INTEREST OR PLEASURE IN DOING THINGS: NOT AT ALL
2. FEELING DOWN, DEPRESSED OR HOPELESS: NOT AT ALL
SUM OF ALL RESPONSES TO PHQ QUESTIONS 1-9: 0
SUM OF ALL RESPONSES TO PHQ QUESTIONS 1-9: 0
SUM OF ALL RESPONSES TO PHQ9 QUESTIONS 1 & 2: 0
SUM OF ALL RESPONSES TO PHQ QUESTIONS 1-9: 0
SUM OF ALL RESPONSES TO PHQ9 QUESTIONS 1 & 2: 0
2. FEELING DOWN, DEPRESSED OR HOPELESS: NOT AT ALL
SUM OF ALL RESPONSES TO PHQ QUESTIONS 1-9: 0
1. LITTLE INTEREST OR PLEASURE IN DOING THINGS: NOT AT ALL

## 2024-07-11 ENCOUNTER — OFFICE VISIT (OUTPATIENT)
Dept: PRIMARY CARE CLINIC | Age: 69
End: 2024-07-11
Payer: MEDICARE

## 2024-07-11 VITALS
DIASTOLIC BLOOD PRESSURE: 90 MMHG | HEART RATE: 71 BPM | OXYGEN SATURATION: 98 % | TEMPERATURE: 97 F | RESPIRATION RATE: 12 BRPM | SYSTOLIC BLOOD PRESSURE: 140 MMHG

## 2024-07-11 DIAGNOSIS — D69.6 THROMBOCYTOPENIA, UNSPECIFIED (HCC): ICD-10-CM

## 2024-07-11 DIAGNOSIS — M1A.09X0 IDIOPATHIC CHRONIC GOUT OF MULTIPLE SITES WITHOUT TOPHUS: ICD-10-CM

## 2024-07-11 DIAGNOSIS — E11.42 DIABETIC POLYNEUROPATHY ASSOCIATED WITH TYPE 2 DIABETES MELLITUS (HCC): ICD-10-CM

## 2024-07-11 DIAGNOSIS — Z00.00 MEDICARE ANNUAL WELLNESS VISIT, SUBSEQUENT: Primary | ICD-10-CM

## 2024-07-11 DIAGNOSIS — I10 ESSENTIAL HYPERTENSION: ICD-10-CM

## 2024-07-11 DIAGNOSIS — R60.0 LOCALIZED EDEMA: ICD-10-CM

## 2024-07-11 DIAGNOSIS — N18.32 STAGE 3B CHRONIC KIDNEY DISEASE (HCC): ICD-10-CM

## 2024-07-11 DIAGNOSIS — E66.01 MORBID OBESITY DUE TO EXCESS CALORIES (HCC): ICD-10-CM

## 2024-07-11 DIAGNOSIS — G47.33 OSA (OBSTRUCTIVE SLEEP APNEA): ICD-10-CM

## 2024-07-11 DIAGNOSIS — Z12.31 ENCOUNTER FOR SCREENING MAMMOGRAM FOR MALIGNANT NEOPLASM OF BREAST: ICD-10-CM

## 2024-07-11 DIAGNOSIS — E78.2 MIXED HYPERLIPIDEMIA: ICD-10-CM

## 2024-07-11 PROCEDURE — 90677 PCV20 VACCINE IM: CPT | Performed by: INTERNAL MEDICINE

## 2024-07-11 PROCEDURE — 3080F DIAST BP >= 90 MM HG: CPT | Performed by: INTERNAL MEDICINE

## 2024-07-11 PROCEDURE — 1123F ACP DISCUSS/DSCN MKR DOCD: CPT | Performed by: INTERNAL MEDICINE

## 2024-07-11 PROCEDURE — 3044F HG A1C LEVEL LT 7.0%: CPT | Performed by: INTERNAL MEDICINE

## 2024-07-11 PROCEDURE — 3077F SYST BP >= 140 MM HG: CPT | Performed by: INTERNAL MEDICINE

## 2024-07-11 PROCEDURE — 3017F COLORECTAL CA SCREEN DOC REV: CPT | Performed by: INTERNAL MEDICINE

## 2024-07-11 PROCEDURE — G0009 ADMIN PNEUMOCOCCAL VACCINE: HCPCS | Performed by: INTERNAL MEDICINE

## 2024-07-11 PROCEDURE — G0439 PPPS, SUBSEQ VISIT: HCPCS | Performed by: INTERNAL MEDICINE

## 2024-07-11 RX ORDER — METOLAZONE 5 MG/1
5 TABLET ORAL DAILY
COMMUNITY
Start: 2024-06-13

## 2024-07-11 ASSESSMENT — PATIENT HEALTH QUESTIONNAIRE - PHQ9
2. FEELING DOWN, DEPRESSED OR HOPELESS: NOT AT ALL
SUM OF ALL RESPONSES TO PHQ QUESTIONS 1-9: 0
SUM OF ALL RESPONSES TO PHQ QUESTIONS 1-9: 0
SUM OF ALL RESPONSES TO PHQ9 QUESTIONS 1 & 2: 0
SUM OF ALL RESPONSES TO PHQ QUESTIONS 1-9: 0
SUM OF ALL RESPONSES TO PHQ QUESTIONS 1-9: 0
1. LITTLE INTEREST OR PLEASURE IN DOING THINGS: NOT AT ALL

## 2024-07-11 ASSESSMENT — LIFESTYLE VARIABLES
HOW MANY STANDARD DRINKS CONTAINING ALCOHOL DO YOU HAVE ON A TYPICAL DAY: 1 OR 2
HOW OFTEN DO YOU HAVE A DRINK CONTAINING ALCOHOL: 2-4 TIMES A MONTH

## 2024-07-11 NOTE — PATIENT INSTRUCTIONS
there are any tasks that you are having trouble doing. This is an important first step to getting help. And when you have the help you need, you can stay as independent as possible.  How will a doctor assess your ADLs?  Asking about ADLs is part of a routine health checkup your doctor will likely do as you age. Your health check might be done in a doctor's office, in your home, or at a hospital. The goal is to find out if you are having any problems that could make it hard to care for yourself or that make it unsafe for you to be on your own.  To measure your ADLs, your doctor will ask how hard it is for you to do routine tasks. Your doctor may also want to know if you have changed the way you do a task because of a health problem. Your doctor may watch how you:  Walk back and forth.  Keep your balance while you stand or walk.  Move from sitting to standing or from a bed to a chair.  Button or unbutton a shirt or sweater.  Remove and put on your shoes.  It's common to feel a little worried or anxious if you find you can't do all the things you used to be able to do. Talking with your doctor about ADLs is a way to make sure you're as safe as possible and able to care for yourself as well as you can. You may want to bring a caregiver, friend, or family member to your checkup. They can help you talk to your doctor.  Follow-up care is a key part of your treatment and safety. Be sure to make and go to all appointments, and call your doctor if you are having problems. It's also a good idea to know your test results and keep a list of the medicines you take.  Current as of: October 24, 2023  Content Version: 14.1  © 2006-2024 [employer].   Care instructions adapted under license by [employer]. If you have questions about a medical condition or this instruction, always ask your healthcare professional. [employer] disclaims any warranty or liability for your use of this information.

## 2024-07-11 NOTE — PROGRESS NOTES
Site: Left Upper Arm   Position: Sitting   Cuff Size: Large Adult   Pulse: 71   Resp: 12   Temp: 97 °F (36.1 °C)   SpO2: 98%      There is no height or weight on file to calculate BMI.        General Appearance: alert and oriented to person, place and time, well developed and well- nourished, in no acute distress  Skin: warm and dry, no rash or erythema  Head: normocephalic and atraumatic  Eyes: pupils equal, round, and reactive to light, extraocular eye movements intact, conjunctivae normal  ENT: tympanic membrane, external ear and ear canal normal bilaterally, nose without deformity, nasal mucosa and turbinates normal without polyps  Neck: supple and non-tender without mass, no thyromegaly or thyroid nodules, no cervical lymphadenopathy  Pulmonary/Chest: clear to auscultation bilaterally- no wheezes, rales or rhonchi, normal air movement, no respiratory distress  Cardiovascular: normal rate, regular rhythm, normal S1 and S2, no murmurs, rubs, clicks, or gallops, distal pulses intact, no carotid bruits  Abdomen: soft, non-tender, non-distended, normal bowel sounds, no masses or organomegaly  Extremities: no cyanosis, clubbing or edema  Musculoskeletal: normal range of motion, no joint swelling, deformity or tenderness  Neurologic: reflexes normal and symmetric, no cranial nerve deficit, gait, coordination and speech normal            Allergies   Allergen Reactions    Cefepime Anxiety    Pcn [Penicillins] Shortness Of Breath    Spironolactone Rash    Coreg [Carvedilol]      Side effects:  SOB, palpitations    Lisinopril Other (See Comments)     cough     Prior to Visit Medications    Medication Sig Taking? Authorizing Provider   metOLazone (ZAROXOLYN) 5 MG tablet Take 1 tablet by mouth daily Yes ProviderDomenico MD   montelukast (SINGULAIR) 10 MG tablet Take 1 tablet by mouth daily Yes Johann Crawford MD   labetalol (NORMODYNE) 300 MG tablet Take 1 tablet by mouth every 12 hours Yes Lise Garcia

## 2024-07-15 ENCOUNTER — TELEPHONE (OUTPATIENT)
Dept: PRIMARY CARE CLINIC | Age: 69
End: 2024-07-15

## 2024-07-15 NOTE — TELEPHONE ENCOUNTER
Gerber from [facility] called and states pt need to re certify for oxygen. Need chart notes and testing done from visit along with what was faxed over to Gerber previously. Fax # [phone]

## 2024-09-13 RX ORDER — MONTELUKAST SODIUM 10 MG/1
10 TABLET ORAL DAILY
Qty: 90 TABLET | Refills: 0 | Status: SHIPPED | OUTPATIENT
Start: 2024-09-13

## 2025-01-20 RX ORDER — MONTELUKAST SODIUM 10 MG/1
10 TABLET ORAL DAILY
Qty: 90 TABLET | Refills: 0 | Status: SHIPPED | OUTPATIENT
Start: 2025-01-20

## 2025-01-20 NOTE — TELEPHONE ENCOUNTER
Medication:   Requested Prescriptions     Pending Prescriptions Disp Refills    montelukast (SINGULAIR) 10 MG tablet [Pharmacy Med Name: MONTELUKAST 10MG TABLETS] 90 tablet 0     Sig: TAKE 1 TABLET BY MOUTH DAILY     Last Filled:  09/13/2024    Last appt: 7/11/2024   Next appt: Visit date not found    Last OARRS:       11/20/2020    10:31 AM   RX Monitoring   Periodic Controlled Substance Monitoring No signs of potential drug abuse or diversion identified.

## 2025-03-31 RX ORDER — MONTELUKAST SODIUM 10 MG/1
10 TABLET ORAL DAILY
Qty: 90 TABLET | Refills: 0 | Status: SHIPPED | OUTPATIENT
Start: 2025-03-31

## 2025-03-31 RX ORDER — MONTELUKAST SODIUM 10 MG/1
10 TABLET ORAL DAILY
Qty: 90 TABLET | Refills: 0 | OUTPATIENT
Start: 2025-03-31

## 2025-03-31 NOTE — TELEPHONE ENCOUNTER
Medication:   Requested Prescriptions     Pending Prescriptions Disp Refills    montelukast (SINGULAIR) 10 MG tablet 90 tablet 0     Sig: Take 1 tablet by mouth daily     Last Filled:  01/20/2025    Last appt: 7/11/2024   Next appt: Visit date not found    Last OARRS:       11/20/2020    10:31 AM   RX Monitoring   Periodic Controlled Substance Monitoring No signs of potential drug abuse or diversion identified.

## 2025-05-27 ENCOUNTER — OFFICE VISIT (OUTPATIENT)
Dept: GYNECOLOGY | Age: 70
End: 2025-05-27

## 2025-05-27 VITALS — OXYGEN SATURATION: 92 % | HEART RATE: 89 BPM

## 2025-05-27 DIAGNOSIS — R93.89 THICKENED ENDOMETRIUM: ICD-10-CM

## 2025-05-27 DIAGNOSIS — N95.0 PMB (POSTMENOPAUSAL BLEEDING): ICD-10-CM

## 2025-05-27 DIAGNOSIS — N94.89 ENDOMETRIAL MASS: ICD-10-CM

## 2025-05-27 DIAGNOSIS — N95.0 PMB (POSTMENOPAUSAL BLEEDING): Primary | ICD-10-CM

## 2025-05-27 RX ORDER — MEDROXYPROGESTERONE ACETATE 10 MG
20 TABLET ORAL 2 TIMES DAILY
Qty: 120 TABLET | Refills: 0 | Status: SHIPPED | OUTPATIENT
Start: 2025-05-27 | End: 2025-06-26

## 2025-05-27 RX ORDER — MEDROXYPROGESTERONE ACETATE 10 MG
20 TABLET ORAL 2 TIMES DAILY
Qty: 360 TABLET | OUTPATIENT
Start: 2025-05-27 | End: 2025-06-26

## 2025-05-27 NOTE — PROGRESS NOTES
Cindy Ortiz is a 69 y.o. female who is here today for evaluation of abnormal uterine bleeding.  She is accompanied today by her .    Cindy Ortiz has been experiencing postmenopausal bleeding.  Reports vaginal bleeding x 1 week.  She reports bleeding would be heavy at times.    She denies any CVA tenderness or changes in voiding issues.  She denies CVA pain.  She denies any changes in lower extremity edema.  She denies any unintended weight loss.  PMB  [facility].  [facility] OB/GYN resident phone call  MPA: 5 BID, increased to 20 BID    5/21/2025:  FINDINGS:   UTERUS: Measures 11.17 x 5.8 cm.   ENDOMETRIUM: Thickened and heterogeneous appearance with suggestion of a 3.1 x 3.1 x 2.7 cm mass.   (normal < 16mm premenopausal and < 5mm postmenopausal or 10mm if on hormone replacement therapy)   RIGHT OVARY: Unremarkable, measures 2.8 x 2.5 x 1.8 cm.   LEFT OVARY: Not visualized.   FREE FLUID:  None   BLADDER:  Unremarkable   OTHER:  None   IMPRESSION   1. Thickened and heterogeneous appearance of the endometrium with suggestion of a 3.1 cm mass. Considerations include endometrial carcinoma, endometrial polyp and submucosal fibroid. In the setting of postmenopausal vaginal bleeding, tissue sampling is   needed for further evaluation.   2.  Normal appearance of the right ovary.   3.  Nonvisualization of the left ovary.       Review of systems:  No complaints of symptoms involving:  Constitutional: negative for fever, chills.  Eyes: No change in vision, double vision, or scotomata.  HENT: No sore throat, ear pain or nasal congestion.  Respiratory: No cough or hemoptysis.  Cardiovascular: No chest pain or fainting.  Skin: No pruritus or generalized rash.  Neurologic: No focal weakness or sensory changes.   Gynecologic: See HPI    Patient Active Problem List   Diagnosis    Essential hypertension    Mixed hyperlipidemia    Type 2 diabetes mellitus with stage 3 chronic kidney disease, with

## 2025-05-30 ENCOUNTER — RESULTS FOLLOW-UP (OUTPATIENT)
Dept: GYNECOLOGY | Age: 70
End: 2025-05-30

## 2025-06-02 ENCOUNTER — TELEPHONE (OUTPATIENT)
Dept: GYNECOLOGY | Age: 70
End: 2025-06-02

## 2025-06-02 NOTE — TELEPHONE ENCOUNTER
Patient would like to discuss current medication. Feels as if it isn't working and maybe a higher dosage would help. Please contact patient to discuss. Patient can be reached at [phone]       [id number] - [place], [place] - [address] - P [phone] - F [phone] [[id number]]

## 2025-06-03 ENCOUNTER — TELEMEDICINE (OUTPATIENT)
Dept: GYNECOLOGY | Age: 70
End: 2025-06-03
Payer: MEDICARE

## 2025-06-03 DIAGNOSIS — R93.89 THICKENED ENDOMETRIUM: ICD-10-CM

## 2025-06-03 DIAGNOSIS — N93.9 VAGINA BLEEDING: ICD-10-CM

## 2025-06-03 DIAGNOSIS — N85.02 EIN (ENDOMETRIAL INTRAEPITHELIAL NEOPLASIA): ICD-10-CM

## 2025-06-03 DIAGNOSIS — N95.0 PMB (POSTMENOPAUSAL BLEEDING): Primary | ICD-10-CM

## 2025-06-03 DIAGNOSIS — N94.89 ENDOMETRIAL MASS: ICD-10-CM

## 2025-06-03 PROCEDURE — G8400 PT W/DXA NO RESULTS DOC: HCPCS | Performed by: OBSTETRICS & GYNECOLOGY

## 2025-06-03 PROCEDURE — 1090F PRES/ABSN URINE INCON ASSESS: CPT | Performed by: OBSTETRICS & GYNECOLOGY

## 2025-06-03 PROCEDURE — 99213 OFFICE O/P EST LOW 20 MIN: CPT | Performed by: OBSTETRICS & GYNECOLOGY

## 2025-06-03 PROCEDURE — 1123F ACP DISCUSS/DSCN MKR DOCD: CPT | Performed by: OBSTETRICS & GYNECOLOGY

## 2025-06-03 PROCEDURE — G8428 CUR MEDS NOT DOCUMENT: HCPCS | Performed by: OBSTETRICS & GYNECOLOGY

## 2025-06-03 PROCEDURE — 3017F COLORECTAL CA SCREEN DOC REV: CPT | Performed by: OBSTETRICS & GYNECOLOGY

## 2025-06-03 NOTE — PROGRESS NOTES
Cindy Ortiz, was evaluated through a synchronous (real-time) audio-video encounter. The patient (or guardian if applicable) is aware that this is a billable service, which includes applicable co-pays. This Virtual Visit was conducted with patient's (and/or legal guardian's) consent. Patient identification was verified, and a caregiver was present when appropriate.   The patient was located at Home: [address] [place] [address]  Provider was located at Facility (Appt Dept): [facility]  [address]  [place] [id number]  Confirm you are appropriately licensed, registered, or certified to deliver care in the state where the patient is located as indicated above. If you are not or unsure, please re-schedule the visit: Yes, I confirm.     Cindy Ortiz (:  1955) is a Established patient, presenting virtually for evaluation of the following:      Below is the assessment and plan developed based on review of pertinent history, physical exam, labs, studies, and medications.     Assessment & Plan  PMB (postmenopausal bleeding)    Thickened endometrium    Endometrial mass    EIN (endometrial intraepithelial neoplasia)    Vagina bleeding  Patient is been diagnosed with EIN.  She is a pending appointment with Dr. Amaya on .  The patient was given Provera in the emergency department and she continues on it currently at 20 mg twice a day.  She reports that on Saturday she had episode of increased bleeding and passage of a clot.  She reports since then she is having light spotting.  She is not saturating a pad.  Bleeding warnings are reviewed with her.  She is continued on the Provera and her appointment with the GYN oncologist.  I discussed with her to notify us if she develops heavy bleeding and additional therapy can be considered such as TXA.    No follow-ups on file.       Subjective   Patient is been diagnosed with EIN.  She is a pending appointment with

## 2025-06-23 RX ORDER — ALLOPURINOL 200 MG/1
200 TABLET ORAL DAILY
Qty: 90 TABLET | Refills: 1 | Status: SHIPPED | OUTPATIENT
Start: 2025-06-23 | End: 2025-06-27

## 2025-06-23 RX ORDER — MONTELUKAST SODIUM 10 MG/1
10 TABLET ORAL DAILY
Qty: 90 TABLET | Refills: 0 | Status: SHIPPED | OUTPATIENT
Start: 2025-06-23

## 2025-06-23 NOTE — TELEPHONE ENCOUNTER
Pt called for a refill on     allopurinol (ZYLOPRIM) 100 MG tablet     montelukast (SINGULAIR) 10 MG tablet       Pharmacy    [id number] - [place], [place] - [address] - P [phone] - F [phone] [address], [facility] [address]  Phone: [phone]  Fax: [phone]

## 2025-06-25 ENCOUNTER — TELEPHONE (OUTPATIENT)
Dept: PRIMARY CARE CLINIC | Age: 70
End: 2025-06-25

## 2025-06-25 NOTE — TELEPHONE ENCOUNTER
Pt called in because a prior approval may be needed  for    allopurinol 200 MG TABS     Please call insurance for that information, and let Pt know when that is complete.       [id number] - [place], [place] - [address] - P [phone] - F [phone] [address], [facility] [address]

## 2025-06-27 RX ORDER — ALLOPURINOL 100 MG/1
200 TABLET ORAL DAILY
Qty: 180 TABLET | Refills: 1 | Status: SHIPPED | OUTPATIENT
Start: 2025-06-27

## 2025-06-27 NOTE — TELEPHONE ENCOUNTER
Submitted PA for Allopurinol 200MG tablets  Via CMM Key: [id number] STATUS: PENDING.    Follow up done daily; if no decision with in three days we will refax.  If another three days goes by with no decision will call the insurance for status.

## 2025-06-27 NOTE — TELEPHONE ENCOUNTER
Spoke with Pt again this afternoon. She is following up on when her medication can be approved. Let her know we are still waiting on approval.

## 2025-07-15 ENCOUNTER — OFFICE VISIT (OUTPATIENT)
Dept: PRIMARY CARE CLINIC | Age: 70
End: 2025-07-15
Payer: MEDICARE

## 2025-07-15 VITALS
RESPIRATION RATE: 13 BRPM | HEART RATE: 82 BPM | SYSTOLIC BLOOD PRESSURE: 140 MMHG | TEMPERATURE: 97.8 F | WEIGHT: 293 LBS | BODY MASS INDEX: 51.91 KG/M2 | HEIGHT: 63 IN | OXYGEN SATURATION: 94 % | DIASTOLIC BLOOD PRESSURE: 100 MMHG

## 2025-07-15 DIAGNOSIS — I50.33 ACUTE ON CHRONIC DIASTOLIC (CONGESTIVE) HEART FAILURE (HCC): ICD-10-CM

## 2025-07-15 DIAGNOSIS — C18.2 MALIGNANT NEOPLASM OF ASCENDING COLON (HCC): ICD-10-CM

## 2025-07-15 DIAGNOSIS — I10 ESSENTIAL HYPERTENSION: ICD-10-CM

## 2025-07-15 DIAGNOSIS — E66.01 MORBID OBESITY WITH BODY MASS INDEX (BMI) OF 50.0 TO 59.9 IN ADULT (HCC): ICD-10-CM

## 2025-07-15 DIAGNOSIS — N18.32 STAGE 3B CHRONIC KIDNEY DISEASE (HCC): ICD-10-CM

## 2025-07-15 DIAGNOSIS — Z01.818 PRE-OP EVALUATION: Primary | ICD-10-CM

## 2025-07-15 DIAGNOSIS — E11.69 TYPE 2 DIABETES MELLITUS WITH OBESITY (HCC): ICD-10-CM

## 2025-07-15 DIAGNOSIS — E78.2 MIXED HYPERLIPIDEMIA: Chronic | ICD-10-CM

## 2025-07-15 DIAGNOSIS — Z79.4 TYPE 2 DIABETES MELLITUS WITH STAGE 3B CHRONIC KIDNEY DISEASE, WITH LONG-TERM CURRENT USE OF INSULIN (HCC): ICD-10-CM

## 2025-07-15 DIAGNOSIS — E11.42 DIABETIC POLYNEUROPATHY ASSOCIATED WITH TYPE 2 DIABETES MELLITUS (HCC): ICD-10-CM

## 2025-07-15 DIAGNOSIS — N18.32 TYPE 2 DIABETES MELLITUS WITH STAGE 3B CHRONIC KIDNEY DISEASE, WITH LONG-TERM CURRENT USE OF INSULIN (HCC): ICD-10-CM

## 2025-07-15 DIAGNOSIS — E11.22 TYPE 2 DIABETES MELLITUS WITH STAGE 3B CHRONIC KIDNEY DISEASE, WITH LONG-TERM CURRENT USE OF INSULIN (HCC): ICD-10-CM

## 2025-07-15 DIAGNOSIS — E66.9 TYPE 2 DIABETES MELLITUS WITH OBESITY (HCC): ICD-10-CM

## 2025-07-15 DIAGNOSIS — L97.419 NON-PRESSURE CHRONIC ULCER OF RIGHT HEEL AND MIDFOOT WITH UNSPECIFIED SEVERITY (HCC): ICD-10-CM

## 2025-07-15 DIAGNOSIS — I50.32 CHRONIC DIASTOLIC HF (HEART FAILURE) (HCC): ICD-10-CM

## 2025-07-15 DIAGNOSIS — G47.61 PLMD (PERIODIC LIMB MOVEMENT DISORDER): ICD-10-CM

## 2025-07-15 DIAGNOSIS — N95.0 POST-MENOPAUSAL BLEEDING: Chronic | ICD-10-CM

## 2025-07-15 DIAGNOSIS — J96.20 ACUTE ON CHRONIC RESPIRATORY FAILURE, UNSPECIFIED WHETHER WITH HYPOXIA OR HYPERCAPNIA (HCC): ICD-10-CM

## 2025-07-15 PROBLEM — J96.90 RESPIRATORY FAILURE (HCC): Status: RESOLVED | Noted: 2024-04-05 | Resolved: 2025-07-15

## 2025-07-15 PROBLEM — A41.9 SEPSIS WITHOUT ACUTE ORGAN DYSFUNCTION (HCC): Status: RESOLVED | Noted: 2024-05-08 | Resolved: 2025-07-15

## 2025-07-15 PROCEDURE — 1160F RVW MEDS BY RX/DR IN RCRD: CPT | Performed by: INTERNAL MEDICINE

## 2025-07-15 PROCEDURE — 3046F HEMOGLOBIN A1C LEVEL >9.0%: CPT | Performed by: INTERNAL MEDICINE

## 2025-07-15 PROCEDURE — 3080F DIAST BP >= 90 MM HG: CPT | Performed by: INTERNAL MEDICINE

## 2025-07-15 PROCEDURE — 3077F SYST BP >= 140 MM HG: CPT | Performed by: INTERNAL MEDICINE

## 2025-07-15 PROCEDURE — 93000 ELECTROCARDIOGRAM COMPLETE: CPT | Performed by: INTERNAL MEDICINE

## 2025-07-15 PROCEDURE — 1123F ACP DISCUSS/DSCN MKR DOCD: CPT | Performed by: INTERNAL MEDICINE

## 2025-07-15 PROCEDURE — 1036F TOBACCO NON-USER: CPT | Performed by: INTERNAL MEDICINE

## 2025-07-15 PROCEDURE — 3017F COLORECTAL CA SCREEN DOC REV: CPT | Performed by: INTERNAL MEDICINE

## 2025-07-15 PROCEDURE — 1159F MED LIST DOCD IN RCRD: CPT | Performed by: INTERNAL MEDICINE

## 2025-07-15 PROCEDURE — 2022F DILAT RTA XM EVC RTNOPTHY: CPT | Performed by: INTERNAL MEDICINE

## 2025-07-15 PROCEDURE — 1090F PRES/ABSN URINE INCON ASSESS: CPT | Performed by: INTERNAL MEDICINE

## 2025-07-15 PROCEDURE — 99215 OFFICE O/P EST HI 40 MIN: CPT | Performed by: INTERNAL MEDICINE

## 2025-07-15 PROCEDURE — G8417 CALC BMI ABV UP PARAM F/U: HCPCS | Performed by: INTERNAL MEDICINE

## 2025-07-15 PROCEDURE — G8427 DOCREV CUR MEDS BY ELIG CLIN: HCPCS | Performed by: INTERNAL MEDICINE

## 2025-07-15 PROCEDURE — G8400 PT W/DXA NO RESULTS DOC: HCPCS | Performed by: INTERNAL MEDICINE

## 2025-07-15 SDOH — ECONOMIC STABILITY: FOOD INSECURITY: WITHIN THE PAST 12 MONTHS, THE FOOD YOU BOUGHT JUST DIDN'T LAST AND YOU DIDN'T HAVE MONEY TO GET MORE.: NEVER TRUE

## 2025-07-15 SDOH — ECONOMIC STABILITY: FOOD INSECURITY: WITHIN THE PAST 12 MONTHS, YOU WORRIED THAT YOUR FOOD WOULD RUN OUT BEFORE YOU GOT MONEY TO BUY MORE.: NEVER TRUE

## 2025-07-15 ASSESSMENT — PATIENT HEALTH QUESTIONNAIRE - PHQ9
2. FEELING DOWN, DEPRESSED OR HOPELESS: NOT AT ALL
SUM OF ALL RESPONSES TO PHQ QUESTIONS 1-9: 0
1. LITTLE INTEREST OR PLEASURE IN DOING THINGS: NOT AT ALL
SUM OF ALL RESPONSES TO PHQ QUESTIONS 1-9: 0

## 2025-07-15 NOTE — PROGRESS NOTES
Subjective:        Reason for visit.   Cindy Ortiz is a 69 y.o. female who presents for a preoperative physical examination.  She is scheduled to have hysterectomy,   done by Dr. Palomino at [facility] on 8/13/25. .    History of Present Illness:      Here for a pre op eval,   Post-menopausal bleeding  Here for a pre op eval for hysterectomy,  She is medically stable.      Morbid obesity with body mass index (BMI) of 50.0 to 59.9 in adult ([facility])  Patient counseled,   Encouraged to lose weight, watch diet and exercise consistently.       Type 2 diabetes mellitus with stage 3 chronic kidney disease, with long-term current use of insulin ([facility])  This problem is stable, reviewed in detail, and advised patient to continue the current instructions or medications. Will continue to closely monitor the situation.      Type 2 diabetes mellitus with obesity ([facility])  This problem is stable, reviewed in detail, and advised patient to continue the current instructions or medications. Will continue to closely monitor the situation.      PLMD (periodic limb movement disorder)  This problem is stable, reviewed in detail, and advised patient to continue the current instructions or medications. Will continue to closely monitor the situation.      Mixed hyperlipidemia  This has been a long standing problem, takes no meds.       Monitors diet and tries to follow a low fat diet. Has  been reasonably  compliant w exercise. Lipids have been stable, The problem is controlled. Recent lipid tests were reviewed and are normal. Pertinent negatives include no chest pain, focal sensory loss, focal weakness, leg pain, myalgias or shortness of breath.  Advised patient to continue the current instructions or medications.       Essential hypertension  This is a chronic problem. The problem is well controlled.  Patient monitors readings regularly. Pertinent negatives include no chest pain, focal sensory loss, focal weakness, leg pain, myalgias

## 2025-07-15 NOTE — ASSESSMENT & PLAN NOTE
On zaroxolyn,  Patient is compliant w medications, no side effects, effective, provides adequate symptom relief. No new symptoms or problems as noted by patient.  The problem is stable, no changes noted by patient. Will consider monitoring labs and refill medications as appropriate. Patient counseled and will continue current plan.

## 2025-07-18 ENCOUNTER — TELEPHONE (OUTPATIENT)
Dept: PRIMARY CARE CLINIC | Age: 70
End: 2025-07-18

## 2025-07-18 DIAGNOSIS — Z01.818 PRE-OP EVALUATION: Primary | ICD-10-CM

## 2025-07-18 NOTE — TELEPHONE ENCOUNTER
I called and verified with patient she needs to have lab orders put in for CBC and CMP prior to her surgery on 7/30/2025. Pt is requesting we put these orders in so she can come in on Tuesday to have labs drawn.

## 2025-07-18 NOTE — TELEPHONE ENCOUNTER
Needs a CBC CMP blood work order entered before surgery, which is scheduled for July 30th.    Please call patient to verify order, she asks to come in on Tuesday to have the draw completed.

## 2025-07-22 ENCOUNTER — HOSPITAL ENCOUNTER (OUTPATIENT)
Dept: GENERAL RADIOLOGY | Age: 70
Discharge: HOME | End: 2025-07-22
Payer: MEDICARE

## 2025-07-22 DIAGNOSIS — Z01.818 PRE-OP EVALUATION: ICD-10-CM

## 2025-07-22 LAB
ALBUMIN SERPL-MCNC: 4.1 G/DL (ref 3.4–5)
ALBUMIN/GLOB SERPL: 1.4 {RATIO} (ref 1.1–2.2)
ALP SERPL-CCNC: 80 U/L (ref 40–129)
ALT SERPL-CCNC: 16 U/L (ref 10–40)
ANION GAP SERPL CALCULATED.3IONS-SCNC: 12 MMOL/L (ref 3–16)
AST SERPL-CCNC: 16 U/L (ref 15–37)
BASOPHILS # BLD: 0.1 K/UL (ref 0–0.2)
BASOPHILS NFR BLD: 1 %
BILIRUB SERPL-MCNC: 0.3 MG/DL (ref 0–1)
BUN SERPL-MCNC: 38 MG/DL (ref 7–20)
CALCIUM SERPL-MCNC: 9.2 MG/DL (ref 8.3–10.6)
CHLORIDE SERPL-SCNC: 101 MMOL/L (ref 99–110)
CO2 SERPL-SCNC: 28 MMOL/L (ref 21–32)
CREAT SERPL-MCNC: 2 MG/DL (ref 0.6–1.2)
DEPRECATED RDW RBC AUTO: 16.7 % (ref 12.4–15.4)
EOSINOPHIL # BLD: 0.2 K/UL (ref 0–0.6)
EOSINOPHIL NFR BLD: 2.1 %
GFR SERPLBLD CREATININE-BSD FMLA CKD-EPI: 26 ML/MIN/{1.73_M2}
GLUCOSE SERPL-MCNC: 121 MG/DL (ref 70–99)
HCT VFR BLD AUTO: 38 % (ref 36–48)
HGB BLD-MCNC: 11.9 G/DL (ref 12–16)
LYMPHOCYTES # BLD: 1.6 K/UL (ref 1–5.1)
LYMPHOCYTES NFR BLD: 16.4 %
MCH RBC QN AUTO: 26 PG (ref 26–34)
MCHC RBC AUTO-ENTMCNC: 31.3 G/DL (ref 31–36)
MCV RBC AUTO: 83 FL (ref 80–100)
MONOCYTES # BLD: 0.8 K/UL (ref 0–1.3)
MONOCYTES NFR BLD: 7.9 %
NEUTROPHILS # BLD: 7 K/UL (ref 1.7–7.7)
NEUTROPHILS NFR BLD: 72.6 %
PLATELET # BLD AUTO: 199 K/UL (ref 135–450)
PMV BLD AUTO: 8.9 FL (ref 5–10.5)
POTASSIUM SERPL-SCNC: 4.4 MMOL/L (ref 3.5–5.1)
PROT SERPL-MCNC: 7 G/DL (ref 6.4–8.2)
RBC # BLD AUTO: 4.58 M/UL (ref 4–5.2)
SODIUM SERPL-SCNC: 141 MMOL/L (ref 136–145)
WBC # BLD AUTO: 9.6 K/UL (ref 4–11)

## 2025-07-22 PROCEDURE — 71046 X-RAY EXAM CHEST 2 VIEWS: CPT

## 2025-07-24 ENCOUNTER — TELEPHONE (OUTPATIENT)
Age: 70
End: 2025-07-24

## 2025-07-24 NOTE — TELEPHONE ENCOUNTER
I called and spoke with patient and she would like for Dr. Hensley to look over the chest CXR and EKG to ensure that she is okay to proceed with surgery. Pt would like a call to let her know if she can proceed.

## 2025-07-24 NOTE — TELEPHONE ENCOUNTER
Patient called to let us know her doctor is wanting the ok from Dr. Hensley to get her chest x ray and EKG done that has been ordered. I let her know I would inform clinical staff and they would give her a call back when available.

## 2025-07-28 ENCOUNTER — OFFICE VISIT (OUTPATIENT)
Dept: CARDIOLOGY CLINIC | Age: 70
End: 2025-07-28
Payer: MEDICARE

## 2025-07-28 ENCOUNTER — TELEPHONE (OUTPATIENT)
Dept: CARDIOLOGY CLINIC | Age: 70
End: 2025-07-28

## 2025-07-28 VITALS
DIASTOLIC BLOOD PRESSURE: 82 MMHG | HEIGHT: 63 IN | SYSTOLIC BLOOD PRESSURE: 122 MMHG | BODY MASS INDEX: 53.16 KG/M2 | HEART RATE: 86 BPM

## 2025-07-28 DIAGNOSIS — E66.01 MORBID OBESITY (HCC): ICD-10-CM

## 2025-07-28 DIAGNOSIS — I50.32 CHRONIC DIASTOLIC HEART FAILURE (HCC): ICD-10-CM

## 2025-07-28 DIAGNOSIS — R06.02 SHORTNESS OF BREATH: Primary | ICD-10-CM

## 2025-07-28 DIAGNOSIS — I10 ESSENTIAL HYPERTENSION: ICD-10-CM

## 2025-07-28 DIAGNOSIS — D50.8 OTHER IRON DEFICIENCY ANEMIA: ICD-10-CM

## 2025-07-28 DIAGNOSIS — Z01.810 PRE-OPERATIVE CARDIOVASCULAR EXAMINATION: ICD-10-CM

## 2025-07-28 PROCEDURE — G2211 COMPLEX E/M VISIT ADD ON: HCPCS | Performed by: INTERNAL MEDICINE

## 2025-07-28 PROCEDURE — 3074F SYST BP LT 130 MM HG: CPT | Performed by: INTERNAL MEDICINE

## 2025-07-28 PROCEDURE — 1036F TOBACCO NON-USER: CPT | Performed by: INTERNAL MEDICINE

## 2025-07-28 PROCEDURE — G8427 DOCREV CUR MEDS BY ELIG CLIN: HCPCS | Performed by: INTERNAL MEDICINE

## 2025-07-28 PROCEDURE — 99214 OFFICE O/P EST MOD 30 MIN: CPT | Performed by: INTERNAL MEDICINE

## 2025-07-28 PROCEDURE — 1123F ACP DISCUSS/DSCN MKR DOCD: CPT | Performed by: INTERNAL MEDICINE

## 2025-07-28 PROCEDURE — 1159F MED LIST DOCD IN RCRD: CPT | Performed by: INTERNAL MEDICINE

## 2025-07-28 PROCEDURE — G8417 CALC BMI ABV UP PARAM F/U: HCPCS | Performed by: INTERNAL MEDICINE

## 2025-07-28 PROCEDURE — 3079F DIAST BP 80-89 MM HG: CPT | Performed by: INTERNAL MEDICINE

## 2025-07-28 PROCEDURE — 1090F PRES/ABSN URINE INCON ASSESS: CPT | Performed by: INTERNAL MEDICINE

## 2025-07-28 PROCEDURE — G8400 PT W/DXA NO RESULTS DOC: HCPCS | Performed by: INTERNAL MEDICINE

## 2025-07-28 PROCEDURE — 3017F COLORECTAL CA SCREEN DOC REV: CPT | Performed by: INTERNAL MEDICINE

## 2025-07-28 NOTE — TELEPHONE ENCOUNTER
Patient seen in office today where a STAT 2 day Lexiscan and ECHO were ordered. Central Scheduling scheduled the tests for 8/7/25 which is next week (at [facility]).    I spoke to Dorothy Hopper stress  at [facility] who OK'd the tests to be done tomorrow (stress at noon, echo at 1). Day 2 stress will be Wednesday, time tbd.    I spoke to Meg in Central Scheduling who rescheduled the tests as noted above.    I confirmed the date and time change with Cindy.

## 2025-07-28 NOTE — PATIENT INSTRUCTIONS
No medication changes. Recommend switching to Mounjaro.    Call the office for any new, worsening, or concerning symptoms.

## 2025-07-28 NOTE — PROGRESS NOTES
[facility]     CONSULTATION  [phone]  7/28/25  Referring: Dr. Crawford, Johann ROMAN MD (PCP)    REASON FOR CONSULT/CHIEF COMPLAINT/HPI     Reason for visit/ Chief complaint  Pre-Op Clearance  Diastolic heart failure   HPI Cindy Ortiz is a 69 y.o. female who is here for cardiac risk assessment prior to total hysterectomy s/p post menopausal bleeding (Dr. Amaya); it is scheduled in 2 days 7/30/25.    I saw her in consult during hospital admission 5/8/24 for acute-on-chronic diastolic heart failure, RLE cellulitis, presumed sepsis.  Symptoms included worsening SOB and fever. RLE red & swollen.  Elevated WBC & trop. Echo showed grade 2 DD. She had a normal Lexiscan several years ago.     PMH includes dCHF, HTN, DM, CKD, colon cancer.    Today, she is here in a wheelchair with her  accompanying (h/o orthopedic issues).  She had pre-op cxr & EKG 7/15/25.  She reports ongoing SOB that is not improving.  She specifically can't walk more than 20 feet without feeling very winded No chest pain. She denies orthopnea, palpitations, dizziness.     Patient is adherent with medications and is tolerating them well without side effects.     HISTORY/ALLERGIES/ROS     MedHx:  has a past medical history of Anemia, Chronic bilateral low back pain without sciatica, Chronic kidney disease, CKD (chronic kidney disease) stage 3, GFR 30-59 ml/min (HCC), Colon cancer (HCC), Diabetes (HCC), Diabetes (HCC), Diabetes with retinopathy (HCC), History of blood transfusion, Hypertension, Neuropathy, Obesity, On home O2, BRIANNA (obstructive sleep apnea), Pancreatic cyst, Positive FIT (fecal immunochemical test), Pulmonary hypertension (HCC), and Type 2 diabetes mellitus without complication (HCC).  SurgHx:  has a past surgical history that includes Upper gastrointestinal endoscopy (N/A, 11/05/2018); hemicolectomy (Right, 11/08/2018); pr office/outpt visit,procedure only (Right, 11/08/2018); [place] tooth extraction;

## 2025-07-29 ENCOUNTER — HOSPITAL ENCOUNTER (OUTPATIENT)
Age: 70
Discharge: HOME | End: 2025-07-29
Attending: INTERNAL MEDICINE
Payer: MEDICARE

## 2025-07-29 ENCOUNTER — HOSPITAL ENCOUNTER (OUTPATIENT)
Age: 70
Discharge: HOME | End: 2025-07-31
Attending: INTERNAL MEDICINE
Payer: MEDICARE

## 2025-07-29 VITALS
HEIGHT: 63 IN | BODY MASS INDEX: 51.91 KG/M2 | WEIGHT: 293 LBS | SYSTOLIC BLOOD PRESSURE: 122 MMHG | DIASTOLIC BLOOD PRESSURE: 82 MMHG

## 2025-07-29 DIAGNOSIS — I10 ESSENTIAL HYPERTENSION: ICD-10-CM

## 2025-07-29 DIAGNOSIS — R06.02 SHORTNESS OF BREATH: ICD-10-CM

## 2025-07-29 DIAGNOSIS — Z01.810 PRE-OPERATIVE CARDIOVASCULAR EXAMINATION: ICD-10-CM

## 2025-07-29 DIAGNOSIS — D50.8 OTHER IRON DEFICIENCY ANEMIA: ICD-10-CM

## 2025-07-29 LAB
ECHO AO ROOT DIAM: 3 CM
ECHO AO ROOT INDEX: 1.3 CM/M2
ECHO AV AREA PEAK VELOCITY: 2.1 CM2
ECHO AV AREA VTI: 1.6 CM2
ECHO AV AREA/BSA PEAK VELOCITY: 0.9 CM2/M2
ECHO AV AREA/BSA VTI: 0.7 CM2/M2
ECHO AV MEAN GRADIENT: 10 MMHG
ECHO AV MEAN VELOCITY: 1.5 M/S
ECHO AV PEAK GRADIENT: 13 MMHG
ECHO AV PEAK VELOCITY: 1.8 M/S
ECHO AV VELOCITY RATIO: 0.67
ECHO AV VTI: 45.1 CM
ECHO BSA: 2.46 M2
ECHO LA AREA 2C: 32.7 CM2
ECHO LA AREA 4C: 25.5 CM2
ECHO LA MAJOR AXIS: 6.5 CM
ECHO LA MINOR AXIS: 6.7 CM
ECHO LA VOL BP: 105 ML (ref 22–52)
ECHO LA VOL MOD A2C: 132 ML (ref 22–52)
ECHO LA VOL MOD A4C: 82 ML (ref 22–52)
ECHO LA VOL/BSA BIPLANE: 46 ML/M2 (ref 16–34)
ECHO LA VOLUME INDEX MOD A2C: 57 ML/M2 (ref 16–34)
ECHO LA VOLUME INDEX MOD A4C: 36 ML/M2 (ref 16–34)
ECHO LV E' LATERAL VELOCITY: 7.29 CM/S
ECHO LV E' SEPTAL VELOCITY: 5.11 CM/S
ECHO LV EDV A2C: 106 ML
ECHO LV EDV A4C: 133 ML
ECHO LV EDV INDEX A4C: 58 ML/M2
ECHO LV EDV NDEX A2C: 46 ML/M2
ECHO LV EF PHYSICIAN: 55 %
ECHO LV EJECTION FRACTION A2C: 57 %
ECHO LV EJECTION FRACTION A4C: 58 %
ECHO LV EJECTION FRACTION BIPLANE: 56 % (ref 55–100)
ECHO LV ESV A2C: 45 ML
ECHO LV ESV A4C: 56 ML
ECHO LV ESV INDEX A2C: 20 ML/M2
ECHO LV ESV INDEX A4C: 24 ML/M2
ECHO LV FRACTIONAL SHORTENING: 42 % (ref 28–44)
ECHO LV INTERNAL DIMENSION DIASTOLE INDEX: 2.3 CM/M2
ECHO LV INTERNAL DIMENSION DIASTOLIC: 5.3 CM (ref 3.9–5.3)
ECHO LV INTERNAL DIMENSION SYSTOLIC INDEX: 1.35 CM/M2
ECHO LV INTERNAL DIMENSION SYSTOLIC: 3.1 CM
ECHO LV IVSD: 0.8 CM (ref 0.6–0.9)
ECHO LV MASS 2D: 150.1 G (ref 67–162)
ECHO LV MASS INDEX 2D: 65.2 G/M2 (ref 43–95)
ECHO LV POSTERIOR WALL DIASTOLIC: 0.8 CM (ref 0.6–0.9)
ECHO LV RELATIVE WALL THICKNESS RATIO: 0.3
ECHO LVOT AREA: 3.1 CM2
ECHO LVOT AV VTI INDEX: 0.52
ECHO LVOT DIAM: 2 CM
ECHO LVOT MEAN GRADIENT: 4 MMHG
ECHO LVOT PEAK GRADIENT: 6 MMHG
ECHO LVOT PEAK VELOCITY: 1.2 M/S
ECHO LVOT STROKE VOLUME INDEX: 31.8 ML/M2
ECHO LVOT SV: 73.2 ML
ECHO LVOT VTI: 23.3 CM
ECHO MV A VELOCITY: 0.99 M/S
ECHO MV AREA VTI: 1.9 CM2
ECHO MV E DECELERATION TIME (DT): 216 MS
ECHO MV E VELOCITY: 1.07 M/S
ECHO MV E/A RATIO: 1.08
ECHO MV E/E' LATERAL: 14.68
ECHO MV E/E' RATIO (AVERAGED): 17.81
ECHO MV E/E' SEPTAL: 20.94
ECHO MV LVOT VTI INDEX: 1.67
ECHO MV MAX VELOCITY: 1.3 M/S
ECHO MV MEAN GRADIENT: 3 MMHG
ECHO MV MEAN VELOCITY: 0.7 M/S
ECHO MV PEAK GRADIENT: 6 MMHG
ECHO MV VTI: 38.8 CM
ECHO RV FREE WALL PEAK S': 12.9 CM/S
ECHO RV INTERNAL DIMENSION: 2.3 CM
ECHO RV TAPSE: 3.2 CM (ref 1.7–?)
FERRITIN SERPL IA-MCNC: 26 NG/ML (ref 15–150)
IRON SATN MFR SERPL: 10 % (ref 15–50)
IRON SERPL-MCNC: 35 UG/DL (ref 37–145)
NT-PROBNP SERPL-MCNC: 454 PG/ML (ref 0–124)
TIBC SERPL-MCNC: 364 UG/DL (ref 260–445)

## 2025-07-29 PROCEDURE — 3430000000 HC RX DIAGNOSTIC RADIOPHARMACEUTICAL: Performed by: INTERNAL MEDICINE

## 2025-07-29 PROCEDURE — 93017 CV STRESS TEST TRACING ONLY: CPT

## 2025-07-29 PROCEDURE — A9502 TC99M TETROFOSMIN: HCPCS | Performed by: INTERNAL MEDICINE

## 2025-07-29 PROCEDURE — C8929 TTE W OR WO FOL WCON,DOPPLER: HCPCS

## 2025-07-29 PROCEDURE — 6360000004 HC RX CONTRAST MEDICATION: Performed by: INTERNAL MEDICINE

## 2025-07-29 PROCEDURE — 78452 HT MUSCLE IMAGE SPECT MULT: CPT

## 2025-07-29 PROCEDURE — 93306 TTE W/DOPPLER COMPLETE: CPT | Performed by: STUDENT IN AN ORGANIZED HEALTH CARE EDUCATION/TRAINING PROGRAM

## 2025-07-29 RX ADMIN — SULFUR HEXAFLUORIDE 2 ML: 60.7; .19; .19 INJECTION, POWDER, LYOPHILIZED, FOR SUSPENSION INTRAVENOUS; INTRAVESICAL at 11:58

## 2025-07-29 RX ADMIN — TETROFOSMIN 34.1 MILLICURIE: 1.38 INJECTION, POWDER, LYOPHILIZED, FOR SOLUTION INTRAVENOUS at 11:15

## 2025-07-30 ENCOUNTER — HOSPITAL ENCOUNTER (OUTPATIENT)
Age: 70
Discharge: HOME | End: 2025-07-30
Attending: INTERNAL MEDICINE
Payer: MEDICARE

## 2025-07-30 ENCOUNTER — TELEPHONE (OUTPATIENT)
Dept: CARDIOLOGY CLINIC | Age: 70
End: 2025-07-30

## 2025-07-30 VITALS — HEIGHT: 63 IN | BODY MASS INDEX: 51.91 KG/M2 | WEIGHT: 293 LBS

## 2025-07-30 DIAGNOSIS — R94.39 ABNORMAL CARDIOVASCULAR STRESS TEST: Primary | ICD-10-CM

## 2025-07-30 DIAGNOSIS — R06.02 SOB (SHORTNESS OF BREATH): ICD-10-CM

## 2025-07-30 LAB
ECHO BSA: 2.46 M2
NUC STRESS EJECTION FRACTION: 70 %
NUC STRESS LV EDV: 105 ML (ref 56–104)
NUC STRESS LV ESV: 31 ML (ref 19–49)
NUC STRESS LV MASS: 134 G
NUC STRESS LV STROKE VOLUME: 74 ML
STRESS BASELINE DIAS BP: 92 MMHG
STRESS BASELINE HR: 82 BPM
STRESS BASELINE SYS BP: 132 MMHG
STRESS ESTIMATED WORKLOAD: 1 METS
STRESS PEAK DIAS BP: 68 MMHG
STRESS PEAK SYS BP: 152 MMHG
STRESS PERCENT HR ACHIEVED: 64 %
STRESS POST PEAK HR: 96 BPM
STRESS RATE PRESSURE PRODUCT: ABNORMAL BPM*MMHG
STRESS TARGET HR: 149 BPM
TID: 0.91

## 2025-07-30 PROCEDURE — 78452 HT MUSCLE IMAGE SPECT MULT: CPT | Performed by: STUDENT IN AN ORGANIZED HEALTH CARE EDUCATION/TRAINING PROGRAM

## 2025-07-30 PROCEDURE — 6360000002 HC RX W HCPCS: Performed by: INTERNAL MEDICINE

## 2025-07-30 PROCEDURE — A9502 TC99M TETROFOSMIN: HCPCS | Performed by: INTERNAL MEDICINE

## 2025-07-30 PROCEDURE — 3430000000 HC RX DIAGNOSTIC RADIOPHARMACEUTICAL: Performed by: INTERNAL MEDICINE

## 2025-07-30 PROCEDURE — 93016 CV STRESS TEST SUPVJ ONLY: CPT | Performed by: STUDENT IN AN ORGANIZED HEALTH CARE EDUCATION/TRAINING PROGRAM

## 2025-07-30 PROCEDURE — 93018 CV STRESS TEST I&R ONLY: CPT | Performed by: STUDENT IN AN ORGANIZED HEALTH CARE EDUCATION/TRAINING PROGRAM

## 2025-07-30 RX ORDER — REGADENOSON 0.08 MG/ML
0.4 INJECTION, SOLUTION INTRAVENOUS
Status: COMPLETED | OUTPATIENT
Start: 2025-07-30 | End: 2025-07-30

## 2025-07-30 RX ADMIN — REGADENOSON 0.4 MG: 0.08 INJECTION, SOLUTION INTRAVENOUS at 08:05

## 2025-07-30 RX ADMIN — TETROFOSMIN 34 MILLICURIE: 1.38 INJECTION, POWDER, LYOPHILIZED, FOR SOLUTION INTRAVENOUS at 08:05

## 2025-07-30 NOTE — TELEPHONE ENCOUNTER
I discussed stress test results with Cindy. She is agreeable to the cath, understanding she will not be cleared for surgery. She also understands that if she requires a stent, she will be on  blood thinner for many months to prevent thrombi which could postpone her surgery even longer.    I went over pre-procedure instructions with her with stated understanding:  Left Heart Cath Patient Pre-procedure Instructions    Do NOT eat or drink anything after midnight morning of procedure    MEDICATIONS:  Your medications have been reviewed. Please follow medication instructions below  It is okay to drink a sip of water with meds morning of procedure  Diuretics- Hold diuretics (water pill) for comfort morning of procedure. Your diuretics to hold: Torsemide (Demadex)  Metformin/Insulin-Consider holding the night before and morning of procedure if your sugars might run low because of not eating any breakfast.    Transportation: Bring someone to drive you home.     Scheduling: Alannah THORNTON is our full time procedure . She will call you to schedule your procedure.    Allergies: Do you have an allergy to dye or contrast? No. Patient does state that CT IV contrast dye made her feel \"horrible\" but she had no hives, numbness, or swelling.    Labs: No additional blood work is needed prior to the day of your procedure. Labs done 7/22/25.

## 2025-07-30 NOTE — NURSING NOTE
Patient completed stress portion of cardiac stress test. Patient complained of SOB that resolved with feet movement and caffeine intake. Pt is discharged to nuclear department for final scan, nuclear tech will remove PIV. Discharge instructions given, pt verbalized understanding to discharge instructions.

## 2025-07-30 NOTE — DISCHARGE INSTR - COC
Continuity of Care Form    Patient Name: Cindy Ortiz   :  1955  MRN:  [id number]    Admit date:  2025  Discharge date:  ***    Code Status Order: Prior   Advance Directives:     Admitting Physician:  No admitting provider for patient encounter.  PCP: Johann Crawford MD    Discharging Nurse: ***  Discharging Hospital Unit/Room#: No information available for this encounter.  Discharging Unit Phone Number: ***    Emergency Contact:   Extended Emergency Contact Information  Primary Emergency Contact: Laith Ortiz  Address: [address]           [place] [id number] [facility]  Home Phone: [phone]  Mobile Phone: [phone]  Relation: Spouse    Past Surgical History:  Past Surgical History:   Procedure Laterality Date    COLONOSCOPY N/A 2018    COLONOSCOPY WITH BIOPSY performed by Georgi Hanks MD at [facility] ENDOSCOPY    COLONOSCOPY  2019    Colonoscopy with polypectomy (cold snare)    COLONOSCOPY N/A 2019    COLONOSCOPY POLYPECTOMY SNARE/COLD BIOPSY performed by Georgi Hanks MD at [facility] ENDOSCOPY    HEMICOLECTOMY Right 2018    LAPAROSCOPIC RIGHT HEMICOLECTOMY and umbilical hernia repair    IA OFFICE/OUTPT VISIT,PROCEDURE ONLY Right 2018    LAPAROSCOPIC RIGHT HEMICOLECTOMY and umbilical hernia repair performed by Channing Jaime MD at [facility] OR    SMALL INTESTINE SURGERY      Cancerous polyp removed - no chemo necessary    UPPER GASTROINTESTINAL ENDOSCOPY N/A 2018    EGD BIOPSY performed by Georgi Hanks MD at [facility] ENDOSCOPY    WISDOM TOOTH EXTRACTION         Immunization History:   Immunization History   Administered Date(s) Administered    COVID-19, MODERNA BLUE border, Primary or Immunocompromised, (age 12y+), IM, 100 mcg/0.5mL 02/15/2021, 2021, 2021    COVID-19, MODERNA Bivalent, (age 12y+), IM, 50 mcg/0.5 mL 2022    Influenza Vaccine, unspecified formulation 2016, 2017    Influenza

## 2025-07-30 NOTE — TELEPHONE ENCOUNTER
I called the surgeon's office Dr. CARLOS Amaya at [facility] ([phone]) to update them on her needing a cath Friday and need for blood thinner if stenting is warranted. Reg sending a message to his nurse Kelli.

## 2025-07-30 NOTE — TELEPHONE ENCOUNTER
Date of Procedure: Friday 8/1/25 @ [facility] with Dr. Manning     Time of arrival: 6:45 am     Procedure time: 8:00 am     Called and spoke to Cindy and she is agreeable to date and time. Reviewed instructions and she verbalized understanding. Encouraged to call with any questions or concerns.     Published on [employer], scheduled on [employer] and e-mailed to cath lab.

## 2025-07-30 NOTE — TELEPHONE ENCOUNTER
Alannah, please schedule [facility] asap with any interventionalist. It will need to be at .  She prefers morning if possible. Case request placed.  Instructions given as noted below. Thank you.

## 2025-07-30 NOTE — TELEPHONE ENCOUNTER
Cindy is agreeable to iron infusions, has had them before. She would like to only receive two if possible.

## 2025-07-30 NOTE — TELEPHONE ENCOUNTER
From Result Notes 7/30/25: Terry Hensley MD [place], Dana-Sue, RN  She needs a cath ASAP  Can't clear her without a cath

## 2025-07-30 NOTE — TELEPHONE ENCOUNTER
IV iron order form filled out and faxed to [facility]. OK to check which medication her insurance will approve. Confirmation received.

## 2025-07-30 NOTE — TELEPHONE ENCOUNTER
From Result Notes: Terry Hensley MD [place], Dana-Sue, MARGIE  BNP likely ok given degree of renal dysfunciton.  Continue current dose of torsemide    She is very iron deficient.  Please get her venofer x 5 doses, indications i50.32 and poor intestinal absorption of iron

## 2025-07-30 NOTE — TELEPHONE ENCOUNTER
Day 2 stress done this morning with abnormal results. Please see other tel encounter dated 7/30/25.

## 2025-07-31 DIAGNOSIS — D50.8 IRON DEFICIENCY ANEMIA SECONDARY TO INADEQUATE DIETARY IRON INTAKE: ICD-10-CM

## 2025-07-31 DIAGNOSIS — K90.9 INTESTINAL MALABSORPTION, UNSPECIFIED TYPE: Primary | ICD-10-CM

## 2025-07-31 PROBLEM — D50.9 IRON DEFICIENCY ANEMIA, UNSPECIFIED: Status: ACTIVE | Noted: 2025-07-31

## 2025-07-31 RX ORDER — SODIUM CHLORIDE 9 MG/ML
5-250 INJECTION, SOLUTION INTRAVENOUS PRN
OUTPATIENT
Start: 2025-07-31

## 2025-07-31 RX ORDER — SODIUM CHLORIDE 0.9 % (FLUSH) 0.9 %
5-40 SYRINGE (ML) INJECTION PRN
OUTPATIENT
Start: 2025-07-31

## 2025-08-01 ENCOUNTER — TELEPHONE (OUTPATIENT)
Dept: CARDIOLOGY CLINIC | Age: 70
End: 2025-08-01

## 2025-08-01 ENCOUNTER — RESULTS FOLLOW-UP (OUTPATIENT)
Dept: CARDIOLOGY CLINIC | Age: 70
End: 2025-08-01

## 2025-08-01 ENCOUNTER — HOSPITAL ENCOUNTER (OUTPATIENT)
Age: 70
Setting detail: OUTPATIENT SURGERY
Discharge: HOME | End: 2025-08-01
Attending: STUDENT IN AN ORGANIZED HEALTH CARE EDUCATION/TRAINING PROGRAM | Admitting: STUDENT IN AN ORGANIZED HEALTH CARE EDUCATION/TRAINING PROGRAM
Payer: MEDICARE

## 2025-08-01 VITALS
WEIGHT: 293 LBS | HEART RATE: 89 BPM | OXYGEN SATURATION: 98 % | SYSTOLIC BLOOD PRESSURE: 150 MMHG | HEIGHT: 63 IN | DIASTOLIC BLOOD PRESSURE: 82 MMHG | BODY MASS INDEX: 51.91 KG/M2 | RESPIRATION RATE: 21 BRPM

## 2025-08-01 DIAGNOSIS — R94.39 ABNORMAL CARDIOVASCULAR STRESS TEST: ICD-10-CM

## 2025-08-01 DIAGNOSIS — R06.02 SOB (SHORTNESS OF BREATH): ICD-10-CM

## 2025-08-01 LAB
ECHO BSA: 2.46 M2
EKG ATRIAL RATE: 98 BPM
EKG DIAGNOSIS: NORMAL
EKG P AXIS: 96 DEGREES
EKG P-R INTERVAL: 180 MS
EKG Q-T INTERVAL: 402 MS
EKG QRS DURATION: 146 MS
EKG QTC CALCULATION (BAZETT): 513 MS
EKG R AXIS: -79 DEGREES
EKG T AXIS: 38 DEGREES
EKG VENTRICULAR RATE: 98 BPM

## 2025-08-01 PROCEDURE — 99152 MOD SED SAME PHYS/QHP 5/>YRS: CPT | Performed by: STUDENT IN AN ORGANIZED HEALTH CARE EDUCATION/TRAINING PROGRAM

## 2025-08-01 PROCEDURE — 6360000002 HC RX W HCPCS: Performed by: STUDENT IN AN ORGANIZED HEALTH CARE EDUCATION/TRAINING PROGRAM

## 2025-08-01 PROCEDURE — 2709999900 HC NON-CHARGEABLE SUPPLY: Performed by: STUDENT IN AN ORGANIZED HEALTH CARE EDUCATION/TRAINING PROGRAM

## 2025-08-01 PROCEDURE — 7100000010 HC PHASE II RECOVERY - FIRST 15 MIN: Performed by: STUDENT IN AN ORGANIZED HEALTH CARE EDUCATION/TRAINING PROGRAM

## 2025-08-01 PROCEDURE — 93010 ELECTROCARDIOGRAM REPORT: CPT | Performed by: INTERNAL MEDICINE

## 2025-08-01 PROCEDURE — C1894 INTRO/SHEATH, NON-LASER: HCPCS | Performed by: STUDENT IN AN ORGANIZED HEALTH CARE EDUCATION/TRAINING PROGRAM

## 2025-08-01 PROCEDURE — 2500000003 HC RX 250 WO HCPCS: Performed by: STUDENT IN AN ORGANIZED HEALTH CARE EDUCATION/TRAINING PROGRAM

## 2025-08-01 PROCEDURE — C1769 GUIDE WIRE: HCPCS | Performed by: STUDENT IN AN ORGANIZED HEALTH CARE EDUCATION/TRAINING PROGRAM

## 2025-08-01 PROCEDURE — 6360000004 HC RX CONTRAST MEDICATION: Performed by: STUDENT IN AN ORGANIZED HEALTH CARE EDUCATION/TRAINING PROGRAM

## 2025-08-01 PROCEDURE — 93005 ELECTROCARDIOGRAM TRACING: CPT | Performed by: STUDENT IN AN ORGANIZED HEALTH CARE EDUCATION/TRAINING PROGRAM

## 2025-08-01 PROCEDURE — 93458 L HRT ARTERY/VENTRICLE ANGIO: CPT | Performed by: STUDENT IN AN ORGANIZED HEALTH CARE EDUCATION/TRAINING PROGRAM

## 2025-08-01 PROCEDURE — 7100000011 HC PHASE II RECOVERY - ADDTL 15 MIN: Performed by: STUDENT IN AN ORGANIZED HEALTH CARE EDUCATION/TRAINING PROGRAM

## 2025-08-01 RX ORDER — MIDAZOLAM HYDROCHLORIDE 1 MG/ML
INJECTION, SOLUTION INTRAMUSCULAR; INTRAVENOUS PRN
Status: DISCONTINUED | OUTPATIENT
Start: 2025-08-01 | End: 2025-08-01 | Stop reason: HOSPADM

## 2025-08-01 RX ORDER — IOPAMIDOL 755 MG/ML
INJECTION, SOLUTION INTRAVASCULAR PRN
Status: DISCONTINUED | OUTPATIENT
Start: 2025-08-01 | End: 2025-08-01 | Stop reason: HOSPADM

## 2025-08-01 RX ORDER — LIDOCAINE HYDROCHLORIDE 10 MG/ML
INJECTION, SOLUTION INFILTRATION; PERINEURAL PRN
Status: DISCONTINUED | OUTPATIENT
Start: 2025-08-01 | End: 2025-08-01 | Stop reason: HOSPADM

## 2025-08-01 RX ORDER — FENTANYL CITRATE 50 UG/ML
INJECTION, SOLUTION INTRAMUSCULAR; INTRAVENOUS PRN
Status: DISCONTINUED | OUTPATIENT
Start: 2025-08-01 | End: 2025-08-01 | Stop reason: HOSPADM

## 2025-08-01 NOTE — H&P
H&P     Patient not capable of > 4 METs, needs intermediate risk non-cardiac surgery  Stress MPI appropriately ordered which was a high risk study with significant defect    I have reviewed the history and physical and examined the patient and find no relevant changes. I have reviewed with the patient and/or family the risks, benefits, and alternatives to the procedure.    Pre-sedation Assessment    Patient:  Cindy Ortiz   :   1955    Intended Procedure:   LHC/Coronary angiogram    Vitals:    25 1000   BP: (!) 150/82   Pulse: 89   Resp: 21   SpO2: 98%       Nursing notes reviewed and agreed.  Medications reviewed  Allergies:   Allergies   Allergen Reactions    Carvedilol Shortness Of Breath     Side effects:  SOB, palpitations    Side effects:  SOB, palpitations   Side effects:  SOB, palpitations   Side effects:  SOB, palpitations   Side effects:  SOB, palpitations    Cefepime Anxiety     Other Reaction(s): Confusion    \"Psychotic break\", \"felt like I was in the twilight zone\"    Penicillins Shortness Of Breath     \"Not sure I'm really allergic\"    Spironolactone Rash    Lisinopril Other (See Comments)     cough    Other Reaction(s): Other (See Comments)    Other reaction(s): Cough   cough   cough   cough   cough         Pre-Procedure Assessment/Plan:  ASA 3 - Patient with moderate systemic disease with functional limitations    Mallampati Airway Assessment:  Mallampati Class III - (soft palate & base of uvula are visible)    Level of Sedation Plan:Moderate sedation    Post Procedure plan: Return to same level of care    Shyam Manning MD  Interventional Cardiology

## 2025-08-01 NOTE — BRIEF OP NOTE
An immediate re-assessment was completed prior to sedation, and it is determined to be safe to proceed.    PROCEDURE TECHNIQUE:  Local anesthetic was given and access was obtained in the right Radial Artery using a micropuncture technique and a (5/6) Fr Slender Terumo Sheath was placed without difficulty. Catheters were advanced over a 0.35 wire under fluoroscopic guidance  Left coronary angiography was done using a 5 Fr Joseph L 3.5 diagnostic catheter.  Right coronary angiography was done using a 5 Fr Joseph R4 diagnostic catheter.  LVEDP obtained. At the end of the procedure a TR band device was used for hemostasis.     HEMODYNAMICS:  Aortic pressure was 150/82;  LVEDP 13.  There was no gradient between the left ventricle and aorta.      ANGIOGRAM/CORONARY ARTERIOGRAM:       The left main coronary artery is angiographically free of disease.    The left anterior descending artery is angiographically free of disease.   D1 is angiographically free of disease    The left circumflex artery is angiographically free of disease.   OM1 is angiographically free of disease    The right coronary artery is dominant vessel without angiographically significant disease.   RPDA is angiographically free of disease    SUMMARY:   No angiographically significant disease    RECOMMENDATION:    No further planned ischemic evaluation  Okay to proceed with planned intermediate risk noncardiac surgery     Shyam Manning MD  Interventional Cardiology  8/1/2025  10:24 AM    [facility]  [address], [address]   [place] [address]  Ph: [phone]  Fax: [phone]

## 2025-08-01 NOTE — TELEPHONE ENCOUNTER
Received call from Dr. Hensley, Cindy Ortiz underwent cardiac cath today 8/1/2025, okay to proceed with planned surgery, \"hysterectomy\"  with Dr. Amaya([facility]), clearance letter sent with confirmation to [phone]

## 2025-08-05 ENCOUNTER — HOSPITAL ENCOUNTER (OUTPATIENT)
Dept: ONCOLOGY | Age: 70
Setting detail: INFUSION SERIES
Discharge: HOME | End: 2025-08-05
Payer: MEDICARE

## 2025-08-05 VITALS
DIASTOLIC BLOOD PRESSURE: 76 MMHG | SYSTOLIC BLOOD PRESSURE: 137 MMHG | RESPIRATION RATE: 18 BRPM | TEMPERATURE: 97.5 F | HEART RATE: 76 BPM

## 2025-08-05 DIAGNOSIS — K90.9 INTESTINAL MALABSORPTION, UNSPECIFIED TYPE: Primary | ICD-10-CM

## 2025-08-05 DIAGNOSIS — D50.8 IRON DEFICIENCY ANEMIA SECONDARY TO INADEQUATE DIETARY IRON INTAKE: ICD-10-CM

## 2025-08-05 DIAGNOSIS — I50.33 ACUTE ON CHRONIC DIASTOLIC (CONGESTIVE) HEART FAILURE (HCC): ICD-10-CM

## 2025-08-05 PROCEDURE — 2580000003 HC RX 258: Performed by: INTERNAL MEDICINE

## 2025-08-05 PROCEDURE — 99211 OFF/OP EST MAY X REQ PHY/QHP: CPT

## 2025-08-05 PROCEDURE — 2500000003 HC RX 250 WO HCPCS: Performed by: INTERNAL MEDICINE

## 2025-08-05 PROCEDURE — 6360000002 HC RX W HCPCS: Performed by: INTERNAL MEDICINE

## 2025-08-05 PROCEDURE — 96365 THER/PROPH/DIAG IV INF INIT: CPT

## 2025-08-05 RX ORDER — SODIUM CHLORIDE 0.9 % (FLUSH) 0.9 %
5-40 SYRINGE (ML) INJECTION PRN
Status: CANCELLED | OUTPATIENT
Start: 2025-08-12

## 2025-08-05 RX ORDER — SODIUM CHLORIDE 0.9 % (FLUSH) 0.9 %
5-40 SYRINGE (ML) INJECTION PRN
Status: DISCONTINUED | OUTPATIENT
Start: 2025-08-05 | End: 2025-08-06 | Stop reason: HOSPADM

## 2025-08-05 RX ORDER — SODIUM CHLORIDE 9 MG/ML
5-250 INJECTION, SOLUTION INTRAVENOUS PRN
Status: CANCELLED | OUTPATIENT
Start: 2025-08-12

## 2025-08-05 RX ORDER — SODIUM CHLORIDE 9 MG/ML
5-250 INJECTION, SOLUTION INTRAVENOUS PRN
Status: DISCONTINUED | OUTPATIENT
Start: 2025-08-05 | End: 2025-08-06 | Stop reason: HOSPADM

## 2025-08-05 RX ADMIN — FERRIC CARBOXYMALTOSE INJECTION 750 MG: 50 INJECTION, SOLUTION INTRAVENOUS at 08:37

## 2025-08-05 RX ADMIN — SODIUM CHLORIDE 250 ML/HR: 9 INJECTION, SOLUTION INTRAVENOUS at 08:36

## 2025-08-05 RX ADMIN — Medication 10 ML: at 08:37

## 2025-08-12 ENCOUNTER — HOSPITAL ENCOUNTER (OUTPATIENT)
Dept: ONCOLOGY | Age: 70
Setting detail: INFUSION SERIES
Discharge: HOME | End: 2025-08-12
Payer: MEDICARE

## 2025-08-12 VITALS
RESPIRATION RATE: 16 BRPM | SYSTOLIC BLOOD PRESSURE: 151 MMHG | DIASTOLIC BLOOD PRESSURE: 65 MMHG | HEART RATE: 63 BPM | TEMPERATURE: 97.4 F

## 2025-08-12 DIAGNOSIS — K90.9 INTESTINAL MALABSORPTION, UNSPECIFIED TYPE: Primary | ICD-10-CM

## 2025-08-12 DIAGNOSIS — D50.8 IRON DEFICIENCY ANEMIA SECONDARY TO INADEQUATE DIETARY IRON INTAKE: ICD-10-CM

## 2025-08-12 DIAGNOSIS — I50.33 ACUTE ON CHRONIC DIASTOLIC (CONGESTIVE) HEART FAILURE (HCC): ICD-10-CM

## 2025-08-12 PROCEDURE — 6360000002 HC RX W HCPCS: Performed by: INTERNAL MEDICINE

## 2025-08-12 PROCEDURE — 2580000003 HC RX 258: Performed by: INTERNAL MEDICINE

## 2025-08-12 PROCEDURE — 99211 OFF/OP EST MAY X REQ PHY/QHP: CPT

## 2025-08-12 PROCEDURE — 2500000003 HC RX 250 WO HCPCS: Performed by: INTERNAL MEDICINE

## 2025-08-12 PROCEDURE — 96365 THER/PROPH/DIAG IV INF INIT: CPT

## 2025-08-12 RX ORDER — SODIUM CHLORIDE 0.9 % (FLUSH) 0.9 %
5-40 SYRINGE (ML) INJECTION PRN
Status: DISCONTINUED | OUTPATIENT
Start: 2025-08-12 | End: 2025-08-12

## 2025-08-12 RX ORDER — SODIUM CHLORIDE 0.9 % (FLUSH) 0.9 %
5-40 SYRINGE (ML) INJECTION PRN
Status: CANCELLED | OUTPATIENT
Start: 2025-08-12

## 2025-08-12 RX ORDER — SODIUM CHLORIDE 9 MG/ML
5-250 INJECTION, SOLUTION INTRAVENOUS PRN
Status: CANCELLED | OUTPATIENT
Start: 2025-08-12

## 2025-08-12 RX ORDER — SODIUM CHLORIDE 9 MG/ML
5-250 INJECTION, SOLUTION INTRAVENOUS PRN
Status: DISCONTINUED | OUTPATIENT
Start: 2025-08-12 | End: 2025-08-12

## 2025-08-12 RX ADMIN — FERRIC CARBOXYMALTOSE INJECTION 750 MG: 50 INJECTION, SOLUTION INTRAVENOUS at 08:34

## 2025-08-12 RX ADMIN — SODIUM CHLORIDE 250 ML/HR: 9 INJECTION, SOLUTION INTRAVENOUS at 08:33

## 2025-08-12 RX ADMIN — Medication 10 ML: at 08:34

## 2025-08-24 ENCOUNTER — ANESTHESIA EVENT (OUTPATIENT)
Dept: OPERATING ROOM | Age: 70
End: 2025-08-24
Payer: MEDICARE

## 2025-08-25 ENCOUNTER — ANESTHESIA (OUTPATIENT)
Dept: OPERATING ROOM | Age: 70
End: 2025-08-25
Payer: MEDICARE

## 2025-08-25 ENCOUNTER — HOSPITAL ENCOUNTER (INPATIENT)
Age: 70
LOS: 4 days | Discharge: HOME | DRG: 740 | End: 2025-08-29
Attending: OBSTETRICS & GYNECOLOGY | Admitting: OBSTETRICS & GYNECOLOGY
Payer: MEDICARE

## 2025-08-25 DIAGNOSIS — N85.02 ENDOMETRIAL INTRAEPITHELIAL NEOPLASIA (EIN): ICD-10-CM

## 2025-08-25 PROBLEM — Z90.710 S/P HYSTERECTOMY: Status: ACTIVE | Noted: 2025-08-25

## 2025-08-25 LAB
ABO/RH: NORMAL
ANION GAP SERPL CALCULATED.3IONS-SCNC: 13 MMOL/L (ref 3–16)
ANTIBODY SCREEN: NORMAL
BUN SERPL-MCNC: 36 MG/DL (ref 7–20)
CALCIUM SERPL-MCNC: 9.4 MG/DL (ref 8.3–10.6)
CHLORIDE SERPL-SCNC: 96 MMOL/L (ref 99–110)
CO2 SERPL-SCNC: 29 MMOL/L (ref 21–32)
CREAT SERPL-MCNC: 2.3 MG/DL (ref 0.6–1.2)
GFR SERPLBLD CREATININE-BSD FMLA CKD-EPI: 22 ML/MIN/{1.73_M2}
GLUCOSE BLD-MCNC: 112 MG/DL (ref 70–99)
GLUCOSE BLD-MCNC: 166 MG/DL (ref 70–99)
GLUCOSE SERPL-MCNC: 102 MG/DL (ref 70–99)
PERFORMED ON: ABNORMAL
PERFORMED ON: ABNORMAL
POTASSIUM SERPL-SCNC: 3.7 MMOL/L (ref 3.5–5.1)
SODIUM SERPL-SCNC: 138 MMOL/L (ref 136–145)

## 2025-08-25 PROCEDURE — 88305 TISSUE EXAM BY PATHOLOGIST: CPT

## 2025-08-25 PROCEDURE — 0UJD4ZZ INSPECTION OF UTERUS AND CERVIX, PERCUTANEOUS ENDOSCOPIC APPROACH: ICD-10-PCS | Performed by: OBSTETRICS & GYNECOLOGY

## 2025-08-25 PROCEDURE — L0450 TLSO FLEX TRUNK/THOR PRE OTS: HCPCS | Performed by: OBSTETRICS & GYNECOLOGY

## 2025-08-25 PROCEDURE — S2900 ROBOTIC SURGICAL SYSTEM: HCPCS | Performed by: OBSTETRICS & GYNECOLOGY

## 2025-08-25 PROCEDURE — 6370000000 HC RX 637 (ALT 250 FOR IP): Performed by: ANESTHESIOLOGY

## 2025-08-25 PROCEDURE — 2700000000 HC OXYGEN THERAPY PER DAY

## 2025-08-25 PROCEDURE — 6370000000 HC RX 637 (ALT 250 FOR IP): Performed by: NURSE ANESTHETIST, CERTIFIED REGISTERED

## 2025-08-25 PROCEDURE — 0UT20ZZ RESECTION OF BILATERAL OVARIES, OPEN APPROACH: ICD-10-PCS | Performed by: OBSTETRICS & GYNECOLOGY

## 2025-08-25 PROCEDURE — 0UT70ZZ RESECTION OF BILATERAL FALLOPIAN TUBES, OPEN APPROACH: ICD-10-PCS | Performed by: OBSTETRICS & GYNECOLOGY

## 2025-08-25 PROCEDURE — 2709999900 HC NON-CHARGEABLE SUPPLY: Performed by: OBSTETRICS & GYNECOLOGY

## 2025-08-25 PROCEDURE — 80048 BASIC METABOLIC PNL TOTAL CA: CPT

## 2025-08-25 PROCEDURE — 6360000002 HC RX W HCPCS: Performed by: PHYSICIAN ASSISTANT

## 2025-08-25 PROCEDURE — 86900 BLOOD TYPING SEROLOGIC ABO: CPT

## 2025-08-25 PROCEDURE — 64488 TAP BLOCK BI INJECTION: CPT | Performed by: ANESTHESIOLOGY

## 2025-08-25 PROCEDURE — 2500000003 HC RX 250 WO HCPCS: Performed by: PHYSICIAN ASSISTANT

## 2025-08-25 PROCEDURE — 2500000003 HC RX 250 WO HCPCS: Performed by: NURSE ANESTHETIST, CERTIFIED REGISTERED

## 2025-08-25 PROCEDURE — 2500000003 HC RX 250 WO HCPCS: Performed by: ANESTHESIOLOGY

## 2025-08-25 PROCEDURE — 94761 N-INVAS EAR/PLS OXIMETRY MLT: CPT

## 2025-08-25 PROCEDURE — 3600000019 HC SURGERY ROBOT ADDTL 15MIN: Performed by: OBSTETRICS & GYNECOLOGY

## 2025-08-25 PROCEDURE — C1765 ADHESION BARRIER: HCPCS | Performed by: OBSTETRICS & GYNECOLOGY

## 2025-08-25 PROCEDURE — 7100000000 HC PACU RECOVERY - FIRST 15 MIN: Performed by: OBSTETRICS & GYNECOLOGY

## 2025-08-25 PROCEDURE — 2720000010 HC SURG SUPPLY STERILE: Performed by: OBSTETRICS & GYNECOLOGY

## 2025-08-25 PROCEDURE — 8E0W4CZ ROBOTIC ASSISTED PROCEDURE OF TRUNK REGION, PERCUTANEOUS ENDOSCOPIC APPROACH: ICD-10-PCS | Performed by: OBSTETRICS & GYNECOLOGY

## 2025-08-25 PROCEDURE — 7100000001 HC PACU RECOVERY - ADDTL 15 MIN: Performed by: OBSTETRICS & GYNECOLOGY

## 2025-08-25 PROCEDURE — 88309 TISSUE EXAM BY PATHOLOGIST: CPT

## 2025-08-25 PROCEDURE — 3700000000 HC ANESTHESIA ATTENDED CARE: Performed by: OBSTETRICS & GYNECOLOGY

## 2025-08-25 PROCEDURE — 88341 IMHCHEM/IMCYTCHM EA ADD ANTB: CPT

## 2025-08-25 PROCEDURE — 0UT90ZZ RESECTION OF UTERUS, OPEN APPROACH: ICD-10-PCS | Performed by: OBSTETRICS & GYNECOLOGY

## 2025-08-25 PROCEDURE — 2500000003 HC RX 250 WO HCPCS: Performed by: OBSTETRICS & GYNECOLOGY

## 2025-08-25 PROCEDURE — 2580000003 HC RX 258: Performed by: PHYSICIAN ASSISTANT

## 2025-08-25 PROCEDURE — 6360000002 HC RX W HCPCS: Performed by: NURSE ANESTHETIST, CERTIFIED REGISTERED

## 2025-08-25 PROCEDURE — 2580000003 HC RX 258: Performed by: NURSE ANESTHETIST, CERTIFIED REGISTERED

## 2025-08-25 PROCEDURE — 86901 BLOOD TYPING SEROLOGIC RH(D): CPT

## 2025-08-25 PROCEDURE — 3600000009 HC SURGERY ROBOT BASE: Performed by: OBSTETRICS & GYNECOLOGY

## 2025-08-25 PROCEDURE — 88342 IMHCHEM/IMCYTCHM 1ST ANTB: CPT

## 2025-08-25 PROCEDURE — 6370000000 HC RX 637 (ALT 250 FOR IP): Performed by: PHYSICIAN ASSISTANT

## 2025-08-25 PROCEDURE — 88112 CYTOPATH CELL ENHANCE TECH: CPT

## 2025-08-25 PROCEDURE — 86850 RBC ANTIBODY SCREEN: CPT

## 2025-08-25 PROCEDURE — 1200000000 HC SEMI PRIVATE

## 2025-08-25 PROCEDURE — 0TJB8ZZ INSPECTION OF BLADDER, VIA NATURAL OR ARTIFICIAL OPENING ENDOSCOPIC: ICD-10-PCS | Performed by: OBSTETRICS & GYNECOLOGY

## 2025-08-25 PROCEDURE — 6360000002 HC RX W HCPCS: Performed by: ANESTHESIOLOGY

## 2025-08-25 PROCEDURE — 3700000001 HC ADD 15 MINUTES (ANESTHESIA): Performed by: OBSTETRICS & GYNECOLOGY

## 2025-08-25 DEVICE — BARRIER ADH SHT 6X5 IN SODIUM HYALURONATE CMC SEPRAFILM: Type: IMPLANTABLE DEVICE | Site: ABDOMEN | Status: FUNCTIONAL

## 2025-08-25 RX ORDER — ENOXAPARIN SODIUM 100 MG/ML
40 INJECTION SUBCUTANEOUS DAILY
Status: DISCONTINUED | OUTPATIENT
Start: 2025-08-26 | End: 2025-08-27

## 2025-08-25 RX ORDER — CEFAZOLIN SODIUM 1 G/3ML
INJECTION, POWDER, FOR SOLUTION INTRAMUSCULAR; INTRAVENOUS
Status: DISCONTINUED | OUTPATIENT
Start: 2025-08-25 | End: 2025-08-25 | Stop reason: SDUPTHER

## 2025-08-25 RX ORDER — METOLAZONE 2.5 MG/1
5 TABLET ORAL WEEKLY
Status: DISCONTINUED | OUTPATIENT
Start: 2025-08-29 | End: 2025-08-29 | Stop reason: HOSPADM

## 2025-08-25 RX ORDER — FENTANYL CITRATE 50 UG/ML
INJECTION, SOLUTION INTRAMUSCULAR; INTRAVENOUS
Status: DISCONTINUED | OUTPATIENT
Start: 2025-08-25 | End: 2025-08-25 | Stop reason: SDUPTHER

## 2025-08-25 RX ORDER — SODIUM CHLORIDE 0.9 % (FLUSH) 0.9 %
5-40 SYRINGE (ML) INJECTION PRN
Status: DISCONTINUED | OUTPATIENT
Start: 2025-08-25 | End: 2025-08-25 | Stop reason: HOSPADM

## 2025-08-25 RX ORDER — SODIUM CHLORIDE 9 MG/ML
INJECTION, SOLUTION INTRAVENOUS PRN
Status: DISCONTINUED | OUTPATIENT
Start: 2025-08-25 | End: 2025-08-29 | Stop reason: SDUPTHER

## 2025-08-25 RX ORDER — ALBUTEROL SULFATE 90 UG/1
INHALANT RESPIRATORY (INHALATION)
Status: DISCONTINUED | OUTPATIENT
Start: 2025-08-25 | End: 2025-08-25 | Stop reason: SDUPTHER

## 2025-08-25 RX ORDER — ONDANSETRON 2 MG/ML
4 INJECTION INTRAMUSCULAR; INTRAVENOUS EVERY 6 HOURS PRN
Status: DISCONTINUED | OUTPATIENT
Start: 2025-08-25 | End: 2025-08-29 | Stop reason: HOSPADM

## 2025-08-25 RX ORDER — ACETAMINOPHEN 500 MG
1000 TABLET ORAL EVERY 8 HOURS SCHEDULED
Status: DISCONTINUED | OUTPATIENT
Start: 2025-08-25 | End: 2025-08-29 | Stop reason: HOSPADM

## 2025-08-25 RX ORDER — DAPAGLIFLOZIN 5 MG/1
5 TABLET, FILM COATED ORAL DAILY
Status: DISCONTINUED | OUTPATIENT
Start: 2025-08-26 | End: 2025-08-25

## 2025-08-25 RX ORDER — ONDANSETRON 2 MG/ML
4 INJECTION INTRAMUSCULAR; INTRAVENOUS EVERY 30 MIN PRN
Status: DISCONTINUED | OUTPATIENT
Start: 2025-08-25 | End: 2025-08-25 | Stop reason: HOSPADM

## 2025-08-25 RX ORDER — SODIUM CHLORIDE 0.9 % (FLUSH) 0.9 %
5-40 SYRINGE (ML) INJECTION PRN
Status: DISCONTINUED | OUTPATIENT
Start: 2025-08-25 | End: 2025-08-29 | Stop reason: SDUPTHER

## 2025-08-25 RX ORDER — TORSEMIDE 100 MG/1
100 TABLET ORAL DAILY
Status: DISCONTINUED | OUTPATIENT
Start: 2025-08-25 | End: 2025-08-29 | Stop reason: HOSPADM

## 2025-08-25 RX ORDER — POLYETHYLENE GLYCOL 3350 17 G/17G
17 POWDER, FOR SOLUTION ORAL DAILY
Status: DISCONTINUED | OUTPATIENT
Start: 2025-08-26 | End: 2025-08-29 | Stop reason: HOSPADM

## 2025-08-25 RX ORDER — INDOCYANINE GREEN AND WATER 25 MG
KIT INJECTION PRN
Status: DISCONTINUED | OUTPATIENT
Start: 2025-08-25 | End: 2025-08-25 | Stop reason: ALTCHOICE

## 2025-08-25 RX ORDER — MORPHINE SULFATE 2 MG/ML
2 INJECTION, SOLUTION INTRAMUSCULAR; INTRAVENOUS
Status: DISCONTINUED | OUTPATIENT
Start: 2025-08-25 | End: 2025-08-26

## 2025-08-25 RX ORDER — MORPHINE SULFATE/0.9% NACL/PF 1 MG/ML
SYRINGE (ML) INJECTION CONTINUOUS
Status: DISCONTINUED | OUTPATIENT
Start: 2025-08-25 | End: 2025-08-25

## 2025-08-25 RX ORDER — SODIUM CHLORIDE 0.9 % (FLUSH) 0.9 %
5-40 SYRINGE (ML) INJECTION EVERY 12 HOURS SCHEDULED
Status: DISCONTINUED | OUTPATIENT
Start: 2025-08-25 | End: 2025-08-29 | Stop reason: SDUPTHER

## 2025-08-25 RX ORDER — CYCLOBENZAPRINE HCL 10 MG
10 TABLET ORAL 2 TIMES DAILY PRN
Status: DISCONTINUED | OUTPATIENT
Start: 2025-08-25 | End: 2025-08-29 | Stop reason: HOSPADM

## 2025-08-25 RX ORDER — SODIUM CHLORIDE 9 MG/ML
INJECTION, SOLUTION INTRAVENOUS
Status: DISCONTINUED | OUTPATIENT
Start: 2025-08-25 | End: 2025-08-25 | Stop reason: SDUPTHER

## 2025-08-25 RX ORDER — OXYCODONE HYDROCHLORIDE 5 MG/1
10 TABLET ORAL PRN
Status: DISCONTINUED | OUTPATIENT
Start: 2025-08-25 | End: 2025-08-25 | Stop reason: HOSPADM

## 2025-08-25 RX ORDER — POTASSIUM CHLORIDE 1500 MG/1
40 TABLET, EXTENDED RELEASE ORAL 2 TIMES DAILY
Status: DISCONTINUED | OUTPATIENT
Start: 2025-08-25 | End: 2025-08-29 | Stop reason: HOSPADM

## 2025-08-25 RX ORDER — DOCUSATE SODIUM 100 MG/1
100 CAPSULE, LIQUID FILLED ORAL 2 TIMES DAILY
Status: DISCONTINUED | OUTPATIENT
Start: 2025-08-25 | End: 2025-08-29 | Stop reason: HOSPADM

## 2025-08-25 RX ORDER — METOCLOPRAMIDE HYDROCHLORIDE 5 MG/ML
5 INJECTION INTRAMUSCULAR; INTRAVENOUS EVERY 6 HOURS PRN
Status: DISCONTINUED | OUTPATIENT
Start: 2025-08-25 | End: 2025-08-29 | Stop reason: HOSPADM

## 2025-08-25 RX ORDER — LIDOCAINE HYDROCHLORIDE 20 MG/ML
INJECTION, SOLUTION INFILTRATION; PERINEURAL
Status: DISCONTINUED | OUTPATIENT
Start: 2025-08-25 | End: 2025-08-25 | Stop reason: SDUPTHER

## 2025-08-25 RX ORDER — AMLODIPINE BESYLATE 5 MG/1
5 TABLET ORAL DAILY
Status: DISCONTINUED | OUTPATIENT
Start: 2025-08-26 | End: 2025-08-29 | Stop reason: HOSPADM

## 2025-08-25 RX ORDER — ALLOPURINOL 100 MG/1
200 TABLET ORAL DAILY
Status: DISCONTINUED | OUTPATIENT
Start: 2025-08-26 | End: 2025-08-29 | Stop reason: HOSPADM

## 2025-08-25 RX ORDER — DIPHENHYDRAMINE HYDROCHLORIDE 50 MG/ML
12.5 INJECTION, SOLUTION INTRAMUSCULAR; INTRAVENOUS EVERY 6 HOURS PRN
Status: DISCONTINUED | OUTPATIENT
Start: 2025-08-25 | End: 2025-08-29 | Stop reason: HOSPADM

## 2025-08-25 RX ORDER — BUPIVACAINE HYDROCHLORIDE AND EPINEPHRINE 2.5; 5 MG/ML; UG/ML
INJECTION, SOLUTION EPIDURAL; INFILTRATION; INTRACAUDAL; PERINEURAL
Status: DISCONTINUED | OUTPATIENT
Start: 2025-08-25 | End: 2025-08-25 | Stop reason: SDUPTHER

## 2025-08-25 RX ORDER — ONDANSETRON 2 MG/ML
INJECTION INTRAMUSCULAR; INTRAVENOUS
Status: DISCONTINUED | OUTPATIENT
Start: 2025-08-25 | End: 2025-08-25 | Stop reason: SDUPTHER

## 2025-08-25 RX ORDER — CEFAZOLIN 2 G/1
INJECTION, POWDER, FOR SOLUTION INTRAMUSCULAR; INTRAVENOUS
Status: COMPLETED
Start: 2025-08-25 | End: 2025-08-25

## 2025-08-25 RX ORDER — PROCHLORPERAZINE EDISYLATE 5 MG/ML
10 INJECTION INTRAMUSCULAR; INTRAVENOUS EVERY 6 HOURS PRN
Status: DISCONTINUED | OUTPATIENT
Start: 2025-08-25 | End: 2025-08-29 | Stop reason: HOSPADM

## 2025-08-25 RX ORDER — PROPOFOL 10 MG/ML
INJECTION, EMULSION INTRAVENOUS
Status: DISCONTINUED | OUTPATIENT
Start: 2025-08-25 | End: 2025-08-25 | Stop reason: SDUPTHER

## 2025-08-25 RX ORDER — INSULIN LISPRO 100 [IU]/ML
0-8 INJECTION, SOLUTION INTRAVENOUS; SUBCUTANEOUS
Status: DISCONTINUED | OUTPATIENT
Start: 2025-08-25 | End: 2025-08-29 | Stop reason: HOSPADM

## 2025-08-25 RX ORDER — FAMOTIDINE 20 MG/1
20 TABLET, FILM COATED ORAL DAILY
Status: DISCONTINUED | OUTPATIENT
Start: 2025-08-25 | End: 2025-08-29 | Stop reason: HOSPADM

## 2025-08-25 RX ORDER — SODIUM CHLORIDE 9 MG/ML
INJECTION, SOLUTION INTRAVENOUS CONTINUOUS
Status: DISCONTINUED | OUTPATIENT
Start: 2025-08-25 | End: 2025-08-26

## 2025-08-25 RX ORDER — ROCURONIUM BROMIDE 10 MG/ML
INJECTION, SOLUTION INTRAVENOUS
Status: DISCONTINUED | OUTPATIENT
Start: 2025-08-25 | End: 2025-08-25 | Stop reason: SDUPTHER

## 2025-08-25 RX ORDER — SODIUM CHLORIDE 9 MG/ML
INJECTION, SOLUTION INTRAVENOUS PRN
Status: DISCONTINUED | OUTPATIENT
Start: 2025-08-25 | End: 2025-08-25 | Stop reason: HOSPADM

## 2025-08-25 RX ORDER — DEXAMETHASONE SODIUM PHOSPHATE 4 MG/ML
INJECTION, SOLUTION INTRA-ARTICULAR; INTRALESIONAL; INTRAMUSCULAR; INTRAVENOUS; SOFT TISSUE
Status: DISCONTINUED | OUTPATIENT
Start: 2025-08-25 | End: 2025-08-25 | Stop reason: SDUPTHER

## 2025-08-25 RX ORDER — ONDANSETRON 4 MG/1
4 TABLET, ORALLY DISINTEGRATING ORAL EVERY 8 HOURS PRN
Status: DISCONTINUED | OUTPATIENT
Start: 2025-08-25 | End: 2025-08-29 | Stop reason: HOSPADM

## 2025-08-25 RX ORDER — SODIUM CHLORIDE 0.9 % (FLUSH) 0.9 %
5-40 SYRINGE (ML) INJECTION EVERY 12 HOURS SCHEDULED
Status: DISCONTINUED | OUTPATIENT
Start: 2025-08-25 | End: 2025-08-25 | Stop reason: HOSPADM

## 2025-08-25 RX ORDER — GLUCAGON 1 MG/ML
1 KIT INJECTION PRN
Status: DISCONTINUED | OUTPATIENT
Start: 2025-08-25 | End: 2025-08-29 | Stop reason: HOSPADM

## 2025-08-25 RX ORDER — MONTELUKAST SODIUM 10 MG/1
10 TABLET ORAL NIGHTLY
Status: DISCONTINUED | OUTPATIENT
Start: 2025-08-25 | End: 2025-08-29 | Stop reason: HOSPADM

## 2025-08-25 RX ORDER — OXYCODONE HYDROCHLORIDE 5 MG/1
5 TABLET ORAL PRN
Status: DISCONTINUED | OUTPATIENT
Start: 2025-08-25 | End: 2025-08-25 | Stop reason: HOSPADM

## 2025-08-25 RX ORDER — MAGNESIUM HYDROXIDE 1200 MG/15ML
LIQUID ORAL CONTINUOUS PRN
Status: COMPLETED | OUTPATIENT
Start: 2025-08-25 | End: 2025-08-25

## 2025-08-25 RX ORDER — DEXTROSE MONOHYDRATE 100 MG/ML
INJECTION, SOLUTION INTRAVENOUS CONTINUOUS PRN
Status: DISCONTINUED | OUTPATIENT
Start: 2025-08-25 | End: 2025-08-29 | Stop reason: HOSPADM

## 2025-08-25 RX ADMIN — PROPOFOL 150 MG: 10 INJECTION, EMULSION INTRAVENOUS at 08:04

## 2025-08-25 RX ADMIN — Medication 2 PUFF: at 08:09

## 2025-08-25 RX ADMIN — HYDROMORPHONE HYDROCHLORIDE 0.5 MG: 1 INJECTION, SOLUTION INTRAMUSCULAR; INTRAVENOUS; SUBCUTANEOUS at 15:04

## 2025-08-25 RX ADMIN — ROCURONIUM BROMIDE 30 MG: 50 INJECTION, SOLUTION INTRAVENOUS at 10:06

## 2025-08-25 RX ADMIN — DEXMEDETOMIDINE HYDROCHLORIDE 4 MCG: 100 INJECTION, SOLUTION INTRAVENOUS at 09:40

## 2025-08-25 RX ADMIN — DEXMEDETOMIDINE HYDROCHLORIDE 4 MCG: 100 INJECTION, SOLUTION INTRAVENOUS at 10:27

## 2025-08-25 RX ADMIN — DEXMEDETOMIDINE HYDROCHLORIDE 4 MCG: 100 INJECTION, SOLUTION INTRAVENOUS at 10:03

## 2025-08-25 RX ADMIN — DEXMEDETOMIDINE HYDROCHLORIDE 4 MCG: 100 INJECTION, SOLUTION INTRAVENOUS at 09:53

## 2025-08-25 RX ADMIN — ACETAMINOPHEN 1000 MG: 500 TABLET ORAL at 21:14

## 2025-08-25 RX ADMIN — SODIUM CHLORIDE: 9 INJECTION, SOLUTION INTRAVENOUS at 10:31

## 2025-08-25 RX ADMIN — PROPOFOL 25 MG: 10 INJECTION, EMULSION INTRAVENOUS at 12:04

## 2025-08-25 RX ADMIN — PROPOFOL 25 MG: 10 INJECTION, EMULSION INTRAVENOUS at 12:08

## 2025-08-25 RX ADMIN — DEXMEDETOMIDINE HYDROCHLORIDE 8 MCG: 100 INJECTION, SOLUTION INTRAVENOUS at 08:25

## 2025-08-25 RX ADMIN — DEXMEDETOMIDINE HYDROCHLORIDE 8 MCG: 100 INJECTION, SOLUTION INTRAVENOUS at 08:39

## 2025-08-25 RX ADMIN — TORSEMIDE 100 MG: 100 TABLET ORAL at 16:52

## 2025-08-25 RX ADMIN — DEXMEDETOMIDINE HYDROCHLORIDE 4 MCG: 100 INJECTION, SOLUTION INTRAVENOUS at 08:49

## 2025-08-25 RX ADMIN — BUPIVACAINE HYDROCHLORIDE AND EPINEPHRINE BITARTRATE 20 ML: 2.5; .005 INJECTION, SOLUTION EPIDURAL; INFILTRATION; INTRACAUDAL; PERINEURAL at 08:00

## 2025-08-25 RX ADMIN — DEXAMETHASONE SODIUM PHOSPHATE 8 MG: 4 INJECTION, SOLUTION INTRAMUSCULAR; INTRAVENOUS at 08:12

## 2025-08-25 RX ADMIN — SODIUM CHLORIDE: 0.9 INJECTION, SOLUTION INTRAVENOUS at 16:52

## 2025-08-25 RX ADMIN — ROCURONIUM BROMIDE 100 MG: 50 INJECTION, SOLUTION INTRAVENOUS at 08:04

## 2025-08-25 RX ADMIN — PROPOFOL 25 MG: 10 INJECTION, EMULSION INTRAVENOUS at 12:44

## 2025-08-25 RX ADMIN — CYCLOBENZAPRINE 10 MG: 10 TABLET, FILM COATED ORAL at 21:13

## 2025-08-25 RX ADMIN — FENTANYL CITRATE 50 MCG: 50 INJECTION, SOLUTION INTRAMUSCULAR; INTRAVENOUS at 11:08

## 2025-08-25 RX ADMIN — Medication 2 PUFF: at 12:21

## 2025-08-25 RX ADMIN — HYDROMORPHONE HYDROCHLORIDE 0.5 MG: 1 INJECTION, SOLUTION INTRAMUSCULAR; INTRAVENOUS; SUBCUTANEOUS at 14:35

## 2025-08-25 RX ADMIN — LIDOCAINE HYDROCHLORIDE 100 MG: 20 INJECTION, SOLUTION INFILTRATION; PERINEURAL at 08:02

## 2025-08-25 RX ADMIN — PROPOFOL 25 MG: 10 INJECTION, EMULSION INTRAVENOUS at 12:27

## 2025-08-25 RX ADMIN — DEXMEDETOMIDINE HYDROCHLORIDE 4 MCG: 100 INJECTION, SOLUTION INTRAVENOUS at 10:42

## 2025-08-25 RX ADMIN — DEXMEDETOMIDINE HYDROCHLORIDE 4 MCG: 100 INJECTION, SOLUTION INTRAVENOUS at 09:15

## 2025-08-25 RX ADMIN — DEXMEDETOMIDINE HYDROCHLORIDE 8 MCG: 100 INJECTION, SOLUTION INTRAVENOUS at 08:34

## 2025-08-25 RX ADMIN — LABETALOL HYDROCHLORIDE 300 MG: 200 TABLET, FILM COATED ORAL at 21:13

## 2025-08-25 RX ADMIN — PROPOFOL 25 MG: 10 INJECTION, EMULSION INTRAVENOUS at 12:22

## 2025-08-25 RX ADMIN — PROPOFOL 25 MG: 10 INJECTION, EMULSION INTRAVENOUS at 12:17

## 2025-08-25 RX ADMIN — CEFAZOLIN 3000 MG: 3 INJECTION, POWDER, FOR SOLUTION INTRAVENOUS at 21:08

## 2025-08-25 RX ADMIN — MORPHINE SULFATE: 1 INJECTION INTRAVENOUS at 17:59

## 2025-08-25 RX ADMIN — PROPOFOL 25 MG: 10 INJECTION, EMULSION INTRAVENOUS at 12:12

## 2025-08-25 RX ADMIN — SODIUM CHLORIDE, PRESERVATIVE FREE 10 ML: 5 INJECTION INTRAVENOUS at 21:10

## 2025-08-25 RX ADMIN — DEXMEDETOMIDINE HYDROCHLORIDE 4 MCG: 100 INJECTION, SOLUTION INTRAVENOUS at 10:19

## 2025-08-25 RX ADMIN — CEFAZOLIN 2 G: 1 INJECTION, POWDER, FOR SOLUTION INTRAMUSCULAR; INTRAVENOUS at 12:15

## 2025-08-25 RX ADMIN — ACETAMINOPHEN 1000 MG: 500 TABLET ORAL at 16:53

## 2025-08-25 RX ADMIN — SUGAMMADEX 200 MG: 100 INJECTION, SOLUTION INTRAVENOUS at 12:54

## 2025-08-25 RX ADMIN — CEFAZOLIN 3 G: 1 INJECTION, POWDER, FOR SOLUTION INTRAMUSCULAR; INTRAVENOUS at 08:16

## 2025-08-25 RX ADMIN — PROPOFOL 25 MG: 10 INJECTION, EMULSION INTRAVENOUS at 12:33

## 2025-08-25 RX ADMIN — OXYCODONE HYDROCHLORIDE 10 MG: 5 TABLET ORAL at 14:38

## 2025-08-25 RX ADMIN — PROPOFOL 25 MG: 10 INJECTION, EMULSION INTRAVENOUS at 12:00

## 2025-08-25 RX ADMIN — PROPOFOL 25 MG: 10 INJECTION, EMULSION INTRAVENOUS at 12:51

## 2025-08-25 RX ADMIN — SODIUM CHLORIDE: 9 INJECTION, SOLUTION INTRAVENOUS at 07:59

## 2025-08-25 RX ADMIN — PROPOFOL 25 MG: 10 INJECTION, EMULSION INTRAVENOUS at 12:39

## 2025-08-25 RX ADMIN — FENTANYL CITRATE 50 MCG: 50 INJECTION, SOLUTION INTRAMUSCULAR; INTRAVENOUS at 08:02

## 2025-08-25 RX ADMIN — DEXMEDETOMIDINE HYDROCHLORIDE 4 MCG: 100 INJECTION, SOLUTION INTRAVENOUS at 09:22

## 2025-08-25 RX ADMIN — ONDANSETRON 4 MG: 2 INJECTION INTRAMUSCULAR; INTRAVENOUS at 08:11

## 2025-08-25 ASSESSMENT — PAIN SCALES - GENERAL
PAINLEVEL_OUTOF10: 8
PAINLEVEL_OUTOF10: 3
PAINLEVEL_OUTOF10: 0
PAINLEVEL_OUTOF10: 5
PAINLEVEL_OUTOF10: 8
PAINLEVEL_OUTOF10: 2
PAINLEVEL_OUTOF10: 8
PAINLEVEL_OUTOF10: 0

## 2025-08-25 ASSESSMENT — PAIN - FUNCTIONAL ASSESSMENT
PAIN_FUNCTIONAL_ASSESSMENT: 0-10
PAIN_FUNCTIONAL_ASSESSMENT: ACTIVITIES ARE NOT PREVENTED
PAIN_FUNCTIONAL_ASSESSMENT: 0-10

## 2025-08-25 ASSESSMENT — PAIN DESCRIPTION - LOCATION
LOCATION: ABDOMEN
LOCATION: ABDOMEN;INCISION

## 2025-08-25 ASSESSMENT — PAIN DESCRIPTION - DESCRIPTORS
DESCRIPTORS: ACHING;DISCOMFORT;TENDER;SORE
DESCRIPTORS: ACHING

## 2025-08-25 ASSESSMENT — PAIN DESCRIPTION - ORIENTATION: ORIENTATION: RIGHT;LEFT;MID

## 2025-08-26 LAB
ANION GAP SERPL CALCULATED.3IONS-SCNC: 13 MMOL/L (ref 3–16)
BUN SERPL-MCNC: 40 MG/DL (ref 7–20)
CALCIUM SERPL-MCNC: 8.5 MG/DL (ref 8.3–10.6)
CHLORIDE SERPL-SCNC: 101 MMOL/L (ref 99–110)
CO2 SERPL-SCNC: 27 MMOL/L (ref 21–32)
CREAT SERPL-MCNC: 2.5 MG/DL (ref 0.6–1.2)
DEPRECATED RDW RBC AUTO: 20.7 % (ref 12.4–15.4)
GFR SERPLBLD CREATININE-BSD FMLA CKD-EPI: 20 ML/MIN/{1.73_M2}
GLUCOSE BLD-MCNC: 128 MG/DL (ref 70–99)
GLUCOSE BLD-MCNC: 160 MG/DL (ref 70–99)
GLUCOSE BLD-MCNC: 162 MG/DL (ref 70–99)
GLUCOSE BLD-MCNC: 174 MG/DL (ref 70–99)
GLUCOSE SERPL-MCNC: 151 MG/DL (ref 70–99)
HCT VFR BLD AUTO: 39 % (ref 36–48)
HGB BLD-MCNC: 12.4 G/DL (ref 12–16)
MCH RBC QN AUTO: 27.2 PG (ref 26–34)
MCHC RBC AUTO-ENTMCNC: 31.8 G/DL (ref 31–36)
MCV RBC AUTO: 85.6 FL (ref 80–100)
PERFORMED ON: ABNORMAL
PLATELET # BLD AUTO: 197 K/UL (ref 135–450)
PMV BLD AUTO: 8.6 FL (ref 5–10.5)
POTASSIUM SERPL-SCNC: 4.1 MMOL/L (ref 3.5–5.1)
RBC # BLD AUTO: 4.56 M/UL (ref 4–5.2)
SODIUM SERPL-SCNC: 141 MMOL/L (ref 136–145)
WBC # BLD AUTO: 10.3 K/UL (ref 4–11)

## 2025-08-26 PROCEDURE — 94761 N-INVAS EAR/PLS OXIMETRY MLT: CPT

## 2025-08-26 PROCEDURE — 6360000002 HC RX W HCPCS: Performed by: PHYSICIAN ASSISTANT

## 2025-08-26 PROCEDURE — 80048 BASIC METABOLIC PNL TOTAL CA: CPT

## 2025-08-26 PROCEDURE — 1200000000 HC SEMI PRIVATE

## 2025-08-26 PROCEDURE — 36415 COLL VENOUS BLD VENIPUNCTURE: CPT

## 2025-08-26 PROCEDURE — 6370000000 HC RX 637 (ALT 250 FOR IP): Performed by: PHYSICIAN ASSISTANT

## 2025-08-26 PROCEDURE — 85027 COMPLETE CBC AUTOMATED: CPT

## 2025-08-26 PROCEDURE — 2580000003 HC RX 258: Performed by: PHYSICIAN ASSISTANT

## 2025-08-26 PROCEDURE — 2700000000 HC OXYGEN THERAPY PER DAY

## 2025-08-26 PROCEDURE — 2500000003 HC RX 250 WO HCPCS: Performed by: PHYSICIAN ASSISTANT

## 2025-08-26 RX ORDER — SODIUM CHLORIDE 9 MG/ML
INJECTION, SOLUTION INTRAVENOUS PRN
Status: DISCONTINUED | OUTPATIENT
Start: 2025-08-26 | End: 2025-08-29 | Stop reason: HOSPADM

## 2025-08-26 RX ORDER — SODIUM CHLORIDE 0.9 % (FLUSH) 0.9 %
5-40 SYRINGE (ML) INJECTION PRN
Status: DISCONTINUED | OUTPATIENT
Start: 2025-08-26 | End: 2025-08-29 | Stop reason: SDUPTHER

## 2025-08-26 RX ORDER — SODIUM CHLORIDE 0.9 % (FLUSH) 0.9 %
5-40 SYRINGE (ML) INJECTION PRN
Status: DISCONTINUED | OUTPATIENT
Start: 2025-08-26 | End: 2025-08-29 | Stop reason: HOSPADM

## 2025-08-26 RX ORDER — LIDOCAINE HYDROCHLORIDE 10 MG/ML
50 INJECTION, SOLUTION INFILTRATION; PERINEURAL ONCE
Status: DISCONTINUED | OUTPATIENT
Start: 2025-08-26 | End: 2025-08-29 | Stop reason: HOSPADM

## 2025-08-26 RX ORDER — SODIUM CHLORIDE 9 MG/ML
INJECTION, SOLUTION INTRAVENOUS PRN
Status: DISCONTINUED | OUTPATIENT
Start: 2025-08-26 | End: 2025-08-29 | Stop reason: SDUPTHER

## 2025-08-26 RX ORDER — SODIUM CHLORIDE 0.9 % (FLUSH) 0.9 %
5-40 SYRINGE (ML) INJECTION EVERY 12 HOURS SCHEDULED
Status: DISCONTINUED | OUTPATIENT
Start: 2025-08-26 | End: 2025-08-29 | Stop reason: SDUPTHER

## 2025-08-26 RX ORDER — OXYCODONE HYDROCHLORIDE 5 MG/1
10 TABLET ORAL EVERY 6 HOURS PRN
Refills: 0 | Status: DISCONTINUED | OUTPATIENT
Start: 2025-08-26 | End: 2025-08-27

## 2025-08-26 RX ORDER — SODIUM CHLORIDE 0.9 % (FLUSH) 0.9 %
5-40 SYRINGE (ML) INJECTION EVERY 12 HOURS SCHEDULED
Status: DISCONTINUED | OUTPATIENT
Start: 2025-08-26 | End: 2025-08-29 | Stop reason: HOSPADM

## 2025-08-26 RX ORDER — OXYCODONE HYDROCHLORIDE 5 MG/1
5 TABLET ORAL EVERY 4 HOURS PRN
Refills: 0 | Status: DISCONTINUED | OUTPATIENT
Start: 2025-08-26 | End: 2025-08-27

## 2025-08-26 RX ADMIN — MONTELUKAST 10 MG: 10 TABLET, FILM COATED ORAL at 20:08

## 2025-08-26 RX ADMIN — CEFAZOLIN 3000 MG: 3 INJECTION, POWDER, FOR SOLUTION INTRAVENOUS at 03:53

## 2025-08-26 RX ADMIN — MORPHINE SULFATE 2 MG: 2 INJECTION, SOLUTION INTRAMUSCULAR; INTRAVENOUS at 09:56

## 2025-08-26 RX ADMIN — EMPAGLIFLOZIN 10 MG: 10 TABLET, FILM COATED ORAL at 08:14

## 2025-08-26 RX ADMIN — LABETALOL HYDROCHLORIDE 300 MG: 200 TABLET, FILM COATED ORAL at 08:15

## 2025-08-26 RX ADMIN — Medication 10 ML: at 20:10

## 2025-08-26 RX ADMIN — LABETALOL HYDROCHLORIDE 300 MG: 200 TABLET, FILM COATED ORAL at 20:16

## 2025-08-26 RX ADMIN — AMLODIPINE BESYLATE 5 MG: 5 TABLET ORAL at 08:14

## 2025-08-26 RX ADMIN — POTASSIUM CHLORIDE 40 MEQ: 1500 TABLET, EXTENDED RELEASE ORAL at 08:15

## 2025-08-26 RX ADMIN — OXYCODONE HYDROCHLORIDE 5 MG: 5 TABLET ORAL at 18:13

## 2025-08-26 RX ADMIN — SODIUM CHLORIDE, PRESERVATIVE FREE 10 ML: 5 INJECTION INTRAVENOUS at 20:09

## 2025-08-26 RX ADMIN — ACETAMINOPHEN 1000 MG: 500 TABLET ORAL at 15:38

## 2025-08-26 RX ADMIN — ACETAMINOPHEN 1000 MG: 500 TABLET ORAL at 22:16

## 2025-08-26 RX ADMIN — OXYCODONE HYDROCHLORIDE 10 MG: 5 TABLET ORAL at 11:56

## 2025-08-26 RX ADMIN — MORPHINE SULFATE 2 MG: 2 INJECTION, SOLUTION INTRAMUSCULAR; INTRAVENOUS at 00:07

## 2025-08-26 RX ADMIN — TORSEMIDE 100 MG: 100 TABLET ORAL at 08:14

## 2025-08-26 RX ADMIN — DOCUSATE SODIUM 100 MG: 100 CAPSULE, LIQUID FILLED ORAL at 08:14

## 2025-08-26 RX ADMIN — ALLOPURINOL 200 MG: 100 TABLET ORAL at 08:15

## 2025-08-26 RX ADMIN — CYCLOBENZAPRINE 10 MG: 10 TABLET, FILM COATED ORAL at 20:16

## 2025-08-26 RX ADMIN — FAMOTIDINE 20 MG: 20 TABLET, FILM COATED ORAL at 08:14

## 2025-08-26 RX ADMIN — DOCUSATE SODIUM 100 MG: 100 CAPSULE, LIQUID FILLED ORAL at 20:08

## 2025-08-26 RX ADMIN — POTASSIUM CHLORIDE 40 MEQ: 1500 TABLET, EXTENDED RELEASE ORAL at 20:09

## 2025-08-26 RX ADMIN — MORPHINE SULFATE 2 MG: 2 INJECTION, SOLUTION INTRAMUSCULAR; INTRAVENOUS at 06:45

## 2025-08-26 ASSESSMENT — PAIN DESCRIPTION - ORIENTATION
ORIENTATION: RIGHT;LEFT;MID
ORIENTATION: LOWER
ORIENTATION: RIGHT;LEFT;MID
ORIENTATION: LOWER
ORIENTATION: RIGHT;LEFT;MID;LOWER

## 2025-08-26 ASSESSMENT — PAIN DESCRIPTION - LOCATION
LOCATION: BACK;ABDOMEN
LOCATION: ABDOMEN;BACK;INCISION
LOCATION: ABDOMEN;INCISION
LOCATION: ABDOMEN;BACK

## 2025-08-26 ASSESSMENT — PAIN - FUNCTIONAL ASSESSMENT
PAIN_FUNCTIONAL_ASSESSMENT: 0-10
PAIN_FUNCTIONAL_ASSESSMENT: ACTIVITIES ARE NOT PREVENTED
PAIN_FUNCTIONAL_ASSESSMENT: ACTIVITIES ARE NOT PREVENTED
PAIN_FUNCTIONAL_ASSESSMENT: 0-10
PAIN_FUNCTIONAL_ASSESSMENT: ACTIVITIES ARE NOT PREVENTED
PAIN_FUNCTIONAL_ASSESSMENT: ACTIVITIES ARE NOT PREVENTED

## 2025-08-26 ASSESSMENT — PAIN SCALES - GENERAL
PAINLEVEL_OUTOF10: 7
PAINLEVEL_OUTOF10: 7
PAINLEVEL_OUTOF10: 8
PAINLEVEL_OUTOF10: 5
PAINLEVEL_OUTOF10: 0
PAINLEVEL_OUTOF10: 3
PAINLEVEL_OUTOF10: 6
PAINLEVEL_OUTOF10: 7
PAINLEVEL_OUTOF10: 6

## 2025-08-26 ASSESSMENT — PAIN DESCRIPTION - DESCRIPTORS
DESCRIPTORS: ACHING;SORE
DESCRIPTORS: ACHING;DISCOMFORT;THROBBING;SHARP
DESCRIPTORS: ACHING;DISCOMFORT;TENDER;THROBBING;SORE
DESCRIPTORS: ACHING;SORE
DESCRIPTORS: ACHING;DISCOMFORT;TENDER;THROBBING;SORE

## 2025-08-27 LAB
ANION GAP SERPL CALCULATED.3IONS-SCNC: 14 MMOL/L (ref 3–16)
BILIRUB UR QL STRIP.AUTO: NEGATIVE
BUN SERPL-MCNC: 46 MG/DL (ref 7–20)
CALCIUM SERPL-MCNC: 8.7 MG/DL (ref 8.3–10.6)
CHLORIDE SERPL-SCNC: 101 MMOL/L (ref 99–110)
CLARITY UR: CLEAR
CO2 SERPL-SCNC: 28 MMOL/L (ref 21–32)
COLOR UR: YELLOW
CREAT SERPL-MCNC: 2.7 MG/DL (ref 0.6–1.2)
CREAT UR-MCNC: 30.6 MG/DL (ref 28–259)
DEPRECATED RDW RBC AUTO: 21.3 % (ref 12.4–15.4)
GFR SERPLBLD CREATININE-BSD FMLA CKD-EPI: 18 ML/MIN/{1.73_M2}
GLUCOSE BLD-MCNC: 122 MG/DL (ref 70–99)
GLUCOSE BLD-MCNC: 128 MG/DL (ref 70–99)
GLUCOSE BLD-MCNC: 134 MG/DL (ref 70–99)
GLUCOSE BLD-MCNC: 186 MG/DL (ref 70–99)
GLUCOSE SERPL-MCNC: 129 MG/DL (ref 70–99)
GLUCOSE UR STRIP.AUTO-MCNC: 500 MG/DL
HCT VFR BLD AUTO: 38.3 % (ref 36–48)
HGB BLD-MCNC: 11.9 G/DL (ref 12–16)
HGB UR QL STRIP.AUTO: ABNORMAL
KETONES UR STRIP.AUTO-MCNC: NEGATIVE MG/DL
LEUKOCYTE ESTERASE UR QL STRIP.AUTO: NEGATIVE
MCH RBC QN AUTO: 26.5 PG (ref 26–34)
MCHC RBC AUTO-ENTMCNC: 31 G/DL (ref 31–36)
MCV RBC AUTO: 85.7 FL (ref 80–100)
NITRITE UR QL STRIP.AUTO: NEGATIVE
PERFORMED ON: ABNORMAL
PH UR STRIP.AUTO: 5.5 [PH] (ref 5–8)
PLATELET # BLD AUTO: 184 K/UL (ref 135–450)
PMV BLD AUTO: 8.7 FL (ref 5–10.5)
POTASSIUM SERPL-SCNC: 3.9 MMOL/L (ref 3.5–5.1)
PROT UR STRIP.AUTO-MCNC: NEGATIVE MG/DL
PROT UR-MCNC: 15.4 MG/DL
PROT/CREAT UR-RTO: 0.5 MG/DL
RBC # BLD AUTO: 4.47 M/UL (ref 4–5.2)
RBC #/AREA URNS HPF: >100 /HPF (ref 0–4)
SODIUM SERPL-SCNC: 143 MMOL/L (ref 136–145)
SODIUM UR-SCNC: 67 MMOL/L
SP GR UR STRIP.AUTO: 1.01 (ref 1–1.03)
UA DIPSTICK W REFLEX MICRO PNL UR: YES
URN SPEC COLLECT METH UR: ABNORMAL
UROBILINOGEN UR STRIP-ACNC: 0.2 E.U./DL
WBC # BLD AUTO: 9.5 K/UL (ref 4–11)
WBC #/AREA URNS HPF: ABNORMAL /HPF (ref 0–5)

## 2025-08-27 PROCEDURE — 81001 URINALYSIS AUTO W/SCOPE: CPT

## 2025-08-27 PROCEDURE — 6370000000 HC RX 637 (ALT 250 FOR IP): Performed by: PHYSICIAN ASSISTANT

## 2025-08-27 PROCEDURE — 94761 N-INVAS EAR/PLS OXIMETRY MLT: CPT

## 2025-08-27 PROCEDURE — 1200000000 HC SEMI PRIVATE

## 2025-08-27 PROCEDURE — 82570 ASSAY OF URINE CREATININE: CPT

## 2025-08-27 PROCEDURE — 85027 COMPLETE CBC AUTOMATED: CPT

## 2025-08-27 PROCEDURE — 97530 THERAPEUTIC ACTIVITIES: CPT

## 2025-08-27 PROCEDURE — 84156 ASSAY OF PROTEIN URINE: CPT

## 2025-08-27 PROCEDURE — 5A09357 ASSISTANCE WITH RESPIRATORY VENTILATION, LESS THAN 24 CONSECUTIVE HOURS, CONTINUOUS POSITIVE AIRWAY PRESSURE: ICD-10-PCS | Performed by: OBSTETRICS & GYNECOLOGY

## 2025-08-27 PROCEDURE — 36415 COLL VENOUS BLD VENIPUNCTURE: CPT

## 2025-08-27 PROCEDURE — 2700000000 HC OXYGEN THERAPY PER DAY

## 2025-08-27 PROCEDURE — 6370000000 HC RX 637 (ALT 250 FOR IP): Performed by: OBSTETRICS & GYNECOLOGY

## 2025-08-27 PROCEDURE — 97535 SELF CARE MNGMENT TRAINING: CPT

## 2025-08-27 PROCEDURE — 97165 OT EVAL LOW COMPLEX 30 MIN: CPT

## 2025-08-27 PROCEDURE — 2580000003 HC RX 258: Performed by: PHYSICIAN ASSISTANT

## 2025-08-27 PROCEDURE — 80048 BASIC METABOLIC PNL TOTAL CA: CPT

## 2025-08-27 PROCEDURE — 97161 PT EVAL LOW COMPLEX 20 MIN: CPT

## 2025-08-27 PROCEDURE — 84300 ASSAY OF URINE SODIUM: CPT

## 2025-08-27 RX ORDER — SODIUM CHLORIDE 9 MG/ML
INJECTION, SOLUTION INTRAVENOUS CONTINUOUS
Status: DISCONTINUED | OUTPATIENT
Start: 2025-08-27 | End: 2025-08-28

## 2025-08-27 RX ORDER — HYDROCODONE BITARTRATE AND ACETAMINOPHEN 7.5; 325 MG/1; MG/1
0.5 TABLET ORAL EVERY 4 HOURS PRN
Refills: 0 | Status: DISCONTINUED | OUTPATIENT
Start: 2025-08-27 | End: 2025-08-29 | Stop reason: HOSPADM

## 2025-08-27 RX ORDER — ENOXAPARIN SODIUM 100 MG/ML
30 INJECTION SUBCUTANEOUS DAILY
Status: DISCONTINUED | OUTPATIENT
Start: 2025-08-28 | End: 2025-08-28

## 2025-08-27 RX ADMIN — LABETALOL HYDROCHLORIDE 300 MG: 200 TABLET, FILM COATED ORAL at 08:05

## 2025-08-27 RX ADMIN — SODIUM CHLORIDE: 0.9 INJECTION, SOLUTION INTRAVENOUS at 15:35

## 2025-08-27 RX ADMIN — ALLOPURINOL 200 MG: 100 TABLET ORAL at 08:05

## 2025-08-27 RX ADMIN — ACETAMINOPHEN 1000 MG: 500 TABLET ORAL at 05:27

## 2025-08-27 RX ADMIN — FAMOTIDINE 20 MG: 20 TABLET, FILM COATED ORAL at 08:05

## 2025-08-27 RX ADMIN — HYDROCODONE BITARTRATE AND ACETAMINOPHEN 0.5 TABLET: 7.5; 325 TABLET ORAL at 06:37

## 2025-08-27 RX ADMIN — HYDROCODONE BITARTRATE AND ACETAMINOPHEN 0.5 TABLET: 7.5; 325 TABLET ORAL at 18:56

## 2025-08-27 RX ADMIN — AMLODIPINE BESYLATE 5 MG: 5 TABLET ORAL at 08:07

## 2025-08-27 RX ADMIN — OXYCODONE HYDROCHLORIDE 5 MG: 5 TABLET ORAL at 02:11

## 2025-08-27 RX ADMIN — INSULIN LISPRO 2 UNITS: 100 INJECTION, SOLUTION INTRAVENOUS; SUBCUTANEOUS at 11:41

## 2025-08-27 RX ADMIN — HYDROCODONE BITARTRATE AND ACETAMINOPHEN 0.5 TABLET: 7.5; 325 TABLET ORAL at 10:36

## 2025-08-27 RX ADMIN — HYDROCODONE BITARTRATE AND ACETAMINOPHEN 0.5 TABLET: 7.5; 325 TABLET ORAL at 14:56

## 2025-08-27 RX ADMIN — CYCLOBENZAPRINE 10 MG: 10 TABLET, FILM COATED ORAL at 20:59

## 2025-08-27 RX ADMIN — POTASSIUM CHLORIDE 40 MEQ: 1500 TABLET, EXTENDED RELEASE ORAL at 08:06

## 2025-08-27 RX ADMIN — MONTELUKAST 10 MG: 10 TABLET, FILM COATED ORAL at 20:54

## 2025-08-27 RX ADMIN — EMPAGLIFLOZIN 10 MG: 10 TABLET, FILM COATED ORAL at 08:06

## 2025-08-27 RX ADMIN — POTASSIUM CHLORIDE 40 MEQ: 1500 TABLET, EXTENDED RELEASE ORAL at 20:54

## 2025-08-27 RX ADMIN — TORSEMIDE 100 MG: 100 TABLET ORAL at 08:05

## 2025-08-27 RX ADMIN — SODIUM CHLORIDE: 0.9 INJECTION, SOLUTION INTRAVENOUS at 08:12

## 2025-08-27 RX ADMIN — LABETALOL HYDROCHLORIDE 300 MG: 200 TABLET, FILM COATED ORAL at 20:54

## 2025-08-27 ASSESSMENT — PAIN DESCRIPTION - ORIENTATION
ORIENTATION: RIGHT;MID
ORIENTATION: MID
ORIENTATION: LOWER

## 2025-08-27 ASSESSMENT — PAIN - FUNCTIONAL ASSESSMENT
PAIN_FUNCTIONAL_ASSESSMENT: 0-10

## 2025-08-27 ASSESSMENT — PAIN SCALES - GENERAL
PAINLEVEL_OUTOF10: 5
PAINLEVEL_OUTOF10: 4
PAINLEVEL_OUTOF10: 9
PAINLEVEL_OUTOF10: 6
PAINLEVEL_OUTOF10: 7
PAINLEVEL_OUTOF10: 8
PAINLEVEL_OUTOF10: 3
PAINLEVEL_OUTOF10: 5
PAINLEVEL_OUTOF10: 3
PAINLEVEL_OUTOF10: 6

## 2025-08-27 ASSESSMENT — PAIN DESCRIPTION - DESCRIPTORS
DESCRIPTORS: ACHING;DISCOMFORT
DESCRIPTORS: ACHING;DISCOMFORT;SORE
DESCRIPTORS: ACHING;DISCOMFORT;SORE
DESCRIPTORS: ACHING;DISCOMFORT

## 2025-08-27 ASSESSMENT — PAIN DESCRIPTION - LOCATION
LOCATION: ABDOMEN;BACK
LOCATION: ABDOMEN;BACK
LOCATION: ABDOMEN
LOCATION: ABDOMEN;BACK

## 2025-08-28 LAB
ANION GAP SERPL CALCULATED.3IONS-SCNC: 14 MMOL/L (ref 3–16)
BUN SERPL-MCNC: 38 MG/DL (ref 7–20)
CALCIUM SERPL-MCNC: 8.9 MG/DL (ref 8.3–10.6)
CHLORIDE SERPL-SCNC: 101 MMOL/L (ref 99–110)
CO2 SERPL-SCNC: 25 MMOL/L (ref 21–32)
CREAT SERPL-MCNC: 2.2 MG/DL (ref 0.6–1.2)
GFR SERPLBLD CREATININE-BSD FMLA CKD-EPI: 23 ML/MIN/{1.73_M2}
GLUCOSE BLD-MCNC: 124 MG/DL (ref 70–99)
GLUCOSE BLD-MCNC: 127 MG/DL (ref 70–99)
GLUCOSE BLD-MCNC: 144 MG/DL (ref 70–99)
GLUCOSE BLD-MCNC: 169 MG/DL (ref 70–99)
GLUCOSE SERPL-MCNC: 129 MG/DL (ref 70–99)
PERFORMED ON: ABNORMAL
POTASSIUM SERPL-SCNC: 5.1 MMOL/L (ref 3.5–5.1)
SODIUM SERPL-SCNC: 140 MMOL/L (ref 136–145)

## 2025-08-28 PROCEDURE — 6370000000 HC RX 637 (ALT 250 FOR IP): Performed by: PHYSICIAN ASSISTANT

## 2025-08-28 PROCEDURE — 2580000003 HC RX 258: Performed by: PHYSICIAN ASSISTANT

## 2025-08-28 PROCEDURE — 36415 COLL VENOUS BLD VENIPUNCTURE: CPT

## 2025-08-28 PROCEDURE — 80048 BASIC METABOLIC PNL TOTAL CA: CPT

## 2025-08-28 PROCEDURE — 6370000000 HC RX 637 (ALT 250 FOR IP): Performed by: OBSTETRICS & GYNECOLOGY

## 2025-08-28 PROCEDURE — 2700000000 HC OXYGEN THERAPY PER DAY

## 2025-08-28 PROCEDURE — 1200000000 HC SEMI PRIVATE

## 2025-08-28 PROCEDURE — 2500000003 HC RX 250 WO HCPCS: Performed by: PHYSICIAN ASSISTANT

## 2025-08-28 PROCEDURE — 94761 N-INVAS EAR/PLS OXIMETRY MLT: CPT

## 2025-08-28 PROCEDURE — 97530 THERAPEUTIC ACTIVITIES: CPT

## 2025-08-28 RX ORDER — ENOXAPARIN SODIUM 100 MG/ML
30 INJECTION SUBCUTANEOUS 2 TIMES DAILY
Status: DISCONTINUED | OUTPATIENT
Start: 2025-08-28 | End: 2025-08-29 | Stop reason: HOSPADM

## 2025-08-28 RX ADMIN — HYDROCODONE BITARTRATE AND ACETAMINOPHEN 0.5 TABLET: 7.5; 325 TABLET ORAL at 18:22

## 2025-08-28 RX ADMIN — CYCLOBENZAPRINE 10 MG: 10 TABLET, FILM COATED ORAL at 20:36

## 2025-08-28 RX ADMIN — ALLOPURINOL 200 MG: 100 TABLET ORAL at 08:37

## 2025-08-28 RX ADMIN — HYDROCODONE BITARTRATE AND ACETAMINOPHEN 0.5 TABLET: 7.5; 325 TABLET ORAL at 01:38

## 2025-08-28 RX ADMIN — HYDROCODONE BITARTRATE AND ACETAMINOPHEN 0.5 TABLET: 7.5; 325 TABLET ORAL at 05:41

## 2025-08-28 RX ADMIN — FAMOTIDINE 20 MG: 20 TABLET, FILM COATED ORAL at 08:37

## 2025-08-28 RX ADMIN — POTASSIUM CHLORIDE 40 MEQ: 1500 TABLET, EXTENDED RELEASE ORAL at 08:37

## 2025-08-28 RX ADMIN — LABETALOL HYDROCHLORIDE 300 MG: 200 TABLET, FILM COATED ORAL at 08:37

## 2025-08-28 RX ADMIN — SODIUM CHLORIDE: 0.9 INJECTION, SOLUTION INTRAVENOUS at 05:44

## 2025-08-28 RX ADMIN — HYDROCODONE BITARTRATE AND ACETAMINOPHEN 0.5 TABLET: 7.5; 325 TABLET ORAL at 22:30

## 2025-08-28 RX ADMIN — Medication 10 ML: at 20:30

## 2025-08-28 RX ADMIN — HYDROCODONE BITARTRATE AND ACETAMINOPHEN 0.5 TABLET: 7.5; 325 TABLET ORAL at 14:18

## 2025-08-28 RX ADMIN — MONTELUKAST 10 MG: 10 TABLET, FILM COATED ORAL at 20:29

## 2025-08-28 RX ADMIN — HYDROCODONE BITARTRATE AND ACETAMINOPHEN 0.5 TABLET: 7.5; 325 TABLET ORAL at 10:17

## 2025-08-28 RX ADMIN — AMLODIPINE BESYLATE 5 MG: 5 TABLET ORAL at 08:37

## 2025-08-28 RX ADMIN — CYCLOBENZAPRINE 10 MG: 10 TABLET, FILM COATED ORAL at 08:41

## 2025-08-28 RX ADMIN — LABETALOL HYDROCHLORIDE 300 MG: 200 TABLET, FILM COATED ORAL at 20:29

## 2025-08-28 ASSESSMENT — PAIN DESCRIPTION - ORIENTATION
ORIENTATION: MID;RIGHT
ORIENTATION: MID;LOWER
ORIENTATION: MID;LOWER
ORIENTATION: MID;RIGHT
ORIENTATION: MID;RIGHT
ORIENTATION: MID;LOWER

## 2025-08-28 ASSESSMENT — PAIN SCALES - GENERAL
PAINLEVEL_OUTOF10: 2
PAINLEVEL_OUTOF10: 5
PAINLEVEL_OUTOF10: 2
PAINLEVEL_OUTOF10: 5
PAINLEVEL_OUTOF10: 6
PAINLEVEL_OUTOF10: 4
PAINLEVEL_OUTOF10: 2
PAINLEVEL_OUTOF10: 5
PAINLEVEL_OUTOF10: 5
PAINLEVEL_OUTOF10: 2
PAINLEVEL_OUTOF10: 4

## 2025-08-28 ASSESSMENT — PAIN DESCRIPTION - DESCRIPTORS
DESCRIPTORS: ACHING;DISCOMFORT;SORE
DESCRIPTORS: ACHING;DISCOMFORT
DESCRIPTORS: ACHING;DISCOMFORT;SORE
DESCRIPTORS: ACHING;DISCOMFORT;SORE
DESCRIPTORS: ACHING
DESCRIPTORS: ACHING;DISCOMFORT;SORE
DESCRIPTORS: ACHING;DISCOMFORT;SORE

## 2025-08-28 ASSESSMENT — PAIN DESCRIPTION - PAIN TYPE
TYPE: SURGICAL PAIN;CHRONIC PAIN
TYPE: SURGICAL PAIN;CHRONIC PAIN

## 2025-08-28 ASSESSMENT — PAIN - FUNCTIONAL ASSESSMENT
PAIN_FUNCTIONAL_ASSESSMENT: 0-10
PAIN_FUNCTIONAL_ASSESSMENT: ACTIVITIES ARE NOT PREVENTED
PAIN_FUNCTIONAL_ASSESSMENT: 0-10
PAIN_FUNCTIONAL_ASSESSMENT: ACTIVITIES ARE NOT PREVENTED

## 2025-08-28 ASSESSMENT — PAIN DESCRIPTION - LOCATION
LOCATION: BACK;INCISION
LOCATION: BACK
LOCATION: ABDOMEN
LOCATION: BACK
LOCATION: BACK;ABDOMEN
LOCATION: ABDOMEN;BACK
LOCATION: BACK;ABDOMEN
LOCATION: BACK

## 2025-08-29 VITALS
RESPIRATION RATE: 20 BRPM | OXYGEN SATURATION: 93 % | TEMPERATURE: 98.6 F | HEIGHT: 63 IN | DIASTOLIC BLOOD PRESSURE: 65 MMHG | HEART RATE: 70 BPM | WEIGHT: 293 LBS | SYSTOLIC BLOOD PRESSURE: 128 MMHG | BODY MASS INDEX: 51.91 KG/M2

## 2025-08-29 LAB
ANION GAP SERPL CALCULATED.3IONS-SCNC: 12 MMOL/L (ref 3–16)
BUN SERPL-MCNC: 33 MG/DL (ref 7–20)
CALCIUM SERPL-MCNC: 9.1 MG/DL (ref 8.3–10.6)
CHLORIDE SERPL-SCNC: 102 MMOL/L (ref 99–110)
CO2 SERPL-SCNC: 29 MMOL/L (ref 21–32)
CREAT SERPL-MCNC: 1.9 MG/DL (ref 0.6–1.2)
DEPRECATED RDW RBC AUTO: 21.2 % (ref 12.4–15.4)
GFR SERPLBLD CREATININE-BSD FMLA CKD-EPI: 28 ML/MIN/{1.73_M2}
GLUCOSE BLD-MCNC: 119 MG/DL (ref 70–99)
GLUCOSE BLD-MCNC: 136 MG/DL (ref 70–99)
GLUCOSE SERPL-MCNC: 131 MG/DL (ref 70–99)
HCT VFR BLD AUTO: 40 % (ref 36–48)
HGB BLD-MCNC: 12.5 G/DL (ref 12–16)
MCH RBC QN AUTO: 26.9 PG (ref 26–34)
MCHC RBC AUTO-ENTMCNC: 31.4 G/DL (ref 31–36)
MCV RBC AUTO: 85.6 FL (ref 80–100)
PERFORMED ON: ABNORMAL
PERFORMED ON: ABNORMAL
PLATELET # BLD AUTO: 175 K/UL (ref 135–450)
PMV BLD AUTO: 8.1 FL (ref 5–10.5)
POTASSIUM SERPL-SCNC: 3.7 MMOL/L (ref 3.5–5.1)
RBC # BLD AUTO: 4.67 M/UL (ref 4–5.2)
SODIUM SERPL-SCNC: 143 MMOL/L (ref 136–145)
WBC # BLD AUTO: 8.2 K/UL (ref 4–11)

## 2025-08-29 PROCEDURE — 2500000003 HC RX 250 WO HCPCS: Performed by: PHYSICIAN ASSISTANT

## 2025-08-29 PROCEDURE — 6370000000 HC RX 637 (ALT 250 FOR IP): Performed by: PHYSICIAN ASSISTANT

## 2025-08-29 PROCEDURE — 6370000000 HC RX 637 (ALT 250 FOR IP): Performed by: OBSTETRICS & GYNECOLOGY

## 2025-08-29 PROCEDURE — 36415 COLL VENOUS BLD VENIPUNCTURE: CPT

## 2025-08-29 PROCEDURE — 80048 BASIC METABOLIC PNL TOTAL CA: CPT

## 2025-08-29 PROCEDURE — 85027 COMPLETE CBC AUTOMATED: CPT

## 2025-08-29 PROCEDURE — 97110 THERAPEUTIC EXERCISES: CPT

## 2025-08-29 RX ORDER — DOCUSATE SODIUM 100 MG/1
100 CAPSULE, LIQUID FILLED ORAL 2 TIMES DAILY
Qty: 60 CAPSULE | Refills: 0 | Status: SHIPPED | OUTPATIENT
Start: 2025-08-29 | End: 2025-09-28

## 2025-08-29 RX ADMIN — HYDROCODONE BITARTRATE AND ACETAMINOPHEN 0.5 TABLET: 7.5; 325 TABLET ORAL at 07:46

## 2025-08-29 RX ADMIN — POTASSIUM CHLORIDE 40 MEQ: 1500 TABLET, EXTENDED RELEASE ORAL at 10:21

## 2025-08-29 RX ADMIN — HYDROCODONE BITARTRATE AND ACETAMINOPHEN 0.5 TABLET: 7.5; 325 TABLET ORAL at 11:44

## 2025-08-29 RX ADMIN — FAMOTIDINE 20 MG: 20 TABLET, FILM COATED ORAL at 10:21

## 2025-08-29 RX ADMIN — Medication 10 ML: at 10:24

## 2025-08-29 RX ADMIN — LABETALOL HYDROCHLORIDE 300 MG: 200 TABLET, FILM COATED ORAL at 10:21

## 2025-08-29 RX ADMIN — AMLODIPINE BESYLATE 5 MG: 5 TABLET ORAL at 10:20

## 2025-08-29 RX ADMIN — ALLOPURINOL 200 MG: 100 TABLET ORAL at 10:21

## 2025-08-29 RX ADMIN — HYDROCODONE BITARTRATE AND ACETAMINOPHEN 0.5 TABLET: 7.5; 325 TABLET ORAL at 02:35

## 2025-08-29 ASSESSMENT — PAIN DESCRIPTION - DESCRIPTORS
DESCRIPTORS: ACHING
DESCRIPTORS: ACHING
DESCRIPTORS: ACHING;TENDER

## 2025-08-29 ASSESSMENT — PAIN SCALES - GENERAL
PAINLEVEL_OUTOF10: 5
PAINLEVEL_OUTOF10: 6
PAINLEVEL_OUTOF10: 5

## 2025-08-29 ASSESSMENT — PAIN - FUNCTIONAL ASSESSMENT
PAIN_FUNCTIONAL_ASSESSMENT: 0-10
PAIN_FUNCTIONAL_ASSESSMENT: PREVENTS OR INTERFERES SOME ACTIVE ACTIVITIES AND ADLS
PAIN_FUNCTIONAL_ASSESSMENT: 0-10
PAIN_FUNCTIONAL_ASSESSMENT: PREVENTS OR INTERFERES SOME ACTIVE ACTIVITIES AND ADLS

## 2025-08-29 ASSESSMENT — PAIN DESCRIPTION - LOCATION
LOCATION: ABDOMEN;BACK
LOCATION: BACK
LOCATION: ABDOMEN

## 2025-08-29 ASSESSMENT — PAIN DESCRIPTION - ORIENTATION
ORIENTATION: MID
ORIENTATION: MID

## 2025-09-02 ENCOUNTER — TELEPHONE (OUTPATIENT)
Dept: PRIMARY CARE CLINIC | Age: 70
End: 2025-09-02

## (undated) DEVICE — SYRINGE MED 20ML STD CLR PLAS LUERLOCK TIP N CTRL DISP

## (undated) DEVICE — SUTURE VCRL SZ 3-0 L27IN ABSRB UD L26MM SH 1/2 CIR J416H

## (undated) DEVICE — SNARE ENDOSCP L240CM OD24MM LOOP W10MM RND INSUL STIFF BRAID

## (undated) DEVICE — SUTURE SZ 0 27IN 5/8 CIR UR-6  TAPER PT VIOLET ABSRB VICRYL J603H

## (undated) DEVICE — SUTURE PROL SZ 0 L18IN NONABSORBABLE BLU L36MM CT-1 1/2 CIR C821G

## (undated) DEVICE — SPONGE LAP W18XL18IN WHT COT 4 PLY FLD STRUNG RADPQ DISP ST 2 PER PACK

## (undated) DEVICE — SUTURE PERMAHAND SZ 3-0 L30IN NONABSORBABLE BLK SILK BRAID A304H

## (undated) DEVICE — STAPLER INT STPL LN H1X90MM TISS THK1-2.5MM REG VERY THCK TLH90] JNJ ETHICON INC]

## (undated) DEVICE — CHLORAPREP 26ML ORANGE

## (undated) DEVICE — TRAP POLYP ETRAP

## (undated) DEVICE — SEALER LAP L37CM MARYLAND JAW OPN NANO COAT MULTIFUNCTIONAL

## (undated) DEVICE — LAPAROSCOPY PK

## (undated) DEVICE — STAPLER INT L60MM DIA12MM STD TISS TI LNAR CUT LN 6 ROW

## (undated) DEVICE — SUTURE VICRYL SZ 0 L36IN ABSRB UD CT-1 L36MM 1/2 CIR TAPR PNT VCP946H

## (undated) DEVICE — NEEDLE HYPO 22GA L1 1/2IN PIVOTING SHLD FOR LUERLOCK SYR

## (undated) DEVICE — NEEDLE 25GAX5.0MM INJ CARR LOCKE

## (undated) DEVICE — CATHETER ANGIO 5FR L110CM COR NYL POLYUR S STL RAD TIGER 4

## (undated) DEVICE — NEEDLE INSUF 13GA L120MM

## (undated) DEVICE — SUTURE PERMAHAND SZ 3-0 L18IN NONABSORBABLE BLK L26MM SH C013D

## (undated) DEVICE — APPLIER CLP M/L SHFT DIA5MM 15 LIG LIGAMAX 5

## (undated) DEVICE — Device

## (undated) DEVICE — SEAL UNIVERSAL 5-12MM

## (undated) DEVICE — SUTURE PDS II SZ 0 L60IN ABSRB VLT L48MM CTX 1/2 CIR Z990G

## (undated) DEVICE — PAD DEFIB AD RADIOTRANS PHY

## (undated) DEVICE — PENCIL ES L3M BTTN SWCH S STL HEX LOK BLDE ELECTRD HOLSTER

## (undated) DEVICE — RELOAD STPL L75MM OPN H3.8MM CLS 1.5MM WIRE DIA0.2MM REG

## (undated) DEVICE — TUBING SUCT L12FT DIA0.1875IN CONN UNIV W/ STR RIB CONN

## (undated) DEVICE — ENDO CARRY-ON PROCEDURE KIT: Brand: ENDO CARRY-ON PROCEDURE KIT

## (undated) DEVICE — KIT OR ROOM TURNOVER W/STRAP

## (undated) DEVICE — MOUTHPIECE ENDOSCP L CTRL OPN AND SIDE PORTS DISP

## (undated) DEVICE — PUMP SUC IRR TBNG L10FT W/ HNDPC ASSEMB STRYKEFLOW 2

## (undated) DEVICE — CANISTER, RIGID, 1200CC: Brand: MEDLINE INDUSTRIES, INC.

## (undated) DEVICE — WOUND RETRACTOR AND PROTECTOR: Brand: ALEXIS WOUND PROTECTOR-RETRACTOR

## (undated) DEVICE — SOLUTION ANTIFOG VIS SYS CLEARIFY LAPSCP

## (undated) DEVICE — TROCAR LAP L100MM DIA5MM ABD Z-THREAD 1ST ENTRY KII FIOS

## (undated) DEVICE — SUTURE VICRYL + SZ 3-0 L18IN ABSRB UD SH 1/2 CIR TAPERCUT NDL VCP864D

## (undated) DEVICE — FORCEPS BX 240CM 2.4MM L NDL RAD JAW 4 M00513334

## (undated) DEVICE — TOWEL SURG STERILE W17XL27IN GRN DLX NONFENESTRATED RADPQ PREWASHED

## (undated) DEVICE — SYRINGE IRRIG 60ML SFT PLIABLE BLB EZ TO GRP 1 HND USE W/

## (undated) DEVICE — FORCEPS SURG DIA8MM BPLR FEN ENDOWRIST

## (undated) DEVICE — SCISSORS SURG DIA8MM MPLR CRV HOT SHR

## (undated) DEVICE — CONTAINER SPEC 480ML CLR POLYSTYR 10% NEUT BUFF FRMLN ZN

## (undated) DEVICE — NEEDLE SPNL L3.5IN PNK HUB S STL REG WALL FIT STYL W/ QNCKE

## (undated) DEVICE — TOTAL TRAY, 16FR 10ML SIL FOLEY, URN: Brand: MEDLINE

## (undated) DEVICE — SKIN AFFIX SURG ADHESIVE 72/CS 0.55ML: Brand: MEDLINE

## (undated) DEVICE — CANNULA SUCT RIG OPN STR TIP W/O CTRL VENT YANKAUERS

## (undated) DEVICE — GARMENT COMPR STD FOR 17IN CALF UNIF THER FLOTRN

## (undated) DEVICE — GLOVE SURG SZ 7 L12IN FNGR THK79MIL GRN LTX FREE

## (undated) DEVICE — SUTURE VICRYL + SZ 0 L27IN ABSRB UD L36MM CT-1 1/2 CIR VCPP41D

## (undated) DEVICE — SUTURE ABSORBABLE BRAIDED 2-0 CT-1 27 IN UD VICRYL J259H

## (undated) DEVICE — ELECTRODE BLDE L6.5IN CAUT EXT DISP

## (undated) DEVICE — THE DISPOSABLE ROTH NET FOREIGN BODY STANDARD RETRIEVAL DEVICE IS USED IN THE ENDOSCOPIC RETRIEVAL OF FOREIGN BODY, FOOD BOLUS AND EXCISED TISSUE SUCH AS POLYPS.: Brand: ROTH NET

## (undated) DEVICE — ADHESIVE SKIN CLOSURE TOP 0.8 CC PREM PUR LIQUIBAND RAPID LF

## (undated) DEVICE — DRAPE EQUIP CLMN ROBOTIC DISP DA VINCI XI

## (undated) DEVICE — COVER LT HNDL BLU PLAS

## (undated) DEVICE — RETRACTOR LAP L SHTH 25CM INCIS RANG 9-14CM WND PROTCT FLX

## (undated) DEVICE — SUTURE MONOCRYL + SZ 4-0 L18IN ABSRB UD L19MM PS-2 3/8 CIR MCP496G

## (undated) DEVICE — SUTURE PERMAHAND SZ 2-0 L18IN NONABSORBABLE BLK L26MM SH C012D

## (undated) DEVICE — [HIGH FLOW INSUFFLATOR,  DO NOT USE IF PACKAGE IS DAMAGED,  KEEP DRY,  KEEP AWAY FROM SUNLIGHT,  PROTECT FROM HEAT AND RADIOACTIVE SOURCES.]: Brand: PNEUMOSURE

## (undated) DEVICE — CATHETER ANGIO 5FR L100CM GRY S STL NYL JL3.5 3 SEG BRAID L

## (undated) DEVICE — SPONGE LAP X RAY DETECTABLE 18X4 IN 4 PLY COTTON WHT STERIL

## (undated) DEVICE — 3M™ IOBAN™ 2 ANTIMICROBIAL INCISE DRAPE 6650EZ: Brand: IOBAN™ 2

## (undated) DEVICE — STAPLER INT L75MM CUT LN L73MM STPL LN L77MM BLU B FRM 8

## (undated) DEVICE — FORCEPS SURG DIA8MM PROGRASP ENDOSWITCH

## (undated) DEVICE — GOWN,AURORA,NONREINF,RAGLAN,XXL,STERILE: Brand: MEDLINE

## (undated) DEVICE — DRAPE,LAP,CHOLE,W/TROUGHS,STERILE: Brand: MEDLINE

## (undated) DEVICE — SET IRRIG L81IN DIA0.195IN UROLOGY Y TYP 2 LD W/ DRIP CHMBR

## (undated) DEVICE — AGENT HEMSTAT 3GM OXIDIZED REGENERATED CELOS ABSRB FOR CONT (ORDER MULTIPLES OF 5EA)

## (undated) DEVICE — DRAPE ARM W21XH19XL10.5IN DA VINCI XI INTUITIVE SURGICAL

## (undated) DEVICE — DRAPE SURG IOBAN W17XL23IN FAB ANTIMIC GEN INCIS LNR FULL W HNDL

## (undated) DEVICE — TROCAR ENDOSCP L100MM DIA5MM BLDELSS STBL SL OBT RADLUC

## (undated) DEVICE — SNARE ENDOSCP DIA9MM SHTH DIA2.4MM CLD FOR POLYP EXACTO

## (undated) DEVICE — DEVICE COMPR L L29CM RAD ART TRNSPAR 2 COMPR BLLN TR BND

## (undated) DEVICE — SUTURE ABSORBABLE MONOFILAMENT 0 CTX 60 IN VIO PDS + PDP990G

## (undated) DEVICE — DRAPE BRACH ANGIO W38XL44IN POLYPR SMS FAB

## (undated) DEVICE — DRIVER NDL DIA8MM MEGA SUTURECUT ENDOWRIST

## (undated) DEVICE — SPONGE LAP W18XL18IN WHT COT 4 PLY FLD STRUNG RADPQ DISP ST

## (undated) DEVICE — BOWL MED L 32OZ PLAS W/ MOLD GRAD EZ OPN PEEL PCH

## (undated) DEVICE — BLADE ES L6IN ELASTOMERIC COAT EXT DURABLE BEND UPTO 90DEG

## (undated) DEVICE — PORT ACCS L120MM 8MM LO PROF OBT W/ BLDELSS OPT TIP AIRSEAL

## (undated) DEVICE — TROCAR ENDOSCP L100MM DIA12MM DIL TIP OBT RADLUC STBL SL

## (undated) DEVICE — OBTURATOR ROBOTIC DIA8MM STD OPT BLDELSS

## (undated) DEVICE — PENCIL SMK EVAC BLADE COAT 70 MM BUTTON SWITCH NEPTUNE E-SEP

## (undated) DEVICE — ELECTRODE PT RET AD L9FT HI MOIST COND ADH HYDRGEL CORDED

## (undated) DEVICE — GLOVE SURG SZ 8 L12IN FNGR THK79MIL GRN LTX FREE

## (undated) DEVICE — KIT INTRO 6FR L10CM MINI WIRE L45CM DIA0.021IN NDL 21GA ANT

## (undated) DEVICE — NEEDLE INSUF L120MM DIA2MM DISP FOR PNEUMOPERI ENDOPATH

## (undated) DEVICE — ENDOSCOPY KIT: Brand: MEDLINE INDUSTRIES, INC.

## (undated) DEVICE — SUTURE VCRL SZ 4-0 L18IN ABSRB UD L19MM PS-2 3/8 CIR PRIM J496H

## (undated) DEVICE — GLOVE SURG SZ 7 L12IN FNGR THK91MIL STD CRM LTX FREE

## (undated) DEVICE — GLOVE SURG SZ 75 L12IN FNGR THK87MIL WHT LTX FREE

## (undated) DEVICE — BINDER ABD UNIV H9IN WAIST 45-62IN E SFT COT PREM 3 PNL

## (undated) DEVICE — DRAPE SURG ST 3/4 SHEET W53XL77IN STD POLYPR 3 QTR DISP

## (undated) DEVICE — COVER TIP DIA8MM DA VINCI SI XI X ENDOWRIST

## (undated) DEVICE — RELOAD STPL H35XL60MM BLU REG B FORM NAT ARTC ECHELON

## (undated) DEVICE — GUIDEWIRE VASC L260CM DIA0.035IN RAD 3MM J TIP L7CM PTFE

## (undated) DEVICE — SOLUTION IV IRRIG POUR BRL 0.9% SODIUM CHL 2F7124

## (undated) DEVICE — SUTURE PDS + SZ 0 L27IN ABSRB VLT L26MM CT 2 1 2 CIR PDP334H

## (undated) DEVICE — SUTURE PERMAHAND SZ 2-0 L30IN NONABSORBABLE BLK SILK W/O A305H

## (undated) DEVICE — TROCAR ENDOSCP L100MM DIA5MM DIL TIP STBL SL W OPTVW

## (undated) DEVICE — KIT AT-X65 ANGIOTOUCH HAND CONTROLLER

## (undated) DEVICE — CANNULA NSL W/ O2 DEL AD 4 M

## (undated) DEVICE — SET TB TRILUMEN FLTR

## (undated) DEVICE — SUTURE PROL SZ 1 L30IN NONABSORBABLE BLU L36MM CT-1 1/2 CIR 8425H